# Patient Record
Sex: FEMALE | Race: ASIAN | NOT HISPANIC OR LATINO | URBAN - METROPOLITAN AREA
[De-identification: names, ages, dates, MRNs, and addresses within clinical notes are randomized per-mention and may not be internally consistent; named-entity substitution may affect disease eponyms.]

---

## 2014-10-02 RX ORDER — FUROSEMIDE 40 MG
1 TABLET ORAL
Qty: 0 | Refills: 0 | COMMUNITY
Start: 2014-10-02

## 2016-10-12 RX ORDER — HYDRALAZINE HCL 50 MG
1 TABLET ORAL
Qty: 0 | Refills: 0 | COMMUNITY
Start: 2016-10-12 | End: 2016-11-11

## 2017-01-25 ENCOUNTER — INPATIENT (INPATIENT)
Facility: HOSPITAL | Age: 77
LOS: 3 days | Discharge: HOME | DRG: 292 | End: 2017-01-29
Attending: FAMILY MEDICINE | Admitting: FAMILY MEDICINE
Payer: MEDICAID

## 2017-01-25 VITALS
DIASTOLIC BLOOD PRESSURE: 100 MMHG | SYSTOLIC BLOOD PRESSURE: 196 MMHG | TEMPERATURE: 99 F | HEART RATE: 106 BPM | WEIGHT: 178.57 LBS | RESPIRATION RATE: 22 BRPM | OXYGEN SATURATION: 96 %

## 2017-01-25 DIAGNOSIS — Z95.4 PRESENCE OF OTHER HEART-VALVE REPLACEMENT: Chronic | ICD-10-CM

## 2017-01-25 DIAGNOSIS — I50.9 HEART FAILURE, UNSPECIFIED: ICD-10-CM

## 2017-01-25 LAB
ALBUMIN SERPL ELPH-MCNC: 3.6 G/DL — SIGNIFICANT CHANGE UP (ref 3.3–5)
ALP SERPL-CCNC: 76 U/L — SIGNIFICANT CHANGE UP (ref 30–120)
ALT FLD-CCNC: 22 U/L DA — SIGNIFICANT CHANGE UP (ref 10–60)
ANION GAP SERPL CALC-SCNC: 12 MMOL/L — SIGNIFICANT CHANGE UP (ref 5–17)
APTT BLD: 44.1 SEC — HIGH (ref 27.5–37.4)
AST SERPL-CCNC: 22 U/L — SIGNIFICANT CHANGE UP (ref 10–40)
BASOPHILS # BLD AUTO: 0.1 K/UL — SIGNIFICANT CHANGE UP (ref 0–0.2)
BASOPHILS NFR BLD AUTO: 1 % — SIGNIFICANT CHANGE UP (ref 0–2)
BILIRUB SERPL-MCNC: 0.5 MG/DL — SIGNIFICANT CHANGE UP (ref 0.2–1.2)
BUN SERPL-MCNC: 18 MG/DL — SIGNIFICANT CHANGE UP (ref 7–23)
CALCIUM SERPL-MCNC: 9.1 MG/DL — SIGNIFICANT CHANGE UP (ref 8.4–10.5)
CHLORIDE SERPL-SCNC: 103 MMOL/L — SIGNIFICANT CHANGE UP (ref 96–108)
CK MB CFR SERPL CALC: 2.4 NG/ML — SIGNIFICANT CHANGE UP (ref 0–3.6)
CK SERPL-CCNC: 89 U/L — SIGNIFICANT CHANGE UP (ref 26–192)
CO2 SERPL-SCNC: 25 MMOL/L — SIGNIFICANT CHANGE UP (ref 22–31)
CREAT SERPL-MCNC: 0.96 MG/DL — SIGNIFICANT CHANGE UP (ref 0.5–1.3)
EOSINOPHIL # BLD AUTO: 0.2 K/UL — SIGNIFICANT CHANGE UP (ref 0–0.5)
EOSINOPHIL NFR BLD AUTO: 1.6 % — SIGNIFICANT CHANGE UP (ref 0–6)
GLUCOSE SERPL-MCNC: 157 MG/DL — HIGH (ref 70–99)
HCT VFR BLD CALC: 36.9 % — SIGNIFICANT CHANGE UP (ref 34.5–45)
HGB BLD-MCNC: 11.6 G/DL — SIGNIFICANT CHANGE UP (ref 11.5–15.5)
INR BLD: 3.02 RATIO — HIGH (ref 0.88–1.16)
LACTATE SERPL-SCNC: 1.2 MMOL/L — SIGNIFICANT CHANGE UP (ref 0.7–2)
LYMPHOCYTES # BLD AUTO: 1.5 K/UL — SIGNIFICANT CHANGE UP (ref 1–3.3)
LYMPHOCYTES # BLD AUTO: 10.5 % — LOW (ref 13–44)
MCHC RBC-ENTMCNC: 25.6 PG — LOW (ref 27–34)
MCHC RBC-ENTMCNC: 31.5 GM/DL — LOW (ref 32–36)
MCV RBC AUTO: 81.1 FL — SIGNIFICANT CHANGE UP (ref 80–100)
MONOCYTES # BLD AUTO: 0.9 K/UL — SIGNIFICANT CHANGE UP (ref 0–0.9)
MONOCYTES NFR BLD AUTO: 6.2 % — SIGNIFICANT CHANGE UP (ref 2–14)
NEUTROPHILS # BLD AUTO: 11.7 K/UL — HIGH (ref 1.8–7.4)
NEUTROPHILS NFR BLD AUTO: 80.7 % — HIGH (ref 43–77)
NT-PROBNP SERPL-SCNC: 1424 PG/ML — HIGH (ref 0–450)
PLATELET # BLD AUTO: 277 K/UL — SIGNIFICANT CHANGE UP (ref 150–400)
POTASSIUM SERPL-MCNC: 3.8 MMOL/L — SIGNIFICANT CHANGE UP (ref 3.5–5.3)
POTASSIUM SERPL-SCNC: 3.8 MMOL/L — SIGNIFICANT CHANGE UP (ref 3.5–5.3)
PROT SERPL-MCNC: 7.4 G/DL — SIGNIFICANT CHANGE UP (ref 6–8.3)
PROTHROM AB SERPL-ACNC: 34.2 SEC — HIGH (ref 10–13.1)
RBC # BLD: 4.55 M/UL — SIGNIFICANT CHANGE UP (ref 3.8–5.2)
RBC # FLD: 13.6 % — SIGNIFICANT CHANGE UP (ref 10.3–14.5)
SODIUM SERPL-SCNC: 140 MMOL/L — SIGNIFICANT CHANGE UP (ref 135–145)
TROPONIN I SERPL-MCNC: 0.06 NG/ML — HIGH (ref 0.02–0.06)
WBC # BLD: 14.5 K/UL — HIGH (ref 3.8–10.5)
WBC # FLD AUTO: 14.5 K/UL — HIGH (ref 3.8–10.5)

## 2017-01-25 PROCEDURE — 93010 ELECTROCARDIOGRAM REPORT: CPT

## 2017-01-25 PROCEDURE — 99285 EMERGENCY DEPT VISIT HI MDM: CPT

## 2017-01-25 PROCEDURE — 71020: CPT | Mod: 26

## 2017-01-25 NOTE — H&P ADULT. - PSH
H/O aortic valve replacement  9/22/14  H/O mitral valve replacement  9/22/14  S/P angioplasty with stent  2011  S/P CABG x 1  (2000)

## 2017-01-25 NOTE — H&P ADULT. - FAMILY HISTORY
Mother  Still living? No  Family history of acute myocardial infarction, Age at diagnosis: Age Unknown

## 2017-01-25 NOTE — H&P ADULT. - PMH
Afib  (on Warfarin)  CAD (Coronary Artery Disease)    CHF (congestive heart failure)    CVA (Cerebral Infarction)  2011  Gout    HTN (Hypertension)    Mitral Regurgitation    Upper GI bleed

## 2017-01-25 NOTE — H&P ADULT. - SCARS
3 mini-lap 1 rt & 2 middle infra-thorax upper abdomen. Left lower inner leg like vein strapping for CABG/location

## 2017-01-25 NOTE — H&P ADULT. - HISTORY OF PRESENT ILLNESS
75 yo woman, obese, c/o SOB x 4 hrs, /s C/P, cough, runny nose, fever, N/V/D, no new leg/ calf pain. Feels like what has happened to her in the past, though she is unsure what the reason of such symptoms means. SOB is worst on excerption. PH (+) HTN, CHF, At Fib, CVA, CAD- CABG, multi- valvular HD, GBD

## 2017-01-26 DIAGNOSIS — I25.810 ATHEROSCLEROSIS OF CORONARY ARTERY BYPASS GRAFT(S) WITHOUT ANGINA PECTORIS: ICD-10-CM

## 2017-01-26 DIAGNOSIS — I48.2 CHRONIC ATRIAL FIBRILLATION: ICD-10-CM

## 2017-01-26 DIAGNOSIS — R79.89 OTHER SPECIFIED ABNORMAL FINDINGS OF BLOOD CHEMISTRY: ICD-10-CM

## 2017-01-26 DIAGNOSIS — J18.9 PNEUMONIA, UNSPECIFIED ORGANISM: ICD-10-CM

## 2017-01-26 DIAGNOSIS — I50.9 HEART FAILURE, UNSPECIFIED: ICD-10-CM

## 2017-01-26 DIAGNOSIS — I10 ESSENTIAL (PRIMARY) HYPERTENSION: ICD-10-CM

## 2017-01-26 LAB
ANION GAP SERPL CALC-SCNC: 8 MMOL/L — SIGNIFICANT CHANGE UP (ref 5–17)
BUN SERPL-MCNC: 17 MG/DL — SIGNIFICANT CHANGE UP (ref 7–23)
CALCIUM SERPL-MCNC: 8.8 MG/DL — SIGNIFICANT CHANGE UP (ref 8.4–10.5)
CHLORIDE SERPL-SCNC: 104 MMOL/L — SIGNIFICANT CHANGE UP (ref 96–108)
CHOLEST SERPL-MCNC: 105 MG/DL — SIGNIFICANT CHANGE UP (ref 10–199)
CK MB BLD-MCNC: 2.8 % — SIGNIFICANT CHANGE UP (ref 0–3.5)
CK MB CFR SERPL CALC: 2.1 NG/ML — SIGNIFICANT CHANGE UP (ref 0–3.6)
CK SERPL-CCNC: 74 U/L — SIGNIFICANT CHANGE UP (ref 26–192)
CO2 SERPL-SCNC: 28 MMOL/L — SIGNIFICANT CHANGE UP (ref 22–31)
CREAT SERPL-MCNC: 0.9 MG/DL — SIGNIFICANT CHANGE UP (ref 0.5–1.3)
CRP SERPL-MCNC: 1 MG/DL — HIGH (ref 0–0.4)
GLUCOSE SERPL-MCNC: 112 MG/DL — HIGH (ref 70–99)
HBA1C BLD-MCNC: 6.3 % — HIGH (ref 4–5.6)
HCT VFR BLD CALC: 34.4 % — LOW (ref 34.5–45)
HDLC SERPL-MCNC: 45 MG/DL — SIGNIFICANT CHANGE UP (ref 40–125)
HGB BLD-MCNC: 10.7 G/DL — LOW (ref 11.5–15.5)
INR BLD: 3.11 RATIO — HIGH (ref 0.88–1.16)
LIPID PNL WITH DIRECT LDL SERPL: 49 MG/DL — SIGNIFICANT CHANGE UP
MCHC RBC-ENTMCNC: 25.8 PG — LOW (ref 27–34)
MCHC RBC-ENTMCNC: 31.2 GM/DL — LOW (ref 32–36)
MCV RBC AUTO: 82.7 FL — SIGNIFICANT CHANGE UP (ref 80–100)
PLATELET # BLD AUTO: 236 K/UL — SIGNIFICANT CHANGE UP (ref 150–400)
POTASSIUM SERPL-MCNC: 4.1 MMOL/L — SIGNIFICANT CHANGE UP (ref 3.5–5.3)
POTASSIUM SERPL-SCNC: 4.1 MMOL/L — SIGNIFICANT CHANGE UP (ref 3.5–5.3)
PROTHROM AB SERPL-ACNC: 35.2 SEC — HIGH (ref 10–13.1)
RBC # BLD: 4.16 M/UL — SIGNIFICANT CHANGE UP (ref 3.8–5.2)
RBC # FLD: 13.6 % — SIGNIFICANT CHANGE UP (ref 10.3–14.5)
SODIUM SERPL-SCNC: 140 MMOL/L — SIGNIFICANT CHANGE UP (ref 135–145)
TOTAL CHOLESTEROL/HDL RATIO MEASUREMENT: 2.3 RATIO — LOW (ref 3.3–7.1)
TRIGL SERPL-MCNC: 55 MG/DL — SIGNIFICANT CHANGE UP (ref 10–149)
TROPONIN I SERPL-MCNC: 0.01 NG/ML — LOW (ref 0.02–0.06)
TROPONIN I SERPL-MCNC: 0.04 NG/ML — SIGNIFICANT CHANGE UP (ref 0.02–0.06)
WBC # BLD: 10.9 K/UL — HIGH (ref 3.8–10.5)
WBC # FLD AUTO: 10.9 K/UL — HIGH (ref 3.8–10.5)

## 2017-01-26 PROCEDURE — 93010 ELECTROCARDIOGRAM REPORT: CPT

## 2017-01-26 PROCEDURE — 93306 TTE W/DOPPLER COMPLETE: CPT | Mod: 26

## 2017-01-26 PROCEDURE — 76604 US EXAM CHEST: CPT | Mod: 26

## 2017-01-26 RX ORDER — ALBUTEROL 90 UG/1
2 AEROSOL, METERED ORAL EVERY 6 HOURS
Qty: 0 | Refills: 0 | Status: DISCONTINUED | OUTPATIENT
Start: 2017-01-26 | End: 2017-01-29

## 2017-01-26 RX ORDER — DILTIAZEM HCL 120 MG
180 CAPSULE, EXT RELEASE 24 HR ORAL DAILY
Qty: 0 | Refills: 0 | Status: DISCONTINUED | OUTPATIENT
Start: 2017-01-26 | End: 2017-01-29

## 2017-01-26 RX ORDER — TIOTROPIUM BROMIDE 18 UG/1
1 CAPSULE ORAL; RESPIRATORY (INHALATION) DAILY
Qty: 0 | Refills: 0 | Status: DISCONTINUED | OUTPATIENT
Start: 2017-01-26 | End: 2017-01-29

## 2017-01-26 RX ORDER — FUROSEMIDE 40 MG
40 TABLET ORAL DAILY
Qty: 0 | Refills: 0 | Status: DISCONTINUED | OUTPATIENT
Start: 2017-01-26 | End: 2017-01-29

## 2017-01-26 RX ORDER — PANTOPRAZOLE SODIUM 20 MG/1
40 TABLET, DELAYED RELEASE ORAL
Qty: 0 | Refills: 0 | Status: DISCONTINUED | OUTPATIENT
Start: 2017-01-26 | End: 2017-01-29

## 2017-01-26 RX ORDER — POTASSIUM CHLORIDE 20 MEQ
20 PACKET (EA) ORAL DAILY
Qty: 0 | Refills: 0 | Status: DISCONTINUED | OUTPATIENT
Start: 2017-01-26 | End: 2017-01-29

## 2017-01-26 RX ORDER — METFORMIN HYDROCHLORIDE 850 MG/1
500 TABLET ORAL
Qty: 0 | Refills: 0 | Status: DISCONTINUED | OUTPATIENT
Start: 2017-01-26 | End: 2017-01-29

## 2017-01-26 RX ORDER — ENOXAPARIN SODIUM 100 MG/ML
40 INJECTION SUBCUTANEOUS EVERY 24 HOURS
Qty: 0 | Refills: 0 | Status: DISCONTINUED | OUTPATIENT
Start: 2017-01-26 | End: 2017-01-27

## 2017-01-26 RX ORDER — SIMVASTATIN 20 MG/1
40 TABLET, FILM COATED ORAL AT BEDTIME
Qty: 0 | Refills: 0 | Status: DISCONTINUED | OUTPATIENT
Start: 2017-01-26 | End: 2017-01-29

## 2017-01-26 RX ORDER — HYDRALAZINE HCL 50 MG
50 TABLET ORAL EVERY 8 HOURS
Qty: 0 | Refills: 0 | Status: DISCONTINUED | OUTPATIENT
Start: 2017-01-26 | End: 2017-01-29

## 2017-01-26 RX ADMIN — ALBUTEROL 2 PUFF(S): 90 AEROSOL, METERED ORAL at 01:54

## 2017-01-26 RX ADMIN — ALBUTEROL 2 PUFF(S): 90 AEROSOL, METERED ORAL at 10:13

## 2017-01-26 RX ADMIN — Medication 20 MILLIGRAM(S): at 06:29

## 2017-01-26 RX ADMIN — Medication 180 MILLIGRAM(S): at 06:29

## 2017-01-26 RX ADMIN — Medication 50 MILLIGRAM(S): at 06:29

## 2017-01-26 RX ADMIN — ALBUTEROL 2 PUFF(S): 90 AEROSOL, METERED ORAL at 17:37

## 2017-01-26 RX ADMIN — ENOXAPARIN SODIUM 40 MILLIGRAM(S): 100 INJECTION SUBCUTANEOUS at 17:36

## 2017-01-26 RX ADMIN — TIOTROPIUM BROMIDE 1 CAPSULE(S): 18 CAPSULE ORAL; RESPIRATORY (INHALATION) at 10:13

## 2017-01-26 RX ADMIN — METFORMIN HYDROCHLORIDE 500 MILLIGRAM(S): 850 TABLET ORAL at 17:37

## 2017-01-26 RX ADMIN — Medication 20 MILLIEQUIVALENT(S): at 11:28

## 2017-01-26 RX ADMIN — Medication 50 MILLIGRAM(S): at 13:20

## 2017-01-26 RX ADMIN — Medication 40 MILLIGRAM(S): at 06:29

## 2017-01-26 RX ADMIN — METFORMIN HYDROCHLORIDE 500 MILLIGRAM(S): 850 TABLET ORAL at 10:12

## 2017-01-26 RX ADMIN — PANTOPRAZOLE SODIUM 40 MILLIGRAM(S): 20 TABLET, DELAYED RELEASE ORAL at 10:12

## 2017-01-26 RX ADMIN — Medication 20 MILLIGRAM(S): at 17:36

## 2017-01-26 RX ADMIN — SIMVASTATIN 40 MILLIGRAM(S): 20 TABLET, FILM COATED ORAL at 22:14

## 2017-01-26 RX ADMIN — Medication 50 MILLIGRAM(S): at 22:14

## 2017-01-27 ENCOUNTER — TRANSCRIPTION ENCOUNTER (OUTPATIENT)
Age: 77
End: 2017-01-27

## 2017-01-27 LAB
INR BLD: 2.67 RATIO — HIGH (ref 0.88–1.16)
OB PNL STL: NEGATIVE — SIGNIFICANT CHANGE UP
PROTHROM AB SERPL-ACNC: 30.2 SEC — HIGH (ref 10–13.1)

## 2017-01-27 PROCEDURE — 93010 ELECTROCARDIOGRAM REPORT: CPT

## 2017-01-27 RX ORDER — WARFARIN SODIUM 2.5 MG/1
2 TABLET ORAL ONCE
Qty: 0 | Refills: 0 | Status: COMPLETED | OUTPATIENT
Start: 2017-01-27 | End: 2017-01-27

## 2017-01-27 RX ADMIN — SIMVASTATIN 40 MILLIGRAM(S): 20 TABLET, FILM COATED ORAL at 22:22

## 2017-01-27 RX ADMIN — Medication 20 MILLIGRAM(S): at 06:01

## 2017-01-27 RX ADMIN — Medication 50 MILLIGRAM(S): at 06:01

## 2017-01-27 RX ADMIN — Medication 180 MILLIGRAM(S): at 06:01

## 2017-01-27 RX ADMIN — PANTOPRAZOLE SODIUM 40 MILLIGRAM(S): 20 TABLET, DELAYED RELEASE ORAL at 06:32

## 2017-01-27 RX ADMIN — METFORMIN HYDROCHLORIDE 500 MILLIGRAM(S): 850 TABLET ORAL at 08:00

## 2017-01-27 RX ADMIN — ALBUTEROL 2 PUFF(S): 90 AEROSOL, METERED ORAL at 17:58

## 2017-01-27 RX ADMIN — TIOTROPIUM BROMIDE 1 CAPSULE(S): 18 CAPSULE ORAL; RESPIRATORY (INHALATION) at 06:45

## 2017-01-27 RX ADMIN — Medication 20 MILLIGRAM(S): at 17:58

## 2017-01-27 RX ADMIN — Medication 50 MILLIGRAM(S): at 22:22

## 2017-01-27 RX ADMIN — Medication 40 MILLIGRAM(S): at 06:01

## 2017-01-27 RX ADMIN — METFORMIN HYDROCHLORIDE 500 MILLIGRAM(S): 850 TABLET ORAL at 17:58

## 2017-01-27 RX ADMIN — WARFARIN SODIUM 2 MILLIGRAM(S): 2.5 TABLET ORAL at 22:22

## 2017-01-27 RX ADMIN — ALBUTEROL 2 PUFF(S): 90 AEROSOL, METERED ORAL at 06:45

## 2017-01-27 RX ADMIN — Medication 20 MILLIEQUIVALENT(S): at 12:43

## 2017-01-27 NOTE — DISCHARGE NOTE ADULT - PLAN OF CARE
Diet/ fluid, sodium salt, weight, activity, physician visit compliance. DAILY WEIGHTS EVERY MORNING, REPORT + OR > 2 LBS/ 24- 48HRS WEIGHT GAIN. DASH/ TLC/ ADA

## 2017-01-27 NOTE — DISCHARGE NOTE ADULT - COMMUNITY RESOURCES
Nurse to visit the day after hospital discharge. Please contact the home care agency at the above phone number if you have not heard from them by 12 noon on the day after your hospital discharge.

## 2017-01-27 NOTE — DISCHARGE NOTE ADULT - CARE PROVIDER_API CALL
Primary cardiologist/ Physician,   Phone: (   )    -  Fax: (   )    -    Shiloh Gomez), Cardiovascular Disease; Internal Medicine  [address] [facility]  [place] [address]  Phone: [phone]  Fax: [phone]    Pasha Oliver), Family Practice Medicine  [address] [id number]  Phone: [phone]  Fax: [phone]

## 2017-01-27 NOTE — DISCHARGE NOTE ADULT - CARE PLAN
Principal Discharge DX:	Acute on chronic congestive heart failure, unspecified congestive heart failure type  Goal:	Diet/ fluid, sodium salt, weight, activity, physician visit compliance. DAILY WEIGHTS EVERY MORNING, REPORT + OR > 2 LBS/ 24- 48HRS WEIGHT GAIN.  Instructions for follow-up, activity and diet:	DASH/ TLC/ ADA  Secondary Diagnosis:	Chronic atrial fibrillation  Secondary Diagnosis:	Troponin level elevated  Secondary Diagnosis:	Hypertensive heart disease with congestive heart failure  Secondary Diagnosis:	Pulmonary hypertension  Secondary Diagnosis:	Anticoagulant long-term use  Secondary Diagnosis:	CAD of autologous vein bypass graft without angina

## 2017-01-27 NOTE — DISCHARGE NOTE ADULT - MEDICATION SUMMARY - MEDICATIONS TO TAKE
I will START or STAY ON the medications listed below when I get home from the hospital:    acetaminophen 325 mg oral tablet  -- 2 tab(s) by mouth every 6 hours, As needed, Moderate Pain  -- Indication: For Pain/ fever    enalapril 20 mg oral tablet  -- 1 tab(s) by mouth 2 times a day  -- Indication: For Heart failure/ HTN    Cardizem  mg/24 hours oral capsule, extended release  -- 1 cap(s) by mouth once a day  -- It is very important that you take or use this exactly as directed.  Do not skip doses or discontinue unless directed by your doctor.  Some non-prescription drugs may aggravate your condition.  Read all labels carefully.  If a warning appears, check with your doctor before taking.  Swallow whole.  Do not crush.    -- Indication: For Chronic atrial fibrillation/ HTN    warfarin 4 mg oral tablet  -- 1 tab(s) by mouth once a day. INR goal 2-3. Monitor INR twice weekly with PCP.  -- Do not take this drug if you are pregnant.  It is very important that you take or use this exactly as directed.  Do not skip doses or discontinue unless directed by your doctor.  Obtain medical advice before taking any non-prescription drugs as some may affect the action of this medication.    -- Indication: For Chronic atrial fibrillation    metFORMIN 500 mg oral tablet, extended release  -- 1 tab(s) by mouth 2 times a day for 10 days, then 1 tab daily.  -- Indication: For Impaired fasting glucose/ Diabetes M    simvastatin 40 mg oral tablet  -- 1 tab(s) by mouth once a day (at bedtime)  -- Indication: For Coronary artery disease involving coronary bypass graft of native heart without angina pectoris    DuoNeb 0.5 mg-2.5 mg/3 mL inhalation solution  -- 3 milliliter(s) inhaled every 6 hours  -- For inhalation only.  It is very important that you take or use this exactly as directed.  Do not skip doses or discontinue unless directed by your doctor.  Obtain medical advice before taking any non-prescription drugs as some may affect the action of this medication.    -- Indication: For COPD exacerbation    Atrovent HFA CFC free 17 mcg/inh inhalation aerosol  -- 2 puff(s) inhaled 4 times a day, As Needed -for shortness of breath and/or wheezing  -- For inhalation only.  It is very important that you take or use this exactly as directed.  Do not skip doses or discontinue unless directed by your doctor.    -- Indication: For COPD exacerbation    furosemide 40 mg oral tablet  -- 1 tab(s) by mouth once a day  -- Indication: For Acute on chronic congestive heart failure, unspecified congestive heart failure type    potassium chloride 20 mEq oral tablet, extended release  -- 1 tab(s) by mouth once a day  -- Indication: For Heart failure/ on diuetic    pantoprazole 40 mg oral delayed release tablet  -- 1 tab(s) by mouth once a day (before a meal)  -- Indication: For Anemia, history of GI bleeding    hydrALAZINE 50 mg oral tablet  -- 1 tab(s) by mouth every 8 hours  -- Indication: For Essential hypertension

## 2017-01-27 NOTE — DISCHARGE NOTE ADULT - SECONDARY DIAGNOSIS.
Chronic atrial fibrillation Troponin level elevated Hypertensive heart disease with congestive heart failure Pulmonary hypertension Anticoagulant long-term use CAD of autologous vein bypass graft without angina

## 2017-01-27 NOTE — DISCHARGE NOTE ADULT - PATIENT PORTAL LINK FT
“You can access the FollowHealth Patient Portal, offered by [facility], by registering with the following website: [email or web address]”

## 2017-01-27 NOTE — DISCHARGE NOTE ADULT - HOSPITAL COURSE
77 yo woman, obese, c/o SOB x 4 hrs, /s C/P, cough, runny nose, fever, N/V/D, no new leg/ calf pain. Feels like what has happened to her in the past, though she is unsure what the reason of such symptoms means. SOB is worst on excerption. PH (+) HTN, CHF, At Fib, CVA, CAD- CABG, multi- valvular HD, GBD. Resolved post diuresing, diet. Cardio, pulmonary consult, PT  Subtherapeutic A/C, adjusted. Needs blood tests as outpatient, also monitor anemia, /c PH GI bleeding on PPI..

## 2017-01-28 LAB
HCT VFR BLD CALC: 33.6 % — LOW (ref 34.5–45)
HGB BLD-MCNC: 10.5 G/DL — LOW (ref 11.5–15.5)
INR BLD: 2.06 RATIO — HIGH (ref 0.88–1.16)
PROTHROM AB SERPL-ACNC: 23.2 SEC — HIGH (ref 10–13.1)

## 2017-01-28 RX ORDER — WARFARIN SODIUM 2.5 MG/1
3 TABLET ORAL ONCE
Qty: 0 | Refills: 0 | Status: COMPLETED | OUTPATIENT
Start: 2017-01-28 | End: 2017-01-28

## 2017-01-28 RX ADMIN — PANTOPRAZOLE SODIUM 40 MILLIGRAM(S): 20 TABLET, DELAYED RELEASE ORAL at 05:41

## 2017-01-28 RX ADMIN — Medication 180 MILLIGRAM(S): at 05:41

## 2017-01-28 RX ADMIN — ALBUTEROL 2 PUFF(S): 90 AEROSOL, METERED ORAL at 05:41

## 2017-01-28 RX ADMIN — METFORMIN HYDROCHLORIDE 500 MILLIGRAM(S): 850 TABLET ORAL at 08:01

## 2017-01-28 RX ADMIN — Medication 40 MILLIGRAM(S): at 05:41

## 2017-01-28 RX ADMIN — TIOTROPIUM BROMIDE 1 CAPSULE(S): 18 CAPSULE ORAL; RESPIRATORY (INHALATION) at 05:40

## 2017-01-28 RX ADMIN — Medication 50 MILLIGRAM(S): at 21:48

## 2017-01-28 RX ADMIN — Medication 20 MILLIEQUIVALENT(S): at 12:47

## 2017-01-28 RX ADMIN — ALBUTEROL 2 PUFF(S): 90 AEROSOL, METERED ORAL at 17:57

## 2017-01-28 RX ADMIN — Medication 50 MILLIGRAM(S): at 15:12

## 2017-01-28 RX ADMIN — WARFARIN SODIUM 3 MILLIGRAM(S): 2.5 TABLET ORAL at 21:48

## 2017-01-28 RX ADMIN — ALBUTEROL 2 PUFF(S): 90 AEROSOL, METERED ORAL at 12:47

## 2017-01-28 RX ADMIN — Medication 20 MILLIGRAM(S): at 17:57

## 2017-01-28 RX ADMIN — Medication 50 MILLIGRAM(S): at 05:41

## 2017-01-28 RX ADMIN — Medication 20 MILLIGRAM(S): at 05:41

## 2017-01-28 RX ADMIN — METFORMIN HYDROCHLORIDE 500 MILLIGRAM(S): 850 TABLET ORAL at 17:56

## 2017-01-28 RX ADMIN — SIMVASTATIN 40 MILLIGRAM(S): 20 TABLET, FILM COATED ORAL at 21:48

## 2017-01-28 NOTE — DIETITIAN INITIAL EVALUATION ADULT. - OTHER INFO
Pt admitted with heart failure: Pmhx:Afib, CAD, CVA, HTN, stent, CABG x 1 MVR, AVR, DM? (spoke with RN who stated as per family pt does have diabetes but they requested no accuchecks/insulin coverage in hospital-) Pt is also on metformin No family at bedside Interview conducted using [place]  #[id number]. She denies any trouble eating or changes in wt PTA. She states she follows a low sodium diet and doesn't eat much sugar. Pt denies having DM but does state that sometimes her sugars run high. Pt educated on dietary strategies to help achieve glycemic control. Reviewed foods that can raise BG and stressed portion size and balanced meals.  Vitamin K/coumadin interaction was also reviewed. Current diet rx DASH/TLC: Recommend adding consistent carbohydrate to order.  Noted: Pt reported wt as 153# per chart However admission weight 178# Pt appears closer to 178#

## 2017-01-28 NOTE — PHYSICAL THERAPY INITIAL EVALUATION ADULT - ADDITIONAL COMMENTS
used to translate Pino<->English (name- Arthur, #[id number]). Pt states she lives with her son in a house in [place] but is currently visiting/staying with her daughter on [place] (she'll remain with her daughter until she is well enough to return to living with her son). Her daughter lives in a house with 3-4 steps to enter with a railing and no steps inside. Pt states her daughter is home during the day. Pt was independent with all mobility prior to admission and has no DME. Pt does not use oxygen at home.

## 2017-01-29 VITALS — DIASTOLIC BLOOD PRESSURE: 69 MMHG | TEMPERATURE: 99 F | HEART RATE: 76 BPM | SYSTOLIC BLOOD PRESSURE: 130 MMHG

## 2017-01-29 LAB
INR BLD: 1.53 RATIO — HIGH (ref 0.88–1.16)
PROTHROM AB SERPL-ACNC: 17.2 SEC — HIGH (ref 10–13.1)

## 2017-01-29 PROCEDURE — 94760 N-INVAS EAR/PLS OXIMETRY 1: CPT

## 2017-01-29 PROCEDURE — 80048 BASIC METABOLIC PNL TOTAL CA: CPT

## 2017-01-29 PROCEDURE — 83036 HEMOGLOBIN GLYCOSYLATED A1C: CPT

## 2017-01-29 PROCEDURE — 82272 OCCULT BLD FECES 1-3 TESTS: CPT

## 2017-01-29 PROCEDURE — 96374 THER/PROPH/DIAG INJ IV PUSH: CPT

## 2017-01-29 PROCEDURE — 83605 ASSAY OF LACTIC ACID: CPT

## 2017-01-29 PROCEDURE — 82553 CREATINE MB FRACTION: CPT

## 2017-01-29 PROCEDURE — 99285 EMERGENCY DEPT VISIT HI MDM: CPT | Mod: 25

## 2017-01-29 PROCEDURE — 94640 AIRWAY INHALATION TREATMENT: CPT

## 2017-01-29 PROCEDURE — 87040 BLOOD CULTURE FOR BACTERIA: CPT

## 2017-01-29 PROCEDURE — 80061 LIPID PANEL: CPT

## 2017-01-29 PROCEDURE — 84484 ASSAY OF TROPONIN QUANT: CPT

## 2017-01-29 PROCEDURE — 85027 COMPLETE CBC AUTOMATED: CPT

## 2017-01-29 PROCEDURE — 93306 TTE W/DOPPLER COMPLETE: CPT

## 2017-01-29 PROCEDURE — 97161 PT EVAL LOW COMPLEX 20 MIN: CPT

## 2017-01-29 PROCEDURE — 93005 ELECTROCARDIOGRAM TRACING: CPT

## 2017-01-29 PROCEDURE — 82550 ASSAY OF CK (CPK): CPT

## 2017-01-29 PROCEDURE — 85018 HEMOGLOBIN: CPT

## 2017-01-29 PROCEDURE — 85610 PROTHROMBIN TIME: CPT

## 2017-01-29 PROCEDURE — 76604 US EXAM CHEST: CPT

## 2017-01-29 PROCEDURE — 80053 COMPREHEN METABOLIC PANEL: CPT

## 2017-01-29 PROCEDURE — 85730 THROMBOPLASTIN TIME PARTIAL: CPT

## 2017-01-29 PROCEDURE — 86140 C-REACTIVE PROTEIN: CPT

## 2017-01-29 PROCEDURE — 71046 X-RAY EXAM CHEST 2 VIEWS: CPT

## 2017-01-29 PROCEDURE — 83880 ASSAY OF NATRIURETIC PEPTIDE: CPT

## 2017-01-29 RX ORDER — WARFARIN SODIUM 2.5 MG/1
4 TABLET ORAL ONCE
Qty: 0 | Refills: 0 | Status: COMPLETED | OUTPATIENT
Start: 2017-01-29 | End: 2017-01-29

## 2017-01-29 RX ADMIN — Medication 50 MILLIGRAM(S): at 06:52

## 2017-01-29 RX ADMIN — WARFARIN SODIUM 4 MILLIGRAM(S): 2.5 TABLET ORAL at 13:58

## 2017-01-29 RX ADMIN — ALBUTEROL 2 PUFF(S): 90 AEROSOL, METERED ORAL at 06:53

## 2017-01-29 RX ADMIN — Medication 40 MILLIGRAM(S): at 06:52

## 2017-01-29 RX ADMIN — METFORMIN HYDROCHLORIDE 500 MILLIGRAM(S): 850 TABLET ORAL at 08:06

## 2017-01-29 RX ADMIN — Medication 20 MILLIEQUIVALENT(S): at 11:44

## 2017-01-29 RX ADMIN — Medication 180 MILLIGRAM(S): at 06:52

## 2017-01-29 RX ADMIN — TIOTROPIUM BROMIDE 1 CAPSULE(S): 18 CAPSULE ORAL; RESPIRATORY (INHALATION) at 06:52

## 2017-01-29 RX ADMIN — ALBUTEROL 2 PUFF(S): 90 AEROSOL, METERED ORAL at 13:57

## 2017-01-29 RX ADMIN — Medication 50 MILLIGRAM(S): at 13:57

## 2017-01-29 RX ADMIN — Medication 20 MILLIGRAM(S): at 06:52

## 2017-01-29 RX ADMIN — PANTOPRAZOLE SODIUM 40 MILLIGRAM(S): 20 TABLET, DELAYED RELEASE ORAL at 06:52

## 2017-01-31 LAB
CULTURE RESULTS: SIGNIFICANT CHANGE UP
CULTURE RESULTS: SIGNIFICANT CHANGE UP
SPECIMEN SOURCE: SIGNIFICANT CHANGE UP
SPECIMEN SOURCE: SIGNIFICANT CHANGE UP

## 2017-02-08 ENCOUNTER — INPATIENT (INPATIENT)
Facility: HOSPITAL | Age: 77
LOS: 4 days | Discharge: HOME | End: 2017-02-13
Attending: INTERNAL MEDICINE | Admitting: INTERNAL MEDICINE
Payer: MEDICAID

## 2017-02-08 VITALS
HEART RATE: 88 BPM | TEMPERATURE: 99 F | OXYGEN SATURATION: 96 % | RESPIRATION RATE: 20 BRPM | DIASTOLIC BLOOD PRESSURE: 78 MMHG | SYSTOLIC BLOOD PRESSURE: 197 MMHG

## 2017-02-08 DIAGNOSIS — Z95.4 PRESENCE OF OTHER HEART-VALVE REPLACEMENT: Chronic | ICD-10-CM

## 2017-02-08 DIAGNOSIS — J90 PLEURAL EFFUSION, NOT ELSEWHERE CLASSIFIED: ICD-10-CM

## 2017-02-08 LAB
ALBUMIN SERPL ELPH-MCNC: 4 G/DL — SIGNIFICANT CHANGE UP (ref 3.3–5)
ALP SERPL-CCNC: 60 U/L — SIGNIFICANT CHANGE UP (ref 40–120)
ALT FLD-CCNC: 20 U/L — SIGNIFICANT CHANGE UP (ref 4–33)
AST SERPL-CCNC: 33 U/L — HIGH (ref 4–32)
BASOPHILS # BLD AUTO: 0.07 K/UL — SIGNIFICANT CHANGE UP (ref 0–0.2)
BASOPHILS NFR BLD AUTO: 0.6 % — SIGNIFICANT CHANGE UP (ref 0–2)
BILIRUB SERPL-MCNC: 0.4 MG/DL — SIGNIFICANT CHANGE UP (ref 0.2–1.2)
BUN SERPL-MCNC: 17 MG/DL — SIGNIFICANT CHANGE UP (ref 7–23)
CALCIUM SERPL-MCNC: 9 MG/DL — SIGNIFICANT CHANGE UP (ref 8.4–10.5)
CHLORIDE SERPL-SCNC: 104 MMOL/L — SIGNIFICANT CHANGE UP (ref 98–107)
CK MB BLD-MCNC: 3.07 NG/ML — SIGNIFICANT CHANGE UP (ref 1–4.7)
CK MB BLD-MCNC: SIGNIFICANT CHANGE UP (ref 0–2.5)
CK SERPL-CCNC: 104 U/L — SIGNIFICANT CHANGE UP (ref 25–170)
CO2 SERPL-SCNC: 21 MMOL/L — LOW (ref 22–31)
CREAT SERPL-MCNC: 0.94 MG/DL — SIGNIFICANT CHANGE UP (ref 0.5–1.3)
EOSINOPHIL # BLD AUTO: 0.21 K/UL — SIGNIFICANT CHANGE UP (ref 0–0.5)
EOSINOPHIL NFR BLD AUTO: 1.7 % — SIGNIFICANT CHANGE UP (ref 0–6)
GLUCOSE SERPL-MCNC: 111 MG/DL — HIGH (ref 70–99)
HCT VFR BLD CALC: 37.5 % — SIGNIFICANT CHANGE UP (ref 34.5–45)
HGB BLD-MCNC: 11.6 G/DL — SIGNIFICANT CHANGE UP (ref 11.5–15.5)
IMM GRANULOCYTES NFR BLD AUTO: 0.3 % — SIGNIFICANT CHANGE UP (ref 0–1.5)
LYMPHOCYTES # BLD AUTO: 16.7 % — SIGNIFICANT CHANGE UP (ref 13–44)
LYMPHOCYTES # BLD AUTO: 2.07 K/UL — SIGNIFICANT CHANGE UP (ref 1–3.3)
MAGNESIUM SERPL-MCNC: 1.9 MG/DL — SIGNIFICANT CHANGE UP (ref 1.6–2.6)
MCHC RBC-ENTMCNC: 26 PG — LOW (ref 27–34)
MCHC RBC-ENTMCNC: 30.9 % — LOW (ref 32–36)
MCV RBC AUTO: 83.9 FL — SIGNIFICANT CHANGE UP (ref 80–100)
MONOCYTES # BLD AUTO: 0.99 K/UL — HIGH (ref 0–0.9)
MONOCYTES NFR BLD AUTO: 8 % — SIGNIFICANT CHANGE UP (ref 2–14)
NEUTROPHILS # BLD AUTO: 9 K/UL — HIGH (ref 1.8–7.4)
NEUTROPHILS NFR BLD AUTO: 72.7 % — SIGNIFICANT CHANGE UP (ref 43–77)
NT-PROBNP SERPL-SCNC: 2001 PG/ML — SIGNIFICANT CHANGE UP
PLATELET # BLD AUTO: 283 K/UL — SIGNIFICANT CHANGE UP (ref 150–400)
PMV BLD: 10.3 FL — SIGNIFICANT CHANGE UP (ref 7–13)
POTASSIUM SERPL-MCNC: 4.6 MMOL/L — SIGNIFICANT CHANGE UP (ref 3.5–5.3)
POTASSIUM SERPL-SCNC: 4.6 MMOL/L — SIGNIFICANT CHANGE UP (ref 3.5–5.3)
PROT SERPL-MCNC: 7.2 G/DL — SIGNIFICANT CHANGE UP (ref 6–8.3)
RBC # BLD: 4.47 M/UL — SIGNIFICANT CHANGE UP (ref 3.8–5.2)
RBC # FLD: 14.2 % — SIGNIFICANT CHANGE UP (ref 10.3–14.5)
SODIUM SERPL-SCNC: 141 MMOL/L — SIGNIFICANT CHANGE UP (ref 135–145)
TROPONIN T SERPL-MCNC: < 0.06 NG/ML — SIGNIFICANT CHANGE UP (ref 0–0.06)
WBC # BLD: 12.38 K/UL — HIGH (ref 3.8–10.5)
WBC # FLD AUTO: 12.38 K/UL — HIGH (ref 3.8–10.5)

## 2017-02-08 PROCEDURE — 71020: CPT | Mod: 26

## 2017-02-08 RX ORDER — FUROSEMIDE 40 MG
40 TABLET ORAL ONCE
Qty: 0 | Refills: 0 | Status: COMPLETED | OUTPATIENT
Start: 2017-02-08 | End: 2017-02-09

## 2017-02-08 NOTE — ED PROVIDER NOTE - OBJECTIVE STATEMENT
76F pmh HTN, ?CHF (EF 60% but on lasix), pulmonary htn,  A Fib (on coumadin), CVA, CAD- CABG, multiple valve replacements, COPD (on duonebs) I, [place], speak [place] fluently  76F pmh HTN, ?CHF (EF 60% but on 40 mg lasix), pulmonary htn,  A Fib (on coumadin), CVA, CAD- CABG, multiple valve replacements, ?COPD (on duonebs) p/w sob. pt recently d/c 1 wk ago for chf exacerabtion & pna ~ 1wk ago. since yesterday pt redeveloped sob, silva. denies any orthopnea, cough, rhinorrhea, f/c, cp, n/v/d, abdominal pain, recent travel, hx of dvt, le swelling, recent surgery.

## 2017-02-08 NOTE — ED ADULT TRIAGE NOTE - CHIEF COMPLAINT QUOTE
Patient c/o SOB since yesterday. Unrelieved with nebulizer. Denies chest pain. patient with cardiac history,

## 2017-02-08 NOTE — ED PROVIDER NOTE - MEDICAL DECISION MAKING DETAILS
ddx: pna vs chf exacerbation vs viral uri. given new LBBB from 2014 will need to r/o acs  -ekg -cxr -bnp -labs -consider admission ddx: pna vs chf exacerbation vs viral uri. given new LBBB that doesn't meet rayray holly from 2014 but will need to r/o acs  -ekg -cxr -bnp -labs -consider admission

## 2017-02-08 NOTE — ED ADULT NURSE NOTE - OBJECTIVE STATEMENT
pt brought to ed by daughter, with c/o difficulty breathing since last night, neb treatment was given by daughter, no respiratory distress noted. rectal temp 98.3. pt denies pain. Daughter Humberto Long translated as per pt, pt speaks limited english.

## 2017-02-08 NOTE — ED PROVIDER NOTE - ATTENDING CONTRIBUTION TO CARE
I performed a face to face evaluation of this patient and performed a history and physical examination on the patient.  I agree with the resident's history, physical examination, and plan of the patient. patient with shortness of breath. multiple comorbids. cv: rrr.  cxr with right pleural effusion. concerning for chf exacerbation. ekg changes. will admit.

## 2017-02-09 DIAGNOSIS — Z41.8 ENCOUNTER FOR OTHER PROCEDURES FOR PURPOSES OTHER THAN REMEDYING HEALTH STATE: ICD-10-CM

## 2017-02-09 DIAGNOSIS — I48.91 UNSPECIFIED ATRIAL FIBRILLATION: ICD-10-CM

## 2017-02-09 DIAGNOSIS — E11.9 TYPE 2 DIABETES MELLITUS WITHOUT COMPLICATIONS: ICD-10-CM

## 2017-02-09 DIAGNOSIS — E78.5 HYPERLIPIDEMIA, UNSPECIFIED: ICD-10-CM

## 2017-02-09 DIAGNOSIS — I10 ESSENTIAL (PRIMARY) HYPERTENSION: ICD-10-CM

## 2017-02-09 DIAGNOSIS — I50.9 HEART FAILURE, UNSPECIFIED: ICD-10-CM

## 2017-02-09 DIAGNOSIS — J44.9 CHRONIC OBSTRUCTIVE PULMONARY DISEASE, UNSPECIFIED: ICD-10-CM

## 2017-02-09 LAB
ALBUMIN SERPL ELPH-MCNC: 3.7 G/DL — SIGNIFICANT CHANGE UP (ref 3.3–5)
ALP SERPL-CCNC: 57 U/L — SIGNIFICANT CHANGE UP (ref 40–120)
ALT FLD-CCNC: 20 U/L — SIGNIFICANT CHANGE UP (ref 4–33)
APPEARANCE UR: CLEAR — SIGNIFICANT CHANGE UP
APTT BLD: 40.1 SEC — HIGH (ref 27.5–37.4)
AST SERPL-CCNC: 22 U/L — SIGNIFICANT CHANGE UP (ref 4–32)
BILIRUB SERPL-MCNC: 0.5 MG/DL — SIGNIFICANT CHANGE UP (ref 0.2–1.2)
BILIRUB UR-MCNC: NEGATIVE — SIGNIFICANT CHANGE UP
BLOOD UR QL VISUAL: NEGATIVE — SIGNIFICANT CHANGE UP
BUN SERPL-MCNC: 18 MG/DL — SIGNIFICANT CHANGE UP (ref 7–23)
CALCIUM SERPL-MCNC: 9 MG/DL — SIGNIFICANT CHANGE UP (ref 8.4–10.5)
CHLORIDE SERPL-SCNC: 102 MMOL/L — SIGNIFICANT CHANGE UP (ref 98–107)
CHOLEST SERPL-MCNC: 99 MG/DL — LOW (ref 120–199)
CK MB BLD-MCNC: 3.4 NG/ML — SIGNIFICANT CHANGE UP (ref 1–4.7)
CK MB BLD-MCNC: SIGNIFICANT CHANGE UP (ref 0–2.5)
CK SERPL-CCNC: 102 U/L — SIGNIFICANT CHANGE UP (ref 25–170)
CO2 SERPL-SCNC: 24 MMOL/L — SIGNIFICANT CHANGE UP (ref 22–31)
COLOR SPEC: YELLOW — SIGNIFICANT CHANGE UP
CREAT SERPL-MCNC: 0.82 MG/DL — SIGNIFICANT CHANGE UP (ref 0.5–1.3)
GLUCOSE SERPL-MCNC: 112 MG/DL — HIGH (ref 70–99)
GLUCOSE UR-MCNC: NEGATIVE — SIGNIFICANT CHANGE UP
HCT VFR BLD CALC: 34.9 % — SIGNIFICANT CHANGE UP (ref 34.5–45)
HDLC SERPL-MCNC: 54 MG/DL — SIGNIFICANT CHANGE UP (ref 45–65)
HGB BLD-MCNC: 11.1 G/DL — LOW (ref 11.5–15.5)
HYALINE CASTS # UR AUTO: SIGNIFICANT CHANGE UP (ref 0–?)
INR BLD: 2.16 — HIGH (ref 0.87–1.18)
KETONES UR-MCNC: NEGATIVE — SIGNIFICANT CHANGE UP
LEUKOCYTE ESTERASE UR-ACNC: NEGATIVE — SIGNIFICANT CHANGE UP
LIPID PNL WITH DIRECT LDL SERPL: 40 MG/DL — SIGNIFICANT CHANGE UP
MAGNESIUM SERPL-MCNC: 1.7 MG/DL — SIGNIFICANT CHANGE UP (ref 1.6–2.6)
MCHC RBC-ENTMCNC: 26.6 PG — LOW (ref 27–34)
MCHC RBC-ENTMCNC: 31.8 % — LOW (ref 32–36)
MCV RBC AUTO: 83.7 FL — SIGNIFICANT CHANGE UP (ref 80–100)
MUCOUS THREADS # UR AUTO: SIGNIFICANT CHANGE UP
NITRITE UR-MCNC: NEGATIVE — SIGNIFICANT CHANGE UP
PH UR: 5.5 — SIGNIFICANT CHANGE UP (ref 4.6–8)
PHOSPHATE SERPL-MCNC: 3.8 MG/DL — SIGNIFICANT CHANGE UP (ref 2.5–4.5)
PLATELET # BLD AUTO: 256 K/UL — SIGNIFICANT CHANGE UP (ref 150–400)
PMV BLD: 10.2 FL — SIGNIFICANT CHANGE UP (ref 7–13)
POTASSIUM SERPL-MCNC: 4.3 MMOL/L — SIGNIFICANT CHANGE UP (ref 3.5–5.3)
POTASSIUM SERPL-SCNC: 4.3 MMOL/L — SIGNIFICANT CHANGE UP (ref 3.5–5.3)
PROT SERPL-MCNC: 6.2 G/DL — SIGNIFICANT CHANGE UP (ref 6–8.3)
PROT UR-MCNC: NEGATIVE — SIGNIFICANT CHANGE UP
PROTHROM AB SERPL-ACNC: 24.8 SEC — HIGH (ref 10–13.1)
RBC # BLD: 4.17 M/UL — SIGNIFICANT CHANGE UP (ref 3.8–5.2)
RBC # FLD: 14.3 % — SIGNIFICANT CHANGE UP (ref 10.3–14.5)
RBC CASTS # UR COMP ASSIST: SIGNIFICANT CHANGE UP (ref 0–?)
SODIUM SERPL-SCNC: 142 MMOL/L — SIGNIFICANT CHANGE UP (ref 135–145)
SP GR SPEC: 1.01 — SIGNIFICANT CHANGE UP (ref 1–1.03)
SQUAMOUS # UR AUTO: SIGNIFICANT CHANGE UP
TRIGL SERPL-MCNC: 62 MG/DL — SIGNIFICANT CHANGE UP (ref 10–149)
TROPONIN T SERPL-MCNC: < 0.06 NG/ML — SIGNIFICANT CHANGE UP (ref 0–0.06)
TSH SERPL-MCNC: 0.63 UIU/ML — SIGNIFICANT CHANGE UP (ref 0.27–4.2)
UROBILINOGEN FLD QL: NORMAL E.U. — SIGNIFICANT CHANGE UP (ref 0.1–0.2)
WBC # BLD: 12.03 K/UL — HIGH (ref 3.8–10.5)
WBC # FLD AUTO: 12.03 K/UL — HIGH (ref 3.8–10.5)
WBC UR QL: SIGNIFICANT CHANGE UP (ref 0–?)

## 2017-02-09 RX ORDER — IPRATROPIUM BROMIDE 0.2 MG/ML
2 SOLUTION, NON-ORAL INHALATION EVERY 6 HOURS
Qty: 0 | Refills: 0 | Status: DISCONTINUED | OUTPATIENT
Start: 2017-02-09 | End: 2017-02-13

## 2017-02-09 RX ORDER — GLUCAGON INJECTION, SOLUTION 0.5 MG/.1ML
1 INJECTION, SOLUTION SUBCUTANEOUS ONCE
Qty: 0 | Refills: 0 | Status: DISCONTINUED | OUTPATIENT
Start: 2017-02-09 | End: 2017-02-13

## 2017-02-09 RX ORDER — DEXTROSE 50 % IN WATER 50 %
25 SYRINGE (ML) INTRAVENOUS ONCE
Qty: 0 | Refills: 0 | Status: DISCONTINUED | OUTPATIENT
Start: 2017-02-09 | End: 2017-02-13

## 2017-02-09 RX ORDER — HYDRALAZINE HCL 50 MG
50 TABLET ORAL EVERY 8 HOURS
Qty: 0 | Refills: 0 | Status: DISCONTINUED | OUTPATIENT
Start: 2017-02-09 | End: 2017-02-13

## 2017-02-09 RX ORDER — ACETAMINOPHEN 500 MG
650 TABLET ORAL EVERY 6 HOURS
Qty: 0 | Refills: 0 | Status: DISCONTINUED | OUTPATIENT
Start: 2017-02-09 | End: 2017-02-13

## 2017-02-09 RX ORDER — DEXTROSE 50 % IN WATER 50 %
1 SYRINGE (ML) INTRAVENOUS ONCE
Qty: 0 | Refills: 0 | Status: DISCONTINUED | OUTPATIENT
Start: 2017-02-09 | End: 2017-02-13

## 2017-02-09 RX ORDER — SODIUM CHLORIDE 9 MG/ML
1000 INJECTION, SOLUTION INTRAVENOUS
Qty: 0 | Refills: 0 | Status: DISCONTINUED | OUTPATIENT
Start: 2017-02-09 | End: 2017-02-13

## 2017-02-09 RX ORDER — INSULIN LISPRO 100/ML
VIAL (ML) SUBCUTANEOUS AT BEDTIME
Qty: 0 | Refills: 0 | Status: DISCONTINUED | OUTPATIENT
Start: 2017-02-09 | End: 2017-02-13

## 2017-02-09 RX ORDER — HYDRALAZINE HCL 50 MG
50 TABLET ORAL ONCE
Qty: 0 | Refills: 0 | Status: COMPLETED | OUTPATIENT
Start: 2017-02-09 | End: 2017-02-09

## 2017-02-09 RX ORDER — PANTOPRAZOLE SODIUM 20 MG/1
40 TABLET, DELAYED RELEASE ORAL
Qty: 0 | Refills: 0 | Status: DISCONTINUED | OUTPATIENT
Start: 2017-02-09 | End: 2017-02-13

## 2017-02-09 RX ORDER — INSULIN LISPRO 100/ML
VIAL (ML) SUBCUTANEOUS
Qty: 0 | Refills: 0 | Status: DISCONTINUED | OUTPATIENT
Start: 2017-02-09 | End: 2017-02-13

## 2017-02-09 RX ORDER — SIMVASTATIN 20 MG/1
40 TABLET, FILM COATED ORAL AT BEDTIME
Qty: 0 | Refills: 0 | Status: DISCONTINUED | OUTPATIENT
Start: 2017-02-09 | End: 2017-02-13

## 2017-02-09 RX ORDER — WARFARIN SODIUM 2.5 MG/1
4 TABLET ORAL ONCE
Qty: 0 | Refills: 0 | Status: COMPLETED | OUTPATIENT
Start: 2017-02-09 | End: 2017-02-09

## 2017-02-09 RX ORDER — DEXTROSE 50 % IN WATER 50 %
12.5 SYRINGE (ML) INTRAVENOUS ONCE
Qty: 0 | Refills: 0 | Status: DISCONTINUED | OUTPATIENT
Start: 2017-02-09 | End: 2017-02-13

## 2017-02-09 RX ORDER — DILTIAZEM HCL 120 MG
180 CAPSULE, EXT RELEASE 24 HR ORAL DAILY
Qty: 0 | Refills: 0 | Status: DISCONTINUED | OUTPATIENT
Start: 2017-02-09 | End: 2017-02-13

## 2017-02-09 RX ORDER — FUROSEMIDE 40 MG
40 TABLET ORAL
Qty: 0 | Refills: 0 | Status: DISCONTINUED | OUTPATIENT
Start: 2017-02-09 | End: 2017-02-12

## 2017-02-09 RX ORDER — ALBUTEROL 90 UG/1
2 AEROSOL, METERED ORAL EVERY 6 HOURS
Qty: 0 | Refills: 0 | Status: DISCONTINUED | OUTPATIENT
Start: 2017-02-09 | End: 2017-02-13

## 2017-02-09 RX ADMIN — Medication 50 MILLIGRAM(S): at 05:28

## 2017-02-09 RX ADMIN — Medication 50 MILLIGRAM(S): at 13:57

## 2017-02-09 RX ADMIN — WARFARIN SODIUM 4 MILLIGRAM(S): 2.5 TABLET ORAL at 18:12

## 2017-02-09 RX ADMIN — Medication 50 MILLIGRAM(S): at 01:11

## 2017-02-09 RX ADMIN — Medication: at 18:12

## 2017-02-09 RX ADMIN — Medication 20 MILLIGRAM(S): at 05:27

## 2017-02-09 RX ADMIN — Medication 50 MILLIGRAM(S): at 22:11

## 2017-02-09 RX ADMIN — Medication 180 MILLIGRAM(S): at 05:28

## 2017-02-09 RX ADMIN — SIMVASTATIN 40 MILLIGRAM(S): 20 TABLET, FILM COATED ORAL at 22:11

## 2017-02-09 RX ADMIN — Medication 40 MILLIGRAM(S): at 05:27

## 2017-02-09 RX ADMIN — Medication 40 MILLIGRAM(S): at 18:13

## 2017-02-09 RX ADMIN — PANTOPRAZOLE SODIUM 40 MILLIGRAM(S): 20 TABLET, DELAYED RELEASE ORAL at 05:28

## 2017-02-09 RX ADMIN — Medication 20 MILLIGRAM(S): at 18:12

## 2017-02-09 RX ADMIN — Medication 40 MILLIGRAM(S): at 00:14

## 2017-02-09 NOTE — H&P ADULT. - NEGATIVE OPHTHALMOLOGIC SYMPTOMS
no discharge R/no lacrimation R/no blurred vision R/no blurred vision L/no lacrimation L/no photophobia/no diplopia/no discharge L

## 2017-02-09 NOTE — H&P ADULT. - FAMILY HISTORY
Mother  Still living? Unknown  Family history of acute myocardial infarction, Age at diagnosis: Age Unknown

## 2017-02-09 NOTE — H&P ADULT. - PROBLEM SELECTOR PLAN 1
Admit to telemetry, serial Uday, serial EKGs  f/u MD note   HgbA1C, TSH, lipid profile, CBC, CMP in am   Low sodium diet, daily weights, monitor I's and O's , fluid restrictions 1500cc/day  Check BNP in 48 hours   On Lasix IV 40mg BID    Consider TTE in am to evaluate LVEF   PT consult ordered

## 2017-02-09 NOTE — H&P ADULT. - NEGATIVE NEUROLOGICAL SYMPTOMS
no focal seizures/no syncope/no loss of sensation/no tremors/no transient paralysis/no difficulty walking/no loss of consciousness/no weakness/no headache/no paresthesias/no vertigo/no generalized seizures/no hemiparesis/no confusion

## 2017-02-09 NOTE — H&P ADULT. - NEGATIVE ENMT SYMPTOMS
no nasal discharge/no vertigo/no hearing difficulty/no tinnitus/no ear pain/no nasal congestion/no sinus symptoms

## 2017-02-09 NOTE — H&P ADULT. - ASSESSMENT
77 y/o F with PMH of CAD(s/p CABG and stents), Afib(on Coumadin), CVA, CHF(with EF of 65%), Aortic and Mitral valve replacement(bioprosthetic) presented with the complaint of SOB and HERNANDEZ. R/o CHF exacerbation    +CHF exacerbation-On IV Lasix 40mg BID

## 2017-02-09 NOTE — H&P ADULT. - HISTORY OF PRESENT ILLNESS
*HPI and ROS was obtained from patient's daughter who was at bedside and preferred to translate for her mother*     77 y/o F with PMH of CAD(s/p CABG and stents), Afib(on Coumadin), CVA, CHF(with EF of 65%), Aortic and Mitral valve replacement(bioprosthetic) presented with the complaint of SOB and HERNANDEZ. As per daughter her mother was discharged 1 month ago from [facility] for similar complaints and was diagnosed with heart failure and pneumonia and was diuresed and given antibiotics and sent home. Yesterday evening while she was lying in the bed she developed severe SOB and took her nebulizer which helped mildly. When daughter asked how she was feeling she stated that "a little better". This morning she developed similar SOB for which she took her inhaler and it did not help so she decided to bring her mother to the ER. Patient stated that when she lies down she developed worsening SOB than when she is sitting up. Patient also endorsed intermittent episode of bilateral LE swelling. Patient also endorsed productive cough with yellow sputum production. Patient normally sleeps with 1 pillow at night which is chronic for her. Patient also endorsed orthopnea and PND. Daughter stated that her mother is compliant with her heart medications but does not adhere to salt and water intake. Patient also endorsed vague abdominal pain, without any aggravating or alleviating factors, without any radiation, lasting only 2 minutes in duration, with a severity of 6/10. Patient also endorsed increased urinary frequency about 2-3 times a night. Patient denied any fevers, chills, N/V/D/C, melena, hematochezia, recent travel, sick contact, dysuria, pleuritic or positional chest pain.     On ED admission EKG revealed Atrial fibrillation with LBBB at a rate of 80, CE x 1: Negative, WBC: 12.38, Gluc: 111, BNP: 2001. Prelim CXR: Small right pleural effusion. Patient received 1x dose of Lasix 40mg IVP in the ED. When examined patient is resting in the stretcher and denied any current CP, SOB or abdominal pain.

## 2017-02-09 NOTE — H&P ADULT. - RS GEN PE MLT RESP DETAILS PC
respirations non-labored/no chest wall tenderness/normal/wheezes/no intercostal retractions/airway patent/no rhonchi/rales

## 2017-02-09 NOTE — H&P ADULT. - NEGATIVE GASTROINTESTINAL SYMPTOMS
no nausea/no change in bowel habits/no melena/no diarrhea/no constipation/no hematochezia/no vomiting

## 2017-02-09 NOTE — H&P ADULT. - PROBLEM SELECTOR PLAN 2
Patient on Coumadin for anticoagulation. Will check stat INR and dose coumadin accordingly if needed. If patient is Subtherapeutic as per on call for physician(EB Ortiz can just does Coumadin without any bridging ie with Heparin or Lovenox)   Continue with Cardizem 180mg q24hrs for rate control

## 2017-02-09 NOTE — H&P ADULT. - NEGATIVE MUSCULOSKELETAL SYMPTOMS
no neck pain/no muscle weakness/no myalgia/no stiffness/no arthralgia/no muscle cramps/no arthritis/no joint swelling

## 2017-02-10 LAB
APTT BLD: 33.8 SEC — SIGNIFICANT CHANGE UP (ref 27.5–37.4)
BUN SERPL-MCNC: 28 MG/DL — HIGH (ref 7–23)
CALCIUM SERPL-MCNC: 8.5 MG/DL — SIGNIFICANT CHANGE UP (ref 8.4–10.5)
CHLORIDE SERPL-SCNC: 97 MMOL/L — LOW (ref 98–107)
CO2 SERPL-SCNC: 24 MMOL/L — SIGNIFICANT CHANGE UP (ref 22–31)
CREAT SERPL-MCNC: 1.19 MG/DL — SIGNIFICANT CHANGE UP (ref 0.5–1.3)
GLUCOSE SERPL-MCNC: 113 MG/DL — HIGH (ref 70–99)
HCT VFR BLD CALC: 36.2 % — SIGNIFICANT CHANGE UP (ref 34.5–45)
HGB BLD-MCNC: 11.3 G/DL — LOW (ref 11.5–15.5)
INR BLD: 1.95 — HIGH (ref 0.87–1.18)
MAGNESIUM SERPL-MCNC: 1.8 MG/DL — SIGNIFICANT CHANGE UP (ref 1.6–2.6)
MCHC RBC-ENTMCNC: 26.1 PG — LOW (ref 27–34)
MCHC RBC-ENTMCNC: 31.2 % — LOW (ref 32–36)
MCV RBC AUTO: 83.6 FL — SIGNIFICANT CHANGE UP (ref 80–100)
PLATELET # BLD AUTO: 269 K/UL — SIGNIFICANT CHANGE UP (ref 150–400)
PMV BLD: 10.6 FL — SIGNIFICANT CHANGE UP (ref 7–13)
POTASSIUM SERPL-MCNC: 3.7 MMOL/L — SIGNIFICANT CHANGE UP (ref 3.5–5.3)
POTASSIUM SERPL-SCNC: 3.7 MMOL/L — SIGNIFICANT CHANGE UP (ref 3.5–5.3)
PROTHROM AB SERPL-ACNC: 22.4 SEC — HIGH (ref 10–13.1)
RBC # BLD: 4.33 M/UL — SIGNIFICANT CHANGE UP (ref 3.8–5.2)
RBC # FLD: 14.5 % — SIGNIFICANT CHANGE UP (ref 10.3–14.5)
SODIUM SERPL-SCNC: 139 MMOL/L — SIGNIFICANT CHANGE UP (ref 135–145)
WBC # BLD: 12.15 K/UL — HIGH (ref 3.8–10.5)
WBC # FLD AUTO: 12.15 K/UL — HIGH (ref 3.8–10.5)

## 2017-02-10 PROCEDURE — 93306 TTE W/DOPPLER COMPLETE: CPT | Mod: 26

## 2017-02-10 RX ORDER — WARFARIN SODIUM 2.5 MG/1
5 TABLET ORAL ONCE
Qty: 0 | Refills: 0 | Status: COMPLETED | OUTPATIENT
Start: 2017-02-10 | End: 2017-02-10

## 2017-02-10 RX ADMIN — PANTOPRAZOLE SODIUM 40 MILLIGRAM(S): 20 TABLET, DELAYED RELEASE ORAL at 05:27

## 2017-02-10 RX ADMIN — SIMVASTATIN 40 MILLIGRAM(S): 20 TABLET, FILM COATED ORAL at 22:50

## 2017-02-10 RX ADMIN — Medication: at 18:00

## 2017-02-10 RX ADMIN — Medication 50 MILLIGRAM(S): at 05:27

## 2017-02-10 RX ADMIN — Medication 20 MILLIGRAM(S): at 18:00

## 2017-02-10 RX ADMIN — Medication 40 MILLIGRAM(S): at 18:00

## 2017-02-10 RX ADMIN — Medication 20 MILLIGRAM(S): at 05:27

## 2017-02-10 RX ADMIN — Medication 40 MILLIGRAM(S): at 05:27

## 2017-02-10 RX ADMIN — Medication 180 MILLIGRAM(S): at 05:27

## 2017-02-10 RX ADMIN — WARFARIN SODIUM 5 MILLIGRAM(S): 2.5 TABLET ORAL at 18:00

## 2017-02-10 RX ADMIN — Medication 50 MILLIGRAM(S): at 22:50

## 2017-02-10 RX ADMIN — Medication 50 MILLIGRAM(S): at 15:53

## 2017-02-11 LAB
BUN SERPL-MCNC: 37 MG/DL — HIGH (ref 7–23)
CALCIUM SERPL-MCNC: 8.6 MG/DL — SIGNIFICANT CHANGE UP (ref 8.4–10.5)
CHLORIDE SERPL-SCNC: 97 MMOL/L — LOW (ref 98–107)
CO2 SERPL-SCNC: 18 MMOL/L — LOW (ref 22–31)
CREAT SERPL-MCNC: 1.29 MG/DL — SIGNIFICANT CHANGE UP (ref 0.5–1.3)
GLUCOSE SERPL-MCNC: 126 MG/DL — HIGH (ref 70–99)
HCT VFR BLD CALC: 34.7 % — SIGNIFICANT CHANGE UP (ref 34.5–45)
HGB BLD-MCNC: 10.9 G/DL — LOW (ref 11.5–15.5)
INR BLD: 1.75 — HIGH (ref 0.87–1.18)
MAGNESIUM SERPL-MCNC: 1.9 MG/DL — SIGNIFICANT CHANGE UP (ref 1.6–2.6)
MCHC RBC-ENTMCNC: 26.4 PG — LOW (ref 27–34)
MCHC RBC-ENTMCNC: 31.4 % — LOW (ref 32–36)
MCV RBC AUTO: 84 FL — SIGNIFICANT CHANGE UP (ref 80–100)
NT-PROBNP SERPL-SCNC: 687 PG/ML — SIGNIFICANT CHANGE UP
PLATELET # BLD AUTO: 276 K/UL — SIGNIFICANT CHANGE UP (ref 150–400)
PMV BLD: 10.7 FL — SIGNIFICANT CHANGE UP (ref 7–13)
POTASSIUM SERPL-MCNC: 4.2 MMOL/L — SIGNIFICANT CHANGE UP (ref 3.5–5.3)
POTASSIUM SERPL-SCNC: 4.2 MMOL/L — SIGNIFICANT CHANGE UP (ref 3.5–5.3)
PROTHROM AB SERPL-ACNC: 20.1 SEC — HIGH (ref 10–13.1)
RBC # BLD: 4.13 M/UL — SIGNIFICANT CHANGE UP (ref 3.8–5.2)
RBC # FLD: 14.4 % — SIGNIFICANT CHANGE UP (ref 10.3–14.5)
SODIUM SERPL-SCNC: 135 MMOL/L — SIGNIFICANT CHANGE UP (ref 135–145)
WBC # BLD: 13.3 K/UL — HIGH (ref 3.8–10.5)
WBC # FLD AUTO: 13.3 K/UL — HIGH (ref 3.8–10.5)

## 2017-02-11 RX ORDER — WARFARIN SODIUM 2.5 MG/1
7.5 TABLET ORAL ONCE
Qty: 0 | Refills: 0 | Status: COMPLETED | OUTPATIENT
Start: 2017-02-11 | End: 2017-02-11

## 2017-02-11 RX ORDER — ENOXAPARIN SODIUM 100 MG/ML
80 INJECTION SUBCUTANEOUS
Qty: 0 | Refills: 0 | Status: DISCONTINUED | OUTPATIENT
Start: 2017-02-11 | End: 2017-02-12

## 2017-02-11 RX ADMIN — Medication 50 MILLIGRAM(S): at 22:38

## 2017-02-11 RX ADMIN — Medication 20 MILLIGRAM(S): at 17:40

## 2017-02-11 RX ADMIN — Medication 180 MILLIGRAM(S): at 06:04

## 2017-02-11 RX ADMIN — Medication 40 MILLIGRAM(S): at 06:04

## 2017-02-11 RX ADMIN — SIMVASTATIN 40 MILLIGRAM(S): 20 TABLET, FILM COATED ORAL at 22:38

## 2017-02-11 RX ADMIN — PANTOPRAZOLE SODIUM 40 MILLIGRAM(S): 20 TABLET, DELAYED RELEASE ORAL at 06:04

## 2017-02-11 RX ADMIN — WARFARIN SODIUM 7.5 MILLIGRAM(S): 2.5 TABLET ORAL at 17:40

## 2017-02-11 RX ADMIN — Medication 50 MILLIGRAM(S): at 06:04

## 2017-02-11 RX ADMIN — Medication 1: at 14:23

## 2017-02-11 RX ADMIN — Medication 20 MILLIGRAM(S): at 06:04

## 2017-02-11 RX ADMIN — Medication 50 MILLIGRAM(S): at 14:24

## 2017-02-11 RX ADMIN — ENOXAPARIN SODIUM 80 MILLIGRAM(S): 100 INJECTION SUBCUTANEOUS at 17:40

## 2017-02-11 RX ADMIN — Medication 40 MILLIGRAM(S): at 17:40

## 2017-02-11 NOTE — DIETITIAN INITIAL EVALUATION ADULT. - PROBLEM SELECTOR PLAN 4
Continue with Atrovent and Proventil q6hrs prn for SOB/Wheezing  NC at 2L, continuous pulse oxygen  Consider Pulmonary consult in am if warranted

## 2017-02-11 NOTE — DIETITIAN INITIAL EVALUATION ADULT. - NS AS NUTRI INTERV MEALS SNACK
1. Suggest: PO diet rx: Consistent Carbohydrate (no snacks), DASH/TLC (cholesterol and sodium restricted), Fluid restriction per MD discretion;               2. Monitor PO diet tolerance;           3. Monitor labs, daily weights, hydration status;/Diets modified for specific foods and ingredients/Other (specify)

## 2017-02-11 NOTE — DIETITIAN INITIAL EVALUATION ADULT. - PERTINENT LABORATORY DATA
(2/11) WBC 13.3 H, Hbg 10.9 L, Cl 97 L, BUN 37 H, Glu 126 H;        (2/9) Albumin 3.7, Cholesterol 99 L

## 2017-02-11 NOTE — DIETITIAN INITIAL EVALUATION ADULT. - OTHER INFO
Nutrition Consult X Registered Dietitian. Pt 77 yo Arabic speaking female appears alert, oriented.  Phone service (ID# [phone]) used. Pt's appetite/PO intake usually okay. No chew/swallow problem voiced; no nausea/vomiting/diarrhea @ present. Pt tries to avoid salt; Pt adds sugar to her morning tea reported as well. RDN discussed Heart Healthy diet, Consistent Carbohydrate diet with Pt; written materials on diets also provided. RDN remains available, Pt made aware. RDN attempted to reach Pt’s family via phone ([phone], [phone]), but no one answered. Spoke to nurse.

## 2017-02-11 NOTE — DIETITIAN INITIAL EVALUATION ADULT. - DIET TYPE
No Carbonated Beverages, No Chocolate/1500ml/low sodium/caffeine free/consistent carbohydrate (no snacks)/regular/DASH/TLC (sodium and cholesterol restricted diet)

## 2017-02-11 NOTE — DIETITIAN INITIAL EVALUATION ADULT. - NS AS NUTRI INTERV ED CONTENT
Recommended modifications/Purpose of the nutrition education/Nutrition relationship to health/disease

## 2017-02-12 LAB
BUN SERPL-MCNC: 33 MG/DL — HIGH (ref 7–23)
CALCIUM SERPL-MCNC: 8.4 MG/DL — SIGNIFICANT CHANGE UP (ref 8.4–10.5)
CHLORIDE SERPL-SCNC: 100 MMOL/L — SIGNIFICANT CHANGE UP (ref 98–107)
CO2 SERPL-SCNC: 24 MMOL/L — SIGNIFICANT CHANGE UP (ref 22–31)
CREAT SERPL-MCNC: 1.14 MG/DL — SIGNIFICANT CHANGE UP (ref 0.5–1.3)
GLUCOSE SERPL-MCNC: 123 MG/DL — HIGH (ref 70–99)
HCT VFR BLD CALC: 34.2 % — LOW (ref 34.5–45)
HGB BLD-MCNC: 10.8 G/DL — LOW (ref 11.5–15.5)
INR BLD: 2.8 — HIGH (ref 0.87–1.18)
MAGNESIUM SERPL-MCNC: 1.9 MG/DL — SIGNIFICANT CHANGE UP (ref 1.6–2.6)
MCHC RBC-ENTMCNC: 26.5 PG — LOW (ref 27–34)
MCHC RBC-ENTMCNC: 31.6 % — LOW (ref 32–36)
MCV RBC AUTO: 83.8 FL — SIGNIFICANT CHANGE UP (ref 80–100)
PLATELET # BLD AUTO: 266 K/UL — SIGNIFICANT CHANGE UP (ref 150–400)
PMV BLD: 10.8 FL — SIGNIFICANT CHANGE UP (ref 7–13)
POTASSIUM SERPL-MCNC: 4.1 MMOL/L — SIGNIFICANT CHANGE UP (ref 3.5–5.3)
POTASSIUM SERPL-SCNC: 4.1 MMOL/L — SIGNIFICANT CHANGE UP (ref 3.5–5.3)
PROTHROM AB SERPL-ACNC: 32.3 SEC — HIGH (ref 10–13.1)
RBC # BLD: 4.08 M/UL — SIGNIFICANT CHANGE UP (ref 3.8–5.2)
RBC # FLD: 14.4 % — SIGNIFICANT CHANGE UP (ref 10.3–14.5)
SODIUM SERPL-SCNC: 139 MMOL/L — SIGNIFICANT CHANGE UP (ref 135–145)
WBC # BLD: 10.59 K/UL — HIGH (ref 3.8–10.5)
WBC # FLD AUTO: 10.59 K/UL — HIGH (ref 3.8–10.5)

## 2017-02-12 PROCEDURE — 78582 LUNG VENTILAT&PERFUS IMAGING: CPT | Mod: 26,GC

## 2017-02-12 PROCEDURE — 71010: CPT | Mod: 26

## 2017-02-12 RX ORDER — WARFARIN SODIUM 2.5 MG/1
3 TABLET ORAL ONCE
Qty: 0 | Refills: 0 | Status: COMPLETED | OUTPATIENT
Start: 2017-02-12 | End: 2017-02-12

## 2017-02-12 RX ORDER — FUROSEMIDE 40 MG
40 TABLET ORAL
Qty: 0 | Refills: 0 | Status: DISCONTINUED | OUTPATIENT
Start: 2017-02-12 | End: 2017-02-13

## 2017-02-12 RX ADMIN — PANTOPRAZOLE SODIUM 40 MILLIGRAM(S): 20 TABLET, DELAYED RELEASE ORAL at 06:17

## 2017-02-12 RX ADMIN — SIMVASTATIN 40 MILLIGRAM(S): 20 TABLET, FILM COATED ORAL at 22:20

## 2017-02-12 RX ADMIN — Medication 20 MILLIGRAM(S): at 18:24

## 2017-02-12 RX ADMIN — Medication 50 MILLIGRAM(S): at 06:17

## 2017-02-12 RX ADMIN — Medication: at 13:46

## 2017-02-12 RX ADMIN — Medication 40 MILLIGRAM(S): at 18:34

## 2017-02-12 RX ADMIN — Medication 50 MILLIGRAM(S): at 13:47

## 2017-02-12 RX ADMIN — ENOXAPARIN SODIUM 80 MILLIGRAM(S): 100 INJECTION SUBCUTANEOUS at 18:25

## 2017-02-12 RX ADMIN — ENOXAPARIN SODIUM 80 MILLIGRAM(S): 100 INJECTION SUBCUTANEOUS at 06:17

## 2017-02-12 RX ADMIN — Medication 50 MILLIGRAM(S): at 22:20

## 2017-02-12 RX ADMIN — Medication 180 MILLIGRAM(S): at 06:17

## 2017-02-12 RX ADMIN — Medication 20 MILLIGRAM(S): at 06:17

## 2017-02-12 RX ADMIN — Medication 40 MILLIGRAM(S): at 06:17

## 2017-02-12 RX ADMIN — WARFARIN SODIUM 3 MILLIGRAM(S): 2.5 TABLET ORAL at 18:32

## 2017-02-12 RX ADMIN — Medication 650 MILLIGRAM(S): at 22:05

## 2017-02-13 ENCOUNTER — TRANSCRIPTION ENCOUNTER (OUTPATIENT)
Age: 77
End: 2017-02-13

## 2017-02-13 VITALS
OXYGEN SATURATION: 98 % | SYSTOLIC BLOOD PRESSURE: 160 MMHG | RESPIRATION RATE: 17 BRPM | HEART RATE: 78 BPM | DIASTOLIC BLOOD PRESSURE: 57 MMHG

## 2017-02-13 LAB
BUN SERPL-MCNC: 36 MG/DL — HIGH (ref 7–23)
CALCIUM SERPL-MCNC: 8.7 MG/DL — SIGNIFICANT CHANGE UP (ref 8.4–10.5)
CHLORIDE SERPL-SCNC: 100 MMOL/L — SIGNIFICANT CHANGE UP (ref 98–107)
CO2 SERPL-SCNC: 22 MMOL/L — SIGNIFICANT CHANGE UP (ref 22–31)
CREAT SERPL-MCNC: 1.11 MG/DL — SIGNIFICANT CHANGE UP (ref 0.5–1.3)
GLUCOSE SERPL-MCNC: 112 MG/DL — HIGH (ref 70–99)
HCT VFR BLD CALC: 33.2 % — LOW (ref 34.5–45)
HGB BLD-MCNC: 10.5 G/DL — LOW (ref 11.5–15.5)
INR BLD: 2.81 — HIGH (ref 0.87–1.18)
MCHC RBC-ENTMCNC: 26.4 PG — LOW (ref 27–34)
MCHC RBC-ENTMCNC: 31.6 % — LOW (ref 32–36)
MCV RBC AUTO: 83.4 FL — SIGNIFICANT CHANGE UP (ref 80–100)
PLATELET # BLD AUTO: 238 K/UL — SIGNIFICANT CHANGE UP (ref 150–400)
PMV BLD: 10.8 FL — SIGNIFICANT CHANGE UP (ref 7–13)
POTASSIUM SERPL-MCNC: 3.9 MMOL/L — SIGNIFICANT CHANGE UP (ref 3.5–5.3)
POTASSIUM SERPL-SCNC: 3.9 MMOL/L — SIGNIFICANT CHANGE UP (ref 3.5–5.3)
PROTHROM AB SERPL-ACNC: 32.4 SEC — HIGH (ref 10–13.1)
RBC # BLD: 3.98 M/UL — SIGNIFICANT CHANGE UP (ref 3.8–5.2)
RBC # FLD: 14.2 % — SIGNIFICANT CHANGE UP (ref 10.3–14.5)
SODIUM SERPL-SCNC: 139 MMOL/L — SIGNIFICANT CHANGE UP (ref 135–145)
WBC # BLD: 9.49 K/UL — SIGNIFICANT CHANGE UP (ref 3.8–10.5)
WBC # FLD AUTO: 9.49 K/UL — SIGNIFICANT CHANGE UP (ref 3.8–10.5)

## 2017-02-13 RX ORDER — IPRATROPIUM BROMIDE 0.2 MG/ML
2 SOLUTION, NON-ORAL INHALATION
Qty: 0 | Refills: 0 | COMMUNITY
Start: 2017-02-13

## 2017-02-13 RX ADMIN — Medication 50 MILLIGRAM(S): at 06:33

## 2017-02-13 RX ADMIN — Medication 40 MILLIGRAM(S): at 06:32

## 2017-02-13 RX ADMIN — Medication 20 MILLIGRAM(S): at 06:32

## 2017-02-13 RX ADMIN — Medication 40 MILLIGRAM(S): at 17:18

## 2017-02-13 RX ADMIN — Medication 180 MILLIGRAM(S): at 06:32

## 2017-02-13 RX ADMIN — Medication 20 MILLIGRAM(S): at 17:17

## 2017-02-13 RX ADMIN — PANTOPRAZOLE SODIUM 40 MILLIGRAM(S): 20 TABLET, DELAYED RELEASE ORAL at 06:33

## 2017-02-13 RX ADMIN — Medication 50 MILLIGRAM(S): at 14:15

## 2017-02-13 NOTE — DISCHARGE NOTE ADULT - ADDITIONAL INSTRUCTIONS
follow up with your doctor in 3 days with kristi Michelle [phone]  follow up with your doctor in 2 weeks Dr Short [phone]

## 2017-02-13 NOTE — DISCHARGE NOTE ADULT - CARE PROVIDER_API CALL
Jose A Short), Cardiovascular Disease; Internal Medicine; Interventional Cardiology  [address] [place] [address]  Phone: [phone]  Fax: [phone]    Rick West ([place]), Internal Medicine  [address] [place] [address]  Phone: [phone]  Fax: [phone]

## 2017-02-13 NOTE — DISCHARGE NOTE ADULT - PLAN OF CARE
continue oral diuresis, compliance with medications, follow up with physicians activity- as tolerated and with assistance   low salt &  low cholesterol, no sugar added, fluid restriction 1500cc daily continue with diltiazem, compliance with medications, follow up with physicians activity- as tolerated and with assistance   low salt &  low cholesterol

## 2017-02-13 NOTE — DISCHARGE NOTE ADULT - PATIENT PORTAL LINK FT
“You can access the FollowHealth Patient Portal, offered by [facility], by registering with the following website: [email or web address]”

## 2017-02-13 NOTE — DISCHARGE NOTE ADULT - CARE PLAN
Principal Discharge DX:	CHF exacerbation  Goal:	continue oral diuresis, compliance with medications, follow up with physicians  Instructions for follow-up, activity and diet:	activity- as tolerated and with assistance   low salt &  low cholesterol, no sugar added, fluid restriction 1500cc daily  Secondary Diagnosis:	Atrial fibrillation  Goal:	continue with diltiazem, compliance with medications, follow up with physicians  Instructions for follow-up, activity and diet:	activity- as tolerated and with assistance   low salt &  low cholesterol

## 2017-02-13 NOTE — DISCHARGE NOTE ADULT - MEDICATION SUMMARY - MEDICATIONS TO STOP TAKING
I will STOP taking the medications listed below when I get home from the hospital:    potassium chloride 20 mEq oral tablet, extended release  -- 1 tab(s) by mouth once a day    DuoNeb 0.5 mg-2.5 mg/3 mL inhalation solution  -- 3 milliliter(s) inhaled every 6 hours  -- For inhalation only.  It is very important that you take or use this exactly as directed.  Do not skip doses or discontinue unless directed by your doctor.  Obtain medical advice before taking any non-prescription drugs as some may affect the action of this medication.

## 2017-02-13 NOTE — DISCHARGE NOTE ADULT - MEDICATION SUMMARY - MEDICATIONS TO TAKE
I will START or STAY ON the medications listed below when I get home from the hospital:    enalapril 20 mg oral tablet  -- 1 tab(s) by mouth 2 times a day  -- Indication: For blood pressure, heart remodeling    Cardizem  mg/24 hours oral capsule, extended release  -- 1 cap(s) by mouth once a day  -- It is very important that you take or use this exactly as directed.  Do not skip doses or discontinue unless directed by your doctor.  Some non-prescription drugs may aggravate your condition.  Read all labels carefully.  If a warning appears, check with your doctor before taking.  Swallow whole.  Do not crush.    -- Indication: For blood pressure and heart rate    warfarin 4 mg oral tablet  -- 1 tab(s) by mouth once a day. INR goal 2-3. Monitor INR twice weekly with PCP.  -- Do not take this drug if you are pregnant.  It is very important that you take or use this exactly as directed.  Do not skip doses or discontinue unless directed by your doctor.  Obtain medical advice before taking any non-prescription drugs as some may affect the action of this medication.    -- Indication: For Afib    metFORMIN 500 mg oral tablet, extended release  -- 1 tab(s) by mouth 2 times a day for 10 days, then 1 tab daily.  -- Indication: For Diabetes mellitus type II, controlled    simvastatin 40 mg oral tablet  -- 1 tab(s) by mouth once a day (at bedtime)  -- Indication: For CHolesterol    Atrovent HFA CFC free 17 mcg/inh inhalation aerosol  -- 2 puff(s) inhaled 4 times a day, As Needed -for shortness of breath and/or wheezing  -- For inhalation only.  It is very important that you take or use this exactly as directed.  Do not skip doses or discontinue unless directed by your doctor.    -- Indication: For bronchial spasm    ipratropium CFC free 17 mcg/inh inhalation aerosol  -- 2 puff(s) inhaled every 6 hours, As needed, SOB/Wheezing  -- Indication: For bronchial spasm    furosemide 40 mg oral tablet  -- 1 tab(s) by mouth once a day  -- Indication: For Heart failure    pantoprazole 40 mg oral delayed release tablet  -- 1 tab(s) by mouth once a day (before a meal)  -- Indication: For stomach    hydrALAZINE 50 mg oral tablet  -- 1 tab(s) by mouth every 8 hours  -- Indication: For blood pressure

## 2017-02-13 NOTE — DISCHARGE NOTE ADULT - HOSPITAL COURSE
75 y/o F with PMH of CAD(s/p CABG and stents), Afib(on Coumadin), CVA, CHF(with EF of 65%), Aortic and Mitral valve repacement(bioprosthetic) presented with the complaint of SOB and HERNANDEZ. R/o CHF exacerbation    +CHF exacerbation-On PO Lasix 40mg BID   + Afib on Lovenox BID to Coumadin   + Sev Pulm HTN     2/11- Tele d/c  EKG: Atrial fibrillation with LBBB at a rate of 80  CE x 2: Negative   WBC: 12.38  Gluc: 111  BNP: 2001  Prelim CXR: Small right pleural effusion.   CXR: Small bilateral pleural effusions with adjacent subsegmental atelectasis.  2/9_Cards: Check TTE, afib 70s TSH normal, continue coumadin for CVA prevention, CHF exac continue IV lasix for diuresis, continue cardizem/hydralazine/enalapril/statin. Consider adding BB/ASA second to stents, Check UA/Ucx for infection, monitor WBC   UA: negative.  2/10- Echo- EF 66% - 1. Bioprosthetic mitral valve replacement. Minimal mitral regurgitation. Mean transmitral valve gradient equals 6 mm Hg, which is elevated even in the setting of a prosthetic valve. Bioprosthetic aortic valve replacement. Peak transaortic  valve gradient equals 46 mm Hg, mean transaortic valve gradient equals 21 mm Hg, which is elevated even in the presence of a prosthetic valve. No aortic valve  regurgitation seen. Mildly dilated left atrium.  LA volume index = 41 cc/m2. Normal left ventricular internal dimensions and wall thicknesses. Normal left ventricular systolic function. No segmental wall motion abnormalities.  Paradoxical septal wall motion,  consistent with the postoperative state. Normal right ventricular size and function.  Estimated right ventricular systolic pressure equals 67 mm Hg, assuming right atrial pressure equals 10 mm Hg, consistent with severe pulmonary hypertension  2/10 Cards: cw coumadin, cw cardizem, echo pending, IV lasix.

## 2017-05-13 ENCOUNTER — INPATIENT (INPATIENT)
Facility: HOSPITAL | Age: 77
LOS: 3 days | Discharge: HOME HEALTH | End: 2017-05-17
Attending: INTERNAL MEDICINE | Admitting: INTERNAL MEDICINE
Payer: MEDICAID

## 2017-05-13 VITALS
OXYGEN SATURATION: 100 % | HEART RATE: 75 BPM | DIASTOLIC BLOOD PRESSURE: 61 MMHG | RESPIRATION RATE: 16 BRPM | TEMPERATURE: 98 F | SYSTOLIC BLOOD PRESSURE: 184 MMHG

## 2017-05-13 DIAGNOSIS — Z95.4 PRESENCE OF OTHER HEART-VALVE REPLACEMENT: Chronic | ICD-10-CM

## 2017-05-13 DIAGNOSIS — R06.09 OTHER FORMS OF DYSPNEA: ICD-10-CM

## 2017-05-13 LAB
ALBUMIN SERPL ELPH-MCNC: 4 G/DL — SIGNIFICANT CHANGE UP (ref 3.3–5)
ALP SERPL-CCNC: 89 U/L — SIGNIFICANT CHANGE UP (ref 40–120)
ALT FLD-CCNC: 20 U/L — SIGNIFICANT CHANGE UP (ref 4–33)
APTT BLD: 58.6 SEC — HIGH (ref 27.5–37.4)
AST SERPL-CCNC: 23 U/L — SIGNIFICANT CHANGE UP (ref 4–32)
BASOPHILS # BLD AUTO: 0.06 K/UL — SIGNIFICANT CHANGE UP (ref 0–0.2)
BASOPHILS NFR BLD AUTO: 0.5 % — SIGNIFICANT CHANGE UP (ref 0–2)
BILIRUB SERPL-MCNC: 0.3 MG/DL — SIGNIFICANT CHANGE UP (ref 0.2–1.2)
BUN SERPL-MCNC: 29 MG/DL — HIGH (ref 7–23)
CALCIUM SERPL-MCNC: 9 MG/DL — SIGNIFICANT CHANGE UP (ref 8.4–10.5)
CHLORIDE SERPL-SCNC: 104 MMOL/L — SIGNIFICANT CHANGE UP (ref 98–107)
CK MB BLD-MCNC: 2.93 NG/ML — SIGNIFICANT CHANGE UP (ref 1–4.7)
CK MB BLD-MCNC: 3.02 NG/ML — SIGNIFICANT CHANGE UP (ref 1–4.7)
CK MB BLD-MCNC: SIGNIFICANT CHANGE UP (ref 0–2.5)
CK SERPL-CCNC: 84 U/L — SIGNIFICANT CHANGE UP (ref 25–170)
CK SERPL-CCNC: 86 U/L — SIGNIFICANT CHANGE UP (ref 25–170)
CO2 SERPL-SCNC: 21 MMOL/L — LOW (ref 22–31)
CREAT SERPL-MCNC: 1.05 MG/DL — SIGNIFICANT CHANGE UP (ref 0.5–1.3)
EOSINOPHIL # BLD AUTO: 0.15 K/UL — SIGNIFICANT CHANGE UP (ref 0–0.5)
EOSINOPHIL NFR BLD AUTO: 1.3 % — SIGNIFICANT CHANGE UP (ref 0–6)
GLUCOSE SERPL-MCNC: 114 MG/DL — HIGH (ref 70–99)
HCT VFR BLD CALC: 38.8 % — SIGNIFICANT CHANGE UP (ref 34.5–45)
HGB BLD-MCNC: 12 G/DL — SIGNIFICANT CHANGE UP (ref 11.5–15.5)
IMM GRANULOCYTES NFR BLD AUTO: 0.3 % — SIGNIFICANT CHANGE UP (ref 0–1.5)
INR BLD: 4.23 — HIGH (ref 0.88–1.17)
LYMPHOCYTES # BLD AUTO: 1.6 K/UL — SIGNIFICANT CHANGE UP (ref 1–3.3)
LYMPHOCYTES # BLD AUTO: 13.7 % — SIGNIFICANT CHANGE UP (ref 13–44)
MAGNESIUM SERPL-MCNC: 2 MG/DL — SIGNIFICANT CHANGE UP (ref 1.6–2.6)
MCHC RBC-ENTMCNC: 26 PG — LOW (ref 27–34)
MCHC RBC-ENTMCNC: 30.9 % — LOW (ref 32–36)
MCV RBC AUTO: 84 FL — SIGNIFICANT CHANGE UP (ref 80–100)
MONOCYTES # BLD AUTO: 0.68 K/UL — SIGNIFICANT CHANGE UP (ref 0–0.9)
MONOCYTES NFR BLD AUTO: 5.8 % — SIGNIFICANT CHANGE UP (ref 2–14)
NEUTROPHILS # BLD AUTO: 9.13 K/UL — HIGH (ref 1.8–7.4)
NEUTROPHILS NFR BLD AUTO: 78.4 % — HIGH (ref 43–77)
NT-PROBNP SERPL-SCNC: 1075 PG/ML — SIGNIFICANT CHANGE UP
PHOSPHATE SERPL-MCNC: 3.1 MG/DL — SIGNIFICANT CHANGE UP (ref 2.5–4.5)
PLATELET # BLD AUTO: 277 K/UL — SIGNIFICANT CHANGE UP (ref 150–400)
PMV BLD: 11 FL — SIGNIFICANT CHANGE UP (ref 7–13)
POTASSIUM SERPL-MCNC: 4.6 MMOL/L — SIGNIFICANT CHANGE UP (ref 3.5–5.3)
POTASSIUM SERPL-SCNC: 4.6 MMOL/L — SIGNIFICANT CHANGE UP (ref 3.5–5.3)
PROT SERPL-MCNC: 7.3 G/DL — SIGNIFICANT CHANGE UP (ref 6–8.3)
PROTHROM AB SERPL-ACNC: 48.8 SEC — HIGH (ref 9.8–13.1)
RBC # BLD: 4.62 M/UL — SIGNIFICANT CHANGE UP (ref 3.8–5.2)
RBC # FLD: 15.5 % — HIGH (ref 10.3–14.5)
SODIUM SERPL-SCNC: 141 MMOL/L — SIGNIFICANT CHANGE UP (ref 135–145)
TROPONIN T SERPL-MCNC: < 0.06 NG/ML — SIGNIFICANT CHANGE UP (ref 0–0.06)
TROPONIN T SERPL-MCNC: < 0.06 NG/ML — SIGNIFICANT CHANGE UP (ref 0–0.06)
WBC # BLD: 11.66 K/UL — HIGH (ref 3.8–10.5)
WBC # FLD AUTO: 11.66 K/UL — HIGH (ref 3.8–10.5)

## 2017-05-13 PROCEDURE — 71010: CPT | Mod: 26

## 2017-05-13 RX ORDER — SODIUM CHLORIDE 9 MG/ML
1000 INJECTION, SOLUTION INTRAVENOUS
Qty: 0 | Refills: 0 | Status: DISCONTINUED | OUTPATIENT
Start: 2017-05-13 | End: 2017-05-17

## 2017-05-13 RX ORDER — DEXTROSE 50 % IN WATER 50 %
25 SYRINGE (ML) INTRAVENOUS ONCE
Qty: 0 | Refills: 0 | Status: DISCONTINUED | OUTPATIENT
Start: 2017-05-13 | End: 2017-05-17

## 2017-05-13 RX ORDER — GLUCAGON INJECTION, SOLUTION 0.5 MG/.1ML
1 INJECTION, SOLUTION SUBCUTANEOUS ONCE
Qty: 0 | Refills: 0 | Status: DISCONTINUED | OUTPATIENT
Start: 2017-05-13 | End: 2017-05-17

## 2017-05-13 RX ORDER — DEXTROSE 50 % IN WATER 50 %
1 SYRINGE (ML) INTRAVENOUS ONCE
Qty: 0 | Refills: 0 | Status: DISCONTINUED | OUTPATIENT
Start: 2017-05-13 | End: 2017-05-17

## 2017-05-13 RX ORDER — SIMVASTATIN 20 MG/1
40 TABLET, FILM COATED ORAL AT BEDTIME
Qty: 0 | Refills: 0 | Status: DISCONTINUED | OUTPATIENT
Start: 2017-05-13 | End: 2017-05-17

## 2017-05-13 RX ORDER — IPRATROPIUM BROMIDE 0.2 MG/ML
2 SOLUTION, NON-ORAL INHALATION EVERY 6 HOURS
Qty: 0 | Refills: 0 | Status: DISCONTINUED | OUTPATIENT
Start: 2017-05-13 | End: 2017-05-17

## 2017-05-13 RX ORDER — INSULIN LISPRO 100/ML
VIAL (ML) SUBCUTANEOUS AT BEDTIME
Qty: 0 | Refills: 0 | Status: DISCONTINUED | OUTPATIENT
Start: 2017-05-13 | End: 2017-05-17

## 2017-05-13 RX ORDER — HYDRALAZINE HCL 50 MG
50 TABLET ORAL EVERY 8 HOURS
Qty: 0 | Refills: 0 | Status: DISCONTINUED | OUTPATIENT
Start: 2017-05-13 | End: 2017-05-17

## 2017-05-13 RX ORDER — SODIUM CHLORIDE 9 MG/ML
3 INJECTION INTRAMUSCULAR; INTRAVENOUS; SUBCUTANEOUS EVERY 8 HOURS
Qty: 0 | Refills: 0 | Status: DISCONTINUED | OUTPATIENT
Start: 2017-05-13 | End: 2017-05-17

## 2017-05-13 RX ORDER — PANTOPRAZOLE SODIUM 20 MG/1
40 TABLET, DELAYED RELEASE ORAL
Qty: 0 | Refills: 0 | Status: DISCONTINUED | OUTPATIENT
Start: 2017-05-13 | End: 2017-05-17

## 2017-05-13 RX ORDER — FUROSEMIDE 40 MG
40 TABLET ORAL ONCE
Qty: 0 | Refills: 0 | Status: COMPLETED | OUTPATIENT
Start: 2017-05-13 | End: 2017-05-13

## 2017-05-13 RX ORDER — DEXTROSE 50 % IN WATER 50 %
12.5 SYRINGE (ML) INTRAVENOUS ONCE
Qty: 0 | Refills: 0 | Status: DISCONTINUED | OUTPATIENT
Start: 2017-05-13 | End: 2017-05-17

## 2017-05-13 RX ORDER — ACETAMINOPHEN 500 MG
650 TABLET ORAL EVERY 6 HOURS
Qty: 0 | Refills: 0 | Status: DISCONTINUED | OUTPATIENT
Start: 2017-05-13 | End: 2017-05-17

## 2017-05-13 RX ORDER — INSULIN LISPRO 100/ML
VIAL (ML) SUBCUTANEOUS
Qty: 0 | Refills: 0 | Status: DISCONTINUED | OUTPATIENT
Start: 2017-05-13 | End: 2017-05-17

## 2017-05-13 RX ORDER — FUROSEMIDE 40 MG
40 TABLET ORAL
Qty: 0 | Refills: 0 | Status: DISCONTINUED | OUTPATIENT
Start: 2017-05-13 | End: 2017-05-16

## 2017-05-13 RX ORDER — DILTIAZEM HCL 120 MG
180 CAPSULE, EXT RELEASE 24 HR ORAL DAILY
Qty: 0 | Refills: 0 | Status: DISCONTINUED | OUTPATIENT
Start: 2017-05-13 | End: 2017-05-17

## 2017-05-13 RX ADMIN — SODIUM CHLORIDE 3 MILLILITER(S): 9 INJECTION INTRAMUSCULAR; INTRAVENOUS; SUBCUTANEOUS at 14:29

## 2017-05-13 RX ADMIN — Medication 40 MILLIGRAM(S): at 04:19

## 2017-05-13 RX ADMIN — PANTOPRAZOLE SODIUM 40 MILLIGRAM(S): 20 TABLET, DELAYED RELEASE ORAL at 14:29

## 2017-05-13 RX ADMIN — Medication 50 MILLIGRAM(S): at 21:29

## 2017-05-13 RX ADMIN — Medication 20 MILLIGRAM(S): at 17:42

## 2017-05-13 RX ADMIN — SIMVASTATIN 40 MILLIGRAM(S): 20 TABLET, FILM COATED ORAL at 21:29

## 2017-05-13 RX ADMIN — SODIUM CHLORIDE 3 MILLILITER(S): 9 INJECTION INTRAMUSCULAR; INTRAVENOUS; SUBCUTANEOUS at 21:29

## 2017-05-13 RX ADMIN — Medication 40 MILLIGRAM(S): at 17:42

## 2017-05-13 RX ADMIN — Medication 50 MILLIGRAM(S): at 14:29

## 2017-05-13 RX ADMIN — Medication 180 MILLIGRAM(S): at 14:29

## 2017-05-13 NOTE — H&P ADULT. - NEGATIVE ENMT SYMPTOMS
no vertigo/no hearing difficulty/no nasal congestion/no ear pain/no nasal discharge/no sinus symptoms/no tinnitus

## 2017-05-13 NOTE — ED ADULT NURSE NOTE - OBJECTIVE STATEMENT
Pt Mongolian speaking, received A&Ox3, NAD to ED Tr A with c/o SOB & mild chest discomfort last evening. Also c/o BLE swelling sp 10hr drive to [place] in which pt did not get out of car the entire time. RR equal & unlabored. Labs sent per MD orders. Son at bedside. Will monitor.

## 2017-05-13 NOTE — ED ADULT TRIAGE NOTE - CHIEF COMPLAINT QUOTE
Pt brought to ED by dtr c/o L Chest Pain for 30 minutes, c/o LLExt swelling/pain to foot for several days. Michael lechuga, denies  services requests dtr to translate. Per dtr - traveled to [place] last wk, 10hrs in car and pt would not get out. Pt in Wheelchair for comfort, per dtr walks w/o walker at baseline.  Hx CHF, CAD, stent/cabg, HTN, CVA w/ left residual weakness. EKG obtained in triage.

## 2017-05-13 NOTE — H&P ADULT. - ATTENDING COMMENTS
Pt. seen and examined.  Agree with above.   -Admit to tele  -GIA   -Check TTE  -IV lasix to keep O > I

## 2017-05-13 NOTE — H&P ADULT. - PMH
Afib  (on Warfarin)  CAD (Coronary Artery Disease)    CHF (congestive heart failure)    CVA (Cerebral Infarction)  2011  Diabetes mellitus    Gout    HTN (Hypertension)    Mitral Regurgitation    Upper GI bleed

## 2017-05-13 NOTE — H&P ADULT. - ASSESSMENT
77 y/o Italian speaking Female, Translation done through  phone ID # [id number], with a PmHx of CAD (s/p CABG x 1 and stents), Afib (on Coumadin), CVA, CHF (Diastolic HF with EF of 65%), Aortic and Mitral valve replacement (bioprosthetic), DM, is being admitted to telemetry for r/o acs and for acute on chronic diastolic dysfunction.

## 2017-05-13 NOTE — ED ADULT NURSE REASSESSMENT NOTE - NS ED NURSE REASSESS COMMENT FT1
Pt AAOx3 resting comfortably, pt in NAD, vital signs stable as noted. Son at bedside will continue to monitor. Pt AAOx3 resting comfortably, pt in NAD, legs appear swollen, no pain noted, vital signs stable as noted. Son at bedside will continue to monitor.

## 2017-05-13 NOTE — ED PROVIDER NOTE - MEDICAL DECISION MAKING DETAILS
76yof w/ significant cardiac hx p/w dyspnea and transient chest discomfort, now resolved. 76yof w/ significant cardiac hx p/w dyspnea and transient chest discomfort, now resolved. Concerning for ACS vs CHF exacerbation. IV lasix, enzymes, BNP, CXR. Likely admission

## 2017-05-13 NOTE — ED PROVIDER NOTE - ATTENDING CONTRIBUTION TO CARE
Pt was seen and evaluated by me. Pt states having SOB over the past day with chest heaviness that has since resolved. Family was concerned over recent 10 hour car ride where she did not urinate and are concerned she is fluid overloaded. Pt denies any fever, chills, nausea, vomiting, or abd pain. RRR. Coarse breath sounds b/l. Abd soft, non-tender. b/l LE + distal pulses, no calf tenderness.

## 2017-05-13 NOTE — H&P ADULT. - PROBLEM SELECTOR PLAN 1
ekg/telemetry, f/u ce x 2, mg, recent echo done, probnp, daily wts, fluid restriction, strict I & O, PT c/s, Lasix 40 iv bid

## 2017-05-13 NOTE — H&P ADULT. - NEGATIVE NEUROLOGICAL SYMPTOMS
no paresthesias/no headache/no generalized seizures/no transient paralysis/no focal seizures/no weakness/no hemiparesis/no tremors/no loss of sensation/no difficulty walking/no loss of consciousness/no vertigo/no syncope/no confusion

## 2017-05-13 NOTE — ED PROVIDER NOTE - OBJECTIVE STATEMENT
76yof w/ HTN, CAD, CHF, Afib on coumadin, AVR/MVR p/w dyspnea. Primarily Welsh speaking,  [id number] used. Was at rest at home today and had mild shortness of breath all day, and then this evening pt began to feel increasing shortness of breath and mild heaviness in the chest. Symptoms have since resolved 76yof w/ HTN, CAD, CHF, Afib on coumadin, AVR/MVR p/w dyspnea. Primarily Greek speaking,  [id number] used. Was at rest at home today and had mild shortness of breath all day, and then this evening pt began to feel increasing shortness of breath and mild heaviness in the chest. Symptoms have since resolved. Endorses increasing orthopnea. Had a 10 hour car ride to [place] 76yof w/ HTN, CAD, CHF, Afib on coumadin, AVR/MVR p/w dyspnea. Primarily [place] speaking,  [id number] used. Was at rest at home today and had mild shortness of breath all day, and then this evening pt began to feel increasing shortness of breath and mild heaviness in the chest. Symptoms have since resolved. Endorses increasing orthopnea. Had a 10 hour car ride to [place] 3 days ago and did not get out of the car the whole time. Her son is concerned because she was not urinating during that time and when she does not urinate she gets fluid in the lungs. No fevers, chills, cough. No unilateral leg swelling.

## 2017-05-13 NOTE — H&P ADULT. - HISTORY OF PRESENT ILLNESS
77 y/o Chinese speaking Female, Translation done through  phone ID # [id number], with a PmHx of CAD (s/p CABG x 1 and stents), Afib (on Coumadin), CVA, CHF (Diastolic HF with EF of 65%), Aortic and Mitral valve replacement (bioprosthetic), DM, presented with the complaint of SOB and HERNANDEZ. Pt states she has been having worsening gradually onset of SOB that had gone worse last night so she came to the hospital for an evaluation. Pt states she was at home resting all day and when the night came she began to have SOB with a mild heaviness in her chest. Pt admits to a 10 hour car ride to [place] 3 days ago. Son at bedside (also Chinese speaking - translated by  phone), stated he was concerned because she hasn't been urinating much and last time that happened (back in February when she was admitted to [facility]), she filled up with fluids. She denies any fever, chills, HA, dizziness, blurred vision, n/v. She appears comfortable at this time. She states after she was given the lasix by the ed and was able to urinated she started to feel much better. She is being admitted to telemetry for r/o acs and for acute on chronic diastolic heart failure.

## 2017-05-13 NOTE — H&P ADULT. - NEGATIVE MUSCULOSKELETAL SYMPTOMS
no muscle weakness/no arthralgia/no myalgia/no neck pain/no muscle cramps/no stiffness/no arthritis/no joint swelling

## 2017-05-13 NOTE — H&P ADULT. - RS GEN PE MLT RESP DETAILS PC
good air movement/no chest wall tenderness/airway patent/respirations non-labored/normal/breath sounds equal/no rhonchi/rales

## 2017-05-13 NOTE — H&P ADULT. - GASTROINTESTINAL DETAILS
normal/no distention/soft/no guarding/nontender/no rebound tenderness/no rigidity/bowel sounds normal

## 2017-05-13 NOTE — H&P ADULT. - NEGATIVE OPHTHALMOLOGIC SYMPTOMS
no blurred vision L/no photophobia/no loss of vision L/no blurred vision R/no loss of vision R/no lacrimation L/no diplopia

## 2017-05-13 NOTE — H&P ADULT. - PROBLEM SELECTOR PLAN 2
rate control, currently supratherapeutic on coumadin with an INR of 4.23  Hold coumadin for now, restart when INR between 2-3

## 2017-05-14 LAB
APTT BLD: 44.1 SEC — HIGH (ref 27.5–37.4)
BUN SERPL-MCNC: 28 MG/DL — HIGH (ref 7–23)
CALCIUM SERPL-MCNC: 8.6 MG/DL — SIGNIFICANT CHANGE UP (ref 8.4–10.5)
CHLORIDE SERPL-SCNC: 100 MMOL/L — SIGNIFICANT CHANGE UP (ref 98–107)
CK SERPL-CCNC: 69 U/L — SIGNIFICANT CHANGE UP (ref 25–170)
CO2 SERPL-SCNC: 25 MMOL/L — SIGNIFICANT CHANGE UP (ref 22–31)
CREAT SERPL-MCNC: 1.19 MG/DL — SIGNIFICANT CHANGE UP (ref 0.5–1.3)
GLUCOSE SERPL-MCNC: 106 MG/DL — HIGH (ref 70–99)
HBA1C BLD-MCNC: 6.4 % — HIGH (ref 4–5.6)
HCT VFR BLD CALC: 37.7 % — SIGNIFICANT CHANGE UP (ref 34.5–45)
HGB BLD-MCNC: 11.7 G/DL — SIGNIFICANT CHANGE UP (ref 11.5–15.5)
INR BLD: 3.08 — HIGH (ref 0.88–1.17)
MCHC RBC-ENTMCNC: 25.5 PG — LOW (ref 27–34)
MCHC RBC-ENTMCNC: 31 % — LOW (ref 32–36)
MCV RBC AUTO: 82.1 FL — SIGNIFICANT CHANGE UP (ref 80–100)
PLATELET # BLD AUTO: 272 K/UL — SIGNIFICANT CHANGE UP (ref 150–400)
PMV BLD: 10.9 FL — SIGNIFICANT CHANGE UP (ref 7–13)
POTASSIUM SERPL-MCNC: 3.9 MMOL/L — SIGNIFICANT CHANGE UP (ref 3.5–5.3)
POTASSIUM SERPL-SCNC: 3.9 MMOL/L — SIGNIFICANT CHANGE UP (ref 3.5–5.3)
PROTHROM AB SERPL-ACNC: 35.3 SEC — HIGH (ref 9.8–13.1)
RBC # BLD: 4.59 M/UL — SIGNIFICANT CHANGE UP (ref 3.8–5.2)
RBC # FLD: 15.5 % — HIGH (ref 10.3–14.5)
SODIUM SERPL-SCNC: 140 MMOL/L — SIGNIFICANT CHANGE UP (ref 135–145)
TROPONIN T SERPL-MCNC: < 0.06 NG/ML — SIGNIFICANT CHANGE UP (ref 0–0.06)
WBC # BLD: 8.95 K/UL — SIGNIFICANT CHANGE UP (ref 3.8–10.5)
WBC # FLD AUTO: 8.95 K/UL — SIGNIFICANT CHANGE UP (ref 3.8–10.5)

## 2017-05-14 PROCEDURE — 93010 ELECTROCARDIOGRAM REPORT: CPT

## 2017-05-14 RX ORDER — CELECOXIB 200 MG/1
200 CAPSULE ORAL DAILY
Qty: 0 | Refills: 0 | Status: DISCONTINUED | OUTPATIENT
Start: 2017-05-14 | End: 2017-05-15

## 2017-05-14 RX ADMIN — SODIUM CHLORIDE 3 MILLILITER(S): 9 INJECTION INTRAMUSCULAR; INTRAVENOUS; SUBCUTANEOUS at 21:01

## 2017-05-14 RX ADMIN — Medication 180 MILLIGRAM(S): at 05:30

## 2017-05-14 RX ADMIN — CELECOXIB 200 MILLIGRAM(S): 200 CAPSULE ORAL at 22:00

## 2017-05-14 RX ADMIN — SODIUM CHLORIDE 3 MILLILITER(S): 9 INJECTION INTRAMUSCULAR; INTRAVENOUS; SUBCUTANEOUS at 05:30

## 2017-05-14 RX ADMIN — Medication 50 MILLIGRAM(S): at 21:03

## 2017-05-14 RX ADMIN — Medication 40 MILLIGRAM(S): at 17:17

## 2017-05-14 RX ADMIN — Medication 40 MILLIGRAM(S): at 05:30

## 2017-05-14 RX ADMIN — Medication 650 MILLIGRAM(S): at 22:00

## 2017-05-14 RX ADMIN — Medication 650 MILLIGRAM(S): at 21:02

## 2017-05-14 RX ADMIN — Medication 20 MILLIGRAM(S): at 05:30

## 2017-05-14 RX ADMIN — PANTOPRAZOLE SODIUM 40 MILLIGRAM(S): 20 TABLET, DELAYED RELEASE ORAL at 05:30

## 2017-05-14 RX ADMIN — CELECOXIB 200 MILLIGRAM(S): 200 CAPSULE ORAL at 21:20

## 2017-05-14 RX ADMIN — Medication 50 MILLIGRAM(S): at 13:36

## 2017-05-14 RX ADMIN — Medication 50 MILLIGRAM(S): at 05:30

## 2017-05-14 RX ADMIN — SODIUM CHLORIDE 3 MILLILITER(S): 9 INJECTION INTRAMUSCULAR; INTRAVENOUS; SUBCUTANEOUS at 13:32

## 2017-05-14 RX ADMIN — SIMVASTATIN 40 MILLIGRAM(S): 20 TABLET, FILM COATED ORAL at 21:02

## 2017-05-14 RX ADMIN — Medication: at 17:21

## 2017-05-14 RX ADMIN — Medication 20 MILLIGRAM(S): at 17:18

## 2017-05-14 NOTE — PHYSICAL THERAPY INITIAL EVALUATION ADULT - ADDITIONAL COMMENTS
Pt. left in chair post-PT, as received, in NAD with all lines/tubes intact & call bell within reach.  RN Stella Lutz aware.

## 2017-05-14 NOTE — PHYSICAL THERAPY INITIAL EVALUATION ADULT - CRITERIA FOR SKILLED THERAPEUTIC INTERVENTIONS
therapy frequency/impairments found/anticipated discharge recommendation/anticipated equipment needs at discharge/Home with home PT and a possible straight cane/predicted duration of therapy intervention

## 2017-05-14 NOTE — PHYSICAL THERAPY INITIAL EVALUATION ADULT - PERTINENT HX OF CURRENT PROBLEM, REHAB EVAL
Pt. is a 75 y/o Estonian-speaking female admitted to [facility] on 05/13/17 with a dx of dyspnea/SOB and r/o ACS.  PT consult request secondary to heart failure.  H/O CVA.  (-) CXR.  Cardiac enzymes (-) x 2.

## 2017-05-14 NOTE — PHYSICAL THERAPY INITIAL EVALUATION ADULT - PATIENT PROFILE REVIEW, REHAB EVAL
yes/no formal activity order; consult with jeff Lutz RN --> pt. OK for PT as tolerated no formal activity order; consult with Stella Lutz RN --> pt. OK for PT as tolerated/yes

## 2017-05-14 NOTE — PHYSICAL THERAPY INITIAL EVALUATION ADULT - RANGE OF MOTION EXAMINATION, REHAB EVAL
bilateral lower extremity ROM was WFL (within functional limits)/(not formally assessed)/bilateral upper extremity ROM was WFL (within functional limits)

## 2017-05-15 ENCOUNTER — TRANSCRIPTION ENCOUNTER (OUTPATIENT)
Age: 77
End: 2017-05-15

## 2017-05-15 LAB
APTT BLD: 36.6 SEC — SIGNIFICANT CHANGE UP (ref 27.5–37.4)
BUN SERPL-MCNC: 37 MG/DL — HIGH (ref 7–23)
CALCIUM SERPL-MCNC: 8.9 MG/DL — SIGNIFICANT CHANGE UP (ref 8.4–10.5)
CHLORIDE SERPL-SCNC: 98 MMOL/L — SIGNIFICANT CHANGE UP (ref 98–107)
CO2 SERPL-SCNC: 26 MMOL/L — SIGNIFICANT CHANGE UP (ref 22–31)
CREAT SERPL-MCNC: 1.36 MG/DL — HIGH (ref 0.5–1.3)
GLUCOSE SERPL-MCNC: 105 MG/DL — HIGH (ref 70–99)
HCT VFR BLD CALC: 39.7 % — SIGNIFICANT CHANGE UP (ref 34.5–45)
HGB BLD-MCNC: 12.3 G/DL — SIGNIFICANT CHANGE UP (ref 11.5–15.5)
INR BLD: 2.05 — HIGH (ref 0.88–1.17)
MCHC RBC-ENTMCNC: 25.5 PG — LOW (ref 27–34)
MCHC RBC-ENTMCNC: 31 % — LOW (ref 32–36)
MCV RBC AUTO: 82.4 FL — SIGNIFICANT CHANGE UP (ref 80–100)
PLATELET # BLD AUTO: 308 K/UL — SIGNIFICANT CHANGE UP (ref 150–400)
PMV BLD: 10.7 FL — SIGNIFICANT CHANGE UP (ref 7–13)
POTASSIUM SERPL-MCNC: 3.8 MMOL/L — SIGNIFICANT CHANGE UP (ref 3.5–5.3)
POTASSIUM SERPL-SCNC: 3.8 MMOL/L — SIGNIFICANT CHANGE UP (ref 3.5–5.3)
PROTHROM AB SERPL-ACNC: 23.3 SEC — HIGH (ref 9.8–13.1)
RBC # BLD: 4.82 M/UL — SIGNIFICANT CHANGE UP (ref 3.8–5.2)
RBC # FLD: 15.5 % — HIGH (ref 10.3–14.5)
SODIUM SERPL-SCNC: 139 MMOL/L — SIGNIFICANT CHANGE UP (ref 135–145)
WBC # BLD: 8.91 K/UL — SIGNIFICANT CHANGE UP (ref 3.8–10.5)
WBC # FLD AUTO: 8.91 K/UL — SIGNIFICANT CHANGE UP (ref 3.8–10.5)

## 2017-05-15 PROCEDURE — 93306 TTE W/DOPPLER COMPLETE: CPT | Mod: 26

## 2017-05-15 PROCEDURE — 78452 HT MUSCLE IMAGE SPECT MULT: CPT | Mod: 26

## 2017-05-15 PROCEDURE — 93018 CV STRESS TEST I&R ONLY: CPT | Mod: GC

## 2017-05-15 PROCEDURE — 93016 CV STRESS TEST SUPVJ ONLY: CPT | Mod: GC

## 2017-05-15 RX ORDER — WARFARIN SODIUM 2.5 MG/1
4 TABLET ORAL ONCE
Qty: 0 | Refills: 0 | Status: COMPLETED | OUTPATIENT
Start: 2017-05-15 | End: 2017-05-15

## 2017-05-15 RX ADMIN — Medication 50 MILLIGRAM(S): at 05:28

## 2017-05-15 RX ADMIN — WARFARIN SODIUM 4 MILLIGRAM(S): 2.5 TABLET ORAL at 17:17

## 2017-05-15 RX ADMIN — Medication 20 MILLIGRAM(S): at 17:22

## 2017-05-15 RX ADMIN — Medication 2: at 12:32

## 2017-05-15 RX ADMIN — Medication 40 MILLIGRAM(S): at 05:28

## 2017-05-15 RX ADMIN — CELECOXIB 200 MILLIGRAM(S): 200 CAPSULE ORAL at 12:35

## 2017-05-15 RX ADMIN — PANTOPRAZOLE SODIUM 40 MILLIGRAM(S): 20 TABLET, DELAYED RELEASE ORAL at 09:18

## 2017-05-15 RX ADMIN — Medication 40 MILLIGRAM(S): at 17:17

## 2017-05-15 RX ADMIN — SODIUM CHLORIDE 3 MILLILITER(S): 9 INJECTION INTRAMUSCULAR; INTRAVENOUS; SUBCUTANEOUS at 21:58

## 2017-05-15 RX ADMIN — SODIUM CHLORIDE 3 MILLILITER(S): 9 INJECTION INTRAMUSCULAR; INTRAVENOUS; SUBCUTANEOUS at 13:04

## 2017-05-15 RX ADMIN — CELECOXIB 200 MILLIGRAM(S): 200 CAPSULE ORAL at 13:04

## 2017-05-15 RX ADMIN — SODIUM CHLORIDE 3 MILLILITER(S): 9 INJECTION INTRAMUSCULAR; INTRAVENOUS; SUBCUTANEOUS at 05:24

## 2017-05-15 RX ADMIN — SIMVASTATIN 40 MILLIGRAM(S): 20 TABLET, FILM COATED ORAL at 22:00

## 2017-05-15 RX ADMIN — Medication 20 MILLIGRAM(S): at 05:28

## 2017-05-15 RX ADMIN — Medication 50 MILLIGRAM(S): at 22:00

## 2017-05-15 RX ADMIN — Medication 180 MILLIGRAM(S): at 05:28

## 2017-05-15 RX ADMIN — Medication 50 MILLIGRAM(S): at 14:07

## 2017-05-15 NOTE — DISCHARGE NOTE ADULT - HOSPITAL COURSE
75 y/o Romanian speaking Female, Translation done through  phone ID # [id number], with a PmHx of CAD (s/p CABG x 1 and stents), Afib (on Coumadin), CVA, CHF (Diastolic HF with EF of 65%), Aortic and Mitral valve replacement (bioprosthetic), DM, presented with the complaint of SOB and HERNANDEZ. Pt states she has been having worsening gradually onset of SOB that had gone worse last night so she came to the hospital for an evaluation. Pt states she was at home resting all day and when the night came she began to have SOB with a mild heaviness in her chest. Pt admits to a 10 hour car ride to [place] 3 days ago. Son at bedside (also Romanian speaking - translated by  phone), stated he was concerned because she hasn't been urinating much and last time that happened (back in February when she was admitted to [facility]), she filled up with fluids. She denies any fever, chills, HA, dizziness, blurred vision, n/v. She appears comfortable at this time. She states after she was given the lasix by the ed and was able to urinated she started to feel much better. She is being admitted to telemetry for r/o acs and for acute on chronic diastolic heart failure.  EKG: Afib @ 78, T inv I, AVL, ST dep V5  CE x 2 neg                WBC: 11.66                Glucose: 114                 BNP: 1,030  CXR - clear  5/13 CArdio: -Admit to tele -GIA  -Check TTE -IV lasix to keep O > I.   5/15 Echo: EF 73% 1. Bioprosthetic mitral valve replacement. No mitral valve regurgitation seen. Mean transmitral valve gradient equals  5 mm Hg, which is probably normal in the setting of a prosthetic valve.  2. Bioprosthetic aortic valve replacement. Peak transaortic valve gradient equals 50 mm Hg, mean transaortic valve gradient equals 22 mm Hg, which is elevated even in the presence of a prosthetic valve. No aortic valve regurgitation seen. 3. Moderately dilated left atrium.  LA volume index = 46 cc/m2. 4. Normal left ventricular internal dimensions and wall thicknesses. 5. Normal left ventricular systolic function. No segmental wall motion abnormalities. Paradoxical septal wall motion  consistent with postoperative state. 6. Mild right atrial enlargement.  7. Normal right ventricular size and function. 8. Normal tricuspid valve.  Moderate tricuspid regurgitation. 9. Estimated pulmonary artery systolic pressure equals 56 mm Hg, assuming right atrial pressure equals 10  mm Hg, consistent with moderate pulmonary hypertension.  *** Compared with echocardiogram of 2/10/2017, no significant changes noted.  5/15 NST: Normal Study  * Myocardial Perfusion SPECT results are normal.  * Normal myocardial perfusion scan,with no evidence of  infarction or inducible ischemia.  * Post-stress gated wall motion analysis was performed  (LVEF = 64 %;LVEDV = 82 ml.), revealing normal LV  function. There was paradoxical motion of the septum (post  CABG). RV function appeared normal.  5/15 Renal c/s (Dorsey): MARICHUY likely NSAID induced +/- effects of diuretics, avoid NSAIDs at present, check UA, total protein/cr, urine lytes, no need for renal sono, diuretics as ordered, decrease enalapril to 10mg bid, no further celebrex of orther nsaids, tylenol for pain     Patient stable for d/c home today INR therapeutic, continue cardiac meds as prescribed. Follow up as outpatient with Cardiologist and PCP. 77 y/o Yi speaking Female, Translation done through  phone ID # [id number], with a PmHx of CAD (s/p CABG x 1 and stents), Afib (on Coumadin), CVA, CHF (Diastolic HF with EF of 65%), Aortic and Mitral valve replacement (bioprosthetic), DM, presented with the complaint of SOB and HERNANDEZ. Pt states she has been having worsening gradually onset of SOB that had gone worse last night so she came to the hospital for an evaluation. Pt states she was at home resting all day and when the night came she began to have SOB with a mild heaviness in her chest. Pt admits to a 10 hour car ride to [place] 3 days ago. Son at bedside (also Yi speaking - translated by  phone), stated he was concerned because she hasn't been urinating much and last time that happened (back in February when she was admitted to [facility]), she filled up with fluids. She denies any fever, chills, HA, dizziness, blurred vision, n/v. She appears comfortable at this time. She states after she was given the lasix by the ed and was able to urinated she started to feel much better. She is being admitted to telemetry for r/o acs and for acute on chronic diastolic heart failure.  EKG: Afib @ 78, T inv I, AVL, ST dep V5  CE x 2 neg                WBC: 11.66                Glucose: 114                 BNP: 1,030  CXR - clear  5/13 CArdio: -Admit to tele -GIA  -Check TTE -IV lasix to keep O > I.   5/15 Echo: EF 73% 1. Bioprosthetic mitral valve replacement. No mitral valve regurgitation seen. Mean transmitral valve gradient equals  5 mm Hg, which is probably normal in the setting of a prosthetic valve.  2. Bioprosthetic aortic valve replacement. Peak transaortic valve gradient equals 50 mm Hg, mean transaortic valve gradient equals 22 mm Hg, which is elevated even in the presence of a prosthetic valve. No aortic valve regurgitation seen. 3. Moderately dilated left atrium.  LA volume index = 46 cc/m2. 4. Normal left ventricular internal dimensions and wall thicknesses. 5. Normal left ventricular systolic function. No segmental wall motion abnormalities. Paradoxical septal wall motion  consistent with postoperative state. 6. Mild right atrial enlargement.  7. Normal right ventricular size and function. 8. Normal tricuspid valve.  Moderate tricuspid regurgitation. 9. Estimated pulmonary artery systolic pressure equals 56 mm Hg, assuming right atrial pressure equals 10  mm Hg, consistent with moderate pulmonary hypertension.  *** Compared with echocardiogram of 2/10/2017, no significant changes noted.  5/15 NST: Normal Study  * Myocardial Perfusion SPECT results are normal.  * Normal myocardial perfusion scan,with no evidence of  infarction or inducible ischemia.  * Post-stress gated wall motion analysis was performed  (LVEF = 64 %;LVEDV = 82 ml.), revealing normal LV  function. There was paradoxical motion of the septum (post  CABG). RV function appeared normal.  5/15 Renal c/s (Dorsey): MARICHUY likely NSAID induced +/- effects of diuretics, avoid NSAIDs at present, check UA, total protein/cr, urine lytes, no need for renal sono, diuretics as ordered, decrease enalapril to 10mg bid, no further celebrex of orther nsaids, tylenol for pain     Patient stable for d/c home today INR therapeutic, continue cardiac meds as prescribed. Follow up as outpatient with Cardiologist and PCP.     7/3 Patient with moderate pulmonary hypertension on echocardiogram which is unchanged from previous echocardiogram in 2/10/2017. Therefore, pulmonary hypertension insignificant for this admission, no treatment was obtained for this diagnosis during this hospitalization.

## 2017-05-15 NOTE — DISCHARGE NOTE ADULT - ADDITIONAL INSTRUCTIONS
follow up with your PCP on Friday for INR check goal INR 2-3  follow up with Dr. Gómez Cardiologist office will call you with appointment

## 2017-05-15 NOTE — DISCHARGE NOTE ADULT - PATIENT PORTAL LINK FT
“You can access the Follow[employer] Patient Portal, offered by [facility], by registering with the following website: [email or web address]”

## 2017-05-15 NOTE — DISCHARGE NOTE ADULT - SECONDARY DIAGNOSIS.
Atrial fibrillation, unspecified type CAD (coronary artery disease) CVA (cerebral vascular accident)

## 2017-05-15 NOTE — DISCHARGE NOTE ADULT - PLAN OF CARE
compliance of medications, euvolemic follow up with your Cardiologist Dr. Gómez in 1-2 weeks, low salt diet, 1500ml fluids restriction, continue current cardiac meds goal INR 2-3, your coumadin dose was decreased to 3mg daily, when you came in your INR was supratherapeutic. Please have your INR checked by Friday by your PCP continue cardiac meds as prescribed continue coumadin and zocor

## 2017-05-15 NOTE — DISCHARGE NOTE ADULT - MEDICATION SUMMARY - MEDICATIONS TO CHANGE
I will SWITCH the dose or number of times a day I take the medications listed below when I get home from the hospital:    enalapril 20 mg oral tablet  -- 1 tab(s) by mouth 2 times a day    warfarin 4 mg oral tablet  -- 1 tab(s) by mouth once a day. INR goal 2-3. Monitor INR twice weekly with PCP.  -- Do not take this drug if you are pregnant.  It is very important that you take or use this exactly as directed.  Do not skip doses or discontinue unless directed by your doctor.  Obtain medical advice before taking any non-prescription drugs as some may affect the action of this medication.

## 2017-05-15 NOTE — DIETITIAN INITIAL EVALUATION ADULT. - NS AS NUTRI INTERV ED CONTENT
Priority modifications/Recommended modifications/Nutrition relationship to health/disease/Purpose of the nutrition education/Survival information

## 2017-05-15 NOTE — DISCHARGE NOTE ADULT - CARE PROVIDER_API CALL
Rick West ([place]), Internal Medicine  [address] [place] [address]  Phone: [phone]  Fax: [phone]    Monica Gómez), Cardiovascular Disease; Internal Medicine  [address] [facility] [address] [place] [id number]  Phone: [phone]  Fax: [phone]

## 2017-05-15 NOTE — DIETITIAN INITIAL EVALUATION ADULT. - PT NOT SOURCE
Pt English speaking but able to make her needs known in English, and RD able to speak to pt in preferred language

## 2017-05-15 NOTE — DISCHARGE NOTE ADULT - MEDICATION SUMMARY - MEDICATIONS TO TAKE
I will START or STAY ON the medications listed below when I get home from the hospital:    dilTIAZem 180 mg/24 hours oral capsule, extended release  -- 1 cap(s) by mouth once a day  -- Indication: For afib    Coumadin 3 mg oral tablet  -- 1 tab(s) by mouth once a day  -- Do not take this drug if you are pregnant.  It is very important that you take or use this exactly as directed.  Do not skip doses or discontinue unless directed by your doctor.  Obtain medical advice before taking any non-prescription drugs as some may affect the action of this medication.    -- Indication: For afib     metFORMIN 500 mg oral tablet, extended release  -- 1 tab(s) by mouth once a day  -- Indication: For Diabetes mellitus    simvastatin 40 mg oral tablet  -- 1 tab(s) by mouth once a day (at bedtime)  -- Indication: For hyperlipidemia     ipratropium CFC free 17 mcg/inh inhalation aerosol  -- 2 puff(s) inhaled every 6 hours, As needed, SOB/Wheezing  -- Indication: For shortness of breath     furosemide 40 mg oral tablet  -- 1 tab(s) by mouth once a day  -- Indication: For CHF    pantoprazole 40 mg oral delayed release tablet  -- 1 tab(s) by mouth once a day (before a meal)  -- Indication: For GERD    hydrALAZINE 50 mg oral tablet  -- 1 tab(s) by mouth every 8 hours  -- Indication: For hypertension

## 2017-05-15 NOTE — DIETITIAN INITIAL EVALUATION ADULT. - OTHER INFO
Nutrition consult received for RD. 75y/o F admitted with SOB. Reports good appetite and PO intake %. Prefers cultural food choices vs. hospital foods. Patient denies any nausea/vomiting/diarrhea/constipation or difficulty chewing and swallowing. NKFA. +BM reported. Reviewed diet recommendations with pt. Provided the Pt with coumadin/vitamin k education. Encouraged good PO intake. No questions or concerns voiced at this time.

## 2017-05-16 LAB
APTT BLD: 146.7 SEC — CRITICAL HIGH (ref 27.5–37.4)
BUN SERPL-MCNC: 36 MG/DL — HIGH (ref 7–23)
CALCIUM SERPL-MCNC: 8.9 MG/DL — SIGNIFICANT CHANGE UP (ref 8.4–10.5)
CHLORIDE SERPL-SCNC: 100 MMOL/L — SIGNIFICANT CHANGE UP (ref 98–107)
CO2 SERPL-SCNC: 25 MMOL/L — SIGNIFICANT CHANGE UP (ref 22–31)
CREAT SERPL-MCNC: 1.29 MG/DL — SIGNIFICANT CHANGE UP (ref 0.5–1.3)
GLUCOSE SERPL-MCNC: 121 MG/DL — HIGH (ref 70–99)
HCT VFR BLD CALC: 39.8 % — SIGNIFICANT CHANGE UP (ref 34.5–45)
HCT VFR BLD CALC: 40.2 % — SIGNIFICANT CHANGE UP (ref 34.5–45)
HGB BLD-MCNC: 12.3 G/DL — SIGNIFICANT CHANGE UP (ref 11.5–15.5)
HGB BLD-MCNC: 12.3 G/DL — SIGNIFICANT CHANGE UP (ref 11.5–15.5)
INR BLD: 1.64 — HIGH (ref 0.88–1.17)
MAGNESIUM SERPL-MCNC: 2.1 MG/DL — SIGNIFICANT CHANGE UP (ref 1.6–2.6)
MCHC RBC-ENTMCNC: 25.3 PG — LOW (ref 27–34)
MCHC RBC-ENTMCNC: 25.4 PG — LOW (ref 27–34)
MCHC RBC-ENTMCNC: 30.6 % — LOW (ref 32–36)
MCHC RBC-ENTMCNC: 30.9 % — LOW (ref 32–36)
MCV RBC AUTO: 82.2 FL — SIGNIFICANT CHANGE UP (ref 80–100)
MCV RBC AUTO: 82.5 FL — SIGNIFICANT CHANGE UP (ref 80–100)
PLATELET # BLD AUTO: 292 K/UL — SIGNIFICANT CHANGE UP (ref 150–400)
PLATELET # BLD AUTO: 302 K/UL — SIGNIFICANT CHANGE UP (ref 150–400)
PMV BLD: 10.7 FL — SIGNIFICANT CHANGE UP (ref 7–13)
PMV BLD: 11 FL — SIGNIFICANT CHANGE UP (ref 7–13)
POTASSIUM SERPL-MCNC: 3.8 MMOL/L — SIGNIFICANT CHANGE UP (ref 3.5–5.3)
POTASSIUM SERPL-SCNC: 3.8 MMOL/L — SIGNIFICANT CHANGE UP (ref 3.5–5.3)
PROTHROM AB SERPL-ACNC: 18.6 SEC — HIGH (ref 9.8–13.1)
RBC # BLD: 4.84 M/UL — SIGNIFICANT CHANGE UP (ref 3.8–5.2)
RBC # BLD: 4.87 M/UL — SIGNIFICANT CHANGE UP (ref 3.8–5.2)
RBC # FLD: 15.3 % — HIGH (ref 10.3–14.5)
RBC # FLD: 15.4 % — HIGH (ref 10.3–14.5)
SODIUM SERPL-SCNC: 141 MMOL/L — SIGNIFICANT CHANGE UP (ref 135–145)
WBC # BLD: 10.9 K/UL — HIGH (ref 3.8–10.5)
WBC # BLD: 11.3 K/UL — HIGH (ref 3.8–10.5)
WBC # FLD AUTO: 10.9 K/UL — HIGH (ref 3.8–10.5)
WBC # FLD AUTO: 11.3 K/UL — HIGH (ref 3.8–10.5)

## 2017-05-16 RX ORDER — WARFARIN SODIUM 2.5 MG/1
4 TABLET ORAL ONCE
Qty: 0 | Refills: 0 | Status: COMPLETED | OUTPATIENT
Start: 2017-05-16 | End: 2017-05-16

## 2017-05-16 RX ORDER — HEPARIN SODIUM 5000 [USP'U]/ML
3000 INJECTION INTRAVENOUS; SUBCUTANEOUS EVERY 6 HOURS
Qty: 0 | Refills: 0 | Status: DISCONTINUED | OUTPATIENT
Start: 2017-05-16 | End: 2017-05-17

## 2017-05-16 RX ORDER — HEPARIN SODIUM 5000 [USP'U]/ML
6500 INJECTION INTRAVENOUS; SUBCUTANEOUS EVERY 6 HOURS
Qty: 0 | Refills: 0 | Status: DISCONTINUED | OUTPATIENT
Start: 2017-05-16 | End: 2017-05-17

## 2017-05-16 RX ORDER — FUROSEMIDE 40 MG
40 TABLET ORAL DAILY
Qty: 0 | Refills: 0 | Status: DISCONTINUED | OUTPATIENT
Start: 2017-05-16 | End: 2017-05-17

## 2017-05-16 RX ORDER — HEPARIN SODIUM 5000 [USP'U]/ML
INJECTION INTRAVENOUS; SUBCUTANEOUS
Qty: 25000 | Refills: 0 | Status: DISCONTINUED | OUTPATIENT
Start: 2017-05-16 | End: 2017-05-17

## 2017-05-16 RX ADMIN — Medication 50 MILLIGRAM(S): at 05:46

## 2017-05-16 RX ADMIN — SODIUM CHLORIDE 3 MILLILITER(S): 9 INJECTION INTRAMUSCULAR; INTRAVENOUS; SUBCUTANEOUS at 21:09

## 2017-05-16 RX ADMIN — HEPARIN SODIUM 1200 UNIT(S)/HR: 5000 INJECTION INTRAVENOUS; SUBCUTANEOUS at 21:30

## 2017-05-16 RX ADMIN — Medication 40 MILLIGRAM(S): at 05:46

## 2017-05-16 RX ADMIN — SODIUM CHLORIDE 3 MILLILITER(S): 9 INJECTION INTRAMUSCULAR; INTRAVENOUS; SUBCUTANEOUS at 05:49

## 2017-05-16 RX ADMIN — Medication 50 MILLIGRAM(S): at 21:09

## 2017-05-16 RX ADMIN — HEPARIN SODIUM 0 UNIT(S)/HR: 5000 INJECTION INTRAVENOUS; SUBCUTANEOUS at 20:31

## 2017-05-16 RX ADMIN — Medication 50 MILLIGRAM(S): at 13:01

## 2017-05-16 RX ADMIN — HEPARIN SODIUM 1500 UNIT(S)/HR: 5000 INJECTION INTRAVENOUS; SUBCUTANEOUS at 12:36

## 2017-05-16 RX ADMIN — SIMVASTATIN 40 MILLIGRAM(S): 20 TABLET, FILM COATED ORAL at 21:09

## 2017-05-16 RX ADMIN — Medication 10 MILLIGRAM(S): at 17:28

## 2017-05-16 RX ADMIN — WARFARIN SODIUM 4 MILLIGRAM(S): 2.5 TABLET ORAL at 17:28

## 2017-05-16 RX ADMIN — Medication 10 MILLIGRAM(S): at 05:51

## 2017-05-16 RX ADMIN — Medication: at 09:23

## 2017-05-16 RX ADMIN — Medication 2: at 12:35

## 2017-05-16 RX ADMIN — PANTOPRAZOLE SODIUM 40 MILLIGRAM(S): 20 TABLET, DELAYED RELEASE ORAL at 05:46

## 2017-05-16 RX ADMIN — SODIUM CHLORIDE 3 MILLILITER(S): 9 INJECTION INTRAMUSCULAR; INTRAVENOUS; SUBCUTANEOUS at 13:01

## 2017-05-16 RX ADMIN — Medication 180 MILLIGRAM(S): at 05:46

## 2017-05-17 VITALS — SYSTOLIC BLOOD PRESSURE: 151 MMHG | HEART RATE: 60 BPM | DIASTOLIC BLOOD PRESSURE: 51 MMHG

## 2017-05-17 LAB
APTT BLD: > 200 SEC — CRITICAL HIGH (ref 27.5–37.4)
BUN SERPL-MCNC: 34 MG/DL — HIGH (ref 7–23)
CALCIUM SERPL-MCNC: 8.8 MG/DL — SIGNIFICANT CHANGE UP (ref 8.4–10.5)
CHLORIDE SERPL-SCNC: 100 MMOL/L — SIGNIFICANT CHANGE UP (ref 98–107)
CO2 SERPL-SCNC: 25 MMOL/L — SIGNIFICANT CHANGE UP (ref 22–31)
CREAT SERPL-MCNC: 1.14 MG/DL — SIGNIFICANT CHANGE UP (ref 0.5–1.3)
GLUCOSE SERPL-MCNC: 120 MG/DL — HIGH (ref 70–99)
HCT VFR BLD CALC: 35.9 % — SIGNIFICANT CHANGE UP (ref 34.5–45)
HGB BLD-MCNC: 11.1 G/DL — LOW (ref 11.5–15.5)
INR BLD: 2.02 — HIGH (ref 0.88–1.17)
MAGNESIUM SERPL-MCNC: 2.1 MG/DL — SIGNIFICANT CHANGE UP (ref 1.6–2.6)
MCHC RBC-ENTMCNC: 25.5 PG — LOW (ref 27–34)
MCHC RBC-ENTMCNC: 30.9 % — LOW (ref 32–36)
MCV RBC AUTO: 82.3 FL — SIGNIFICANT CHANGE UP (ref 80–100)
PLATELET # BLD AUTO: 269 K/UL — SIGNIFICANT CHANGE UP (ref 150–400)
PMV BLD: 10.8 FL — SIGNIFICANT CHANGE UP (ref 7–13)
POTASSIUM SERPL-MCNC: 3.9 MMOL/L — SIGNIFICANT CHANGE UP (ref 3.5–5.3)
POTASSIUM SERPL-SCNC: 3.9 MMOL/L — SIGNIFICANT CHANGE UP (ref 3.5–5.3)
PROTHROM AB SERPL-ACNC: 22.9 SEC — HIGH (ref 9.8–13.1)
RBC # BLD: 4.36 M/UL — SIGNIFICANT CHANGE UP (ref 3.8–5.2)
RBC # FLD: 15.3 % — HIGH (ref 10.3–14.5)
SODIUM SERPL-SCNC: 140 MMOL/L — SIGNIFICANT CHANGE UP (ref 135–145)
WBC # BLD: 10.38 K/UL — SIGNIFICANT CHANGE UP (ref 3.8–10.5)
WBC # FLD AUTO: 10.38 K/UL — SIGNIFICANT CHANGE UP (ref 3.8–10.5)

## 2017-05-17 RX ORDER — METFORMIN HYDROCHLORIDE 850 MG/1
1 TABLET ORAL
Qty: 0 | Refills: 0 | COMMUNITY
Start: 2017-05-17

## 2017-05-17 RX ORDER — WARFARIN SODIUM 2.5 MG/1
1 TABLET ORAL
Qty: 30 | Refills: 0 | OUTPATIENT
Start: 2017-05-17 | End: 2017-06-16

## 2017-05-17 RX ORDER — DILTIAZEM HCL 120 MG
1 CAPSULE, EXT RELEASE 24 HR ORAL
Qty: 0 | Refills: 0 | COMMUNITY
Start: 2017-05-17

## 2017-05-17 RX ORDER — WARFARIN SODIUM 2.5 MG/1
3 TABLET ORAL ONCE
Qty: 0 | Refills: 0 | Status: COMPLETED | OUTPATIENT
Start: 2017-05-17 | End: 2017-05-17

## 2017-05-17 RX ADMIN — Medication 10 MILLIGRAM(S): at 18:11

## 2017-05-17 RX ADMIN — Medication: at 18:11

## 2017-05-17 RX ADMIN — SODIUM CHLORIDE 3 MILLILITER(S): 9 INJECTION INTRAMUSCULAR; INTRAVENOUS; SUBCUTANEOUS at 05:19

## 2017-05-17 RX ADMIN — PANTOPRAZOLE SODIUM 40 MILLIGRAM(S): 20 TABLET, DELAYED RELEASE ORAL at 06:09

## 2017-05-17 RX ADMIN — Medication 40 MILLIGRAM(S): at 05:20

## 2017-05-17 RX ADMIN — SODIUM CHLORIDE 3 MILLILITER(S): 9 INJECTION INTRAMUSCULAR; INTRAVENOUS; SUBCUTANEOUS at 13:32

## 2017-05-17 RX ADMIN — Medication: at 13:31

## 2017-05-17 RX ADMIN — Medication 10 MILLIGRAM(S): at 05:20

## 2017-05-17 RX ADMIN — HEPARIN SODIUM 0 UNIT(S)/HR: 5000 INJECTION INTRAVENOUS; SUBCUTANEOUS at 07:20

## 2017-05-17 RX ADMIN — Medication 180 MILLIGRAM(S): at 05:20

## 2017-05-17 RX ADMIN — Medication 50 MILLIGRAM(S): at 05:20

## 2017-05-17 RX ADMIN — WARFARIN SODIUM 3 MILLIGRAM(S): 2.5 TABLET ORAL at 18:11

## 2017-05-17 RX ADMIN — Medication 50 MILLIGRAM(S): at 13:32

## 2017-05-17 NOTE — CONSULT NOTE ADULT - ASSESSMENT
Problem/Plan - 1:  ·  Problem: CHF exacerbation.  Plan: ekg/telemetry, f/u ce x 2, mg, recent echo done, probnp, daily wts, fluid restriction, strict I & O, PT c/s, Lasix 40 iv bid.     Problem/Plan - 2:  ·  Problem: Atrial fibrillation.  Plan: rate control, currently supratherapeutic on coumadin with an INR of 4.23  Hold coumadin for now, restart when INR between 2-3.     Problem/Plan - 3:  ·  Problem: HTN (Hypertension).  Plan: Monitor bp, con't meds, adjust as needed.     Problem/Plan - 4:  ·  Problem: COPD (chronic obstructive pulmonary disease).  Plan: con't nebs.     Problem/Plan - 5:  ·  Problem: Diabetes mellitus type II, controlled.  Plan: monitor fs, hold po meds, insulin ssc, hgba1-c.     Problem/Plan - 6:  Problem: HLD (hyperlipidemia). Plan: fasting lipid profile, con't statin.    Problem/Plan - 7:  ·  Problem: Need for prophylactic measure.  Plan: therapeutic on coumadin. Problem/Plan - 1:  ·  Problem: CHF exacerbation.  Plan: diuretics as per crds  cw current meds     Problem/Plan - 2:  ·  Problem: Atrial fibrillation.  Plan: rate control,   check inr and dose coumadin daily    Problem/Plan - 3:  ·  Problem: HTN (Hypertension).  Plan: Monitor bp, con't meds, adjust as needed.     Problem/Plan - 4:  ·  Problem: COPD (chronic obstructive pulmonary disease).  Plan: con't nebs.     Problem/Plan - 5:  ·  Problem: Diabetes mellitus type II, controlled.  Plan: monitor fs, hold po meds, insulin ssc, hgba1-c.     Problem/Plan - 6:  Problem: HLD (hyperlipidemia). Plan: fasting lipid profile, con't statin.    Problem/Plan - 7:  ·  Problem: Need for prophylactic measure.  Plan: therapeutic on coumadin.

## 2017-05-17 NOTE — PROGRESS NOTE ADULT - SUBJECTIVE AND OBJECTIVE BOX
Subjective: Patient with no anginal chest pain or shortness of breath    	  MEDICATIONS:  MEDICATIONS  (STANDING):  sodium chloride 0.9% lock flush 3milliLiter(s) IV Push every 8 hours  diltiazem   CD 180milliGRAM(s) Oral daily  simvastatin 40milliGRAM(s) Oral at bedtime  pantoprazole    Tablet 40milliGRAM(s) Oral before breakfast  hydrALAZINE 50milliGRAM(s) Oral every 8 hours  insulin lispro (HumaLOG) corrective regimen sliding scale  SubCutaneous three times a day before meals  insulin lispro (HumaLOG) corrective regimen sliding scale  SubCutaneous at bedtime  dextrose 5%. 1000milliLiter(s) IV Continuous <Continuous>  dextrose 50% Injectable 12.5Gram(s) IV Push once  dextrose 50% Injectable 25Gram(s) IV Push once  dextrose 50% Injectable 25Gram(s) IV Push once  enalapril 10milliGRAM(s) Oral two times a day  furosemide    Tablet 40milliGRAM(s) Oral daily  warfarin 3milliGRAM(s) Oral once      LABS:	 	    CARDIAC MARKERS:  CARDIAC MARKERS ( 14 May 2017 21:18 )  x     / < 0.06 ng/mL / 69 u/L / x     / x                                11.1   10.38 )-----------( 269      ( 17 May 2017 06:00 )             35.9     05-17    140  |  100  |  34<H>  ----------------------------<  120<H>  3.9   |  25  |  1.14    Ca    8.8      17 May 2017 06:00  Mg     2.1     05-17      COAGS:   PT/INR - ( 17 May 2017 06:00 )   PT: 22.9 SEC;   INR: 2.02          PTT - ( 17 May 2017 06:00 )  PTT:> 200.0 SEC      PHYSICAL EXAM:  T(C): 37.1, Max: 37.1 (05-17 @ 05:18)  HR: 72 (63 - 72)  BP: 155/75 (138/55 - 155/75)  RR: 18 (18 - 18)  SpO2: 98% (98% - 98%)  Wt(kg): --  I&O's Summary        HEENT:   Normal oral mucosa, PERRL, EOMI	  Lymphatic: No obvious lymphadenopathy , no edema  Cardiovascular: Normal S1 S2, No JVD, 1/6 DAVID murmur, Peripheral pulses palpable 2+ bilaterally  Respiratory: Lungs clear to auscultation, normal effort 	  Gastrointestinal:  Soft, Non-tender, + BS	  Skin: No rashes,  No cyanosis, warm to touch  Musculoskeletal: Normal range of motion, normal strength  Psychiatry:  Appropriate Mood & affect     TELEMETRY: 	  AFIB 70s         DIAGNOSTIC TESTING:    [ ] Echocardiogram: CONCLUSIONS:  1. Bioprosthetic mitral valve replacement. No mitral valve  regurgitation seen. Mean transmitral valve gradient equals  5 mm Hg, which is probably normal inthe setting of a  prosthetic valve.  2. Bioprosthetic aortic valve replacement. Peak transaortic  valve gradient equals 50 mm Hg, mean transaortic valve  gradient equals 22 mm Hg, which is elevated even in the  presence of a prosthetic valve. No aortic valve  regurgitation seen.  3. Moderately dilated left atrium.  LA volume index = 46  cc/m2.  4. Normal left ventricular internal dimensions and wall  thicknesses.  5. Normal left ventricular systolic function. No segmental  wall motion abnormalities. Paradoxical septal wall motion  consistent with postoperative state.  6. Mild right atrial enlargement.  7. Normal right ventricular size and function.  8. Normal tricuspid valve.  Moderate tricuspid  regurgitation.  9. Estimated pulmonary artery systolic pressure equals 56  mm Hg, assuming right atrial pressure equals 10  mm Hg,  consistent with moderate pulmonary hypertension.  *** Compared with echocardiogram of 2/10/2017, no  significant changes noted.    [ ] Stress Test:    IMPRESSIONS:Normal Study  * Myocardial Perfusion SPECT results are normal.  * Normal myocardial perfusion scan,with no evidence of  infarction or inducible ischemia.  * Post-stress gated wall motion analysis was performed  (LVEF = 64 %;LVEDV = 82 ml.), revealing normal LV  function. There was paradoxical motion of the septum (post  CABG). RV function appeared normal.OTHER: 	      ASSESSMENT/PLAN: 	76y Female with HTN, HLD, DM, CAD h/o CABG/AVR/MVR , CVA, Afib admitted with acute on chronic diastolic CHF exacerbation with MARICHUY who ruled out for ACS with normal LV function on TTE with no ischemia on nuclear stress test :    - Patient's inr is therapeutic today. Continue with lifelong AC on Coumadin with CVA prevention. Continue with Cardizem CD for rate control .   - Continue with enalapril and hydralazine for BP control.  - Continue with Lasix 40milliGRAM(s) Oral daily - patient is now euvolemic.   - Statin for h/o CAD.   - DC home today     Yaritza Willis [facility]  O:  [phone]  P: [phone] Subjective: Patient with no anginal chest pain or shortness of breath    	  MEDICATIONS:  MEDICATIONS  (STANDING):  sodium chloride 0.9% lock flush 3milliLiter(s) IV Push every 8 hours  diltiazem   CD 180milliGRAM(s) Oral daily  simvastatin 40milliGRAM(s) Oral at bedtime  pantoprazole    Tablet 40milliGRAM(s) Oral before breakfast  hydrALAZINE 50milliGRAM(s) Oral every 8 hours  insulin lispro (HumaLOG) corrective regimen sliding scale  SubCutaneous three times a day before meals  insulin lispro (HumaLOG) corrective regimen sliding scale  SubCutaneous at bedtime  dextrose 5%. 1000milliLiter(s) IV Continuous <Continuous>  dextrose 50% Injectable 12.5Gram(s) IV Push once  dextrose 50% Injectable 25Gram(s) IV Push once  dextrose 50% Injectable 25Gram(s) IV Push once  enalapril 10milliGRAM(s) Oral two times a day  furosemide    Tablet 40milliGRAM(s) Oral daily  warfarin 3milliGRAM(s) Oral once      LABS:	 	    CARDIAC MARKERS:  CARDIAC MARKERS ( 14 May 2017 21:18 )  x     / < 0.06 ng/mL / 69 u/L / x     / x                                11.1   10.38 )-----------( 269      ( 17 May 2017 06:00 )             35.9     05-17    140  |  100  |  34<H>  ----------------------------<  120<H>  3.9   |  25  |  1.14    Ca    8.8      17 May 2017 06:00  Mg     2.1     05-17      COAGS:   PT/INR - ( 17 May 2017 06:00 )   PT: 22.9 SEC;   INR: 2.02          PTT - ( 17 May 2017 06:00 )  PTT:> 200.0 SEC      PHYSICAL EXAM:  T(C): 37.1, Max: 37.1 (05-17 @ 05:18)  HR: 72 (63 - 72)  BP: 155/75 (138/55 - 155/75)  RR: 18 (18 - 18)  SpO2: 98% (98% - 98%)  Wt(kg): --  I&O's Summary        HEENT:   Normal oral mucosa, PERRL, EOMI	  Lymphatic: No obvious lymphadenopathy , no edema  Cardiovascular: Normal S1 S2, No JVD, 1/6 DAVID murmur, Peripheral pulses palpable 2+ bilaterally  Respiratory: Lungs clear to auscultation, normal effort 	  Gastrointestinal:  Soft, Non-tender, + BS	  Skin: No rashes,  No cyanosis, warm to touch  Musculoskeletal: Normal range of motion, normal strength  Psychiatry:  Appropriate Mood & affect     TELEMETRY: 	  AFIB 70s         DIAGNOSTIC TESTING:    [ ] Echocardiogram: CONCLUSIONS:  1. Bioprosthetic mitral valve replacement. No mitral valve  regurgitation seen. Mean transmitral valve gradient equals  5 mm Hg, which is probably normal inthe setting of a  prosthetic valve.  2. Bioprosthetic aortic valve replacement. Peak transaortic  valve gradient equals 50 mm Hg, mean transaortic valve  gradient equals 22 mm Hg, which is elevated even in the  presence of a prosthetic valve. No aortic valve  regurgitation seen.  3. Moderately dilated left atrium.  LA volume index = 46  cc/m2.  4. Normal left ventricular internal dimensions and wall  thicknesses.  5. Normal left ventricular systolic function. No segmental  wall motion abnormalities. Paradoxical septal wall motion  consistent with postoperative state.  6. Mild right atrial enlargement.  7. Normal right ventricular size and function.  8. Normal tricuspid valve.  Moderate tricuspid  regurgitation.  9. Estimated pulmonary artery systolic pressure equals 56  mm Hg, assuming right atrial pressure equals 10  mm Hg,  consistent with moderate pulmonary hypertension.  *** Compared with echocardiogram of 2/10/2017, no  significant changes noted.    [ ] Stress Test:    IMPRESSIONS:Normal Study  * Myocardial Perfusion SPECT results are normal.  * Normal myocardial perfusion scan,with no evidence of  infarction or inducible ischemia.  * Post-stress gated wall motion analysis was performed  (LVEF = 64 %;LVEDV = 82 ml.), revealing normal LV  function. There was paradoxical motion of the septum (post  CABG). RV function appeared normal.OTHER: 	      ASSESSMENT/PLAN: 	76y Female with HTN, HLD, DM, CAD h/o CABG/AVR/MVR , CVA, Afib admitted with acute on chronic diastolic CHF exacerbation with MARICHUY who ruled out for ACS with normal LV function on TTE with no ischemia on nuclear stress test :    - Patient's inr is therapeutic today. Continue with lifelong AC on Coumadin with CVA prevention. Continue with Cardizem CD for rate control .   - Continue with enalapril and hydralazine for BP control.  - Continue with Lasix 40milliGRAM(s) Oral daily - patient is now euvolemic.   - Statin for h/o CAD.   - DC home today - will arrange outpatient cardio follow up with Dr. Dalia Willis [facility]  O:  [phone]  P: [phone]

## 2017-05-17 NOTE — PROGRESS NOTE ADULT - SUBJECTIVE AND OBJECTIVE BOX
NEPHROLOGY        Patient seen and examined @ beside, no new complaints, feels good    REVIEW OF SYSTEMS:  As per HPI, otherwise 8 full 10 ROS were unremarkable    MEDICATIONS  (STANDING):  sodium chloride 0.9% lock flush 3milliLiter(s) IV Push every 8 hours  diltiazem   CD 180milliGRAM(s) Oral daily  simvastatin 40milliGRAM(s) Oral at bedtime  pantoprazole    Tablet 40milliGRAM(s) Oral before breakfast  hydrALAZINE 50milliGRAM(s) Oral every 8 hours  insulin lispro (HumaLOG) corrective regimen sliding scale  SubCutaneous three times a day before meals  insulin lispro (HumaLOG) corrective regimen sliding scale  SubCutaneous at bedtime  dextrose 5%. 1000milliLiter(s) IV Continuous <Continuous>  dextrose 50% Injectable 12.5Gram(s) IV Push once  dextrose 50% Injectable 25Gram(s) IV Push once  dextrose 50% Injectable 25Gram(s) IV Push once  enalapril 10milliGRAM(s) Oral two times a day  furosemide    Tablet 40milliGRAM(s) Oral daily  warfarin 3milliGRAM(s) Oral once      VITAL:  T(C): , Max: 37.1 (05-17 @ 05:18)  T(F): , Max: 98.8 (05-17 @ 05:18)  HR: 72  BP: 155/75  BP(mean): --  RR: 18  SpO2: 98%  Wt(kg): --    I and O's:        PHYSICAL EXAM:    Constitutional: NAD  HEENT: PERRLA, EOMI,  MMM  Neck: No LAD, No JVD  Respiratory: CTAB  Cardiovascular: S1 and S2  Gastrointestinal: BS+, soft, NT/ND  Extremities: No peripheral edema  Neurological: A/O x 3, no focal deficits  Psychiatric: Normal mood, normal affect  : No Rosa  Skin: No rashes  Access: Not applicable    LABS:                        11.1   10.38 )-----------( 269      ( 17 May 2017 06:00 )             35.9     Na(140)/K(3.9)/Cl(100)/HCO3(25)/BUN(34)/Cr(1.14)/Glu(120)/Ca(8.8)/Mg(2.1)/P04 (--)   05-17 @ 06:00  Na(141)/K(3.8)/Cl(100)/HCO3(25)/BUN(36)/Cr(1.29)/Glu(121)/Ca(8.9)/Mg(2.1)/P04 (--)   05-16 @ 06:48  Na(139)/K(3.8)/Cl(98)/HCO3(26)/BUN(37)/Cr(1.36)/Glu(105)/Ca(8.9)/Mg(--)/P04 (--)   05-15 @ 06:48      INR:2.02    Urine Studies:          RADIOLOGY & ADDITIONAL STUDIES:        ASSESSMENT: 77 y/o female PMH of DM, CAD-CABG/stent, AVR, MVR, HTN, CVA, HFpEF on 5/13 a/w SOB c/b MARICHUY      RECOMMENDATIONS  (1) Renal: MARICUHY likely prerenally mediated in setting of obligatory diuresis.  Resoloved, no renal objection to d/c  (2) CVS: continue PO lasix as ordered  (3) Heme: being bridged to coumading  (4) Dispo: d/c planning once INR therapeutic NEPHROLOGY        Patient seen and examined @ beside, no new complaints, feels good    REVIEW OF SYSTEMS:  As per HPI, otherwise 8 full 10 ROS were unremarkable    MEDICATIONS  (STANDING):  sodium chloride 0.9% lock flush 3milliLiter(s) IV Push every 8 hours  diltiazem   CD 180milliGRAM(s) Oral daily  simvastatin 40milliGRAM(s) Oral at bedtime  pantoprazole    Tablet 40milliGRAM(s) Oral before breakfast  hydrALAZINE 50milliGRAM(s) Oral every 8 hours  insulin lispro (HumaLOG) corrective regimen sliding scale  SubCutaneous three times a day before meals  insulin lispro (HumaLOG) corrective regimen sliding scale  SubCutaneous at bedtime  dextrose 5%. 1000milliLiter(s) IV Continuous <Continuous>  dextrose 50% Injectable 12.5Gram(s) IV Push once  dextrose 50% Injectable 25Gram(s) IV Push once  dextrose 50% Injectable 25Gram(s) IV Push once  enalapril 10milliGRAM(s) Oral two times a day  furosemide    Tablet 40milliGRAM(s) Oral daily  warfarin 3milliGRAM(s) Oral once      VITAL:  T(C): , Max: 37.1 (05-17 @ 05:18)  T(F): , Max: 98.8 (05-17 @ 05:18)  HR: 72  BP: 155/75  BP(mean): --  RR: 18  SpO2: 98%  Wt(kg): --    I and O's:        PHYSICAL EXAM:    Constitutional: NAD  HEENT: PERRLA, EOMI  Neck:  No JVD  Respiratory: CTAB  Cardiovascular: S1 and S2  Gastrointestinal: BS+, soft, NT/ND  Extremities: No peripheral edema  Neurological: A/O x 3, no focal deficits  Psychiatric: Normal mood, normal affect  : No Rosa  Skin: No rashes  Access: Not applicable    LABS:                        11.1   10.38 )-----------( 269      ( 17 May 2017 06:00 )             35.9     Na(140)/K(3.9)/Cl(100)/HCO3(25)/BUN(34)/Cr(1.14)/Glu(120)/Ca(8.8)/Mg(2.1)/P04 (--)   05-17 @ 06:00  Na(141)/K(3.8)/Cl(100)/HCO3(25)/BUN(36)/Cr(1.29)/Glu(121)/Ca(8.9)/Mg(2.1)/P04 (--)   05-16 @ 06:48  Na(139)/K(3.8)/Cl(98)/HCO3(26)/BUN(37)/Cr(1.36)/Glu(105)/Ca(8.9)/Mg(--)/P04 (--)   05-15 @ 06:48      INR:2.02    Urine Studies:          RADIOLOGY & ADDITIONAL STUDIES:        ASSESSMENT: 75 y/o female PMH of DM, CAD-CABG/stent, AVR, MVR, HTN, CVA, HFpEF on 5/13 a/w SOB c/b MARICHUY      RECOMMENDATIONS  (1) Renal: MARICHUY likely prerenally mediated in setting of obligatory diuresis.  Resoloved, no renal objection to d/c  (2) CVS: continue PO lasix as ordered  (3) Heme: being bridged to coumading  (4) Dispo: d/c planning once INR therapeutic

## 2017-05-17 NOTE — CONSULT NOTE ADULT - SUBJECTIVE AND OBJECTIVE BOX
Allergies/Medications:   Allergies:        Allergies:  	No Known Allergies:     Home Medications:   * Patient Currently Takes Medications as of 13-May-2017 10:54 documented in Prescription Writer  · 	ipratropium CFC free 17 mcg/inh inhalation aerosol: Last Dose Taken:  , 2 puff(s) inhaled every 6 hours, As needed, SOB/Wheezing  · 	Cardizem  mg/24 hours oral capsule, extended release: Last Dose Taken:  , 1 cap(s) orally once a day  · 	metFORMIN 500 mg oral tablet, extended release: Last Dose Taken:  , 1 tab(s) orally 2 times a day for 10 days, then 1 tab daily.  · 	warfarin 4 mg oral tablet: 1 tab(s) orally once a day. INR goal 2-3. Monitor INR twice weekly with PCP.  · 	hydrALAZINE 50 mg oral tablet: Last Dose Taken:  , 1 tab(s) orally every 8 hours  · 	pantoprazole 40 mg oral delayed release tablet: Last Dose Taken:  , 1 tab(s) orally once a day (before a meal)  · 	enalapril 20 mg oral tablet: Last Dose Taken:  , 1 tab(s) orally 2 times a day  · 	furosemide 40 mg oral tablet: Last Dose Taken:  , 1 tab(s) orally once a day  PMH/PSH/FH/SH:   Past Medical History:  Afib  (on Warfarin)  CAD (Coronary Artery Disease)    CHF (congestive heart failure)    CVA (Cerebral Infarction)  2011  Diabetes mellitus    Gout    HTN (Hypertension)    Mitral Regurgitation    Upper GI bleed.    Past Surgical History:  H/O aortic valve replacement  9/22/14  H/O mitral valve replacement  9/22/14  S/P angioplasty with stent  2011  S/P CABG x 1  (2000).    Family History:  Mother  Still living? Unknown  Family history of acute myocardial infarction, Age at diagnosis: Age Unknown.    Social History:  · Marital Status		  · Occupation	Housewife	  · Lives With	children; spouse	    Substance Use History:  · Substance Use	never used	    Alcohol Use History:  · Have you ever consumed alcohol	never	    Tobacco Usage:  · Tobacco Usage: Never smoker	    Passive Smoke Exposure:  · Passive Smoke Exposure	No	  Review of Systems:  · Negative General Symptoms	no fever; no chills; no sweating; no anorexia; no weight loss; no weight gain; no malaise; no fatigue	  · Negative Skin Symptoms	no rash; no itching; no dryness	  · Negative Ophthalmologic Symptoms	no diplopia; no photophobia; no lacrimation L; no blurred vision L; no blurred vision R; no loss of vision L; no loss of vision R	  · Negative ENMT Symptoms	no hearing difficulty; no ear pain; no tinnitus; no vertigo; no sinus symptoms; no nasal congestion; no nasal discharge	  · Negative Respiratory and Thorax Symptoms	no wheezing; no cough; no hemoptysis; no pleuritic chest pain	  · Respiratory and Thorax Symptoms	dyspnea	  · Negative Cardiovascular Symptoms	no chest pain; no palpitations	  · Cardiovascular Symptoms	dyspnea on exertion; orthopnea; paroxysmal nocturnal dyspnea; peripheral edema	  · Negative Gastrointestinal Symptoms	no nausea; no vomiting; no change in bowel habits; no abdominal pain	  · Negative General Genitourinary Symptoms	no hematuria; no renal colic; no flank pain L; no flank pain R; no dysuria	  · Negative Musculoskeletal Symptoms	no arthralgia; no arthritis; no joint swelling; no myalgia; no muscle cramps; no muscle weakness; no stiffness; no neck pain	  · Negative Neurological Symptoms	no transient paralysis; no weakness; no paresthesias; no generalized seizures; no focal seizures; no syncope; no tremors; no vertigo; no loss of sensation; no difficulty walking; no headache; no loss of consciousness; no hemiparesis; no confusion	  · Psychiatric	negative	  · Hematology/Lymphatics	negative	  · Endocrine	negative	  · Allergic/Immunologic	negative	  · Constitutional Details	well-developed; well-groomed; well-nourished; no distress; obese	  · Eyes	detailed exam	  · Eyes Details	PERRL; EOMI; conjunctiva clear	  · ENMT	No oral lesions; no gross abnormalities	  · Neck	detailed exam	  · Neck Details	normal; supple; no JVD	  · Breasts	not examined	  · Back	No deformity or limitation of movement	  · Respiratory	detailed exam	  · Respiratory Details	normal; airway patent; breath sounds equal; good air movement; respirations non-labored; no chest wall tenderness; no rhonchi; rales	  · Rales	lower L; lower R	  · Cardiovascular	detailed exam	  · Cardiovascular Details	no rub	  · Cardiovascular Details	irregular rate and rhythm; positive S1; positive S2; murmur	  · Murmur Timing	systolic	  · Character of Systolic Murmur	murmur loudness: II/VI	  · Gastrointestinal	detailed exam	  · GI Normal	normal; soft; nontender; no distention; bowel sounds normal; no rebound tenderness; no guarding; no rigidity	  · Genitourinary	not examined	  · Rectal	not examined	  · Extremities	detailed exam	  · Extremities Details	normal; no clubbing; no cyanosis; pedal edema	  · Pedal Edema Severity	1+	  · Pedal Edema Type	non-pitting	  · Extremities Comments	+1 non-pitting traceable edema noted b/l from ankle to mid tibia	  · Vascular	detailed exam	  · Radial Pulse	right normal; left normal; +2 pulses b/l	  · DP Pulse	+1 pulses b/l	  · PT Pulse	+1 pulses b/l	  · Neurological	detailed exam	  · Neurological Details	alert and oriented x 3; responds to verbal commands; sensation intact; cranial nerves intact	  · Skin	No lesions; no rash	                          11.1   10.38 )-----------( 269      ( 17 May 2017 06:00 )             35.9       05-17    140  |  100  |  34<H>  ----------------------------<  120<H>  3.9   |  25  |  1.14    Ca    8.8      17 May 2017 06:00  Mg     2.1     05-17                    PT/INR - ( 17 May 2017 06:00 )   PT: 22.9 SEC;   INR: 2.02          PTT - ( 17 May 2017 06:00 )  PTT:> 200.0 SEC    Lactate Trend            CAPILLARY BLOOD GLUCOSE  167 (17 May 2017 12:21) cc sob    HPI 77 y/o Bengali speaking Female, Translation done through  phone ID # [id number], with a PmHx of CAD (s/p CABG x 1 and stents), Afib (on Coumadin), CVA, CHF (Diastolic HF with EF of 65%), Aortic and Mitral valve replacement (bioprosthetic), DM, presented with the complaint of SOB and HERNANDEZ. Pt states she has been having worsening gradually onset of SOB that had gone worse last night so she came to the hospital for an evaluation. Pt states she was at home resting all day and when the night came she began to have SOB with a mild heaviness in her chest. Pt admits to a 10 hour car ride to [place] 3 days ago. Son at bedside (also Bengali speaking - translated by  phone), stated he was concerned because she hasn't been urinating much and last time that happened (back in February when she was admitted to [facility]), she filled up with fluids. She denies any fever, chills, HA, dizziness, blurred vision, n/v. She appears comfortable at this time.     Allergies/Medications:   Allergies:        Allergies:  	No Known Allergies:     Home Medications:   * Patient Currently Takes Medications as of 13-May-2017 10:54 documented in Prescription Writer  · 	ipratropium CFC free 17 mcg/inh inhalation aerosol: Last Dose Taken:  , 2 puff(s) inhaled every 6 hours, As needed, SOB/Wheezing  · 	Cardizem  mg/24 hours oral capsule, extended release: Last Dose Taken:  , 1 cap(s) orally once a day  · 	metFORMIN 500 mg oral tablet, extended release: Last Dose Taken:  , 1 tab(s) orally 2 times a day for 10 days, then 1 tab daily.  · 	warfarin 4 mg oral tablet: 1 tab(s) orally once a day. INR goal 2-3. Monitor INR twice weekly with PCP.  · 	hydrALAZINE 50 mg oral tablet: Last Dose Taken:  , 1 tab(s) orally every 8 hours  · 	pantoprazole 40 mg oral delayed release tablet: Last Dose Taken:  , 1 tab(s) orally once a day (before a meal)  · 	enalapril 20 mg oral tablet: Last Dose Taken:  , 1 tab(s) orally 2 times a day  · 	furosemide 40 mg oral tablet: Last Dose Taken:  , 1 tab(s) orally once a day  PMH/PSH/FH/SH:   Past Medical History:  Afib  (on Warfarin)  CAD (Coronary Artery Disease)    CHF (congestive heart failure)    CVA (Cerebral Infarction)  2011  Diabetes mellitus    Gout    HTN (Hypertension)    Mitral Regurgitation    Upper GI bleed.    Past Surgical History:  H/O aortic valve replacement  9/22/14  H/O mitral valve replacement  9/22/14  S/P angioplasty with stent  2011  S/P CABG x 1  (2000).    Family History:  Mother  Still living? Unknown  Family history of acute myocardial infarction, Age at diagnosis: Age Unknown.    Social History:  · Marital Status		  · Occupation	Housewife	  · Lives With	children; spouse	    Substance Use History:  · Substance Use	never used	    Alcohol Use History:  · Have you ever consumed alcohol	never	    Tobacco Usage:  · Tobacco Usage: Never smoker	    Passive Smoke Exposure:  · Passive Smoke Exposure	No	  Review of Systems:  · Negative General Symptoms	no fever; no chills; no sweating; no anorexia; no weight loss; no weight gain; no malaise; no fatigue	  · Negative Skin Symptoms	no rash; no itching; no dryness	  · Negative Ophthalmologic Symptoms	no diplopia; no photophobia; no lacrimation L; no blurred vision L; no blurred vision R; no loss of vision L; no loss of vision R	  · Negative ENMT Symptoms	no hearing difficulty; no ear pain; no tinnitus; no vertigo; no sinus symptoms; no nasal congestion; no nasal discharge	  · Negative Respiratory and Thorax Symptoms	no wheezing; no cough; no hemoptysis; no pleuritic chest pain	  · Respiratory and Thorax Symptoms	dyspnea	  · Negative Cardiovascular Symptoms	no chest pain; no palpitations	  · Cardiovascular Symptoms	dyspnea on exertion; orthopnea; paroxysmal nocturnal dyspnea; peripheral edema	  · Negative Gastrointestinal Symptoms	no nausea; no vomiting; no change in bowel habits; no abdominal pain	  · Negative General Genitourinary Symptoms	no hematuria; no renal colic; no flank pain L; no flank pain R; no dysuria	  · Negative Musculoskeletal Symptoms	no arthralgia; no arthritis; no joint swelling; no myalgia; no muscle cramps; no muscle weakness; no stiffness; no neck pain	  · Negative Neurological Symptoms	no transient paralysis; no weakness; no paresthesias; no generalized seizures; no focal seizures; no syncope; no tremors; no vertigo; no loss of sensation; no difficulty walking; no headache; no loss of consciousness; no hemiparesis; no confusion	  · Psychiatric	negative	  · Hematology/Lymphatics	negative	  · Endocrine	negative	  · Allergic/Immunologic	negative	  · Constitutional Details	well-developed; well-groomed; well-nourished; no distress; obese	  · Eyes	detailed exam	  · Eyes Details	PERRL; EOMI; conjunctiva clear	  · ENMT	No oral lesions; no gross abnormalities	  · Neck	detailed exam	  · Neck Details	normal; supple; no JVD	  · Breasts	not examined	  · Back	No deformity or limitation of movement	  · Respiratory	detailed exam	  · Respiratory Details	normal; airway patent; breath sounds equal; good air movement; respirations non-labored; no chest wall tenderness; no rhonchi; rales	  · Rales	lower L; lower R	  · Cardiovascular	detailed exam	  · Cardiovascular Details	no rub	  · Cardiovascular Details	irregular rate and rhythm; positive S1; positive S2; murmur	  · Murmur Timing	systolic	  · Character of Systolic Murmur	murmur loudness: II/VI	  · Gastrointestinal	detailed exam	  · GI Normal	normal; soft; nontender; no distention; bowel sounds normal; no rebound tenderness; no guarding; no rigidity	  · Genitourinary	not examined	  · Rectal	not examined	  · Extremities	detailed exam	  · Extremities Details	normal; no clubbing; no cyanosis; pedal edema	  · Pedal Edema Severity	1+	  · Pedal Edema Type	non-pitting	  · Extremities Comments	+1 non-pitting traceable edema noted b/l from ankle to mid tibia	  · Vascular	detailed exam	  · Radial Pulse	right normal; left normal; +2 pulses b/l	  · DP Pulse	+1 pulses b/l	  · PT Pulse	+1 pulses b/l	  · Neurological	detailed exam	  · Neurological Details	alert and oriented x 3; responds to verbal commands; sensation intact; cranial nerves intact	  · Skin	No lesions; no rash	                          11.1   10.38 )-----------( 269      ( 17 May 2017 06:00 )             35.9       05-17    140  |  100  |  34<H>  ----------------------------<  120<H>  3.9   |  25  |  1.14    Ca    8.8      17 May 2017 06:00  Mg     2.1     05-17                    PT/INR - ( 17 May 2017 06:00 )   PT: 22.9 SEC;   INR: 2.02          PTT - ( 17 May 2017 06:00 )  PTT:> 200.0 SEC    Lactate Trend            CAPILLARY BLOOD GLUCOSE  167 (17 May 2017 12:21)

## 2017-10-07 ENCOUNTER — EMERGENCY (EMERGENCY)
Facility: HOSPITAL | Age: 77
LOS: 1 days | Discharge: HOME | End: 2017-10-07
Attending: EMERGENCY MEDICINE | Admitting: EMERGENCY MEDICINE
Payer: SELF-PAY

## 2017-10-07 VITALS
WEIGHT: 173.94 LBS | RESPIRATION RATE: 24 BRPM | HEIGHT: 60 IN | TEMPERATURE: 98 F | DIASTOLIC BLOOD PRESSURE: 82 MMHG | OXYGEN SATURATION: 94 % | HEART RATE: 82 BPM | SYSTOLIC BLOOD PRESSURE: 180 MMHG

## 2017-10-07 VITALS
HEART RATE: 65 BPM | SYSTOLIC BLOOD PRESSURE: 160 MMHG | OXYGEN SATURATION: 96 % | DIASTOLIC BLOOD PRESSURE: 50 MMHG | RESPIRATION RATE: 18 BRPM | TEMPERATURE: 98 F

## 2017-10-07 DIAGNOSIS — Z95.4 PRESENCE OF OTHER HEART-VALVE REPLACEMENT: Chronic | ICD-10-CM

## 2017-10-07 LAB
ANION GAP SERPL CALC-SCNC: 9 MMOL/L — SIGNIFICANT CHANGE UP (ref 5–17)
APTT BLD: 50.7 SEC — HIGH (ref 27.5–37.4)
BASOPHILS # BLD AUTO: 0.1 K/UL — SIGNIFICANT CHANGE UP (ref 0–0.2)
BASOPHILS NFR BLD AUTO: 1.1 % — SIGNIFICANT CHANGE UP (ref 0–2)
BUN SERPL-MCNC: 30 MG/DL — HIGH (ref 7–23)
CALCIUM SERPL-MCNC: 9.1 MG/DL — SIGNIFICANT CHANGE UP (ref 8.4–10.5)
CHLORIDE SERPL-SCNC: 103 MMOL/L — SIGNIFICANT CHANGE UP (ref 96–108)
CK MB BLD-MCNC: 1.6 % — SIGNIFICANT CHANGE UP (ref 0–3.5)
CK MB CFR SERPL CALC: 1.4 NG/ML — SIGNIFICANT CHANGE UP (ref 0–3.6)
CK SERPL-CCNC: 88 U/L — SIGNIFICANT CHANGE UP (ref 26–192)
CO2 SERPL-SCNC: 27 MMOL/L — SIGNIFICANT CHANGE UP (ref 22–31)
CREAT SERPL-MCNC: 1.07 MG/DL — SIGNIFICANT CHANGE UP (ref 0.5–1.3)
EOSINOPHIL # BLD AUTO: 0.2 K/UL — SIGNIFICANT CHANGE UP (ref 0–0.5)
EOSINOPHIL NFR BLD AUTO: 2 % — SIGNIFICANT CHANGE UP (ref 0–6)
GLUCOSE SERPL-MCNC: 139 MG/DL — HIGH (ref 70–99)
HCT VFR BLD CALC: 36.6 % — SIGNIFICANT CHANGE UP (ref 34.5–45)
HGB BLD-MCNC: 11.2 G/DL — LOW (ref 11.5–15.5)
INR BLD: 4.15 RATIO — HIGH (ref 0.88–1.16)
LYMPHOCYTES # BLD AUTO: 1.9 K/UL — SIGNIFICANT CHANGE UP (ref 1–3.3)
LYMPHOCYTES # BLD AUTO: 16.6 % — SIGNIFICANT CHANGE UP (ref 13–44)
MCHC RBC-ENTMCNC: 25.7 PG — LOW (ref 27–34)
MCHC RBC-ENTMCNC: 30.8 GM/DL — LOW (ref 32–36)
MCV RBC AUTO: 83.7 FL — SIGNIFICANT CHANGE UP (ref 80–100)
MONOCYTES # BLD AUTO: 0.8 K/UL — SIGNIFICANT CHANGE UP (ref 0–0.9)
MONOCYTES NFR BLD AUTO: 6.7 % — SIGNIFICANT CHANGE UP (ref 2–14)
NEUTROPHILS # BLD AUTO: 8.3 K/UL — HIGH (ref 1.8–7.4)
NEUTROPHILS NFR BLD AUTO: 73.6 % — SIGNIFICANT CHANGE UP (ref 43–77)
NT-PROBNP SERPL-SCNC: 1587 PG/ML — HIGH (ref 0–450)
PLATELET # BLD AUTO: 252 K/UL — SIGNIFICANT CHANGE UP (ref 150–400)
POTASSIUM SERPL-MCNC: 4 MMOL/L — SIGNIFICANT CHANGE UP (ref 3.5–5.3)
POTASSIUM SERPL-SCNC: 4 MMOL/L — SIGNIFICANT CHANGE UP (ref 3.5–5.3)
PROTHROM AB SERPL-ACNC: 46.6 SEC — HIGH (ref 9.8–12.7)
RBC # BLD: 4.37 M/UL — SIGNIFICANT CHANGE UP (ref 3.8–5.2)
RBC # FLD: 14.7 % — HIGH (ref 10.3–14.5)
SODIUM SERPL-SCNC: 139 MMOL/L — SIGNIFICANT CHANGE UP (ref 135–145)
TROPONIN I SERPL-MCNC: 0.01 NG/ML — LOW (ref 0.02–0.06)
TROPONIN I SERPL-MCNC: 0.02 NG/ML — SIGNIFICANT CHANGE UP (ref 0.02–0.06)
WBC # BLD: 11.3 K/UL — HIGH (ref 3.8–10.5)
WBC # FLD AUTO: 11.3 K/UL — HIGH (ref 3.8–10.5)

## 2017-10-07 PROCEDURE — 71010: CPT | Mod: 26

## 2017-10-07 PROCEDURE — 93010 ELECTROCARDIOGRAM REPORT: CPT

## 2017-10-07 PROCEDURE — 85730 THROMBOPLASTIN TIME PARTIAL: CPT

## 2017-10-07 PROCEDURE — 85027 COMPLETE CBC AUTOMATED: CPT

## 2017-10-07 PROCEDURE — 83880 ASSAY OF NATRIURETIC PEPTIDE: CPT

## 2017-10-07 PROCEDURE — 99285 EMERGENCY DEPT VISIT HI MDM: CPT

## 2017-10-07 PROCEDURE — 82553 CREATINE MB FRACTION: CPT

## 2017-10-07 PROCEDURE — 80048 BASIC METABOLIC PNL TOTAL CA: CPT

## 2017-10-07 PROCEDURE — 96374 THER/PROPH/DIAG INJ IV PUSH: CPT

## 2017-10-07 PROCEDURE — 82550 ASSAY OF CK (CPK): CPT

## 2017-10-07 PROCEDURE — 84484 ASSAY OF TROPONIN QUANT: CPT

## 2017-10-07 PROCEDURE — 99284 EMERGENCY DEPT VISIT MOD MDM: CPT | Mod: 25

## 2017-10-07 PROCEDURE — 93005 ELECTROCARDIOGRAM TRACING: CPT

## 2017-10-07 PROCEDURE — 71045 X-RAY EXAM CHEST 1 VIEW: CPT

## 2017-10-07 PROCEDURE — 85610 PROTHROMBIN TIME: CPT

## 2017-10-07 RX ORDER — SODIUM CHLORIDE 9 MG/ML
3 INJECTION INTRAMUSCULAR; INTRAVENOUS; SUBCUTANEOUS ONCE
Qty: 0 | Refills: 0 | Status: COMPLETED | OUTPATIENT
Start: 2017-10-07 | End: 2017-10-07

## 2017-10-07 RX ORDER — FUROSEMIDE 40 MG
20 TABLET ORAL ONCE
Qty: 0 | Refills: 0 | Status: COMPLETED | OUTPATIENT
Start: 2017-10-07 | End: 2017-10-07

## 2017-10-07 RX ADMIN — SODIUM CHLORIDE 3 MILLILITER(S): 9 INJECTION INTRAMUSCULAR; INTRAVENOUS; SUBCUTANEOUS at 15:30

## 2017-10-07 RX ADMIN — Medication 20 MILLIGRAM(S): at 15:59

## 2017-10-07 NOTE — ED PROVIDER NOTE - NS_ ATTENDINGSCRIBEDETAILS _ED_A_ED_FT
Neel Lim MD - The scribe's documentation has been prepared under my direction and personally reviewed by me in its entirety. I confirm that the note above accurately reflects all work, treatment, procedures, and medical decision making performed by me.

## 2017-10-07 NOTE — ED PROVIDER NOTE - PROGRESS NOTE DETAILS
All results were explained to patient and/or family and a copy of all available results given.  pt felt better and resting comfortably

## 2017-10-07 NOTE — ED ADULT NURSE NOTE - CHPI ED SYMPTOMS NEG
no cough/no body aches/no chest pain/no edema/no hemoptysis/no fever/no headache/no diaphoresis/no wheezing/no chills

## 2017-10-07 NOTE — ED ADULT NURSE NOTE - OBJECTIVE STATEMENT
Per daughter, patient has c/o increased shortness of breath since last night. Patient presents moaning but denies pain. Hyperventilating on arrival. Lungs crackles left base. Daughter describes episodes of shortness of breath last night resolved after a few hours and again today. Patient has had admission every few months for CHF.

## 2017-10-07 NOTE — CONSULT NOTE ADULT - SUBJECTIVE AND OBJECTIVE BOX
History of Present Illness: The patient is a 77 year old female with a history of HTN, DM, CAD s/p PCI and CABG, chronic AF, CVA, bioprosthetic AVR, MVR who presents with shortness of breath. There has been progressive dyspnea on exertion, worse over past day or so. She denies chest pain, cough, fevers, dizziness, palpitations. Two weeks ago, she had similar symptoms, her daughter increased her dose of furosemide to bid dosing, which helped.    Past Medical/Surgical History:  HTN, DM, CAD s/p PCI and CABG, chronic AF, CVA, bioprosthetic AVR, MVR    Medications:  Furosemide  Enalapril  Hydralazine  Diltiazem  Warfarin  Simvastatin    Family History: Non-contributory family history of premature cardiovascular atherosclerotic disease    Social History: No tobacco, alcohol or drug use    Review of Systems:  General: No fevers, chills, weight loss or gain  Skin: No rashes, color changes  Cardiovascular: No chest pain, orthopnea  Respiratory: No shortness of breath, cough  Gastrointestinal: No nausea, abdominal pain  Genitourinary: No incontinence, pain with urination  Musculoskeletal: No pain, swelling, decreased range of motion  Neurological: No headache, weakness  Psychiatric: No depression, anxiety  Endocrine: No weight loss or gain, increased thirst  All other systems are comprehensively negative.    Physical Exam:  Vitals:        Vital Signs Last 24 Hrs  T(C): 36.7 (07 Oct 2017 14:56), Max: 36.7 (07 Oct 2017 14:56)  T(F): 98 (07 Oct 2017 14:56), Max: 98 (07 Oct 2017 14:56)  HR: 68 (07 Oct 2017 15:30) (68 - 82)  BP: 138/42 (07 Oct 2017 15:30) (138/42 - 180/82)  BP(mean): --  RR: 22 (07 Oct 2017 15:30) (22 - 24)  SpO2: 94% (07 Oct 2017 15:30) (94% - 94%)  General: NAD  HEENT: MMM  Neck: No JVD, no carotid bruit  Lungs: CTAB  CV: RRR, nl S1/S2, no M/R/G  Abdomen: S/NT/ND, +BS  Extremities: No LE edema, no cyanosis  Neuro: AAOx3, non-focal  Skin: No rash    Labs:                        11.2   11.3  )-----------( 252      ( 07 Oct 2017 15:30 )             36.6     10-07    139  |  103  |  30<H>  ----------------------------<  139<H>  4.0   |  27  |  1.07    Ca    9.1      07 Oct 2017 15:31      CARDIAC MARKERS ( 07 Oct 2017 15:31 )  .008 ng/mL / x     / 88 U/L / x     / 1.4 ng/mL      PT/INR - ( 07 Oct 2017 15:30 )   PT: 46.6 sec;   INR: 4.15 ratio         PTT - ( 07 Oct 2017 15:30 )  PTT:50.7 sec    ECG: AF, LAD, poor R wave progression, TWI I, aVL

## 2017-10-07 NOTE — ED PROVIDER NOTE - OBJECTIVE STATEMENT
78 y/o F presents to the ED for two episodes of SOB today. Pt's daughter states that pt got up to use the restroom at about 0200 and experienced an episode of SOB, lasting about 2-3 hours. She felt better but the SOB returned this morning after she awoke. Daughter states that pt has episodes of SOB about every three months. Daughter states that pt was hospitalized for fluid buildup in lungs at [place] last year. Daughter is a poor historian. PMD: elin wade. PMHx: CAD, CHF

## 2017-10-10 ENCOUNTER — INPATIENT (INPATIENT)
Facility: HOSPITAL | Age: 77
LOS: 2 days | Discharge: HOME HEALTH | End: 2017-10-13
Attending: INTERNAL MEDICINE | Admitting: INTERNAL MEDICINE
Payer: MEDICAID

## 2017-10-10 VITALS
OXYGEN SATURATION: 93 % | DIASTOLIC BLOOD PRESSURE: 69 MMHG | HEART RATE: 78 BPM | SYSTOLIC BLOOD PRESSURE: 199 MMHG | RESPIRATION RATE: 22 BRPM

## 2017-10-10 DIAGNOSIS — I25.10 ATHEROSCLEROTIC HEART DISEASE OF NATIVE CORONARY ARTERY WITHOUT ANGINA PECTORIS: ICD-10-CM

## 2017-10-10 DIAGNOSIS — E11.9 TYPE 2 DIABETES MELLITUS WITHOUT COMPLICATIONS: ICD-10-CM

## 2017-10-10 DIAGNOSIS — Z95.4 PRESENCE OF OTHER HEART-VALVE REPLACEMENT: Chronic | ICD-10-CM

## 2017-10-10 DIAGNOSIS — I10 ESSENTIAL (PRIMARY) HYPERTENSION: ICD-10-CM

## 2017-10-10 DIAGNOSIS — I50.33 ACUTE ON CHRONIC DIASTOLIC (CONGESTIVE) HEART FAILURE: ICD-10-CM

## 2017-10-10 DIAGNOSIS — I48.91 UNSPECIFIED ATRIAL FIBRILLATION: ICD-10-CM

## 2017-10-10 DIAGNOSIS — I50.9 HEART FAILURE, UNSPECIFIED: ICD-10-CM

## 2017-10-10 LAB
ALBUMIN SERPL ELPH-MCNC: 3.8 G/DL — SIGNIFICANT CHANGE UP (ref 3.3–5)
ALP SERPL-CCNC: 67 U/L — SIGNIFICANT CHANGE UP (ref 40–120)
ALT FLD-CCNC: 30 U/L — SIGNIFICANT CHANGE UP (ref 4–33)
APTT BLD: 35.9 SEC — SIGNIFICANT CHANGE UP (ref 27.5–37.4)
AST SERPL-CCNC: 36 U/L — HIGH (ref 4–32)
BASOPHILS # BLD AUTO: 0.08 K/UL — SIGNIFICANT CHANGE UP (ref 0–0.2)
BASOPHILS NFR BLD AUTO: 0.7 % — SIGNIFICANT CHANGE UP (ref 0–2)
BILIRUB SERPL-MCNC: 0.5 MG/DL — SIGNIFICANT CHANGE UP (ref 0.2–1.2)
BUN SERPL-MCNC: 26 MG/DL — HIGH (ref 7–23)
CALCIUM SERPL-MCNC: 9 MG/DL — SIGNIFICANT CHANGE UP (ref 8.4–10.5)
CHLORIDE SERPL-SCNC: 97 MMOL/L — LOW (ref 98–107)
CK MB BLD-MCNC: 2.81 NG/ML — SIGNIFICANT CHANGE UP (ref 1–4.7)
CK SERPL-CCNC: 94 U/L — SIGNIFICANT CHANGE UP (ref 25–170)
CK SERPL-CCNC: 98 U/L — SIGNIFICANT CHANGE UP (ref 25–170)
CO2 SERPL-SCNC: 26 MMOL/L — SIGNIFICANT CHANGE UP (ref 22–31)
CREAT SERPL-MCNC: 1.05 MG/DL — SIGNIFICANT CHANGE UP (ref 0.5–1.3)
EOSINOPHIL # BLD AUTO: 0.16 K/UL — SIGNIFICANT CHANGE UP (ref 0–0.5)
EOSINOPHIL NFR BLD AUTO: 1.4 % — SIGNIFICANT CHANGE UP (ref 0–6)
GLUCOSE BLDC GLUCOMTR-MCNC: 122 MG/DL — HIGH (ref 70–99)
GLUCOSE SERPL-MCNC: 103 MG/DL — HIGH (ref 70–99)
HCT VFR BLD CALC: 37 % — SIGNIFICANT CHANGE UP (ref 34.5–45)
HGB BLD-MCNC: 11.5 G/DL — SIGNIFICANT CHANGE UP (ref 11.5–15.5)
IMM GRANULOCYTES # BLD AUTO: 0.05 # — SIGNIFICANT CHANGE UP
IMM GRANULOCYTES NFR BLD AUTO: 0.4 % — SIGNIFICANT CHANGE UP (ref 0–1.5)
INR BLD: 1.5 — HIGH (ref 0.88–1.17)
LYMPHOCYTES # BLD AUTO: 1.68 K/UL — SIGNIFICANT CHANGE UP (ref 1–3.3)
LYMPHOCYTES # BLD AUTO: 14.8 % — SIGNIFICANT CHANGE UP (ref 13–44)
MCHC RBC-ENTMCNC: 25.9 PG — LOW (ref 27–34)
MCHC RBC-ENTMCNC: 31.1 % — LOW (ref 32–36)
MCV RBC AUTO: 83.3 FL — SIGNIFICANT CHANGE UP (ref 80–100)
MONOCYTES # BLD AUTO: 0.76 K/UL — SIGNIFICANT CHANGE UP (ref 0–0.9)
MONOCYTES NFR BLD AUTO: 6.7 % — SIGNIFICANT CHANGE UP (ref 2–14)
NEUTROPHILS # BLD AUTO: 8.65 K/UL — HIGH (ref 1.8–7.4)
NEUTROPHILS NFR BLD AUTO: 76 % — SIGNIFICANT CHANGE UP (ref 43–77)
NRBC # FLD: 0 — SIGNIFICANT CHANGE UP
NT-PROBNP SERPL-SCNC: 1391 PG/ML — SIGNIFICANT CHANGE UP
PLATELET # BLD AUTO: 272 K/UL — SIGNIFICANT CHANGE UP (ref 150–400)
PMV BLD: 10.5 FL — SIGNIFICANT CHANGE UP (ref 7–13)
POTASSIUM SERPL-MCNC: 4.6 MMOL/L — SIGNIFICANT CHANGE UP (ref 3.5–5.3)
POTASSIUM SERPL-SCNC: 4.6 MMOL/L — SIGNIFICANT CHANGE UP (ref 3.5–5.3)
PROT SERPL-MCNC: 7.3 G/DL — SIGNIFICANT CHANGE UP (ref 6–8.3)
PROTHROM AB SERPL-ACNC: 16.9 SEC — HIGH (ref 9.8–13.1)
RBC # BLD: 4.44 M/UL — SIGNIFICANT CHANGE UP (ref 3.8–5.2)
RBC # FLD: 15.9 % — HIGH (ref 10.3–14.5)
SODIUM SERPL-SCNC: 138 MMOL/L — SIGNIFICANT CHANGE UP (ref 135–145)
TROPONIN T SERPL-MCNC: < 0.06 NG/ML — SIGNIFICANT CHANGE UP (ref 0–0.06)
TROPONIN T SERPL-MCNC: < 0.06 NG/ML — SIGNIFICANT CHANGE UP (ref 0–0.06)
WBC # BLD: 11.38 K/UL — HIGH (ref 3.8–10.5)
WBC # FLD AUTO: 11.38 K/UL — HIGH (ref 3.8–10.5)

## 2017-10-10 PROCEDURE — 71010: CPT | Mod: 26

## 2017-10-10 PROCEDURE — 93971 EXTREMITY STUDY: CPT | Mod: 26,LT

## 2017-10-10 RX ORDER — SODIUM CHLORIDE 9 MG/ML
1000 INJECTION, SOLUTION INTRAVENOUS
Qty: 0 | Refills: 0 | Status: DISCONTINUED | OUTPATIENT
Start: 2017-10-10 | End: 2017-10-13

## 2017-10-10 RX ORDER — GLUCAGON INJECTION, SOLUTION 0.5 MG/.1ML
1 INJECTION, SOLUTION SUBCUTANEOUS ONCE
Qty: 0 | Refills: 0 | Status: DISCONTINUED | OUTPATIENT
Start: 2017-10-10 | End: 2017-10-13

## 2017-10-10 RX ORDER — DEXTROSE 50 % IN WATER 50 %
1 SYRINGE (ML) INTRAVENOUS ONCE
Qty: 0 | Refills: 0 | Status: DISCONTINUED | OUTPATIENT
Start: 2017-10-10 | End: 2017-10-13

## 2017-10-10 RX ORDER — FUROSEMIDE 40 MG
80 TABLET ORAL ONCE
Qty: 0 | Refills: 0 | Status: COMPLETED | OUTPATIENT
Start: 2017-10-10 | End: 2017-10-10

## 2017-10-10 RX ORDER — HYDRALAZINE HCL 50 MG
50 TABLET ORAL EVERY 8 HOURS
Qty: 0 | Refills: 0 | Status: DISCONTINUED | OUTPATIENT
Start: 2017-10-10 | End: 2017-10-13

## 2017-10-10 RX ORDER — DEXTROSE 50 % IN WATER 50 %
25 SYRINGE (ML) INTRAVENOUS ONCE
Qty: 0 | Refills: 0 | Status: DISCONTINUED | OUTPATIENT
Start: 2017-10-10 | End: 2017-10-13

## 2017-10-10 RX ORDER — SIMVASTATIN 20 MG/1
40 TABLET, FILM COATED ORAL AT BEDTIME
Qty: 0 | Refills: 0 | Status: DISCONTINUED | OUTPATIENT
Start: 2017-10-10 | End: 2017-10-13

## 2017-10-10 RX ORDER — PANTOPRAZOLE SODIUM 20 MG/1
40 TABLET, DELAYED RELEASE ORAL
Qty: 0 | Refills: 0 | Status: DISCONTINUED | OUTPATIENT
Start: 2017-10-10 | End: 2017-10-13

## 2017-10-10 RX ORDER — INSULIN LISPRO 100/ML
VIAL (ML) SUBCUTANEOUS
Qty: 0 | Refills: 0 | Status: DISCONTINUED | OUTPATIENT
Start: 2017-10-10 | End: 2017-10-13

## 2017-10-10 RX ORDER — DEXTROSE 50 % IN WATER 50 %
12.5 SYRINGE (ML) INTRAVENOUS ONCE
Qty: 0 | Refills: 0 | Status: DISCONTINUED | OUTPATIENT
Start: 2017-10-10 | End: 2017-10-13

## 2017-10-10 RX ORDER — DILTIAZEM HCL 120 MG
180 CAPSULE, EXT RELEASE 24 HR ORAL DAILY
Qty: 0 | Refills: 0 | Status: DISCONTINUED | OUTPATIENT
Start: 2017-10-11 | End: 2017-10-13

## 2017-10-10 RX ORDER — WARFARIN SODIUM 2.5 MG/1
4 TABLET ORAL ONCE
Qty: 0 | Refills: 0 | Status: COMPLETED | OUTPATIENT
Start: 2017-10-10 | End: 2017-10-10

## 2017-10-10 RX ORDER — FUROSEMIDE 40 MG
80 TABLET ORAL EVERY 12 HOURS
Qty: 0 | Refills: 0 | Status: DISCONTINUED | OUTPATIENT
Start: 2017-10-10 | End: 2017-10-12

## 2017-10-10 RX ORDER — WARFARIN SODIUM 2.5 MG/1
5 TABLET ORAL ONCE
Qty: 0 | Refills: 0 | Status: DISCONTINUED | OUTPATIENT
Start: 2017-10-10 | End: 2017-10-10

## 2017-10-10 RX ADMIN — Medication 50 MILLIGRAM(S): at 21:23

## 2017-10-10 RX ADMIN — Medication 10 MILLIGRAM(S): at 17:37

## 2017-10-10 RX ADMIN — PANTOPRAZOLE SODIUM 40 MILLIGRAM(S): 20 TABLET, DELAYED RELEASE ORAL at 17:37

## 2017-10-10 RX ADMIN — SIMVASTATIN 40 MILLIGRAM(S): 20 TABLET, FILM COATED ORAL at 21:23

## 2017-10-10 RX ADMIN — Medication 80 MILLIGRAM(S): at 14:26

## 2017-10-10 RX ADMIN — WARFARIN SODIUM 4 MILLIGRAM(S): 2.5 TABLET ORAL at 18:48

## 2017-10-10 NOTE — PATIENT PROFILE ADULT. - FALL HARM RISK
weakness, silva/other coagulation(Bleeding disorder R/T clinical cond/anti-coags)/weakness, silva/other

## 2017-10-10 NOTE — ED ADULT NURSE NOTE - OBJECTIVE STATEMENT
(coverage RN) Pt awake, alert, Chinese speaking, needs .  S1,S2+.  A-fib on cm.  VSS.  Breathing mildly labored, 97% on room air.  EB Landers at the bedside.  Placed onto cm. IV accessed.  labs sent.  Daughter at the bedside.  Will continue to monitor.

## 2017-10-10 NOTE — ED PROVIDER NOTE - OBJECTIVE STATEMENT
Rich  #: [id number]. 77 year old female, PMHx of CHF, Afib on coumadin, HTN, HLD, CAD, DM, CVA; presents to the ED for shortness of breath. Patient states she has been having worsening shortness of breath for the last three weeks. She was seen at [facility], told to take her Lasix 40mg two times a day but it is not getting any better. Her symptoms worsen with exertion and while laying flat, improve with rest and sitting up. She sees Dr. Short as an outpatient. She also endorses two days of left lower leg pain, bilateral LE swelling chronic. She denies f/c/n/v/d, chest pain, cough, recent travel or other complaints.

## 2017-10-10 NOTE — ED ADULT TRIAGE NOTE - CHIEF COMPLAINT QUOTE
pt with a c/o severe SOB. pt seen in the ED on 10/07/17 for SOB. pt denies chest pain. pt is very uncomfortable during triage. ekg completed.   pmhx HTN

## 2017-10-10 NOTE — ED PROVIDER NOTE - MEDICAL DECISION MAKING DETAILS
Worsening SOB with LE swelling, appears to be fluid overloaded, took Lasix 40mg this morning. Will give Lasix 80mg IV, labs, CE/pro-bnp. EKG unchanged

## 2017-10-10 NOTE — H&P ADULT - PROBLEM SELECTOR PLAN 1
Admit to Telemetry, serial CE's, EKG's, FLP, continue IV Lasix, monitor daily weights, strict I's & O's, F/U Cardiology note

## 2017-10-10 NOTE — ED PROVIDER NOTE - ATTENDING CONTRIBUTION TO CARE
ED Attending Dr. Barnes: 76 yo female with CHF, afib on coumadin, HTN, HLD, CAD, DM, CVA, recently had Lasix dose increased for CHF, in ED with 3 weeks of worsening SOB.  Symptoms worse with exertion and when laying flat.  Also 2 days of left LE pain but continues to have bilateral LE swelling at baseline.  On exam pt overall well appearing, in NAD, heart irregular, lungs mild bibasilar crackles, abd NTND, extremities without swelling, +left calf TTP, strength 5/5 in all extremities and skin without rash.

## 2017-10-10 NOTE — H&P ADULT - ATTENDING COMMENTS
Pt seen and examined at bedside. Agree with assessment and plan   Admitted with chest pain, typical and atypical features  Also complaining of palpitations   Given history, will plan for coronary angiography   Monitor on Tele Pt seen and examined at bedside. Agree with assessment and plan   Admitted with acute decompensated diastolic heart failure   IV diuresis   Home meds

## 2017-10-10 NOTE — H&P ADULT - HISTORY OF PRESENT ILLNESS
78 yo Nepali speaking F p/w "breathing problem" for the last 2 weeks. Pt admits to feeling HERNANDEZ walking to the bathroom lately, also c/o weakness x 3-4 days. Pt admits to requiring 2-3 pillows to sleep sometimes but usually denies orthopnea. Also c/o nasal congestion anya at night with yellow mucous. No fevers/ chills, cough, recent infections, CP, palp's, Pt also admits to chronic LE edema for 10 years. Pt denies weighing herself, but admits to following a low salt diet. Pt was recently seen in [place] ED on 10/7 for CHF, was recommended to increase Lasix to BID but pt denies doing so.

## 2017-10-11 DIAGNOSIS — I48.91 UNSPECIFIED ATRIAL FIBRILLATION: ICD-10-CM

## 2017-10-11 DIAGNOSIS — E11.9 TYPE 2 DIABETES MELLITUS WITHOUT COMPLICATIONS: ICD-10-CM

## 2017-10-11 DIAGNOSIS — I50.33 ACUTE ON CHRONIC DIASTOLIC (CONGESTIVE) HEART FAILURE: ICD-10-CM

## 2017-10-11 DIAGNOSIS — I25.118 ATHEROSCLEROTIC HEART DISEASE OF NATIVE CORONARY ARTERY WITH OTHER FORMS OF ANGINA PECTORIS: ICD-10-CM

## 2017-10-11 LAB
ALBUMIN SERPL ELPH-MCNC: 3.6 G/DL — SIGNIFICANT CHANGE UP (ref 3.3–5)
ALP SERPL-CCNC: 60 U/L — SIGNIFICANT CHANGE UP (ref 40–120)
ALT FLD-CCNC: 22 U/L — SIGNIFICANT CHANGE UP (ref 4–33)
AST SERPL-CCNC: 21 U/L — SIGNIFICANT CHANGE UP (ref 4–32)
BASOPHILS # BLD AUTO: 0.1 K/UL — SIGNIFICANT CHANGE UP (ref 0–0.2)
BASOPHILS NFR BLD AUTO: 0.9 % — SIGNIFICANT CHANGE UP (ref 0–2)
BILIRUB SERPL-MCNC: 0.5 MG/DL — SIGNIFICANT CHANGE UP (ref 0.2–1.2)
BUN SERPL-MCNC: 30 MG/DL — HIGH (ref 7–23)
CALCIUM SERPL-MCNC: 8.5 MG/DL — SIGNIFICANT CHANGE UP (ref 8.4–10.5)
CHLORIDE SERPL-SCNC: 95 MMOL/L — LOW (ref 98–107)
CHOLEST SERPL-MCNC: 123 MG/DL — SIGNIFICANT CHANGE UP (ref 120–199)
CK SERPL-CCNC: 87 U/L — SIGNIFICANT CHANGE UP (ref 25–170)
CO2 SERPL-SCNC: 24 MMOL/L — SIGNIFICANT CHANGE UP (ref 22–31)
CREAT SERPL-MCNC: 1.16 MG/DL — SIGNIFICANT CHANGE UP (ref 0.5–1.3)
EOSINOPHIL # BLD AUTO: 0.41 K/UL — SIGNIFICANT CHANGE UP (ref 0–0.5)
EOSINOPHIL NFR BLD AUTO: 3.6 % — SIGNIFICANT CHANGE UP (ref 0–6)
GLUCOSE BLDC GLUCOMTR-MCNC: 103 MG/DL — HIGH (ref 70–99)
GLUCOSE BLDC GLUCOMTR-MCNC: 108 MG/DL — HIGH (ref 70–99)
GLUCOSE BLDC GLUCOMTR-MCNC: 202 MG/DL — HIGH (ref 70–99)
GLUCOSE SERPL-MCNC: 97 MG/DL — SIGNIFICANT CHANGE UP (ref 70–99)
HBA1C BLD-MCNC: 6.1 % — HIGH (ref 4–5.6)
HCT VFR BLD CALC: 35.9 % — SIGNIFICANT CHANGE UP (ref 34.5–45)
HDLC SERPL-MCNC: 45 MG/DL — SIGNIFICANT CHANGE UP (ref 45–65)
HGB BLD-MCNC: 11 G/DL — LOW (ref 11.5–15.5)
IMM GRANULOCYTES # BLD AUTO: 0.05 # — SIGNIFICANT CHANGE UP
IMM GRANULOCYTES NFR BLD AUTO: 0.4 % — SIGNIFICANT CHANGE UP (ref 0–1.5)
INR BLD: 1.82 — HIGH (ref 0.88–1.17)
LIPID PNL WITH DIRECT LDL SERPL: 69 MG/DL — SIGNIFICANT CHANGE UP
LYMPHOCYTES # BLD AUTO: 1.63 K/UL — SIGNIFICANT CHANGE UP (ref 1–3.3)
LYMPHOCYTES # BLD AUTO: 14.2 % — SIGNIFICANT CHANGE UP (ref 13–44)
MAGNESIUM SERPL-MCNC: 1.9 MG/DL — SIGNIFICANT CHANGE UP (ref 1.6–2.6)
MCHC RBC-ENTMCNC: 25.6 PG — LOW (ref 27–34)
MCHC RBC-ENTMCNC: 30.6 % — LOW (ref 32–36)
MCV RBC AUTO: 83.5 FL — SIGNIFICANT CHANGE UP (ref 80–100)
MONOCYTES # BLD AUTO: 1.06 K/UL — HIGH (ref 0–0.9)
MONOCYTES NFR BLD AUTO: 9.2 % — SIGNIFICANT CHANGE UP (ref 2–14)
NEUTROPHILS # BLD AUTO: 8.22 K/UL — HIGH (ref 1.8–7.4)
NEUTROPHILS NFR BLD AUTO: 71.7 % — SIGNIFICANT CHANGE UP (ref 43–77)
NRBC # FLD: 0 — SIGNIFICANT CHANGE UP
PHOSPHATE SERPL-MCNC: 4.6 MG/DL — HIGH (ref 2.5–4.5)
PLATELET # BLD AUTO: 251 K/UL — SIGNIFICANT CHANGE UP (ref 150–400)
PMV BLD: 10.3 FL — SIGNIFICANT CHANGE UP (ref 7–13)
POTASSIUM SERPL-MCNC: 3.9 MMOL/L — SIGNIFICANT CHANGE UP (ref 3.5–5.3)
POTASSIUM SERPL-SCNC: 3.9 MMOL/L — SIGNIFICANT CHANGE UP (ref 3.5–5.3)
PROT SERPL-MCNC: 6.7 G/DL — SIGNIFICANT CHANGE UP (ref 6–8.3)
PROTHROM AB SERPL-ACNC: 20.6 SEC — HIGH (ref 9.8–13.1)
RBC # BLD: 4.3 M/UL — SIGNIFICANT CHANGE UP (ref 3.8–5.2)
RBC # FLD: 15.7 % — HIGH (ref 10.3–14.5)
SODIUM SERPL-SCNC: 137 MMOL/L — SIGNIFICANT CHANGE UP (ref 135–145)
TRIGL SERPL-MCNC: 71 MG/DL — SIGNIFICANT CHANGE UP (ref 10–149)
TROPONIN T SERPL-MCNC: < 0.06 NG/ML — SIGNIFICANT CHANGE UP (ref 0–0.06)
WBC # BLD: 11.47 K/UL — HIGH (ref 3.8–10.5)
WBC # FLD AUTO: 11.47 K/UL — HIGH (ref 3.8–10.5)

## 2017-10-11 RX ORDER — WARFARIN SODIUM 2.5 MG/1
4 TABLET ORAL ONCE
Qty: 0 | Refills: 0 | Status: COMPLETED | OUTPATIENT
Start: 2017-10-11 | End: 2017-10-11

## 2017-10-11 RX ADMIN — Medication 50 MILLIGRAM(S): at 14:38

## 2017-10-11 RX ADMIN — Medication 80 MILLIGRAM(S): at 17:15

## 2017-10-11 RX ADMIN — PANTOPRAZOLE SODIUM 40 MILLIGRAM(S): 20 TABLET, DELAYED RELEASE ORAL at 06:43

## 2017-10-11 RX ADMIN — Medication 10 MILLIGRAM(S): at 06:43

## 2017-10-11 RX ADMIN — Medication 10 MILLIGRAM(S): at 17:15

## 2017-10-11 RX ADMIN — SIMVASTATIN 40 MILLIGRAM(S): 20 TABLET, FILM COATED ORAL at 21:48

## 2017-10-11 RX ADMIN — Medication 50 MILLIGRAM(S): at 21:49

## 2017-10-11 RX ADMIN — WARFARIN SODIUM 4 MILLIGRAM(S): 2.5 TABLET ORAL at 18:43

## 2017-10-11 RX ADMIN — Medication 80 MILLIGRAM(S): at 08:54

## 2017-10-11 RX ADMIN — Medication 50 MILLIGRAM(S): at 06:43

## 2017-10-11 NOTE — PROGRESS NOTE ADULT - SUBJECTIVE AND OBJECTIVE BOX
Subjective: Patient seen and examined. No new events except as noted.    at bedside   REVIEW OF SYSTEMS:    CONSTITUTIONAL: No weakness, fevers or chills  EYES/ENT: No visual changes;  No vertigo or throat pain   NECK: No pain or stiffness  RESPIRATORY: No cough, wheezing, hemoptysis; No shortness of breath  CARDIOVASCULAR: No chest pain or palpitations  GASTROINTESTINAL: No abdominal or epigastric pain. No nausea, vomiting, or hematemesis; No diarrhea or constipation. No melena or hematochezia.  GENITOURINARY: No dysuria, frequency or hematuria  NEUROLOGICAL: No numbness or weakness  SKIN: No itching, burning, rashes, or lesions   All other review of systems is negative unless indicated above.    MEDICATIONS:  MEDICATIONS  (STANDING):  dextrose 5%. 1000 milliLiter(s) (50 mL/Hr) IV Continuous <Continuous>  dextrose 50% Injectable 12.5 Gram(s) IV Push once  dextrose 50% Injectable 25 Gram(s) IV Push once  dextrose 50% Injectable 25 Gram(s) IV Push once  diltiazem    milliGRAM(s) Oral daily  enalapril 10 milliGRAM(s) Oral two times a day  furosemide   Injectable 80 milliGRAM(s) IV Push every 12 hours  hydrALAZINE 50 milliGRAM(s) Oral every 8 hours  insulin lispro (HumaLOG) corrective regimen sliding scale   SubCutaneous three times a day before meals  pantoprazole    Tablet 40 milliGRAM(s) Oral before breakfast  simvastatin 40 milliGRAM(s) Oral at bedtime      PHYSICAL EXAM:  T(C): 36.6 (10-11-17 @ 06:22), Max: 36.8 (10-10-17 @ 13:57)  HR: 54 (10-11-17 @ 06:22) (54 - 78)  BP: 123/61 (10-11-17 @ 06:22) (123/61 - 199/69)  RR: 18 (10-11-17 @ 06:22) (16 - 22)  SpO2: 98% (10-11-17 @ 06:22) (93% - 98%)  Wt(kg): --  I&O's Summary    10 Oct 2017 07:01  -  11 Oct 2017 07:00  --------------------------------------------------------  IN: 200 mL / OUT: 60 mL / NET: 140 mL      Height (cm): 152.4 (10-10 @ 21:15)  Weight (kg): 83 (10-10 @ 21:15)  BMI (kg/m2): 35.7 (10-10 @ 21:15)  BSA (m2): 1.8 (10-10 @ 21:15)    Appearance: Normal	  HEENT:   Normal oral mucosa, PERRL, EOMI	  Lymphatic: No lymphadenopathy , no edema  Cardiovascular: Normal S1 S2, No JVD, No murmurs , Peripheral pulses palpable 2+ bilaterally  Respiratory: Lungs clear to auscultation, normal effort 	  Gastrointestinal:  Soft, Non-tender, + BS	  Skin: No rashes, No ecchymoses, No cyanosis, warm to touch  Musculoskeletal: Normal range of motion, normal strength  Psychiatry:  Mood & affect appropriate  Ext: No edema      LABS:    CARDIAC MARKERS:  CARDIAC MARKERS ( 11 Oct 2017 06:00 )  x     / < 0.06 ng/mL / 87 u/L / x     / x      CARDIAC MARKERS ( 10 Oct 2017 19:50 )  x     / < 0.06 ng/mL / 94 u/L / x     / x      CARDIAC MARKERS ( 10 Oct 2017 13:50 )  x     / < 0.06 ng/mL / 98 u/L / 2.81 ng/mL / x                                    11.0   11.47 )-----------( 251      ( 11 Oct 2017 06:00 )             35.9     10-11    137  |  95<L>  |  30<H>  ----------------------------<  97  3.9   |  24  |  1.16    Ca    8.5      11 Oct 2017 06:00  Phos  4.6     10-11  Mg     1.9     10-11    TPro  6.7  /  Alb  3.6  /  TBili  0.5  /  DBili  x   /  AST  21  /  ALT  22  /  AlkPhos  60  10-11    proBNP: Serum Pro-Brain Natriuretic Peptide: 1391 pg/mL (10-10 @ 19:50)    Lipid Profile:   HgA1c: Hemoglobin A1C, Whole Blood: 6.1 % (10-11 @ 06:00)    TSH:             TELEMETRY: SR	    ECG:  	  RADIOLOGY:   DIAGNOSTIC TESTING:  [ ] Echocardiogram:  [ ]  Catheterization:  [ ] Stress Test:    OTHER:

## 2017-10-11 NOTE — CONSULT NOTE ADULT - SUBJECTIVE AND OBJECTIVE BOX
78 yo woman admitted with acute on chronic diastolic CHF 2/2 poor compliance with lasix. HAI 6/1%  PMH/PSH/Meds/Allerigies reviewed in H&P  ROs: 3 pillow orthopnea, no CP/palps/n/v worsening edema in LE    [id number]  HEENT  NECK  Lungs  Card  Abd  Ext  neuro    Labs/meds/consults reviewed    78 yo woman w h/o CAD, AFIB, Diastolic CHF, DMII presents w worsening HF

## 2017-10-11 NOTE — CONSULT NOTE ADULT - PROBLEM SELECTOR PROBLEM 3
Coronary artery disease involving native coronary artery with other forms of angina pectoris, unspecified whether native or transplanted heart

## 2017-10-12 LAB
BUN SERPL-MCNC: 33 MG/DL — HIGH (ref 7–23)
CALCIUM SERPL-MCNC: 8.4 MG/DL — SIGNIFICANT CHANGE UP (ref 8.4–10.5)
CHLORIDE SERPL-SCNC: 97 MMOL/L — LOW (ref 98–107)
CO2 SERPL-SCNC: 23 MMOL/L — SIGNIFICANT CHANGE UP (ref 22–31)
CREAT SERPL-MCNC: 1.17 MG/DL — SIGNIFICANT CHANGE UP (ref 0.5–1.3)
GLUCOSE BLDC GLUCOMTR-MCNC: 122 MG/DL — HIGH (ref 70–99)
GLUCOSE BLDC GLUCOMTR-MCNC: 125 MG/DL — HIGH (ref 70–99)
GLUCOSE BLDC GLUCOMTR-MCNC: 134 MG/DL — HIGH (ref 70–99)
GLUCOSE SERPL-MCNC: 104 MG/DL — HIGH (ref 70–99)
HCT VFR BLD CALC: 36.3 % — SIGNIFICANT CHANGE UP (ref 34.5–45)
HGB BLD-MCNC: 11.2 G/DL — LOW (ref 11.5–15.5)
INR BLD: 2.13 — HIGH (ref 0.88–1.17)
MAGNESIUM SERPL-MCNC: 2.1 MG/DL — SIGNIFICANT CHANGE UP (ref 1.6–2.6)
MCHC RBC-ENTMCNC: 25.3 PG — LOW (ref 27–34)
MCHC RBC-ENTMCNC: 30.9 % — LOW (ref 32–36)
MCV RBC AUTO: 81.9 FL — SIGNIFICANT CHANGE UP (ref 80–100)
NRBC # FLD: 0 — SIGNIFICANT CHANGE UP
PHOSPHATE SERPL-MCNC: 4.1 MG/DL — SIGNIFICANT CHANGE UP (ref 2.5–4.5)
PLATELET # BLD AUTO: 249 K/UL — SIGNIFICANT CHANGE UP (ref 150–400)
PMV BLD: 10.5 FL — SIGNIFICANT CHANGE UP (ref 7–13)
POTASSIUM SERPL-MCNC: 3.9 MMOL/L — SIGNIFICANT CHANGE UP (ref 3.5–5.3)
POTASSIUM SERPL-SCNC: 3.9 MMOL/L — SIGNIFICANT CHANGE UP (ref 3.5–5.3)
PROTHROM AB SERPL-ACNC: 24.2 SEC — HIGH (ref 9.8–13.1)
RBC # BLD: 4.43 M/UL — SIGNIFICANT CHANGE UP (ref 3.8–5.2)
RBC # FLD: 15.6 % — HIGH (ref 10.3–14.5)
SODIUM SERPL-SCNC: 137 MMOL/L — SIGNIFICANT CHANGE UP (ref 135–145)
WBC # BLD: 11.06 K/UL — HIGH (ref 3.8–10.5)
WBC # FLD AUTO: 11.06 K/UL — HIGH (ref 3.8–10.5)

## 2017-10-12 RX ORDER — WARFARIN SODIUM 2.5 MG/1
4 TABLET ORAL ONCE
Qty: 0 | Refills: 0 | Status: COMPLETED | OUTPATIENT
Start: 2017-10-12 | End: 2017-10-12

## 2017-10-12 RX ORDER — FUROSEMIDE 40 MG
40 TABLET ORAL
Qty: 0 | Refills: 0 | Status: DISCONTINUED | OUTPATIENT
Start: 2017-10-12 | End: 2017-10-13

## 2017-10-12 RX ADMIN — Medication 80 MILLIGRAM(S): at 05:52

## 2017-10-12 RX ADMIN — WARFARIN SODIUM 4 MILLIGRAM(S): 2.5 TABLET ORAL at 18:45

## 2017-10-12 RX ADMIN — Medication 50 MILLIGRAM(S): at 22:13

## 2017-10-12 RX ADMIN — Medication 50 MILLIGRAM(S): at 05:52

## 2017-10-12 RX ADMIN — PANTOPRAZOLE SODIUM 40 MILLIGRAM(S): 20 TABLET, DELAYED RELEASE ORAL at 05:52

## 2017-10-12 RX ADMIN — Medication 10 MILLIGRAM(S): at 05:52

## 2017-10-12 RX ADMIN — Medication 40 MILLIGRAM(S): at 18:45

## 2017-10-12 RX ADMIN — Medication 50 MILLIGRAM(S): at 13:39

## 2017-10-12 RX ADMIN — SIMVASTATIN 40 MILLIGRAM(S): 20 TABLET, FILM COATED ORAL at 22:13

## 2017-10-12 RX ADMIN — Medication 180 MILLIGRAM(S): at 05:52

## 2017-10-12 RX ADMIN — Medication 10 MILLIGRAM(S): at 18:45

## 2017-10-12 NOTE — PROGRESS NOTE ADULT - PROBLEM SELECTOR PLAN 1
IV lasix  - diuresed appropriately by exam. I/O not documented.  Monitor urine output  May switch to PO lasix 40mg BID (pt was on 40 QD at home).

## 2017-10-12 NOTE — PROGRESS NOTE ADULT - SUBJECTIVE AND OBJECTIVE BOX
Patient is a 77y old  Female who presents with a chief complaint of     SUBJECTIVE / OVERNIGHT EVENTS:    MEDICATIONS  (STANDING):  dextrose 5%. 1000 milliLiter(s) (50 mL/Hr) IV Continuous <Continuous>  dextrose 50% Injectable 12.5 Gram(s) IV Push once  dextrose 50% Injectable 25 Gram(s) IV Push once  dextrose 50% Injectable 25 Gram(s) IV Push once  diltiazem    milliGRAM(s) Oral daily  enalapril 10 milliGRAM(s) Oral two times a day  furosemide    Tablet 40 milliGRAM(s) Oral two times a day  hydrALAZINE 50 milliGRAM(s) Oral every 8 hours  insulin lispro (HumaLOG) corrective regimen sliding scale   SubCutaneous three times a day before meals  pantoprazole    Tablet 40 milliGRAM(s) Oral before breakfast  simvastatin 40 milliGRAM(s) Oral at bedtime    MEDICATIONS  (PRN):  dextrose Gel 1 Dose(s) Oral once PRN Blood Glucose LESS THAN 70 milliGRAM(s)/deciliter  glucagon  Injectable 1 milliGRAM(s) IntraMuscular once PRN Glucose LESS THAN 70 milligrams/deciliter      Vital Signs Last 24 Hrs  T(F): 98.4 (10-12-17 @ 20:17), Max: 98.5 (10-12-17 @ 12:30)  HR: 57 (10-12-17 @ 20:17) (52 - 67)  BP: 140/51 (10-12-17 @ 20:17) (130/60 - 153/63)  RR: 18 (10-12-17 @ 20:17) (16 - 18)  SpO2: 95% (10-12-17 @ 20:17) (95% - 97%)  Telemetry:   CAPILLARY BLOOD GLUCOSE  130 (12 Oct 2017 09:20)      POCT Blood Glucose.: 134 mg/dL (12 Oct 2017 16:38)  POCT Blood Glucose.: 125 mg/dL (12 Oct 2017 13:20)  POCT Blood Glucose.: 202 mg/dL (11 Oct 2017 23:02)    I&O's Summary    11 Oct 2017 07:01  -  12 Oct 2017 07:00  --------------------------------------------------------  IN: 200 mL / OUT: 0 mL / NET: 200 mL    12 Oct 2017 07:01  -  12 Oct 2017 20:43  --------------------------------------------------------  IN: 220 mL / OUT: 0 mL / NET: 220 mL        PHYSICAL EXAM:  GENERAL: NAD, well-developed  HEAD:  Atraumatic, Normocephalic  EYES: EOMI, PERRLA, conjunctiva and sclera clear  NECK: Supple, No JVD  CHEST/LUNG: Clear to auscultation bilaterally; No wheeze  HEART: Regular rate and rhythm; No murmurs, rubs, or gallops  ABDOMEN: Soft, Nontender, Nondistended; Bowel sounds present  EXTREMITIES:  2+ Peripheral Pulses, No clubbing, cyanosis, or edema  PSYCH: AAOx3  NEUROLOGY: non-focal  SKIN: No rashes or lesions    LABS:                        11.2   11.06 )-----------( 249      ( 12 Oct 2017 05:55 )             36.3     10-12    137  |  97<L>  |  33<H>  ----------------------------<  104<H>  3.9   |  23  |  1.17    Ca    8.4      12 Oct 2017 05:55  Phos  4.1     10-12  Mg     2.1     10-12    TPro  6.7  /  Alb  3.6  /  TBili  0.5  /  DBili  x   /  AST  21  /  ALT  22  /  AlkPhos  60  10-11    PT/INR - ( 12 Oct 2017 05:55 )   PT: 24.2 SEC;   INR: 2.13            CARDIAC MARKERS ( 11 Oct 2017 06:00 )  x     / < 0.06 ng/mL / 87 u/L / x     / x              RADIOLOGY & ADDITIONAL TESTS:    Imaging Personally Reviewed:    Consultant(s) Notes Reviewed:      Care Discussed with Consultants/Other Providers:

## 2017-10-12 NOTE — DIETITIAN INITIAL EVALUATION ADULT. - ENERGY NEEDS
Pt's height: 60"        IBW: 100#+/-10%  Pt's dosing weight (182.9#) not consistent with usual body weight (178# - per daughter). ?? Question about accuracy of weights?? Related to Fluid status?

## 2017-10-12 NOTE — DIETITIAN INITIAL EVALUATION ADULT. - OTHER INFO
Nutrition Consult X Registered Dietitian. Pt 76 yo Frisian speaking female appears alert, oriented. Pt's daughter @ bed side answered questions during interview. Pt's appetite usually good; No chew/swallow problem voiced; no nausea/vomiting/diarrhea reported @ present. Pt's usual body weight: 178# - daughter stated. No weight loss or weight changes voiced. At home Pt usually eats food prepared with minimal amount of salt. Heart Healthy diet discussed with daughter; written materials on diet also provided. RDN remains available, daughter made aware.

## 2017-10-12 NOTE — PROGRESS NOTE ADULT - PROBLEM SELECTOR PLAN 1
IV lasix  - diuresed appropriately by exam. I/O not documented.  Monitor urine output  May switch to PO lasix 40mg BID (pt was on 40 QD at home).  DC planning as per Cardiology

## 2017-10-12 NOTE — PROGRESS NOTE ADULT - SUBJECTIVE AND OBJECTIVE BOX
Subjective: Patient seen and examined. No new events except as noted.     No new reports.  Pt unable to communicate in English.    MEDICATIONS:  MEDICATIONS  (STANDING):  diltiazem    milliGRAM(s) Oral daily  enalapril 10 milliGRAM(s) Oral two times a day  furosemide    Tablet 40 milliGRAM(s) Oral two times a day  hydrALAZINE 50 milliGRAM(s) Oral every 8 hours  insulin lispro (HumaLOG) corrective regimen sliding scale   SubCutaneous three times a day before meals  pantoprazole    Tablet 40 milliGRAM(s) Oral before breakfast  simvastatin 40 milliGRAM(s) Oral at bedtime  warfarin 4 milliGRAM(s) Oral once      PHYSICAL EXAM:  T(F): 97.5 (10-12-17 @ 11:10), Max: 98.4 (10-12-17 @ 05:48)  HR: 52 (10-12-17 @ 11:10) (52 - 67)  BP: 135/57 (10-12-17 @ 11:10) (130/60 - 153/63)  RR: 18 (10-12-17 @ 11:10) (18 - 18)  SpO2: 96% (10-12-17 @ 11:10) (95% - 96%)  I&O's Summary    11 Oct 2017 07:01  -  12 Oct 2017 07:00  --------------------------------------------------------  IN: 200 mL / OUT: 0 mL / NET: 200 mL    12 Oct 2017 07:01  -  12 Oct 2017 11:26  --------------------------------------------------------  IN: 100 mL / OUT: 0 mL / NET: 100 mL      TELEMETRY: A-fib     Appearance: Non-English speaking, NAD	  HEENT:   Normal oral mucosa, PERRL, EOMI	  Lymphatic: No lymphadenopathy , no edema  Cardiovascular: Normal S1 S2, No JVD, No murmurs , Peripheral pulses palpable 2+ bilaterally  Respiratory: Lungs clear to auscultation, normal effort 	  Gastrointestinal:  Soft, Non-tender, + BS	  Skin: No rashes, No ecchymoses, No cyanosis, warm to touch  Musculoskeletal: Normal range of motion, normal strength  Psychiatry:  Mood & affect appropriate  Ext: No edema      LABS:    CARDIAC MARKERS:  CARDIAC MARKERS ( 11 Oct 2017 06:00 )  x     / < 0.06 ng/mL / 87 u/L / x     / x      CARDIAC MARKERS ( 10 Oct 2017 19:50 )  x     / < 0.06 ng/mL / 94 u/L / x     / x      CARDIAC MARKERS ( 10 Oct 2017 13:50 )  x     / < 0.06 ng/mL / 98 u/L / 2.81 ng/mL / x                                    11.2   11.06 )-----------( 249      ( 12 Oct 2017 05:55 )             36.3     10-12    137  |  97<L>  |  33<H>  ----------------------------<  104<H>  3.9   |  23  |  1.17    Ca    8.4      12 Oct 2017 05:55  Phos  4.1     10-12  Mg     2.1     10-12    TPro  6.7  /  Alb  3.6  /  TBili  0.5  /  DBili  x   /  AST  21  /  ALT  22  /  AlkPhos  60  10-11

## 2017-10-13 ENCOUNTER — TRANSCRIPTION ENCOUNTER (OUTPATIENT)
Age: 77
End: 2017-10-13

## 2017-10-13 VITALS
RESPIRATION RATE: 14 BRPM | TEMPERATURE: 98 F | OXYGEN SATURATION: 97 % | DIASTOLIC BLOOD PRESSURE: 54 MMHG | SYSTOLIC BLOOD PRESSURE: 146 MMHG | HEART RATE: 74 BPM

## 2017-10-13 LAB
BUN SERPL-MCNC: 38 MG/DL — HIGH (ref 7–23)
CALCIUM SERPL-MCNC: 8.2 MG/DL — LOW (ref 8.4–10.5)
CHLORIDE SERPL-SCNC: 98 MMOL/L — SIGNIFICANT CHANGE UP (ref 98–107)
CO2 SERPL-SCNC: 26 MMOL/L — SIGNIFICANT CHANGE UP (ref 22–31)
CREAT SERPL-MCNC: 1.24 MG/DL — SIGNIFICANT CHANGE UP (ref 0.5–1.3)
GLUCOSE BLDC GLUCOMTR-MCNC: 132 MG/DL — HIGH (ref 70–99)
GLUCOSE BLDC GLUCOMTR-MCNC: 136 MG/DL — HIGH (ref 70–99)
GLUCOSE BLDC GLUCOMTR-MCNC: 136 MG/DL — HIGH (ref 70–99)
GLUCOSE SERPL-MCNC: 113 MG/DL — HIGH (ref 70–99)
HCT VFR BLD CALC: 35.2 % — SIGNIFICANT CHANGE UP (ref 34.5–45)
HGB BLD-MCNC: 11 G/DL — LOW (ref 11.5–15.5)
INR BLD: 2.27 — HIGH (ref 0.88–1.17)
MAGNESIUM SERPL-MCNC: 2.2 MG/DL — SIGNIFICANT CHANGE UP (ref 1.6–2.6)
MCHC RBC-ENTMCNC: 25.6 PG — LOW (ref 27–34)
MCHC RBC-ENTMCNC: 31.3 % — LOW (ref 32–36)
MCV RBC AUTO: 81.9 FL — SIGNIFICANT CHANGE UP (ref 80–100)
NRBC # FLD: 0 — SIGNIFICANT CHANGE UP
PHOSPHATE SERPL-MCNC: 3.7 MG/DL — SIGNIFICANT CHANGE UP (ref 2.5–4.5)
PLATELET # BLD AUTO: 233 K/UL — SIGNIFICANT CHANGE UP (ref 150–400)
PMV BLD: 10.4 FL — SIGNIFICANT CHANGE UP (ref 7–13)
POTASSIUM SERPL-MCNC: 3.7 MMOL/L — SIGNIFICANT CHANGE UP (ref 3.5–5.3)
POTASSIUM SERPL-SCNC: 3.7 MMOL/L — SIGNIFICANT CHANGE UP (ref 3.5–5.3)
PROTHROM AB SERPL-ACNC: 25.8 SEC — HIGH (ref 9.8–13.1)
RBC # BLD: 4.3 M/UL — SIGNIFICANT CHANGE UP (ref 3.8–5.2)
RBC # FLD: 15.6 % — HIGH (ref 10.3–14.5)
SODIUM SERPL-SCNC: 138 MMOL/L — SIGNIFICANT CHANGE UP (ref 135–145)
WBC # BLD: 10.78 K/UL — HIGH (ref 3.8–10.5)
WBC # FLD AUTO: 10.78 K/UL — HIGH (ref 3.8–10.5)

## 2017-10-13 RX ORDER — FUROSEMIDE 40 MG
1 TABLET ORAL
Qty: 60 | Refills: 0 | OUTPATIENT
Start: 2017-10-13 | End: 2017-11-12

## 2017-10-13 RX ORDER — FUROSEMIDE 40 MG
1 TABLET ORAL
Qty: 0 | Refills: 0 | COMMUNITY

## 2017-10-13 RX ORDER — FUROSEMIDE 40 MG
1 TABLET ORAL
Qty: 0 | Refills: 0 | COMMUNITY
Start: 2017-10-13 | End: 2017-11-12

## 2017-10-13 RX ORDER — WARFARIN SODIUM 2.5 MG/1
4 TABLET ORAL ONCE
Qty: 0 | Refills: 0 | Status: COMPLETED | OUTPATIENT
Start: 2017-10-13 | End: 2017-10-13

## 2017-10-13 RX ADMIN — Medication 180 MILLIGRAM(S): at 05:35

## 2017-10-13 RX ADMIN — PANTOPRAZOLE SODIUM 40 MILLIGRAM(S): 20 TABLET, DELAYED RELEASE ORAL at 09:26

## 2017-10-13 RX ADMIN — Medication 50 MILLIGRAM(S): at 14:47

## 2017-10-13 RX ADMIN — Medication 50 MILLIGRAM(S): at 05:35

## 2017-10-13 RX ADMIN — Medication 40 MILLIGRAM(S): at 05:35

## 2017-10-13 RX ADMIN — Medication 40 MILLIGRAM(S): at 17:19

## 2017-10-13 RX ADMIN — Medication 10 MILLIGRAM(S): at 17:19

## 2017-10-13 RX ADMIN — WARFARIN SODIUM 4 MILLIGRAM(S): 2.5 TABLET ORAL at 17:19

## 2017-10-13 RX ADMIN — Medication 10 MILLIGRAM(S): at 05:35

## 2017-10-13 NOTE — PROGRESS NOTE ADULT - SUBJECTIVE AND OBJECTIVE BOX
Subjective: Patient seen and examined. No new events except as noted.     REVIEW OF SYSTEMS:    CONSTITUTIONAL: No weakness, fevers or chills  EYES/ENT: No visual changes;  No vertigo or throat pain   NECK: No pain or stiffness  RESPIRATORY: No cough, wheezing, hemoptysis; No shortness of breath  CARDIOVASCULAR: No chest pain or palpitations  GASTROINTESTINAL: No abdominal or epigastric pain. No nausea, vomiting, or hematemesis; No diarrhea or constipation. No melena or hematochezia.  GENITOURINARY: No dysuria, frequency or hematuria  NEUROLOGICAL: No numbness or weakness  SKIN: No itching, burning, rashes, or lesions   All other review of systems is negative unless indicated above.    MEDICATIONS:  MEDICATIONS  (STANDING):  dextrose 5%. 1000 milliLiter(s) (50 mL/Hr) IV Continuous <Continuous>  dextrose 50% Injectable 12.5 Gram(s) IV Push once  dextrose 50% Injectable 25 Gram(s) IV Push once  dextrose 50% Injectable 25 Gram(s) IV Push once  diltiazem    milliGRAM(s) Oral daily  enalapril 10 milliGRAM(s) Oral two times a day  furosemide    Tablet 40 milliGRAM(s) Oral two times a day  hydrALAZINE 50 milliGRAM(s) Oral every 8 hours  insulin lispro (HumaLOG) corrective regimen sliding scale   SubCutaneous three times a day before meals  pantoprazole    Tablet 40 milliGRAM(s) Oral before breakfast  simvastatin 40 milliGRAM(s) Oral at bedtime  warfarin 4 milliGRAM(s) Oral once      PHYSICAL EXAM:  T(F): 98.7 (10-13-17 @ 05:34), Max: 98.7 (10-13-17 @ 05:34)  HR: 63 (10-13-17 @ 05:34) (52 - 63)  BP: 130/55 (10-13-17 @ 05:34) (130/55 - 142/56)  RR: 18 (10-13-17 @ 05:34) (16 - 18)  SpO2: 95% (10-13-17 @ 05:34) (95% - 97%)  I&O's Summary    12 Oct 2017 07:01  -  13 Oct 2017 07:00  --------------------------------------------------------  IN: 220 mL / OUT: 0 mL / NET: 220 mL      TELEMETRY: A-fib 50-80, no ectopy    Appearance: Non-English speaking, NAD	  HEENT:   Normal oral mucosa, PERRL, EOMI	  Lymphatic: No lymphadenopathy , no edema  Cardiovascular: Normal S1 S2, No JVD, No murmurs , Peripheral pulses palpable 2+ bilaterally  Respiratory: Lungs clear to auscultation, normal effort 	  Gastrointestinal:  Soft, Non-tender, + BS	  Skin: No rashes, No ecchymoses, No cyanosis, warm to touch  Musculoskeletal: Normal range of motion, normal strength  Psychiatry:  Mood & affect appropriate  Ext: No edema      LABS:    CARDIAC MARKERS:  CARDIAC MARKERS ( 11 Oct 2017 06:00 )  x     / < 0.06 ng/mL / 87 u/L / x     / x      CARDIAC MARKERS ( 10 Oct 2017 19:50 )  x     / < 0.06 ng/mL / 94 u/L / x     / x      CARDIAC MARKERS ( 10 Oct 2017 13:50 )  x     / < 0.06 ng/mL / 98 u/L / 2.81 ng/mL / x                                    11.0   10.78 )-----------( 233      ( 13 Oct 2017 05:12 )             35.2     10-13    138  |  98  |  38<H>  ----------------------------<  113<H>  3.7   |  26  |  1.24    Ca    8.2<L>      13 Oct 2017 05:12  Phos  3.7     10-13  Mg     2.2     10-13 Subjective: Patient seen and examined. No new events except as noted.     No new complaints.  Difficult to communicate but indicates with a smile and a nod that she feels better and wants to go home.    MEDICATIONS:  MEDICATIONS  (STANDING):  diltiazem    milliGRAM(s) Oral daily  enalapril 10 milliGRAM(s) Oral two times a day  furosemide    Tablet 40 milliGRAM(s) Oral two times a day  hydrALAZINE 50 milliGRAM(s) Oral every 8 hours  insulin lispro (HumaLOG) corrective regimen sliding scale   SubCutaneous three times a day before meals  pantoprazole    Tablet 40 milliGRAM(s) Oral before breakfast  simvastatin 40 milliGRAM(s) Oral at bedtime  warfarin 4 milliGRAM(s) Oral once      PHYSICAL EXAM:  T(F): 98.7 (10-13-17 @ 05:34), Max: 98.7 (10-13-17 @ 05:34)  HR: 63 (10-13-17 @ 05:34) (52 - 63)  BP: 130/55 (10-13-17 @ 05:34) (130/55 - 142/56)  RR: 18 (10-13-17 @ 05:34) (16 - 18)  SpO2: 95% (10-13-17 @ 05:34) (95% - 97%)  I&O's Summary    12 Oct 2017 07:01  -  13 Oct 2017 07:00  --------------------------------------------------------  IN: 220 mL / OUT: 0 mL / NET: 220 mL      TELEMETRY: A-fib 50-80, no ectopy    Appearance: Non-English speaking, NAD	  HEENT:   Normal oral mucosa, PERRL, EOMI	  Lymphatic: No lymphadenopathy , no edema  Cardiovascular: Irreg irreg S1 S2, No JVD, No murmurs , Peripheral pulses palpable 2+ bilaterally  Respiratory: Lungs clear to auscultation, normal effort 	  Gastrointestinal:  Soft, Non-tender, + BS	  Skin: No rashes, No ecchymoses, No cyanosis, warm to touch  Musculoskeletal: Normal range of motion, normal strength  Psychiatry:  Mood & affect appropriate  Ext: Minimal edema, non-pitting      LABS:    CARDIAC MARKERS:  CARDIAC MARKERS ( 11 Oct 2017 06:00 )  x     / < 0.06 ng/mL / 87 u/L / x     / x      CARDIAC MARKERS ( 10 Oct 2017 19:50 )  x     / < 0.06 ng/mL / 94 u/L / x     / x      CARDIAC MARKERS ( 10 Oct 2017 13:50 )  x     / < 0.06 ng/mL / 98 u/L / 2.81 ng/mL / x                                    11.0   10.78 )-----------( 233      ( 13 Oct 2017 05:12 )             35.2     10-13    138  |  98  |  38<H>  ----------------------------<  113<H>  3.7   |  26  |  1.24    Ca    8.2<L>      13 Oct 2017 05:12  Phos  3.7     10-13  Mg     2.2     10-13

## 2017-10-13 NOTE — DISCHARGE NOTE ADULT - MEDICATION SUMMARY - MEDICATIONS TO TAKE
I will START or STAY ON the medications listed below when I get home from the hospital:    enalapril 10 mg oral tablet  -- orally 2 times a day  -- Indication: For HTN (Hypertension)    dilTIAZem 180 mg/24 hours oral capsule, extended release  -- 1 cap(s) by mouth once a day  -- Indication: For Afib    Coumadin 4 mg oral tablet  -- 1 tab(s) by mouth once a day (at bedtime)  -- Indication: For Afib    metFORMIN 500 mg oral tablet, extended release  -- 1 tab(s) by mouth once a day  -- Indication: For Dm    simvastatin 40 mg oral tablet  -- 1 tab(s) by mouth once a day (at bedtime)  -- Indication: For Hld    furosemide 40 mg oral tablet  -- 1 tab(s) by mouth 2 times a day  -- Indication: For Chf    pantoprazole 40 mg oral delayed release tablet  -- 1 tab(s) by mouth once a day (before a meal)  -- Indication: For gerd    hydrALAZINE 50 mg oral tablet  -- 1 tab(s) by mouth every 8 hours  -- Indication: For HTN (Hypertension)

## 2017-10-13 NOTE — PROGRESS NOTE ADULT - PROBLEM SELECTOR PLAN 4
continue diltiazem, hydralazine, enalapril, furosemide continue diltiazem, hydralazine, enalapril, furosemide  Furosemide dose is increased compared with prior dose at home.  She may be discharged home with f/u to her PMD and cardiologist.  She should understand the change in her medication regimen, specifically to higher dose of lasix (along with salt/fluid restrictions)

## 2017-10-13 NOTE — DISCHARGE NOTE ADULT - CARE PROVIDER_API CALL
Mikhail Gary (), Cardiology; Internal Medicine  [address] [place] [address]  [address]  [place] [address]  Phone: [phone]  Fax: [phone] Mikhail Gary (DO), Cardiology; Internal Medicine  [address] [place] [address]  [address]  [place] [address]  Phone: [phone]  Fax: [phone]    Dr Francois,   Phone: (   )    -  Fax: (   )    -

## 2017-10-13 NOTE — DISCHARGE NOTE ADULT - CARE PLAN
Principal Discharge DX:	Acute on chronic diastolic CHF (congestive heart failure)  Goal:	improve breath  Instructions for follow-up, activity and diet:	continue medication as directed, your lasix has been increased from daily to twice a day. follow up with your cardiologist in 1 week. low salt diet and daily weight  Secondary Diagnosis:	Afib  Instructions for follow-up, activity and diet:	continue coumadin as directed. follow up with your PCP for INR check in 2-3 days  Secondary Diagnosis:	Diabetes mellitus  Instructions for follow-up, activity and diet:	Continue diet modification. Avoid complex carbohydrates such as bread, pasta, cereal, white rice, white potatoes, etc. Avoid concentrated sugar as found in desserts, candy, soda, juice, etc. Consume a diet based on lean protein (chicken, fish) and vegetables.  Secondary Diagnosis:	HTN (Hypertension)  Instructions for follow-up, activity and diet:	Continue medications as currently prescribed. Follow up with your PMD for further management of disease. Dash diet. Principal Discharge DX:	Acute on chronic diastolic CHF (congestive heart failure)  Goal:	improve breath  Instructions for follow-up, activity and diet:	continue medication as directed, your lasix has been increased from daily to twice a day. follow up with your cardiologist (Dr Francois) in 1 week. low salt diet and daily weight  Secondary Diagnosis:	Afib  Instructions for follow-up, activity and diet:	continue coumadin as directed. follow up with your PCP for INR check in 2-3 days  Secondary Diagnosis:	Diabetes mellitus  Instructions for follow-up, activity and diet:	Continue diet modification. Avoid complex carbohydrates such as bread, pasta, cereal, white rice, white potatoes, etc. Avoid concentrated sugar as found in desserts, candy, soda, juice, etc. Consume a diet based on lean protein (chicken, fish) and vegetables.  Secondary Diagnosis:	HTN (Hypertension)  Instructions for follow-up, activity and diet:	Continue medications as currently prescribed. Follow up with your PMD for further management of disease. Dash diet.

## 2017-10-13 NOTE — DISCHARGE NOTE ADULT - PATIENT PORTAL LINK FT
“You can access the Follow[facility] Patient Portal, offered by [facility], by registering with the following website: [email or web address]”

## 2017-10-13 NOTE — PROGRESS NOTE ADULT - PROBLEM SELECTOR PLAN 1
IV lasix  - diuresed appropriately by exam. I/O not documented.  Monitor urine output  May switch to PO lasix 40mg BID (pt was on 40 QD at home). IV lasix  - diuresed appropriately by exam.   I/O again not documented.  PO lasix 40mg BID (pt was on 40 QD at home).

## 2017-10-13 NOTE — DISCHARGE NOTE ADULT - PLAN OF CARE
improve breath continue medication as directed, your lasix has been increased from daily to twice a day. follow up with your cardiologist in 1 week. low salt diet and daily weight continue coumadin as directed. follow up with your PCP for INR check in 2-3 days Continue diet modification. Avoid complex carbohydrates such as bread, pasta, cereal, white rice, white potatoes, etc. Avoid concentrated sugar as found in desserts, candy, soda, juice, etc. Consume a diet based on lean protein (chicken, fish) and vegetables. Continue medications as currently prescribed. Follow up with your PMD for further management of disease. Dash diet. continue medication as directed, your lasix has been increased from daily to twice a day. follow up with your cardiologist (Dr Francois) in 1 week. low salt diet and daily weight

## 2017-10-13 NOTE — DISCHARGE NOTE ADULT - PROVIDER TOKENS
TOKEN:'[id number]' TOKEN:'[id number]',FREE:[LAST:[Dr Francois],PHONE:[(   )    -],FAX:[(   )    -]]

## 2017-10-13 NOTE — PROGRESS NOTE ADULT - PROBLEM SELECTOR PLAN 2
continue with cardizem, coumadin, monitor INR  Dose 4mg QD continue with cardizem, coumadin,  Dose 4mg QD

## 2017-10-13 NOTE — DISCHARGE NOTE ADULT - HOSPITAL COURSE
76 yo Chinese speaking F p/w "breathing problem" for the last 2 weeks. Pt admits to feeling HERNANDEZ walking to the bathroom lately, also c/o weakness x 3-4 days. Pt admits to requiring 2-3 pillows to sleep sometimes but usually denies orthopnea. Also c/o nasal congestion anya at night with yellow mucous. No fevers/ chills, cough, recent infections, CP, palp's, Pt also admits to chronic LE edema for 10 years. Pt denies weighing herself, but admits to following a low salt diet. Pt was recently seen in [place] ED on 10/7 for CHF, was recommended to increase Lasix to BID but pt denies doing so.     Hospital course:  EKG- Afib @ 70 TWI, PRWP. I, avL  CXR- Mild interstitial edema.  CE neg x2  BNP 1391  INR- 1.5  WBC- 11.7  LLE Doppler- No evidence of left lower extremity deep venous thrombosis.    Pt seen by cardiology, noted with CHF exacerbation. Pt started on IV lasix with transitioned to PO lasix. Pt with improvement of LE edema and breathing and cleared for discharge with lasix BID. PT consulted recommended home with home PT.

## 2017-11-02 NOTE — H&P ADULT - PROBLEM/PLAN-2
Ideally your blood pressure goal should be below 130/80. You can learn more about blood pressure and ways to incorporate a healthy lifestlye to help treat it from the American Heart Association website: www.heart. org under the  Getting Healthy link, and the [address] website: www.nhlbi.nih.gov/hbp    A for Activity  It helps your blood pressure when you exercise regularly. You should try to exercise at least 3 times a week for 20 minutes at a pace that you can comfortably carry on a conversation without being out of breath. B for BMI  The Body Mass Index should be below 30. This is your weight and height measurement. A BMI below 30 is preferred to help lower the risk of Type 2 diabetes, high cholesterol, heart disease, high blood pressure, sleep apnea, gallbladder disease and strokes. C for Control your Salt Intake  Avoid adding salt to your food. Avoid processed food, fast food, and junk food which all has high levels of sodium added. Read labels and get no more than 1500-2300mg of sodium a day. How confident do you feel that you will be able to begin working on your goals before next visit? :     *Some confidence       Please record your blood pressure once every two weeks below:  Date  Blood pressure    You should quit smoking for the benefit of your health and those who love you. Your goal is to quit smoking. The first step is to cut down your cigarettes per week by half. This is your immediate goal.   Cut down your number of cigarettes per week in half before your next visit. Go to  [employer] on the internet for more assistance in your effort to quit smoking. Or call 8-507- [place] Ouch for more assistance. Good luck and keep up the effort! Many patients quit after several times of trying to quit and failing, so do not get discouraged if you don't quit on your first try. DISPLAY PLAN FREE TEXT

## 2017-12-07 ENCOUNTER — EMERGENCY (EMERGENCY)
Facility: HOSPITAL | Age: 77
LOS: 1 days | Discharge: HOME | End: 2017-12-07
Attending: EMERGENCY MEDICINE | Admitting: EMERGENCY MEDICINE
Payer: SELF-PAY

## 2017-12-07 VITALS
HEART RATE: 63 BPM | SYSTOLIC BLOOD PRESSURE: 172 MMHG | RESPIRATION RATE: 17 BRPM | DIASTOLIC BLOOD PRESSURE: 47 MMHG | TEMPERATURE: 98 F | OXYGEN SATURATION: 97 %

## 2017-12-07 DIAGNOSIS — Z95.4 PRESENCE OF OTHER HEART-VALVE REPLACEMENT: Chronic | ICD-10-CM

## 2017-12-07 PROCEDURE — 99284 EMERGENCY DEPT VISIT MOD MDM: CPT

## 2017-12-07 NOTE — ED ADULT TRIAGE NOTE - PRO INTERPRETER NEED 2
Rich no loss of consciousness, no gait abnormality, no headache, no sensory deficits, and no weakness.

## 2017-12-08 ENCOUNTER — EMERGENCY (EMERGENCY)
Facility: HOSPITAL | Age: 77
LOS: 1 days | Discharge: HOME | End: 2017-12-08
Attending: EMERGENCY MEDICINE | Admitting: EMERGENCY MEDICINE
Payer: SELF-PAY

## 2017-12-08 VITALS
OXYGEN SATURATION: 97 % | SYSTOLIC BLOOD PRESSURE: 168 MMHG | DIASTOLIC BLOOD PRESSURE: 60 MMHG | HEART RATE: 58 BPM | RESPIRATION RATE: 18 BRPM

## 2017-12-08 VITALS
OXYGEN SATURATION: 95 % | TEMPERATURE: 98 F | HEIGHT: 60 IN | DIASTOLIC BLOOD PRESSURE: 58 MMHG | RESPIRATION RATE: 16 BRPM | SYSTOLIC BLOOD PRESSURE: 163 MMHG | HEART RATE: 70 BPM | WEIGHT: 184.09 LBS

## 2017-12-08 VITALS — HEIGHT: 61 IN | WEIGHT: 179.9 LBS

## 2017-12-08 DIAGNOSIS — Z95.4 PRESENCE OF OTHER HEART-VALVE REPLACEMENT: Chronic | ICD-10-CM

## 2017-12-08 LAB
ALBUMIN SERPL ELPH-MCNC: 3.8 G/DL — SIGNIFICANT CHANGE UP (ref 3.3–5)
ALP SERPL-CCNC: 56 U/L — SIGNIFICANT CHANGE UP (ref 40–120)
ALT FLD-CCNC: 15 U/L — SIGNIFICANT CHANGE UP (ref 4–33)
APPEARANCE UR: CLEAR — SIGNIFICANT CHANGE UP
APTT BLD: 60.7 SEC — HIGH (ref 27.5–37.4)
AST SERPL-CCNC: 22 U/L — SIGNIFICANT CHANGE UP (ref 4–32)
BACTERIA # UR AUTO: SIGNIFICANT CHANGE UP
BASOPHILS # BLD AUTO: 0.07 K/UL — SIGNIFICANT CHANGE UP (ref 0–0.2)
BASOPHILS NFR BLD AUTO: 0.6 % — SIGNIFICANT CHANGE UP (ref 0–2)
BILIRUB SERPL-MCNC: 0.4 MG/DL — SIGNIFICANT CHANGE UP (ref 0.2–1.2)
BILIRUB UR-MCNC: NEGATIVE — SIGNIFICANT CHANGE UP
BLOOD UR QL VISUAL: HIGH
BUN SERPL-MCNC: 25 MG/DL — HIGH (ref 7–23)
CALCIUM SERPL-MCNC: 8.7 MG/DL — SIGNIFICANT CHANGE UP (ref 8.4–10.5)
CHLORIDE SERPL-SCNC: 100 MMOL/L — SIGNIFICANT CHANGE UP (ref 98–107)
CO2 SERPL-SCNC: 31 MMOL/L — SIGNIFICANT CHANGE UP (ref 22–31)
COLOR SPEC: SIGNIFICANT CHANGE UP
CREAT SERPL-MCNC: 1.05 MG/DL — SIGNIFICANT CHANGE UP (ref 0.5–1.3)
EOSINOPHIL # BLD AUTO: 0.34 K/UL — SIGNIFICANT CHANGE UP (ref 0–0.5)
EOSINOPHIL NFR BLD AUTO: 2.8 % — SIGNIFICANT CHANGE UP (ref 0–6)
GLUCOSE SERPL-MCNC: 110 MG/DL — HIGH (ref 70–99)
GLUCOSE UR-MCNC: NEGATIVE — SIGNIFICANT CHANGE UP
HCT VFR BLD CALC: 37.5 % — SIGNIFICANT CHANGE UP (ref 34.5–45)
HGB BLD-MCNC: 11.9 G/DL — SIGNIFICANT CHANGE UP (ref 11.5–15.5)
IMM GRANULOCYTES # BLD AUTO: 0.04 # — SIGNIFICANT CHANGE UP
IMM GRANULOCYTES NFR BLD AUTO: 0.3 % — SIGNIFICANT CHANGE UP (ref 0–1.5)
INR BLD: 6.13 — CRITICAL HIGH (ref 0.88–1.17)
KETONES UR-MCNC: NEGATIVE — SIGNIFICANT CHANGE UP
LEUKOCYTE ESTERASE UR-ACNC: NEGATIVE — SIGNIFICANT CHANGE UP
LYMPHOCYTES # BLD AUTO: 1.79 K/UL — SIGNIFICANT CHANGE UP (ref 1–3.3)
LYMPHOCYTES # BLD AUTO: 15 % — SIGNIFICANT CHANGE UP (ref 13–44)
MCHC RBC-ENTMCNC: 26 PG — LOW (ref 27–34)
MCHC RBC-ENTMCNC: 31.7 % — LOW (ref 32–36)
MCV RBC AUTO: 82.1 FL — SIGNIFICANT CHANGE UP (ref 80–100)
MONOCYTES # BLD AUTO: 0.96 K/UL — HIGH (ref 0–0.9)
MONOCYTES NFR BLD AUTO: 8 % — SIGNIFICANT CHANGE UP (ref 2–14)
NEUTROPHILS # BLD AUTO: 8.77 K/UL — HIGH (ref 1.8–7.4)
NEUTROPHILS NFR BLD AUTO: 73.3 % — SIGNIFICANT CHANGE UP (ref 43–77)
NITRITE UR-MCNC: NEGATIVE — SIGNIFICANT CHANGE UP
NRBC # FLD: 0 — SIGNIFICANT CHANGE UP
PH UR: 7 — SIGNIFICANT CHANGE UP (ref 4.6–8)
PLATELET # BLD AUTO: 230 K/UL — SIGNIFICANT CHANGE UP (ref 150–400)
PMV BLD: 10.3 FL — SIGNIFICANT CHANGE UP (ref 7–13)
POTASSIUM SERPL-MCNC: 4.2 MMOL/L — SIGNIFICANT CHANGE UP (ref 3.5–5.3)
POTASSIUM SERPL-SCNC: 4.2 MMOL/L — SIGNIFICANT CHANGE UP (ref 3.5–5.3)
PROT SERPL-MCNC: 7.1 G/DL — SIGNIFICANT CHANGE UP (ref 6–8.3)
PROT UR-MCNC: NEGATIVE MG/DL — SIGNIFICANT CHANGE UP
PROTHROM AB SERPL-ACNC: 70.6 SEC — HIGH (ref 9.8–13.1)
RBC # BLD: 4.57 M/UL — SIGNIFICANT CHANGE UP (ref 3.8–5.2)
RBC # FLD: 14.9 % — HIGH (ref 10.3–14.5)
RBC CASTS # UR COMP ASSIST: SIGNIFICANT CHANGE UP (ref 0–?)
SODIUM SERPL-SCNC: 141 MMOL/L — SIGNIFICANT CHANGE UP (ref 135–145)
SP GR SPEC: 1.01 — SIGNIFICANT CHANGE UP (ref 1–1.04)
SQUAMOUS # UR AUTO: SIGNIFICANT CHANGE UP
UROBILINOGEN FLD QL: NORMAL MG/DL — SIGNIFICANT CHANGE UP
WBC # BLD: 11.97 K/UL — HIGH (ref 3.8–10.5)
WBC # FLD AUTO: 11.97 K/UL — HIGH (ref 3.8–10.5)

## 2017-12-08 PROCEDURE — 70450 CT HEAD/BRAIN W/O DYE: CPT | Mod: 26

## 2017-12-08 PROCEDURE — 99284 EMERGENCY DEPT VISIT MOD MDM: CPT

## 2017-12-08 RX ORDER — DIPHENHYDRAMINE HCL 50 MG
25 CAPSULE ORAL ONCE
Qty: 0 | Refills: 0 | Status: COMPLETED | OUTPATIENT
Start: 2017-12-08 | End: 2017-12-08

## 2017-12-08 RX ORDER — METOCLOPRAMIDE HCL 10 MG
10 TABLET ORAL ONCE
Qty: 0 | Refills: 0 | Status: COMPLETED | OUTPATIENT
Start: 2017-12-08 | End: 2017-12-08

## 2017-12-08 RX ADMIN — Medication 10 MILLIGRAM(S): at 02:23

## 2017-12-08 RX ADMIN — Medication 25 MILLIGRAM(S): at 02:23

## 2017-12-08 NOTE — ED PROVIDER NOTE - CHPI ED SYMPTOMS NEG
no nausea/no dizziness/no fever/no numbness/no tingling/no weakness/no decreased eating/drinking/no vomiting/no chills/no pain

## 2017-12-08 NOTE — ED PROVIDER NOTE - PHYSICAL EXAMINATION
Gen: NAD, non-toxic, conversational  Eyes: PERRLA, EOMI   HENT: Normocephalic, atraumatic. External ears normal, no rhinorrhea, moist mucous membranes.   CV: RRR, no M/R/G  Resp: non-labored, speaking without difficulty on room air  Abd: soft, non tender, non rigid, no guarding or rebound tenderness  Skin: dry, wwp   Neuro: AOx3, speech is fluent and appropriate (French), CN 2-12 intact, motor 5/5 & symmetric in BUE/BLE, sensation grossly intact, gait with narrow short stride, FTN nl.   Psych: Mood good, affect euthymic

## 2017-12-08 NOTE — ED PROVIDER NOTE - PHYSICAL EXAMINATION
: 1/2 cm superficial laceration to L buttock. No active bleeding. Cervix visible and no bleeding. No discharge. No rectal bleeding. Chaperone: EB Mccann

## 2017-12-08 NOTE — ED ADULT TRIAGE NOTE - CHIEF COMPLAINT QUOTE
c/o blood noticed on tissue after wiping post urination. Daughter states pt told her she noticed it the 3 times she went to the bathroom while being seen in the ER since last night. Pt denies any pain, denies bleeding now. pt is on Warfarin

## 2017-12-08 NOTE — ED PROVIDER NOTE - CARE PLAN
Principal Discharge DX:	Coumadin toxicity, accidental or unintentional, initial encounter Principal Discharge DX:	Coumadin toxicity, accidental or unintentional, initial encounter  Instructions for follow-up, activity and diet:	DO NOT TAKE YOUR COUMADIN TODAY, RESUME TAKING IT TOMORROW. FOLLOW UP WITH YOUR DOCTOR FOR YOUR INR CHECK. Rest, drink plenty of fluids.  Advance activity as tolerated.  Continue all previously prescribed medications as directed. You can use motrin 600mg every 6-8 hours for pain or fever, and/or Tylenol 650 mg every 4 hours for pain/fever. Follow up with your primary care physician in 48-72 hours- bring copies of your results.  Return to the emergency department for chest pain, shortness of breath, dizziness, or worsening, concerning, or persistent symptoms.

## 2017-12-08 NOTE — ED PROVIDER NOTE - ATTENDING CONTRIBUTION TO CARE
AJM: pt seen with resident and agree with above note. 77 year old female with history of afib on coumadin, CAD, CHF, CVA presenting with headache. Symptoms present for the past 2 days, intermittent. Patient notes headache was not sudden onset and began as mild headache that gradually grew more intense. Denies any focal weakness/numbness. No confusion, changes in vision, neck pain. No family history of SAH or aneurysms. No syncope. Also noted some dizziness earlier tonight which resolved. took advil pta and notes headache is improved though still present. Benign neuro exam, no dysmetria, normal gait. will obtain ct head to r/o ICH given risk factors, reglan, benadryl. dc with pmd follow up if workup neg and symptoms improved.

## 2017-12-08 NOTE — ED PROVIDER NOTE - PROGRESS NOTE DETAILS
JW 77 year old female, PMHx of CHF, Afib on coumadin, HTN, HLD, CAD, DM, CVA; presents to the ED for blood on toilet paper when she wiped this morning. pt was seen over night in the ER for vertigo and nausea and had negative CT, now resolved p/w hematochezia after wiping.  Repeat examination today reveals 1/2 cm superficial laceration to L buttock with no active bleeding.  Given return performing INR check and will refer to PCP for follow up.  Will treat symptomatically and reassess. EB Bro: pt doing well, no further bleeding. INR 6.13-- will have pt hold coumadin today and then restart and follow up with PMD for recheck.

## 2017-12-08 NOTE — ED PROVIDER NOTE - OBJECTIVE STATEMENT
Multiple medical comorbidities presenting with complaint of 2 days of headache with an episode of vertigo tonight at midnight that spontaneously resolved. Pt took two advil prior to coming in to the ED and notes her headache feels like it is improving. She does not recall what she was doing when the headache started. She has no aggravating factors for her headache, stating no photophobia, phonophobia, or worsening with neck movements. She has not noticed any change in her vision, has been able to ambulate as normal, and has not had any numbness, tingling, or weakness. She notes her headache is now improving but still present.

## 2017-12-08 NOTE — ED PROVIDER NOTE - NS ED ROS FT
General: No fevers / chills  HENT: No head trauma, ear pain, runny nose, or sore throat  Eyes: No visual changes  CP: No chest pain, palpitations, or light headedness  Resp: No shortness of breath, no cough  GI: No abdominal pain, diarrhea, constipation, nausea, or vomiting  : No urinary fz, dysuria, or hematuria  Neuro: + HA  Endo: + hx of diabetes  Heme: + coumadin

## 2017-12-08 NOTE — ED PROVIDER NOTE - OBJECTIVE STATEMENT
77 year old female, PMHx of CHF, Afib on coumadin, HTN, HLD, CAD, DM, CVA; presents to the ED for blood on toilet paper when she wiped this morning. pt was seen over night in the ER for vertigo and nausea and had negative CT, now resolved. However returned when she noted blood on toilet paper when wiping when she left. Denies other bleeding or blood loss. Unsure if from vagina/urine/rectal. Denies bleeding in past. On warfarin 4mg daily. Denies fever chills vomiting diarrhea cp sob weakness HA dizziness. Daughter translating at pt request.

## 2017-12-08 NOTE — ED ADULT NURSE NOTE - OBJECTIVE STATEMENT
pt was orginally discharged but than c/o blood noticed on tissue after wiping post urination. Pt denies any pain, denies bleeding now. pt is on Warfarin  poss vaginal bleed. pt denies any chest pain/palpitations. pt breathing unlabored, pt reports a headache and feeling light headed. pt is VSS and is in NAD at this time. pt daughter is at bedside. pt labs/urine sent.

## 2017-12-08 NOTE — ED PROVIDER NOTE - MEDICAL DECISION MAKING DETAILS
77yF multiple medical comorbidities presenting with complaint of HA that is partially resolved upon presentation; no ongoing vertigo, negative neuro exam. Will order reglan benadryl for HA sx, NCHCT to eval for possible bleeding; f/u studies, reassess, dispo.

## 2017-12-08 NOTE — ED PROVIDER NOTE - PROGRESS NOTE DETAILS
AJM: pt feeling improved, again normal exam. ct neg. stable for dc home with pmd follow up. pt and daughter comfortable with plan.

## 2017-12-08 NOTE — ED PROVIDER NOTE - MEDICAL DECISION MAKING DETAILS
78 yo F here for blood on toilet paper this AM. PLAN: labs, INR check. 76 yo F here for blood on toilet paper this AM. Small superficial laceration to buttock on exam, no active bleeding, pt reports shaving in that area yesterday, likely nicked her skin however will obtain basic labs and urine and INR check. PLAN: labs, INR check.

## 2017-12-08 NOTE — ED ADULT NURSE NOTE - OBJECTIVE STATEMENT
rec'd pt aaox3 in room 8, predominantly Lithuanian speaking, daughter at bedside translating.  Pt. presents w/ c/o an intermittent frontal HA w/ radiating pain to the forehead and eyes, denies n/v/d, visual changes, neck stiffness, sensitivity to light, CP/SOB.  Pt. hypertensive, as per daughter pt. did not take her PM dose of hydralazine 50mg.  VS otherwise stable.  Pt. awaiting MD evaluation and dispo.

## 2017-12-08 NOTE — ED PROVIDER NOTE - ATTENDING CONTRIBUTION TO CARE
Danica--Pt with bleeding wjile wiping on toilet.  Pt on coumadin.  reports shaving buttocks area.  requesting female peorvider  per PA note--abrasion n buttocks, not bleeding  plan UA  INR elevated--pt instructed to hold for a day

## 2017-12-08 NOTE — ED PROVIDER NOTE - PLAN OF CARE
DO NOT TAKE YOUR COUMADIN TODAY, RESUME TAKING IT TOMORROW. FOLLOW UP WITH YOUR DOCTOR FOR YOUR INR CHECK. Rest, drink plenty of fluids.  Advance activity as tolerated.  Continue all previously prescribed medications as directed. You can use motrin 600mg every 6-8 hours for pain or fever, and/or Tylenol 650 mg every 4 hours for pain/fever. Follow up with your primary care physician in 48-72 hours- bring copies of your results.  Return to the emergency department for chest pain, shortness of breath, dizziness, or worsening, concerning, or persistent symptoms.

## 2018-01-06 NOTE — ED ADULT TRIAGE NOTE - BP NONINVASIVE DIASTOLIC (MM HG)
[facility]-  Patient called because he had a syncopal episode about an hour ago. Patient stated he was dizzy and sweaty and fell to onto his buttocks. 12-lead showed ST, pacer did kick in at one point when the heart rate was around 200. Patient saw his PMD yesterday and everything was normal, including his BP, today he was hypertensive. . 2 IVs attempted. No pain, and patient is feeling normal. 12-lead send.   Crew stated he lost his wife last month, lives alone, independent.     Patient stated he was walking to the fridge after eating and was putting his food away and knew he felt dizzy and was going to pass out. He feels as though he may have black out for a short time, he doesn't feel as though he hurt himself. He was able to get up off the floor but when he sat down his tailbone hurt.     Patient stated his wife  after aspiration episode, she was at a nursing home for years.  is this week, they were waiting for family to come home. Patient stated that he has support and he is okay living at home still.   
77

## 2018-02-13 NOTE — H&P ADULT. - RS GEN HX ROS MEA POS PC
continue with Hemodialysis on Tues, thurs, Sat watch diet, monitor fluid intake, limit to 1.5 L per day  keep renal diet continue with Hemodialysis on Tues, thurs, Sat  keep renal diet continue home medications continue zoloft  see outpatient psychiatrist within 3-5 days dyspnea/sputum production/cough/wheezing

## 2019-02-01 ENCOUNTER — OUTPATIENT (OUTPATIENT)
Dept: OUTPATIENT SERVICES | Facility: HOSPITAL | Age: 79
LOS: 1 days | End: 2019-02-01
Payer: MEDICARE

## 2019-02-01 DIAGNOSIS — Z95.4 PRESENCE OF OTHER HEART-VALVE REPLACEMENT: Chronic | ICD-10-CM

## 2019-02-01 PROCEDURE — G9001: CPT

## 2019-02-07 ENCOUNTER — INPATIENT (INPATIENT)
Facility: HOSPITAL | Age: 79
LOS: 9 days | Discharge: HOME HEALTH | End: 2019-02-17
Attending: INTERNAL MEDICINE | Admitting: INTERNAL MEDICINE
Payer: MEDICARE

## 2019-02-07 VITALS
DIASTOLIC BLOOD PRESSURE: 69 MMHG | HEART RATE: 130 BPM | OXYGEN SATURATION: 97 % | SYSTOLIC BLOOD PRESSURE: 153 MMHG | RESPIRATION RATE: 24 BRPM | TEMPERATURE: 98 F

## 2019-02-07 DIAGNOSIS — I25.10 ATHEROSCLEROTIC HEART DISEASE OF NATIVE CORONARY ARTERY WITHOUT ANGINA PECTORIS: ICD-10-CM

## 2019-02-07 DIAGNOSIS — E78.5 HYPERLIPIDEMIA, UNSPECIFIED: ICD-10-CM

## 2019-02-07 DIAGNOSIS — Z95.4 PRESENCE OF OTHER HEART-VALVE REPLACEMENT: Chronic | ICD-10-CM

## 2019-02-07 DIAGNOSIS — Z79.899 OTHER LONG TERM (CURRENT) DRUG THERAPY: ICD-10-CM

## 2019-02-07 DIAGNOSIS — I48.91 UNSPECIFIED ATRIAL FIBRILLATION: ICD-10-CM

## 2019-02-07 DIAGNOSIS — E11.9 TYPE 2 DIABETES MELLITUS WITHOUT COMPLICATIONS: ICD-10-CM

## 2019-02-07 DIAGNOSIS — A41.9 SEPSIS, UNSPECIFIED ORGANISM: ICD-10-CM

## 2019-02-07 DIAGNOSIS — I10 ESSENTIAL (PRIMARY) HYPERTENSION: ICD-10-CM

## 2019-02-07 DIAGNOSIS — I50.9 HEART FAILURE, UNSPECIFIED: ICD-10-CM

## 2019-02-07 DIAGNOSIS — Z29.9 ENCOUNTER FOR PROPHYLACTIC MEASURES, UNSPECIFIED: ICD-10-CM

## 2019-02-07 LAB
ALBUMIN SERPL ELPH-MCNC: 4.2 G/DL — SIGNIFICANT CHANGE UP (ref 3.3–5)
ALP SERPL-CCNC: 68 U/L — SIGNIFICANT CHANGE UP (ref 40–120)
ALT FLD-CCNC: 10 U/L — SIGNIFICANT CHANGE UP (ref 4–33)
ANION GAP SERPL CALC-SCNC: 14 MMO/L — SIGNIFICANT CHANGE UP (ref 7–14)
APTT BLD: 37.1 SEC — HIGH (ref 27.5–36.3)
AST SERPL-CCNC: 21 U/L — SIGNIFICANT CHANGE UP (ref 4–32)
B PERT DNA SPEC QL NAA+PROBE: NOT DETECTED — SIGNIFICANT CHANGE UP
BASE EXCESS BLDV CALC-SCNC: 1.5 MMOL/L — SIGNIFICANT CHANGE UP
BASE EXCESS BLDV CALC-SCNC: 2.8 MMOL/L — SIGNIFICANT CHANGE UP
BASOPHILS # BLD AUTO: 0.06 K/UL — SIGNIFICANT CHANGE UP (ref 0–0.2)
BASOPHILS NFR BLD AUTO: 0.4 % — SIGNIFICANT CHANGE UP (ref 0–2)
BILIRUB SERPL-MCNC: 0.5 MG/DL — SIGNIFICANT CHANGE UP (ref 0.2–1.2)
BLOOD GAS VENOUS - CREATININE: 1 MG/DL — SIGNIFICANT CHANGE UP (ref 0.5–1.3)
BLOOD GAS VENOUS - CREATININE: 1.05 MG/DL — SIGNIFICANT CHANGE UP (ref 0.5–1.3)
BUN SERPL-MCNC: 27 MG/DL — HIGH (ref 7–23)
C PNEUM DNA SPEC QL NAA+PROBE: NOT DETECTED — SIGNIFICANT CHANGE UP
CALCIUM SERPL-MCNC: 9.6 MG/DL — SIGNIFICANT CHANGE UP (ref 8.4–10.5)
CHLORIDE BLDV-SCNC: 103 MMOL/L — SIGNIFICANT CHANGE UP (ref 96–108)
CHLORIDE BLDV-SCNC: 107 MMOL/L — SIGNIFICANT CHANGE UP (ref 96–108)
CHLORIDE SERPL-SCNC: 99 MMOL/L — SIGNIFICANT CHANGE UP (ref 98–107)
CO2 SERPL-SCNC: 26 MMOL/L — SIGNIFICANT CHANGE UP (ref 22–31)
CREAT SERPL-MCNC: 1.12 MG/DL — SIGNIFICANT CHANGE UP (ref 0.5–1.3)
EOSINOPHIL # BLD AUTO: 0.04 K/UL — SIGNIFICANT CHANGE UP (ref 0–0.5)
EOSINOPHIL NFR BLD AUTO: 0.3 % — SIGNIFICANT CHANGE UP (ref 0–6)
FLUAV H1 2009 PAND RNA SPEC QL NAA+PROBE: NOT DETECTED — SIGNIFICANT CHANGE UP
FLUAV H1 RNA SPEC QL NAA+PROBE: NOT DETECTED — SIGNIFICANT CHANGE UP
FLUAV H3 RNA SPEC QL NAA+PROBE: NOT DETECTED — SIGNIFICANT CHANGE UP
FLUAV SUBTYP SPEC NAA+PROBE: NOT DETECTED — SIGNIFICANT CHANGE UP
FLUBV RNA SPEC QL NAA+PROBE: NOT DETECTED — SIGNIFICANT CHANGE UP
GAS PNL BLDV: 134 MMOL/L — LOW (ref 136–146)
GAS PNL BLDV: 135 MMOL/L — LOW (ref 136–146)
GLUCOSE BLDV-MCNC: 105 — HIGH (ref 70–99)
GLUCOSE BLDV-MCNC: 117 — HIGH (ref 70–99)
GLUCOSE SERPL-MCNC: 120 MG/DL — HIGH (ref 70–99)
HADV DNA SPEC QL NAA+PROBE: NOT DETECTED — SIGNIFICANT CHANGE UP
HCO3 BLDV-SCNC: 25 MMOL/L — SIGNIFICANT CHANGE UP (ref 20–27)
HCO3 BLDV-SCNC: 26 MMOL/L — SIGNIFICANT CHANGE UP (ref 20–27)
HCOV PNL SPEC NAA+PROBE: SIGNIFICANT CHANGE UP
HCT VFR BLD CALC: 37.6 % — SIGNIFICANT CHANGE UP (ref 34.5–45)
HCT VFR BLDV CALC: 30.6 % — LOW (ref 34.5–45)
HCT VFR BLDV CALC: 34.4 % — LOW (ref 34.5–45)
HGB BLD-MCNC: 11 G/DL — LOW (ref 11.5–15.5)
HGB BLDV-MCNC: 11.1 G/DL — LOW (ref 11.5–15.5)
HGB BLDV-MCNC: 9.9 G/DL — LOW (ref 11.5–15.5)
HMPV RNA SPEC QL NAA+PROBE: NOT DETECTED — SIGNIFICANT CHANGE UP
HPIV1 RNA SPEC QL NAA+PROBE: NOT DETECTED — SIGNIFICANT CHANGE UP
HPIV2 RNA SPEC QL NAA+PROBE: NOT DETECTED — SIGNIFICANT CHANGE UP
HPIV3 RNA SPEC QL NAA+PROBE: NOT DETECTED — SIGNIFICANT CHANGE UP
HPIV4 RNA SPEC QL NAA+PROBE: NOT DETECTED — SIGNIFICANT CHANGE UP
IMM GRANULOCYTES NFR BLD AUTO: 0.4 % — SIGNIFICANT CHANGE UP (ref 0–1.5)
INR BLD: 3.1 — HIGH (ref 0.88–1.17)
LACTATE BLDV-MCNC: 1.5 MMOL/L — SIGNIFICANT CHANGE UP (ref 0.5–2)
LACTATE BLDV-MCNC: 3.1 MMOL/L — HIGH (ref 0.5–2)
LYMPHOCYTES # BLD AUTO: 0.72 K/UL — LOW (ref 1–3.3)
LYMPHOCYTES # BLD AUTO: 5.2 % — LOW (ref 13–44)
MCHC RBC-ENTMCNC: 22.5 PG — LOW (ref 27–34)
MCHC RBC-ENTMCNC: 29.3 % — LOW (ref 32–36)
MCV RBC AUTO: 76.9 FL — LOW (ref 80–100)
MONOCYTES # BLD AUTO: 0.88 K/UL — SIGNIFICANT CHANGE UP (ref 0–0.9)
MONOCYTES NFR BLD AUTO: 6.3 % — SIGNIFICANT CHANGE UP (ref 2–14)
NEUTROPHILS # BLD AUTO: 12.14 K/UL — HIGH (ref 1.8–7.4)
NEUTROPHILS NFR BLD AUTO: 87.4 % — HIGH (ref 43–77)
NRBC # FLD: 0 K/UL — LOW (ref 25–125)
NT-PROBNP SERPL-SCNC: 2306 PG/ML — SIGNIFICANT CHANGE UP
PCO2 BLDV: 43 MMHG — SIGNIFICANT CHANGE UP (ref 41–51)
PCO2 BLDV: 44 MMHG — SIGNIFICANT CHANGE UP (ref 41–51)
PH BLDV: 7.39 PH — SIGNIFICANT CHANGE UP (ref 7.32–7.43)
PH BLDV: 7.41 PH — SIGNIFICANT CHANGE UP (ref 7.32–7.43)
PLATELET # BLD AUTO: 316 K/UL — SIGNIFICANT CHANGE UP (ref 150–400)
PMV BLD: 10.3 FL — SIGNIFICANT CHANGE UP (ref 7–13)
PO2 BLDV: 28 MMHG — LOW (ref 35–40)
PO2 BLDV: 29 MMHG — LOW (ref 35–40)
POTASSIUM BLDV-SCNC: 3.8 MMOL/L — SIGNIFICANT CHANGE UP (ref 3.4–4.5)
POTASSIUM BLDV-SCNC: 4.1 MMOL/L — SIGNIFICANT CHANGE UP (ref 3.4–4.5)
POTASSIUM SERPL-MCNC: 4.1 MMOL/L — SIGNIFICANT CHANGE UP (ref 3.5–5.3)
POTASSIUM SERPL-SCNC: 4.1 MMOL/L — SIGNIFICANT CHANGE UP (ref 3.5–5.3)
PROT SERPL-MCNC: 7.9 G/DL — SIGNIFICANT CHANGE UP (ref 6–8.3)
PROTHROM AB SERPL-ACNC: 35.6 SEC — HIGH (ref 9.8–13.1)
RBC # BLD: 4.89 M/UL — SIGNIFICANT CHANGE UP (ref 3.8–5.2)
RBC # FLD: 18.5 % — HIGH (ref 10.3–14.5)
RSV RNA SPEC QL NAA+PROBE: NOT DETECTED — SIGNIFICANT CHANGE UP
RV+EV RNA SPEC QL NAA+PROBE: NOT DETECTED — SIGNIFICANT CHANGE UP
SAO2 % BLDV: 44.6 % — LOW (ref 60–85)
SAO2 % BLDV: 47.4 % — LOW (ref 60–85)
SODIUM SERPL-SCNC: 139 MMOL/L — SIGNIFICANT CHANGE UP (ref 135–145)
TROPONIN T, HIGH SENSITIVITY: 44 NG/L — SIGNIFICANT CHANGE UP (ref ?–14)
WBC # BLD: 13.9 K/UL — HIGH (ref 3.8–10.5)
WBC # FLD AUTO: 13.9 K/UL — HIGH (ref 3.8–10.5)

## 2019-02-07 PROCEDURE — 71046 X-RAY EXAM CHEST 2 VIEWS: CPT | Mod: 26

## 2019-02-07 PROCEDURE — 99223 1ST HOSP IP/OBS HIGH 75: CPT | Mod: GC

## 2019-02-07 PROCEDURE — 71045 X-RAY EXAM CHEST 1 VIEW: CPT | Mod: 26

## 2019-02-07 RX ORDER — DEXTROSE 50 % IN WATER 50 %
25 SYRINGE (ML) INTRAVENOUS ONCE
Qty: 0 | Refills: 0 | Status: DISCONTINUED | OUTPATIENT
Start: 2019-02-07 | End: 2019-02-17

## 2019-02-07 RX ORDER — DEXTROSE 50 % IN WATER 50 %
15 SYRINGE (ML) INTRAVENOUS ONCE
Qty: 0 | Refills: 0 | Status: DISCONTINUED | OUTPATIENT
Start: 2019-02-07 | End: 2019-02-17

## 2019-02-07 RX ORDER — INFLUENZA VIRUS VACCINE 15; 15; 15; 15 UG/.5ML; UG/.5ML; UG/.5ML; UG/.5ML
0.5 SUSPENSION INTRAMUSCULAR ONCE
Qty: 0 | Refills: 0 | Status: DISCONTINUED | OUTPATIENT
Start: 2019-02-07 | End: 2019-02-17

## 2019-02-07 RX ORDER — ACETAMINOPHEN 500 MG
650 TABLET ORAL ONCE
Qty: 0 | Refills: 0 | Status: COMPLETED | OUTPATIENT
Start: 2019-02-07 | End: 2019-02-07

## 2019-02-07 RX ORDER — FUROSEMIDE 40 MG
20 TABLET ORAL ONCE
Qty: 0 | Refills: 0 | Status: COMPLETED | OUTPATIENT
Start: 2019-02-07 | End: 2019-02-07

## 2019-02-07 RX ORDER — METOPROLOL TARTRATE 50 MG
50 TABLET ORAL EVERY 12 HOURS
Qty: 0 | Refills: 0 | Status: DISCONTINUED | OUTPATIENT
Start: 2019-02-07 | End: 2019-02-09

## 2019-02-07 RX ORDER — ACETAMINOPHEN 500 MG
650 TABLET ORAL EVERY 6 HOURS
Qty: 0 | Refills: 0 | Status: DISCONTINUED | OUTPATIENT
Start: 2019-02-07 | End: 2019-02-17

## 2019-02-07 RX ORDER — SODIUM CHLORIDE 9 MG/ML
1000 INJECTION, SOLUTION INTRAVENOUS
Qty: 0 | Refills: 0 | Status: DISCONTINUED | OUTPATIENT
Start: 2019-02-07 | End: 2019-02-17

## 2019-02-07 RX ORDER — ONDANSETRON 8 MG/1
4 TABLET, FILM COATED ORAL ONCE
Qty: 0 | Refills: 0 | Status: COMPLETED | OUTPATIENT
Start: 2019-02-07 | End: 2019-02-07

## 2019-02-07 RX ORDER — PIPERACILLIN AND TAZOBACTAM 4; .5 G/20ML; G/20ML
3.38 INJECTION, POWDER, LYOPHILIZED, FOR SOLUTION INTRAVENOUS ONCE
Qty: 0 | Refills: 0 | Status: DISCONTINUED | OUTPATIENT
Start: 2019-02-07 | End: 2019-02-07

## 2019-02-07 RX ORDER — ONDANSETRON 8 MG/1
4 TABLET, FILM COATED ORAL ONCE
Qty: 0 | Refills: 0 | Status: COMPLETED | OUTPATIENT
Start: 2019-02-07 | End: 2019-02-08

## 2019-02-07 RX ORDER — VANCOMYCIN HCL 1 G
1000 VIAL (EA) INTRAVENOUS ONCE
Qty: 0 | Refills: 0 | Status: DISCONTINUED | OUTPATIENT
Start: 2019-02-07 | End: 2019-02-07

## 2019-02-07 RX ORDER — SODIUM CHLORIDE 9 MG/ML
1000 INJECTION INTRAMUSCULAR; INTRAVENOUS; SUBCUTANEOUS ONCE
Qty: 0 | Refills: 0 | Status: COMPLETED | OUTPATIENT
Start: 2019-02-07 | End: 2019-02-07

## 2019-02-07 RX ORDER — WARFARIN SODIUM 2.5 MG/1
1 TABLET ORAL
Qty: 0 | Refills: 0 | COMMUNITY

## 2019-02-07 RX ORDER — AZITHROMYCIN 500 MG/1
500 TABLET, FILM COATED ORAL ONCE
Qty: 0 | Refills: 0 | Status: COMPLETED | OUTPATIENT
Start: 2019-02-07 | End: 2019-02-07

## 2019-02-07 RX ORDER — SIMVASTATIN 20 MG/1
40 TABLET, FILM COATED ORAL AT BEDTIME
Qty: 0 | Refills: 0 | Status: DISCONTINUED | OUTPATIENT
Start: 2019-02-07 | End: 2019-02-08

## 2019-02-07 RX ORDER — CEFTRIAXONE 500 MG/1
1 INJECTION, POWDER, FOR SOLUTION INTRAMUSCULAR; INTRAVENOUS ONCE
Qty: 0 | Refills: 0 | Status: COMPLETED | OUTPATIENT
Start: 2019-02-07 | End: 2019-02-07

## 2019-02-07 RX ORDER — INSULIN LISPRO 100/ML
VIAL (ML) SUBCUTANEOUS
Qty: 0 | Refills: 0 | Status: DISCONTINUED | OUTPATIENT
Start: 2019-02-07 | End: 2019-02-17

## 2019-02-07 RX ORDER — GLUCAGON INJECTION, SOLUTION 0.5 MG/.1ML
1 INJECTION, SOLUTION SUBCUTANEOUS ONCE
Qty: 0 | Refills: 0 | Status: DISCONTINUED | OUTPATIENT
Start: 2019-02-07 | End: 2019-02-17

## 2019-02-07 RX ORDER — DEXTROSE 50 % IN WATER 50 %
12.5 SYRINGE (ML) INTRAVENOUS ONCE
Qty: 0 | Refills: 0 | Status: DISCONTINUED | OUTPATIENT
Start: 2019-02-07 | End: 2019-02-17

## 2019-02-07 RX ADMIN — Medication 20 MILLIGRAM(S): at 23:43

## 2019-02-07 RX ADMIN — Medication 650 MILLIGRAM(S): at 23:43

## 2019-02-07 RX ADMIN — Medication 650 MILLIGRAM(S): at 18:42

## 2019-02-07 RX ADMIN — CEFTRIAXONE 100 GRAM(S): 500 INJECTION, POWDER, FOR SOLUTION INTRAMUSCULAR; INTRAVENOUS at 17:20

## 2019-02-07 RX ADMIN — AZITHROMYCIN 250 MILLIGRAM(S): 500 TABLET, FILM COATED ORAL at 18:42

## 2019-02-07 RX ADMIN — Medication 50 MILLIGRAM(S): at 23:41

## 2019-02-07 RX ADMIN — Medication 650 MILLIGRAM(S): at 17:03

## 2019-02-07 RX ADMIN — SIMVASTATIN 40 MILLIGRAM(S): 20 TABLET, FILM COATED ORAL at 23:43

## 2019-02-07 RX ADMIN — SODIUM CHLORIDE 1000 MILLILITER(S): 9 INJECTION INTRAMUSCULAR; INTRAVENOUS; SUBCUTANEOUS at 17:03

## 2019-02-07 RX ADMIN — ONDANSETRON 4 MILLIGRAM(S): 8 TABLET, FILM COATED ORAL at 17:03

## 2019-02-07 NOTE — H&P ADULT - NSHPPHYSICALEXAM_GEN_ALL_CORE
.  VITAL SIGNS:  T(C): 37.5 (02-07-19 @ 21:59), Max: 39.6 (02-07-19 @ 16:47)  T(F): 99.5 (02-07-19 @ 21:59), Max: 103.2 (02-07-19 @ 16:47)  HR: 110 (02-07-19 @ 21:59) (110 - 130)  BP: 145/58 (02-07-19 @ 21:59) (145/58 - 199/41)  BP(mean): --  RR: 18 (02-07-19 @ 21:59) (18 - 24)  SpO2: 98% (02-07-19 @ 21:59) (97% - 98%)  Wt(kg): --    PHYSICAL EXAM:    Constitutional: NAD  Head: NC/AT  Eyes: PERRLA, EOMI, clear conjunctiva  ENT: no nasal discharge; no oropharyngeal erythema or exudates; dry oral mucosa  Neck: supple; no JVD or thyromegaly  Respiratory: diffuse rhonchi in mid and lower lung fields bilaterally  Cardiac: irregularly irregular rhythm, tachycardic, PMI non-displaced  Gastrointestinal: soft, NT/ND; no rebound or guarding; +BS, no hepatosplenomegaly  Extremities: WWP, no clubbing or cyanosis; 2+ nonpitting edema  Vascular: 2+ radial, DP/PT pulses B/L  Lymphatic: no submandibular or cervical LAD  Neurologic: AAOx3; CNII-XII grossly intact

## 2019-02-07 NOTE — H&P ADULT - PROBLEM SELECTOR PLAN 3
Most recent A1C unknown. Pt was previously on metformin, but has not been taking for several months.   - f/u A1C  - FS checks with low dose ISS Pt takes metoprolol XR for rate control and is on coumadin 3mg QD. INR on admission supratherapeutic to 3.10  - will hold tonight's dose of coumadin and recheck INR in AM  - c/w metoprolol 50mg BID in setting of sepsis  - cont to monitor on telemetry Pt has hx of HFpEF, TTE from 5/2017 showed EF73%, normal RVSF and LVSF, moderate TR, and moderate pulm HTN. Prelim read of CXR showing mild pulm edema. pBNP elevated to 2306; Likely multifactorial etiology due to sepsis and Afib with RVR;  - will give one dose of furosemide 20mg IV now and monitor response  - repeat TTE  -daily weigth

## 2019-02-07 NOTE — H&P ADULT - ATTENDING COMMENTS
Pt seen and evaluated and d/w PGY-2 on 2/7; H&P co-signed/updated after MN; Pt seen and evaluated and d/w PGY-2 on 2/7; H&P co-signed/updated after MN;    Addendum, 2/8, 7:30am:  -2nd Trop elevated 44 --> 628, Pt reports no CP or palpitations, daughter translating at bedside;   -Repeat EKG, 2/8, 6:36am unchanged compared to prior from 2/7, no acute ST changes;   -s/p ASA and Brilinta  -Cardiology c/s pending   -c/w telemetry   -trend CE  -TTE  Possible demand ischaemia vs NSTEMI Pt seen and evaluated and d/w PGY-2 on 2/7; H&P co-signed/updated after MN;    Addendum, 2/8, 7:30am:  -2nd Trop elevated 44 --> 628, Pt reports no CP or palpitations, daughter translating at bedside;   -Repeat EKG, 2/8, 6:36am unchanged compared to prior from 2/7, no acute ST changes;   -s/p ASA and Brilinta  -No Heparin gtt ACS necessary at this time --> INR=2.8   -Cardiology c/s pending   -c/w telemetry   -trend CE  -TTE  Possible demand ischaemia vs NSTEMI

## 2019-02-07 NOTE — H&P ADULT - PROBLEM SELECTOR PLAN 8
Diet: DASH/TLC/CC  DVT ppx: lovenox Diet: DASH/TLC/CC  DVT ppx: coumadin Pt and daughter unsure of medications and pharmacy could not be reached, [facility]. Daughter will bring in list of meds with pharmacy information tomorrow AM  - med rec in AM Pt and daughter unsure of medications and pharmacy could not be reached, [facility] in [facility]. Daughter will bring in list of meds with pharmacy information tomorrow AM  - med rec in AM (primary team)

## 2019-02-07 NOTE — ED ADULT NURSE NOTE - NSIMPLEMENTINTERV_GEN_ALL_ED
Implemented All Fall with Harm Risk Interventions:  [place] to call system. Call bell, personal items and telephone within reach. Instruct patient to call for assistance. Room bathroom lighting operational. Non-slip footwear when patient is off stretcher. Physically safe environment: no spills, clutter or unnecessary equipment. Stretcher in lowest position, wheels locked, appropriate side rails in place. Provide visual cue, wrist band, yellow gown, etc. Monitor gait and stability. Monitor for mental status changes and reorient to person, place, and time. Review medications for side effects contributing to fall risk. Reinforce activity limits and safety measures with patient and family. Provide visual clues: red socks.

## 2019-02-07 NOTE — ED PROVIDER NOTE - OBJECTIVE STATEMENT
77yo F PMHx of CHF, Afib on coumadin, HTN, HLD, CAD, DM, CVA pw cough, shortness of breath x2 days. daughter at bedside and patient prefers her to translate. Patient reports she is feeling generalized weakness and shortness of breath worsening since yesterday. shortness of breath occurs even at resting. reports dry cough, denies fevers, chills, headaches, diarrhea, constipation, urinary complaints, rashes, leg swelling, chest pain. Reports body aches. Denies sick contacts.

## 2019-02-07 NOTE — H&P ADULT - ASSESSMENT
78F with PMHx of HFpEF (EF73%), CAD s/p CABG, afib on coumadin, HTN, HLD, DMII who presents with generalized fatigue, malaise, and shortness of breath 2/2 sepsis of known etiology, most likely pneumonia. 78F with PMHx of HFpEF (EF73%), CAD s/p CABG, afib on coumadin, HTN, HLD, DM type 2 who presents with generalized fatigue, malaise, and shortness of breath a/w sepsis due to likely pneumonia in the setting of acute pEF HF; 78F with PMHx of HFpEF (EF73%), CAD s/p CABG, afib on coumadin, HTN, HLD, DM type 2 who presents with generalized fatigue, malaise, and shortness of breath a/w sepsis due to likely pneumonia in the setting of acute pEF HF;  Now complicated by elevated hsTrop due to demand ischemia vs NSTEMI;     Please see attg's addendum below;

## 2019-02-07 NOTE — H&P ADULT - NSHPSOCIALHISTORY_GEN_ALL_CORE
Pt lives in [facility] with son. She denies any hx of alcohol, tobacco, or illicit drug use.

## 2019-02-07 NOTE — H&P ADULT - PROBLEM SELECTOR PLAN 2
Pt has hx of HFpEF, TTE from 5/2017 showed EF73%, normal RVSF and LVSF, moderate TR, and moderate pulm HTN. Prelim read of CXR showing mild pulm edema. pBNP elevated to 2306  - c/w furosemide 40mg QD  - repeat TTE Pt has hx of HFpEF, TTE from 5/2017 showed EF73%, normal RVSF and LVSF, moderate TR, and moderate pulm HTN. Prelim read of CXR showing mild pulm edema. pBNP elevated to 2306  - will give one dose of furosemide 20mg IV now and monitor response  - repeat TTE Pt takes metoprolol XR for rate control and is on coumadin 3mg QD.  INR on admission supratherapeutic to 3.10  - will hold tonight's dose of coumadin and recheck INR in AM  - c/w metoprolol 50mg BID in setting of sepsis  - cont to monitor on telemetry [id number] risk score 7  Pt takes metoprolol XR for rate control and is on coumadin 3mg QD.  INR on admission supratherapeutic to 3.10;  - will hold tonight's dose of coumadin and recheck INR in AM  - c/w metoprolol 50mg BID in setting of sepsis  - cont to monitor on telemetry

## 2019-02-07 NOTE — ED ADULT NURSE REASSESSMENT NOTE - NS ED NURSE REASSESS COMMENT FT1
Pt receiving IV abx, pending RVP results at this time. Pt AxOx3. Pt afib on the monitor with HR in the 110's. Pt O2 saturation 98% on room air. Vital signs as noted. Will continue to monitor.

## 2019-02-07 NOTE — H&P ADULT - PROBLEM SELECTOR PLAN 5
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 Pt not on ASA81  - c/w statin 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 Pt not on ASA81. Troponin 44 on admission.  - c/w statin  - recheck troponin Most recent A1C unknown. Pt was previously on metformin, but has not been taking for several months.   - f/u A1C  - FS checks with low dose ISS

## 2019-02-07 NOTE — ED PROVIDER NOTE - NS ED ROS FT
ROS:  GENERAL: No fever, no chills  EYES: no change in vision  HEENT: no trouble swallowing, no trouble speaking  CARDIAC: no chest pain  PULMONARY: + cough, + shortness of breath  GI: no abdominal pain, no nausea, no vomiting, no diarrhea, no constipation  : No dysuria, no frequency, no change in appearance, or odor of urine  SKIN: no rashes  NEURO: no headache, + weakness  MSK: No joint pain  ~Lew Kiran D.O. -Resident

## 2019-02-07 NOTE — ED ADULT TRIAGE NOTE - CHIEF COMPLAINT QUOTE
Pt. c/o sob, weakness and cough since last night. Denies n/v/d, cp, dizziness or fever. Rapid afib noted on EKG. Pmhx: CAD, Afib, CHF

## 2019-02-07 NOTE — H&P ADULT - PROBLEM SELECTOR PLAN 1
Pt meets severe sepsis criteria with fever to 103.2, tachycardia, tachypnea, WBC count of 13.9, and elevated lactate. Unclear etiology at this time but most likely 2/2 pneumonia as pt has SOB and was coughing at time of evaluation. Received azithromycin and ctx in ED.  - will c/w vanc and zosyn given multiple recent admissions for pneumonia, most recent approx 2 months ago  - f/u UA, urine and blood cultures  - f/u CXR   - tylenol PRN fever Pt meets severe sepsis criteria with fever to 103.2, tachycardia, tachypnea, WBC count of 13.9, and elevated lactate. Unclear etiology at this time but most likely 2/2 pneumonia as pt has SOB and was coughing at time of evaluation. CXR does not show consolidation. Received azithromycin and ctx in ED.  - will c/w levofloxacin renally dosed Q48H  - f/u UA, urine and blood cultures, urine legionella  - f/u CT scan  - tylenol PRN fever  - vital signs Q4H Pt meets sepsis criteria with fever to 103.2, tachycardia, tachypnea, WBC count of 13.9, and elevated lactate. Suspect pneumonia as pt has SOB, coughing and febrile. Cannot r/o PNA masked by pulm edema; Received azithromycin and ctx in ED.  - will c/w levofloxacin renally dosed Q48H  - f/u UA, urine and blood cultures, urine legionella  - f/u CT scan  - tylenol PRN fever  - vital signs Q4H  - RVP negative

## 2019-02-07 NOTE — H&P ADULT - HISTORY OF PRESENT ILLNESS
Pt is a 78F with PMHx of HFpEF (EF73%), CAD s/p CABG, afib on coumadin, HTN, HLD, DMII who presents with generalized fatigue, malaise, and shortness of breath. Pt is Czech speaking, translation and hx provided by daughter at bedside. Pt is poor historian and daughter at bedside is not primary caretaker. Pt has had multiple admissions over past year for "lung infection," most [place]3 [place] where [place] primarily lives with son. Pt was in usual state of health and is in [place] visiting daughter. Pt became acutely lethargic with shortness of breath the day prior to presentation. Shortness of breath is worse when she is walking and ameliorates with rest. Pt also endorses chest congestion, but denies cough. She denies F/C/N/V, HA, chest pain, orthopnea, PND, or worsening LE edema. Her symptoms progressed overnight and the morning of presentation her daughter brought her to the ED for worsening fatigue and shortness of breath. Pt reports compliance with her home medications and denies dietary indiscretions, however she has not seen a primary care doctor for several months. Daughter at bedside states her LE edema is at baseline.     In ED, VS were Tmax 103.2  /69 RR24 SaO2 97% on RA. Labs notable for WBC of 13.9, lactate of 3.1 on VBG. Prelim CXR read showing mild edema. RVP negative. Pt is a 77yo Female, ambulatory with a walker (non-compliant per the daughter) with PMHx of HFpEF (EF73%), CAD s/p CABG, afib on coumadin, HTN, HLD, DM Type 2 who presents with generalized fatigue, malaise, and shortness of breath. Pt is Turkish speaking, translation and hx provided by daughter at bedside. Pt is poor historian and daughter at bedside is not primary caretaker. Pt has had multiple admissions over past year for "lung infection," most recently about 2[place]3 [place] ago [place] where [place] primarily lives with son. Pt was in usual state of health and is in [place] visiting daughter. Pt became acutely lethargic with shortness of breath the day prior to presentation. Shortness of breath is worse when she is walking and ameliorates with rest. Pt also endorses chest congestion, but denies cough (which was observed at bedside). She denies F/C/N/V, HA, chest pain, orthopnea, PND, or worsening LE edema. Her symptoms progressed overnight and the morning of presentation her daughter brought her to the ED for worsening fatigue and shortness of breath. Pt reports compliance with her home medications and denies dietary indiscretions, however she has not seen a primary care doctor for several months. Daughter at bedside states her LE edema is at baseline.     In ED, VS were Tmax 103.2  /69 RR24 SaO2 97% on RA. Labs notable for WBC of 13.9, lactate of 3.1 on VBG. Prelim CXR read showing mild edema. RVP negative. Pt is a 77yo Female, ambulatory with a walker (non-compliant per the daughter) with PMHx of HFpEF (EF73%), CAD s/p CABG, afib on coumadin, s/p Bioprosthetic mitral and aortic valve replacement, moderate pHTN, HTN, HLD, DM Type 2 who presents with generalized fatigue, malaise, and shortness of breath. Pt is Maori speaking, translation and hx provided by daughter at bedside. Pt is poor historian and daughter at bedside is not primary caretaker. Pt has had multiple admissions over past year for "lung infection," most [place] about [place]3 [place] ago [place] where [place] primarily lives with son. Pt was in usual state of health and is in [place] visiting daughter. Pt became acutely lethargic with shortness of breath the day prior to presentation. Shortness of breath is worse when she is walking and ameliorates with rest. Pt also endorses chest congestion, but denies cough (which was observed at bedside). She denies F/C/N/V, HA, chest pain, orthopnea, PND, or worsening LE edema. Her symptoms progressed overnight and the morning of presentation her daughter brought her to the ED for worsening fatigue and shortness of breath. Pt reports compliance with her home medications and denies dietary indiscretions, however she has not seen a primary care doctor for several months. Daughter at bedside states her LE edema is at baseline.     In ED, VS were Tmax 103.2  /69 RR24 SaO2 97% on RA. Labs notable for WBC of 13.9, lactate of 3.1 on VBG. Prelim CXR read showing mild edema. RVP negative.

## 2019-02-07 NOTE — ED PROVIDER NOTE - MEDICAL DECISION MAKING DETAILS
77yo f shortness of breath, fever, body aches, labs, abx, cxr, ekg, fluids, cultures, concern for pna with shortness of breath, crackles on exam, body aches, will get ua and RVP as well r/o other sources of infection. 77yo f shortness of breath, fever, body aches, labs, abx, cxr, ekg, fluids, cultures, concern for pna with shortness of breath, crackles on exam, body aches, will get ua and RVP as well r/o other sources of infection. will not give sepsis fluids due to CHF and signs of fluid overload on exam, concern for further worsening of fluid overload/chf.

## 2019-02-07 NOTE — H&P ADULT - PROBLEM SELECTOR PLAN 6
- c/w enalapril 20mg QD  - holding home hydralazine in setting of active infection, add back as clinically indicated holding home hydralazine and enalapril in setting of active infection, add back as clinically indicated

## 2019-02-07 NOTE — H&P ADULT - NSHPREVIEWOFSYSTEMS_GEN_ALL_CORE
REVIEW OF SYSTEMS:    CONSTITUTIONAL: +weakness, No fevers or chills  EYES/ENT: No visual changes;  No vertigo or throat pain   NECK: No pain or stiffness  RESPIRATORY: +SOB, No cough, wheezing, hemoptysis  CARDIOVASCULAR: No chest pain or palpitations  GASTROINTESTINAL: No abdominal or epigastric pain. No nausea, vomiting, or hematemesis; No diarrhea or constipation. No melena or hematochezia.  GENITOURINARY: No dysuria, frequency or hematuria  NEUROLOGICAL: No numbness or weakness  SKIN: No itching, burning, rashes, or lesions   All other review of systems is negative unless indicated above. REVIEW OF SYSTEMS:    CONSTITUTIONAL: +weakness, No fevers or chills  EYES/ENT: No visual changes;  No vertigo or throat pain   NECK: No pain or stiffness  RESPIRATORY: +SOB, + cough, no wheezing, hemoptysis  CARDIOVASCULAR: No chest pain or palpitations  GASTROINTESTINAL: No abdominal or epigastric pain. No nausea, vomiting, or hematemesis; No diarrhea or constipation. No melena or hematochezia.  GENITOURINARY: No dysuria, frequency or hematuria  NEUROLOGICAL: No numbness or weakness  SKIN: No itching, burning, rashes, or lesions   All other review of systems is negative unless indicated above.

## 2019-02-07 NOTE — ED PROVIDER NOTE - PROGRESS NOTE DETAILS
Storm GARNER-  patient seen and reassessed. called Dr. West, who is not in town and requesting to admit to hospitalist.

## 2019-02-07 NOTE — H&P ADULT - PROBLEM SELECTOR PLAN 4
Pt takes metoprolol XR for rate control and is on coumadin 3mg QD. INR on admission supratherapeutic to 3.10  - will hold tonight's dose of coumadin and recheck INR in AM  - c/w metoprolol 50mg BID in setting of sepsis Pt takes metoprolol XR for rate control and is on coumadin 3mg QD. INR on admission supratherapeutic to 3.10  - will hold tonight's dose of coumadin and recheck INR in AM  - c/w metoprolol 50mg BID in setting of sepsis  - cont to monitor on telemetry Most recent A1C unknown. Pt was previously on metformin, but has not been taking for several months.   - f/u A1C  - FS checks with low dose ISS 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 Pt not on ASA81. Troponin 44 on admission.  - c/w statin  - recheck troponin

## 2019-02-07 NOTE — ED PROVIDER NOTE - PHYSICAL EXAMINATION
Physical Exam:  Gen: NAD, AOx3, lethargic appearing  Head: NCAT  HEENT: EOMI, PEERLA, normal conjunctiva, tongue midline, oral mucosa dry  Lung: tachypneic, coarse breath sounds B/L, no resp distress  CV: tachycardic  Abd: soft, NTND, no guarding, no rigidity, no CVA tenderness   MSK: no visible deformities, ROM normal in UE/LE, no back pain  Neuro: No focal sensory or motor deficits  Skin: Warm, well perfused, no rash  Psych: calm  ~Lew Kiran D.O. -Resident

## 2019-02-07 NOTE — ED PROVIDER NOTE - ATTENDING CONTRIBUTION TO CARE
DR. MARIN, ATTENDING MD-  I performed a face to face bedside interview with patient regarding history of present illness, review of symptoms and past medical history. I completed an independent physical exam.  I have discussed patient's plan of care with the resident.   Documentation as above in the note.    77 y/o female h/o chf, afib, htn, hld, dm, cva, cad, cabg, stent here with sob, cough x2 days.  Denies ha, neck stiffness, cp, abd pain, n/v/d, dysuria, rash.  Febrile, ill appearing, dry mm, diffuse rhonchi, mild tachypnea, speaking in full sentences, s1s2 tachy reg rhythm ejection murmur, abd soft non ttp no r/g, no cva tenderness b/l, no rash.  Concern for viral vs bacterial pna causing sepsis.  Obtain cbc, cmp, blood cx x 2, lactate, rvp, cxr, ua, ucx, blood cx x2, give ivf bolus x1 (cautious admin given h/o chf), cap coverage abx, will need admission for further care and evaluation.

## 2019-02-07 NOTE — ED PROVIDER NOTE - CARE PLAN
Principal Discharge DX:	Cough  Secondary Diagnosis:	SOB (shortness of breath) Principal Discharge DX:	Sepsis  Secondary Diagnosis:	SOB (shortness of breath)  Secondary Diagnosis:	PNA (pneumonia)

## 2019-02-07 NOTE — H&P ADULT - PROBLEM SELECTOR PROBLEM 5
Coronary artery disease Type 2 diabetes mellitus with hyperglycemia, without long-term current use of insulin

## 2019-02-07 NOTE — H&P ADULT - NSHPLABSRESULTS_GEN_ALL_CORE
11.0   13.90 )-----------( 316      ( 07 Feb 2019 16:43 )             37.6       02-07    139  |  99  |  27<H>  ----------------------------<  120<H>  4.1   |  26  |  1.12    Ca    9.6      07 Feb 2019 16:43    TPro  7.9  /  Alb  4.2  /  TBili  0.5  /  DBili  x   /  AST  21  /  ALT  10  /  AlkPhos  68  02-07                  PT/INR - ( 07 Feb 2019 16:43 )   PT: 35.6 SEC;   INR: 3.10          PTT - ( 07 Feb 2019 16:43 )  PTT:37.1 SEC          < from: Xray Chest 1 View AP/PA (02.07.19 @ 17:22) >    ******PRELIMINARY REPORT******    ******PRELIMINARY REPORT******            INTERPRETATION:  mild edema.            ******PRELIMINARY REPORT******    ******PRELIMINARY REPORT******       < end of copied text > EKG, 2/7, Afib with RVR, 127bpm, qtc 467, no acute Tw or ST changes, isolated Tw inv in aVL - my reading     11.0   13.90 )-----------( 316      ( 07 Feb 2019 16:43 )             37.6       02-07    139  |  99  |  27<H>  ----------------------------<  120<H>  4.1   |  26  |  1.12    Ca    9.6      07 Feb 2019 16:43    TPro  7.9  /  Alb  4.2  /  TBili  0.5  /  DBili  x   /  AST  21  /  ALT  10  /  AlkPhos  68  02-07                  PT/INR - ( 07 Feb 2019 16:43 )   PT: 35.6 SEC;   INR: 3.10          PTT - ( 07 Feb 2019 16:43 )  PTT:37.1 SEC      Xray Chest - mild pulm edema, no consolidations or pleural effusions - my reading EKG, 2/7, Afib with RVR, 127bpm, qtc 467, no acute Tw or ST changes, isolated Tw inv in aVL - my reading     11.0   13.90 )-----------( 316      ( 07 Feb 2019 16:43 )             37.6       02-07    139  |  99  |  27<H>  ----------------------------<  120<H>  4.1   |  26  |  1.12    Ca    9.6      07 Feb 2019 16:43    TPro  7.9  /  Alb  4.2  /  TBili  0.5  /  DBili  x   /  AST  21  /  ALT  10  /  AlkPhos  68  02-07          TTE, 5/15/17  Ejection Fraction (Teicholtz): 73 %  ------------------------------------------------------------------------  OBSERVATIONS:  Mitral Valve: Bioprosthetic mitral valve replacement. No  mitral valve regurgitation seen. Mean transmitral valve  gradient equals 5 mm Hg, which is probably normal in the  setting of a prosthetic valve.  Aortic Root: Normal aortic root.  Aortic Valve: Bioprosthetic aortic valve replacement. Peak  transaortic valve gradient equals 50 mm Hg, mean  transaortic valve gradient equals 22 mm Hg, which is  elevated even in the presence of a prosthetic valve. No  aortic valve regurgitation seen.  Left Atrium: Moderately dilated left atrium.  LA volume  index = 46 cc/m2.  Left Ventricle: Normal left ventricular systolic function.  No segmental wall motion abnormalities. Paradoxical septal  wall motion consistent with postoperative state. Normal  left ventricular internal dimensions and wall thicknesses.  Right Heart: Mild right atrial enlargement. Normal right  ventricular size and function. Normal tricuspid valve.  Moderate tricuspid regurgitation. Normal pulmonic valve.  Pericardium/PleuraNormal pericardium with no pericardial  effusion.  Hemodynamic: Estimated right ventricular systolic pressure  equals 56 mm Hg, assuming right atrial pressure equals 10  mm Hg, consistent with moderate pulmonary hypertension.  ------------------------------------------------------------------------  CONCLUSIONS:  1. Bioprosthetic mitral valve replacement. No mitral valve  regurgitation seen. Mean transmitral valve gradient equals  5 mm Hg, which is probably normal in the setting of a  prosthetic valve.  2. Bioprosthetic aortic valve replacement. Peak transaortic  valve gradient equals 50 mm Hg, mean transaortic valve  gradient equals 22 mm Hg, which is elevated even in the  presence of a prosthetic valve. No aortic valve  regurgitation seen.  3. Moderately dilated left atrium.  LA volume index = 46  cc/m2.  4. Normal left ventricular internal dimensions and wall  thicknesses.  5. Normal left ventricular systolic function. No segmental  wall motion abnormalities. Paradoxical septal wall motion  consistent with postoperative state.  6. Mild right atrial enlargement.  7. Normal right ventricular size and function.  8. Normal tricuspid valve.  Moderate tricuspid  regurgitation.  9. Estimated pulmonary artery systolic pressure equals 56  mm Hg, assuming right atrial pressure equals 10  mm Hg,  consistent with moderate pulmonary hypertension.      PT/INR - ( 07 Feb 2019 16:43 )   PT: 35.6 SEC;   INR: 3.10          PTT - ( 07 Feb 2019 16:43 )  PTT:37.1 SEC      Xray Chest - mild pulm edema, no consolidations or pleural effusions - my reading EKG, 2/7, 15:18, Afib with RVR, 127bpm, qtc 467, no acute ST changes, Tw inv in aVL and I - my reading       11.0   13.90 )-----------( 316      ( 07 Feb 2019 16:43 )             37.6       02-07    139  |  99  |  27<H>  ----------------------------<  120<H>  4.1   |  26  |  1.12    Ca    9.6      07 Feb 2019 16:43    TPro  7.9  /  Alb  4.2  /  TBili  0.5  /  DBili  x   /  AST  21  /  ALT  10  /  AlkPhos  68  02-07          TTE, 5/15/17  Ejection Fraction (Teicholtz): 73 %  ------------------------------------------------------------------------  OBSERVATIONS:  Mitral Valve: Bioprosthetic mitral valve replacement. No  mitral valve regurgitation seen. Mean transmitral valve  gradient equals 5 mm Hg, which is probably normal in the  setting of a prosthetic valve.  Aortic Root: Normal aortic root.  Aortic Valve: Bioprosthetic aortic valve replacement. Peak  transaortic valve gradient equals 50 mm Hg, mean  transaortic valve gradient equals 22 mm Hg, which is  elevated even in the presence of a prosthetic valve. No  aortic valve regurgitation seen.  Left Atrium: Moderately dilated left atrium.  LA volume  index = 46 cc/m2.  Left Ventricle: Normal left ventricular systolic function.  No segmental wall motion abnormalities. Paradoxical septal  wall motion consistent with postoperative state. Normal  left ventricular internal dimensions and wall thicknesses.  Right Heart: Mild right atrial enlargement. Normal right  ventricular size and function. Normal tricuspid valve.  Moderate tricuspid regurgitation. Normal pulmonic valve.  Pericardium/PleuraNormal pericardium with no pericardial  effusion.  Hemodynamic: Estimated right ventricular systolic pressure  equals 56 mm Hg, assuming right atrial pressure equals 10  mm Hg, consistent with moderate pulmonary hypertension.  ------------------------------------------------------------------------  CONCLUSIONS:  1. Bioprosthetic mitral valve replacement. No mitral valve  regurgitation seen. Mean transmitral valve gradient equals  5 mm Hg, which is probably normal in the setting of a  prosthetic valve.  2. Bioprosthetic aortic valve replacement. Peak transaortic  valve gradient equals 50 mm Hg, mean transaortic valve  gradient equals 22 mm Hg, which is elevated even in the  presence of a prosthetic valve. No aortic valve  regurgitation seen.  3. Moderately dilated left atrium.  LA volume index = 46  cc/m2.  4. Normal left ventricular internal dimensions and wall  thicknesses.  5. Normal left ventricular systolic function. No segmental  wall motion abnormalities. Paradoxical septal wall motion  consistent with postoperative state.  6. Mild right atrial enlargement.  7. Normal right ventricular size and function.  8. Normal tricuspid valve.  Moderate tricuspid  regurgitation.  9. Estimated pulmonary artery systolic pressure equals 56  mm Hg, assuming right atrial pressure equals 10  mm Hg,  consistent with moderate pulmonary hypertension.      PT/INR - ( 07 Feb 2019 16:43 )   PT: 35.6 SEC;   INR: 3.10          PTT - ( 07 Feb 2019 16:43 )  PTT:37.1 SEC      Xray Chest - mild pulm edema, no consolidations or pleural effusions - my reading

## 2019-02-08 DIAGNOSIS — I21.4 NON-ST ELEVATION (NSTEMI) MYOCARDIAL INFARCTION: ICD-10-CM

## 2019-02-08 DIAGNOSIS — D50.0 IRON DEFICIENCY ANEMIA SECONDARY TO BLOOD LOSS (CHRONIC): ICD-10-CM

## 2019-02-08 DIAGNOSIS — I50.30 UNSPECIFIED DIASTOLIC (CONGESTIVE) HEART FAILURE: ICD-10-CM

## 2019-02-08 DIAGNOSIS — E11.65 TYPE 2 DIABETES MELLITUS WITH HYPERGLYCEMIA: ICD-10-CM

## 2019-02-08 LAB
ALBUMIN SERPL ELPH-MCNC: 3.7 G/DL — SIGNIFICANT CHANGE UP (ref 3.3–5)
ALP SERPL-CCNC: 70 U/L — SIGNIFICANT CHANGE UP (ref 40–120)
ALT FLD-CCNC: 35 U/L — HIGH (ref 4–33)
ANION GAP SERPL CALC-SCNC: 15 MMO/L — HIGH (ref 7–14)
ANION GAP SERPL CALC-SCNC: 16 MMO/L — HIGH (ref 7–14)
APPEARANCE UR: CLEAR — SIGNIFICANT CHANGE UP
AST SERPL-CCNC: 88 U/L — HIGH (ref 4–32)
BACTERIA # UR AUTO: NEGATIVE — SIGNIFICANT CHANGE UP
BASOPHILS # BLD AUTO: 0.04 K/UL — SIGNIFICANT CHANGE UP (ref 0–0.2)
BASOPHILS # BLD AUTO: 0.06 K/UL — SIGNIFICANT CHANGE UP (ref 0–0.2)
BASOPHILS NFR BLD AUTO: 0.5 % — SIGNIFICANT CHANGE UP (ref 0–2)
BASOPHILS NFR BLD AUTO: 0.7 % — SIGNIFICANT CHANGE UP (ref 0–2)
BILIRUB SERPL-MCNC: 0.3 MG/DL — SIGNIFICANT CHANGE UP (ref 0.2–1.2)
BILIRUB UR-MCNC: NEGATIVE — SIGNIFICANT CHANGE UP
BLOOD UR QL VISUAL: NEGATIVE — SIGNIFICANT CHANGE UP
BUN SERPL-MCNC: 26 MG/DL — HIGH (ref 7–23)
BUN SERPL-MCNC: 26 MG/DL — HIGH (ref 7–23)
CALCIUM SERPL-MCNC: 8.6 MG/DL — SIGNIFICANT CHANGE UP (ref 8.4–10.5)
CALCIUM SERPL-MCNC: 8.7 MG/DL — SIGNIFICANT CHANGE UP (ref 8.4–10.5)
CHLORIDE SERPL-SCNC: 102 MMOL/L — SIGNIFICANT CHANGE UP (ref 98–107)
CHLORIDE SERPL-SCNC: 96 MMOL/L — LOW (ref 98–107)
CK MB BLD-MCNC: 17.37 NG/ML — HIGH (ref 1–4.7)
CK MB BLD-MCNC: 2.8 — HIGH (ref 0–2.5)
CK MB BLD-MCNC: 21.45 NG/ML — HIGH (ref 1–4.7)
CK MB BLD-MCNC: 21.97 NG/ML — HIGH (ref 1–4.7)
CK MB BLD-MCNC: 6 — HIGH (ref 0–2.5)
CK SERPL-CCNC: 364 U/L — HIGH (ref 25–170)
CK SERPL-CCNC: 503 U/L — HIGH (ref 25–170)
CK SERPL-CCNC: 618 U/L — HIGH (ref 25–170)
CO2 SERPL-SCNC: 23 MMOL/L — SIGNIFICANT CHANGE UP (ref 22–31)
CO2 SERPL-SCNC: 27 MMOL/L — SIGNIFICANT CHANGE UP (ref 22–31)
COLOR SPEC: YELLOW — SIGNIFICANT CHANGE UP
CREAT SERPL-MCNC: 1.31 MG/DL — HIGH (ref 0.5–1.3)
CREAT SERPL-MCNC: 1.49 MG/DL — HIGH (ref 0.5–1.3)
EOSINOPHIL # BLD AUTO: 0.01 K/UL — SIGNIFICANT CHANGE UP (ref 0–0.5)
EOSINOPHIL # BLD AUTO: 0.02 K/UL — SIGNIFICANT CHANGE UP (ref 0–0.5)
EOSINOPHIL NFR BLD AUTO: 0.1 % — SIGNIFICANT CHANGE UP (ref 0–6)
EOSINOPHIL NFR BLD AUTO: 0.2 % — SIGNIFICANT CHANGE UP (ref 0–6)
GLUCOSE BLDC GLUCOMTR-MCNC: 124 MG/DL — HIGH (ref 70–99)
GLUCOSE BLDC GLUCOMTR-MCNC: 99 MG/DL — SIGNIFICANT CHANGE UP (ref 70–99)
GLUCOSE SERPL-MCNC: 102 MG/DL — HIGH (ref 70–99)
GLUCOSE SERPL-MCNC: 90 MG/DL — SIGNIFICANT CHANGE UP (ref 70–99)
GLUCOSE UR-MCNC: NEGATIVE — SIGNIFICANT CHANGE UP
HBA1C BLD-MCNC: 5.7 % — HIGH (ref 4–5.6)
HCT VFR BLD CALC: 31.8 % — LOW (ref 34.5–45)
HCT VFR BLD CALC: 32.7 % — LOW (ref 34.5–45)
HCT VFR BLD CALC: 33.5 % — LOW (ref 34.5–45)
HGB BLD-MCNC: 9.6 G/DL — LOW (ref 11.5–15.5)
HGB BLD-MCNC: 9.7 G/DL — LOW (ref 11.5–15.5)
HGB BLD-MCNC: 9.7 G/DL — LOW (ref 11.5–15.5)
HYALINE CASTS # UR AUTO: NEGATIVE — SIGNIFICANT CHANGE UP
IMM GRANULOCYTES NFR BLD AUTO: 0.5 % — SIGNIFICANT CHANGE UP (ref 0–1.5)
IMM GRANULOCYTES NFR BLD AUTO: 0.8 % — SIGNIFICANT CHANGE UP (ref 0–1.5)
INR BLD: 2.82 — HIGH (ref 0.88–1.17)
KETONES UR-MCNC: NEGATIVE — SIGNIFICANT CHANGE UP
LEUKOCYTE ESTERASE UR-ACNC: NEGATIVE — SIGNIFICANT CHANGE UP
LYMPHOCYTES # BLD AUTO: 0.69 K/UL — LOW (ref 1–3.3)
LYMPHOCYTES # BLD AUTO: 0.72 K/UL — LOW (ref 1–3.3)
LYMPHOCYTES # BLD AUTO: 8.1 % — LOW (ref 13–44)
LYMPHOCYTES # BLD AUTO: 9.4 % — LOW (ref 13–44)
MAGNESIUM SERPL-MCNC: 1.7 MG/DL — SIGNIFICANT CHANGE UP (ref 1.6–2.6)
MAGNESIUM SERPL-MCNC: 1.8 MG/DL — SIGNIFICANT CHANGE UP (ref 1.6–2.6)
MCHC RBC-ENTMCNC: 22.4 PG — LOW (ref 27–34)
MCHC RBC-ENTMCNC: 22.8 PG — LOW (ref 27–34)
MCHC RBC-ENTMCNC: 23.1 PG — LOW (ref 27–34)
MCHC RBC-ENTMCNC: 29 % — LOW (ref 32–36)
MCHC RBC-ENTMCNC: 29.7 % — LOW (ref 32–36)
MCHC RBC-ENTMCNC: 30.2 % — LOW (ref 32–36)
MCV RBC AUTO: 76.6 FL — LOW (ref 80–100)
MCV RBC AUTO: 76.9 FL — LOW (ref 80–100)
MCV RBC AUTO: 77.4 FL — LOW (ref 80–100)
MONOCYTES # BLD AUTO: 0.89 K/UL — SIGNIFICANT CHANGE UP (ref 0–0.9)
MONOCYTES # BLD AUTO: 1.24 K/UL — HIGH (ref 0–0.9)
MONOCYTES NFR BLD AUTO: 11.6 % — SIGNIFICANT CHANGE UP (ref 2–14)
MONOCYTES NFR BLD AUTO: 14.5 % — HIGH (ref 2–14)
NEUTROPHILS # BLD AUTO: 5.98 K/UL — SIGNIFICANT CHANGE UP (ref 1.8–7.4)
NEUTROPHILS # BLD AUTO: 6.51 K/UL — SIGNIFICANT CHANGE UP (ref 1.8–7.4)
NEUTROPHILS NFR BLD AUTO: 76 % — SIGNIFICANT CHANGE UP (ref 43–77)
NEUTROPHILS NFR BLD AUTO: 77.6 % — HIGH (ref 43–77)
NITRITE UR-MCNC: NEGATIVE — SIGNIFICANT CHANGE UP
NRBC # FLD: 0 K/UL — LOW (ref 25–125)
PH UR: 5.5 — SIGNIFICANT CHANGE UP (ref 5–8)
PHOSPHATE SERPL-MCNC: 3.5 MG/DL — SIGNIFICANT CHANGE UP (ref 2.5–4.5)
PHOSPHATE SERPL-MCNC: 3.8 MG/DL — SIGNIFICANT CHANGE UP (ref 2.5–4.5)
PLATELET # BLD AUTO: 226 K/UL — SIGNIFICANT CHANGE UP (ref 150–400)
PLATELET # BLD AUTO: 227 K/UL — SIGNIFICANT CHANGE UP (ref 150–400)
PLATELET # BLD AUTO: 231 K/UL — SIGNIFICANT CHANGE UP (ref 150–400)
PMV BLD: 10.3 FL — SIGNIFICANT CHANGE UP (ref 7–13)
PMV BLD: 10.3 FL — SIGNIFICANT CHANGE UP (ref 7–13)
PMV BLD: 9.8 FL — SIGNIFICANT CHANGE UP (ref 7–13)
POTASSIUM SERPL-MCNC: 3.7 MMOL/L — SIGNIFICANT CHANGE UP (ref 3.5–5.3)
POTASSIUM SERPL-MCNC: 4.4 MMOL/L — SIGNIFICANT CHANGE UP (ref 3.5–5.3)
POTASSIUM SERPL-SCNC: 3.7 MMOL/L — SIGNIFICANT CHANGE UP (ref 3.5–5.3)
POTASSIUM SERPL-SCNC: 4.4 MMOL/L — SIGNIFICANT CHANGE UP (ref 3.5–5.3)
PROT SERPL-MCNC: 7.2 G/DL — SIGNIFICANT CHANGE UP (ref 6–8.3)
PROT UR-MCNC: 20 — SIGNIFICANT CHANGE UP
PROTHROM AB SERPL-ACNC: 32.3 SEC — HIGH (ref 9.8–13.1)
RBC # BLD: 4.15 M/UL — SIGNIFICANT CHANGE UP (ref 3.8–5.2)
RBC # BLD: 4.25 M/UL — SIGNIFICANT CHANGE UP (ref 3.8–5.2)
RBC # BLD: 4.33 M/UL — SIGNIFICANT CHANGE UP (ref 3.8–5.2)
RBC # FLD: 18.2 % — HIGH (ref 10.3–14.5)
RBC # FLD: 18.4 % — HIGH (ref 10.3–14.5)
RBC # FLD: 18.5 % — HIGH (ref 10.3–14.5)
RBC CASTS # UR COMP ASSIST: SIGNIFICANT CHANGE UP (ref 0–?)
SODIUM SERPL-SCNC: 139 MMOL/L — SIGNIFICANT CHANGE UP (ref 135–145)
SODIUM SERPL-SCNC: 140 MMOL/L — SIGNIFICANT CHANGE UP (ref 135–145)
SP GR SPEC: 1.02 — SIGNIFICANT CHANGE UP (ref 1–1.04)
SPECIMEN SOURCE: SIGNIFICANT CHANGE UP
SPECIMEN SOURCE: SIGNIFICANT CHANGE UP
SQUAMOUS # UR AUTO: SIGNIFICANT CHANGE UP
TROPONIN T, HIGH SENSITIVITY: 1005 NG/L — CRITICAL HIGH (ref ?–14)
TROPONIN T, HIGH SENSITIVITY: 628 NG/L — CRITICAL HIGH (ref ?–14)
TROPONIN T, HIGH SENSITIVITY: 841 NG/L — CRITICAL HIGH (ref ?–14)
TSH SERPL-MCNC: 0.59 UIU/ML — SIGNIFICANT CHANGE UP (ref 0.27–4.2)
UROBILINOGEN FLD QL: NORMAL — SIGNIFICANT CHANGE UP
WBC # BLD: 7.7 K/UL — SIGNIFICANT CHANGE UP (ref 3.8–10.5)
WBC # BLD: 8.56 K/UL — SIGNIFICANT CHANGE UP (ref 3.8–10.5)
WBC # BLD: 8.75 K/UL — SIGNIFICANT CHANGE UP (ref 3.8–10.5)
WBC # FLD AUTO: 7.7 K/UL — SIGNIFICANT CHANGE UP (ref 3.8–10.5)
WBC # FLD AUTO: 8.56 K/UL — SIGNIFICANT CHANGE UP (ref 3.8–10.5)
WBC # FLD AUTO: 8.75 K/UL — SIGNIFICANT CHANGE UP (ref 3.8–10.5)
WBC UR QL: SIGNIFICANT CHANGE UP (ref 0–?)

## 2019-02-08 PROCEDURE — 93010 ELECTROCARDIOGRAM REPORT: CPT

## 2019-02-08 PROCEDURE — 99223 1ST HOSP IP/OBS HIGH 75: CPT

## 2019-02-08 PROCEDURE — 71250 CT THORAX DX C-: CPT | Mod: 26

## 2019-02-08 PROCEDURE — 99233 SBSQ HOSP IP/OBS HIGH 50: CPT | Mod: GC

## 2019-02-08 RX ORDER — ASPIRIN/CALCIUM CARB/MAGNESIUM 324 MG
325 TABLET ORAL ONCE
Qty: 0 | Refills: 0 | Status: COMPLETED | OUTPATIENT
Start: 2019-02-08 | End: 2019-02-08

## 2019-02-08 RX ORDER — HEPARIN SODIUM 5000 [USP'U]/ML
5300 INJECTION INTRAVENOUS; SUBCUTANEOUS EVERY 6 HOURS
Qty: 0 | Refills: 0 | Status: DISCONTINUED | OUTPATIENT
Start: 2019-02-08 | End: 2019-02-09

## 2019-02-08 RX ORDER — ACETAMINOPHEN 500 MG
1000 TABLET ORAL ONCE
Qty: 0 | Refills: 0 | Status: COMPLETED | OUTPATIENT
Start: 2019-02-08 | End: 2019-02-08

## 2019-02-08 RX ORDER — FUROSEMIDE 40 MG
20 TABLET ORAL EVERY 12 HOURS
Qty: 0 | Refills: 0 | Status: DISCONTINUED | OUTPATIENT
Start: 2019-02-08 | End: 2019-02-08

## 2019-02-08 RX ORDER — ASPIRIN/CALCIUM CARB/MAGNESIUM 324 MG
81 TABLET ORAL DAILY
Qty: 0 | Refills: 0 | Status: DISCONTINUED | OUTPATIENT
Start: 2019-02-09 | End: 2019-02-17

## 2019-02-08 RX ORDER — HEPARIN SODIUM 5000 [USP'U]/ML
5300 INJECTION INTRAVENOUS; SUBCUTANEOUS EVERY 6 HOURS
Qty: 0 | Refills: 0 | Status: DISCONTINUED | OUTPATIENT
Start: 2019-02-08 | End: 2019-02-08

## 2019-02-08 RX ORDER — HEPARIN SODIUM 5000 [USP'U]/ML
5000 INJECTION INTRAVENOUS; SUBCUTANEOUS ONCE
Qty: 0 | Refills: 0 | Status: DISCONTINUED | OUTPATIENT
Start: 2019-02-08 | End: 2019-02-08

## 2019-02-08 RX ORDER — FUROSEMIDE 40 MG
40 TABLET ORAL DAILY
Qty: 0 | Refills: 0 | Status: DISCONTINUED | OUTPATIENT
Start: 2019-02-08 | End: 2019-02-09

## 2019-02-08 RX ORDER — FUROSEMIDE 40 MG
20 TABLET ORAL ONCE
Qty: 0 | Refills: 0 | Status: DISCONTINUED | OUTPATIENT
Start: 2019-02-08 | End: 2019-02-08

## 2019-02-08 RX ORDER — FUROSEMIDE 40 MG
40 TABLET ORAL ONCE
Qty: 0 | Refills: 0 | Status: COMPLETED | OUTPATIENT
Start: 2019-02-08 | End: 2019-02-08

## 2019-02-08 RX ORDER — TICAGRELOR 90 MG/1
90 TABLET ORAL EVERY 12 HOURS
Qty: 0 | Refills: 0 | Status: DISCONTINUED | OUTPATIENT
Start: 2019-02-09 | End: 2019-02-11

## 2019-02-08 RX ORDER — TICAGRELOR 90 MG/1
180 TABLET ORAL ONCE
Qty: 0 | Refills: 0 | Status: COMPLETED | OUTPATIENT
Start: 2019-02-08 | End: 2019-02-08

## 2019-02-08 RX ORDER — HEPARIN SODIUM 5000 [USP'U]/ML
INJECTION INTRAVENOUS; SUBCUTANEOUS
Qty: 25000 | Refills: 0 | Status: DISCONTINUED | OUTPATIENT
Start: 2019-02-08 | End: 2019-02-08

## 2019-02-08 RX ORDER — HEPARIN SODIUM 5000 [USP'U]/ML
INJECTION INTRAVENOUS; SUBCUTANEOUS
Qty: 25000 | Refills: 0 | Status: DISCONTINUED | OUTPATIENT
Start: 2019-02-08 | End: 2019-02-09

## 2019-02-08 RX ORDER — WARFARIN SODIUM 2.5 MG/1
2 TABLET ORAL ONCE
Qty: 0 | Refills: 0 | Status: COMPLETED | OUTPATIENT
Start: 2019-02-08 | End: 2019-02-08

## 2019-02-08 RX ORDER — ATORVASTATIN CALCIUM 80 MG/1
40 TABLET, FILM COATED ORAL AT BEDTIME
Qty: 0 | Refills: 0 | Status: DISCONTINUED | OUTPATIENT
Start: 2019-02-08 | End: 2019-02-09

## 2019-02-08 RX ADMIN — Medication 1000 MILLIGRAM(S): at 09:55

## 2019-02-08 RX ADMIN — Medication 650 MILLIGRAM(S): at 06:55

## 2019-02-08 RX ADMIN — ATORVASTATIN CALCIUM 40 MILLIGRAM(S): 80 TABLET, FILM COATED ORAL at 21:30

## 2019-02-08 RX ADMIN — Medication 40 MILLIGRAM(S): at 08:06

## 2019-02-08 RX ADMIN — ONDANSETRON 4 MILLIGRAM(S): 8 TABLET, FILM COATED ORAL at 00:31

## 2019-02-08 RX ADMIN — HEPARIN SODIUM 1000 UNIT(S)/HR: 5000 INJECTION INTRAVENOUS; SUBCUTANEOUS at 23:03

## 2019-02-08 RX ADMIN — Medication 650 MILLIGRAM(S): at 07:30

## 2019-02-08 RX ADMIN — Medication 650 MILLIGRAM(S): at 17:24

## 2019-02-08 RX ADMIN — Medication 40 MILLIGRAM(S): at 21:30

## 2019-02-08 RX ADMIN — Medication 325 MILLIGRAM(S): at 06:55

## 2019-02-08 RX ADMIN — Medication 400 MILLIGRAM(S): at 09:40

## 2019-02-08 RX ADMIN — Medication 40 MILLIGRAM(S): at 18:12

## 2019-02-08 RX ADMIN — TICAGRELOR 180 MILLIGRAM(S): 90 TABLET ORAL at 06:54

## 2019-02-08 RX ADMIN — WARFARIN SODIUM 2 MILLIGRAM(S): 2.5 TABLET ORAL at 17:13

## 2019-02-08 RX ADMIN — Medication 650 MILLIGRAM(S): at 01:15

## 2019-02-08 RX ADMIN — Medication 50 MILLIGRAM(S): at 17:14

## 2019-02-08 NOTE — PROGRESS NOTE ADULT - PROBLEM SELECTOR PROBLEM 5
Type 2 diabetes mellitus with hyperglycemia, without long-term current use of insulin Coronary artery disease Anemia due to blood loss

## 2019-02-08 NOTE — PROGRESS NOTE ADULT - PROBLEM SELECTOR PLAN 5
Most recent A1C unknown. Pt was previously on metformin, but has not been taking for several months.   - f/u A1C  - FS checks with low dose ISS 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 Pt not on ASA81. Troponin 44 on admission.  - c/w statin  - recheck troponin -c/w DAPT  - c/w simvastatin for now, will consider switching to high intensity statin Anemia 2/2 blood loss vs. hemoconcentration as all values are lower. Repeat cbc shows hgb stable at 9.6 making hemoconcentration more likely than GIB.   -FOBT   -Iron studies if persistent

## 2019-02-08 NOTE — PROGRESS NOTE ADULT - PROBLEM SELECTOR PLAN 3
Pt has hx of HFpEF, TTE from 5/2017 showed EF73%, normal RVSF and LVSF, moderate TR, and moderate pulm HTN. Prelim read of CXR showing mild pulm edema. pBNP elevated to 2306; Likely multifactorial etiology due to sepsis and Afib with RVR;  - will give one dose of furosemide 20mg IV now and monitor response  - repeat TTE  -daily weigth Pt has hx of HFpEF, TTE from 5/2017 showed EF73%, normal RVSF and LVSF, moderate TR, and moderate pulm HTN. Prelim read of CXR showing mild pulm edema. pBNP elevated to 2306; Likely multifactorial etiology due to sepsis and Afib with RVR;  - STAT dose 40mg lasix IV   - repeat TTE  -daily weights  -Strict Is/Os Acute on chronic HFpEF exacerbation likely 2/2 Sepsis and afib w/t RVR  TTE from 5/2017 showed EF73%, normal RVSF and LVSF, moderate TR, and moderate pulm HTN. Prelim read of CXR showing mild pulm edema. proBNP elevated to 2306  - STAT dose 40mg lasix IV and then 40mg QD  - repeat TTE  -daily weights  -Strict Is/Os  -will continue metoprolol tart 50mg bid

## 2019-02-08 NOTE — CONSULT NOTE ADULT - SUBJECTIVE AND OBJECTIVE BOX
Date of Admission: 2019    CHIEF COMPLAINT: fatigue, general malaise, and exertional SOB    HISTORY OF PRESENT ILLNESS: 78F with CAD, s/p CABG, s/p cath  with patent LIMA to LAD and SVG to OM, + of RCA, valvular heart disease, s/p bioprosthetic MVR and AVR, afib on coumadin, HTN, HLD, DM, CVA, and multiple "lung infections" per family presents with worsening fatigue, exertional SOB, and general malaise with cough and cold symptoms. Patient denies chest pain, palpitations, N/V, or dizziness. In ER patient was febrile to 103.2, cultures were sent and IV antibiotics were started with concern for sepsis of unknown origin. CXR with mild edema and trace LE edema with diffuse rhonchi and scattered crackles noted on PE, IV lasix was given this am.  hsT and CKMB this am were elevated and cardiology is consulted with concern for NSTEMI.       Allergies    No Known Allergies    Intolerances    	    MEDICATIONS:  furosemide   Injectable 20 milliGRAM(s) IV Push every 12 hours  metoprolol tartrate 50 milliGRAM(s) Oral every 12 hours        acetaminophen   Tablet .. 650 milliGRAM(s) Oral every 6 hours PRN  acetaminophen  IVPB .. 1000 milliGRAM(s) IV Intermittent once      dextrose 40% Gel 15 Gram(s) Oral once PRN  dextrose 50% Injectable 12.5 Gram(s) IV Push once  dextrose 50% Injectable 25 Gram(s) IV Push once  dextrose 50% Injectable 25 Gram(s) IV Push once  glucagon  Injectable 1 milliGRAM(s) IntraMuscular once PRN  insulin lispro (HumaLOG) corrective regimen sliding scale   SubCutaneous three times a day before meals  simvastatin 40 milliGRAM(s) Oral at bedtime    dextrose 5%. 1000 milliLiter(s) IV Continuous <Continuous>  influenza   Vaccine 0.5 milliLiter(s) IntraMuscular once      PAST MEDICAL & SURGICAL HISTORY:  Diabetes mellitus  Gout  Upper GI bleed  CHF (congestive heart failure)  Mitral Regurgitation  CVA (Cerebral Infarction):   CAD (Coronary Artery Disease)  Afib: (on Warfarin)  HTN (Hypertension)  H/O mitral valve replacement: 14  H/O aortic valve replacement: 14  S/P CABG x 1: ()  S/P angioplasty with stent:       FAMILY HISTORY:  Family history of acute myocardial infarction (Mother): mother      SOCIAL HISTORY:    Smoker  unknown  Alcohol unknown  Drugs unknown      REVIEW OF SYSTEMS:  See HPI. Otherwise, 10 point ROS done and otherwise negative.    PHYSICAL EXAM:  T(C): 39.4 (19 @ 07:40), Max: 39.6 (19 @ 16:47)  HR: 86 (19 @ 07:40) (74 - 130)  BP: 138/61 (19 @ 07:40) (111/48 - 199/41)  RR: 17 (19 @ 07:40) (17 - 24)  SpO2: 95% (19 @ 07:40) (94% - 98%)  Wt(kg): --  I&O's Summary    2019 07:01  -  2019 08:48  --------------------------------------------------------  IN: 0 mL / OUT: 400 mL / NET: -400 mL        Appearance: mild distress but skin W & D	  HEENT:   Normal oral mucosa, PERRL, EOMI	  Cardiovascular: S1S2 irreg irreg  Respiratory: diffuse rhonchi and scattered crackles  Psychiatry: awake and responsive  Gastrointestinal:  Soft, Non-tender, + BS	  Skin: No rashes, No ecchymoses, No cyanosis	  Neurologic: Non-focal  Extremities: trace B/L LE edema  Vascular: Peripheral pulses palpable 2+ bilaterally        LABS:	 	                        9.6    8.56  )-----------( 231      ( 2019 04:50 )             31.8           140  |  102  |  26<H>  ----------------------------<  90  4.4   |  23  |  1.31<H>    02    139  |  99  |  27<H>  ----------------------------<  120<H>  4.1   |  26  |  1.12    Ca    8.6      2019 04:50  Ca    9.6      2019 16:43  Phos  3.5       Mg     1.8         TPro  7.9  /  Alb  4.2  /  TBili  0.5  /  DBili  x   /  AST  21  /  ALT  10  /  AlkPhos  68        proBNP: Serum Pro-Brain Natriuretic Peptide: 2306 pg/mL ( @ 16:43)      HgA1c: Hemoglobin A1C, Whole Blood: 5.7 % ( @ 04:50)    TSH: Thyroid Stimulating Hormone, Serum: 0.59 uIU/mL ( @ 04:50)      CARDIAC MARKERS:  Troponin T, High Sensitivity: 628 ng/L (19 @ 04:50)  Troponin T, High Sensitivity: 44 ng/L (19 @ 16:43)    Creatine Kinase, Serum: 364 u/L ( @ 04:50)    CKMB: 21.97 ng/mL ( @ 04:50)    CKMB Relative Index: 6.0 ( @ 04:50)    TELEMETRY: 	afib with VR 90s      ECG:  afib with ? IVCD  	    PREVIOUS DIAGNOSTIC TESTING:    [ ] Echocardiogram:    Patient name: CATRACHITO WOODS  YOB: 1940   Age: 76 (F)   MR#: [id number]  Study Date: 5/15/2017  Location: [place] [address] StressSonographer: Idania Mcneill  Study quality: Technically good  Referring Physician: Tomas Cochran MD  Blood Pressure: 128/50 mmHg  Height: 162 cm  Weight: 80 kg  BSA: 1.9 m2  ------------------------------------------------------------------------  PROCEDURE: Transthoracic echocardiogram with 2-D, M-Mode  and complete spectral and color flow Doppler.  INDICATION: Dyspnea, unspecified (R06.00)  ------------------------------------------------------------------------  DIMENSIONS:  Dimensions:     Normal Values:  LA:     4.0 cm    2.0 - 4.0 cm  Ao:     2.8 cm    2.0 - 3.8 cm  SEPTUM: 0.7 cm    0.6 - 1.2 cm  PWT:    0.8 cm    0.6 - 1.1 cm  LVIDd:  4.5 cm    3.0 - 5.6 cm  LVIDs:  2.6 cm    1.8 - 4.0 cm  Derived Variables:  LVMI: 56 g/m2  RWT: 0.35  Fractional short: 42 %  Ejection Fraction (Teicholtz): 73 %  ------------------------------------------------------------------------  OBSERVATIONS:  Mitral Valve: Bioprosthetic mitral valve replacement. No  mitral valve regurgitation seen. Mean transmitral valve  gradient equals 5 mm Hg, which is probably normal in the  setting of a prosthetic valve.  Aortic Root: Normal aortic root.  Aortic Valve: Bioprosthetic aortic valve replacement. Peak  transaortic valve gradient equals 50 mm Hg, mean  transaortic valve gradient equals 22 mm Hg, which is  elevated even in the presence of a prosthetic valve. No  aortic valve regurgitation seen.  Left Atrium: Moderately dilated left atrium.  LA volume  index = 46 cc/m2.  Left Ventricle: Normal left ventricular systolic function.  No segmental wall motion abnormalities. Paradoxical septal  wall motion consistent with postoperative state. Normal  left ventricular internal dimensions and wall thicknesses.  Right Heart: Mild right atrial enlargement. Normal right  ventricular size and function. Normal tricuspid valve.  Moderate tricuspid regurgitation. Normal pulmonic valve.  Pericardium/PleuraNormal pericardium with no pericardial  effusion.  Hemodynamic: Estimated right ventricular systolic pressure  equals 56 mm Hg, assuming right atrial pressure equals 10  mm Hg, consistent with moderate pulmonary hypertension.  ------------------------------------------------------------------------  CONCLUSIONS:  1. Bioprosthetic mitral valve replacement. No mitral valve  regurgitation seen. Mean transmitral valve gradient equals  5 mm Hg, which is probably normal inthe setting of a  prosthetic valve.  2. Bioprosthetic aortic valve replacement. Peak transaortic  valve gradient equals 50 mm Hg, mean transaortic valve  gradient equals 22 mm Hg, which is elevated even in the  presence of a prosthetic valve. No aortic valve  regurgitation seen.  3. Moderately dilated left atrium.  LA volume index = 46  cc/m2.  4. Normal left ventricular internal dimensions and wall  thicknesses.  5. Normal left ventricular systolic function. No segmental  wall motion abnormalities. Paradoxical septal wall motion  consistent with postoperative state.  6. Mild right atrial enlargement.  7. Normal right ventricular size and function.  8. Normal tricuspid valve.  Moderate tricuspid  regurgitation.  9. Estimated pulmonary artery systolic pressure equals 56  mm Hg, assuming right atrial pressure equals 10  mm Hg,  consistent with moderate pulmonary hypertension.  *** Compared with echocardiogram of 2/10/2017, no  significant changes noted.  ------------------------------------------------------------------------  Confirmed on  5/15/2017 - 11:31:39 by Josh Berry M.D. RPVI  ------------------------------------------------------------------------      [ ] Stress Test:  	    PATIENT: CATRACHITO WOODS  : 1940   AGE: 76 (F)   MR#: [id number]  STUDY DATE: 05/15/2017  LOCATION: [place] [address]  REF. PHYSICIAN(S): Tomas Cochran MD  FELLOW: Henry Schoen. [facility]  ------------------------------------------------------------------------  TYPE OF TEST: Stress Pharmacologic  INDICATION: Dyspnea, unspecified (R06.00)  ------------------------------------------------------------------------  HISTORY:  CARDIAC HISTORY: 76 year old female presents with c/o  dyspnea.  History of CAD, CABG, AFIB, CHF, AVR, MVR, HTN, DM.  RISK FACTORS: Diabetes, Hypertension, Post Menopausal  MEDICATIONS: Celebrex, cardizem, enalapril, lasix IV,  hydralizine, humalog, zocor, atrovent.  ------------------------------------------------------------------------  BASELINE ELECTROCARDIOGRAM:  Rhythm: Atrial Fibrillation - 75 BPM  Conduction Defect: LBBB  ST: Nonspecific ST-T wave abnormality.  ------------------------------------------------------------------------  HEMODYNAMIC PARAMETERS:                              HR      BP  Baseline  Pre-Injection             75  134/56  00:00     Inject Regadenoson        68  00:30     Post Injection            75  01:00     Post Injection            76  02:00     Post Injection            84  166/60  03:00     Post Injection            82  04:00     Post Injection            75  114/82  05:00     Recovery                  82  06:00     Recovery                  78  07:00     Recovery                  85  155/54  08:00     Recovery      75  ------------------------------------------------------------------------  Agent: Regadenoson 0.4 mg/5 ml NS. injected over 10 sec.  Aminophylline: 75 mg  HR: Baseline HR: 75 bpm   Peak HR: 85 bpm (59% of MPHR)  MPHR: 143 bpm   85% of MPHR: 122 bpm  BP: Baseline BP: 134/56 mmHg   Peak BP: 166/60 mmHg   Peak  RPP: [id number] (Rate Pressure Product)  Last Caffeine intake: 12 hrs  Terminated: Completion of protocol  ------------------------------------------------------------------------  SYMPTOMS/FINDINGS:  Symptoms: Nausea  Chest pain: No chest pain with administration of  Regadenoson  ------------------------------------------------------------------------  ECG ABNORMALITIES DURING/AFTER STRESS:   Abnormalities: ECG changes could not be interpreted due  to bundle branch block.  ------------------------------------------------------------------------  NEW ARRHYTHMIAS DEVELOPED DURING/AFTER STRESS:  None  ------------------------------------------------------------------------  STRESS TEST IMPRESSIONS:  Chest Pain: No chest pain with administration of  Regadenoson.  Symptom: Nausea.  HR Response: Appropriate.  BP Response: Appropriate.  Heart Rhythm: Atrial Fibrillation - 75 BPM.  Conduction defects: LBBB.  Baseline ECG: NonspecificST-T wave abnormality.  ECG Abnormalities: ECG changes could not be interpreted  due to bundle branch block.  Arrhythmia: None.  ------------------------------------------------------------------------  PROCEDURE:  7.85 mCi of Tc 99m Tetrofosmin wereinjected during stress  protocol. Approximately 45 minutes later, tomographic  images were obtained in a 180 degree arc from right  anterior oblique to left anterior oblique with 64 stops.  The tomographic slices were reconstructed in 3 orthogonal  planes (short axis, horizontal long axis and vertical long  axis).  Interpretation was performed both by visual and  quantitative analysis.  Stress images were acquired using CZT-based system with  pinhole collimation ([employer], [employer] X),  and reconstructed using MLEM algorithm. Images were  re-acquired with the patient in a prone position.  ------------------------------------------------------------------------  NUCLEAR FINDINGS:  Review of raw data shows: The study is of good technical  quality.  The left ventricle was normal in size. Normal myocardial  perfusion scan,with no evidence of infarction or inducible  ischemia.  ------------------------------------------------------------------------  GATED ANALYSIS:  Post-stress gated wall motion analysis was performed (LVEF  = 64 %;LVEDV = 82 ml.), revealing normal LV function.  There was paradoxical motion of the septum (post CABG). RV  function appeared normal.  ------------------------------------------------------------------------  IMPRESSIONS:Normal Study  * Myocardial Perfusion SPECT results are normal.  * Normal myocardial perfusion scan,with no evidence of  infarction or inducible ischemia.  * Post-stress gated wall motion analysis was performed  (LVEF = 64 %;LVEDV = 82 ml.), revealing normal LV  function. There was paradoxical motion of the septum (post  CABG). RV function appeared normal.  ------------------------------------------------------------------------  Confirmed on  5/15/2017 - 14:02:13 by Ramin Cardenas M.D.  ------------------------------------------------------------------------ Date of Admission: 2019    CHIEF COMPLAINT: fatigue, general malaise, and exertional SOB    HISTORY OF PRESENT ILLNESS: 78F with CAD, s/p CABG, s/p cath  with patent LIMA to LAD and SVG to OM, + of RCA, valvular heart disease, s/p bioprosthetic MVR and AVR, afib on coumadin, HTN, HLD, DM, CVA, and multiple "lung infections" per family presents with worsening fatigue, exertional SOB, and general malaise with cough and cold symptoms. Patient denies chest pain, palpitations, N/V, or dizziness. In ER patient was febrile to 103.2, cultures were sent and IV antibiotics were started with concern for sepsis of unknown origin. Patient continues to be febrile.  CXR with mild edema and trace LE edema with diffuse rhonchi and scattered crackles noted on PE, IV lasix was given this am.  hsT and CKMB this am were elevated and cardiology is consulted with concern for NSTEMI.       Allergies    No Known Allergies    Intolerances    	    MEDICATIONS:  furosemide   Injectable 20 milliGRAM(s) IV Push every 12 hours  metoprolol tartrate 50 milliGRAM(s) Oral every 12 hours        acetaminophen   Tablet .. 650 milliGRAM(s) Oral every 6 hours PRN  acetaminophen  IVPB .. 1000 milliGRAM(s) IV Intermittent once      dextrose 40% Gel 15 Gram(s) Oral once PRN  dextrose 50% Injectable 12.5 Gram(s) IV Push once  dextrose 50% Injectable 25 Gram(s) IV Push once  dextrose 50% Injectable 25 Gram(s) IV Push once  glucagon  Injectable 1 milliGRAM(s) IntraMuscular once PRN  insulin lispro (HumaLOG) corrective regimen sliding scale   SubCutaneous three times a day before meals  simvastatin 40 milliGRAM(s) Oral at bedtime    dextrose 5%. 1000 milliLiter(s) IV Continuous <Continuous>  influenza   Vaccine 0.5 milliLiter(s) IntraMuscular once      PAST MEDICAL & SURGICAL HISTORY:  Diabetes mellitus  Gout  Upper GI bleed  CHF (congestive heart failure)  Mitral Regurgitation  CVA (Cerebral Infarction):   CAD (Coronary Artery Disease)  Afib: (on Warfarin)  HTN (Hypertension)  H/O mitral valve replacement: 14  H/O aortic valve replacement: 14  S/P CABG x 1: ()  S/P angioplasty with stent:       FAMILY HISTORY:  Family history of acute myocardial infarction (Mother): mother      SOCIAL HISTORY:    Smoker  unknown  Alcohol unknown  Drugs unknown      REVIEW OF SYSTEMS:  See HPI. Otherwise, 10 point ROS done and otherwise negative.    PHYSICAL EXAM:  T(C): 39.4 (19 @ 07:40), Max: 39.6 (19 @ 16:47)  HR: 86 (19 @ 07:40) (74 - 130)  BP: 138/61 (19 @ 07:40) (111/48 - 199/41)  RR: 17 (19 @ 07:40) (17 - 24)  SpO2: 95% (19 @ 07:40) (94% - 98%)  Wt(kg): --  I&O's Summary    2019 07:01  -  2019 08:48  --------------------------------------------------------  IN: 0 mL / OUT: 400 mL / NET: -400 mL        Appearance: mild distress but skin W & D	  HEENT:   Normal oral mucosa, PERRL, EOMI	  Cardiovascular: S1S2 irreg irreg  Respiratory: diffuse rhonchi and scattered crackles  Psychiatry: awake and responsive  Gastrointestinal:  Soft, Non-tender, + BS	  Skin: No rashes, No ecchymoses, No cyanosis	  Neurologic: Non-focal  Extremities: trace B/L LE edema  Vascular: Peripheral pulses palpable 2+ bilaterally        LABS:	 	                        9.6    8.56  )-----------( 231      ( 2019 04:50 )             31.8           140  |  102  |  26<H>  ----------------------------<  90  4.4   |  23  |  1.31<H>    02    139  |  99  |  27<H>  ----------------------------<  120<H>  4.1   |  26  |  1.12    Ca    8.6      2019 04:50  Ca    9.6      2019 16:43  Phos  3.5       Mg     1.8         TPro  7.9  /  Alb  4.2  /  TBili  0.5  /  DBili  x   /  AST  21  /  ALT  10  /  AlkPhos  68        proBNP: Serum Pro-Brain Natriuretic Peptide: 2306 pg/mL ( @ 16:43)      HgA1c: Hemoglobin A1C, Whole Blood: 5.7 % ( @ 04:50)    TSH: Thyroid Stimulating Hormone, Serum: 0.59 uIU/mL ( @ 04:50)      CARDIAC MARKERS:  Troponin T, High Sensitivity: 628 ng/L (19 @ 04:50)  Troponin T, High Sensitivity: 44 ng/L (19 @ 16:43)    Creatine Kinase, Serum: 364 u/L ( @ 04:50)    CKMB: 21.97 ng/mL ( @ 04:50)    CKMB Relative Index: 6.0 ( @ 04:50)    TELEMETRY: 	afib with VR 90s      ECG:  afib with ? IVCD  	    PREVIOUS DIAGNOSTIC TESTING:    [ ] Echocardiogram:    Patient name: CATRACHITO WOODS  YOB: 1940   Age: 76 (F)   MR#: [id number]  Study Date: 5/15/2017  Location: [place] [address] StressSonographer: Idania Mcneill  Study quality: Technically good  Referring Physician: Tomas Cochran MD  Blood Pressure: 128/50 mmHg  Height: 162 cm  Weight: 80 kg  BSA: 1.9 m2  ------------------------------------------------------------------------  PROCEDURE: Transthoracic echocardiogram with 2-D, M-Mode  and complete spectral and color flow Doppler.  INDICATION: Dyspnea, unspecified (R06.00)  ------------------------------------------------------------------------  DIMENSIONS:  Dimensions:     Normal Values:  LA:     4.0 cm    2.0 - 4.0 cm  Ao:     2.8 cm    2.0 - 3.8 cm  SEPTUM: 0.7 cm    0.6 - 1.2 cm  PWT:    0.8 cm    0.6 - 1.1 cm  LVIDd:  4.5 cm    3.0 - 5.6 cm  LVIDs:  2.6 cm    1.8 - 4.0 cm  Derived Variables:  LVMI: 56 g/m2  RWT: 0.35  Fractional short: 42 %  Ejection Fraction (Teicholtz): 73 %  ------------------------------------------------------------------------  OBSERVATIONS:  Mitral Valve: Bioprosthetic mitral valve replacement. No  mitral valve regurgitation seen. Mean transmitral valve  gradient equals 5 mm Hg, which is probably normal in the  setting of a prosthetic valve.  Aortic Root: Normal aortic root.  Aortic Valve: Bioprosthetic aortic valve replacement. Peak  transaortic valve gradient equals 50 mm Hg, mean  transaortic valve gradient equals 22 mm Hg, which is  elevated even in the presence of a prosthetic valve. No  aortic valve regurgitation seen.  Left Atrium: Moderately dilated left atrium.  LA volume  index = 46 cc/m2.  Left Ventricle: Normal left ventricular systolic function.  No segmental wall motion abnormalities. Paradoxical septal  wall motion consistent with postoperative state. Normal  left ventricular internal dimensions and wall thicknesses.  Right Heart: Mild right atrial enlargement. Normal right  ventricular size and function. Normal tricuspid valve.  Moderate tricuspid regurgitation. Normal pulmonic valve.  Pericardium/PleuraNormal pericardium with no pericardial  effusion.  Hemodynamic: Estimated right ventricular systolic pressure  equals 56 mm Hg, assuming right atrial pressure equals 10  mm Hg, consistent with moderate pulmonary hypertension.  ------------------------------------------------------------------------  CONCLUSIONS:  1. Bioprosthetic mitral valve replacement. No mitral valve  regurgitation seen. Mean transmitral valve gradient equals  5 mm Hg, which is probably normal inthe setting of a  prosthetic valve.  2. Bioprosthetic aortic valve replacement. Peak transaortic  valve gradient equals 50 mm Hg, mean transaortic valve  gradient equals 22 mm Hg, which is elevated even in the  presence of a prosthetic valve. No aortic valve  regurgitation seen.  3. Moderately dilated left atrium.  LA volume index = 46  cc/m2.  4. Normal left ventricular internal dimensions and wall  thicknesses.  5. Normal left ventricular systolic function. No segmental  wall motion abnormalities. Paradoxical septal wall motion  consistent with postoperative state.  6. Mild right atrial enlargement.  7. Normal right ventricular size and function.  8. Normal tricuspid valve.  Moderate tricuspid  regurgitation.  9. Estimated pulmonary artery systolic pressure equals 56  mm Hg, assuming right atrial pressure equals 10  mm Hg,  consistent with moderate pulmonary hypertension.  *** Compared with echocardiogram of 2/10/2017, no  significant changes noted.  ------------------------------------------------------------------------  Confirmed on  5/15/2017 - 11:31:39 by Josh Berry M.D. RPVI  ------------------------------------------------------------------------      [ ] Stress Test:  	    PATIENT: CATRACHITO WOODS  : 1940   AGE: 76 (F)   MR#: [id number]  STUDY DATE: 05/15/2017  LOCATION: [place] [address]  REF. PHYSICIAN(S): Tomas Cochran MD  FELLOW: Henry Schoen. [facility]  ------------------------------------------------------------------------  TYPE OF TEST: Stress Pharmacologic  INDICATION: Dyspnea, unspecified (R06.00)  ------------------------------------------------------------------------  HISTORY:  CARDIAC HISTORY: 76 year old female presents with c/o  dyspnea.  History of CAD, CABG, AFIB, CHF, AVR, MVR, HTN, DM.  RISK FACTORS: Diabetes, Hypertension, Post Menopausal  MEDICATIONS: Celebrex, cardizem, enalapril, lasix IV,  hydralizine, humalog, zocor, atrovent.  ------------------------------------------------------------------------  BASELINE ELECTROCARDIOGRAM:  Rhythm: Atrial Fibrillation - 75 BPM  Conduction Defect: LBBB  ST: Nonspecific ST-T wave abnormality.  ------------------------------------------------------------------------  HEMODYNAMIC PARAMETERS:                              HR      BP  Baseline  Pre-Injection             75  134/56  00:00     Inject Regadenoson        68  00:30     Post Injection            75  01:00     Post Injection            76  02:00     Post Injection            84  166/60  03:00     Post Injection            82  04:00     Post Injection            75  114/82  05:00     Recovery                  82  06:00     Recovery                  78  07:00     Recovery                  85  155/54  08:00     Recovery      75  ------------------------------------------------------------------------  Agent: Regadenoson 0.4 mg/5 ml NS. injected over 10 sec.  Aminophylline: 75 mg  HR: Baseline HR: 75 bpm   Peak HR: 85 bpm (59% of MPHR)  MPHR: 143 bpm   85% of MPHR: 122 bpm  BP: Baseline BP: 134/56 mmHg   Peak BP: 166/60 mmHg   Peak  RPP: [id number] (Rate Pressure Product)  Last Caffeine intake: 12 hrs  Terminated: Completion of protocol  ------------------------------------------------------------------------  SYMPTOMS/FINDINGS:  Symptoms: Nausea  Chest pain: No chest pain with administration of  Regadenoson  ------------------------------------------------------------------------  ECG ABNORMALITIES DURING/AFTER STRESS:   Abnormalities: ECG changes could not be interpreted due  to bundle branch block.  ------------------------------------------------------------------------  NEW ARRHYTHMIAS DEVELOPED DURING/AFTER STRESS:  None  ------------------------------------------------------------------------  STRESS TEST IMPRESSIONS:  Chest Pain: No chest pain with administration of  Regadenoson.  Symptom: Nausea.  HR Response: Appropriate.  BP Response: Appropriate.  Heart Rhythm: Atrial Fibrillation - 75 BPM.  Conduction defects: LBBB.  Baseline ECG: NonspecificST-T wave abnormality.  ECG Abnormalities: ECG changes could not be interpreted  due to bundle branch block.  Arrhythmia: None.  ------------------------------------------------------------------------  PROCEDURE:  7.85 mCi of Tc 99m Tetrofosmin wereinjected during stress  protocol. Approximately 45 minutes later, tomographic  images were obtained in a 180 degree arc from right  anterior oblique to left anterior oblique with 64 stops.  The tomographic slices were reconstructed in 3 orthogonal  planes (short axis, horizontal long axis and vertical long  axis).  Interpretation was performed both by visual and  quantitative analysis.  Stress images were acquired using CZT-based system with  pinhole collimation ([facility], [employer]),  and reconstructed using MLEM algorithm. Images were  re-acquired with the patient in a prone position.  ------------------------------------------------------------------------  NUCLEAR FINDINGS:  Review of raw data shows: The study is of good technical  quality.  The left ventricle was normal in size. Normal myocardial  perfusion scan,with no evidence of infarction or inducible  ischemia.  ------------------------------------------------------------------------  GATED ANALYSIS:  Post-stress gated wall motion analysis was performed (LVEF  = 64 %;LVEDV = 82 ml.), revealing normal LV function.  There was paradoxical motion of the septum (post CABG). RV  function appeared normal.  ------------------------------------------------------------------------  IMPRESSIONS:Normal Study  * Myocardial Perfusion SPECT results are normal.  * Normal myocardial perfusion scan,with no evidence of  infarction or inducible ischemia.  * Post-stress gated wall motion analysis was performed  (LVEF = 64 %;LVEDV = 82 ml.), revealing normal LV  function. There was paradoxical motion of the septum (post  CABG). RV function appeared normal.  ------------------------------------------------------------------------  Confirmed on  5/15/2017 - 14:02:13 by Ramin Cardenas M.D.  ------------------------------------------------------------------------

## 2019-02-08 NOTE — PROGRESS NOTE ADULT - PROBLEM SELECTOR PLAN 2
[id number] risk score 7  Pt takes metoprolol XR for rate control and is on coumadin 3mg QD.  INR on admission supratherapeutic to 3.10;  - will hold tonight's dose of coumadin and recheck INR in AM  - c/w metoprolol 50mg BID in setting of sepsis  - cont to monitor on telemetry NSTEMI 2/2 type demand ischemia due to sepsis and/or CHF exacerbation  hst Trops 44 -->628  CKMB 21.97    -cards consult  -DAPT load w/t ASA and brillinta  -Holding on AC pending INR and cards recs  -STAT EKG  -Repeat Echo NSTEMI 2/2 type demand ischemia due to sepsis and/or CHF exacerbation  hst Trops 44 -->628  CKMB 21.97    -cards consult  -DAPT load w/t ASA and brillinta  -Holding heparin given INR 2.82  -STAT EKG  -Repeat TTE

## 2019-02-08 NOTE — CONSULT NOTE ADULT - ATTENDING COMMENTS
Patient seen and examined.    Case reviewed with team.  Continue with supportive management for presumed underlying sepsis.  Diurese prn.  At this point, she is off NC O2, and is laying flat in bed.  However, her pulmonary exam suggests volume overload.  Will follow closely with you.

## 2019-02-08 NOTE — PROGRESS NOTE ADULT - PROBLEM SELECTOR PLAN 6
holding home hydralazine and enalapril in setting of active infection, add back as clinically indicated Most recent A1C unknown. Pt was previously on metformin, but has not been taking for several months.   - f/u A1C  - FS checks with low dose ISS Most recent A1C unknown. Pt was previously on metformin, but has not been taking for several months.   - f/u A1C  - FS checks with KRYSTYNA -c/w DAPT  - c/w simvastatin for now, will consider switching to high intensity statin

## 2019-02-08 NOTE — PROVIDER CONTACT NOTE (CRITICAL VALUE NOTIFICATION) - ACTION/TREATMENT ORDERED:
Bedside EKG completed, Stat CBC ordered, Cardiology at bedside to assess patient. Bedside EKG completed, Stat CBC ordered. Brilanta and Aspirin to be administered. Cardiology at bedside to assess patient.

## 2019-02-08 NOTE — PROGRESS NOTE ADULT - PROBLEM SELECTOR PLAN 1
Pt meets sepsis criteria with fever to 103.2, tachycardia, tachypnea, WBC count of 13.9, and elevated lactate. Suspect pneumonia as pt has SOB, coughing and febrile. Cannot r/o PNA masked by pulm edema; Received azithromycin and ctx in ED.  - will c/w levofloxacin renally dosed Q48H  - f/u UA, urine and blood cultures, urine legionella  - f/u CT scan  - tylenol PRN fever  - vital signs Q4H  - RVP negative + severe sepsis criteria: fever to 103.2, tachycardia, tachypnea, WBC count of 13.9, and elevated lactate. CXR significant pulmonary edema, RVP neg. Received azithromycin and ctx in ED.  - now on levofloxacin renally dosed Q48H  - STAT UA,   -f/u blood cultures, urine legionella  - f/u CT scan  - tylenol PRN fever  - vital signs Q4H  -trend fever and wbc + severe sepsis criteria: fever to 103.2, tachycardia, tachypnea, WBC count of 13.9, and elevated lactate. CXR significant pulmonary edema, but no focal consolidation, RVP neg. Received azithromycin and ctx in ED.  - now on levofloxacin renally dosed Q48H  - STAT UA  -f/u blood cultures, urine legionella  - f/u CT scan  - tylenol PRN fever  - vital signs Q4H  -trend fever and wbc qd

## 2019-02-08 NOTE — PROGRESS NOTE ADULT - SUBJECTIVE AND OBJECTIVE BOX
Dr. Larry Pike   Internal Medicine PGY-1  Pager #[phone]    Patient is a 78y old  Female who presents with a chief complaint of Sepsis (07 Feb 2019 22:12)      SUBJECTIVE / OVERNIGHT EVENTS:  Positive hst Troponins    MEDICATIONS  (STANDING):  dextrose 5%. 1000 milliLiter(s) (50 mL/Hr) IV Continuous <Continuous>  dextrose 50% Injectable 12.5 Gram(s) IV Push once  dextrose 50% Injectable 25 Gram(s) IV Push once  dextrose 50% Injectable 25 Gram(s) IV Push once  furosemide   Injectable 20 milliGRAM(s) IV Push every 12 hours  influenza   Vaccine 0.5 milliLiter(s) IntraMuscular once  insulin lispro (HumaLOG) corrective regimen sliding scale   SubCutaneous three times a day before meals  metoprolol tartrate 50 milliGRAM(s) Oral every 12 hours  simvastatin 40 milliGRAM(s) Oral at bedtime    MEDICATIONS  (PRN):  acetaminophen   Tablet .. 650 milliGRAM(s) Oral every 6 hours PRN Temp greater or equal to 38C (100.4F)  dextrose 40% Gel 15 Gram(s) Oral once PRN Blood Glucose LESS THAN 70 milliGRAM(s)/deciliter  glucagon  Injectable 1 milliGRAM(s) IntraMuscular once PRN Glucose LESS THAN 70 milligrams/deciliter      Vital Signs Last 24 Hrs  T(C): 37.9 (08 Feb 2019 03:40), Max: 39.6 (07 Feb 2019 16:47)  T(F): 100.2 (08 Feb 2019 03:40), Max: 103.2 (07 Feb 2019 16:47)  HR: 74 (08 Feb 2019 03:40) (74 - 130)  BP: 111/48 (08 Feb 2019 03:40) (111/48 - 199/41)  BP(mean): --  RR: 18 (08 Feb 2019 03:40) (18 - 24)  SpO2: 95% (08 Feb 2019 03:40) (94% - 98%)  CAPILLARY BLOOD GLUCOSE        I&O's Summary      PHYSICAL EXAM:  GENERAL: NAD, well-developed  HEAD:  Atraumatic, Normocephalic  EYES: EOMI, PERRLA, conjunctiva and sclera clear  NECK: Supple, No JVD  CHEST/LUNG: Clear to auscultation bilaterally; No wheeze  HEART: Regular rate and rhythm; No murmurs, rubs, or gallops  ABDOMEN: Soft, Nontender, Nondistended; Bowel sounds present  EXTREMITIES:  2+ Peripheral Pulses, No clubbing, cyanosis, or edema  PSYCH: AAOx3  NEUROLOGY: non-focal  SKIN: No rashes or lesions    LABS:                        9.6    8.56  )-----------( 231      ( 08 Feb 2019 04:50 )             31.8     02-08    140  |  102  |  26<H>  ----------------------------<  90  4.4   |  23  |  1.31<H>    Ca    8.6      08 Feb 2019 04:50  Phos  3.5     02-08  Mg     1.8     02-08    TPro  7.9  /  Alb  4.2  /  TBili  0.5  /  DBili  x   /  AST  21  /  ALT  10  /  AlkPhos  68  02-07    PT/INR - ( 08 Feb 2019 04:50 )   PT: 32.3 SEC;   INR: 2.82          PTT - ( 07 Feb 2019 16:43 )  PTT:37.1 SEC          RADIOLOGY & ADDITIONAL TESTS:    Imaging Personally Reviewed:    Consultant(s) Notes Reviewed:      Care Discussed with Consultants/Other Providers: Dr. Larry Pike   Internal Medicine PGY-1  Pager #[phone]    Patient is a 78y old  Female who presents with a chief complaint of Sepsis (07 Feb 2019 22:12)      SUBJECTIVE / OVERNIGHT EVENTS:  Positive hst Troponins 44--> 628. EKG unchanged since admission. [place] down could not compare to priors. Per patient using  phone, SOB started 3 days prior and she also had 1 episode of acute N/V and diaphoresis. She denies CP, but also notes that she never had CP with her past MIs. No pain radiating to the neck, arm, or back. Notes that her leg swelling appears the same as usual and they always hurt when pressed on. She notes HERNANDEZ going to the restroom after only a few feet. No LOC or palpitations. Denies subjective fevers, sweats, or chills. No diarrhea.     At baseline, she develops HERNANDEZ going up 1 flight of stairs and has difficulty ambulating 1 block on flat ground. No orthopnea or PND.    Tele: Highest rate of afib on tele went to 130s.     MEDICATIONS  (STANDING):  dextrose 5%. 1000 milliLiter(s) (50 mL/Hr) IV Continuous <Continuous>  dextrose 50% Injectable 12.5 Gram(s) IV Push once  dextrose 50% Injectable 25 Gram(s) IV Push once  dextrose 50% Injectable 25 Gram(s) IV Push once  furosemide   Injectable 20 milliGRAM(s) IV Push every 12 hours  influenza   Vaccine 0.5 milliLiter(s) IntraMuscular once  insulin lispro (HumaLOG) corrective regimen sliding scale   SubCutaneous three times a day before meals  metoprolol tartrate 50 milliGRAM(s) Oral every 12 hours  simvastatin 40 milliGRAM(s) Oral at bedtime    MEDICATIONS  (PRN):  acetaminophen   Tablet .. 650 milliGRAM(s) Oral every 6 hours PRN Temp greater or equal to 38C (100.4F)  dextrose 40% Gel 15 Gram(s) Oral once PRN Blood Glucose LESS THAN 70 milliGRAM(s)/deciliter  glucagon  Injectable 1 milliGRAM(s) IntraMuscular once PRN Glucose LESS THAN 70 milligrams/deciliter      Vital Signs Last 24 Hrs  T(C): 37.9 (08 Feb 2019 03:40), Max: 39.6 (07 Feb 2019 16:47)  T(F): 100.2 (08 Feb 2019 03:40), Max: 103.2 (07 Feb 2019 16:47)  HR: 74 (08 Feb 2019 03:40) (74 - 130)  BP: 111/48 (08 Feb 2019 03:40) (111/48 - 199/41)  BP(mean): --  RR: 18 (08 Feb 2019 03:40) (18 - 24)  SpO2: 95% (08 Feb 2019 03:40) (94% - 98%)  CAPILLARY BLOOD GLUCOSE        I&O's Summary      PHYSICAL EXAM:  GENERAL: In acute distress  HEAD:  Atraumatic, Normocephalic  EYES: EOMI, PERRLA, conjunctiva and sclera clear  NECK: Supple, +JVD  CHEST/LUNG: Diffuse crackles bilaterally.   HEART: Irregularly irrgeular rate and rhythm; No murmurs, rubs, or gallops appreciated, however, course crackles limited exam.   ABDOMEN: Soft, Nontender, Nondistended; Bowel sounds present  EXTREMITIES:  2+ Peripheral Pulses, 1-2+ edema of bl LE with mild tenderness diffusely throughout bilateral calves.   PSYCH: AAOx3  NEUROLOGY: non-focal  SKIN: No rashes or lesions    LABS:                        9.6    8.56  )-----------( 231      ( 08 Feb 2019 04:50 )             31.8     02-08    140  |  102  |  26<H>  ----------------------------<  90  4.4   |  23  |  1.31<H>    Ca    8.6      08 Feb 2019 04:50  Phos  3.5     02-08  Mg     1.8     02-08    TPro  7.9  /  Alb  4.2  /  TBili  0.5  /  DBili  x   /  AST  21  /  ALT  10  /  AlkPhos  68  02-07    PT/INR - ( 08 Feb 2019 04:50 )   PT: 32.3 SEC;   INR: 2.82          PTT - ( 07 Feb 2019 16:43 )  PTT:37.1 SEC    CARDIAC MARKERS ( 08 Feb 2019 04:50 )  x     / x     / 364 u/L / 21.97 ng/mL / x            RADIOLOGY & ADDITIONAL TESTS:  < from: Transthoracic Echocardiogram (05.15.17 @ 07:14) >  Ejection Fraction (Teicholtz): 73 %  ------------------------------------------------------------------------  OBSERVATIONS:  Mitral Valve: Bioprosthetic mitral valve replacement. No  mitral valve regurgitation seen. Mean transmitral valve  gradient equals 5 mm Hg, which is probably normal in the  setting of a prosthetic valve.  Aortic Root: Normal aortic root.  Aortic Valve: Bioprosthetic aortic valve replacement. Peak  transaortic valve gradient equals 50 mm Hg, mean  transaortic valve gradient equals 22 mm Hg, which is  elevated even in the presence of a prosthetic valve. No  aortic valve regurgitation seen.  Left Atrium: Moderately dilated left atrium.  LA volume  index = 46 cc/m2.  Left Ventricle: Normal left ventricular systolic function.  No segmental wall motion abnormalities. Paradoxical septal  wall motion consistent with postoperative state. Normal  left ventricular internal dimensions and wall thicknesses.  Right Heart: Mild right atrial enlargement. Normal right  ventricular size and function. Normal tricuspid valve.  Moderate tricuspid regurgitation. Normal pulmonic valve.  Pericardium/PleuraNormal pericardium with no pericardial  effusion.  Hemodynamic: Estimated right ventricular systolic pressure  equals 56 mm Hg, assuming right atrial pressure equals 10  mm Hg, consistent with moderate pulmonary hypertension.  ------------------------------------------------------------------------  CONCLUSIONS:  1. Bioprosthetic mitral valve replacement. No mitral valve  regurgitation seen. Mean transmitral valve gradient equals  5 mm Hg, which is probably normal inthe setting of a  prosthetic valve.  2. Bioprosthetic aortic valve replacement. Peak transaortic  valve gradient equals 50 mm Hg, mean transaortic valve  gradient equals 22 mm Hg, which is elevated even in the  presence of a prosthetic valve. No aortic valve  regurgitation seen.  3. Moderately dilated left atrium.  LA volume index = 46  cc/m2.  4. Normal left ventricular internal dimensions and wall  thicknesses.  5. Normal left ventricular systolic function. No segmental  wall motion abnormalities. Paradoxical septal wall motion  consistent with postoperative state.  6. Mild right atrial enlargement.  7. Normal right ventricular size and function.  8. Normal tricuspid valve.  Moderate tricuspid  regurgitation.  9. Estimated pulmonary artery systolic pressure equals 56  mm Hg, assuming right atrial pressure equals 10  mm Hg,  consistent with moderate pulmonary hypertension.  *** Compared with echocardiogram of 2/10/2017, no  significant changes noted.    < end of copied text > Dr. Larry Pike   Internal Medicine PGY-1  Pager #[phone]    Patient is a 78y old  Female who presents with a chief complaint of Sepsis (07 Feb 2019 22:12)      SUBJECTIVE / OVERNIGHT EVENTS:  Positive hst Troponins 44--> 628. EKG unchanged since admission. [place] down could not compare to priors. Per patient using  phone, SOB started 3 days prior and she also had 1 episode of acute N/V and diaphoresis. She denies CP, but also notes that she never had CP with her past MIs. No pain radiating to the neck, arm, or back. Notes that her leg swelling appears the same as usual and they always hurt when pressed on. She notes HERNANDEZ going to the restroom after only a few feet. No LOC or palpitations. Denies subjective fevers, sweats, or chills. No diarrhea.     At baseline, she develops HERNANDEZ going up 1 flight of stairs and has difficulty ambulating 1 block on flat ground. No orthopnea or PND.    Tele: Highest rate of afib on tele went to 130s.     MEDICATIONS  (STANDING):  dextrose 5%. 1000 milliLiter(s) (50 mL/Hr) IV Continuous <Continuous>  dextrose 50% Injectable 12.5 Gram(s) IV Push once  dextrose 50% Injectable 25 Gram(s) IV Push once  dextrose 50% Injectable 25 Gram(s) IV Push once  furosemide   Injectable 20 milliGRAM(s) IV Push every 12 hours  influenza   Vaccine 0.5 milliLiter(s) IntraMuscular once  insulin lispro (HumaLOG) corrective regimen sliding scale   SubCutaneous three times a day before meals  metoprolol tartrate 50 milliGRAM(s) Oral every 12 hours  simvastatin 40 milliGRAM(s) Oral at bedtime    MEDICATIONS  (PRN):  acetaminophen   Tablet .. 650 milliGRAM(s) Oral every 6 hours PRN Temp greater or equal to 38C (100.4F)  dextrose 40% Gel 15 Gram(s) Oral once PRN Blood Glucose LESS THAN 70 milliGRAM(s)/deciliter  glucagon  Injectable 1 milliGRAM(s) IntraMuscular once PRN Glucose LESS THAN 70 milligrams/deciliter      Vital Signs Last 24 Hrs  T(C): 37.9 (08 Feb 2019 03:40), Max: 39.6 (07 Feb 2019 16:47)  T(F): 100.2 (08 Feb 2019 03:40), Max: 103.2 (07 Feb 2019 16:47)  HR: 74 (08 Feb 2019 03:40) (74 - 130)  BP: 111/48 (08 Feb 2019 03:40) (111/48 - 199/41)  BP(mean): --  RR: 18 (08 Feb 2019 03:40) (18 - 24)  SpO2: 95% (08 Feb 2019 03:40) (94% - 98%)  CAPILLARY BLOOD GLUCOSE        I&O's Summary      PHYSICAL EXAM:  GENERAL: In acute distress  HEAD:  Atraumatic, Normocephalic  EYES: EOMI, PERRLA, conjunctiva and sclera clear  NECK: Supple, +JVD  CHEST/LUNG: Diffuse crackles bilaterally.   HEART: Irregularly irrgeular rate and rhythm; Holosystolic murmur at RSB.  ABDOMEN: Soft, Nontender, Nondistended; Bowel sounds present  EXTREMITIES:  2+ Peripheral Pulses, 1-2+ edema of bl LE with mild tenderness diffusely throughout bilateral calves.   PSYCH: AAOx3  NEUROLOGY: non-focal  SKIN: No rashes or lesions    LABS:                        9.6    8.56  )-----------( 231      ( 08 Feb 2019 04:50 )             31.8     02-08    140  |  102  |  26<H>  ----------------------------<  90  4.4   |  23  |  1.31<H>    Ca    8.6      08 Feb 2019 04:50  Phos  3.5     02-08  Mg     1.8     02-08    TPro  7.9  /  Alb  4.2  /  TBili  0.5  /  DBili  x   /  AST  21  /  ALT  10  /  AlkPhos  68  02-07    PT/INR - ( 08 Feb 2019 04:50 )   PT: 32.3 SEC;   INR: 2.82          PTT - ( 07 Feb 2019 16:43 )  PTT:37.1 SEC    CARDIAC MARKERS ( 08 Feb 2019 04:50 )  x     / x     / 364 u/L / 21.97 ng/mL / x            RADIOLOGY & ADDITIONAL TESTS:  < from: Transthoracic Echocardiogram (05.15.17 @ 07:14) >  Ejection Fraction (Teicholtz): 73 %  ------------------------------------------------------------------------  OBSERVATIONS:  Mitral Valve: Bioprosthetic mitral valve replacement. No  mitral valve regurgitation seen. Mean transmitral valve  gradient equals 5 mm Hg, which is probably normal in the  setting of a prosthetic valve.  Aortic Root: Normal aortic root.  Aortic Valve: Bioprosthetic aortic valve replacement. Peak  transaortic valve gradient equals 50 mm Hg, mean  transaortic valve gradient equals 22 mm Hg, which is  elevated even in the presence of a prosthetic valve. No  aortic valve regurgitation seen.  Left Atrium: Moderately dilated left atrium.  LA volume  index = 46 cc/m2.  Left Ventricle: Normal left ventricular systolic function.  No segmental wall motion abnormalities. Paradoxical septal  wall motion consistent with postoperative state. Normal  left ventricular internal dimensions and wall thicknesses.  Right Heart: Mild right atrial enlargement. Normal right  ventricular size and function. Normal tricuspid valve.  Moderate tricuspid regurgitation. Normal pulmonic valve.  Pericardium/PleuraNormal pericardium with no pericardial  effusion.  Hemodynamic: Estimated right ventricular systolic pressure  equals 56 mm Hg, assuming right atrial pressure equals 10  mm Hg, consistent with moderate pulmonary hypertension.  ------------------------------------------------------------------------  CONCLUSIONS:  1. Bioprosthetic mitral valve replacement. No mitral valve  regurgitation seen. Mean transmitral valve gradient equals  5 mm Hg, which is probably normal inthe setting of a  prosthetic valve.  2. Bioprosthetic aortic valve replacement. Peak transaortic  valve gradient equals 50 mm Hg, mean transaortic valve  gradient equals 22 mm Hg, which is elevated even in the  presence of a prosthetic valve. No aortic valve  regurgitation seen.  3. Moderately dilated left atrium.  LA volume index = 46  cc/m2.  4. Normal left ventricular internal dimensions and wall  thicknesses.  5. Normal left ventricular systolic function. No segmental  wall motion abnormalities. Paradoxical septal wall motion  consistent with postoperative state.  6. Mild right atrial enlargement.  7. Normal right ventricular size and function.  8. Normal tricuspid valve.  Moderate tricuspid  regurgitation.  9. Estimated pulmonary artery systolic pressure equals 56  mm Hg, assuming right atrial pressure equals 10  mm Hg,  consistent with moderate pulmonary hypertension.  *** Compared with echocardiogram of 2/10/2017, no  significant changes noted.    < end of copied text >

## 2019-02-08 NOTE — PROGRESS NOTE ADULT - ASSESSMENT
78F with PMHx of HFpEF (EF73%), CAD s/p CABG, afib on coumadin, HTN, HLD, DM type 2 who presents with generalized fatigue, malaise, and shortness of breath a/w sepsis due to likely pneumonia in the setting of acute pEF HF; 78F with PMHx of HFpEF (EF73%), CAD s/p CABG (LIMA to LAD & SVG to OM,  RCA), bioprosthetic MVR & AVR, afib on coumadin, HTN, HLD, T2DM who presents with fever, SOB, N/V and diaphoresis 2/2 sepsis due to CHF exacerbation likely due to underlying infection c/b NSTEMI (type II) vs. post MI fever. 78F with PMHx of HFpEF (EF73%), CAD s/p CABG (LIMA to LAD & SVG to OM,  RCA), bioprosthetic MVR & AVR, afib on coumadin, HTN, HLD, T2DM, and recurrent PNA who presents with fever, SOB, N/V and diaphoresis 2/2 sepsis likely 2/2 acute viral URI vs. CAP (less likely given CXR) c/b CHF exacerbation and afib w/t RVR further c/b NSTEMI (likely type II demand ischemia in setting of sepsis and afib w/t RVR).

## 2019-02-08 NOTE — PROGRESS NOTE ADULT - PROBLEM SELECTOR PLAN 8
Pt and daughter unsure of medications and pharmacy could not be reached, uses a [facility] in [facility]. Daughter will bring in list of meds with pharmacy information tomorrow AM  - med rec in AM (primary team) - c/w simvastatin - c/w simvastatin, will touch base with cards about switching to high intensity statin holding home hydralazine and enalapril in setting of active infection, add back as clinically indicated

## 2019-02-08 NOTE — CHART NOTE - NSCHARTNOTEFT_GEN_A_CORE
Pt with cardiac enzymes that continue to rise, high sensitivity trops now 1005. I spoke with Nurse Practitioner Don who advised to start the pt on heparin at this time per ACS protocol. Cardiology will continue to follow, but no cardiac catheterization at this time given that the pt meets criteria for sepsis.    Will continue to monitor closely.    Good Kamara  PGY-1, [phone], [id number] Pt with cardiac enzymes that continue to rise, high sensitivity trops now 1005. I spoke with Nurse Practitioner Don who advised to start the pt on heparin at this time per ACS protocol. Cardiology will continue to follow, but no cardiac catheterization at this time given that the pt meets criteria for sepsis.  Pt with physical exam and imaging evidence of pulmonary edema being actively diuresed with lasix with significant crackles bilat and tachypneic with a resp rate of 27 saturating 97% on 2L NC. Will start the patient on bipap and continue to monitor her electrolytes while she is being diuresed.  Pt with gram positive cocci in clusters growing in the anaerobic blood culture bottle from 2/7. Will continue pt on Levaquin and follow up speciation and sensitivities.     Will continue to monitor closely.    Good Kamara  PGY-1, [phone], [phone] Pt with cardiac enzymes that continue to rise, high sensitivity trops now 1005. I spoke with Nurse Practitioner Don who advised to start the pt on heparin at this time per ACS protocol. Cardiology will continue to follow, but no cardiac catheterization at this time given that the pt meets criteria for sepsis.  Pt with physical exam and imaging evidence of pulmonary edema being actively diuresed with lasix with significant crackles bilat and tachypneic with a resp rate of 27 saturating 97% on 2L NC. Will start the patient on bipap and continue to monitor her electrolytes while she is being diuresed.  Pt with gram positive cocci in clusters growing in the aerobic blood culture bottle from 2/7. Will continue pt on Levaquin and follow up speciation and sensitivities.     Will continue to monitor closely.    Good Kamara  PGY-1, [phone], [id number]

## 2019-02-08 NOTE — CONSULT NOTE ADULT - ASSESSMENT
78F with CAD, s/p CABG, s/p cath 2014 with patent LIMA to LAD and SVG to OM, + of RCA, valvular heart disease, s/p bioprosthetic MVR and AVR, afib on coumadin, HTN, HLD, DM, CVA, and multiple "lung infections" per family presents with worsening fatigue, exertional SOB, and general malaise with cough and cold symptoms. +fever and likely sepsis but also with elevated hsT and CKMB.    D/W Dr. Santiago, cardio attending: this is likely NSTEMI in setting of demand ischemia due to sepsis. Continue DAPT, trend cardiac enzymes, and repeat ECHO. Cardio will follow.

## 2019-02-08 NOTE — CHART NOTE - NSCHARTNOTEFT_GEN_A_CORE
Patient has a private cardiologist. Seen by house cardiology as a limited consult for elevated trop thought to be 2/2 demand ischemia.    Please call pt's cardiologist for full consult.      Eliel Celestin MD  Cardiology Fellow

## 2019-02-08 NOTE — PROGRESS NOTE ADULT - PROBLEM SELECTOR PLAN 7
- c/w simvastatin holding home hydralazine and enalapril in setting of active infection, add back as clinically indicated Most recent A1C unknown. Pt was previously on metformin, but has not been taking for several months.   - f/u A1C  - FS checks with KRYSTYNA

## 2019-02-08 NOTE — PROGRESS NOTE ADULT - PROBLEM SELECTOR PLAN 9
Diet: DASH/TLC/CC  DVT ppx: on full a/c with coumadin Pt and daughter unsure of medications and pharmacy could not be reached, [facility]. Daughter will bring in list of meds with pharmacy information tomorrow AM  - med rec in AM (primary team) Pt and daughter unsure of medications and pharmacy could not be reached, [facility]. Daughter provided numbers for PCP and Cardiologist. Will call and try to formally med rec her.   - med rec in AM (primary team)

## 2019-02-08 NOTE — PROGRESS NOTE ADULT - PROBLEM SELECTOR PROBLEM 7
Hyperlipidemia HTN (Hypertension) Type 2 diabetes mellitus with hyperglycemia, without long-term current use of insulin

## 2019-02-08 NOTE — PROVIDER CONTACT NOTE (CRITICAL VALUE NOTIFICATION) - BACKGROUND
Patient presents for SOB and cough. Admitting diagnosis of Sepsis. RVP negative. Denies CP. Hx of CHf, Afib, CAD, HTN.

## 2019-02-08 NOTE — PROGRESS NOTE ADULT - PROBLEM SELECTOR PROBLEM 6
HTN (Hypertension) Type 2 diabetes mellitus with hyperglycemia, without long-term current use of insulin Coronary artery disease

## 2019-02-08 NOTE — PROGRESS NOTE ADULT - PROBLEM SELECTOR PLAN 4
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 Pt not on ASA81. Troponin 44 on admission.  - c/w statin  - recheck troponin [id number] risk score 7  Pt takes metoprolol XR for rate control and is on coumadin 3mg QD.  INR on admission supratherapeutic to 3.10 now 2.82;  - holding coumadin pending cardio recs for AC (NSTEMI Rx type I vs type II)  - c/w metoprolol 50mg BID in spit of sepsis given RVR  - telemetry Afib w/t RVR likely 2/2 acute sepsis  Telemetry showed rate up to 131  [id number] score 7  -c/w metop tart 50mg bid in spite of sepsis given RVR  INR on admission supratherapeutic to 3.10 now 2.82 will redose 2mg tonight  - telemetry  -Tylenol for fever

## 2019-02-09 LAB
ALBUMIN SERPL ELPH-MCNC: 3.4 G/DL — SIGNIFICANT CHANGE UP (ref 3.3–5)
ALBUMIN SERPL ELPH-MCNC: 3.6 G/DL — SIGNIFICANT CHANGE UP (ref 3.3–5)
ALP SERPL-CCNC: 59 U/L — SIGNIFICANT CHANGE UP (ref 40–120)
ALP SERPL-CCNC: 67 U/L — SIGNIFICANT CHANGE UP (ref 40–120)
ALT FLD-CCNC: 28 U/L — SIGNIFICANT CHANGE UP (ref 4–33)
ALT FLD-CCNC: 38 U/L — HIGH (ref 4–33)
ANION GAP SERPL CALC-SCNC: 15 MMO/L — HIGH (ref 7–14)
ANION GAP SERPL CALC-SCNC: 19 MMO/L — HIGH (ref 7–14)
APTT BLD: 58.6 SEC — HIGH (ref 27.5–36.3)
AST SERPL-CCNC: 77 U/L — HIGH (ref 4–32)
AST SERPL-CCNC: 96 U/L — HIGH (ref 4–32)
BACTERIA UR CULT: SIGNIFICANT CHANGE UP
BILIRUB SERPL-MCNC: 0.4 MG/DL — SIGNIFICANT CHANGE UP (ref 0.2–1.2)
BILIRUB SERPL-MCNC: 0.4 MG/DL — SIGNIFICANT CHANGE UP (ref 0.2–1.2)
BLD GP AB SCN SERPL QL: NEGATIVE — SIGNIFICANT CHANGE UP
BUN SERPL-MCNC: 27 MG/DL — HIGH (ref 7–23)
BUN SERPL-MCNC: 37 MG/DL — HIGH (ref 7–23)
CALCIUM SERPL-MCNC: 8.1 MG/DL — LOW (ref 8.4–10.5)
CALCIUM SERPL-MCNC: 8.6 MG/DL — SIGNIFICANT CHANGE UP (ref 8.4–10.5)
CHLORIDE SERPL-SCNC: 93 MMOL/L — LOW (ref 98–107)
CHLORIDE SERPL-SCNC: 96 MMOL/L — LOW (ref 98–107)
CK MB BLD-MCNC: 13.67 NG/ML — HIGH (ref 1–4.7)
CK MB BLD-MCNC: 2 — SIGNIFICANT CHANGE UP (ref 0–2.5)
CK SERPL-CCNC: 667 U/L — HIGH (ref 25–170)
CO2 SERPL-SCNC: 25 MMOL/L — SIGNIFICANT CHANGE UP (ref 22–31)
CO2 SERPL-SCNC: 26 MMOL/L — SIGNIFICANT CHANGE UP (ref 22–31)
CREAT SERPL-MCNC: 1.36 MG/DL — HIGH (ref 0.5–1.3)
CREAT SERPL-MCNC: 1.67 MG/DL — HIGH (ref 0.5–1.3)
GLUCOSE BLDC GLUCOMTR-MCNC: 101 MG/DL — HIGH (ref 70–99)
GLUCOSE BLDC GLUCOMTR-MCNC: 106 MG/DL — HIGH (ref 70–99)
GLUCOSE BLDC GLUCOMTR-MCNC: 107 MG/DL — HIGH (ref 70–99)
GLUCOSE BLDC GLUCOMTR-MCNC: 111 MG/DL — HIGH (ref 70–99)
GLUCOSE BLDC GLUCOMTR-MCNC: 129 MG/DL — HIGH (ref 70–99)
GLUCOSE SERPL-MCNC: 122 MG/DL — HIGH (ref 70–99)
GLUCOSE SERPL-MCNC: 99 MG/DL — SIGNIFICANT CHANGE UP (ref 70–99)
HCT VFR BLD CALC: 34.2 % — LOW (ref 34.5–45)
HCT VFR BLD CALC: 35.4 % — SIGNIFICANT CHANGE UP (ref 34.5–45)
HGB BLD-MCNC: 10.1 G/DL — LOW (ref 11.5–15.5)
HGB BLD-MCNC: 10.5 G/DL — LOW (ref 11.5–15.5)
INR BLD: 2.43 — HIGH (ref 0.88–1.17)
L PNEUMO AG UR QL: NEGATIVE — SIGNIFICANT CHANGE UP
MAGNESIUM SERPL-MCNC: 2 MG/DL — SIGNIFICANT CHANGE UP (ref 1.6–2.6)
MAGNESIUM SERPL-MCNC: 2.1 MG/DL — SIGNIFICANT CHANGE UP (ref 1.6–2.6)
MCHC RBC-ENTMCNC: 22.4 PG — LOW (ref 27–34)
MCHC RBC-ENTMCNC: 22.5 PG — LOW (ref 27–34)
MCHC RBC-ENTMCNC: 29.5 % — LOW (ref 32–36)
MCHC RBC-ENTMCNC: 29.7 % — LOW (ref 32–36)
MCV RBC AUTO: 75.8 FL — LOW (ref 80–100)
MCV RBC AUTO: 76 FL — LOW (ref 80–100)
METHOD TYPE: SIGNIFICANT CHANGE UP
NRBC # FLD: 0 K/UL — LOW (ref 25–125)
NRBC # FLD: 0 K/UL — LOW (ref 25–125)
ORGANISM # SPEC MICROSCOPIC CNT: SIGNIFICANT CHANGE UP
PHOSPHATE SERPL-MCNC: 3.6 MG/DL — SIGNIFICANT CHANGE UP (ref 2.5–4.5)
PHOSPHATE SERPL-MCNC: 3.8 MG/DL — SIGNIFICANT CHANGE UP (ref 2.5–4.5)
PLATELET # BLD AUTO: 235 K/UL — SIGNIFICANT CHANGE UP (ref 150–400)
PLATELET # BLD AUTO: 242 K/UL — SIGNIFICANT CHANGE UP (ref 150–400)
PMV BLD: 10.1 FL — SIGNIFICANT CHANGE UP (ref 7–13)
PMV BLD: 10.6 FL — SIGNIFICANT CHANGE UP (ref 7–13)
POTASSIUM SERPL-MCNC: 4.2 MMOL/L — SIGNIFICANT CHANGE UP (ref 3.5–5.3)
POTASSIUM SERPL-MCNC: 4.3 MMOL/L — SIGNIFICANT CHANGE UP (ref 3.5–5.3)
POTASSIUM SERPL-SCNC: 4.2 MMOL/L — SIGNIFICANT CHANGE UP (ref 3.5–5.3)
POTASSIUM SERPL-SCNC: 4.3 MMOL/L — SIGNIFICANT CHANGE UP (ref 3.5–5.3)
PROT SERPL-MCNC: 6.7 G/DL — SIGNIFICANT CHANGE UP (ref 6–8.3)
PROT SERPL-MCNC: 6.9 G/DL — SIGNIFICANT CHANGE UP (ref 6–8.3)
PROTHROM AB SERPL-ACNC: 28.5 SEC — HIGH (ref 9.8–13.1)
RBC # BLD: 4.5 M/UL — SIGNIFICANT CHANGE UP (ref 3.8–5.2)
RBC # BLD: 4.67 M/UL — SIGNIFICANT CHANGE UP (ref 3.8–5.2)
RBC # FLD: 18.1 % — HIGH (ref 10.3–14.5)
RBC # FLD: 18.4 % — HIGH (ref 10.3–14.5)
RH IG SCN BLD-IMP: POSITIVE — SIGNIFICANT CHANGE UP
SODIUM SERPL-SCNC: 137 MMOL/L — SIGNIFICANT CHANGE UP (ref 135–145)
SODIUM SERPL-SCNC: 137 MMOL/L — SIGNIFICANT CHANGE UP (ref 135–145)
SPECIMEN SOURCE: SIGNIFICANT CHANGE UP
TROPONIN T, HIGH SENSITIVITY: 1065 NG/L — CRITICAL HIGH (ref ?–14)
TROPONIN T, HIGH SENSITIVITY: 1133 NG/L — CRITICAL HIGH (ref ?–14)
WBC # BLD: 5.4 K/UL — SIGNIFICANT CHANGE UP (ref 3.8–10.5)
WBC # BLD: 8.4 K/UL — SIGNIFICANT CHANGE UP (ref 3.8–10.5)
WBC # FLD AUTO: 5.4 K/UL — SIGNIFICANT CHANGE UP (ref 3.8–10.5)
WBC # FLD AUTO: 8.4 K/UL — SIGNIFICANT CHANGE UP (ref 3.8–10.5)

## 2019-02-09 PROCEDURE — 99233 SBSQ HOSP IP/OBS HIGH 50: CPT | Mod: GC

## 2019-02-09 PROCEDURE — 99233 SBSQ HOSP IP/OBS HIGH 50: CPT

## 2019-02-09 PROCEDURE — 93306 TTE W/DOPPLER COMPLETE: CPT | Mod: 26

## 2019-02-09 RX ORDER — PIPERACILLIN AND TAZOBACTAM 4; .5 G/20ML; G/20ML
3.38 INJECTION, POWDER, LYOPHILIZED, FOR SOLUTION INTRAVENOUS ONCE
Qty: 0 | Refills: 0 | Status: COMPLETED | OUTPATIENT
Start: 2019-02-09 | End: 2019-02-09

## 2019-02-09 RX ORDER — MAGNESIUM SULFATE 500 MG/ML
2 VIAL (ML) INJECTION ONCE
Qty: 0 | Refills: 0 | Status: COMPLETED | OUTPATIENT
Start: 2019-02-09 | End: 2019-02-09

## 2019-02-09 RX ORDER — LEVALBUTEROL 1.25 MG/.5ML
0.63 SOLUTION, CONCENTRATE RESPIRATORY (INHALATION) EVERY 6 HOURS
Qty: 0 | Refills: 0 | Status: DISCONTINUED | OUTPATIENT
Start: 2019-02-09 | End: 2019-02-17

## 2019-02-09 RX ORDER — FUROSEMIDE 40 MG
40 TABLET ORAL EVERY 12 HOURS
Qty: 0 | Refills: 0 | Status: DISCONTINUED | OUTPATIENT
Start: 2019-02-09 | End: 2019-02-13

## 2019-02-09 RX ORDER — ATORVASTATIN CALCIUM 80 MG/1
80 TABLET, FILM COATED ORAL AT BEDTIME
Qty: 0 | Refills: 0 | Status: DISCONTINUED | OUTPATIENT
Start: 2019-02-09 | End: 2019-02-17

## 2019-02-09 RX ORDER — WARFARIN SODIUM 2.5 MG/1
2 TABLET ORAL ONCE
Qty: 0 | Refills: 0 | Status: COMPLETED | OUTPATIENT
Start: 2019-02-09 | End: 2019-02-09

## 2019-02-09 RX ORDER — METOPROLOL TARTRATE 50 MG
75 TABLET ORAL EVERY 12 HOURS
Qty: 0 | Refills: 0 | Status: DISCONTINUED | OUTPATIENT
Start: 2019-02-09 | End: 2019-02-11

## 2019-02-09 RX ORDER — PIPERACILLIN AND TAZOBACTAM 4; .5 G/20ML; G/20ML
3.38 INJECTION, POWDER, LYOPHILIZED, FOR SOLUTION INTRAVENOUS EVERY 8 HOURS
Qty: 0 | Refills: 0 | Status: DISCONTINUED | OUTPATIENT
Start: 2019-02-09 | End: 2019-02-14

## 2019-02-09 RX ORDER — POTASSIUM CHLORIDE 20 MEQ
40 PACKET (EA) ORAL EVERY 4 HOURS
Qty: 0 | Refills: 0 | Status: COMPLETED | OUTPATIENT
Start: 2019-02-09 | End: 2019-02-09

## 2019-02-09 RX ADMIN — TICAGRELOR 90 MILLIGRAM(S): 90 TABLET ORAL at 17:39

## 2019-02-09 RX ADMIN — ATORVASTATIN CALCIUM 80 MILLIGRAM(S): 80 TABLET, FILM COATED ORAL at 21:45

## 2019-02-09 RX ADMIN — Medication 81 MILLIGRAM(S): at 12:13

## 2019-02-09 RX ADMIN — PIPERACILLIN AND TAZOBACTAM 25 GRAM(S): 4; .5 INJECTION, POWDER, LYOPHILIZED, FOR SOLUTION INTRAVENOUS at 21:45

## 2019-02-09 RX ADMIN — Medication 40 MILLIGRAM(S): at 05:00

## 2019-02-09 RX ADMIN — Medication 40 MILLIEQUIVALENT(S): at 05:00

## 2019-02-09 RX ADMIN — PIPERACILLIN AND TAZOBACTAM 200 GRAM(S): 4; .5 INJECTION, POWDER, LYOPHILIZED, FOR SOLUTION INTRAVENOUS at 12:13

## 2019-02-09 RX ADMIN — Medication 40 MILLIGRAM(S): at 17:39

## 2019-02-09 RX ADMIN — WARFARIN SODIUM 2 MILLIGRAM(S): 2.5 TABLET ORAL at 17:39

## 2019-02-09 RX ADMIN — Medication 75 MILLIGRAM(S): at 17:39

## 2019-02-09 RX ADMIN — Medication 50 GRAM(S): at 01:33

## 2019-02-09 RX ADMIN — TICAGRELOR 90 MILLIGRAM(S): 90 TABLET ORAL at 09:09

## 2019-02-09 RX ADMIN — Medication 40 MILLIEQUIVALENT(S): at 01:34

## 2019-02-09 RX ADMIN — Medication 50 MILLIGRAM(S): at 05:00

## 2019-02-09 NOTE — PROGRESS NOTE ADULT - PROBLEM SELECTOR PLAN 8
holding home hydralazine and enalapril in setting of active infection, add back as clinically indicated

## 2019-02-09 NOTE — PROGRESS NOTE ADULT - PROBLEM SELECTOR PLAN 2
NSTEMI 2/2 type demand ischemia due to sepsis and/or CHF exacerbation  hst Trops 44 -->628  CKMB 21.97    -cards consult  -DAPT load w/t ASA and brillinta  -Holding heparin given INR 2.82  -STAT EKG  -Repeat TTE NSTEMI 2/2 type demand ischemia due to sepsis and/or CHF exacerbation  hst Trops 44 -->628 -->1133  CKMB 21.97 --> 13.67    --> 667  -cards recs  -ASA/Brillinta maintenance dose  -Holding heparin given bleed overnight  -EKG QD  -f/u TTE result  - per cards, c/w coumadin, goal INR 2-3  -NSTEMI likely to improve with better rate control will increase metoprol to 75mg bid.

## 2019-02-09 NOTE — PROGRESS NOTE ADULT - PROBLEM SELECTOR PLAN 9
Pt and daughter unsure of medications and pharmacy could not be reached, [facility]. Daughter provided numbers for PCP and Cardiologist. Will call and try to formally med rec her.   - med rec in AM (primary team)

## 2019-02-09 NOTE — PROGRESS NOTE ADULT - PROBLEM SELECTOR PLAN 1
+ severe sepsis criteria: fever to 103.2, tachycardia, tachypnea, WBC count of 13.9, and elevated lactate. CXR significant pulmonary edema, but no focal consolidation, RVP neg. Received azithromycin and ctx in ED.  - now on levofloxacin renally dosed Q48H  - STAT UA  -f/u blood cultures, urine legionella  - f/u CT scan  - tylenol PRN fever  - vital signs Q4H  -trend fever and wbc qd + severe sepsis criteria: fever to 103.2, tachycardia, tachypnea, WBC count of 13.9, and elevated lactate. CXR significant pulmonary edema, but no focal consolidation, RVP neg. Received azithromycin and ctx in ED. Chest CT showing possible multifocal PNA  - now on levofloxacin renally dosed Q48H  -UA negative  -f/u blood cultures w/t coag neg staph- likely contaminant, urine legionella neg  - tylenol PRN fever  - vital signs Q4H  -trend fever and wbc qd  -Breath sounds more rhochorus, will order xopenex neb + severe sepsis criteria: fever to 103.2, tachycardia, tachypnea, WBC count of 13.9, and elevated lactate. CXR significant pulmonary edema, but no focal consolidation, RVP neg. Received azithromycin and ctx in ED. Chest CT showing possible multifocal PNA  - dc levo and start zosyn  -UA negative  -f/u 1 blood culture w/t coag neg staph- likely contaminant, urine legionella neg  - tylenol PRN fever  - vital signs Q4H  -trend fever and wbc qd  -Breath sounds more rhochorus, will order xopenex neb Q6H PRN SOB

## 2019-02-09 NOTE — CHART NOTE - NSCHARTNOTEFT_GEN_A_CORE
Pt noted to having bleeding from the IV site and new ecchymotic area on left forearm. Pt still without any complaints. BP. On exam pt is in mild resp distress, improved since starting on bipap. Lung sounds still with significant crackles on exam bilat. Blood saturated gauze over the area where IV was removed and a new soft ecchymotic area on left arm, but no active bleeding  observed at this time. Pt also with old ecchymotic areas on legs.    The heparin was discontinued and cardiology notified. I ordered a stat cbc and coags and will follow up.    Will continue to monitor closely.    Maximus Marshall, [place]  PGY-1, [phone], [id number] Pt noted to having bleeding from the IV site and new ecchymotic area on left forearm. Pt still without any complaints. /80, HR 88, Resp 18 sat 98% on bipap 30% FiO2. On exam pt is in mild resp distress, improved since starting on bipap. Lung sounds still with significant crackles on exam bilat. Blood saturated gauze over the area where IV was removed and a new soft ecchymotic area on left arm, but no active bleeding  observed at this time. Pt also with old ecchymotic areas on legs.    The heparin was discontinued and cardiology notified. I ordered a stat cbc and coags and will follow up.    Will continue to monitor closely.    Maximus Marshall, Megt  PGY-1, [phone], [id number] Pt noted to having bleeding from the IV site and new ecchymotic area on left forearm. Pt still without any complaints. /80, HR 88, Resp 18 sat 98% on bipap 30% FiO2. On exam pt is in mild resp distress, improved since starting on bipap. Lung sounds still with significant crackles on exam bilat. Blood saturated gauze over the area where IV was removed, a new soft ecchymotic area on left arm,  and minimal dry  blood noted from nose, but no active bleeding  observed at this time. Pt also with old ecchymotic areas on legs.    The heparin was discontinued and cardiology notified. I ordered a stat cbc and coags and will follow up.    Will continue to monitor closely.    Maximus Marshall, Megt  PGY-1, [phone], [id number] Pt noted to having bleeding from the IV site and new ecchymotic area on left forearm. Pt still without any complaints. /80, HR 88, Resp 18 sat 98% on bipap 30% FiO2. On exam pt is in mild resp distress, improved since starting on bipap. Lung sounds still with significant crackles on exam bilat. Blood saturated gauze over the area where IV was removed, a new soft ecchymotic area on left arm,  and minimal dry  blood noted from nose, but no active bleeding  observed at this time. Pt also with old ecchymotic areas on legs.    The heparin was discontinued and cardiology notified. I ordered a stat cbc which showed Hgb stable at 10.5. I also ordered  coags and will follow up.    Will continue to monitor closely.    Maximus Marshall, Megt  PGY-1, [phone], [id number]

## 2019-02-09 NOTE — PROVIDER CONTACT NOTE (OTHER) - ASSESSMENT
Pt is alert and orientedx4. Denies pain at this time. Hematomas seen in left arm and bilateral thighs.

## 2019-02-09 NOTE — DIETITIAN INITIAL EVALUATION ADULT. - PROBLEM SELECTOR PLAN 3
Pt has hx of HFpEF, TTE from 5/2017 showed EF73%, normal RVSF and LVSF, moderate TR, and moderate pulm HTN. Prelim read of CXR showing mild pulm edema. pBNP elevated to 2306; Likely multifactorial etiology due to sepsis and Afib with RVR;  - will give one dose of furosemide 20mg IV now and monitor response  - repeat TTE  -daily weigth

## 2019-02-09 NOTE — PHYSICAL THERAPY INITIAL EVALUATION ADULT - PERTINENT HX OF CURRENT PROBLEM, REHAB EVAL
Pt is a 78 year old female admitted to [facility] secondary to sepsis. PMH: CVA, CAD, HTN, CHF.

## 2019-02-09 NOTE — PROGRESS NOTE ADULT - PROBLEM SELECTOR PLAN 5
Anemia 2/2 blood loss vs. hemoconcentration as all values are lower. Repeat cbc shows hgb stable at 9.6 making hemoconcentration more likely than GIB.   -FOBT   -Iron studies if persistent Heparin gtt last night appears to have caused bleed vs. infiltration. Hgb stable.  -CBC Q12H (minimize blood loss by stick)  -FOBT

## 2019-02-09 NOTE — PROGRESS NOTE ADULT - SUBJECTIVE AND OBJECTIVE BOX
24H hour events: No acute events.    MEDICATIONS:  furosemide   Injectable 40 milliGRAM(s) IV Push daily  metoprolol tartrate 50 milliGRAM(s) Oral every 12 hours  ticagrelor 90 milliGRAM(s) Oral every 12 hours  acetaminophen   Tablet .. 650 milliGRAM(s) Oral every 6 hours PRN  aspirin 81 milliGRAM(s) Oral daily  atorvastatin 40 milliGRAM(s) Oral at bedtime  dextrose 40% Gel 15 Gram(s) Oral once PRN  dextrose 50% Injectable 12.5 Gram(s) IV Push once  dextrose 50% Injectable 25 Gram(s) IV Push once  dextrose 50% Injectable 25 Gram(s) IV Push once  glucagon  Injectable 1 milliGRAM(s) IntraMuscular once PRN  insulin lispro (HumaLOG) corrective regimen sliding scale   SubCutaneous three times a day before meals  dextrose 5%. 1000 milliLiter(s) IV Continuous <Continuous>  influenza   Vaccine 0.5 milliLiter(s) IntraMuscular once    REVIEW OF SYSTEMS:  Complete 10point ROS negative.    PHYSICAL EXAM:  T(C): 37.3 (02-09-19 @ 04:54), Max: 39.3 (02-08-19 @ 17:20)  HR: 84 (02-09-19 @ 07:23) (67 - 130)  BP: 153/80 (02-09-19 @ 04:54) (135/64 - 199/82)  RR: 18 (02-09-19 @ 04:54) (17 - 28)  SpO2: 98% (02-09-19 @ 04:54) (95% - 100%)  Wt(kg): --  I&O's Summary    08 Feb 2019 07:01  -  09 Feb 2019 07:00  --------------------------------------------------------  IN: 0 mL / OUT: 2000 mL / NET: -2000 mL    Appearance: NAd  HEENT:   Normal oral mucosa, PERRL, EOMI	  Cardiovascular: Normal S1 S2, JVD elevated, No murmurs, LE 2+ pitting  Respiratory: ronchi	  Gastrointestinal:  Soft, Non-tender, + BS	  Neurologic: Non-focal  Vascular: Peripheral pulses palpable 2+ bilaterally    LABS:	 	    CBC Full  -  ( 09 Feb 2019 04:32 )  WBC Count : 8.40 K/uL  Hemoglobin : 10.5 g/dL  Hematocrit : 35.4 %  Platelet Count - Automated : 242 K/uL  Mean Cell Volume : 75.8 fL  Mean Cell Hemoglobin : 22.5 pg  Mean Cell Hemoglobin Concentration : 29.7 %    02-09    137  |  93<L>  |  27<H>  ----------------------------<  99  4.3   |  25  |  1.36<H>  02-08    139  |  96<L>  |  26<H>  ----------------------------<  102<H>  3.7   |  27  |  1.49<H>    Ca    8.6      09 Feb 2019 03:05  Ca    8.7      08 Feb 2019 21:30  Phos  3.6     02-09  Phos  3.8     02-08  Mg     2.1     02-09  Mg     1.7     02-08    TPro  6.9  /  Alb  3.6  /  TBili  0.4  /  DBili  x   /  AST  96<H>  /  ALT  38<H>  /  AlkPhos  67  02-09  TPro  7.2  /  Alb  3.7  /  TBili  0.3  /  DBili  x   /  AST  88<H>  /  ALT  35<H>  /  AlkPhos  70  02-08    proBNP: Serum Pro-Brain Natriuretic Peptide: 2306 pg/mL (02-07 @ 16:43)    < from: Transthoracic Echocardiogram (05.15.17 @ 07:14) >  CONCLUSIONS:  1. Bioprosthetic mitral valve replacement. No mitral valve  regurgitation seen. Mean transmitral valve gradient equals  5 mm Hg, which is probably normal inthe setting of a  prosthetic valve.  2. Bioprosthetic aortic valve replacement. Peak transaortic  valve gradient equals 50 mm Hg, mean transaortic valve  gradient equals 22 mm Hg, which is elevated even in the  presence of a prosthetic valve. No aortic valve  regurgitation seen.  3. Moderately dilated left atrium.  LA volume index = 46  cc/m2.  4. Normal left ventricular internal dimensions and wall  thicknesses.  5. Normal left ventricular systolic function. No segmental  wall motion abnormalities. Paradoxical septal wall motion  consistent with postoperative state.  6. Mild right atrial enlargement.  7. Normal right ventricular size and function.  8. Normal tricuspid valve.  Moderate tricuspid  regurgitation.  9. Estimated pulmonary artery systolic pressure equals 56  mm Hg, assuming right atrial pressure equals 10  mm Hg,  consistent with moderate pulmonary hypertension.  *** Compared with echocardiogram of 2/10/2017, no  significant changes noted.    < end of copied text >

## 2019-02-09 NOTE — DIETITIAN INITIAL EVALUATION ADULT. - PROBLEM SELECTOR PLAN 5
Most recent A1C unknown. Pt was previously on metformin, but has not been taking for several months.   - f/u A1C  - FS checks with low dose ISS

## 2019-02-09 NOTE — DIETITIAN INITIAL EVALUATION ADULT. - PROBLEM SELECTOR PLAN 1
Pt meets sepsis criteria with fever to 103.2, tachycardia, tachypnea, WBC count of 13.9, and elevated lactate. Suspect pneumonia as pt has SOB, coughing and febrile. Cannot r/o PNA masked by pulm edema; Received azithromycin and ctx in ED.  - will c/w levofloxacin renally dosed Q48H  - f/u UA, urine and blood cultures, urine legionella  - f/u CT scan  - tylenol PRN fever  - vital signs Q4H  - RVP negative

## 2019-02-09 NOTE — DIETITIAN INITIAL EVALUATION ADULT. - PROBLEM SELECTOR PLAN 4
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 Pt not on ASA81. Troponin 44 on admission.  - c/w statin  - recheck troponin

## 2019-02-09 NOTE — PROGRESS NOTE ADULT - SUBJECTIVE AND OBJECTIVE BOX
Dr. Larry Pike   Internal Medicine PGY-1  Pager #[phone]    Patient is a 78y old  Female who presents with a chief complaint of Sepsis (2019 08:48)      SUBJECTIVE / OVERNIGHT EVENTS:    MEDICATIONS  (STANDING):  aspirin 81 milliGRAM(s) Oral daily  atorvastatin 40 milliGRAM(s) Oral at bedtime  dextrose 5%. 1000 milliLiter(s) (50 mL/Hr) IV Continuous <Continuous>  dextrose 50% Injectable 12.5 Gram(s) IV Push once  dextrose 50% Injectable 25 Gram(s) IV Push once  dextrose 50% Injectable 25 Gram(s) IV Push once  furosemide   Injectable 40 milliGRAM(s) IV Push daily  influenza   Vaccine 0.5 milliLiter(s) IntraMuscular once  insulin lispro (HumaLOG) corrective regimen sliding scale   SubCutaneous three times a day before meals  metoprolol tartrate 50 milliGRAM(s) Oral every 12 hours  ticagrelor 90 milliGRAM(s) Oral every 12 hours    MEDICATIONS  (PRN):  acetaminophen   Tablet .. 650 milliGRAM(s) Oral every 6 hours PRN Temp greater or equal to 38C (100.4F)  dextrose 40% Gel 15 Gram(s) Oral once PRN Blood Glucose LESS THAN 70 milliGRAM(s)/deciliter  glucagon  Injectable 1 milliGRAM(s) IntraMuscular once PRN Glucose LESS THAN 70 milligrams/deciliter      Vital Signs Last 24 Hrs  T(C): 37.3 (2019 04:54), Max: 39.4 (2019 07:40)  T(F): 99.1 (2019 04:54), Max: 102.9 (2019 07:40)  HR: 87 (2019 03:16) (67 - 130)  BP: 153/80 (2019 04:54) (135/64 - 199/82)  BP(mean): --  RR: 18 (2019 04:54) (17 - 28)  SpO2: 98% (2019 04:54) (95% - 100%)  CAPILLARY BLOOD GLUCOSE      POCT Blood Glucose.: 101 mg/dL (2019 06:23)  POCT Blood Glucose.: 106 mg/dL (2019 00:34)  POCT Blood Glucose.: 124 mg/dL (2019 17:36)  POCT Blood Glucose.: 99 mg/dL (2019 12:04)  POCT Blood Glucose.: 141 mg/dL (2019 08:28)    I&O's Summary    2019 07:01  -  2019 07:00  --------------------------------------------------------  IN: 100 mL / OUT: 200 mL / NET: -100 mL    2019 07:01  -  2019 06:43  --------------------------------------------------------  IN: 0 mL / OUT: 2000 mL / NET: -2000 mL        PHYSICAL EXAM:  GENERAL: NAD, well-developed  HEAD:  Atraumatic, Normocephalic  EYES: EOMI, PERRLA, conjunctiva and sclera clear  NECK: Supple, No JVD  CHEST/LUNG: Clear to auscultation bilaterally; No wheeze  HEART: Regular rate and rhythm; No murmurs, rubs, or gallops  ABDOMEN: Soft, Nontender, Nondistended; Bowel sounds present  EXTREMITIES:  2+ Peripheral Pulses, No clubbing, cyanosis, or edema  PSYCH: AAOx3  NEUROLOGY: non-focal  SKIN: No rashes or lesions    LABS:                        10.5   8.40  )-----------( 242      ( 2019 04:32 )             35.4     02-09    137  |  93<L>  |  27<H>  ----------------------------<  99  4.3   |  25  |  1.36<H>    Ca    8.6      2019 03:05  Phos  3.6     02-  Mg     2.1     02-09    TPro  6.9  /  Alb  3.6  /  TBili  0.4  /  DBili  x   /  AST  96<H>  /  ALT  38<H>  /  AlkPhos  67  02-    PT/INR - ( 2019 04:50 )   PT: 32.3 SEC;   INR: 2.82          PTT - ( 2019 04:32 )  PTT:58.6 SEC  CARDIAC MARKERS ( 2019 03:05 )  x     / x     / 667 u/L / 13.67 ng/mL / x      CARDIAC MARKERS ( 2019 19:06 )  x     / x     / 618 u/L / 17.37 ng/mL / x      CARDIAC MARKERS ( 2019 10:40 )  x     / x     / 503 u/L / 21.45 ng/mL / x      CARDIAC MARKERS ( 2019 04:50 )  x     / x     / 364 u/L / 21.97 ng/mL / x          Urinalysis Basic - ( 2019 08:10 )    Color: YELLOW / Appearance: CLEAR / S.018 / pH: 5.5  Gluc: NEGATIVE / Ketone: NEGATIVE  / Bili: NEGATIVE / Urobili: NORMAL   Blood: NEGATIVE / Protein: 20 / Nitrite: NEGATIVE   Leuk Esterase: NEGATIVE / RBC: 0-2 / WBC 0-2   Sq Epi: FEW / Non Sq Epi: x / Bacteria: NEGATIVE        RADIOLOGY & ADDITIONAL TESTS:    Imaging Personally Reviewed:    Consultant(s) Notes Reviewed:      Care Discussed with Consultants/Other Providers: Dr. Larry Pike   Internal Medicine PGY-1  Pager #[phone]    Patient is a 78y old  Female who presents with a chief complaint of Sepsis (2019 08:48)      SUBJECTIVE / OVERNIGHT EVENTS:  Trops continued to rise overnight so NF called cardiology NP, they started heparin gtt, but it had to be stopped 2/2 bleeding from IV sites and hematoma of L forearm where heparin was running. She was also started on biPAP overnight. This morning she had removed the biPAP herself as she does not like the mask and feels like she is breathing better. She notes chills overnight, but no subjective fever. No cough. She denies CP, N/V, and diarrhea. Still feels she is urinating well. No palpitations or racing heart.     MEDICATIONS  (STANDING):  aspirin 81 milliGRAM(s) Oral daily  atorvastatin 40 milliGRAM(s) Oral at bedtime  dextrose 5%. 1000 milliLiter(s) (50 mL/Hr) IV Continuous <Continuous>  dextrose 50% Injectable 12.5 Gram(s) IV Push once  dextrose 50% Injectable 25 Gram(s) IV Push once  dextrose 50% Injectable 25 Gram(s) IV Push once  furosemide   Injectable 40 milliGRAM(s) IV Push daily  influenza   Vaccine 0.5 milliLiter(s) IntraMuscular once  insulin lispro (HumaLOG) corrective regimen sliding scale   SubCutaneous three times a day before meals  metoprolol tartrate 50 milliGRAM(s) Oral every 12 hours  ticagrelor 90 milliGRAM(s) Oral every 12 hours    MEDICATIONS  (PRN):  acetaminophen   Tablet .. 650 milliGRAM(s) Oral every 6 hours PRN Temp greater or equal to 38C (100.4F)  dextrose 40% Gel 15 Gram(s) Oral once PRN Blood Glucose LESS THAN 70 milliGRAM(s)/deciliter  glucagon  Injectable 1 milliGRAM(s) IntraMuscular once PRN Glucose LESS THAN 70 milligrams/deciliter      Vital Signs Last 24 Hrs  T(C): 37.3 (2019 04:54), Max: 39.4 (2019 07:40)  T(F): 99.1 (2019 04:54), Max: 102.9 (2019 07:40)  HR: 87 (2019 03:16) (67 - 130)  BP: 153/80 (2019 04:54) (135/64 - 199/82)  BP(mean): --  RR: 18 (2019 04:54) (17 - 28)  SpO2: 98% (2019 04:54) (95% - 100%)  CAPILLARY BLOOD GLUCOSE      POCT Blood Glucose.: 101 mg/dL (2019 06:23)  POCT Blood Glucose.: 106 mg/dL (2019 00:34)  POCT Blood Glucose.: 124 mg/dL (2019 17:36)  POCT Blood Glucose.: 99 mg/dL (2019 12:04)  POCT Blood Glucose.: 141 mg/dL (2019 08:28)    I&O's Summary    2019 07:01  -  2019 07:00  --------------------------------------------------------  IN: 100 mL / OUT: 200 mL / NET: -100 mL    2019 07:01  -  2019 06:43  --------------------------------------------------------  IN: 0 mL / OUT: 2000 mL / NET: -2000 mL        PHYSICAL EXAM:  GENERAL: NAD, well-developed  HEAD:  Atraumatic, Normocephalic  EYES: EOMI, PERRLA, conjunctiva and sclera clear  NECK: Supple, No JVD  CHEST/LUNG: Clear to auscultation bilaterally; No wheeze  HEART: Regular rate and rhythm; No murmurs, rubs, or gallops  ABDOMEN: Soft, Nontender, Nondistended; Bowel sounds present  EXTREMITIES:  2+ Peripheral Pulses, No clubbing, cyanosis, or edema  PSYCH: AAOx3  NEUROLOGY: non-focal  SKIN: No rashes or lesions    LABS:                        10.5   8.40  )-----------( 242      ( 2019 04:32 )             35.4     02-09    137  |  93<L>  |  27<H>  ----------------------------<  99  4.3   |  25  |  1.36<H>    Ca    8.6      2019 03:05  Phos  3.6       Mg     2.1         TPro  6.9  /  Alb  3.6  /  TBili  0.4  /  DBili  x   /  AST  96<H>  /  ALT  38<H>  /  AlkPhos  67      PT/INR - ( 2019 04:50 )   PT: 32.3 SEC;   INR: 2.82          PTT - ( 2019 04:32 )  PTT:58.6 SEC  CARDIAC MARKERS ( 2019 03:05 )  x     / x     / 667 u/L / 13.67 ng/mL / x      CARDIAC MARKERS ( 2019 19:06 )  x     / x     / 618 u/L / 17.37 ng/mL / x      CARDIAC MARKERS ( 2019 10:40 )  x     / x     / 503 u/L / 21.45 ng/mL / x      CARDIAC MARKERS ( 2019 04:50 )  x     / x     / 364 u/L / 21.97 ng/mL / x          Urinalysis Basic - ( 2019 08:10 )    Color: YELLOW / Appearance: CLEAR / S.018 / pH: 5.5  Gluc: NEGATIVE / Ketone: NEGATIVE  / Bili: NEGATIVE / Urobili: NORMAL   Blood: NEGATIVE / Protein: 20 / Nitrite: NEGATIVE   Leuk Esterase: NEGATIVE / RBC: 0-2 / WBC 0-2   Sq Epi: FEW / Non Sq Epi: x / Bacteria: NEGATIVE        RADIOLOGY & ADDITIONAL TESTS:    Imaging Personally Reviewed:    Consultant(s) Notes Reviewed:      Care Discussed with Consultants/Other Providers: Dr. Larry Pike   Internal Medicine PGY-1  Pager #[phone]    Patient is a 78y old  Female who presents with a chief complaint of Sepsis (2019 08:48)      SUBJECTIVE / OVERNIGHT EVENTS:  Trops continued to rise overnight so NF called cardiology NP, they started heparin gtt, but it had to be stopped 2/2 bleeding from IV sites and hematoma of L forearm where heparin was running. She was also started on biPAP overnight. This morning she had removed the biPAP herself as she does not like the mask and feels like she is breathing better. She notes chills overnight, but no subjective fever. No cough. She denies CP, N/V, and diarrhea. Still feels she is urinating well. No palpitations or racing heart.     MEDICATIONS  (STANDING):  aspirin 81 milliGRAM(s) Oral daily  atorvastatin 40 milliGRAM(s) Oral at bedtime  dextrose 5%. 1000 milliLiter(s) (50 mL/Hr) IV Continuous <Continuous>  dextrose 50% Injectable 12.5 Gram(s) IV Push once  dextrose 50% Injectable 25 Gram(s) IV Push once  dextrose 50% Injectable 25 Gram(s) IV Push once  furosemide   Injectable 40 milliGRAM(s) IV Push daily  influenza   Vaccine 0.5 milliLiter(s) IntraMuscular once  insulin lispro (HumaLOG) corrective regimen sliding scale   SubCutaneous three times a day before meals  metoprolol tartrate 50 milliGRAM(s) Oral every 12 hours  ticagrelor 90 milliGRAM(s) Oral every 12 hours    MEDICATIONS  (PRN):  acetaminophen   Tablet .. 650 milliGRAM(s) Oral every 6 hours PRN Temp greater or equal to 38C (100.4F)  dextrose 40% Gel 15 Gram(s) Oral once PRN Blood Glucose LESS THAN 70 milliGRAM(s)/deciliter  glucagon  Injectable 1 milliGRAM(s) IntraMuscular once PRN Glucose LESS THAN 70 milligrams/deciliter      Vital Signs Last 24 Hrs  T(C): 37.3 (2019 04:54), Max: 39.4 (2019 07:40)  T(F): 99.1 (2019 04:54), Max: 102.9 (2019 07:40)  HR: 87 (2019 03:16) (67 - 130)  BP: 153/80 (2019 04:54) (135/64 - 199/82)  BP(mean): --  RR: 18 (2019 04:54) (17 - 28)  SpO2: 98% (2019 04:54) (95% - 100%)  CAPILLARY BLOOD GLUCOSE      POCT Blood Glucose.: 101 mg/dL (2019 06:23)  POCT Blood Glucose.: 106 mg/dL (2019 00:34)  POCT Blood Glucose.: 124 mg/dL (2019 17:36)  POCT Blood Glucose.: 99 mg/dL (2019 12:04)  POCT Blood Glucose.: 141 mg/dL (2019 08:28)    I&O's Summary    2019 07:01  -  2019 07:00  --------------------------------------------------------  IN: 100 mL / OUT: 200 mL / NET: -100 mL    2019 07:01  -  2019 06:43  --------------------------------------------------------  IN: 0 mL / OUT: 2000 mL / NET: -2000 mL        PHYSICAL EXAM:  GENERAL: Still grunting with each breath, but appears comfortable.   HEAD:  Atraumatic, Normocephalic  EYES: EOMI, PERRLA, conjunctiva and sclera clear  NECK: Supple, No JVD  CHEST/LUNG: Lungs now diffusely with more rhonchi and crackles in the dependent position (slept on her left side).  HEART: Irregularly irregular rate and rhythm; No murmurs, rubs, or gallops  ABDOMEN: Soft, Nontender, Nondistended; Bowel sounds present  EXTREMITIES:  2+ Peripheral Pulses, No clubbing, cyanosis, or edema  PSYCH: AAOx3  NEUROLOGY: non-focal  SKIN: No rashes or lesions    LABS:                        10.5   8.40  )-----------( 242      ( 2019 04:32 )             35.4     02-    137  |  93<L>  |  27<H>  ----------------------------<  99  4.3   |  25  |  1.36<H>    Ca    8.6      2019 03:05  Phos  3.6     02-  Mg     2.1         TPro  6.9  /  Alb  3.6  /  TBili  0.4  /  DBili  x   /  AST  96<H>  /  ALT  38<H>  /  AlkPhos  67  02-09    PT/INR - ( 2019 04:50 )   PT: 32.3 SEC;   INR: 2.82          PTT - ( 2019 04:32 )  PTT:58.6 SEC  CARDIAC MARKERS ( 2019 03:05 )  x     / x     / 667 u/L / 13.67 ng/mL / x      CARDIAC MARKERS ( 2019 19:06 )  x     / x     / 618 u/L / 17.37 ng/mL / x      CARDIAC MARKERS ( 2019 10:40 )  x     / x     / 503 u/L / 21.45 ng/mL / x      CARDIAC MARKERS ( 2019 04:50 )  x     / x     / 364 u/L / 21.97 ng/mL / x          Urinalysis Basic - ( 2019 08:10 )    Color: YELLOW / Appearance: CLEAR / S.018 / pH: 5.5  Gluc: NEGATIVE / Ketone: NEGATIVE  / Bili: NEGATIVE / Urobili: NORMAL   Blood: NEGATIVE / Protein: 20 / Nitrite: NEGATIVE   Leuk Esterase: NEGATIVE / RBC: 0-2 / WBC 0-2   Sq Epi: FEW / Non Sq Epi: x / Bacteria: NEGATIVE        RADIOLOGY & ADDITIONAL TESTS:    Imaging Personally Reviewed:    Consultant(s) Notes Reviewed:      Care Discussed with Consultants/Other Providers: Dr. Larry Pike   Internal Medicine PGY-1  Pager #[phone]    Patient is a 78y old  Female who presents with a chief complaint of Sepsis (2019 08:48)      SUBJECTIVE / OVERNIGHT EVENTS:  Trops continued to rise overnight so NF called cardiology NP, they started heparin gtt, but it had to be stopped 2/2 bleeding from IV sites and hematoma of L forearm where heparin was running. She was also started on biPAP overnight. This morning she had removed the biPAP herself as she does not like the mask and feels like she is breathing better. She notes chills overnight, but no subjective fever. No cough. She denies CP, N/V, and diarrhea. Still feels she is urinating well. No palpitations or racing heart.     MEDICATIONS  (STANDING):  aspirin 81 milliGRAM(s) Oral daily  atorvastatin 40 milliGRAM(s) Oral at bedtime  dextrose 5%. 1000 milliLiter(s) (50 mL/Hr) IV Continuous <Continuous>  dextrose 50% Injectable 12.5 Gram(s) IV Push once  dextrose 50% Injectable 25 Gram(s) IV Push once  dextrose 50% Injectable 25 Gram(s) IV Push once  furosemide   Injectable 40 milliGRAM(s) IV Push daily  influenza   Vaccine 0.5 milliLiter(s) IntraMuscular once  insulin lispro (HumaLOG) corrective regimen sliding scale   SubCutaneous three times a day before meals  metoprolol tartrate 50 milliGRAM(s) Oral every 12 hours  ticagrelor 90 milliGRAM(s) Oral every 12 hours    MEDICATIONS  (PRN):  acetaminophen   Tablet .. 650 milliGRAM(s) Oral every 6 hours PRN Temp greater or equal to 38C (100.4F)  dextrose 40% Gel 15 Gram(s) Oral once PRN Blood Glucose LESS THAN 70 milliGRAM(s)/deciliter  glucagon  Injectable 1 milliGRAM(s) IntraMuscular once PRN Glucose LESS THAN 70 milligrams/deciliter      Vital Signs Last 24 Hrs  T(C): 37.3 (2019 04:54), Max: 39.4 (2019 07:40)  T(F): 99.1 (2019 04:54), Max: 102.9 (2019 07:40)  HR: 87 (2019 03:16) (67 - 130)  BP: 153/80 (2019 04:54) (135/64 - 199/82)  BP(mean): --  RR: 18 (2019 04:54) (17 - 28)  SpO2: 98% (2019 04:54) (95% - 100%)  CAPILLARY BLOOD GLUCOSE      POCT Blood Glucose.: 101 mg/dL (2019 06:23)  POCT Blood Glucose.: 106 mg/dL (2019 00:34)  POCT Blood Glucose.: 124 mg/dL (2019 17:36)  POCT Blood Glucose.: 99 mg/dL (2019 12:04)  POCT Blood Glucose.: 141 mg/dL (2019 08:28)    I&O's Summary    2019 07:01  -  2019 07:00  --------------------------------------------------------  IN: 100 mL / OUT: 200 mL / NET: -100 mL    2019 07:01  -  2019 06:43  --------------------------------------------------------  IN: 0 mL / OUT: 2000 mL / NET: -2000 mL        PHYSICAL EXAM:  GENERAL: Still grunting with each breath, but appears comfortable.   HEAD:  Atraumatic, Normocephalic  EYES: EOMI, PERRLA, conjunctiva and sclera clear  NECK: Supple, No JVD  CHEST/LUNG: Lungs now diffusely with more rhonchi and crackles in the dependent position (slept on her left side).  HEART: Irregularly irregular rate and rhythm; holosystolic murmur heard best between 2nd/3rd IC at midline.   ABDOMEN: Soft, Nontender, Nondistended; Bowel sounds present  EXTREMITIES:  2+ Peripheral Pulses, No clubbing, cyanosis. 1+ LE edema.   PSYCH: AAOx3  NEUROLOGY: non-focal  SKIN: No rashes or lesions    LABS:                        10.5   8.40  )-----------( 242      ( 2019 04:32 )             35.4         137  |  93<L>  |  27<H>  ----------------------------<  99  4.3   |  25  |  1.36<H>    Ca    8.6      2019 03:05  Phos  3.6       Mg     2.1         TPro  6.9  /  Alb  3.6  /  TBili  0.4  /  DBili  x   /  AST  96<H>  /  ALT  38<H>  /  AlkPhos  67      PT/INR - ( 2019 04:50 )   PT: 32.3 SEC;   INR: 2.82          PTT - ( 2019 04:32 )  PTT:58.6 SEC  CARDIAC MARKERS ( 2019 03:05 )  x     / x     / 667 u/L / 13.67 ng/mL / x      CARDIAC MARKERS ( 2019 19:06 )  x     / x     / 618 u/L / 17.37 ng/mL / x      CARDIAC MARKERS ( 2019 10:40 )  x     / x     / 503 u/L / 21.45 ng/mL / x      CARDIAC MARKERS ( 2019 04:50 )  x     / x     / 364 u/L / 21.97 ng/mL / x          Urinalysis Basic - ( 2019 08:10 )    Color: YELLOW / Appearance: CLEAR / S.018 / pH: 5.5  Gluc: NEGATIVE / Ketone: NEGATIVE  / Bili: NEGATIVE / Urobili: NORMAL   Blood: NEGATIVE / Protein: 20 / Nitrite: NEGATIVE   Leuk Esterase: NEGATIVE / RBC: 0-2 / WBC 0-2   Sq Epi: FEW / Non Sq Epi: x / Bacteria: NEGATIVE        RADIOLOGY & ADDITIONAL TESTS:    < from: CT Chest No Cont (19 @ 20:09) >  FINDINGS:    CHEST:     LUNGS AND LARGE AIRWAYS: The airways are patent with debris/fluid within   the left mainstem bronchus. There are right upper lobe and left lower   lobe nodular opacities. There is a patchy left lower lobe opacity.   PLEURA: Small left pleural effusion.  VESSELS: Dense atherosclerosis of the thoracic aorta.  HEART: Cardiomegaly. No pericardial effusion. Coronary artery, aortic   valve, and dense mitralannular calcifications. Status post CABG.  MEDIASTINUM AND MAITE: No lymphadenopathy.  CHEST WALL AND LOWER NECK: Sternotomy.  VISUALIZED UPPER ABDOMEN: Cholelithiasis  BONES: Mild degenerative changes.    IMPRESSION:   Multifocal pneumonia.Small leftpleural effusion.    < end of copied text >

## 2019-02-09 NOTE — PROGRESS NOTE ADULT - PROBLEM SELECTOR PLAN 4
Afib w/t RVR likely 2/2 acute sepsis  Telemetry showed rate up to 131  [id number] score 7  -c/w metop tart 50mg bid in spite of sepsis given RVR  INR on admission supratherapeutic to 3.10 now 2.82 will redose 2mg tonight  - telemetry  -Tylenol for fever Afib w/t RVR likely 2/2 acute sepsis  Telemetry showed rate up to 131  [id number] score 7  -increase metop tart 75mg bid to decrease demand  -coumadin 2mg tonight, goal INR 2-3  - telemetry  -Tylenol for fever

## 2019-02-09 NOTE — DIETITIAN INITIAL EVALUATION ADULT. - PROBLEM SELECTOR PLAN 8
Pt and daughter unsure of medications and pharmacy could not be reached, uses a [facility] in [facility]. Daughter will bring in list of meds with pharmacy information tomorrow AM  - med rec in AM (primary team)

## 2019-02-09 NOTE — DIETITIAN INITIAL EVALUATION ADULT. - PROBLEM SELECTOR PLAN 2
[id number] risk score 7  Pt takes metoprolol XR for rate control and is on coumadin 3mg QD.  INR on admission supratherapeutic to 3.10;  - will hold tonight's dose of coumadin and recheck INR in AM  - c/w metoprolol 50mg BID in setting of sepsis  - cont to monitor on telemetry

## 2019-02-09 NOTE — PHYSICAL THERAPY INITIAL EVALUATION ADULT - ADDITIONAL COMMENTS
as per medical records pt. lives with her son. Pt. ambulates with a rolling walker. Please consult with social work for more accurate history. Pt. left supine in bed with all tubes/lines intact, call bell in reach and in NAD.

## 2019-02-09 NOTE — PHYSICAL THERAPY INITIAL EVALUATION ADULT - LEVEL OF INDEPENDENCE: SUPINE/SIT, REHAB EVAL
Medications:  Continuous Infusions:    Scheduled Meds:   acetaminophen  1,000 mg Oral Q8H    amLODIPine  10 mg Oral Daily    ampicillin-sulbactam 1.5 g in sodium chloride 0.9 % 100 mL IVPB (ready to mix system)  1.5 g Intravenous Q8H    benazepril  20 mg Oral BID    levothyroxine  100 mcg Oral Before breakfast    metoprolol succinate  25 mg Oral Daily    pantoprazole  40 mg Oral Daily    pregabalin  75 mg Oral QHS    rosuvastatin  20 mg Oral QHS    senna-docusate 8.6-50 mg  1 tablet Oral BID    vancomycin (VANCOCIN) IVPB  750 mg Intravenous Q24H     PRN Meds:ondansetron, oxyCODONE, prochlorperazine, ramelteon, sodium chloride 0.9%     Objective:     Vital Signs (Most Recent):  Temp: 96.2 °F (35.7 °C) (06/08/18 0513)  Pulse: 74 (06/08/18 0513)  Resp: 17 (06/08/18 0513)  BP: (!) 149/67 (06/08/18 0513)  SpO2: 95 % (06/08/18 0513) Vital Signs (24h Range):  Temp:  [96.2 °F (35.7 °C)-99.8 °F (37.7 °C)] 96.2 °F (35.7 °C)  Pulse:  [68-91] 74  Resp:  [16-17] 17  SpO2:  [86 %-96 %] 95 %  BP: (120-157)/(56-70) 149/67          Physical Exam   Constitutional: She appears well-developed. No distress.   Cardiovascular: Normal rate.    Pulmonary/Chest: Effort normal. No respiratory distress.   Abdominal: Soft. She exhibits no distension.   Neurological: She is alert.   Psychiatric: She has a normal mood and affect.   Wound vac in place over surgical incision    Significant Labs:  CBC:     Recent Labs  Lab 06/08/18  0408   WBC 21.14*   RBC 3.78*   HGB 11.6*   HCT 36.0*      MCV 95   MCH 30.7   MCHC 32.2     CMP:     Recent Labs  Lab 06/08/18  0408   GLU 64*   CALCIUM 7.8*   ALBUMIN 1.4*   PROT 4.3*   *   K 4.3   CO2 23      BUN 6*   CREATININE 0.5   ALKPHOS 91   ALT <5*   AST 13   BILITOT 0.4       Significant Diagnostics:  I have reviewed all pertinent imaging results/findings within the past 24 hours.   unable to perform

## 2019-02-09 NOTE — PROGRESS NOTE ADULT - PROBLEM SELECTOR PLAN 7
Most recent A1C unknown. Pt was previously on metformin, but has not been taking for several months.   - f/u A1C  - FS checks with KRYSTYNA Hba1c 5.7, now in prediabetes range  - FS checks with KRYSTNYA

## 2019-02-09 NOTE — PROGRESS NOTE ADULT - PROBLEM SELECTOR PLAN 6
-c/w DAPT  - c/w simvastatin for now, will consider switching to high intensity statin -c/w DAPT  -high intensity statin atorvastatin 80mg qhs

## 2019-02-09 NOTE — PHYSICAL THERAPY INITIAL EVALUATION ADULT - PATIENT PROFILE REVIEW, REHAB EVAL
yes/Pt. profile reviewed, consulted with RN Mariza HOLM prior to initial PT evaluation and tx, as per RN, Pt. is OK to participate in skilled therapy session,  as per RN HOLD functional mobility at this time secondary to medical however, Pt. ok for skilled ROM and MMT assessment

## 2019-02-09 NOTE — PROGRESS NOTE ADULT - ASSESSMENT
78F with PMHx of HFpEF (EF73%), CAD s/p CABG (LIMA to LAD & SVG to OM,  RCA), bioprosthetic MVR & AVR, afib on coumadin, HTN, HLD, T2DM, and recurrent PNA who presents with fever, SOB, N/V and diaphoresis 2/2 sepsis likely 2/2 acute viral URI vs. CAP (less likely given CXR) c/b CHF exacerbation and afib w/t RVR further c/b NSTEMI (likely type II demand ischemia in setting of sepsis and afib w/t RVR). 78F with PMHx of HFpEF (EF73%), CAD s/p CABG (LIMA to LAD & SVG to OM,  RCA), bioprosthetic MVR & AVR, afib on coumadin, HTN, HLD, T2DM, and recurrent PNA who presents with fever, SOB, N/V and diaphoresis 2/2 sepsis likely 2/2 acute viral URI vs. CAP (less likely given CXR) c/b CHF exacerbation and afib w/t RVR further c/b NSTEMI (likely type II demand ischemia in setting of sepsis and afib w/t RVR). Continued rise in trops concerning for type I MI will monitor.

## 2019-02-09 NOTE — PROGRESS NOTE ADULT - ASSESSMENT
78 year old woman with CAD, s/p CABG, s/p cath 2014 with patent LIMA to LAD and SVG to OM, + of RCA, valvular heart disease, s/p bioprosthetic MVR and AVR, afib on coumadin, HTN, HLD, DM, CVA, and multiple "lung infections" per family presents with worsening fatigue, exertional SOB, and general malaise with cough and cold symptoms. +fever and likely sepsis but also with elevated hsT and CKMB.    nstemi likely type II  -cont trend CE  -cont dapt  -change atorvastatin to 80mg     AF w RVR  -cont systemic anticoagulation goal INR 2-3  -cont rate control - inc metoprolol for goal HR closer to 60    bio mvr/avr - elevated gradients  -cont lasix  -HR control will improve flow    [id number] 78 year old woman with CAD, s/p CABG, s/p cath 2014 with patent LIMA to LAD and SVG to OM, + of RCA, valvular heart disease, s/p bioprosthetic MVR and AVR, afib on coumadin, HTN, HLD, DM, CVA, and multiple "lung infections" per family presents with worsening fatigue, exertional SOB, and general malaise with cough and cold symptoms. +fever and likely sepsis but also with elevated hsT and CKMB.    nstemi likely type II  - daily EKG  -cont trend CE  -cont dapt  -change atorvastatin to 80mg     AF w RVR  -cont systemic anticoagulation goal INR 2-3  -cont rate control - inc metoprolol for goal HR closer to 60    bio mvr/avr - elevated gradients  -cont lasix  -HR control will improve flow    [id number]

## 2019-02-10 LAB
-  COAGULASE NEGATIVE STAPHYLOCOCCUS: SIGNIFICANT CHANGE UP
ALBUMIN SERPL ELPH-MCNC: 3.2 G/DL — LOW (ref 3.3–5)
ALP SERPL-CCNC: 56 U/L — SIGNIFICANT CHANGE UP (ref 40–120)
ALT FLD-CCNC: 25 U/L — SIGNIFICANT CHANGE UP (ref 4–33)
ANION GAP SERPL CALC-SCNC: 15 MMO/L — HIGH (ref 7–14)
AST SERPL-CCNC: 69 U/L — HIGH (ref 4–32)
BACTERIA BLD CULT: SIGNIFICANT CHANGE UP
BILIRUB SERPL-MCNC: 0.4 MG/DL — SIGNIFICANT CHANGE UP (ref 0.2–1.2)
BUN SERPL-MCNC: 44 MG/DL — HIGH (ref 7–23)
CALCIUM SERPL-MCNC: 8.3 MG/DL — LOW (ref 8.4–10.5)
CHLORIDE SERPL-SCNC: 98 MMOL/L — SIGNIFICANT CHANGE UP (ref 98–107)
CK MB BLD-MCNC: 1.8 — SIGNIFICANT CHANGE UP (ref 0–2.5)
CK MB BLD-MCNC: 7.07 NG/ML — HIGH (ref 1–4.7)
CK SERPL-CCNC: 385 U/L — HIGH (ref 25–170)
CO2 SERPL-SCNC: 25 MMOL/L — SIGNIFICANT CHANGE UP (ref 22–31)
CREAT SERPL-MCNC: 1.79 MG/DL — HIGH (ref 0.5–1.3)
GLUCOSE BLDC GLUCOMTR-MCNC: 107 MG/DL — HIGH (ref 70–99)
GLUCOSE BLDC GLUCOMTR-MCNC: 124 MG/DL — HIGH (ref 70–99)
GLUCOSE BLDC GLUCOMTR-MCNC: 138 MG/DL — HIGH (ref 70–99)
GLUCOSE BLDC GLUCOMTR-MCNC: 143 MG/DL — HIGH (ref 70–99)
GLUCOSE SERPL-MCNC: 105 MG/DL — HIGH (ref 70–99)
HCT VFR BLD CALC: 35.1 % — SIGNIFICANT CHANGE UP (ref 34.5–45)
HGB BLD-MCNC: 10.2 G/DL — LOW (ref 11.5–15.5)
INR BLD: 1.79 — HIGH (ref 0.88–1.17)
MAGNESIUM SERPL-MCNC: 1.9 MG/DL — SIGNIFICANT CHANGE UP (ref 1.6–2.6)
MCHC RBC-ENTMCNC: 22.3 PG — LOW (ref 27–34)
MCHC RBC-ENTMCNC: 29.1 % — LOW (ref 32–36)
MCV RBC AUTO: 76.8 FL — LOW (ref 80–100)
NRBC # FLD: 0 K/UL — LOW (ref 25–125)
ORGANISM # SPEC MICROSCOPIC CNT: SIGNIFICANT CHANGE UP
PHOSPHATE SERPL-MCNC: 4.2 MG/DL — SIGNIFICANT CHANGE UP (ref 2.5–4.5)
PLATELET # BLD AUTO: 245 K/UL — SIGNIFICANT CHANGE UP (ref 150–400)
PMV BLD: 10.4 FL — SIGNIFICANT CHANGE UP (ref 7–13)
POTASSIUM SERPL-MCNC: 4.1 MMOL/L — SIGNIFICANT CHANGE UP (ref 3.5–5.3)
POTASSIUM SERPL-SCNC: 4.1 MMOL/L — SIGNIFICANT CHANGE UP (ref 3.5–5.3)
PROT SERPL-MCNC: 6.3 G/DL — SIGNIFICANT CHANGE UP (ref 6–8.3)
PROTHROM AB SERPL-ACNC: 20.2 SEC — HIGH (ref 9.8–13.1)
RBC # BLD: 4.57 M/UL — SIGNIFICANT CHANGE UP (ref 3.8–5.2)
RBC # FLD: 18.3 % — HIGH (ref 10.3–14.5)
SODIUM SERPL-SCNC: 138 MMOL/L — SIGNIFICANT CHANGE UP (ref 135–145)
TROPONIN T, HIGH SENSITIVITY: 1180 NG/L — CRITICAL HIGH (ref ?–14)
WBC # BLD: 6.22 K/UL — SIGNIFICANT CHANGE UP (ref 3.8–10.5)
WBC # FLD AUTO: 6.22 K/UL — SIGNIFICANT CHANGE UP (ref 3.8–10.5)

## 2019-02-10 PROCEDURE — 99233 SBSQ HOSP IP/OBS HIGH 50: CPT

## 2019-02-10 PROCEDURE — 99233 SBSQ HOSP IP/OBS HIGH 50: CPT | Mod: GC

## 2019-02-10 RX ORDER — WARFARIN SODIUM 2.5 MG/1
2 TABLET ORAL AT BEDTIME
Qty: 0 | Refills: 0 | Status: COMPLETED | OUTPATIENT
Start: 2019-02-10 | End: 2019-02-10

## 2019-02-10 RX ORDER — LACTOBACILLUS ACIDOPHILUS 100MM CELL
1 CAPSULE ORAL DAILY
Qty: 0 | Refills: 0 | Status: DISCONTINUED | OUTPATIENT
Start: 2019-02-10 | End: 2019-02-17

## 2019-02-10 RX ADMIN — Medication 40 MILLIGRAM(S): at 05:46

## 2019-02-10 RX ADMIN — Medication 1 TABLET(S): at 18:51

## 2019-02-10 RX ADMIN — Medication 40 MILLIGRAM(S): at 18:49

## 2019-02-10 RX ADMIN — Medication 81 MILLIGRAM(S): at 12:39

## 2019-02-10 RX ADMIN — TICAGRELOR 90 MILLIGRAM(S): 90 TABLET ORAL at 05:46

## 2019-02-10 RX ADMIN — Medication 75 MILLIGRAM(S): at 18:50

## 2019-02-10 RX ADMIN — TICAGRELOR 90 MILLIGRAM(S): 90 TABLET ORAL at 18:50

## 2019-02-10 RX ADMIN — PIPERACILLIN AND TAZOBACTAM 25 GRAM(S): 4; .5 INJECTION, POWDER, LYOPHILIZED, FOR SOLUTION INTRAVENOUS at 21:13

## 2019-02-10 RX ADMIN — ATORVASTATIN CALCIUM 80 MILLIGRAM(S): 80 TABLET, FILM COATED ORAL at 21:13

## 2019-02-10 RX ADMIN — Medication 75 MILLIGRAM(S): at 05:46

## 2019-02-10 RX ADMIN — PIPERACILLIN AND TAZOBACTAM 25 GRAM(S): 4; .5 INJECTION, POWDER, LYOPHILIZED, FOR SOLUTION INTRAVENOUS at 05:46

## 2019-02-10 RX ADMIN — LEVALBUTEROL 0.63 MILLIGRAM(S): 1.25 SOLUTION, CONCENTRATE RESPIRATORY (INHALATION) at 00:15

## 2019-02-10 RX ADMIN — PIPERACILLIN AND TAZOBACTAM 25 GRAM(S): 4; .5 INJECTION, POWDER, LYOPHILIZED, FOR SOLUTION INTRAVENOUS at 12:39

## 2019-02-10 RX ADMIN — LEVALBUTEROL 0.63 MILLIGRAM(S): 1.25 SOLUTION, CONCENTRATE RESPIRATORY (INHALATION) at 09:53

## 2019-02-10 RX ADMIN — WARFARIN SODIUM 2 MILLIGRAM(S): 2.5 TABLET ORAL at 21:13

## 2019-02-10 NOTE — PROGRESS NOTE ADULT - PROBLEM SELECTOR PLAN 3
- Acute on chronic HFpEF exacerbation likely 2/2 Sepsis and afib w/t RVR  - TTE from 5/2017 showed EF73%, normal RVSF and LVSF, moderate TR, and moderate pulm HTN. Prelim read of CXR showing mild pulm edema. proBNP elevated to 2306  - 40mg lasix IV BID   - f/u TTE  -daily weights  -Strict Is/Os  -increase to metoprolol tart 75mg bid - Acute on chronic HFpEF exacerbation likely 2/2 Sepsis and afib w/t RVR  - TTE from 5/2017 showed EF73%, normal RVSF and LVSF, moderate TR, and moderate pulm HTN. Prelim read of CXR showing mild pulm edema. proBNP elevated to 2306  - 40mg lasix IV BID   - f/u TTE  -daily weights  -Strict Is/Os  -continue metoprolol tart 75mg bid

## 2019-02-10 NOTE — PROGRESS NOTE ADULT - PROBLEM SELECTOR PLAN 9
Pt and daughter unsure of medications and pharmacy could not be reached, [facility]. Daughter provided numbers for PCP and Cardiologist. Will call and try to formally med rec her.   - med rec in AM (primary team)

## 2019-02-10 NOTE — PROGRESS NOTE ADULT - SUBJECTIVE AND OBJECTIVE BOX
24H hour events: No acute events.    MEDICATIONS:  furosemide   Injectable 40 milliGRAM(s) IV Push every 12 hours  metoprolol tartrate 75 milliGRAM(s) Oral every 12 hours  ticagrelor 90 milliGRAM(s) Oral every 12 hours  piperacillin/tazobactam IVPB. 3.375 Gram(s) IV Intermittent every 8 hours  levalbuterol Inhalation 0.63 milliGRAM(s) Inhalation every 6 hours PRN  acetaminophen   Tablet .. 650 milliGRAM(s) Oral every 6 hours PRN  aspirin 81 milliGRAM(s) Oral daily  atorvastatin 80 milliGRAM(s) Oral at bedtime  dextrose 40% Gel 15 Gram(s) Oral once PRN  dextrose 50% Injectable 12.5 Gram(s) IV Push once  dextrose 50% Injectable 25 Gram(s) IV Push once  dextrose 50% Injectable 25 Gram(s) IV Push once  glucagon  Injectable 1 milliGRAM(s) IntraMuscular once PRN  insulin lispro (HumaLOG) corrective regimen sliding scale   SubCutaneous three times a day before meals  dextrose 5%. 1000 milliLiter(s) IV Continuous <Continuous>  influenza   Vaccine 0.5 milliLiter(s) IntraMuscular once    REVIEW OF SYSTEMS:  Complete 10point ROS negative.  # [id number] Romanian. Patient denies SOB, pain, palpitations, lh/dizziness, orthopnea, pnd. Feels well. She is asking when she can go home.    PHYSICAL EXAM:  T(C): 37.1 (02-10-19 @ 08:30), Max: 37.5 (02-09-19 @ 14:40)  HR: 70 (02-10-19 @ 09:53) (62 - 90)  BP: 104/64 (02-10-19 @ 08:30) (104/64 - 125/58)  RR: 18 (02-10-19 @ 08:30) (17 - 18)  SpO2: 98% (02-10-19 @ 09:53) (95% - 100%)  Wt(kg): --  I&O's Summary    09 Feb 2019 07:01  -  10 Feb 2019 07:00  --------------------------------------------------------  IN: 400 mL / OUT: 2050 mL / NET: -1650 mL    Appearance: NAD  HEENT:   Normal oral mucosa, PERRL, EOMI	  Cardiovascular: Normal S1 S2, JVD elevated, No murmurs, LE 2+ pitting  Respiratory: ronchi still present, but improved air movement sounds less turbulent   Gastrointestinal:  Soft, Non-tender, + BS	  Neurologic: Non-focal  Vascular: Peripheral pulses palpable 2+ bilaterally    LABS:	 	    CBC Full  -  ( 10 Feb 2019 06:13 )  WBC Count : 6.22 K/uL  Hemoglobin : 10.2 g/dL  Hematocrit : 35.1 %  Platelet Count - Automated : 245 K/uL  Mean Cell Volume : 76.8 fL  Mean Cell Hemoglobin : 22.3 pg  Mean Cell Hemoglobin Concentration : 29.1 %    02-10    138  |  98  |  44<H>  ----------------------------<  105<H>  4.1   |  25  |  1.79<H>  02-09    137  |  96<L>  |  37<H>  ----------------------------<  122<H>  4.2   |  26  |  1.67<H>    Ca    8.3<L>      10 Feb 2019 06:13  Ca    8.1<L>      09 Feb 2019 18:11  Phos  4.2     02-10  Phos  3.8     02-09  Mg     1.9     02-10  Mg     2.0     02-09    TPro  6.3  /  Alb  3.2<L>  /  TBili  0.4  /  DBili  x   /  AST  69<H>  /  ALT  25  /  AlkPhos  56  02-10  TPro  6.7  /  Alb  3.4  /  TBili  0.4  /  DBili  x   /  AST  77<H>  /  ALT  28  /  AlkPhos  59  02-09    proBNP: Serum Pro-Brain Natriuretic Peptide: 2306 pg/mL (02-07 @ 16:43)    CKMB: 7.07 ng/mL (02-10 @ 06:13)    CKMB Relative Index: 1.8 (02-10 @ 06:13)    TELEMETRY: Af 40-80s    < from: Transthoracic Echocardiogram (02.09.19 @ 07:28) >  ------------------------------------------------------------------------  DIMENSIONS:  Dimensions:     Normal Values:  LA:     4.5 cm    2.0 - 4.0 cm  Ao:     2.9 cm    2.0 - 3.8 cm  SEPTUM: 1.3 cm    0.6 - 1.2 cm  PWT:1.3 cm    0.6 - 1.1 cm  LVIDd:  3.5 cm    3.0 - 5.6 cm  LVIDs:  2.8 cm    1.8 - 4.0 cm  Derived Variables:  LVMI: 83 g/m2  RWT: 0.74  Fractional short: 20 %  Ejection Fraction (Teicholtz): 42 %  ------------------------------------------------------------------------    < end of copied text >  < from: Transthoracic Echocardiogram (02.09.19 @ 07:28) >  OBSERVATIONS:  Mitral Valve: Bioprosthetic mitral valve replacement. Mean  transmitral valve gradient equals 5 mm Hg, which is  probably normal in the setting of a prosthetic valve.  Aortic Root: Normal aortic root.  Aortic Valve: Bioprosthetic aortic valve replacement. Peak  transaortic valve gradient equals 25 mm Hg, mean  transaortic valve gradient equals 11 mm Hg, which is  probably normal in the presence of a prosthetic valve.  Left Atrium: Mild left atrial enlargement.  Left Ventricle: Endocardium not well visualized; moderate  left ventricular dysfunction. Increased relative wall  thickness with normal left ventricular mass index,  consistent with concentric left ventricular remodeling.  Right Heart: Mild right atrialenlargement. Right  ventricular enlargement with normal right ventricular  systolic function. Normal tricuspid valve.  Severe  tricuspid regurgitation. Normal pulmonic valve.  Pericardium/PleuraNormal pericardium with no pericardial  effusion.  Hemodynamic: Estimated right ventricular systolic pressure  equals 40 mm Hg, assuming right atrial pressure equals 10  mm Hg, consistent with mild pulmonary hypertension.  ------------------------------------------------------------------------  CONCLUSIONS:  1. Bioprosthetic mitral valve replacement. Mean transmitral  valve gradient equals 5 mm Hg, which is probably normal in  the setting of a prosthetic valve.  2. Bioprosthetic aortic valve replacement. Peak transaortic  valve gradient equals 25 mm Hg, mean transaortic valve  gradient equals 11 mm Hg, which is probably normal in the  presence of a prosthetic valve.  3. Mild left atrial enlargement.  4. Increased relative wall thickness with normal left  ventricular mass index, consistent with concentric left  ventricular remodeling.  5. Endocardium not well visualized; moderate left  ventricular dysfunction.  6. Mild right atrial enlargement.  7. Right ventricular enlargement with normal right  ventricular systolic function.  8. Normal tricuspid valve.  Severe tricuspid regurgitation.  9. Estimated pulmonary artery systolic pressure equals 40  mm Hg, assuming right atrial pressure equals 10  mm Hg,  consistent with mild pulmonary hypertension.    < end of copied text >

## 2019-02-10 NOTE — PROGRESS NOTE ADULT - ASSESSMENT
78F with PMHx of HFpEF (EF73%), CAD s/p CABG (LIMA to LAD & SVG to OM,  RCA), bioprosthetic MVR & AVR, afib on coumadin, HTN, HLD, T2DM, and recurrent PNA who presents with fever, SOB, N/V and diaphoresis 2/2 sepsis likely 2/2 acute viral URI vs. CAP (less likely given CXR) c/b CHF exacerbation and afib w/t RVR further c/b NSTEMI (likely type II demand ischemia in setting of sepsis and afib w/t RVR). Continued rise in trops

## 2019-02-10 NOTE — PROGRESS NOTE ADULT - PROBLEM SELECTOR PLAN 2
- NSTEMI 2/2 type demand ischemia likely multifactorial due to sepsis and/or CHF exacerbation/ afib w/RVR  - hst Trops 44 -->628 -->1133  - CKMB trending down  -ASA/Brillinta maintenance dose  -Holding heparin given bleed overnight  -EKG QD  -f/u TTE result  - per cards, c/w coumadin, goal INR 2-3  - NSTEMI likely to improve with better rate control will increase metoprol to 75mg bid.   - Continue rate control and AC - NSTEMI 2/2 type demand ischemia likely multifactorial due to sepsis and/or CHF exacerbation/ afib w/RVR  - hst Trops 44 -->628 -->1133  - CKMB trending down  -ASA/Brillinta maintenance dose  -Holding heparin given bleed overnight  -EKG QD  -f/u TTE result  - per cards, c/w coumadin, goal INR 2-3  - NSTEMI likely to improve with better rate control will increase metoprol to 75mg bid.   - Continue rate control and AC  - Possible LHC per cards

## 2019-02-10 NOTE — PROGRESS NOTE ADULT - PROBLEM SELECTOR PLAN 5
Heparin gtt last night appears to have caused bleed vs. infiltration. Hgb stable.  -CBC Q12H (minimize blood loss by stick)  -FOBT

## 2019-02-10 NOTE — PROGRESS NOTE ADULT - PROBLEM SELECTOR PLAN 4
Afib w/t RVR likely 2/2 acute sepsis  Telemetry showed rate up to 131  [id number] score 7  -increase metop tart 75mg bid to decrease demand  -coumadin 2mg tonight, goal INR 2-3  - telemetry  -Tylenol for fever

## 2019-02-10 NOTE — PROGRESS NOTE ADULT - SUBJECTIVE AND OBJECTIVE BOX
For Night coverage 7pm-7am: NS- page [phone] Team1-3, page [phone] Team4 & Care Model  Sat/[place] Cross Coverage 12pm-7pm: NS- page [phone] for Team1-4, MANUEL- pager forwarded to covering Resident    CONTACT INFO:  GAVIOTA Hardy MD  Internal Medicine PGY1  Pager: [phone]/ [phone]    CATRACHITO WOODS  78y  Female    Patient is a 78y old  Female who presents with a chief complaint of Sepsis (09 Feb 2019 09:22)    INTERVAL HPI / OVERNIGHT EVENTS: No acute events overnight. Patient seen and evaluated at bedside. Son at bedside. Breathing well on NC. No fever/chills.  Denies chest pain, palpitations, abdominal pain, nausea/vomiting      OBJECTIVE:  Vitals Signs (24 Hrs):  T(C): 37.1 (02-10-19 @ 08:30), Max: 37.5 (02-09-19 @ 14:40)  HR: 70 (02-10-19 @ 09:53) (62 - 90)  BP: 104/64 (02-10-19 @ 08:30) (104/64 - 125/58)  RR: 18 (02-10-19 @ 08:30) (17 - 18)  SpO2: 98% (02-10-19 @ 09:53) (95% - 100%)    MEDICATIONS:  acetaminophen   Tablet .. 650 milliGRAM(s) Oral every 6 hours PRN Temp greater or equal to 38C (100.4F)  aspirin 81 milliGRAM(s) Oral daily  atorvastatin 80 milliGRAM(s) Oral at bedtime  dextrose 40% Gel 15 Gram(s) Oral once PRN Blood Glucose LESS THAN 70 milliGRAM(s)/deciliter  dextrose 5%. 1000 milliLiter(s) (50 mL/Hr) IV Continuous <Continuous>  dextrose 50% Injectable 12.5 Gram(s) IV Push once  dextrose 50% Injectable 25 Gram(s) IV Push once  dextrose 50% Injectable 25 Gram(s) IV Push once  furosemide   Injectable 40 milliGRAM(s) IV Push every 12 hours  glucagon  Injectable 1 milliGRAM(s) IntraMuscular once PRN Glucose LESS THAN 70 milligrams/deciliter  influenza   Vaccine 0.5 milliLiter(s) IntraMuscular once  insulin lispro (HumaLOG) corrective regimen sliding scale   SubCutaneous three times a day before meals  levalbuterol Inhalation 0.63 milliGRAM(s) Inhalation every 6 hours PRN sob/wheezing  metoprolol tartrate 75 milliGRAM(s) Oral every 12 hours  piperacillin/tazobactam IVPB. 3.375 Gram(s) IV Intermittent every 8 hours  ticagrelor 90 milliGRAM(s) Oral every 12 hours      PHYSICAL EXAM:  General: Comfortable, no apparent distress  HEENT: Atraumatic; EOMI,   Neck: Supple; no JVD;  Chest/Lungs: Coarse breath sound  Heart: Regular rate and rhythm; normal S1/S2; no murmurs, rubs, or gallops  Abdomen: Soft, nontender, nondistended; bowel sounds present  Extremities: PT/DP pulses 2+ B/L; no LE edema  Skin: No rashes or lesions  Neurological: A&O x3    LABS:                        10.2   6.22  )-----------( 245      ( 10 Feb 2019 06:13 )             35.1     02-10    138  |  98  |  44<H>  ----------------------------<  105<H>  4.1   |  25  |  1.79<H>    Ca    8.3<L>      10 Feb 2019 06:13  Phos  4.2     02-10  Mg     1.9     02-10    TPro  6.3  /  Alb  3.2<L>  /  TBili  0.4  /  DBili  x   /  AST  69<H>  /  ALT  25  /  AlkPhos  56  02-10    PT/INR - ( 09 Feb 2019 04:32 )   PT: 28.5 SEC;   INR: 2.43          PTT - ( 09 Feb 2019 04:32 )  PTT:58.6 SEC    CAPILLARY BLOOD GLUCOSE      POCT Blood Glucose.: 107 mg/dL (10 Feb 2019 07:41)  POCT Blood Glucose.: 129 mg/dL (09 Feb 2019 22:05)  POCT Blood Glucose.: 111 mg/dL (09 Feb 2019 17:25)  POCT Blood Glucose.: 107 mg/dL (09 Feb 2019 12:01)        RADIOLOGY & ADDITIONAL TESTS:    ALLERGIES:  No Known Allergies      Labs and imaging personally reviewed and interpreted [  ]  Consult notes reviewed   Case discussed with consultants

## 2019-02-10 NOTE — PROGRESS NOTE ADULT - ASSESSMENT
78 year old woman with CAD, s/p CABG, s/p cath 2014 with patent LIMA to LAD and SVG to OM, + of RCA, valvular heart disease, s/p bioprosthetic MVR and AVR, afib on coumadin, HTN, HLD, DM, CVA, and multiple "lung infections" per family presents with worsening fatigue, exertional SOB, and general malaise with cough and cold symptoms. +fever and likely sepsis but also with elevated hsT and CKMB.    -1.6L balance 24h    nstemi likely demand  -daily EKG  -CE have been downtrending  -cont dapt  -atorvastatin 80mg   -benefit from LHC once optimized from infectious perspective    AF w RVR  -cont systemic anticoagulation goal INR 2-3  -cont current rate control dosing    bio mvr/avr - elevated gradients  -cont lasix  -HR control will improve flow    [id number]

## 2019-02-10 NOTE — PROGRESS NOTE ADULT - PROBLEM SELECTOR PLAN 1
- Met severe sepsis criteria on admission: fever to 103.2, tachycardia, tachypnea, WBC count of 13.9, and elevated lactate. CXR significant pulmonary edema, but no focal consolidation, RVP neg. Received azithromycin and ctx in ED. Chest CT showing possible multifocal PNA  -UA negative  -f/u 1 blood culture w/t coag neg staph- likely contaminant, urine legionella neg  - tylenol PRN fever  - vital signs Q4H  - Continue Zosyn  - trend fever and wbc qd

## 2019-02-11 DIAGNOSIS — Z71.89 OTHER SPECIFIED COUNSELING: ICD-10-CM

## 2019-02-11 DIAGNOSIS — I50.21 ACUTE SYSTOLIC (CONGESTIVE) HEART FAILURE: ICD-10-CM

## 2019-02-11 DIAGNOSIS — N17.9 ACUTE KIDNEY FAILURE, UNSPECIFIED: ICD-10-CM

## 2019-02-11 DIAGNOSIS — Z29.9 ENCOUNTER FOR PROPHYLACTIC MEASURES, UNSPECIFIED: ICD-10-CM

## 2019-02-11 LAB
ALBUMIN SERPL ELPH-MCNC: 3.2 G/DL — LOW (ref 3.3–5)
ALBUMIN SERPL ELPH-MCNC: 3.7 G/DL — SIGNIFICANT CHANGE UP (ref 3.3–5)
ALP SERPL-CCNC: 56 U/L — SIGNIFICANT CHANGE UP (ref 40–120)
ALP SERPL-CCNC: 59 U/L — SIGNIFICANT CHANGE UP (ref 40–120)
ALT FLD-CCNC: 19 U/L — SIGNIFICANT CHANGE UP (ref 4–33)
ALT FLD-CCNC: 22 U/L — SIGNIFICANT CHANGE UP (ref 4–33)
ANION GAP SERPL CALC-SCNC: 13 MMO/L — SIGNIFICANT CHANGE UP (ref 7–14)
ANION GAP SERPL CALC-SCNC: 13 MMO/L — SIGNIFICANT CHANGE UP (ref 7–14)
APPEARANCE UR: CLEAR — SIGNIFICANT CHANGE UP
APTT BLD: 31.9 SEC — SIGNIFICANT CHANGE UP (ref 27.5–36.3)
AST SERPL-CCNC: 46 U/L — HIGH (ref 4–32)
AST SERPL-CCNC: 50 U/L — HIGH (ref 4–32)
BILIRUB SERPL-MCNC: 0.3 MG/DL — SIGNIFICANT CHANGE UP (ref 0.2–1.2)
BILIRUB SERPL-MCNC: 0.4 MG/DL — SIGNIFICANT CHANGE UP (ref 0.2–1.2)
BILIRUB UR-MCNC: NEGATIVE — SIGNIFICANT CHANGE UP
BLOOD UR QL VISUAL: SIGNIFICANT CHANGE UP
BUN SERPL-MCNC: 39 MG/DL — HIGH (ref 7–23)
BUN SERPL-MCNC: 43 MG/DL — HIGH (ref 7–23)
CALCIUM SERPL-MCNC: 8.1 MG/DL — LOW (ref 8.4–10.5)
CALCIUM SERPL-MCNC: 8.6 MG/DL — SIGNIFICANT CHANGE UP (ref 8.4–10.5)
CHLORIDE SERPL-SCNC: 96 MMOL/L — LOW (ref 98–107)
CHLORIDE SERPL-SCNC: 96 MMOL/L — LOW (ref 98–107)
CK MB BLD-MCNC: 2.2 — SIGNIFICANT CHANGE UP (ref 0–2.5)
CK MB BLD-MCNC: 4.81 NG/ML — HIGH (ref 1–4.7)
CK SERPL-CCNC: 221 U/L — HIGH (ref 25–170)
CO2 SERPL-SCNC: 25 MMOL/L — SIGNIFICANT CHANGE UP (ref 22–31)
CO2 SERPL-SCNC: 28 MMOL/L — SIGNIFICANT CHANGE UP (ref 22–31)
COLOR SPEC: YELLOW — SIGNIFICANT CHANGE UP
CREAT SERPL-MCNC: 1.49 MG/DL — HIGH (ref 0.5–1.3)
CREAT SERPL-MCNC: 1.5 MG/DL — HIGH (ref 0.5–1.3)
GLUCOSE BLDC GLUCOMTR-MCNC: 125 MG/DL — HIGH (ref 70–99)
GLUCOSE SERPL-MCNC: 119 MG/DL — HIGH (ref 70–99)
GLUCOSE SERPL-MCNC: 128 MG/DL — HIGH (ref 70–99)
GLUCOSE UR-MCNC: NEGATIVE — SIGNIFICANT CHANGE UP
HCT VFR BLD CALC: 34 % — LOW (ref 34.5–45)
HGB BLD-MCNC: 10.1 G/DL — LOW (ref 11.5–15.5)
INR BLD: 1.68 — HIGH (ref 0.88–1.17)
KETONES UR-MCNC: NEGATIVE — SIGNIFICANT CHANGE UP
LEUKOCYTE ESTERASE UR-ACNC: NEGATIVE — SIGNIFICANT CHANGE UP
MAGNESIUM SERPL-MCNC: 1.9 MG/DL — SIGNIFICANT CHANGE UP (ref 1.6–2.6)
MAGNESIUM SERPL-MCNC: 2.5 MG/DL — SIGNIFICANT CHANGE UP (ref 1.6–2.6)
MCHC RBC-ENTMCNC: 22.6 PG — LOW (ref 27–34)
MCHC RBC-ENTMCNC: 29.7 % — LOW (ref 32–36)
MCV RBC AUTO: 76.1 FL — LOW (ref 80–100)
NITRITE UR-MCNC: NEGATIVE — SIGNIFICANT CHANGE UP
NRBC # FLD: 0 K/UL — LOW (ref 25–125)
PH UR: 6.5 — SIGNIFICANT CHANGE UP (ref 5–8)
PHOSPHATE SERPL-MCNC: 3.1 MG/DL — SIGNIFICANT CHANGE UP (ref 2.5–4.5)
PHOSPHATE SERPL-MCNC: 3.9 MG/DL — SIGNIFICANT CHANGE UP (ref 2.5–4.5)
PLATELET # BLD AUTO: 263 K/UL — SIGNIFICANT CHANGE UP (ref 150–400)
PMV BLD: 10.5 FL — SIGNIFICANT CHANGE UP (ref 7–13)
POTASSIUM SERPL-MCNC: 3.3 MMOL/L — LOW (ref 3.5–5.3)
POTASSIUM SERPL-MCNC: 4.5 MMOL/L — SIGNIFICANT CHANGE UP (ref 3.5–5.3)
POTASSIUM SERPL-SCNC: 3.3 MMOL/L — LOW (ref 3.5–5.3)
POTASSIUM SERPL-SCNC: 4.5 MMOL/L — SIGNIFICANT CHANGE UP (ref 3.5–5.3)
PROT SERPL-MCNC: 6.2 G/DL — SIGNIFICANT CHANGE UP (ref 6–8.3)
PROT SERPL-MCNC: 7 G/DL — SIGNIFICANT CHANGE UP (ref 6–8.3)
PROT UR-MCNC: NEGATIVE — SIGNIFICANT CHANGE UP
PROTHROM AB SERPL-ACNC: 19.5 SEC — HIGH (ref 9.8–13.1)
RBC # BLD: 4.47 M/UL — SIGNIFICANT CHANGE UP (ref 3.8–5.2)
RBC # FLD: 18.1 % — HIGH (ref 10.3–14.5)
SODIUM SERPL-SCNC: 134 MMOL/L — LOW (ref 135–145)
SODIUM SERPL-SCNC: 137 MMOL/L — SIGNIFICANT CHANGE UP (ref 135–145)
SP GR SPEC: 1.01 — SIGNIFICANT CHANGE UP (ref 1–1.04)
TROPONIN T, HIGH SENSITIVITY: 1590 NG/L — CRITICAL HIGH (ref ?–14)
UROBILINOGEN FLD QL: NORMAL — SIGNIFICANT CHANGE UP
UUN UR-MCNC: 512.6 MG/DL — SIGNIFICANT CHANGE UP
WBC # BLD: 6.67 K/UL — SIGNIFICANT CHANGE UP (ref 3.8–10.5)
WBC # FLD AUTO: 6.67 K/UL — SIGNIFICANT CHANGE UP (ref 3.8–10.5)

## 2019-02-11 PROCEDURE — 99233 SBSQ HOSP IP/OBS HIGH 50: CPT | Mod: GC

## 2019-02-11 PROCEDURE — 99232 SBSQ HOSP IP/OBS MODERATE 35: CPT

## 2019-02-11 PROCEDURE — 93010 ELECTROCARDIOGRAM REPORT: CPT

## 2019-02-11 RX ORDER — WARFARIN SODIUM 2.5 MG/1
2 TABLET ORAL ONCE
Qty: 0 | Refills: 0 | Status: COMPLETED | OUTPATIENT
Start: 2019-02-11 | End: 2019-02-11

## 2019-02-11 RX ORDER — TICAGRELOR 90 MG/1
90 TABLET ORAL ONCE
Qty: 0 | Refills: 0 | Status: COMPLETED | OUTPATIENT
Start: 2019-02-11 | End: 2019-02-11

## 2019-02-11 RX ORDER — POTASSIUM CHLORIDE 20 MEQ
40 PACKET (EA) ORAL EVERY 4 HOURS
Qty: 0 | Refills: 0 | Status: COMPLETED | OUTPATIENT
Start: 2019-02-11 | End: 2019-02-11

## 2019-02-11 RX ORDER — MAGNESIUM SULFATE 500 MG/ML
1 VIAL (ML) INJECTION ONCE
Qty: 0 | Refills: 0 | Status: COMPLETED | OUTPATIENT
Start: 2019-02-11 | End: 2019-02-11

## 2019-02-11 RX ORDER — CLOPIDOGREL BISULFATE 75 MG/1
75 TABLET, FILM COATED ORAL DAILY
Qty: 0 | Refills: 0 | Status: DISCONTINUED | OUTPATIENT
Start: 2019-02-12 | End: 2019-02-14

## 2019-02-11 RX ORDER — METOPROLOL TARTRATE 50 MG
50 TABLET ORAL EVERY 12 HOURS
Qty: 0 | Refills: 0 | Status: DISCONTINUED | OUTPATIENT
Start: 2019-02-11 | End: 2019-02-14

## 2019-02-11 RX ORDER — WARFARIN SODIUM 2.5 MG/1
3 TABLET ORAL ONCE
Qty: 0 | Refills: 0 | Status: DISCONTINUED | OUTPATIENT
Start: 2019-02-11 | End: 2019-02-11

## 2019-02-11 RX ADMIN — Medication 40 MILLIEQUIVALENT(S): at 12:55

## 2019-02-11 RX ADMIN — Medication 81 MILLIGRAM(S): at 12:55

## 2019-02-11 RX ADMIN — PIPERACILLIN AND TAZOBACTAM 25 GRAM(S): 4; .5 INJECTION, POWDER, LYOPHILIZED, FOR SOLUTION INTRAVENOUS at 05:30

## 2019-02-11 RX ADMIN — Medication 50 MILLIGRAM(S): at 17:32

## 2019-02-11 RX ADMIN — Medication 40 MILLIGRAM(S): at 05:30

## 2019-02-11 RX ADMIN — TICAGRELOR 90 MILLIGRAM(S): 90 TABLET ORAL at 05:30

## 2019-02-11 RX ADMIN — Medication 1 TABLET(S): at 12:55

## 2019-02-11 RX ADMIN — PIPERACILLIN AND TAZOBACTAM 25 GRAM(S): 4; .5 INJECTION, POWDER, LYOPHILIZED, FOR SOLUTION INTRAVENOUS at 21:43

## 2019-02-11 RX ADMIN — Medication 75 MILLIGRAM(S): at 05:30

## 2019-02-11 RX ADMIN — Medication 100 GRAM(S): at 17:32

## 2019-02-11 RX ADMIN — WARFARIN SODIUM 2 MILLIGRAM(S): 2.5 TABLET ORAL at 17:35

## 2019-02-11 RX ADMIN — TICAGRELOR 90 MILLIGRAM(S): 90 TABLET ORAL at 17:33

## 2019-02-11 RX ADMIN — ATORVASTATIN CALCIUM 80 MILLIGRAM(S): 80 TABLET, FILM COATED ORAL at 21:43

## 2019-02-11 RX ADMIN — PIPERACILLIN AND TAZOBACTAM 25 GRAM(S): 4; .5 INJECTION, POWDER, LYOPHILIZED, FOR SOLUTION INTRAVENOUS at 12:55

## 2019-02-11 RX ADMIN — Medication 40 MILLIEQUIVALENT(S): at 10:42

## 2019-02-11 NOTE — PROGRESS NOTE ADULT - PROBLEM SELECTOR PLAN 9
Pt and daughter unsure of medications and pharmacy could not be reached, [facility] in [facility]. Daughter provided numbers for PCP and Cardiologist. Will call and try to formally med rec her.   - med rec in AM (primary team) holding home hydralazine and enalapril in setting of active infection, add back as clinically indicated

## 2019-02-11 NOTE — PROGRESS NOTE ADULT - SUBJECTIVE AND OBJECTIVE BOX
Dr. Larry Pike   Internal Medicine PGY-1  Pager #[phone]    Patient is a 78y old  Female who presents with a chief complaint of Sepsis (10 Feb 2019 11:11)      SUBJECTIVE / OVERNIGHT EVENTS:  HAYDER ON. Denies CP.     Tele events:     MEDICATIONS  (STANDING):  aspirin 81 milliGRAM(s) Oral daily  atorvastatin 80 milliGRAM(s) Oral at bedtime  dextrose 5%. 1000 milliLiter(s) (50 mL/Hr) IV Continuous <Continuous>  dextrose 50% Injectable 12.5 Gram(s) IV Push once  dextrose 50% Injectable 25 Gram(s) IV Push once  dextrose 50% Injectable 25 Gram(s) IV Push once  furosemide   Injectable 40 milliGRAM(s) IV Push every 12 hours  influenza   Vaccine 0.5 milliLiter(s) IntraMuscular once  insulin lispro (HumaLOG) corrective regimen sliding scale   SubCutaneous three times a day before meals  lactobacillus acidophilus 1 Tablet(s) Oral daily  metoprolol tartrate 75 milliGRAM(s) Oral every 12 hours  piperacillin/tazobactam IVPB. 3.375 Gram(s) IV Intermittent every 8 hours  ticagrelor 90 milliGRAM(s) Oral every 12 hours    MEDICATIONS  (PRN):  acetaminophen   Tablet .. 650 milliGRAM(s) Oral every 6 hours PRN Temp greater or equal to 38C (100.4F)  dextrose 40% Gel 15 Gram(s) Oral once PRN Blood Glucose LESS THAN 70 milliGRAM(s)/deciliter  glucagon  Injectable 1 milliGRAM(s) IntraMuscular once PRN Glucose LESS THAN 70 milligrams/deciliter  levalbuterol Inhalation 0.63 milliGRAM(s) Inhalation every 6 hours PRN sob/wheezing      Vital Signs Last 24 Hrs  T(C): 36.7 (11 Feb 2019 04:30), Max: 37.2 (10 Feb 2019 20:30)  T(F): 98.1 (11 Feb 2019 04:30), Max: 98.9 (10 Feb 2019 20:30)  HR: 70 (11 Feb 2019 04:30) (62 - 82)  BP: 131/60 (11 Feb 2019 04:30) (104/64 - 131/60)  BP(mean): --  RR: 17 (11 Feb 2019 04:30) (17 - 18)  SpO2: 98% (11 Feb 2019 04:30) (96% - 100%)  CAPILLARY BLOOD GLUCOSE      POCT Blood Glucose.: 124 mg/dL (10 Feb 2019 22:04)  POCT Blood Glucose.: 138 mg/dL (10 Feb 2019 17:02)  POCT Blood Glucose.: 143 mg/dL (10 Feb 2019 12:10)  POCT Blood Glucose.: 107 mg/dL (10 Feb 2019 07:41)    I&O's Summary    09 Feb 2019 07:01  -  10 Feb 2019 07:00  --------------------------------------------------------  IN: 400 mL / OUT: 2050 mL / NET: -1650 mL    10 Feb 2019 07:01  -  11 Feb 2019 06:29  --------------------------------------------------------  IN: 1040 mL / OUT: 900 mL / NET: 140 mL        PHYSICAL EXAM:  GENERAL: Still grunting with each breath, but appears comfortable.   HEAD:  Atraumatic, Normocephalic  EYES: EOMI, PERRLA, conjunctiva and sclera clear  NECK: Supple, No JVD  CHEST/LUNG: Lungs now diffusely with more rhonchi and crackles in the dependent position (slept on her left side).  HEART: Irregularly irregular rate and rhythm; holosystolic murmur heard best between 2nd/3rd IC at midline.   ABDOMEN: Soft, Nontender, Nondistended; Bowel sounds present  EXTREMITIES:  2+ Peripheral Pulses, No clubbing, cyanosis. 1+ LE edema.   PSYCH: AAOx3  NEUROLOGY: non-focal    LABS:                        10.2   6.22  )-----------( 245      ( 10 Feb 2019 06:13 )             35.1     02-10    138  |  98  |  44<H>  ----------------------------<  105<H>  4.1   |  25  |  1.79<H>    Ca    8.3<L>      10 Feb 2019 06:13  Phos  4.2     02-10  Mg     1.9     02-10    TPro  6.3  /  Alb  3.2<L>  /  TBili  0.4  /  DBili  x   /  AST  69<H>  /  ALT  25  /  AlkPhos  56  02-10    PT/INR - ( 10 Feb 2019 17:45 )   PT: 20.2 SEC;   INR: 1.79            CARDIAC MARKERS ( 10 Feb 2019 06:13 )  x     / x     / 385 u/L / 7.07 ng/mL / x              RADIOLOGY & ADDITIONAL TESTS:    Imaging Personally Reviewed:    Consultant(s) Notes Reviewed:      Care Discussed with Consultants/Other Providers: Dr. Larry Pike   Internal Medicine PGY-1  Pager #[phone]    Patient is a 78y old  Female who presents with a chief complaint of Sepsis (10 Feb 2019 11:11)      SUBJECTIVE / OVERNIGHT EVENTS:  HAYDER ON. Denies CP. Breathing improved. Had 3 loose stools, however, there was some formed stool within it and greenish color, not pure loose watery diarrhea. No fever sweats or chills. No N/V.    Tele events: 4 episodes of felecia to mid 50s, no pauses > 3s  EKG: New TWI in V1-V6    MEDICATIONS  (STANDING):  aspirin 81 milliGRAM(s) Oral daily  atorvastatin 80 milliGRAM(s) Oral at bedtime  dextrose 5%. 1000 milliLiter(s) (50 mL/Hr) IV Continuous <Continuous>  dextrose 50% Injectable 12.5 Gram(s) IV Push once  dextrose 50% Injectable 25 Gram(s) IV Push once  dextrose 50% Injectable 25 Gram(s) IV Push once  furosemide   Injectable 40 milliGRAM(s) IV Push every 12 hours  influenza   Vaccine 0.5 milliLiter(s) IntraMuscular once  insulin lispro (HumaLOG) corrective regimen sliding scale   SubCutaneous three times a day before meals  lactobacillus acidophilus 1 Tablet(s) Oral daily  metoprolol tartrate 75 milliGRAM(s) Oral every 12 hours  piperacillin/tazobactam IVPB. 3.375 Gram(s) IV Intermittent every 8 hours  ticagrelor 90 milliGRAM(s) Oral every 12 hours    MEDICATIONS  (PRN):  acetaminophen   Tablet .. 650 milliGRAM(s) Oral every 6 hours PRN Temp greater or equal to 38C (100.4F)  dextrose 40% Gel 15 Gram(s) Oral once PRN Blood Glucose LESS THAN 70 milliGRAM(s)/deciliter  glucagon  Injectable 1 milliGRAM(s) IntraMuscular once PRN Glucose LESS THAN 70 milligrams/deciliter  levalbuterol Inhalation 0.63 milliGRAM(s) Inhalation every 6 hours PRN sob/wheezing      Vital Signs Last 24 Hrs  T(C): 36.7 (11 Feb 2019 04:30), Max: 37.2 (10 Feb 2019 20:30)  T(F): 98.1 (11 Feb 2019 04:30), Max: 98.9 (10 Feb 2019 20:30)  HR: 70 (11 Feb 2019 04:30) (62 - 82)  BP: 131/60 (11 Feb 2019 04:30) (104/64 - 131/60)  BP(mean): --  RR: 17 (11 Feb 2019 04:30) (17 - 18)  SpO2: 98% (11 Feb 2019 04:30) (96% - 100%)  CAPILLARY BLOOD GLUCOSE      POCT Blood Glucose.: 124 mg/dL (10 Feb 2019 22:04)  POCT Blood Glucose.: 138 mg/dL (10 Feb 2019 17:02)  POCT Blood Glucose.: 143 mg/dL (10 Feb 2019 12:10)  POCT Blood Glucose.: 107 mg/dL (10 Feb 2019 07:41)    I&O's Summary    09 Feb 2019 07:01  -  10 Feb 2019 07:00  --------------------------------------------------------  IN: 400 mL / OUT: 2050 mL / NET: -1650 mL    10 Feb 2019 07:01  -  11 Feb 2019 06:29  --------------------------------------------------------  IN: 1040 mL / OUT: 900 mL / NET: 140 mL        PHYSICAL EXAM:  GENERAL: Still grunting with each breath, but appears comfortable.   HEAD:  Atraumatic, Normocephalic  EYES: EOMI, PERRLA, conjunctiva and sclera clear  NECK: Supple, No JVD  CHEST/LUNG: Lungs now diffusely with more rhonchi and crackles in the dependent position (slept on her left side).  HEART: Irregularly irregular rate and rhythm; holosystolic murmur heard best between 2nd/3rd IC at midline.   ABDOMEN: Soft, Nontender, Nondistended; Bowel sounds present  EXTREMITIES:  2+ Peripheral Pulses, No clubbing, cyanosis. 1+ LE edema.   PSYCH: AAOx3  NEUROLOGY: non-focal    LABS:                        10.2   6.22  )-----------( 245      ( 10 Feb 2019 06:13 )             35.1     02-10    138  |  98  |  44<H>  ----------------------------<  105<H>  4.1   |  25  |  1.79<H>    Ca    8.3<L>      10 Feb 2019 06:13  Phos  4.2     02-10  Mg     1.9     02-10    TPro  6.3  /  Alb  3.2<L>  /  TBili  0.4  /  DBili  x   /  AST  69<H>  /  ALT  25  /  AlkPhos  56  02-10    PT/INR - ( 10 Feb 2019 17:45 )   PT: 20.2 SEC;   INR: 1.79            CARDIAC MARKERS ( 10 Feb 2019 06:13 )  x     / x     / 385 u/L / 7.07 ng/mL / x              RADIOLOGY & ADDITIONAL TESTS:    Imaging Personally Reviewed:    Consultant(s) Notes Reviewed:      Care Discussed with Consultants/Other Providers: Dr. Larry Pike   Internal Medicine PGY-1  Pager #[phone]    Patient is a 78y old  Female who presents with a chief complaint of Sepsis (10 Feb 2019 11:11)      SUBJECTIVE / OVERNIGHT EVENTS:  HAYDER ON. Denies CP. Breathing improved. Had 3 loose stools, however, there was some formed stool within it and greenish color, not pure loose watery diarrhea. No fever sweats or chills. No N/V.    Tele events: 4 episodes of felecia to mid 50s, no pauses > 3s  EKG: New TWI in V1-V6    MEDICATIONS  (STANDING):  aspirin 81 milliGRAM(s) Oral daily  atorvastatin 80 milliGRAM(s) Oral at bedtime  dextrose 5%. 1000 milliLiter(s) (50 mL/Hr) IV Continuous <Continuous>  dextrose 50% Injectable 12.5 Gram(s) IV Push once  dextrose 50% Injectable 25 Gram(s) IV Push once  dextrose 50% Injectable 25 Gram(s) IV Push once  furosemide   Injectable 40 milliGRAM(s) IV Push every 12 hours  influenza   Vaccine 0.5 milliLiter(s) IntraMuscular once  insulin lispro (HumaLOG) corrective regimen sliding scale   SubCutaneous three times a day before meals  lactobacillus acidophilus 1 Tablet(s) Oral daily  metoprolol tartrate 75 milliGRAM(s) Oral every 12 hours  piperacillin/tazobactam IVPB. 3.375 Gram(s) IV Intermittent every 8 hours  ticagrelor 90 milliGRAM(s) Oral every 12 hours    MEDICATIONS  (PRN):  acetaminophen   Tablet .. 650 milliGRAM(s) Oral every 6 hours PRN Temp greater or equal to 38C (100.4F)  dextrose 40% Gel 15 Gram(s) Oral once PRN Blood Glucose LESS THAN 70 milliGRAM(s)/deciliter  glucagon  Injectable 1 milliGRAM(s) IntraMuscular once PRN Glucose LESS THAN 70 milligrams/deciliter  levalbuterol Inhalation 0.63 milliGRAM(s) Inhalation every 6 hours PRN sob/wheezing      Vital Signs Last 24 Hrs  T(C): 36.7 (2019 04:30), Max: 37.2 (10 Feb 2019 20:30)  T(F): 98.1 (2019 04:30), Max: 98.9 (10 Feb 2019 20:30)  HR: 70 (2019 04:30) (62 - 82)  BP: 131/60 (2019 04:30) (104/64 - 131/60)  BP(mean): --  RR: 17 (2019 04:30) (17 - 18)  SpO2: 98% (2019 04:30) (96% - 100%)  CAPILLARY BLOOD GLUCOSE      POCT Blood Glucose.: 124 mg/dL (10 Feb 2019 22:04)  POCT Blood Glucose.: 138 mg/dL (10 Feb 2019 17:02)  POCT Blood Glucose.: 143 mg/dL (10 Feb 2019 12:10)  POCT Blood Glucose.: 107 mg/dL (10 Feb 2019 07:41)    I&O's Summary    2019 07:01  -  10 Feb 2019 07:00  --------------------------------------------------------  IN: 400 mL / OUT: 2050 mL / NET: -1650 mL    10 Feb 2019 07:01  -  2019 06:29  --------------------------------------------------------  IN: 1040 mL / OUT: 900 mL / NET: 140 mL        PHYSICAL EXAM:  GENERAL: Still grunting with each breath, but appears comfortable.   HEAD:  Atraumatic, Normocephalic  EYES: EOMI, PERRLA, conjunctiva and sclera clear  NECK: Supple, No JVD  CHEST/LUNG: Lungs now diffusely with more rhonchi and crackles in the dependent position (slept on her left side).  HEART: Irregularly irregular rate and rhythm; holosystolic murmur heard best between 2nd/3rd IC at midline.   ABDOMEN: Soft, Nontender, Nondistended; Bowel sounds present  EXTREMITIES:  2+ Peripheral Pulses, No clubbing, cyanosis. 1+ LE edema.   PSYCH: AAOx3  NEUROLOGY: non-focal    LABS:  CAPILLARY BLOOD GLUCOSE      POCT Blood Glucose.: 125 mg/dL (2019 11:52)  POCT Blood Glucose.: 125 mg/dL (2019 07:48)  POCT Blood Glucose.: 124 mg/dL (10 Feb 2019 22:04)  POCT Blood Glucose.: 138 mg/dL (10 Feb 2019 17:02)                            10.1   6.67  )-----------( 263      ( 2019 06:25 )             34.0     02-11    137  |  96<L>  |  43<H>  ----------------------------<  119<H>  3.3<L>   |  28  |  1.50<H>    Ca    8.1<L>      2019 06:28  Phos  3.9       Mg     1.9         TPro  6.2  /  Alb  3.2<L>  /  TBili  0.3  /  DBili  x   /  AST  50<H>  /  ALT  19  /  AlkPhos  56  0211    PT/INR - ( 2019 06:25 )   PT: 19.5 SEC;   INR: 1.68          PTT - ( 2019 06:25 )  PTT:31.9 SEC  CARDIAC MARKERS ( 2019 06:28 )  x     / x     / 221 u/L / 4.81 ng/mL / x      CARDIAC MARKERS ( 10 Feb 2019 06:13 )  x     / x     / 385 u/L / 7.07 ng/mL / x        Troponin T, High Sensitivity (19 @ 06:28)    Troponin T, High Sensitivity: 1590: Delta: 1180 on 02/10/  Delta: 1180 on 02/10/    Urinalysis Basic - ( 2019 10:43 )    Color: YELLOW / Appearance: CLEAR / S.015 / pH: 6.5  Gluc: NEGATIVE / Ketone: NEGATIVE  / Bili: NEGATIVE / Urobili: NORMAL   Blood: TRACE / Protein: NEGATIVE / Nitrite: NEGATIVE   Leuk Esterase: NEGATIVE / RBC: x / WBC x   Sq Epi: x / Non Sq Epi: x / Bacteria: x          RADIOLOGY & ADDITIONAL TESTS:    Telemetry Personally Reviewed:    ECG Personally Reviewed:    Imaging Personally Reviewed:    Imaging Reviewed:     Consultant(s) Notes Reviewed:      Care Discussed with Consultants/Other Providers:

## 2019-02-11 NOTE — PROGRESS NOTE ADULT - PROBLEM SELECTOR PLAN 8
holding home hydralazine and enalapril in setting of active infection, add back as clinically indicated Hba1c 5.7, now in prediabetes range  - FS checks with KRYSTYNA

## 2019-02-11 NOTE — PROGRESS NOTE ADULT - PROBLEM SELECTOR PLAN 7
Hba1c 5.7, now in prediabetes range  - FS checks with KRYSTYNA -c/w DAPT  -high intensity statin atorvastatin 80mg qhs

## 2019-02-11 NOTE — PROGRESS NOTE ADULT - ASSESSMENT
78 year old woman with CAD, s/p CABG, s/p cath 2014 with patent LIMA to LAD and SVG to OM, + of RCA, valvular heart disease, s/p bioprosthetic MVR and AVR, afib on coumadin, HTN, HLD, DM, CVA, and multiple "lung infections" per family presents with worsening fatigue, exertional SOB, and general malaise with cough and cold symptoms. +fever and likely sepsis but also with elevated hsT and CKMB.    #NSTEMI w/ hx of CAD  - Pt with still rising cardiac biomarkers  - Mild / moderate LVDF on Echo  - Will need in patient ischemic eval  - Will plan for cardiac cath tomorrow pending continued clinical improvement.  - c/w ASA and atorvastatin  - Change Brilinta to Plavix, given triple therapy      #A-fib  - Bradycardic on tele   - change metoprolol to 50 q 12  - c/w coumadin     #Reduced EF  - Plan for ischemic eval  - c/w metoprolol   - c/w IV lasix 40 BID    Damien Francois MD  Cardiology Fellow - PGY-4  LICRISTOPHER: [id number]  NS: [phone]  [id number]

## 2019-02-11 NOTE — PROGRESS NOTE ADULT - PROBLEM SELECTOR PROBLEM 8
HTN (Hypertension) Type 2 diabetes mellitus with hyperglycemia, without long-term current use of insulin

## 2019-02-11 NOTE — PROGRESS NOTE ADULT - PROBLEM SELECTOR PROBLEM 7
Type 2 diabetes mellitus with hyperglycemia, without long-term current use of insulin Coronary artery disease

## 2019-02-11 NOTE — PROGRESS NOTE ADULT - SUBJECTIVE AND OBJECTIVE BOX
Patient seen and examined at bedside.    Overnight Events: Pt states she feels much improved. No chest pain. Still has cough.       REVIEW OF SYSTEMS:  Constitutional:     [ ] negative [ ] fevers [ ] chills [ ] weight loss [ ] weight gain  HEENT:                  [ ] negative [ ] dry eyes [ ] eye irritation [ ] postnasal drip [ ] nasal congestion  CV:                         [ ] negative  [ ] chest pain [ ] orthopnea [ ] palpitations [ ] murmur  Resp:                     [ ] negative [ x] cough [ ] shortness of breath [ ] dyspnea [ ] wheezing [ ] sputum [ ]hemoptysis  GI:                          [ ] negative [ ] nausea [ ] vomiting [ ] diarrhea [ ] constipation [ ] abd pain [ ] dysphagia   :                        [ ] negative [ ] dysuria [ ] nocturia [ ] hematuria [ ] increased urinary frequency  Musculoskeletal: [ ] negative [ ] back pain [ ] myalgias [ ] arthralgias [ ] fracture  Skin:                       [ ] negative [ ] rash [ ] itch  Neurological:        [ ] negative [ ] headache [ ] dizziness [ ] syncope [ ] weakness [ ] numbness  Psychiatric:           [ ] negative [ ] anxiety [ ] depression  Endocrine:            [ ] negative [ ] diabetes [ ] thyroid problem  Heme/Lymph:      [ ] negative [ ] anemia [ ] bleeding problem  Allergic/Immune: [ ] negative [ ] itchy eyes [ ] nasal discharge [ ] hives [ ] angioedema    [ x] All other systems negative  [ ] Unable to assess ROS because sedated with anoxic brain injury.    Current Meds:  acetaminophen   Tablet .. 650 milliGRAM(s) Oral every 6 hours PRN  aspirin 81 milliGRAM(s) Oral daily  atorvastatin 80 milliGRAM(s) Oral at bedtime  dextrose 40% Gel 15 Gram(s) Oral once PRN  dextrose 5%. 1000 milliLiter(s) IV Continuous <Continuous>  dextrose 50% Injectable 12.5 Gram(s) IV Push once  dextrose 50% Injectable 25 Gram(s) IV Push once  dextrose 50% Injectable 25 Gram(s) IV Push once  furosemide   Injectable 40 milliGRAM(s) IV Push every 12 hours  glucagon  Injectable 1 milliGRAM(s) IntraMuscular once PRN  influenza   Vaccine 0.5 milliLiter(s) IntraMuscular once  insulin lispro (HumaLOG) corrective regimen sliding scale   SubCutaneous three times a day before meals  lactobacillus acidophilus 1 Tablet(s) Oral daily  levalbuterol Inhalation 0.63 milliGRAM(s) Inhalation every 6 hours PRN  metoprolol tartrate 75 milliGRAM(s) Oral every 12 hours  piperacillin/tazobactam IVPB. 3.375 Gram(s) IV Intermittent every 8 hours  potassium chloride    Tablet ER 40 milliEquivalent(s) Oral every 4 hours  ticagrelor 90 milliGRAM(s) Oral every 12 hours  warfarin 3 milliGRAM(s) Oral once      PAST MEDICAL & SURGICAL HISTORY:  Diabetes mellitus  Gout  Upper GI bleed  CHF (congestive heart failure)  Mitral Regurgitation  CVA (Cerebral Infarction): 2011  CAD (Coronary Artery Disease)  Afib: (on Warfarin)  HTN (Hypertension)  H/O mitral valve replacement: 9/22/14  H/O aortic valve replacement: 9/22/14  S/P CABG x 1: (2000)  S/P angioplasty with stent: 2011      Vitals:  T(F): 98.3 (02-11), Max: 98.9 (02-10)  HR: 72 (02-11) (56 - 84)  BP: 139/71 (02-11) (117/52 - 139/71)  RR: 18 (02-11)  SpO2: 95% (02-11)  I&O's Summary    10 Feb 2019 07:01  -  11 Feb 2019 07:00  --------------------------------------------------------  IN: 1040 mL / OUT: 900 mL / NET: 140 mL        Physical Exam:  Appearance: No acute distress; well appearing  Eyes: PERRL, EOMI, pink conjunctiva  HENT: Normal oral mucosa  Cardiovascular: RRR, S1, S2, no murmurs, rubs, or gallops; no edema; no JVD  Respiratory: diffuse rhonchi   Gastrointestinal: soft, non-tender, non-distended with normal bowel sounds  Musculoskeletal: No clubbing; no joint deformity   Neurologic: Non-focal  Lymphatic: No lymphadenopathy  Psychiatry: AAOx3, mood & affect appropriate  Skin: No rashes, ecchymoses, or cyanosis                          10.1   6.67  )-----------( 263      ( 11 Feb 2019 06:25 )             34.0     02-11    137  |  96<L>  |  43<H>  ----------------------------<  119<H>  3.3<L>   |  28  |  1.50<H>    Ca    8.1<L>      11 Feb 2019 06:28  Phos  3.9     02-11  Mg     1.9     02-11    TPro  6.2  /  Alb  3.2<L>  /  TBili  0.3  /  DBili  x   /  AST  50<H>  /  ALT  19  /  AlkPhos  56  02-11    PT/INR - ( 11 Feb 2019 06:25 )   PT: 19.5 SEC;   INR: 1.68          PTT - ( 11 Feb 2019 06:25 )  PTT:31.9 SEC  CARDIAC MARKERS ( 11 Feb 2019 06:28 )  x     / x     / 221 u/L / 4.81 ng/mL / x      CARDIAC MARKERS ( 10 Feb 2019 06:13 )  x     / x     / 385 u/L / 7.07 ng/mL / x          Serum Pro-Brain Natriuretic Peptide: 2306 pg/mL (02-07 @ 16:43)          New ECG(s): Personally reviewed    Echo:  < from: Transthoracic Echocardiogram (02.09.19 @ 07:28) >  ------------------------------------------------------------------------  CONCLUSIONS:  1. Bioprosthetic mitral valve replacement. Mean transmitral  valve gradient equals 5 mm Hg, which is probably normal in  the setting of a prosthetic valve.  2. Bioprosthetic aortic valve replacement. Peak transaortic  valve gradient equals 25 mm Hg, mean transaortic valve  gradient equals 11 mm Hg, which is probably normal in the  presence of a prosthetic valve.  3. Mild left atrial enlargement.  4. Increased relative wall thickness with normal left  ventricular mass index, consistent with concentric left  ventricular remodeling.  5. Endocardium not well visualized; moderate left  ventricular dysfunction.  6. Mild right atrial enlargement.  7. Right ventricular enlargement with normal right  ventricular systolic function.  8. Normal tricuspid valve.  Severe tricuspid regurgitation.  9. Estimated pulmonary artery systolic pressure equals 40  mm Hg, assuming right atrial pressure equals 10  mm Hg,  consistent with mild pulmonary hypertension.  ------------------------------------------------------------------------  Confirmed on  2/9/2019 - 15:42:56 by Sheila Alva M.D.  ------------------------------------------------------------------------    < end of copied text >    Stress Testing:     Cath:  < from: Cardiac Cath Lab - Adult (09.19.14 @ 15:43) >  VENTRICLES: EF estimated was 50 %.  CORONARY VESSELS: The coronary circulation is right dominant.  LM:   --  LM: Normal.  LAD:   --  Proximal LAD: There was a tubular 100 % stenosis. Mid to Distal  LAD fills via widely patent LIMA graft.  CX:   --  Proximal circumflex: Normal. There was no significant restenosis.  --  Mid circumflex: Normal. There was no significant restenosis.  --  Distal circumflex: Normal.  --  OM1: In a second lesion, there was a 100 % stenosis. Om1 fills via  widely patent SVG to OM.  RCA:   -- Proximal RCA: There was a tubular 100 % stenosis. Mid to Distal  RCA fills via left to right collaterals.  GRAFTS:   --  Graft to the LAD: The graft was a medium sized LIMA. Graft  angiography showed no evidence of disease.  --  Graft to the 1st obtuse marginal: The graft was a medium to large sized  saphenous vein graft from the aorta. Graft angiography showed no evidence  of disease.  COMPLICATIONS: There were no complications.  DIAGNOSTIC IMPRESSIONS: Severe Native CAD as described. Normal LVSystolic  Function. Patent LIMA to the LAD and SVG to OM grafts. Severe Mitral and  Aortic Regurgitation on GIACOMO-Echocardiogram.  DIAGNOSTIC RECOMMENDATIONS: CT surgical evaluation for MVR and AVR. Staged  PCI of RCA- after she recovers from surgery.  INTERVENTIONAL IMPRESSIONS: Severe Native CAD as described. Normal LV  Systolic Function. Patent LIMA to the LAD and SVG to OM grafts. Severe  Mitral and Aortic Regurgitation on GIACOMO-Echocardiogram.  INTERVENTIONAL RECOMMENDATIONS: CT surgical evaluation for MVR and AVR.  Staged PCI of RCA- after she recovers from surgery. Patient management  should include aggressive medical therapy, close monitoring of BUN and  creatinine, and weight reduction.  Prepared and signed by  Jose A Short M.D.    < end of copied text >    Imaging:    Interpretation of Telemetry: sheila [email or web address]

## 2019-02-11 NOTE — PROGRESS NOTE ADULT - PROBLEM SELECTOR PLAN 2
- NSTEMI 2/2 type demand ischemia likely multifactorial due to sepsis and/or CHF exacerbation/ afib w/RVR  - hst Trops 44 -->628 -->1133  - CKMB trending down  -ASA/Brillinta maintenance dose  -Holding heparin given bleed overnight  -EKG QD  -f/u TTE result  - per cards, c/w coumadin, goal INR 2-3  - NSTEMI likely to improve with better rate control will increase metoprol to 75mg bid.   - Continue rate control and AC  - Possible LHC per cards - NSTEMI 2/2 type demand ischemia likely multifactorial due to sepsis and/or CHF exacerbation/ afib w/RVR  - hst Trops 44 -->628 -->1133  - CKMB trending down  -ASA/Brillinta maintenance dose  -Holding heparin given bleed overnight  -EKG QD  -f/u TTE result  - per cards, c/w coumadin, goal INR 2-3  - NSTEMI likely to improve with better rate control will increase metoprol to 75mg bid.   - Continue rate control and AC  - Possible LHC per cards once infection controlled - NSTEMI 2/2 type demand ischemia likely multifactorial due to sepsis and/or CHF exacerbation/ afib w/RVR  - hst Trops 44 -->628 -->1133 --> 1590  - CKMB trending down  -ASA/Brillinta maintenance dose  -Holding heparin given bleed overnight, now outside 48hrs  -EKG QD  -TTE w/t new drop in EF concerning for possible combination of type I and II MI in setting of new TWIs.  - per cards, c/w coumadin, goal INR 2-3  - NSTEMI likely to improve with better rate control will continue metoprol to 75mg bid.   - Continue rate control and AC  - Possible LHC per cards once infection controlled - NSTEMI 2/2 type demand ischemia likely multifactorial due to sepsis and/or CHF exacerbation/ afib w/RVR  - hst Trops 44 -->628 -->1133 --> 1590  - CKMB trending down  -ASA/Brillinta maintenance dose  -Holding heparin given bleed overnight, now outside 48hrs  -EKG QD  -TTE w/t new drop in EF concerning for possible combination of type I and II MI in setting of new TWIs.  - per cards, c/w coumadin, goal INR 2-3  - NSTEMI likely to improve with better rate control will continue metoprol to 75mg bid.   - Continue rate control and AC  - Possible [facility] tomorrow per cards - NSTEMI 2/2 type demand ischemia likely multifactorial due to sepsis and/or CHF exacerbation/ afib w/RVR  - hst Trops 44 -->628 -->1133 --> 1590  - CKMB trending down  -ASA/Brillinta maintenance dose  -Holding heparin given bleed overnight, now outside 48hrs  -EKG QD  -TTE w/t new drop in EF concerning for possible combination of type I and II MI in setting of new TWIs.  - per cards, c/w coumadin, goal INR 2-3  - NSTEMI likely to improve with better rate control will decrease metoprolol dose per cardiology recs given bradycardia  - Continue rate control and AC  - LHC tomorrow as part of ischemic evaluation

## 2019-02-11 NOTE — PROGRESS NOTE ADULT - PROBLEM SELECTOR PLAN 3
- Acute on chronic HFpEF exacerbation likely 2/2 Sepsis and afib w/t RVR  - TTE from 5/2017 showed EF73%, however, now with new EF 42% and mod LV dysfunction. Severe TR, and moderate pulm HTN. Prelim read of CXR showing mild pulm edema. proBNP elevated to 2306  - 40mg lasix IV BID   -daily weights  -Strict Is/Os  -continue metoprolol tart 75mg bid - Acute on chronic HFpEF exacerbation likely 2/2 Sepsis and afib w/t RVR  - TTE from 5/2017 showed EF73%, however, now with new EF 42% and mod LV dysfunction. Severe TR, and moderate pulm HTN. Prelim read of CXR showing mild pulm edema. proBNP elevated to 2306  - 40mg lasix IV BID   -daily weights  -Strict Is/Os  -continue metoprolol tart 50mg bid

## 2019-02-11 NOTE — PROGRESS NOTE ADULT - PROBLEM SELECTOR PLAN 6
-c/w DAPT  -high intensity statin atorvastatin 80mg qhs Heparin gtt last night appears to have caused bleed vs. infiltration. Hgb stable.  -CBC Q12H (minimize blood loss by stick)  -FOBT Heparin gtt last night appears to have caused bleed vs. infiltration. Hgb stable.  -CBC QD as hgb stable  -FOBT

## 2019-02-11 NOTE — PROGRESS NOTE ADULT - PROBLEM SELECTOR PLAN 4
Afib w/t RVR likely 2/2 acute sepsis  Telemetry showed rate up to 131  [id number] score 7  -increase metop tart 75mg bid to decrease demand  -coumadin 2mg tonight, goal INR 2-3  - telemetry  -Tylenol for fever Afib w/t RVR likely 2/2 acute sepsis  Telemetry showed rate up to 131  [id number] score 7  -c/w metop tart 75mg bid to decrease demand, intermittent episode of HR in 50s, acceptable, no pauses > 3s  -coumadin 3mg tonight, goal INR 2-3  - telemetry Afib w/t RVR likely 2/2 acute sepsis  Telemetry showed rate up to 131  [id number] score 7  -c/w metop tart 50mg bid to decrease demand  -coumadin 2mg tonight, goal INR 2-3  - telemetry

## 2019-02-11 NOTE — PROGRESS NOTE ADULT - PROBLEM SELECTOR PLAN 5
Heparin gtt last night appears to have caused bleed vs. infiltration. Hgb stable.  -CBC Q12H (minimize blood loss by stick)  -FOBT Acute rise in sCr now improving.   -c/w diuresis  -no IVF given decompensated HFrEF  -UA, U lytes Acute rise in sCr now improving. Likely cardiorenal syndrome.  -c/w diuresis  -UA, U lytes

## 2019-02-12 LAB
ALBUMIN SERPL ELPH-MCNC: 3.2 G/DL — LOW (ref 3.3–5)
ALBUMIN SERPL ELPH-MCNC: 3.6 G/DL — SIGNIFICANT CHANGE UP (ref 3.3–5)
ALP SERPL-CCNC: 51 U/L — SIGNIFICANT CHANGE UP (ref 40–120)
ALP SERPL-CCNC: 54 U/L — SIGNIFICANT CHANGE UP (ref 40–120)
ALT FLD-CCNC: 19 U/L — SIGNIFICANT CHANGE UP (ref 4–33)
ALT FLD-CCNC: 19 U/L — SIGNIFICANT CHANGE UP (ref 4–33)
ANION GAP SERPL CALC-SCNC: 14 MMO/L — SIGNIFICANT CHANGE UP (ref 7–14)
ANION GAP SERPL CALC-SCNC: 15 MMO/L — HIGH (ref 7–14)
APTT BLD: 32 SEC — SIGNIFICANT CHANGE UP (ref 27.5–36.3)
AST SERPL-CCNC: 39 U/L — HIGH (ref 4–32)
AST SERPL-CCNC: 52 U/L — HIGH (ref 4–32)
BACTERIA BLD CULT: SIGNIFICANT CHANGE UP
BILIRUB SERPL-MCNC: 0.4 MG/DL — SIGNIFICANT CHANGE UP (ref 0.2–1.2)
BILIRUB SERPL-MCNC: 0.6 MG/DL — SIGNIFICANT CHANGE UP (ref 0.2–1.2)
BUN SERPL-MCNC: 30 MG/DL — HIGH (ref 7–23)
BUN SERPL-MCNC: 32 MG/DL — HIGH (ref 7–23)
CALCIUM SERPL-MCNC: 8.6 MG/DL — SIGNIFICANT CHANGE UP (ref 8.4–10.5)
CALCIUM SERPL-MCNC: 8.7 MG/DL — SIGNIFICANT CHANGE UP (ref 8.4–10.5)
CHLORIDE SERPL-SCNC: 93 MMOL/L — LOW (ref 98–107)
CHLORIDE SERPL-SCNC: 97 MMOL/L — LOW (ref 98–107)
CK MB BLD-MCNC: 3.8 NG/ML — SIGNIFICANT CHANGE UP (ref 1–4.7)
CK SERPL-CCNC: 158 U/L — SIGNIFICANT CHANGE UP (ref 25–170)
CO2 SERPL-SCNC: 22 MMOL/L — SIGNIFICANT CHANGE UP (ref 22–31)
CO2 SERPL-SCNC: 26 MMOL/L — SIGNIFICANT CHANGE UP (ref 22–31)
CREAT SERPL-MCNC: 1.29 MG/DL — SIGNIFICANT CHANGE UP (ref 0.5–1.3)
CREAT SERPL-MCNC: 1.38 MG/DL — HIGH (ref 0.5–1.3)
GLUCOSE SERPL-MCNC: 112 MG/DL — HIGH (ref 70–99)
GLUCOSE SERPL-MCNC: 121 MG/DL — HIGH (ref 70–99)
HCT VFR BLD CALC: 34.8 % — SIGNIFICANT CHANGE UP (ref 34.5–45)
HGB BLD-MCNC: 10.1 G/DL — LOW (ref 11.5–15.5)
INR BLD: 1.63 — HIGH (ref 0.88–1.17)
MAGNESIUM SERPL-MCNC: 2.1 MG/DL — SIGNIFICANT CHANGE UP (ref 1.6–2.6)
MAGNESIUM SERPL-MCNC: 2.2 MG/DL — SIGNIFICANT CHANGE UP (ref 1.6–2.6)
MCHC RBC-ENTMCNC: 22.1 PG — LOW (ref 27–34)
MCHC RBC-ENTMCNC: 29 % — LOW (ref 32–36)
MCV RBC AUTO: 76.3 FL — LOW (ref 80–100)
NRBC # FLD: 0 K/UL — LOW (ref 25–125)
PHOSPHATE SERPL-MCNC: 3.3 MG/DL — SIGNIFICANT CHANGE UP (ref 2.5–4.5)
PHOSPHATE SERPL-MCNC: 3.8 MG/DL — SIGNIFICANT CHANGE UP (ref 2.5–4.5)
PLATELET # BLD AUTO: 288 K/UL — SIGNIFICANT CHANGE UP (ref 150–400)
PMV BLD: 10.7 FL — SIGNIFICANT CHANGE UP (ref 7–13)
POTASSIUM SERPL-MCNC: 3.8 MMOL/L — SIGNIFICANT CHANGE UP (ref 3.5–5.3)
POTASSIUM SERPL-MCNC: 4.4 MMOL/L — SIGNIFICANT CHANGE UP (ref 3.5–5.3)
POTASSIUM SERPL-SCNC: 3.8 MMOL/L — SIGNIFICANT CHANGE UP (ref 3.5–5.3)
POTASSIUM SERPL-SCNC: 4.4 MMOL/L — SIGNIFICANT CHANGE UP (ref 3.5–5.3)
PROT SERPL-MCNC: 6.8 G/DL — SIGNIFICANT CHANGE UP (ref 6–8.3)
PROT SERPL-MCNC: 7 G/DL — SIGNIFICANT CHANGE UP (ref 6–8.3)
PROTHROM AB SERPL-ACNC: 18.4 SEC — HIGH (ref 9.8–13.1)
RBC # BLD: 4.56 M/UL — SIGNIFICANT CHANGE UP (ref 3.8–5.2)
RBC # FLD: 18 % — HIGH (ref 10.3–14.5)
SODIUM SERPL-SCNC: 130 MMOL/L — LOW (ref 135–145)
SODIUM SERPL-SCNC: 137 MMOL/L — SIGNIFICANT CHANGE UP (ref 135–145)
TROPONIN T, HIGH SENSITIVITY: 1763 NG/L — CRITICAL HIGH (ref ?–14)
WBC # BLD: 6.98 K/UL — SIGNIFICANT CHANGE UP (ref 3.8–10.5)
WBC # FLD AUTO: 6.98 K/UL — SIGNIFICANT CHANGE UP (ref 3.8–10.5)

## 2019-02-12 PROCEDURE — 93455 CORONARY ART/GRFT ANGIO S&I: CPT | Mod: 26

## 2019-02-12 PROCEDURE — 99232 SBSQ HOSP IP/OBS MODERATE 35: CPT

## 2019-02-12 PROCEDURE — 76937 US GUIDE VASCULAR ACCESS: CPT | Mod: 26

## 2019-02-12 PROCEDURE — 93010 ELECTROCARDIOGRAM REPORT: CPT

## 2019-02-12 PROCEDURE — 99233 SBSQ HOSP IP/OBS HIGH 50: CPT | Mod: GC

## 2019-02-12 RX ORDER — SODIUM CHLORIDE 9 MG/ML
500 INJECTION INTRAMUSCULAR; INTRAVENOUS; SUBCUTANEOUS
Qty: 0 | Refills: 0 | Status: COMPLETED | OUTPATIENT
Start: 2019-02-12 | End: 2019-02-12

## 2019-02-12 RX ORDER — WARFARIN SODIUM 2.5 MG/1
3 TABLET ORAL ONCE
Qty: 0 | Refills: 0 | Status: COMPLETED | OUTPATIENT
Start: 2019-02-12 | End: 2019-02-12

## 2019-02-12 RX ADMIN — Medication 40 MILLIGRAM(S): at 05:15

## 2019-02-12 RX ADMIN — SODIUM CHLORIDE 75 MILLILITER(S): 9 INJECTION INTRAMUSCULAR; INTRAVENOUS; SUBCUTANEOUS at 20:09

## 2019-02-12 RX ADMIN — WARFARIN SODIUM 3 MILLIGRAM(S): 2.5 TABLET ORAL at 21:18

## 2019-02-12 RX ADMIN — Medication 50 MILLIGRAM(S): at 05:15

## 2019-02-12 RX ADMIN — PIPERACILLIN AND TAZOBACTAM 25 GRAM(S): 4; .5 INJECTION, POWDER, LYOPHILIZED, FOR SOLUTION INTRAVENOUS at 20:09

## 2019-02-12 RX ADMIN — Medication 1: at 12:28

## 2019-02-12 RX ADMIN — ATORVASTATIN CALCIUM 80 MILLIGRAM(S): 80 TABLET, FILM COATED ORAL at 21:18

## 2019-02-12 RX ADMIN — Medication 1 TABLET(S): at 13:07

## 2019-02-12 RX ADMIN — Medication 40 MILLIGRAM(S): at 20:08

## 2019-02-12 RX ADMIN — PIPERACILLIN AND TAZOBACTAM 25 GRAM(S): 4; .5 INJECTION, POWDER, LYOPHILIZED, FOR SOLUTION INTRAVENOUS at 05:14

## 2019-02-12 RX ADMIN — CLOPIDOGREL BISULFATE 75 MILLIGRAM(S): 75 TABLET, FILM COATED ORAL at 13:07

## 2019-02-12 RX ADMIN — Medication 50 MILLIGRAM(S): at 20:08

## 2019-02-12 RX ADMIN — PIPERACILLIN AND TAZOBACTAM 25 GRAM(S): 4; .5 INJECTION, POWDER, LYOPHILIZED, FOR SOLUTION INTRAVENOUS at 13:07

## 2019-02-12 RX ADMIN — LEVALBUTEROL 0.63 MILLIGRAM(S): 1.25 SOLUTION, CONCENTRATE RESPIRATORY (INHALATION) at 15:00

## 2019-02-12 RX ADMIN — Medication 81 MILLIGRAM(S): at 13:07

## 2019-02-12 NOTE — PROGRESS NOTE ADULT - PROBLEM SELECTOR PLAN 1
- Met severe sepsis criteria on admission: fever to 103.2, tachycardia, tachypnea, WBC count of 13.9, and elevated lactate. CXR significant pulmonary edema, but no focal consolidation, RVP neg. Received azithromycin and ctx in ED. Chest CT showing possible multifocal PNA  -UA negative  -f/u 1 blood culture w/t coag neg staph- likely contaminant, urine legionella neg  - tylenol PRN fever  - vital signs Q4H  - Continue Zosyn renally dosed (2/9- )  - trend fever and wbc qd

## 2019-02-12 NOTE — PROGRESS NOTE ADULT - PROBLEM SELECTOR PLAN 2
- NSTEMI 2/2 type demand ischemia likely multifactorial due to sepsis and/or CHF exacerbation/ afib w/RVR  - hst Trops 44 -->628 -->1133 --> 1590  - CKMB trending down  -ASA and plavix due to triple therapy based on woest trial  -Holding heparin given bleed overnight, now outside 48hrs  -EKG QD  -TTE w/t new drop in EF concerning for possible combination of type I and II MI in setting of new TWIs.  - per cards, c/w coumadin, goal INR 2-3  - NSTEMI likely to improve with better rate control, c/w metoprolol 50mg bid  - Continue rate control and AC  - LHC today - NSTEMI 2/2 type demand ischemia likely multifactorial due to sepsis and/or CHF exacerbation/ afib w/RVR  - hst Trops 44 -->628 -->1133 --> 1590 --> 1700  - CKMB trending down  -ASA and plavix due to triple therapy based on woest trial  -LHC today  -EKG QD  -TTE w/t new drop in EF concerning for possible combination of type I and II MI in setting of new TWIs.  - per cards, c/w coumadin, goal INR 2-3  - NSTEMI likely to improve with better rate control, c/w metoprolol 50mg bid  - Continue rate control and AC  - LHC today

## 2019-02-12 NOTE — PROGRESS NOTE ADULT - SUBJECTIVE AND OBJECTIVE BOX
Dr. Larry Pike   Internal Medicine PGY-1  Pager #[phone]    Patient is a 78y old  Female who presents with a chief complaint of Sepsis (2019 12:46)      SUBJECTIVE / OVERNIGHT EVENTS:  HAYDER ON. Pending [facility] today.     MEDICATIONS  (STANDING):  aspirin 81 milliGRAM(s) Oral daily  atorvastatin 80 milliGRAM(s) Oral at bedtime  clopidogrel Tablet 75 milliGRAM(s) Oral daily  dextrose 5%. 1000 milliLiter(s) (50 mL/Hr) IV Continuous <Continuous>  dextrose 50% Injectable 12.5 Gram(s) IV Push once  dextrose 50% Injectable 25 Gram(s) IV Push once  dextrose 50% Injectable 25 Gram(s) IV Push once  furosemide   Injectable 40 milliGRAM(s) IV Push every 12 hours  influenza   Vaccine 0.5 milliLiter(s) IntraMuscular once  insulin lispro (HumaLOG) corrective regimen sliding scale   SubCutaneous three times a day before meals  lactobacillus acidophilus 1 Tablet(s) Oral daily  metoprolol tartrate 50 milliGRAM(s) Oral every 12 hours  piperacillin/tazobactam IVPB. 3.375 Gram(s) IV Intermittent every 8 hours    MEDICATIONS  (PRN):  acetaminophen   Tablet .. 650 milliGRAM(s) Oral every 6 hours PRN Temp greater or equal to 38C (100.4F)  dextrose 40% Gel 15 Gram(s) Oral once PRN Blood Glucose LESS THAN 70 milliGRAM(s)/deciliter  glucagon  Injectable 1 milliGRAM(s) IntraMuscular once PRN Glucose LESS THAN 70 milligrams/deciliter  levalbuterol Inhalation 0.63 milliGRAM(s) Inhalation every 6 hours PRN sob/wheezing      Vital Signs Last 24 Hrs  T(C): 37.2 (2019 04:00), Max: 37.3 (2019 00:00)  T(F): 98.9 (2019 04:00), Max: 99.1 (2019 00:00)  HR: 61 (2019 05:13) (56 - 84)  BP: 136/74 (2019 05:13) (128/72 - 149/90)  BP(mean): --  RR: 17 (2019 04:00) (17 - 18)  SpO2: 100% (2019 04:00) (95% - 100%)  CAPILLARY BLOOD GLUCOSE      POCT Blood Glucose.: 130 mg/dL (2019 21:55)  POCT Blood Glucose.: 129 mg/dL (2019 17:10)  POCT Blood Glucose.: 125 mg/dL (2019 11:52)  POCT Blood Glucose.: 125 mg/dL (2019 07:48)    I&O's Summary    10 Feb 2019 07:01  -  2019 07:00  --------------------------------------------------------  IN: 1040 mL / OUT: 900 mL / NET: 140 mL    2019 07:01  -  2019 06:47  --------------------------------------------------------  IN: 976 mL / OUT: 900 mL / NET: 76 mL        PHYSICAL EXAM:  GENERAL: Still grunting with each breath, but appears comfortable.   HEAD:  Atraumatic, Normocephalic  EYES: EOMI, PERRLA, conjunctiva and sclera clear  NECK: Supple, No JVD  CHEST/LUNG: Lungs now diffusely with more rhonchi and crackles in the dependent position (slept on her left side).  HEART: Irregularly irregular rate and rhythm; holosystolic murmur heard best between 2nd/3rd IC at midline.   ABDOMEN: Soft, Nontender, Nondistended; Bowel sounds present  EXTREMITIES:  2+ Peripheral Pulses, No clubbing, cyanosis. 1+ LE edema.       LABS:                        10.1   6.67  )-----------( 263      ( 2019 06:25 )             34.0     02-11    134<L>  |  96<L>  |  39<H>  ----------------------------<  128<H>  4.5   |  25  |  1.49<H>    Ca    8.6      2019 20:30  Phos  3.1     -  Mg     2.5     -    TPro  7.0  /  Alb  3.7  /  TBili  0.4  /  DBili  x   /  AST  46<H>  /  ALT  22  /  AlkPhos  59  02-11    PT/INR - ( 2019 06:25 )   PT: 19.5 SEC;   INR: 1.68          PTT - ( 2019 06:25 )  PTT:31.9 SEC  CARDIAC MARKERS ( 2019 06:28 )  x     / x     / 221 u/L / 4.81 ng/mL / x          Urinalysis Basic - ( 2019 10:43 )    Color: YELLOW / Appearance: CLEAR / S.015 / pH: 6.5  Gluc: NEGATIVE / Ketone: NEGATIVE  / Bili: NEGATIVE / Urobili: NORMAL   Blood: TRACE / Protein: NEGATIVE / Nitrite: NEGATIVE   Leuk Esterase: NEGATIVE / RBC: x / WBC x   Sq Epi: x / Non Sq Epi: x / Bacteria: x        RADIOLOGY & ADDITIONAL TESTS:    Imaging Personally Reviewed:    Consultant(s) Notes Reviewed:      Care Discussed with Consultants/Other Providers: Dr. Larry Pike   Internal Medicine PGY-1  Pager #[phone]    Patient is a 78y old  Female who presents with a chief complaint of Sepsis (2019 12:46)      SUBJECTIVE / OVERNIGHT EVENTS:  HAYDER ON. Pending [facility] today. Denies SOB and HERNANDEZ, however, crackles could be heard without a stethoscope as she was laying flat on her back. No CP. No recurrence of diarrhea. Believes she is still urinating well.     MEDICATIONS  (STANDING):  aspirin 81 milliGRAM(s) Oral daily  atorvastatin 80 milliGRAM(s) Oral at bedtime  clopidogrel Tablet 75 milliGRAM(s) Oral daily  dextrose 5%. 1000 milliLiter(s) (50 mL/Hr) IV Continuous <Continuous>  dextrose 50% Injectable 12.5 Gram(s) IV Push once  dextrose 50% Injectable 25 Gram(s) IV Push once  dextrose 50% Injectable 25 Gram(s) IV Push once  furosemide   Injectable 40 milliGRAM(s) IV Push every 12 hours  influenza   Vaccine 0.5 milliLiter(s) IntraMuscular once  insulin lispro (HumaLOG) corrective regimen sliding scale   SubCutaneous three times a day before meals  lactobacillus acidophilus 1 Tablet(s) Oral daily  metoprolol tartrate 50 milliGRAM(s) Oral every 12 hours  piperacillin/tazobactam IVPB. 3.375 Gram(s) IV Intermittent every 8 hours    MEDICATIONS  (PRN):  acetaminophen   Tablet .. 650 milliGRAM(s) Oral every 6 hours PRN Temp greater or equal to 38C (100.4F)  dextrose 40% Gel 15 Gram(s) Oral once PRN Blood Glucose LESS THAN 70 milliGRAM(s)/deciliter  glucagon  Injectable 1 milliGRAM(s) IntraMuscular once PRN Glucose LESS THAN 70 milligrams/deciliter  levalbuterol Inhalation 0.63 milliGRAM(s) Inhalation every 6 hours PRN sob/wheezing      Vital Signs Last 24 Hrs  T(C): 37.2 (2019 04:00), Max: 37.3 (2019 00:00)  T(F): 98.9 (2019 04:00), Max: 99.1 (2019 00:00)  HR: 61 (2019 05:13) (56 - 84)  BP: 136/74 (2019 05:13) (128/72 - 149/90)  BP(mean): --  RR: 17 (2019 04:00) (17 - 18)  SpO2: 100% (2019 04:00) (95% - 100%)  CAPILLARY BLOOD GLUCOSE      POCT Blood Glucose.: 130 mg/dL (2019 21:55)  POCT Blood Glucose.: 129 mg/dL (2019 17:10)  POCT Blood Glucose.: 125 mg/dL (2019 11:52)  POCT Blood Glucose.: 125 mg/dL (2019 07:48)    I&O's Summary    10 Feb 2019 07:01  -  2019 07:00  --------------------------------------------------------  IN: 1040 mL / OUT: 900 mL / NET: 140 mL    2019 07:01  -  2019 06:47  --------------------------------------------------------  IN: 976 mL / OUT: 900 mL / NET: 76 mL        PHYSICAL EXAM:  GENERAL: Still grunting with each breath, but appears comfortable.   HEAD:  Atraumatic, Normocephalic  EYES: EOMI, PERRLA, conjunctiva and sclera clear  NECK: Supple, No JVD  CHEST/LUNG: Crackles heard without a stethoscope. Diffuse crackles and rhonchi throughout all lung fields, patient was laying flat on her back.   HEART: Irregularly irregular rate and rhythm; holosystolic murmur heard best between 2nd/3rd IC at midline.   ABDOMEN: Soft, Nontender, Nondistended; Bowel sounds present  EXTREMITIES:  2+ Peripheral Pulses, No clubbing, cyanosis. 1+ LE edema.       LABS:                                     10.1   6.98  )-----------( 288      ( 2019 06:57 )             34.8       02-12    137  |  97<L>  |  32<H>  ----------------------------<  112<H>  3.8   |  26  |  1.38<H>    Ca    8.7      2019 06:57  Phos  3.3     02-12  Mg     2.2         TPro  7.0  /  Alb  3.6  /  TBili  0.6  /  DBili  x   /  AST  39<H>  /  ALT  19  /  AlkPhos  54                Urinalysis Basic - ( 2019 10:43 )    Color: YELLOW / Appearance: CLEAR / S.015 / pH: 6.5  Gluc: NEGATIVE / Ketone: NEGATIVE  / Bili: NEGATIVE / Urobili: NORMAL   Blood: TRACE / Protein: NEGATIVE / Nitrite: NEGATIVE   Leuk Esterase: NEGATIVE / RBC: x / WBC x   Sq Epi: x / Non Sq Epi: x / Bacteria: x        PT/INR - ( 2019 06:57 )   PT: 18.4 SEC;   INR: 1.63          PTT - ( 2019 06:57 )  PTT:32.0 SEC    Lactate Trend      CARDIAC MARKERS ( 2019 06:57 )  x     / x     / 158 u/L / 3.80 ng/mL / x      CARDIAC MARKERS ( 2019 06:28 )  x     / x     / 221 u/L / 4.81 ng/mL / x            CAPILLARY BLOOD GLUCOSE      POCT Blood Glucose.: 166 mg/dL (2019 11:42)        RADIOLOGY & ADDITIONAL TESTS:    Imaging Personally Reviewed:    Consultant(s) Notes Reviewed:      Care Discussed with Consultants/Other Providers: Dr. Larry Pike   Internal Medicine PGY-1  Pager #[phone]    Patient is a 78y old  Female who presents with a chief complaint of Sepsis (2019 12:46)      SUBJECTIVE / OVERNIGHT EVENTS:  HAYDER ON. Pending [facility] today. Denies SOB and HERNANDEZ, however, crackles could be heard without a stethoscope as she was laying flat on her back. No CP. No recurrence of diarrhea. Believes she is still urinating well.     MEDICATIONS  (STANDING):  aspirin 81 milliGRAM(s) Oral daily  atorvastatin 80 milliGRAM(s) Oral at bedtime  clopidogrel Tablet 75 milliGRAM(s) Oral daily  dextrose 5%. 1000 milliLiter(s) (50 mL/Hr) IV Continuous <Continuous>  dextrose 50% Injectable 12.5 Gram(s) IV Push once  dextrose 50% Injectable 25 Gram(s) IV Push once  dextrose 50% Injectable 25 Gram(s) IV Push once  furosemide   Injectable 40 milliGRAM(s) IV Push every 12 hours  influenza   Vaccine 0.5 milliLiter(s) IntraMuscular once  insulin lispro (HumaLOG) corrective regimen sliding scale   SubCutaneous three times a day before meals  lactobacillus acidophilus 1 Tablet(s) Oral daily  metoprolol tartrate 50 milliGRAM(s) Oral every 12 hours  piperacillin/tazobactam IVPB. 3.375 Gram(s) IV Intermittent every 8 hours    MEDICATIONS  (PRN):  acetaminophen   Tablet .. 650 milliGRAM(s) Oral every 6 hours PRN Temp greater or equal to 38C (100.4F)  dextrose 40% Gel 15 Gram(s) Oral once PRN Blood Glucose LESS THAN 70 milliGRAM(s)/deciliter  glucagon  Injectable 1 milliGRAM(s) IntraMuscular once PRN Glucose LESS THAN 70 milligrams/deciliter  levalbuterol Inhalation 0.63 milliGRAM(s) Inhalation every 6 hours PRN sob/wheezing      Vital Signs Last 24 Hrs  T(C): 37.2 (2019 04:00), Max: 37.3 (2019 00:00)  T(F): 98.9 (2019 04:00), Max: 99.1 (2019 00:00)  HR: 61 (2019 05:13) (56 - 84)  BP: 136/74 (2019 05:13) (128/72 - 149/90)  BP(mean): --  RR: 17 (2019 04:00) (17 - 18)  SpO2: 100% (2019 04:00) (95% - 100%)  CAPILLARY BLOOD GLUCOSE      POCT Blood Glucose.: 130 mg/dL (2019 21:55)  POCT Blood Glucose.: 129 mg/dL (2019 17:10)  POCT Blood Glucose.: 125 mg/dL (2019 11:52)  POCT Blood Glucose.: 125 mg/dL (2019 07:48)    I&O's Summary    10 Feb 2019 07:01  -  2019 07:00  --------------------------------------------------------  IN: 1040 mL / OUT: 900 mL / NET: 140 mL    2019 07:01  -  2019 06:47  --------------------------------------------------------  IN: 976 mL / OUT: 900 mL / NET: 76 mL        PHYSICAL EXAM:  GENERAL: Still grunting with each breath, but appears comfortable.   HEAD:  Atraumatic, Normocephalic  EYES: EOMI, PERRLA, conjunctiva and sclera clear  NECK: Supple, No JVD  CHEST/LUNG: Diffuse crackles and rhonchi throughout all lung fields, patient was laying flat on her back.   HEART: Irregularly irregular rate and rhythm; holosystolic murmur heard best between 2nd/3rd IC at midline.   ABDOMEN: Soft, Nontender, Nondistended; Bowel sounds present  EXTREMITIES:  2+ Peripheral Pulses, No clubbing, cyanosis. 1+ LE edema.       LABS:                                     10.1   6.98  )-----------( 288      ( 2019 06:57 )             34.8       02-12    137  |  97<L>  |  32<H>  ----------------------------<  112<H>  3.8   |  26  |  1.38<H>    Ca    8.7      2019 06:57  Phos  3.3     02-  Mg     2.2         TPro  7.0  /  Alb  3.6  /  TBili  0.6  /  DBili  x   /  AST  39<H>  /  ALT  19  /  AlkPhos  54  02-12              Urinalysis Basic - ( 2019 10:43 )    Color: YELLOW / Appearance: CLEAR / S.015 / pH: 6.5  Gluc: NEGATIVE / Ketone: NEGATIVE  / Bili: NEGATIVE / Urobili: NORMAL   Blood: TRACE / Protein: NEGATIVE / Nitrite: NEGATIVE   Leuk Esterase: NEGATIVE / RBC: x / WBC x   Sq Epi: x / Non Sq Epi: x / Bacteria: x        PT/INR - ( 2019 06:57 )   PT: 18.4 SEC;   INR: 1.63          PTT - ( 2019 06:57 )  PTT:32.0 SEC    Lactate Trend      CARDIAC MARKERS ( 2019 06:57 )  x     / x     / 158 u/L / 3.80 ng/mL / x      CARDIAC MARKERS ( 2019 06:28 )  x     / x     / 221 u/L / 4.81 ng/mL / x            CAPILLARY BLOOD GLUCOSE      POCT Blood Glucose.: 166 mg/dL (2019 11:42)        RADIOLOGY & ADDITIONAL TESTS:    Imaging Personally Reviewed:    Consultant(s) Notes Reviewed:      Care Discussed with Consultants/Other Providers:

## 2019-02-12 NOTE — PROGRESS NOTE ADULT - ASSESSMENT
78 year old woman with CAD, s/p CABG, s/p cath 2014 with patent LIMA to LAD and SVG to OM, + of RCA, valvular heart disease, s/p bioprosthetic MVR and AVR, afib on coumadin, HTN, HLD, DM, CVA, and multiple "lung infections" per family presents with worsening fatigue, exertional SOB, and general malaise with cough and cold symptoms. +fever and likely sepsis but also with elevated hsT and CKMB.    #NSTEMI w/ hx of CAD (Hx of CABG)  - Pt with still rising cardiac biomarkers  - Mild / moderate LVDF on Echo.   - Will need in patient ischemic eval  - Will plan for cardiac cath tomorrow pending continued clinical improvement.  - c/w ASA and atorvastatin  - Change Brilinta to Plavix, given triple therapy      #A-fib  - Bradycardic on tele   - change metoprolol to 50 q 12  - c/w coumadin     #Reduced EF  - Plan for ischemic eval  - c/w metoprolol   - c/w IV lasix 40 BID    Damien Francois MD  Cardiology Fellow - PGY-4  LICRISTOPHER: [id number]  NS: [phone]  [id number] 78 year old woman with CAD, s/p CABG, s/p cath 2014 with patent LIMA to LAD and SVG to OM, + of RCA, valvular heart disease, s/p bioprosthetic MVR and AVR, afib on coumadin, HTN, HLD, DM, CVA, and multiple "lung infections" per family presents with worsening fatigue, exertional SOB, and general malaise with cough and cold symptoms. +fever and likely sepsis but also with elevated hsT and CKMB.    #NSTEMI w/ hx of CAD (Hx of CABG)  - Pt with still rising cardiac biomarkers  - Mild / moderate LVDF on Echo.   - Will need in patient ischemic eval. Will plan for cardiac cath today.   - c/w ASA, Plavix and atorvastatin     #A-fib  - c/w metoprolol 50 BID  - c/w coumadin     #Reduced EF  - Plan for ischemic eval  - c/w metoprolol   - change lasix to 40 PO BID (pt on 40 daily at home)    Damien Francois MD  Cardiology Fellow - PGY-4  MANUEL: [id number]  NS: [phone]  [id number]

## 2019-02-12 NOTE — PROGRESS NOTE ADULT - PROBLEM SELECTOR PLAN 4
Afib w/t RVR likely 2/2 acute sepsis  Telemetry showed rate up to 131  [id number] score 7  -c/w metop tart 50mg bid to decrease demand  -coumadin 2mg tonight, goal INR 2-3  - telemetry

## 2019-02-12 NOTE — PROGRESS NOTE ADULT - SUBJECTIVE AND OBJECTIVE BOX
Patient seen and examined at bedside.    Overnight Events: No acute events overnight.       REVIEW OF SYSTEMS:  Constitutional:     [ ] negative [ ] fevers [ ] chills [ ] weight loss [ ] weight gain  HEENT:                  [ ] negative [ ] dry eyes [ ] eye irritation [ ] postnasal drip [ ] nasal congestion  CV:                         [ ] negative  [ ] chest pain [ ] orthopnea [ ] palpitations [ ] murmur  Resp:                     [ ] negative [ ] cough [ ] shortness of breath [ ] dyspnea [ ] wheezing [ ] sputum [ ]hemoptysis  GI:                          [ ] negative [ ] nausea [ ] vomiting [ ] diarrhea [ ] constipation [ ] abd pain [ ] dysphagia   :                        [ ] negative [ ] dysuria [ ] nocturia [ ] hematuria [ ] increased urinary frequency  Musculoskeletal: [ ] negative [ ] back pain [ ] myalgias [ ] arthralgias [ ] fracture  Skin:                       [ ] negative [ ] rash [ ] itch  Neurological:        [ ] negative [ ] headache [ ] dizziness [ ] syncope [ ] weakness [ ] numbness  Psychiatric:           [ ] negative [ ] anxiety [ ] depression  Endocrine:            [ ] negative [ ] diabetes [ ] thyroid problem  Heme/Lymph:      [ ] negative [ ] anemia [ ] bleeding problem  Allergic/Immune: [ ] negative [ ] itchy eyes [ ] nasal discharge [ ] hives [ ] angioedema    [ ] All other systems negative  [ ] Unable to assess ROS because sedated with anoxic brain injury.    Current Meds:  acetaminophen   Tablet .. 650 milliGRAM(s) Oral every 6 hours PRN  aspirin 81 milliGRAM(s) Oral daily  atorvastatin 80 milliGRAM(s) Oral at bedtime  clopidogrel Tablet 75 milliGRAM(s) Oral daily  dextrose 40% Gel 15 Gram(s) Oral once PRN  dextrose 5%. 1000 milliLiter(s) IV Continuous <Continuous>  dextrose 50% Injectable 12.5 Gram(s) IV Push once  dextrose 50% Injectable 25 Gram(s) IV Push once  dextrose 50% Injectable 25 Gram(s) IV Push once  furosemide   Injectable 40 milliGRAM(s) IV Push every 12 hours  glucagon  Injectable 1 milliGRAM(s) IntraMuscular once PRN  influenza   Vaccine 0.5 milliLiter(s) IntraMuscular once  insulin lispro (HumaLOG) corrective regimen sliding scale   SubCutaneous three times a day before meals  lactobacillus acidophilus 1 Tablet(s) Oral daily  levalbuterol Inhalation 0.63 milliGRAM(s) Inhalation every 6 hours PRN  metoprolol tartrate 50 milliGRAM(s) Oral every 12 hours  piperacillin/tazobactam IVPB. 3.375 Gram(s) IV Intermittent every 8 hours      PAST MEDICAL & SURGICAL HISTORY:  Diabetes mellitus  Gout  Upper GI bleed  CHF (congestive heart failure)  Mitral Regurgitation  CVA (Cerebral Infarction): 2011  CAD (Coronary Artery Disease)  Afib: (on Warfarin)  HTN (Hypertension)  H/O mitral valve replacement: 9/22/14  H/O aortic valve replacement: 9/22/14  S/P CABG x 1: (2000)  S/P angioplasty with stent: 2011      Vitals:  T(F): 98.9 (02-12), Max: 99.1 (02-12)  HR: 65 (02-12) (61 - 81)  BP: 136/74 (02-12) (128/72 - 149/90)  RR: 17 (02-12)  SpO2: 95% (02-12)  I&O's Summary    11 Feb 2019 07:01  -  12 Feb 2019 07:00  --------------------------------------------------------  IN: 1196 mL / OUT: 1400 mL / NET: -204 mL        Physical Exam:  Appearance: No acute distress; well appearing  Eyes: PERRL, EOMI, pink conjunctiva  HENT: Normal oral mucosa  Cardiovascular: RRR, S1, S2, no murmurs, rubs, or gallops; no edema; no JVD  Respiratory: Clear to auscultation bilaterally  Gastrointestinal: soft, non-tender, non-distended with normal bowel sounds  Musculoskeletal: No clubbing; no joint deformity   Neurologic: Non-focal  Lymphatic: No lymphadenopathy  Psychiatry: AAOx3, mood & affect appropriate  Skin: No rashes, ecchymoses, or cyanosis                          10.1   6.98  )-----------( 288      ( 12 Feb 2019 06:57 )             34.8     02-12    137  |  97<L>  |  32<H>  ----------------------------<  112<H>  3.8   |  26  |  1.38<H>    Ca    8.7      12 Feb 2019 06:57  Phos  3.3     02-12  Mg     2.2     02-12    TPro  7.0  /  Alb  3.6  /  TBili  0.6  /  DBili  x   /  AST  39<H>  /  ALT  19  /  AlkPhos  54  02-12    PT/INR - ( 12 Feb 2019 06:57 )   PT: 18.4 SEC;   INR: 1.63          PTT - ( 12 Feb 2019 06:57 )  PTT:32.0 SEC  CARDIAC MARKERS ( 12 Feb 2019 06:57 )  x     / x     / 158 u/L / 3.80 ng/mL / x      CARDIAC MARKERS ( 11 Feb 2019 06:28 )  x     / x     / 221 u/L / 4.81 ng/mL / x          Serum Pro-Brain Natriuretic Peptide: 2306 pg/mL (02-07 @ 16:43)      New ECG(s): Personally reviewed    Echo:  < from: Transthoracic Echocardiogram (02.09.19 @ 07:28) >  ------------------------------------------------------------------------  CONCLUSIONS:  1. Bioprosthetic mitral valve replacement. Mean transmitral  valve gradient equals 5 mm Hg, which is probably normal in  the setting of a prosthetic valve.  2. Bioprosthetic aortic valve replacement. Peak transaortic  valve gradient equals 25 mm Hg, mean transaortic valve  gradient equals 11 mm Hg, which is probably normal in the  presence of a prosthetic valve.  3. Mild left atrial enlargement.  4. Increased relative wall thickness with normal left  ventricular mass index, consistent with concentric left  ventricular remodeling.  5. Endocardium not well visualized; moderate left  ventricular dysfunction.  6. Mild right atrial enlargement.  7. Right ventricular enlargement with normal right  ventricular systolic function.  8. Normal tricuspid valve.  Severe tricuspid regurgitation.  9. Estimated pulmonary artery systolic pressure equals 40  mm Hg, assuming right atrial pressure equals 10  mm Hg,  consistent with mild pulmonary hypertension.  ------------------------------------------------------------------------  Confirmed on  2/9/2019 - 15:42:56 by Sheila Alva M.D.  ------------------------------------------------------------------------    < end of copied text >    Stress Testing:   < from: Nuclear Stress Test-Pharmacologic (05.15.17 @ 09:14) >  ------------------------------------------------------------------------  IMPRESSIONS:Normal Study  * Myocardial Perfusion SPECT results are normal.  * Normal myocardial perfusion scan,with no evidence of  infarction or inducible ischemia.  * Post-stress gated wall motion analysis was performed  (LVEF = 64 %;LVEDV = 82 ml.), revealing normal LV  function. There was paradoxical motion of the septum (post  CABG). RV function appeared normal.  ------------------------------------------------------------------------  Confirmed on  5/15/2017 - 14:02:13 by Ramin Cardenas M.D.  ------------------------------------------------------------------------    < end of copied text >    Cath:  < from: Cardiac Cath Lab - Adult (09.19.14 @ 15:43) >  VENTRICLES: EF estimated was 50 %.  CORONARY VESSELS: The coronary circulation is right dominant.  LM:   --  LM: Normal.  LAD:   --  Proximal LAD: There was a tubular 100 % stenosis. Mid to Distal  LAD fills via widely patent LIMA graft.  CX:   --  Proximal circumflex: Normal. There was no significant restenosis.  --  Mid circumflex: Normal. There was no significant restenosis.  --  Distal circumflex: Normal.  --  OM1: In a second lesion, there was a 100 % stenosis. Om1 fills via  widely patent SVG to OM.  RCA:   -- Proximal RCA: There was a tubular 100 % stenosis. Mid to Distal  RCA fills via left to right collaterals.  GRAFTS:   --  Graft to the LAD: The graft was a medium sized LIMA. Graft  angiography showed no evidence of disease.  --  Graft to the 1st obtuse marginal: The graft was a medium to large sized  saphenous vein graft from the aorta. Graft angiography showed no evidence  of disease.  COMPLICATIONS: There were no complications.  DIAGNOSTIC IMPRESSIONS: Severe Native CAD as described. Normal LVSystolic  Function. Patent LIMA to the LAD and SVG to OM grafts. Severe Mitral and  Aortic Regurgitation on GIACOMO-Echocardiogram.  DIAGNOSTIC RECOMMENDATIONS: CT surgical evaluation for MVR and AVR. Staged  PCI of RCA- after she recovers from surgery.  INTERVENTIONAL IMPRESSIONS: Severe Native CAD as described. Normal LV  Systolic Function. Patent LIMA to the LAD and SVG to OM grafts. Severe  Mitral and Aortic Regurgitation on GIACOMO-Echocardiogram.  INTERVENTIONAL RECOMMENDATIONS: CT surgical evaluation for MVR and AVR.  Staged PCI of RCA- after she recovers from surgery. Patient management  should include aggressive medical therapy, close monitoring of BUN and  creatinine, and weight reduction.    < end of copied text >    Imaging:    Interpretation of Telemetry: Patient seen and examined at bedside.    Overnight Events: No acute events overnight. Pt states that SOB has resolved. Not having chest pain. Pt did not have chest pain on arrival.       REVIEW OF SYSTEMS:  Constitutional:     [ ] negative [ ] fevers [ ] chills [ ] weight loss [ ] weight gain  HEENT:                  [ ] negative [ ] dry eyes [ ] eye irritation [ ] postnasal drip [ ] nasal congestion  CV:                         [ ] negative  [ ] chest pain [ ] orthopnea [ ] palpitations [ ] murmur  Resp:                     [ ] negative [ ] cough [ ] shortness of breath [ ] dyspnea [ ] wheezing [ ] sputum [ ]hemoptysis  GI:                          [ ] negative [ ] nausea [ ] vomiting [ ] diarrhea [ ] constipation [ ] abd pain [ ] dysphagia   :                        [ ] negative [ ] dysuria [ ] nocturia [ ] hematuria [ ] increased urinary frequency  Musculoskeletal: [ ] negative [ ] back pain [ ] myalgias [ ] arthralgias [ ] fracture  Skin:                       [ ] negative [ ] rash [ ] itch  Neurological:        [ ] negative [ ] headache [ ] dizziness [ ] syncope [ ] weakness [ ] numbness  Psychiatric:           [ ] negative [ ] anxiety [ ] depression  Endocrine:            [ ] negative [ ] diabetes [ ] thyroid problem  Heme/Lymph:      [ ] negative [ ] anemia [ ] bleeding problem  Allergic/Immune: [ ] negative [ ] itchy eyes [ ] nasal discharge [ ] hives [ ] angioedema    [x ] All other systems negative  [ ] Unable to assess ROS because sedated with anoxic brain injury.    Current Meds:  acetaminophen   Tablet .. 650 milliGRAM(s) Oral every 6 hours PRN  aspirin 81 milliGRAM(s) Oral daily  atorvastatin 80 milliGRAM(s) Oral at bedtime  clopidogrel Tablet 75 milliGRAM(s) Oral daily  dextrose 40% Gel 15 Gram(s) Oral once PRN  dextrose 5%. 1000 milliLiter(s) IV Continuous <Continuous>  dextrose 50% Injectable 12.5 Gram(s) IV Push once  dextrose 50% Injectable 25 Gram(s) IV Push once  dextrose 50% Injectable 25 Gram(s) IV Push once  furosemide   Injectable 40 milliGRAM(s) IV Push every 12 hours  glucagon  Injectable 1 milliGRAM(s) IntraMuscular once PRN  influenza   Vaccine 0.5 milliLiter(s) IntraMuscular once  insulin lispro (HumaLOG) corrective regimen sliding scale   SubCutaneous three times a day before meals  lactobacillus acidophilus 1 Tablet(s) Oral daily  levalbuterol Inhalation 0.63 milliGRAM(s) Inhalation every 6 hours PRN  metoprolol tartrate 50 milliGRAM(s) Oral every 12 hours  piperacillin/tazobactam IVPB. 3.375 Gram(s) IV Intermittent every 8 hours      PAST MEDICAL & SURGICAL HISTORY:  Diabetes mellitus  Gout  Upper GI bleed  CHF (congestive heart failure)  Mitral Regurgitation  CVA (Cerebral Infarction): 2011  CAD (Coronary Artery Disease)  Afib: (on Warfarin)  HTN (Hypertension)  H/O mitral valve replacement: 9/22/14  H/O aortic valve replacement: 9/22/14  S/P CABG x 1: (2000)  S/P angioplasty with stent: 2011      Vitals:  T(F): 98.9 (02-12), Max: 99.1 (02-12)  HR: 65 (02-12) (61 - 81)  BP: 136/74 (02-12) (128/72 - 149/90)  RR: 17 (02-12)  SpO2: 95% (02-12)  I&O's Summary    11 Feb 2019 07:01  -  12 Feb 2019 07:00  --------------------------------------------------------  IN: 1196 mL / OUT: 1400 mL / NET: -204 mL        Physical Exam:  Appearance: No acute distress; well appearing  Eyes: PERRL, EOMI, pink conjunctiva  HENT: Normal oral mucosa  Cardiovascular: RRR, S1, S2, no murmurs, rubs, or gallops; trace edema; no JVD  Respiratory: b/l rhonchi   Gastrointestinal: soft, non-tender, non-distended with normal bowel sounds  Musculoskeletal: No clubbing; no joint deformity   Neurologic: Non-focal  Lymphatic: No lymphadenopathy  Psychiatry: AAOx3, mood & affect appropriate  Skin: No rashes, ecchymoses, or cyanosis                          10.1   6.98  )-----------( 288      ( 12 Feb 2019 06:57 )             34.8     02-12    137  |  97<L>  |  32<H>  ----------------------------<  112<H>  3.8   |  26  |  1.38<H>    Ca    8.7      12 Feb 2019 06:57  Phos  3.3     02-12  Mg     2.2     02-12    TPro  7.0  /  Alb  3.6  /  TBili  0.6  /  DBili  x   /  AST  39<H>  /  ALT  19  /  AlkPhos  54  02-12    PT/INR - ( 12 Feb 2019 06:57 )   PT: 18.4 SEC;   INR: 1.63          PTT - ( 12 Feb 2019 06:57 )  PTT:32.0 SEC  CARDIAC MARKERS ( 12 Feb 2019 06:57 )  x     / x     / 158 u/L / 3.80 ng/mL / x      CARDIAC MARKERS ( 11 Feb 2019 06:28 )  x     / x     / 221 u/L / 4.81 ng/mL / x          Serum Pro-Brain Natriuretic Peptide: 2306 pg/mL (02-07 @ 16:43)      New ECG(s): Personally reviewed    Echo:  < from: Transthoracic Echocardiogram (02.09.19 @ 07:28) >  ------------------------------------------------------------------------  CONCLUSIONS:  1. Bioprosthetic mitral valve replacement. Mean transmitral  valve gradient equals 5 mm Hg, which is probably normal in  the setting of a prosthetic valve.  2. Bioprosthetic aortic valve replacement. Peak transaortic  valve gradient equals 25 mm Hg, mean transaortic valve  gradient equals 11 mm Hg, which is probably normal in the  presence of a prosthetic valve.  3. Mild left atrial enlargement.  4. Increased relative wall thickness with normal left  ventricular mass index, consistent with concentric left  ventricular remodeling.  5. Endocardium not well visualized; moderate left  ventricular dysfunction.  6. Mild right atrial enlargement.  7. Right ventricular enlargement with normal right  ventricular systolic function.  8. Normal tricuspid valve.  Severe tricuspid regurgitation.  9. Estimated pulmonary artery systolic pressure equals 40  mm Hg, assuming right atrial pressure equals 10  mm Hg,  consistent with mild pulmonary hypertension.  ------------------------------------------------------------------------  Confirmed on  2/9/2019 - 15:42:56 by Sheila Alva M.D.  ------------------------------------------------------------------------    < end of copied text >    Stress Testing:   < from: Nuclear Stress Test-Pharmacologic (05.15.17 @ 09:14) >  ------------------------------------------------------------------------  IMPRESSIONS:Normal Study  * Myocardial Perfusion SPECT results are normal.  * Normal myocardial perfusion scan,with no evidence of  infarction or inducible ischemia.  * Post-stress gated wall motion analysis was performed  (LVEF = 64 %;LVEDV = 82 ml.), revealing normal LV  function. There was paradoxical motion of the septum (post  CABG). RV function appeared normal.  ------------------------------------------------------------------------  Confirmed on  5/15/2017 - 14:02:13 by Ramin Cardenas M.D.  ------------------------------------------------------------------------    < end of copied text >    Cath:  < from: Cardiac Cath Lab - Adult (09.19.14 @ 15:43) >  VENTRICLES: EF estimated was 50 %.  CORONARY VESSELS: The coronary circulation is right dominant.  LM:   --  LM: Normal.  LAD:   --  Proximal LAD: There was a tubular 100 % stenosis. Mid to Distal  LAD fills via widely patent LIMA graft.  CX:   --  Proximal circumflex: Normal. There was no significant restenosis.  --  Mid circumflex: Normal. There was no significant restenosis.  --  Distal circumflex: Normal.  --  OM1: In a second lesion, there was a 100 % stenosis. Om1 fills via  widely patent SVG to OM.  RCA:   -- Proximal RCA: There was a tubular 100 % stenosis. Mid to Distal  RCA fills via left to right collaterals.  GRAFTS:   --  Graft to the LAD: The graft was a medium sized LIMA. Graft  angiography showed no evidence of disease.  --  Graft to the 1st obtuse marginal: The graft was a medium to large sized  saphenous vein graft from the aorta. Graft angiography showed no evidence  of disease.  COMPLICATIONS: There were no complications.  DIAGNOSTIC IMPRESSIONS: Severe Native CAD as described. Normal LVSystolic  Function. Patent LIMA to the LAD and SVG to OM grafts. Severe Mitral and  Aortic Regurgitation on GIACOMO-Echocardiogram.  DIAGNOSTIC RECOMMENDATIONS: CT surgical evaluation for MVR and AVR. Staged  PCI of RCA- after she recovers from surgery.  INTERVENTIONAL IMPRESSIONS: Severe Native CAD as described. Normal LV  Systolic Function. Patent LIMA to the LAD and SVG to OM grafts. Severe  Mitral and Aortic Regurgitation on GIACOMO-Echocardiogram.  INTERVENTIONAL RECOMMENDATIONS: CT surgical evaluation for MVR and AVR.  Staged PCI of RCA- after she recovers from surgery. Patient management  should include aggressive medical therapy, close monitoring of BUN and  creatinine, and weight reduction.    < end of copied text >    Imaging:    Interpretation of Telemetry:

## 2019-02-12 NOTE — PROGRESS NOTE ADULT - PROBLEM SELECTOR PLAN 3
- Acute on chronic HFpEF exacerbation likely 2/2 Sepsis and afib w/t RVR now c/b new systolic failure  - TTE from 5/2017 showed EF73%, however, now with new EF 42% and mod LV dysfunction. Severe TR, and moderate pulm HTN. Prelim read of CXR showing mild pulm edema. proBNP elevated to 2306  - 40mg lasix IV BID   -daily weights  -Strict Is/Os  -continue metoprolol tart 50mg bid - Acute on chronic HFpEF exacerbation likely 2/2 Sepsis and afib w/t RVR now c/b new systolic failure  - TTE from 5/2017 showed EF73%, however, now with new EF 42% and mod LV dysfunction. Severe TR, and moderate pulm HTN. Prelim read of CXR showing mild pulm edema. proBNP elevated to 2306  - 40mg lasix IV BID   -daily weights  -Strict Is/Os  -continue metoprolol tart 50mg bid  -Head of bed 45degrees

## 2019-02-12 NOTE — PROGRESS NOTE ADULT - PROBLEM SELECTOR PLAN 6
Heparin gtt last night appears to have caused bleed vs. infiltration. Hgb stable.  -CBC QD as hgb stable Heparin gtt previously caused bleed vs. infiltration. Hgb stable.  -CBC QD as hgb stable

## 2019-02-12 NOTE — PROGRESS NOTE ADULT - PROBLEM SELECTOR PLAN 5
Acute rise in sCr now improving. Likely cardiorenal syndrome.  -c/w diuresis  -UA, U lytes Acute rise in sCr now improving. Likely cardiorenal syndrome.  -c/w diuresis

## 2019-02-13 ENCOUNTER — TRANSCRIPTION ENCOUNTER (OUTPATIENT)
Age: 79
End: 2019-02-13

## 2019-02-13 DIAGNOSIS — E87.1 HYPO-OSMOLALITY AND HYPONATREMIA: ICD-10-CM

## 2019-02-13 LAB
ANION GAP SERPL CALC-SCNC: 13 MMO/L — SIGNIFICANT CHANGE UP (ref 7–14)
ANION GAP SERPL CALC-SCNC: 13 MMO/L — SIGNIFICANT CHANGE UP (ref 7–14)
BUN SERPL-MCNC: 29 MG/DL — HIGH (ref 7–23)
BUN SERPL-MCNC: 30 MG/DL — HIGH (ref 7–23)
CALCIUM SERPL-MCNC: 8.4 MG/DL — SIGNIFICANT CHANGE UP (ref 8.4–10.5)
CALCIUM SERPL-MCNC: 8.5 MG/DL — SIGNIFICANT CHANGE UP (ref 8.4–10.5)
CHLORIDE SERPL-SCNC: 92 MMOL/L — LOW (ref 98–107)
CHLORIDE SERPL-SCNC: 94 MMOL/L — LOW (ref 98–107)
CK MB BLD-MCNC: 2.82 NG/ML — SIGNIFICANT CHANGE UP (ref 1–4.7)
CK MB BLD-MCNC: SIGNIFICANT CHANGE UP (ref 0–2.5)
CK SERPL-CCNC: 141 U/L — SIGNIFICANT CHANGE UP (ref 25–170)
CO2 SERPL-SCNC: 24 MMOL/L — SIGNIFICANT CHANGE UP (ref 22–31)
CO2 SERPL-SCNC: 26 MMOL/L — SIGNIFICANT CHANGE UP (ref 22–31)
CREAT ?TM UR-MCNC: 65 MG/DL — SIGNIFICANT CHANGE UP
CREAT SERPL-MCNC: 1.42 MG/DL — HIGH (ref 0.5–1.3)
CREAT SERPL-MCNC: 1.42 MG/DL — HIGH (ref 0.5–1.3)
GLUCOSE SERPL-MCNC: 131 MG/DL — HIGH (ref 70–99)
GLUCOSE SERPL-MCNC: 186 MG/DL — HIGH (ref 70–99)
HCT VFR BLD CALC: 34.3 % — LOW (ref 34.5–45)
HGB BLD-MCNC: 9.9 G/DL — LOW (ref 11.5–15.5)
INR BLD: 1.74 — HIGH (ref 0.88–1.17)
MAGNESIUM SERPL-MCNC: 2.1 MG/DL — SIGNIFICANT CHANGE UP (ref 1.6–2.6)
MCHC RBC-ENTMCNC: 22 PG — LOW (ref 27–34)
MCHC RBC-ENTMCNC: 28.9 % — LOW (ref 32–36)
MCV RBC AUTO: 76.1 FL — LOW (ref 80–100)
NRBC # FLD: 0 K/UL — LOW (ref 25–125)
OSMOLALITY SERPL: 288 MOSMO/KG — SIGNIFICANT CHANGE UP (ref 275–295)
OSMOLALITY UR: 359 MOSMO/KG — SIGNIFICANT CHANGE UP (ref 50–1200)
PLATELET # BLD AUTO: 285 K/UL — SIGNIFICANT CHANGE UP (ref 150–400)
PMV BLD: 10.9 FL — SIGNIFICANT CHANGE UP (ref 7–13)
POTASSIUM SERPL-MCNC: 3.8 MMOL/L — SIGNIFICANT CHANGE UP (ref 3.5–5.3)
POTASSIUM SERPL-MCNC: 3.8 MMOL/L — SIGNIFICANT CHANGE UP (ref 3.5–5.3)
POTASSIUM SERPL-SCNC: 3.8 MMOL/L — SIGNIFICANT CHANGE UP (ref 3.5–5.3)
POTASSIUM SERPL-SCNC: 3.8 MMOL/L — SIGNIFICANT CHANGE UP (ref 3.5–5.3)
PROTHROM AB SERPL-ACNC: 19.7 SEC — HIGH (ref 9.8–13.1)
RBC # BLD: 4.51 M/UL — SIGNIFICANT CHANGE UP (ref 3.8–5.2)
RBC # FLD: 18.4 % — HIGH (ref 10.3–14.5)
SODIUM SERPL-SCNC: 131 MMOL/L — LOW (ref 135–145)
SODIUM SERPL-SCNC: 131 MMOL/L — LOW (ref 135–145)
SODIUM UR-SCNC: 48 MMOL/L — SIGNIFICANT CHANGE UP
TROPONIN T, HIGH SENSITIVITY: 1568 NG/L — CRITICAL HIGH (ref ?–14)
UUN UR-MCNC: 473.8 MG/DL — SIGNIFICANT CHANGE UP
WBC # BLD: 8.92 K/UL — SIGNIFICANT CHANGE UP (ref 3.8–10.5)
WBC # FLD AUTO: 8.92 K/UL — SIGNIFICANT CHANGE UP (ref 3.8–10.5)

## 2019-02-13 PROCEDURE — 99232 SBSQ HOSP IP/OBS MODERATE 35: CPT

## 2019-02-13 PROCEDURE — 99233 SBSQ HOSP IP/OBS HIGH 50: CPT | Mod: GC

## 2019-02-13 PROCEDURE — 93010 ELECTROCARDIOGRAM REPORT: CPT

## 2019-02-13 RX ORDER — WARFARIN SODIUM 2.5 MG/1
3 TABLET ORAL ONCE
Qty: 0 | Refills: 0 | Status: COMPLETED | OUTPATIENT
Start: 2019-02-13 | End: 2019-02-13

## 2019-02-13 RX ORDER — FUROSEMIDE 40 MG
40 TABLET ORAL EVERY 12 HOURS
Qty: 0 | Refills: 0 | Status: DISCONTINUED | OUTPATIENT
Start: 2019-02-13 | End: 2019-02-15

## 2019-02-13 RX ORDER — POTASSIUM CHLORIDE 20 MEQ
40 PACKET (EA) ORAL ONCE
Qty: 0 | Refills: 0 | Status: COMPLETED | OUTPATIENT
Start: 2019-02-13 | End: 2019-02-13

## 2019-02-13 RX ADMIN — CLOPIDOGREL BISULFATE 75 MILLIGRAM(S): 75 TABLET, FILM COATED ORAL at 11:53

## 2019-02-13 RX ADMIN — Medication 50 MILLIGRAM(S): at 05:17

## 2019-02-13 RX ADMIN — ATORVASTATIN CALCIUM 80 MILLIGRAM(S): 80 TABLET, FILM COATED ORAL at 21:08

## 2019-02-13 RX ADMIN — Medication 81 MILLIGRAM(S): at 11:53

## 2019-02-13 RX ADMIN — PIPERACILLIN AND TAZOBACTAM 25 GRAM(S): 4; .5 INJECTION, POWDER, LYOPHILIZED, FOR SOLUTION INTRAVENOUS at 05:16

## 2019-02-13 RX ADMIN — WARFARIN SODIUM 3 MILLIGRAM(S): 2.5 TABLET ORAL at 17:45

## 2019-02-13 RX ADMIN — Medication 40 MILLIGRAM(S): at 05:17

## 2019-02-13 RX ADMIN — Medication 40 MILLIEQUIVALENT(S): at 11:53

## 2019-02-13 RX ADMIN — Medication 1 TABLET(S): at 11:52

## 2019-02-13 RX ADMIN — LEVALBUTEROL 0.63 MILLIGRAM(S): 1.25 SOLUTION, CONCENTRATE RESPIRATORY (INHALATION) at 17:30

## 2019-02-13 RX ADMIN — PIPERACILLIN AND TAZOBACTAM 25 GRAM(S): 4; .5 INJECTION, POWDER, LYOPHILIZED, FOR SOLUTION INTRAVENOUS at 11:53

## 2019-02-13 RX ADMIN — Medication 2: at 17:44

## 2019-02-13 RX ADMIN — Medication 50 MILLIGRAM(S): at 17:45

## 2019-02-13 RX ADMIN — Medication 40 MILLIGRAM(S): at 17:45

## 2019-02-13 RX ADMIN — PIPERACILLIN AND TAZOBACTAM 25 GRAM(S): 4; .5 INJECTION, POWDER, LYOPHILIZED, FOR SOLUTION INTRAVENOUS at 21:08

## 2019-02-13 RX ADMIN — Medication 1: at 12:00

## 2019-02-13 NOTE — PROGRESS NOTE ADULT - PROBLEM SELECTOR PLAN 2
- NSTEMI 2/2 type demand ischemia likely multifactorial due to sepsis and/or CHF exacerbation/ afib w/RVR s/p LHC without intervention, LIMA to LAD and SVG to OM1 with mild disease, but good flow.  - hst Trops 44 -->628 -->1133 --> 1590 --> 1700  - CKMB trending down  -ASA and plavix due to triple therapy based on woest trial  -EKG QD  -TTE w/t new drop in EF concerning for possible combination of type I and II MI in setting of new TWIs.  - per cards, c/w coumadin, goal INR 2-3  - NSTEMI likely to improve with better rate control, c/w metoprolol 50mg bid  - Continue rate control and AC - NSTEMI 2/2 type demand ischemia likely multifactorial due to sepsis and/or CHF exacerbation/ afib w/RVR s/p LHC without intervention, LIMA to LAD and SVG to OM1 with mild disease, but good flow.  - hst Trops decreased today  - CKMB trending down  -ASA and plavix due to triple therapy based on woest trial  - per cards, c/w coumadin, goal INR 2-3  - NSTEMI likely to improve with better rate control, c/w metoprolol 50mg bid - NSTEMI 2/2 type demand ischemia likely multifactorial due to sepsis and/or CHF exacerbation/ afib w/RVR s/p LHC without intervention, LIMA to LAD and SVG to OM1 with mild disease, but good flow.  - hst Trops decreased today  - CKMB trending down  -ASA and plavix due to triple therapy based on woest trial  - per cards, c/w coumadin, goal INR 2-3  - c/w metoprolol 50mg bid

## 2019-02-13 NOTE — PROGRESS NOTE ADULT - PROBLEM SELECTOR PLAN 10
Diet: DASH/TLC/CC  DVT ppx: on full a/c with coumadin holding home hydralazine and enalapril in setting of active infection, add back as clinically indicated BP at goal off of hydralazine and enalapril, add back as clinically indicated

## 2019-02-13 NOTE — PROVIDER CONTACT NOTE (MEDICATION) - ASSESSMENT
patient with IVP Lasix and PO Metoprolol q12h, last dose given late at 2000 rather than 1800 due to pt being in cardiac cath. Next doses scheduled to be given at 0600, however medications ordered q12h should be given at 0800.

## 2019-02-13 NOTE — DISCHARGE NOTE ADULT - MEDICATION SUMMARY - MEDICATIONS TO CHANGE
I will SWITCH the dose or number of times a day I take the medications listed below when I get home from the hospital:    Coumadin 3 mg oral tablet  -- 1 tab(s) by mouth once a day    furosemide 40 mg oral tablet  -- 1 tab(s) by mouth once a day

## 2019-02-13 NOTE — PROGRESS NOTE ADULT - PROBLEM SELECTOR PLAN 3
- Acute on chronic HFpEF exacerbation likely 2/2 Sepsis and afib w/t RVR now c/b new systolic failure  - TTE from 5/2017 showed EF73%, however, now with new EF 42% and mod LV dysfunction. Severe TR, and moderate pulm HTN. Prelim read of CXR showing mild pulm edema. proBNP elevated to 2306  - 40mg lasix IV BID   -daily weights  -Strict Is/Os  -continue metoprolol tart 50mg bid  -Head of bed 45degrees - Acute on chronic HFpEF exacerbation likely 2/2 Sepsis and afib w/t RVR now c/b new systolic failure  - TTE from 5/2017 showed EF73%, however, now with new EF 42% and mod LV dysfunction. Severe TR, and moderate pulm HTN. Prelim read of CXR showing mild pulm edema. proBNP elevated to 2306  - 40mg lasix IV BID, crackles not improving  -daily weights  -Strict Is/Os  -continue metoprolol tart 50mg bid  -Head of bed 45degrees

## 2019-02-13 NOTE — PROGRESS NOTE ADULT - PROBLEM SELECTOR PLAN 5
Acute rise in sCr now improving. Likely cardiorenal syndrome.  -c/w diuresis Acute drop in serum sodium  -Serum osm 288, Uosm > 600, suggestive of SIADH. Lasix should dilute the urine  -Fluid restriction < 1L  -Give both doses of IV lasix today, re-evaluate tomorrow Acute drop in serum sodium  -Serum osm 288, Uosm > 600, suggestive of SIADH. Lasix should dilute the urine  -Fluid restriction < 1L  -Give both doses of IV lasix today given continued crackles on exam, re-evaluate tomorrow; if no improvement, will obtain renal consultation

## 2019-02-13 NOTE — DISCHARGE NOTE ADULT - SECONDARY DIAGNOSIS.
Atrial fibrillation with RVR NSTEMI (non-ST elevated myocardial infarction) Acute systolic heart failure CAD (coronary artery disease) Diabetes mellitus

## 2019-02-13 NOTE — PROGRESS NOTE ADULT - PROBLEM SELECTOR PLAN 6
Heparin gtt previously caused bleed vs. infiltration. Hgb stable.  -CBC QD as hgb stable Acute rise in sCr now improving, barely above baseline. Likely cardiorenal syndrome.  -c/w diuresis

## 2019-02-13 NOTE — PROGRESS NOTE ADULT - SUBJECTIVE AND OBJECTIVE BOX
Patient seen and examined at bedside.    Overnight Events:       REVIEW OF SYSTEMS:  Constitutional:     [ ] negative [ ] fevers [ ] chills [ ] weight loss [ ] weight gain  HEENT:                  [ ] negative [ ] dry eyes [ ] eye irritation [ ] postnasal drip [ ] nasal congestion  CV:                         [ ] negative  [ ] chest pain [ ] orthopnea [ ] palpitations [ ] murmur  Resp:                     [ ] negative [ ] cough [ ] shortness of breath [ ] dyspnea [ ] wheezing [ ] sputum [ ]hemoptysis  GI:                          [ ] negative [ ] nausea [ ] vomiting [ ] diarrhea [ ] constipation [ ] abd pain [ ] dysphagia   :                        [ ] negative [ ] dysuria [ ] nocturia [ ] hematuria [ ] increased urinary frequency  Musculoskeletal: [ ] negative [ ] back pain [ ] myalgias [ ] arthralgias [ ] fracture  Skin:                       [ ] negative [ ] rash [ ] itch  Neurological:        [ ] negative [ ] headache [ ] dizziness [ ] syncope [ ] weakness [ ] numbness  Psychiatric:           [ ] negative [ ] anxiety [ ] depression  Endocrine:            [ ] negative [ ] diabetes [ ] thyroid problem  Heme/Lymph:      [ ] negative [ ] anemia [ ] bleeding problem  Allergic/Immune: [ ] negative [ ] itchy eyes [ ] nasal discharge [ ] hives [ ] angioedema    [ ] All other systems negative  [ ] Unable to assess ROS because sedated with anoxic brain injury.    Current Meds:  acetaminophen   Tablet .. 650 milliGRAM(s) Oral every 6 hours PRN  aspirin 81 milliGRAM(s) Oral daily  atorvastatin 80 milliGRAM(s) Oral at bedtime  clopidogrel Tablet 75 milliGRAM(s) Oral daily  dextrose 40% Gel 15 Gram(s) Oral once PRN  dextrose 5%. 1000 milliLiter(s) IV Continuous <Continuous>  dextrose 50% Injectable 12.5 Gram(s) IV Push once  dextrose 50% Injectable 25 Gram(s) IV Push once  dextrose 50% Injectable 25 Gram(s) IV Push once  furosemide   Injectable 40 milliGRAM(s) IV Push every 12 hours  glucagon  Injectable 1 milliGRAM(s) IntraMuscular once PRN  influenza   Vaccine 0.5 milliLiter(s) IntraMuscular once  insulin lispro (HumaLOG) corrective regimen sliding scale   SubCutaneous three times a day before meals  lactobacillus acidophilus 1 Tablet(s) Oral daily  levalbuterol Inhalation 0.63 milliGRAM(s) Inhalation every 6 hours PRN  metoprolol tartrate 50 milliGRAM(s) Oral every 12 hours  piperacillin/tazobactam IVPB. 3.375 Gram(s) IV Intermittent every 8 hours  potassium chloride    Tablet ER 40 milliEquivalent(s) Oral once  warfarin 3 milliGRAM(s) Oral once      PAST MEDICAL & SURGICAL HISTORY:  Diabetes mellitus  Gout  Upper GI bleed  CHF (congestive heart failure)  Mitral Regurgitation  CVA (Cerebral Infarction): 2011  CAD (Coronary Artery Disease)  Afib: (on Warfarin)  HTN (Hypertension)  H/O mitral valve replacement: 9/22/14  H/O aortic valve replacement: 9/22/14  S/P CABG x 1: (2000)  S/P angioplasty with stent: 2011      Vitals:  T(F): 98 (02-13), Max: 98.5 (02-13)  HR: 66 (02-13) (61 - 76)  BP: 130/54 (02-13) (128/51 - 158/74)  RR: 17 (02-13)  SpO2: 96% (02-13)  I&O's Summary    12 Feb 2019 07:01  -  13 Feb 2019 07:00  --------------------------------------------------------  IN: 1235 mL / OUT: 2205 mL / NET: -970 mL        Physical Exam:  Appearance: No acute distress; well appearing  Eyes: PERRL, EOMI, pink conjunctiva  HENT: Normal oral mucosa  Cardiovascular: RRR, S1, S2, no murmurs, rubs, or gallops; no edema; no JVD  Respiratory: Clear to auscultation bilaterally  Gastrointestinal: soft, non-tender, non-distended with normal bowel sounds  Musculoskeletal: No clubbing; no joint deformity   Neurologic: Non-focal  Lymphatic: No lymphadenopathy  Psychiatry: AAOx3, mood & affect appropriate  Skin: No rashes, ecchymoses, or cyanosis                          9.9    8.92  )-----------( 285      ( 13 Feb 2019 04:44 )             34.3     02-13    131<L>  |  94<L>  |  30<H>  ----------------------------<  131<H>  3.8   |  24  |  1.42<H>    Ca    8.5      13 Feb 2019 04:44  Phos  3.8     02-12  Mg     2.1     02-13    TPro  6.8  /  Alb  3.2<L>  /  TBili  0.4  /  DBili  x   /  AST  52<H>  /  ALT  19  /  AlkPhos  51  02-12    PT/INR - ( 13 Feb 2019 04:44 )   PT: 19.7 SEC;   INR: 1.74          PTT - ( 12 Feb 2019 06:57 )  PTT:32.0 SEC  CARDIAC MARKERS ( 13 Feb 2019 04:44 )  x     / x     / 141 u/L / 2.82 ng/mL / x      CARDIAC MARKERS ( 12 Feb 2019 06:57 )  x     / x     / 158 u/L / 3.80 ng/mL / x          Serum Pro-Brain Natriuretic Peptide: 2306 pg/mL (02-07 @ 16:43)          New ECG(s): Personally reviewed    Echo:  < from: Transthoracic Echocardiogram (02.09.19 @ 07:28) >  ------------------------------------------------------------------------  CONCLUSIONS:  1. Bioprosthetic mitral valve replacement. Mean transmitral  valve gradient equals 5 mm Hg, which is probably normal in  the setting of a prosthetic valve.  2. Bioprosthetic aortic valve replacement. Peak transaortic  valve gradient equals 25 mm Hg, mean transaortic valve  gradient equals 11 mm Hg, which is probably normal in the  presence of a prosthetic valve.  3. Mild left atrial enlargement.  4. Increased relative wall thickness with normal left  ventricular mass index, consistent with concentric left  ventricular remodeling.  5. Endocardium not well visualized; moderate left  ventricular dysfunction.  6. Mild right atrial enlargement.  7. Right ventricular enlargement with normal right  ventricular systolic function.  8. Normal tricuspid valve.  Severe tricuspid regurgitation.  9. Estimated pulmonary artery systolic pressure equals 40  mm Hg, assuming right atrial pressure equals 10  mm Hg,  consistent with mild pulmonary hypertension.  ------------------------------------------------------------------------  Confirmed on  2/9/2019 - 15:42:56 by Sheila Alva M.D.  ------------------------------------------------------------------------    < end of copied text >    Stress Testing:   < from: Nuclear Stress Test-Pharmacologic (05.15.17 @ 09:14) >  ------------------------------------------------------------------------  IMPRESSIONS:Normal Study  * Myocardial Perfusion SPECT results are normal.  * Normal myocardial perfusion scan,with no evidence of  infarction or inducible ischemia.  * Post-stress gated wall motion analysis was performed  (LVEF = 64 %;LVEDV = 82 ml.), revealing normal LV  function. There was paradoxical motion of the septum (post  CABG). RV function appeared normal.  ------------------------------------------------------------------------  Confirmed on  5/15/2017 - 14:02:13 by Ramin Cardenas M.D.  ------------------------------------------------------------------------    < end of copied text >    Cath:  < from: Cardiac Cath Lab - Adult (02.12.19 @ 17:20) >  VENTRICLES: No left ventriculogram was performed.  CORONARY VESSELS: The coronary circulation is right dominant.  LM:   --  LM: Angiography showed mild atherosclerosis with no flow limiting  lesions.  LAD:   --  LAD: The distal vessel was supplied by extensive retrograde flow  from the graft(s) to the mid LAD.  --  Proximal LAD: There was a diffuse 99 % stenosis at a site with no prior  intervention. The lesion was eccentric.  CX:   --  Mid circumflex: There was a diffuse 10 % stenosis at the site of  a prior stent. The lesion was eccentric. There was YELENA grade 3 flow  through the vessel (brisk flow).  --  OM1: There was a 100 % stenosis. This lesion is a chronic total  occlusion.  RCA:   --  Proximal RCA: This vessel was not injected. Known to be .  GRAFTS:   --  Graft to the LAD: The graft was a medium sized LIMA. Distal  vessel angiography showed mild diffuse disease.  --  Graft to the 1st obtuse marginal: Thegraft was a saphenous vein graft  from the ascending aorta. Graft angiography showed mild degeneration.  Distal vessel angiography showed mild diffuse disease.  COMPLICATIONS: There were no complications.  DIAGNOSTIC IMPRESSIONS: Patent LIMA to LAD, SVG to OM-1  Patent stents LCx  RCA is a well collateralized , known from prior cath  DIAGNOSTIC RECOMMENDATIONS: Medical management  INTERVENTIONAL IMPRESSIONS: Patent LIMA to LAD, SVG to OM-1  Patent stents LCx  RCA is a well collateralized , known from prior cath  INTERVENTIONAL RECOMMENDATIONS: Medical management    < end of copied text >    Imaging:  < from: Xray Chest 1 View AP/PA (02.07.19 @ 17:22) >  IMPRESSION: Mild pulmonary edema.    < end of copied text > Patient seen and examined at bedside.    Overnight Events: Pt states that she feels a little SOB today.       REVIEW OF SYSTEMS:  Constitutional:     [ ] negative [ ] fevers [ ] chills [ ] weight loss [ ] weight gain  HEENT:                  [ ] negative [ ] dry eyes [ ] eye irritation [ ] postnasal drip [ ] nasal congestion  CV:                         [ ] negative  [ ] chest pain [ ] orthopnea [ ] palpitations [ ] murmur  Resp:                     [ ] negative [ ] cough [ ] shortness of breath [x ] dyspnea [ ] wheezing [ ] sputum [ ]hemoptysis  GI:                          [ ] negative [ ] nausea [ ] vomiting [ ] diarrhea [ ] constipation [ ] abd pain [ ] dysphagia   :                        [ ] negative [ ] dysuria [ ] nocturia [ ] hematuria [ ] increased urinary frequency  Musculoskeletal: [ ] negative [ ] back pain [ ] myalgias [ ] arthralgias [ ] fracture  Skin:                       [ ] negative [ ] rash [ ] itch  Neurological:        [ ] negative [ ] headache [ ] dizziness [ ] syncope [ ] weakness [ ] numbness  Psychiatric:           [ ] negative [ ] anxiety [ ] depression  Endocrine:            [ ] negative [ ] diabetes [ ] thyroid problem  Heme/Lymph:      [ ] negative [ ] anemia [ ] bleeding problem  Allergic/Immune: [ ] negative [ ] itchy eyes [ ] nasal discharge [ ] hives [ ] angioedema    [ x] All other systems negative  [ ] Unable to assess ROS because sedated with anoxic brain injury.    Current Meds:  acetaminophen   Tablet .. 650 milliGRAM(s) Oral every 6 hours PRN  aspirin 81 milliGRAM(s) Oral daily  atorvastatin 80 milliGRAM(s) Oral at bedtime  clopidogrel Tablet 75 milliGRAM(s) Oral daily  dextrose 40% Gel 15 Gram(s) Oral once PRN  dextrose 5%. 1000 milliLiter(s) IV Continuous <Continuous>  dextrose 50% Injectable 12.5 Gram(s) IV Push once  dextrose 50% Injectable 25 Gram(s) IV Push once  dextrose 50% Injectable 25 Gram(s) IV Push once  furosemide   Injectable 40 milliGRAM(s) IV Push every 12 hours  glucagon  Injectable 1 milliGRAM(s) IntraMuscular once PRN  influenza   Vaccine 0.5 milliLiter(s) IntraMuscular once  insulin lispro (HumaLOG) corrective regimen sliding scale   SubCutaneous three times a day before meals  lactobacillus acidophilus 1 Tablet(s) Oral daily  levalbuterol Inhalation 0.63 milliGRAM(s) Inhalation every 6 hours PRN  metoprolol tartrate 50 milliGRAM(s) Oral every 12 hours  piperacillin/tazobactam IVPB. 3.375 Gram(s) IV Intermittent every 8 hours  potassium chloride    Tablet ER 40 milliEquivalent(s) Oral once  warfarin 3 milliGRAM(s) Oral once      PAST MEDICAL & SURGICAL HISTORY:  Diabetes mellitus  Gout  Upper GI bleed  CHF (congestive heart failure)  Mitral Regurgitation  CVA (Cerebral Infarction): 2011  CAD (Coronary Artery Disease)  Afib: (on Warfarin)  HTN (Hypertension)  H/O mitral valve replacement: 9/22/14  H/O aortic valve replacement: 9/22/14  S/P CABG x 1: (2000)  S/P angioplasty with stent: 2011      Vitals:  T(F): 98 (02-13), Max: 98.5 (02-13)  HR: 66 (02-13) (61 - 76)  BP: 130/54 (02-13) (128/51 - 158/74)  RR: 17 (02-13)  SpO2: 96% (02-13)  I&O's Summary    12 Feb 2019 07:01  -  13 Feb 2019 07:00  --------------------------------------------------------  IN: 1235 mL / OUT: 2205 mL / NET: -970 mL        Physical Exam:  Appearance: No acute distress; well appearing  Eyes: PERRL, EOMI, pink conjunctiva  HENT: Normal oral mucosa  Cardiovascular: RRR, S1, S2, no murmurs, rubs, or gallops; tace edema; no JVD  Respiratory: b/l rales and rhonchi   Gastrointestinal: soft, non-tender, non-distended with normal bowel sounds  Musculoskeletal: No clubbing; no joint deformity   Neurologic: Non-focal  Lymphatic: No lymphadenopathy  Psychiatry: AAOx3, mood & affect appropriate  Skin: No rashes, ecchymoses, or cyanosis  Right femoral site: Clean dry no hematoma                         9.9    8.92  )-----------( 285      ( 13 Feb 2019 04:44 )             34.3     02-13    131<L>  |  94<L>  |  30<H>  ----------------------------<  131<H>  3.8   |  24  |  1.42<H>    Ca    8.5      13 Feb 2019 04:44  Phos  3.8     02-12  Mg     2.1     02-13    TPro  6.8  /  Alb  3.2<L>  /  TBili  0.4  /  DBili  x   /  AST  52<H>  /  ALT  19  /  AlkPhos  51  02-12    PT/INR - ( 13 Feb 2019 04:44 )   PT: 19.7 SEC;   INR: 1.74          PTT - ( 12 Feb 2019 06:57 )  PTT:32.0 SEC  CARDIAC MARKERS ( 13 Feb 2019 04:44 )  x     / x     / 141 u/L / 2.82 ng/mL / x      CARDIAC MARKERS ( 12 Feb 2019 06:57 )  x     / x     / 158 u/L / 3.80 ng/mL / x          Serum Pro-Brain Natriuretic Peptide: 2306 pg/mL (02-07 @ 16:43)          New ECG(s): Personally reviewed    Echo:  < from: Transthoracic Echocardiogram (02.09.19 @ 07:28) >  ------------------------------------------------------------------------  CONCLUSIONS:  1. Bioprosthetic mitral valve replacement. Mean transmitral  valve gradient equals 5 mm Hg, which is probably normal in  the setting of a prosthetic valve.  2. Bioprosthetic aortic valve replacement. Peak transaortic  valve gradient equals 25 mm Hg, mean transaortic valve  gradient equals 11 mm Hg, which is probably normal in the  presence of a prosthetic valve.  3. Mild left atrial enlargement.  4. Increased relative wall thickness with normal left  ventricular mass index, consistent with concentric left  ventricular remodeling.  5. Endocardium not well visualized; moderate left  ventricular dysfunction.  6. Mild right atrial enlargement.  7. Right ventricular enlargement with normal right  ventricular systolic function.  8. Normal tricuspid valve.  Severe tricuspid regurgitation.  9. Estimated pulmonary artery systolic pressure equals 40  mm Hg, assuming right atrial pressure equals 10  mm Hg,  consistent with mild pulmonary hypertension.  ------------------------------------------------------------------------  Confirmed on  2/9/2019 - 15:42:56 by Sheila Alva M.D.  ------------------------------------------------------------------------    < end of copied text >    Stress Testing:   < from: Nuclear Stress Test-Pharmacologic (05.15.17 @ 09:14) >  ------------------------------------------------------------------------  IMPRESSIONS:Normal Study  * Myocardial Perfusion SPECT results are normal.  * Normal myocardial perfusion scan,with no evidence of  infarction or inducible ischemia.  * Post-stress gated wall motion analysis was performed  (LVEF = 64 %;LVEDV = 82 ml.), revealing normal LV  function. There was paradoxical motion of the septum (post  CABG). RV function appeared normal.  ------------------------------------------------------------------------  Confirmed on  5/15/2017 - 14:02:13 by Ramin Cardenas M.D.  ------------------------------------------------------------------------    < end of copied text >    Cath:  < from: Cardiac Cath Lab - Adult (02.12.19 @ 17:20) >  VENTRICLES: No left ventriculogram was performed.  CORONARY VESSELS: The coronary circulation is right dominant.  LM:   --  LM: Angiography showed mild atherosclerosis with no flow limiting  lesions.  LAD:   --  LAD: The distal vessel was supplied by extensive retrograde flow  from the graft(s) to the mid LAD.  --  Proximal LAD: There was a diffuse 99 % stenosis at a site with no prior  intervention. The lesion was eccentric.  CX:   --  Mid circumflex: There was a diffuse 10 % stenosis at the site of  a prior stent. The lesion was eccentric. There was YELENA grade 3 flow  through the vessel (brisk flow).  --  OM1: There was a 100 % stenosis. This lesion is a chronic total  occlusion.  RCA:   --  Proximal RCA: This vessel was not injected. Known to be .  GRAFTS:   --  Graft to the LAD: The graft was a medium sized LIMA. Distal  vessel angiography showed mild diffuse disease.  --  Graft to the 1st obtuse marginal: Thegraft was a saphenous vein graft  from the ascending aorta. Graft angiography showed mild degeneration.  Distal vessel angiography showed mild diffuse disease.  COMPLICATIONS: There were no complications.  DIAGNOSTIC IMPRESSIONS: Patent LIMA to LAD, SVG to OM-1  Patent stents LCx  RCA is a well collateralized , known from prior cath  DIAGNOSTIC RECOMMENDATIONS: Medical management  INTERVENTIONAL IMPRESSIONS: Patent LIMA to LAD, SVG to OM-1  Patent stents LCx  RCA is a well collateralized , known from prior cath  INTERVENTIONAL RECOMMENDATIONS: Medical management    < end of copied text >    Imaging:  < from: Xray Chest 1 View AP/PA (02.07.19 @ 17:22) >  IMPRESSION: Mild pulmonary edema.    < end of copied text > Patient seen and examined at bedside.    Overnight Events: Pt states that she feels a little SOB today.       REVIEW OF SYSTEMS:  Constitutional:     [ ] negative [ ] fevers [ ] chills [ ] weight loss [ ] weight gain  HEENT:                  [ ] negative [ ] dry eyes [ ] eye irritation [ ] postnasal drip [ ] nasal congestion  CV:                         [ ] negative  [ ] chest pain [ ] orthopnea [ ] palpitations [ ] murmur  Resp:                     [ ] negative [ ] cough [ ] shortness of breath [x ] dyspnea [ ] wheezing [ ] sputum [ ]hemoptysis  GI:                          [ ] negative [ ] nausea [ ] vomiting [ ] diarrhea [ ] constipation [ ] abd pain [ ] dysphagia   :                        [ ] negative [ ] dysuria [ ] nocturia [ ] hematuria [ ] increased urinary frequency  Musculoskeletal: [ ] negative [ ] back pain [ ] myalgias [ ] arthralgias [ ] fracture  Skin:                       [ ] negative [ ] rash [ ] itch  Neurological:        [ ] negative [ ] headache [ ] dizziness [ ] syncope [ ] weakness [ ] numbness  Psychiatric:           [ ] negative [ ] anxiety [ ] depression  Endocrine:            [ ] negative [ ] diabetes [ ] thyroid problem  Heme/Lymph:      [ ] negative [ ] anemia [ ] bleeding problem  Allergic/Immune: [ ] negative [ ] itchy eyes [ ] nasal discharge [ ] hives [ ] angioedema    [ x] All other systems negative  [ ] Unable to assess ROS because sedated with anoxic brain injury.    Current Meds:  acetaminophen   Tablet .. 650 milliGRAM(s) Oral every 6 hours PRN  aspirin 81 milliGRAM(s) Oral daily  atorvastatin 80 milliGRAM(s) Oral at bedtime  clopidogrel Tablet 75 milliGRAM(s) Oral daily  dextrose 40% Gel 15 Gram(s) Oral once PRN  dextrose 5%. 1000 milliLiter(s) IV Continuous <Continuous>  dextrose 50% Injectable 12.5 Gram(s) IV Push once  dextrose 50% Injectable 25 Gram(s) IV Push once  dextrose 50% Injectable 25 Gram(s) IV Push once  furosemide   Injectable 40 milliGRAM(s) IV Push every 12 hours  glucagon  Injectable 1 milliGRAM(s) IntraMuscular once PRN  influenza   Vaccine 0.5 milliLiter(s) IntraMuscular once  insulin lispro (HumaLOG) corrective regimen sliding scale   SubCutaneous three times a day before meals  lactobacillus acidophilus 1 Tablet(s) Oral daily  levalbuterol Inhalation 0.63 milliGRAM(s) Inhalation every 6 hours PRN  metoprolol tartrate 50 milliGRAM(s) Oral every 12 hours  piperacillin/tazobactam IVPB. 3.375 Gram(s) IV Intermittent every 8 hours  potassium chloride    Tablet ER 40 milliEquivalent(s) Oral once  warfarin 3 milliGRAM(s) Oral once      PAST MEDICAL & SURGICAL HISTORY:  Diabetes mellitus  Gout  Upper GI bleed  CHF (congestive heart failure)  Mitral Regurgitation  CVA (Cerebral Infarction): 2011  CAD (Coronary Artery Disease)  Afib: (on Warfarin)  HTN (Hypertension)  H/O mitral valve replacement: 9/22/14  H/O aortic valve replacement: 9/22/14  S/P CABG x 1: (2000)  S/P angioplasty with stent: 2011      Vitals:  T(F): 98 (02-13), Max: 98.5 (02-13)  HR: 66 (02-13) (61 - 76)  BP: 130/54 (02-13) (128/51 - 158/74)  RR: 17 (02-13)  SpO2: 96% (02-13)  I&O's Summary    12 Feb 2019 07:01  -  13 Feb 2019 07:00  --------------------------------------------------------  IN: 1235 mL / OUT: 2205 mL / NET: -970 mL        Physical Exam:  Appearance: No acute distress; well appearing  Eyes: PERRL, EOMI, pink conjunctiva  HENT: Normal oral mucosa  Cardiovascular: RRR, S1, S2, no murmurs, rubs, or gallops; tace edema; no JVD  Respiratory: b/l rales and rhonchi   Gastrointestinal: soft, non-tender, non-distended with normal bowel sounds  Musculoskeletal: No clubbing; no joint deformity   Neurologic: Non-focal  Lymphatic: No lymphadenopathy  Psychiatry: AAOx3, mood & affect appropriate  Skin: No rashes, ecchymoses, or cyanosis  Right femoral site: Clean dry no hematoma. 2+ DP and PD pulses on the right                         9.9    8.92  )-----------( 285      ( 13 Feb 2019 04:44 )             34.3     02-13    131<L>  |  94<L>  |  30<H>  ----------------------------<  131<H>  3.8   |  24  |  1.42<H>    Ca    8.5      13 Feb 2019 04:44  Phos  3.8     02-12  Mg     2.1     02-13    TPro  6.8  /  Alb  3.2<L>  /  TBili  0.4  /  DBili  x   /  AST  52<H>  /  ALT  19  /  AlkPhos  51  02-12    PT/INR - ( 13 Feb 2019 04:44 )   PT: 19.7 SEC;   INR: 1.74          PTT - ( 12 Feb 2019 06:57 )  PTT:32.0 SEC  CARDIAC MARKERS ( 13 Feb 2019 04:44 )  x     / x     / 141 u/L / 2.82 ng/mL / x      CARDIAC MARKERS ( 12 Feb 2019 06:57 )  x     / x     / 158 u/L / 3.80 ng/mL / x          Serum Pro-Brain Natriuretic Peptide: 2306 pg/mL (02-07 @ 16:43)          New ECG(s): Personally reviewed    Echo:  < from: Transthoracic Echocardiogram (02.09.19 @ 07:28) >  ------------------------------------------------------------------------  CONCLUSIONS:  1. Bioprosthetic mitral valve replacement. Mean transmitral  valve gradient equals 5 mm Hg, which is probably normal in  the setting of a prosthetic valve.  2. Bioprosthetic aortic valve replacement. Peak transaortic  valve gradient equals 25 mm Hg, mean transaortic valve  gradient equals 11 mm Hg, which is probably normal in the  presence of a prosthetic valve.  3. Mild left atrial enlargement.  4. Increased relative wall thickness with normal left  ventricular mass index, consistent with concentric left  ventricular remodeling.  5. Endocardium not well visualized; moderate left  ventricular dysfunction.  6. Mild right atrial enlargement.  7. Right ventricular enlargement with normal right  ventricular systolic function.  8. Normal tricuspid valve.  Severe tricuspid regurgitation.  9. Estimated pulmonary artery systolic pressure equals 40  mm Hg, assuming right atrial pressure equals 10  mm Hg,  consistent with mild pulmonary hypertension.  ------------------------------------------------------------------------  Confirmed on  2/9/2019 - 15:42:56 by Sheila Alva M.D.  ------------------------------------------------------------------------    < end of copied text >    Stress Testing:   < from: Nuclear Stress Test-Pharmacologic (05.15.17 @ 09:14) >  ------------------------------------------------------------------------  IMPRESSIONS:Normal Study  * Myocardial Perfusion SPECT results are normal.  * Normal myocardial perfusion scan,with no evidence of  infarction or inducible ischemia.  * Post-stress gated wall motion analysis was performed  (LVEF = 64 %;LVEDV = 82 ml.), revealing normal LV  function. There was paradoxical motion of the septum (post  CABG). RV function appeared normal.  ------------------------------------------------------------------------  Confirmed on  5/15/2017 - 14:02:13 by Ramin Cardenas M.D.  ------------------------------------------------------------------------    < end of copied text >    Cath:  < from: Cardiac Cath Lab - Adult (02.12.19 @ 17:20) >  VENTRICLES: No left ventriculogram was performed.  CORONARY VESSELS: The coronary circulation is right dominant.  LM:   --  LM: Angiography showed mild atherosclerosis with no flow limiting  lesions.  LAD:   --  LAD: The distal vessel was supplied by extensive retrograde flow  from the graft(s) to the mid LAD.  --  Proximal LAD: There was a diffuse 99 % stenosis at a site with no prior  intervention. The lesion was eccentric.  CX:   --  Mid circumflex: There was a diffuse 10 % stenosis at the site of  a prior stent. The lesion was eccentric. There was YELENA grade 3 flow  through the vessel (brisk flow).  --  OM1: There was a 100 % stenosis. This lesion is a chronic total  occlusion.  RCA:   --  Proximal RCA: This vessel was not injected. Known to be .  GRAFTS:   --  Graft to the LAD: The graft was a medium sized LIMA. Distal  vessel angiography showed mild diffuse disease.  --  Graft to the 1st obtuse marginal: Thegraft was a saphenous vein graft  from the ascending aorta. Graft angiography showed mild degeneration.  Distal vessel angiography showed mild diffuse disease.  COMPLICATIONS: There were no complications.  DIAGNOSTIC IMPRESSIONS: Patent LIMA to LAD, SVG to OM-1  Patent stents LCx  RCA is a well collateralized , known from prior cath  DIAGNOSTIC RECOMMENDATIONS: Medical management  INTERVENTIONAL IMPRESSIONS: Patent LIMA to LAD, SVG to OM-1  Patent stents LCx  RCA is a well collateralized , known from prior cath  INTERVENTIONAL RECOMMENDATIONS: Medical management    < end of copied text >    Imaging:  < from: Xray Chest 1 View AP/PA (02.07.19 @ 17:22) >  IMPRESSION: Mild pulmonary edema.    < end of copied text >

## 2019-02-13 NOTE — DISCHARGE NOTE ADULT - HOME CARE AGENCY
[facility]  [phone] .   Nurse to call and visit day after discharge 2/18/2019

## 2019-02-13 NOTE — DISCHARGE NOTE ADULT - HOSPITAL COURSE
Pt is a 77yo Female, ambulatory with a walker (non-compliant per the daughter) with PMHx of HFpEF (EF73%), CAD s/p CABG, afib on coumadin, s/p Bioprosthetic mitral and aortic valve replacement, moderate pHTN, HTN, HLD, DM Type 2 who presents with generalized fatigue, malaise, and shortness of breath. Pt is Polish speaking, translation and hx provided by daughter at bedside. Pt is poor historian and daughter at bedside is not primary caretaker. Pt has had multiple admissions over past year for "lung infection," most recently [place] 2[place]3 [place] primarily lives with son. Pt was in usual state of health and is in [place] visiting daughter. Pt became acutely lethargic with shortness of breath the day prior to presentation. Shortness of breath is worse when she is walking and ameliorates with rest. Pt also endorses chest congestion, but denies cough (which was observed at bedside). She denies F/C/N/V, HA, chest pain, orthopnea, PND, or worsening LE edema. Her symptoms progressed overnight and the morning of presentation her daughter brought her to the ED for worsening fatigue and shortness of breath. Pt reports compliance with her home medications and denies dietary indiscretions, however she has not seen a primary care doctor for several months. Daughter at bedside states her LE edema is at baseline.     In ED, VS were Tmax 103.2  /69 RR24 SaO2 97% on RA. Labs notable for WBC of 13.9, lactate of 3.1 on VBG. Prelim CXR read showing mild edema. RVP negative. The patient was then admitted to medicine for sepsis c/b afib w/t rvr.     Hospital Course:  On the medicine floors she was treated with IV antibiotics. Her fevers were treated with tylenol. Her metoprolol was titrated up to control her heart rate. She then developed acute decompensated HFpEF and aggressive diuresis was started. Her course was further complicated by positive troponins, suggesting a type II NSTEMI. Cardiology was consulted and aided in medical optimization. She was maintained on coumadin as it was a type II MI, however, her troponins continued to rise above 1000 which was concerning for new occlusive disease. Her TTE revealed a new drop in EF to 42% so she was taken for a C. The LHC revealed patent LIMA graft to the LAD and SVG to OM1. There was no other flow limiting disease so no intervention was performed. She was then transitioned to PO antibiotics and optimized by medical therapy for discharge. Pt is a 79yo Female, ambulatory with a walker (non-compliant per the daughter) with PMHx of HFpEF (EF73%), CAD s/p CABG, afib on coumadin, s/p Bioprosthetic mitral and aortic valve replacement, moderate pHTN, HTN, HLD, DM Type 2 who presents with generalized fatigue, malaise, and shortness of breath. Pt is Icelandic speaking, translation and hx provided by daughter at bedside. Pt is poor historian and daughter at bedside is not primary caretaker. Pt has had multiple admissions over past year for "lung infection," most recently about 2[place]3 [place] ago [place] where [place] primarily lives with son. Pt was in usual state of health and is in [place] visiting daughter. Pt became acutely lethargic with shortness of breath the day prior to presentation. Shortness of breath is worse when she is walking and ameliorates with rest. Pt also endorses chest congestion, but denies cough (which was observed at bedside). She denies F/C/N/V, HA, chest pain, orthopnea, PND, or worsening LE edema. Her symptoms progressed overnight and the morning of presentation her daughter brought her to the ED for worsening fatigue and shortness of breath. Pt reports compliance with her home medications and denies dietary indiscretions, however she has not seen a primary care doctor for several months. Daughter at bedside states her LE edema is at baseline.     In ED, VS were Tmax 103.2  /69 RR24 SaO2 97% on RA. Labs notable for WBC of 13.9, lactate of 3.1 on VBG. Prelim CXR read showing mild edema. RVP negative. The patient was then admitted to medicine for sepsis c/b afib w/t rvr.     Hospital Course:  On the medicine floors she was treated with IV antibiotics. Her fevers were treated with tylenol. Her metoprolol was titrated up to control her heart rate. She then developed acute decompensated HFpEF and aggressive diuresis was started. Her course was further complicated by positive troponins, suggesting a type II NSTEMI. Cardiology was consulted and aided in medical optimization. She was maintained on coumadin as it was a type II MI, however, her troponins continued to rise above 1000 which was concerning for new occlusive disease. Her TTE revealed a new drop in EF to 42% so she was taken for a C. The LHC revealed patent LIMA graft to the LAD and SVG to OM1. There was no other flow limiting disease so no intervention was performed and she was continued on medical management. She was then transitioned to PO antibiotics and continued on IV diuretics. As she recovered from her pneumonia her heart rate started to improve and metoprolol was titrated down. She finished her course of antibiotics. She was transitioned to PO lasix. She is now medically optimized for discharge to home with PT and close followup.

## 2019-02-13 NOTE — PROGRESS NOTE ADULT - ASSESSMENT
78 year old woman with CAD, s/p CABG, s/p cath 2014 with patent LIMA to LAD and SVG to OM, + of RCA, valvular heart disease, s/p bioprosthetic MVR and AVR, afib on coumadin, HTN, HLD, DM, CVA, and multiple "lung infections" per family presents with worsening fatigue, exertional SOB, and general malaise with cough and cold symptoms. +fever and likely sepsis but also with elevated hsT and CKMB.    #Reduced EF  - Pt with some rales, and SOB on exam. Still has trace edema in LE.   - Plan for ischemic eval  - c/w metoprolol   - Agree with IV lasix 40 IV BID for now.   - Home dose if 40 PO daily.     #NSTEMI w/ hx of CAD (Hx of CABG)  - Mild / moderate LVDF on Echo.   - s/p cardiac cath with patent grafts, no intervention was done.   - Maybe related to myositis, vs myocardial injury in the setting of sepsis.   - c/w ASA, Plavix and atorvastatin     #A-fib  - c/w metoprolol 50 BID  - c/w coumadin     Damien Francois MD  Cardiology Fellow - PGY-4  LICRISTOPHER: [id number]  NS: [phone]  [id number]

## 2019-02-13 NOTE — CHART NOTE - NSCHARTNOTEFT_GEN_A_CORE
Upon follow up of repeat BMP to asses k+ (as patient is being diuresed w/ lasix), found that k+ is WNL at 4.4 but Na+ 130. Na+ dropped from 137 at 7 AM. Patient was seen at bedside and denied any SOb. Was satting 98% on RA and mentating normally. On exam, diffuse crackles b/l but consistent w/ prior exam, LE w/ 1+ pitting edema b/l. Patient did receive NS 50 cc/hr at the time of getting BMP. Repeated BMP and Na+ found to be 131.     Hyponatremia likely in setting of aggressive diuresis vs. fluid overload from systolic CHF. Patient currently not symptomatic. Will recommend fluid restriction.     Erica Al  PGY-1

## 2019-02-13 NOTE — DISCHARGE NOTE ADULT - PATIENT PORTAL LINK FT
You can access the [facility] Patient Portal, offered by [facility], by registering with the following website: [email or web address]

## 2019-02-13 NOTE — DISCHARGE NOTE ADULT - MEDICATION SUMMARY - MEDICATIONS TO STOP TAKING
I will STOP taking the medications listed below when I get home from the hospital:    simvastatin 40 mg oral tablet  -- 1 tab(s) by mouth once a day (at bedtime)    hydrALAZINE 50 mg oral tablet  -- 1 tab(s) by mouth 2 times a day    metoprolol succinate 100 mg oral tablet, extended release  -- 1 tab(s) by mouth once a day

## 2019-02-13 NOTE — PROVIDER CONTACT NOTE (MEDICATION) - ACTION/TREATMENT ORDERED:
As per HS4 okay to give next doses as scheduled at 0600 (10 hours rather than 12 hours) then to resume q12h schedule. Continuing to monitor.

## 2019-02-13 NOTE — PROGRESS NOTE ADULT - SUBJECTIVE AND OBJECTIVE BOX
Dr. Larry Pike   Internal Medicine PGY-1  Pager #[phone]    Patient is a 78y old  Female who presents with a chief complaint of Sepsis (2019 08:44)      SUBJECTIVE / OVERNIGHT EVENTS:    MEDICATIONS  (STANDING):  aspirin 81 milliGRAM(s) Oral daily  atorvastatin 80 milliGRAM(s) Oral at bedtime  clopidogrel Tablet 75 milliGRAM(s) Oral daily  dextrose 5%. 1000 milliLiter(s) (50 mL/Hr) IV Continuous <Continuous>  dextrose 50% Injectable 12.5 Gram(s) IV Push once  dextrose 50% Injectable 25 Gram(s) IV Push once  dextrose 50% Injectable 25 Gram(s) IV Push once  furosemide   Injectable 40 milliGRAM(s) IV Push every 12 hours  influenza   Vaccine 0.5 milliLiter(s) IntraMuscular once  insulin lispro (HumaLOG) corrective regimen sliding scale   SubCutaneous three times a day before meals  lactobacillus acidophilus 1 Tablet(s) Oral daily  metoprolol tartrate 50 milliGRAM(s) Oral every 12 hours  piperacillin/tazobactam IVPB. 3.375 Gram(s) IV Intermittent every 8 hours  warfarin 3 milliGRAM(s) Oral once    MEDICATIONS  (PRN):  acetaminophen   Tablet .. 650 milliGRAM(s) Oral every 6 hours PRN Temp greater or equal to 38C (100.4F)  dextrose 40% Gel 15 Gram(s) Oral once PRN Blood Glucose LESS THAN 70 milliGRAM(s)/deciliter  glucagon  Injectable 1 milliGRAM(s) IntraMuscular once PRN Glucose LESS THAN 70 milligrams/deciliter  levalbuterol Inhalation 0.63 milliGRAM(s) Inhalation every 6 hours PRN sob/wheezing      Vital Signs Last 24 Hrs  T(C): 36.7 (2019 05:10), Max: 36.9 (2019 01:30)  T(F): 98 (2019 05:10), Max: 98.5 (2019 01:30)  HR: 66 (2019 07:03) (61 - 76)  BP: 130/54 (2019 05:25) (128/51 - 158/74)  BP(mean): --  RR: 17 (2019 05:25) (17 - 19)  SpO2: 96% (2019 05:25) (94% - 100%)  CAPILLARY BLOOD GLUCOSE      POCT Blood Glucose.: 137 mg/dL (2019 07:42)  POCT Blood Glucose.: 127 mg/dL (2019 21:01)  POCT Blood Glucose.: 123 mg/dL (2019 18:56)  POCT Blood Glucose.: 166 mg/dL (2019 11:42)    I&O's Summary    2019 07:01  -  2019 07:00  --------------------------------------------------------  IN: 1235 mL / OUT: 2205 mL / NET: -970 mL        PHYSICAL EXAM:  GENERAL: NAD, well-developed  HEAD:  Atraumatic, Normocephalic  EYES: EOMI, PERRLA, conjunctiva and sclera clear  NECK: Supple, No JVD  CHEST/LUNG: Clear to auscultation bilaterally; No wheeze  HEART: Regular rate and rhythm; No murmurs, rubs, or gallops  ABDOMEN: Soft, Nontender, Nondistended; Bowel sounds present  EXTREMITIES:  2+ Peripheral Pulses, No clubbing, cyanosis, or edema  PSYCH: AAOx3  NEUROLOGY: non-focal  SKIN: No rashes or lesions    LABS:                        9.9    8.92  )-----------( 285      ( 2019 04:44 )             34.3     02-13    131<L>  |  94<L>  |  30<H>  ----------------------------<  131<H>  3.8   |  24  |  1.42<H>    Ca    8.5      2019 04:44  Phos  3.8     02-12  Mg     2.1     02-13    TPro  6.8  /  Alb  3.2<L>  /  TBili  0.4  /  DBili  x   /  AST  52<H>  /  ALT  19  /  AlkPhos  51  02-12    PT/INR - ( 2019 04:44 )   PT: 19.7 SEC;   INR: 1.74          PTT - ( 2019 06:57 )  PTT:32.0 SEC  CARDIAC MARKERS ( 2019 06:57 )  x     / x     / 158 u/L / 3.80 ng/mL / x          Urinalysis Basic - ( 2019 10:43 )    Color: YELLOW / Appearance: CLEAR / S.015 / pH: 6.5  Gluc: NEGATIVE / Ketone: NEGATIVE  / Bili: NEGATIVE / Urobili: NORMAL   Blood: TRACE / Protein: NEGATIVE / Nitrite: NEGATIVE   Leuk Esterase: NEGATIVE / RBC: x / WBC x   Sq Epi: x / Non Sq Epi: x / Bacteria: x        RADIOLOGY & ADDITIONAL TESTS:    Imaging Personally Reviewed:    Consultant(s) Notes Reviewed:      Care Discussed with Consultants/Other Providers: Dr. Larry Pike   Internal Medicine PGY-1  Pager #[phone]    Patient is a 78y old  Female who presents with a chief complaint of Sepsis (2019 08:44)      SUBJECTIVE / OVERNIGHT EVENTS:  NF called for hyponatremia. Mental status stable. Still with crackles. UOP was good over 24hrs. Patient feels breathing has improved. No CP. No abdominal pain.     MEDICATIONS  (STANDING):  aspirin 81 milliGRAM(s) Oral daily  atorvastatin 80 milliGRAM(s) Oral at bedtime  clopidogrel Tablet 75 milliGRAM(s) Oral daily  dextrose 5%. 1000 milliLiter(s) (50 mL/Hr) IV Continuous <Continuous>  dextrose 50% Injectable 12.5 Gram(s) IV Push once  dextrose 50% Injectable 25 Gram(s) IV Push once  dextrose 50% Injectable 25 Gram(s) IV Push once  furosemide   Injectable 40 milliGRAM(s) IV Push every 12 hours  influenza   Vaccine 0.5 milliLiter(s) IntraMuscular once  insulin lispro (HumaLOG) corrective regimen sliding scale   SubCutaneous three times a day before meals  lactobacillus acidophilus 1 Tablet(s) Oral daily  metoprolol tartrate 50 milliGRAM(s) Oral every 12 hours  piperacillin/tazobactam IVPB. 3.375 Gram(s) IV Intermittent every 8 hours  warfarin 3 milliGRAM(s) Oral once    MEDICATIONS  (PRN):  acetaminophen   Tablet .. 650 milliGRAM(s) Oral every 6 hours PRN Temp greater or equal to 38C (100.4F)  dextrose 40% Gel 15 Gram(s) Oral once PRN Blood Glucose LESS THAN 70 milliGRAM(s)/deciliter  glucagon  Injectable 1 milliGRAM(s) IntraMuscular once PRN Glucose LESS THAN 70 milligrams/deciliter  levalbuterol Inhalation 0.63 milliGRAM(s) Inhalation every 6 hours PRN sob/wheezing      Vital Signs Last 24 Hrs  T(C): 36.7 (2019 05:10), Max: 36.9 (2019 01:30)  T(F): 98 (2019 05:10), Max: 98.5 (2019 01:30)  HR: 66 (2019 07:03) (61 - 76)  BP: 130/54 (2019 05:25) (128/51 - 158/74)  BP(mean): --  RR: 17 (2019 05:25) (17 - 19)  SpO2: 96% (2019 05:25) (94% - 100%)  CAPILLARY BLOOD GLUCOSE      POCT Blood Glucose.: 137 mg/dL (2019 07:42)  POCT Blood Glucose.: 127 mg/dL (2019 21:01)  POCT Blood Glucose.: 123 mg/dL (2019 18:56)  POCT Blood Glucose.: 166 mg/dL (2019 11:42)    I&O's Summary    2019 07:01  -  2019 07:00  --------------------------------------------------------  IN: 1235 mL / OUT: 2205 mL / NET: -970 mL        PHYSICAL EXAM:  GENERAL: Still grunting with each breath, but appears comfortable.   HEAD:  Atraumatic, Normocephalic  EYES: EOMI, PERRLA, conjunctiva and sclera clear  NECK: Supple, No JVD  CHEST/LUNG: Diffuse crackles and rhonchi throughout all lung fields, patient was laying flat on her back.   HEART: Irregularly irregular rate and rhythm; holosystolic murmur heard best between 2nd/3rd IC at midline.   ABDOMEN: Soft, Nontender, Nondistended; Bowel sounds present  EXTREMITIES:  2+ Peripheral Pulses, No clubbing, cyanosis. 1+ LE edema.     LABS:                        9.9    8.92  )-----------( 285      ( 2019 04:44 )             34.3     02-13    131<L>  |  94<L>  |  30<H>  ----------------------------<  131<H>  3.8   |  24  |  1.42<H>    Ca    8.5      2019 04:44  Phos  3.8     02-12  Mg     2.1     02-13    TPro  6.8  /  Alb  3.2<L>  /  TBili  0.4  /  DBili  x   /  AST  52<H>  /  ALT  19  /  AlkPhos  51  02-12    PT/INR - ( 2019 04:44 )   PT: 19.7 SEC;   INR: 1.74          PTT - ( 2019 06:57 )  PTT:32.0 SEC  CARDIAC MARKERS ( 2019 06:57 )  x     / x     / 158 u/L / 3.80 ng/mL / x          Urinalysis Basic - ( 2019 10:43 )    Color: YELLOW / Appearance: CLEAR / S.015 / pH: 6.5  Gluc: NEGATIVE / Ketone: NEGATIVE  / Bili: NEGATIVE / Urobili: NORMAL   Blood: TRACE / Protein: NEGATIVE / Nitrite: NEGATIVE   Leuk Esterase: NEGATIVE / RBC: x / WBC x   Sq Epi: x / Non Sq Epi: x / Bacteria: x        RADIOLOGY & ADDITIONAL TESTS:    < from: Cardiac Cath Lab - Adult (19 @ 17:20) >  VENTRICLES: No left ventriculogram was performed.  CORONARY VESSELS: The coronary circulation is right dominant.  LM:   --  LM: Angiography showed mild atherosclerosis with no flow limiting  lesions.  LAD:   --  LAD: The distal vessel was supplied by extensive retrograde flow  from the graft(s) to the mid LAD.  --  Proximal LAD: There was a diffuse 99 % stenosis at a site with no prior  intervention. The lesion was eccentric.  CX:   --  Mid circumflex: There was a diffuse 10 % stenosis at the site of  a prior stent. The lesion was eccentric. There was YELENA grade 3 flow  through the vessel (brisk flow).  --  OM1: There was a 100 % stenosis. This lesion is a chronic total  occlusion.  RCA:   --  Proximal RCA: This vessel was not injected. Known to be .  GRAFTS:   --  Graft to the LAD: The graft was a medium sized LIMA. Distal  vessel angiography showed mild diffuse disease.  --  Graft to the 1st obtuse marginal: Thegraft was a saphenous vein graft  from the ascending aorta. Graft angiography showed mild degeneration.  Distal vessel angiography showed mild diffuse disease.  COMPLICATIONS: There were no complications.  DIAGNOSTIC IMPRESSIONS: Patent LIMA to LAD, SVG to OM-1  Patent stents LCx  RCA is a well collateralized , known from prior cath  DIAGNOSTIC RECOMMENDATIONS: Medical management  INTERVENTIONAL IMPRESSIONS: Patent LIMA to LAD, SVG to OM-1  Patent stents LCx  RCA is a well collateralized , known from prior cath  INTERVENTIONAL RECOMMENDATIONS: Medical management    < end of copied text > Dr. Larry Pike   Internal Medicine PGY-1  Pager #[phone]    Patient is a 78y old  Female who presents with a chief complaint of Sepsis (2019 08:44)      SUBJECTIVE / OVERNIGHT EVENTS:  NF called for hyponatremia. Mental status stable. Still with crackles. UOP was good over 24hrs. Patient feels breathing has improved. No CP. No abdominal pain.     MEDICATIONS  (STANDING):  aspirin 81 milliGRAM(s) Oral daily  atorvastatin 80 milliGRAM(s) Oral at bedtime  clopidogrel Tablet 75 milliGRAM(s) Oral daily  dextrose 5%. 1000 milliLiter(s) (50 mL/Hr) IV Continuous <Continuous>  dextrose 50% Injectable 12.5 Gram(s) IV Push once  dextrose 50% Injectable 25 Gram(s) IV Push once  dextrose 50% Injectable 25 Gram(s) IV Push once  furosemide   Injectable 40 milliGRAM(s) IV Push every 12 hours  influenza   Vaccine 0.5 milliLiter(s) IntraMuscular once  insulin lispro (HumaLOG) corrective regimen sliding scale   SubCutaneous three times a day before meals  lactobacillus acidophilus 1 Tablet(s) Oral daily  metoprolol tartrate 50 milliGRAM(s) Oral every 12 hours  piperacillin/tazobactam IVPB. 3.375 Gram(s) IV Intermittent every 8 hours  warfarin 3 milliGRAM(s) Oral once    MEDICATIONS  (PRN):  acetaminophen   Tablet .. 650 milliGRAM(s) Oral every 6 hours PRN Temp greater or equal to 38C (100.4F)  dextrose 40% Gel 15 Gram(s) Oral once PRN Blood Glucose LESS THAN 70 milliGRAM(s)/deciliter  glucagon  Injectable 1 milliGRAM(s) IntraMuscular once PRN Glucose LESS THAN 70 milligrams/deciliter  levalbuterol Inhalation 0.63 milliGRAM(s) Inhalation every 6 hours PRN sob/wheezing      Vital Signs Last 24 Hrs  T(C): 36.7 (2019 05:10), Max: 36.9 (2019 01:30)  T(F): 98 (2019 05:10), Max: 98.5 (2019 01:30)  HR: 66 (2019 07:03) (61 - 76)  BP: 130/54 (2019 05:25) (128/51 - 158/74)  BP(mean): --  RR: 17 (2019 05:25) (17 - 19)  SpO2: 96% (2019 05:25) (94% - 100%)  CAPILLARY BLOOD GLUCOSE      POCT Blood Glucose.: 137 mg/dL (2019 07:42)  POCT Blood Glucose.: 127 mg/dL (2019 21:01)  POCT Blood Glucose.: 123 mg/dL (2019 18:56)  POCT Blood Glucose.: 166 mg/dL (2019 11:42)    I&O's Summary    2019 07:01  -  2019 07:00  --------------------------------------------------------  IN: 1235 mL / OUT: 2205 mL / NET: -970 mL        PHYSICAL EXAM:  GENERAL: Still grunting with each breath, but appears comfortable.   HEAD:  Atraumatic, Normocephalic  EYES: EOMI, PERRLA, conjunctiva and sclera clear  NECK: Supple, No JVD  CHEST/LUNG: Diffuse inspiratory crackles and rhonchi throughout all lung fields, no use of accessory muscles  HEART: Irregularly irregular rate and rhythm; holosystolic murmur heard best between 2nd/3rd IC at midline.   ABDOMEN: Soft, Nontender, Nondistended; Bowel sounds present  EXTREMITIES:  2+ Peripheral Pulses, No clubbing, cyanosis. 1+ LE edema.     LABS:                        9.9    8.92  )-----------( 285      ( 2019 04:44 )             34.3     02-13    131<L>  |  94<L>  |  30<H>  ----------------------------<  131<H>  3.8   |  24  |  1.42<H>    Ca    8.5      2019 04:44  Phos  3.8     02-12  Mg     2.1     02-13    TPro  6.8  /  Alb  3.2<L>  /  TBili  0.4  /  DBili  x   /  AST  52<H>  /  ALT  19  /  AlkPhos  51  02    PT/INR - ( 2019 04:44 )   PT: 19.7 SEC;   INR: 1.74          PTT - ( 2019 06:57 )  PTT:32.0 SEC  CARDIAC MARKERS ( 2019 06:57 )  x     / x     / 158 u/L / 3.80 ng/mL / x          Urinalysis Basic - ( 2019 10:43 )    Color: YELLOW / Appearance: CLEAR / S.015 / pH: 6.5  Gluc: NEGATIVE / Ketone: NEGATIVE  / Bili: NEGATIVE / Urobili: NORMAL   Blood: TRACE / Protein: NEGATIVE / Nitrite: NEGATIVE   Leuk Esterase: NEGATIVE / RBC: x / WBC x   Sq Epi: x / Non Sq Epi: x / Bacteria: x        RADIOLOGY & ADDITIONAL TESTS:    < from: Cardiac Cath Lab - Adult (19 @ 17:20) >  VENTRICLES: No left ventriculogram was performed.  CORONARY VESSELS: The coronary circulation is right dominant.  LM:   --  LM: Angiography showed mild atherosclerosis with no flow limiting  lesions.  LAD:   --  LAD: The distal vessel was supplied by extensive retrograde flow  from the graft(s) to the mid LAD.  --  Proximal LAD: There was a diffuse 99 % stenosis at a site with no prior  intervention. The lesion was eccentric.  CX:   --  Mid circumflex: There was a diffuse 10 % stenosis at the site of  a prior stent. The lesion was eccentric. There was YELENA grade 3 flow  through the vessel (brisk flow).  --  OM1: There was a 100 % stenosis. This lesion is a chronic total  occlusion.  RCA:   --  Proximal RCA: This vessel was not injected. Known to be .  GRAFTS:   --  Graft to the LAD: The graft was a medium sized LIMA. Distal  vessel angiography showed mild diffuse disease.  --  Graft to the 1st obtuse marginal: Thegraft was a saphenous vein graft  from the ascending aorta. Graft angiography showed mild degeneration.  Distal vessel angiography showed mild diffuse disease.  COMPLICATIONS: There were no complications.  DIAGNOSTIC IMPRESSIONS: Patent LIMA to LAD, SVG to OM-1  Patent stents LCx  RCA is a well collateralized , known from prior cath  DIAGNOSTIC RECOMMENDATIONS: Medical management  INTERVENTIONAL IMPRESSIONS: Patent LIMA to LAD, SVG to OM-1  Patent stents LCx  RCA is a well collateralized , known from prior cath  INTERVENTIONAL RECOMMENDATIONS: Medical management    < end of copied text >

## 2019-02-13 NOTE — PROVIDER CONTACT NOTE (OTHER) - ASSESSMENT
Pt A&Ox4 laying comfortably in bed refusing Bipap mask tonight despite education from respitory therapist and primary RN. Patient saturating 98% on RA, no SOB or HERNANDEZ noted. HS4 PJ Al aware.

## 2019-02-13 NOTE — DISCHARGE NOTE ADULT - ADDITIONAL INSTRUCTIONS
Please followup with your cardiologist, Dr. Cristobal Zapien, within 1 week to manage your heart disease and blood pressure medications.   1 [place], [place]  [phone]  Address: [address] [place] [address], [place] [address]  Phone: [phone]  Please followup with your primary care physician, Dr. Laura Aguilar, within 1 week to get you a referral for a pulmonologist and to manage your diabetes.

## 2019-02-13 NOTE — PROGRESS NOTE ADULT - PROBLEM SELECTOR PLAN 7
-c/w DAPT  -high intensity statin atorvastatin 80mg qhs -On triple therapy w/t coumadin, asa, plavix   -Disease stable w/o new stenting, dc ASA and c/w plavix and coumadin  -high intensity statin atorvastatin 80mg qhs Heparin gtt previously caused bleed vs. infiltration. Hgb stable.  -CBC QD as hgb stable

## 2019-02-13 NOTE — PROGRESS NOTE ADULT - ASSESSMENT
78F with PMHx of HFpEF (EF73%), CAD s/p CABG (LIMA to LAD & SVG to OM,  RCA), bioprosthetic MVR & AVR, afib on coumadin, HTN, HLD, T2DM, and recurrent PNA who presents with fever, SOB, N/V and diaphoresis 2/2 sepsis likely 2/2 acute viral URI vs. CAP (less likely given CXR) c/b CHF exacerbation and afib w/t RVR further c/b NSTEMI (likely type II demand ischemia in setting of sepsis and afib w/t RVR). Continued rise in trops 78F with PMHx of HFpEF (EF73%), CAD s/p CABG (LIMA to LAD & SVG to OM,  RCA), bioprosthetic MVR & AVR, afib on coumadin, HTN, HLD, T2DM, and recurrent PNA who presents with fever, SOB, N/V and diaphoresis 2/2 sepsis likely 2/2 acute viral URI vs. CAP (less likely given CXR) c/b CHF exacerbation and afib w/t RVR further c/b NSTEMI (likely type II demand ischemia in setting of sepsis and afib w/t RVR). Trops falling.

## 2019-02-13 NOTE — DISCHARGE NOTE ADULT - CARE PLAN
Principal Discharge DX:	Sepsis  Goal:	Treat Infection  Assessment and plan of treatment:	You came into the hospital with acute shortness of breath and fever due to a multifocal pneumonia. You were treated for this problem using antibiotics, tylenol, xopenex nebulizer, and diuretics as the infection led to fluid build up in your lungs. You are finished with your antibiotics, however, your lungs continue to sound course at baseline. It is very important that you see your primary care physician and get a referral to a pulmonologist within 1 week to treat your underlying lung disease and prevent future infections from occurring.  Secondary Diagnosis:	Atrial fibrillation with RVR  Goal:	Maintain a heart rate less than 120  Assessment and plan of treatment:	Due to your infection and fever, your heart rate became elevated. It was controlled with metoprolol and treatment of your infection as above. Now that your infection is treated you are being maintained on 25mg of metoprolol twice daily. Please continue to take 2.5mg of coumadin each evening and 25mg of metoprolol twice per day and followup with your cardiologist within 1 week.  Secondary Diagnosis:	NSTEMI (non-ST elevated myocardial infarction)  Assessment and plan of treatment:	Due to your infection causing a rapid heart rate, you developed a heart attack from the increasing demand on your heart to supply your body with blood. For this problem you were treated with aspirin, plavix, and coumadin. You were then taken to the cath lab, but there was no discreet vessel that could be intervened on so you were continued on medical therapy. For your heart attack, please continue to take 2.5mg of coumadin each night and 81mg of aspirin each day. Please followup with your cardiologist within 1 week.  Secondary Diagnosis:	Acute systolic heart failure  Assessment and plan of treatment:	Due to your heart attack, you developed new systolic heart failure with a reduced ejection fraction of 42%. For this problem you were aggressively diuresed with IV lasix. You were then transitioned to 40mg of lasix twice daily. Please continue to take 40mg of lasix twice per day. Please also continue taking 25mg of metoprolol twice per day, and you may resume your 20mg of enalapril daily. Please followup with your cardiologist within 1 week.  Secondary Diagnosis:	CAD (coronary artery disease)  Assessment and plan of treatment:	You have severe coronary artery disease. Please continue taking 81mg of aspirin daily and 2.5mg of coumadin each night. Please also take 80mg of atorvastatin every evening.  Secondary Diagnosis:	Diabetes mellitus  Assessment and plan of treatment:	You have a history of diabetes. Please continue to take your home medications as prescribed and followup with your primary care within 1 week.

## 2019-02-13 NOTE — DISCHARGE NOTE ADULT - OTHER SIGNIFICANT FINDINGS
< from: Transthoracic Echocardiogram (02.09.19 @ 07:28) >  Ejection Fraction (Teicholtz): 42 %  ------------------------------------------------------------------------  CONCLUSIONS:  1. Bioprosthetic mitral valve replacement. Mean transmitral  valve gradient equals 5 mm Hg, which is probably normal in  the setting of a prosthetic valve.  2. Bioprosthetic aortic valve replacement. Peak transaortic  valve gradient equals 25 mm Hg, mean transaortic valve  gradient equals 11 mm Hg, which is probably normal in the  presence of a prosthetic valve.  3. Mild left atrial enlargement.  4. Increased relative wall thickness with normal left  ventricular mass index, consistent with concentric left  ventricular remodeling.  5. Endocardium not well visualized; moderate left  ventricular dysfunction.  6. Mild right atrial enlargement.  7. Right ventricular enlargement with normal right  ventricular systolic function.  8. Normal tricuspid valve.  Severe tricuspid regurgitation.  9. Estimated pulmonary artery systolic pressure equals 40  mm Hg, assuming right atrial pressure equals 10  mm Hg,  consistent with mild pulmonary hypertension.    < end of copied text >  < from: Cardiac Cath Lab - Adult (02.12.19 @ 17:20) >  VENTRICLES: No left ventriculogram was performed.  CORONARY VESSELS: The coronary circulation is right dominant.  LM:   --  LM: Angiography showed mild atherosclerosis with no flow limiting  lesions.  LAD:   --  LAD: The distal vessel was supplied by extensive retrograde flow  from the graft(s) to the mid LAD.  --  Proximal LAD: There was a diffuse 99 % stenosis at a site with no prior  intervention. The lesion was eccentric.  CX:   --  Mid circumflex: There was a diffuse 10 % stenosis at the site of  a prior stent. The lesion was eccentric. There was YELENA grade 3 flow  through the vessel (brisk flow).  --  OM1: There was a 100 % stenosis. This lesion is a chronic total  occlusion.  RCA:   --  Proximal RCA: This vessel was not injected. Known to be .  GRAFTS:   --  Graft to the LAD: The graft was a medium sized LIMA. Distal  vessel angiography showed mild diffuse disease.  --  Graft to the 1st obtuse marginal: Thegraft was a saphenous vein graft  from the ascending aorta. Graft angiography showed mild degeneration.  Distal vessel angiography showed mild diffuse disease.    < end of copied text >

## 2019-02-13 NOTE — DISCHARGE NOTE ADULT - PLAN OF CARE
Treat Infection You came into the hospital with acute shortness of breath and fever due to a multifocal pneumonia. You were treated for this problem using antibiotics, tylenol, xopenex nebulizer, and diuretics as the infection led to fluid build up in your lungs. You are finished with your antibiotics, however, your lungs continue to sound course at baseline. It is very important that you see your primary care physician and get a referral to a pulmonologist within 1 week to treat your underlying lung disease and prevent future infections from occurring. Maintain a heart rate less than 120 Due to your infection and fever, your heart rate became elevated. It was controlled with metoprolol and treatment of your infection as above. Now that your infection is treated you are being maintained on 25mg of metoprolol twice daily. Please continue to take 2.5mg of coumadin each evening and 25mg of metoprolol twice per day and followup with your cardiologist within 1 week. Due to your infection causing a rapid heart rate, you developed a heart attack from the increasing demand on your heart to supply your body with blood. For this problem you were treated with aspirin, plavix, and coumadin. You were then taken to the cath lab, but there was no discreet vessel that could be intervened on so you were continued on medical therapy. For your heart attack, please continue to take 2.5mg of coumadin each night and 81mg of aspirin each day. Please followup with your cardiologist within 1 week. Due to your heart attack, you developed new systolic heart failure with a reduced ejection fraction of 42%. For this problem you were aggressively diuresed with IV lasix. You were then transitioned to 40mg of lasix twice daily. Please continue to take 40mg of lasix twice per day. Please also continue taking 25mg of metoprolol twice per day, and you may resume your 20mg of enalapril daily. Please followup with your cardiologist within 1 week. You have severe coronary artery disease. Please continue taking 81mg of aspirin daily and 2.5mg of coumadin each night. Please also take 80mg of atorvastatin every evening. You have a history of diabetes. Please continue to take your home medications as prescribed and followup with your primary care within 1 week.

## 2019-02-13 NOTE — PROGRESS NOTE ADULT - PROBLEM SELECTOR PLAN 8
Hba1c 5.7, now in prediabetes range  - FS checks with KRYSTYNA -On triple therapy w/t coumadin, asa, plavix   -Disease stable w/o new stenting, dc ASA and c/w plavix and coumadin  -high intensity statin atorvastatin 80mg qhs

## 2019-02-13 NOTE — DISCHARGE NOTE ADULT - NS AS ACTIVITY OBS
Showering allowed/No Heavy lifting/straining/Do not drive or operate machinery/Bathing allowed/Walking-Outdoors allowed/Walking-Indoors allowed/Do not make important decisions

## 2019-02-13 NOTE — DISCHARGE NOTE ADULT - MEDICATION SUMMARY - MEDICATIONS TO TAKE
I will START or STAY ON the medications listed below when I get home from the hospital:    Aspirin Enteric Coated 81 mg oral delayed release tablet  -- 1 tab(s) by mouth once a day   -- Swallow whole.  Do not crush.  Take with food or milk.    -- Indication: For CAD (coronary artery disease)    traMADol 50 mg oral tablet  -- 1 tab(s) by mouth every 4 hours, As Needed  -- Indication: For Pain    enalapril 20 mg oral tablet  -- 1 tab(s) by mouth once a day  -- Indication: For Hypertension    Coumadin 2.5 mg oral tablet  -- 1 tab(s) by mouth once a day (at bedtime)   -- Do not take this drug if you are pregnant.  It is very important that you take or use this exactly as directed.  Do not skip doses or discontinue unless directed by your doctor.  Obtain medical advice before taking any non-prescription drugs as some may affect the action of this medication.    -- Indication: For Afib    atorvastatin 80 mg oral tablet  -- 1 tab(s) by mouth once a day (at bedtime)  -- Indication: For CAD (coronary artery disease)    metoprolol tartrate 25 mg oral tablet  -- 1 tab(s) by mouth 2 times a day  -- Indication: For Afib    furosemide 40 mg oral tablet  -- 1 tab(s) by mouth 2 times a day   -- Indication: For Heart failure with preserved ejection fraction I will START or STAY ON the medications listed below when I get home from the hospital:    traMADol 50 mg oral tablet  -- 1 tab(s) by mouth every 4 hours, As Needed  -- Indication: For Pain    Aspirin Enteric Coated 81 mg oral delayed release tablet  -- 1 tab(s) by mouth once a day   -- Swallow whole.  Do not crush.  Take with food or milk.    -- Indication: For Need for prophylactic measure    enalapril 20 mg oral tablet  -- 1 tab(s) by mouth once a day  -- Indication: For Hypertension    Coumadin 2.5 mg oral tablet  -- 1 tab(s) by mouth once a day (at bedtime)   -- Do not take this drug if you are pregnant.  It is very important that you take or use this exactly as directed.  Do not skip doses or discontinue unless directed by your doctor.  Obtain medical advice before taking any non-prescription drugs as some may affect the action of this medication.    -- Indication: For Afib    atorvastatin 80 mg oral tablet  -- 1 tab(s) by mouth once a day (at bedtime)  -- Indication: For Hyperlipidemia    metoprolol tartrate 25 mg oral tablet  -- 1 tab(s) by mouth 2 times a day  -- Indication: For Afib    furosemide 40 mg oral tablet  -- 1 tab(s) by mouth 2 times a day   -- Indication: For Acute on chronic congestive heart failure

## 2019-02-13 NOTE — DISCHARGE NOTE ADULT - CARE PROVIDER_API CALL
Cristobal Zapien  Cardiologist  1 [place], [place]  [phone]  Phone: [phone]  Fax: (   )    -  Follow Up Time: 1 week    Laura Aguilar  PCP  Address: [address] [place] [address], [place] [address]  Phone: [phone]  Phone: [phone]  Fax: (   )    -  Follow Up Time: 1 week

## 2019-02-13 NOTE — DISCHARGE NOTE ADULT - PROVIDER TOKENS
FREE:[LAST:[Haleem],FIRST:[Cristobal],PHONE:[phone]],FAX:[(   )    -],ADDRESS:[Cardiologist  [address] [place] [address]  [phone]],FOLLOWUP:[1 week]],FREE:[LAST:[Ogleh],FIRST:[Laura],PHONE:[phone]],FAX:[(   )    -],ADDRESS:[PCP  Address: [address] [facility], [place] [address]  Phone: [phone]],FOLLOWUP:[1 week]]

## 2019-02-13 NOTE — PROGRESS NOTE ADULT - PROBLEM SELECTOR PLAN 1
- Met severe sepsis criteria on admission: fever to 103.2, tachycardia, tachypnea, WBC count of 13.9, and elevated lactate. CXR significant pulmonary edema, but no focal consolidation, RVP neg. Received azithromycin and ctx in ED. Chest CT showing possible multifocal PNA  -UA negative  -f/u 1 blood culture w/t coag neg staph- likely contaminant, urine legionella neg  - tylenol PRN fever  - vital signs Q4H  - Continue Zosyn renally dosed (2/9- )  - trend fever and wbc qd - Met severe sepsis criteria on admission: fever to 103.2, tachycardia, tachypnea, WBC count of 13.9, and elevated lactate. CXR significant pulmonary edema, but no focal consolidation, RVP neg. Received azithromycin and ctx in ED. Chest CT showing possible multifocal PNA  -UA negative  -f/u 1 blood culture w/t coag neg staph- likely contaminant, urine legionella neg  - tylenol PRN fever  - vital signs Q4H  - Consider switching to PO antibiotics to finish 7 day course: Amoxycillin-clavulanate 875/125mg  12-hourly x2 days   -Zosyn renally dosed (2/9-2/13)  - trend fever and wbc qd - Met severe sepsis criteria on admission: fever to 103.2, tachycardia, tachypnea, WBC count of 13.9, and elevated lactate. CXR significant pulmonary edema, but no focal consolidation, RVP neg. Received azithromycin and ctx in ED. Chest CT showing possible multifocal PNA  -UA negative  -f/u 1 blood culture w/t coag neg staph- likely contaminant, urine legionella neg  - tylenol PRN fever  - vital signs Q4H  -Zosyn renally dosed (2/9-2/13); likely transition to oral antibiotics tomorrow given clinical improvement  - trend fever and wbc qd

## 2019-02-14 LAB
ANION GAP SERPL CALC-SCNC: 13 MMO/L — SIGNIFICANT CHANGE UP (ref 7–14)
BUN SERPL-MCNC: 29 MG/DL — HIGH (ref 7–23)
CALCIUM SERPL-MCNC: 8.9 MG/DL — SIGNIFICANT CHANGE UP (ref 8.4–10.5)
CHLORIDE SERPL-SCNC: 100 MMOL/L — SIGNIFICANT CHANGE UP (ref 98–107)
CO2 SERPL-SCNC: 22 MMOL/L — SIGNIFICANT CHANGE UP (ref 22–31)
CREAT SERPL-MCNC: 1.33 MG/DL — HIGH (ref 0.5–1.3)
GLUCOSE SERPL-MCNC: 126 MG/DL — HIGH (ref 70–99)
HCT VFR BLD CALC: 36.2 % — SIGNIFICANT CHANGE UP (ref 34.5–45)
HGB BLD-MCNC: 10.5 G/DL — LOW (ref 11.5–15.5)
INR BLD: 2.29 — HIGH (ref 0.88–1.17)
MAGNESIUM SERPL-MCNC: 2.1 MG/DL — SIGNIFICANT CHANGE UP (ref 1.6–2.6)
MCHC RBC-ENTMCNC: 22.1 PG — LOW (ref 27–34)
MCHC RBC-ENTMCNC: 29 % — LOW (ref 32–36)
MCV RBC AUTO: 76.2 FL — LOW (ref 80–100)
NRBC # FLD: 0 K/UL — LOW (ref 25–125)
PHOSPHATE SERPL-MCNC: 3.5 MG/DL — SIGNIFICANT CHANGE UP (ref 2.5–4.5)
PLATELET # BLD AUTO: 318 K/UL — SIGNIFICANT CHANGE UP (ref 150–400)
PMV BLD: 10.8 FL — SIGNIFICANT CHANGE UP (ref 7–13)
POTASSIUM SERPL-MCNC: 4.3 MMOL/L — SIGNIFICANT CHANGE UP (ref 3.5–5.3)
POTASSIUM SERPL-SCNC: 4.3 MMOL/L — SIGNIFICANT CHANGE UP (ref 3.5–5.3)
PROTHROM AB SERPL-ACNC: 26.8 SEC — HIGH (ref 9.8–13.1)
RBC # BLD: 4.75 M/UL — SIGNIFICANT CHANGE UP (ref 3.8–5.2)
RBC # FLD: 18.3 % — HIGH (ref 10.3–14.5)
SODIUM SERPL-SCNC: 135 MMOL/L — SIGNIFICANT CHANGE UP (ref 135–145)
WBC # BLD: 11.49 K/UL — HIGH (ref 3.8–10.5)
WBC # FLD AUTO: 11.49 K/UL — HIGH (ref 3.8–10.5)

## 2019-02-14 PROCEDURE — 99233 SBSQ HOSP IP/OBS HIGH 50: CPT | Mod: GC

## 2019-02-14 PROCEDURE — 93010 ELECTROCARDIOGRAM REPORT: CPT

## 2019-02-14 RX ORDER — METOPROLOL TARTRATE 50 MG
25 TABLET ORAL
Qty: 0 | Refills: 0 | Status: DISCONTINUED | OUTPATIENT
Start: 2019-02-14 | End: 2019-02-17

## 2019-02-14 RX ORDER — WARFARIN SODIUM 2.5 MG/1
2 TABLET ORAL ONCE
Qty: 0 | Refills: 0 | Status: COMPLETED | OUTPATIENT
Start: 2019-02-14 | End: 2019-02-14

## 2019-02-14 RX ORDER — WARFARIN SODIUM 2.5 MG/1
3 TABLET ORAL ONCE
Qty: 0 | Refills: 0 | Status: DISCONTINUED | OUTPATIENT
Start: 2019-02-14 | End: 2019-02-14

## 2019-02-14 RX ORDER — CLOPIDOGREL BISULFATE 75 MG/1
75 TABLET, FILM COATED ORAL DAILY
Qty: 0 | Refills: 0 | Status: DISCONTINUED | OUTPATIENT
Start: 2019-02-15 | End: 2019-02-14

## 2019-02-14 RX ADMIN — Medication 50 MILLIGRAM(S): at 05:14

## 2019-02-14 RX ADMIN — Medication 1 TABLET(S): at 12:38

## 2019-02-14 RX ADMIN — Medication 40 MILLIGRAM(S): at 17:44

## 2019-02-14 RX ADMIN — ATORVASTATIN CALCIUM 80 MILLIGRAM(S): 80 TABLET, FILM COATED ORAL at 23:17

## 2019-02-14 RX ADMIN — CLOPIDOGREL BISULFATE 75 MILLIGRAM(S): 75 TABLET, FILM COATED ORAL at 12:38

## 2019-02-14 RX ADMIN — Medication 25 MILLIGRAM(S): at 17:44

## 2019-02-14 RX ADMIN — Medication 1: at 17:43

## 2019-02-14 RX ADMIN — Medication 40 MILLIGRAM(S): at 05:13

## 2019-02-14 RX ADMIN — Medication 81 MILLIGRAM(S): at 12:38

## 2019-02-14 RX ADMIN — WARFARIN SODIUM 2 MILLIGRAM(S): 2.5 TABLET ORAL at 18:14

## 2019-02-14 RX ADMIN — PIPERACILLIN AND TAZOBACTAM 25 GRAM(S): 4; .5 INJECTION, POWDER, LYOPHILIZED, FOR SOLUTION INTRAVENOUS at 05:14

## 2019-02-14 NOTE — PROGRESS NOTE ADULT - SUBJECTIVE AND OBJECTIVE BOX
Tony Gu  PGY2 Internal Medicine  Pager [phone] or [phone]    Patient is a 78y old  Female who presents with a chief complaint of Sepsis (14 Feb 2019 12:06)    SUBJECTIVE / OVERNIGHT EVENTS: No ON events, no CP,SOB, feels better and stable LE edema    MEDICATIONS  (STANDING):  aspirin 81 milliGRAM(s) Oral daily  atorvastatin 80 milliGRAM(s) Oral at bedtime  dextrose 5%. 1000 milliLiter(s) (50 mL/Hr) IV Continuous <Continuous>  dextrose 50% Injectable 12.5 Gram(s) IV Push once  dextrose 50% Injectable 25 Gram(s) IV Push once  dextrose 50% Injectable 25 Gram(s) IV Push once  furosemide   Injectable 40 milliGRAM(s) IV Push every 12 hours  influenza   Vaccine 0.5 milliLiter(s) IntraMuscular once  insulin lispro (HumaLOG) corrective regimen sliding scale   SubCutaneous three times a day before meals  lactobacillus acidophilus 1 Tablet(s) Oral daily  levoFLOXacin  Tablet 750 milliGRAM(s) Oral every 48 hours  metoprolol tartrate 25 milliGRAM(s) Oral two times a day  warfarin 2 milliGRAM(s) Oral once    MEDICATIONS  (PRN):  acetaminophen   Tablet .. 650 milliGRAM(s) Oral every 6 hours PRN Temp greater or equal to 38C (100.4F)  dextrose 40% Gel 15 Gram(s) Oral once PRN Blood Glucose LESS THAN 70 milliGRAM(s)/deciliter  glucagon  Injectable 1 milliGRAM(s) IntraMuscular once PRN Glucose LESS THAN 70 milligrams/deciliter  levalbuterol Inhalation 0.63 milliGRAM(s) Inhalation every 6 hours PRN sob/wheezing      T(C): 36.7 (02-14-19 @ 12:34), Max: 37 (02-13-19 @ 20:57)  HR: 64 (02-14-19 @ 12:34) (52 - 78)  BP: 139/65 (02-14-19 @ 12:34) (128/62 - 154/69)  RR: 17 (02-14-19 @ 12:34) (16 - 19)  SpO2: 99% (02-14-19 @ 12:34) (96% - 100%)  CAPILLARY BLOOD GLUCOSE      POCT Blood Glucose.: 128 mg/dL (14 Feb 2019 11:55)  POCT Blood Glucose.: 131 mg/dL (14 Feb 2019 07:34)  POCT Blood Glucose.: 137 mg/dL (13 Feb 2019 20:37)  POCT Blood Glucose.: 227 mg/dL (13 Feb 2019 17:19)    I&O's Summary    13 Feb 2019 07:01  -  14 Feb 2019 07:00  --------------------------------------------------------  IN: 950 mL / OUT: 880 mL / NET: 70 mL    PHYSICAL EXAM:  GENERAL: Still grunting with each breath, but appears comfortable.   HEAD:  Atraumatic, Normocephalic  EYES: EOMI, PERRLA, conjunctiva and sclera clear  NECK: Supple, No JVD  CHEST/LUNG: Diffuse inspiratory crackles and rhonchi throughout all lung fields, no use of accessory muscles  HEART: Irregularly irregular rate and rhythm; holosystolic murmur heard best between 2nd/3rd IC at midline.   ABDOMEN: Soft, Nontender, Nondistended; Bowel sounds present  EXTREMITIES:  2+ Peripheral Pulses, No clubbing, cyanosis. 1+ LE edema.     LABS:                        10.5   11.49 )-----------( 318      ( 14 Feb 2019 05:30 )             36.2     02-14    135  |  100  |  29<H>  ----------------------------<  126<H>  4.3   |  22  |  1.33<H>    Ca    8.9      14 Feb 2019 05:30  Phos  3.5     02-14  Mg     2.1     02-14    TPro  6.8  /  Alb  3.2<L>  /  TBili  0.4  /  DBili  x   /  AST  52<H>  /  ALT  19  /  AlkPhos  51  02-12    PT/INR - ( 14 Feb 2019 05:30 )   PT: 26.8 SEC;   INR: 2.29            CARDIAC MARKERS ( 13 Feb 2019 04:44 )  x     / x     / 141 u/L / 2.82 ng/mL / x              Micro:      RADIOLOGY & ADDITIONAL TESTS:    Imaging Personally Reviewed:    Consultant(s) Notes Reviewed:      Care Discussed with Consultants/Other Providers:

## 2019-02-14 NOTE — PROGRESS NOTE ADULT - PROBLEM SELECTOR PLAN 5
Afib w/t RVR likely 2/2 acute sepsis  Telemetry showed rate up to 131  [id number] score 7  -c/w metop tart 50mg bid to decrease demand  -coumadin 3mg tonight, goal INR 2-3  - telemetry Afib w/t RVR likely 2/2 acute sepsis  Telemetry showed rate up to 131  [id number] score 7  -decreased metoprolol tart 50mg bid to 25mg BID  -coumadin 3mg tonight, goal INR 2-3  - telemetry Afib w/t RVR likely 2/2 acute sepsis  Telemetry showed rate up to 131  [id number] score 7  -decreased metoprolol tart 50mg bid to 25mg BID  -coumadin 2mg tonight, goal INR 2-3  - telemetry

## 2019-02-14 NOTE — PROGRESS NOTE ADULT - PROBLEM SELECTOR PLAN 2
-Resolved, had met severe sepsis criteria on admission: fever to 103.2, tachycardia, tachypnea, WBC count of 13.9, and elevated lactate. CXR significant pulmonary edema, but no focal consolidation, RVP neg. Received azithromycin and ctx in ED. Chest CT showing possible multifocal PNA  -UA negative  -f/u 1 blood culture w/t coag neg staph- likely contaminant, urine legionella neg  - tylenol PRN fever  - vital signs Q4H  -Zosyn renally dosed (2/9-2/13); likely transition to oral antibiotics tomorrow given clinical improvement  - trend fever and wbc qd -Resolved and afebrile had 11.49 WBC, had met severe sepsis criteria on admission: fever to 103.2, tachycardia, tachypnea. CXR significant pulmonary edema, but no focal consolidation, RVP neg. Received azithromycin and ctx in ED. Chest CT showing possible multifocal PNA  -UA negative  -f/u 1 blood culture w/t coag neg staph- likely contaminant, urine legionella neg  - tylenol PRN fever  - vital signs Q4H  -Zosyn renally dosed (2/9-2/13); likely transition to oral antibiotics tomorrow given clinical improvement  - trend fever and wbc qd -Resolved and afebrile had 11.49 WBC, had met severe sepsis criteria on admission: fever to 103.2, tachycardia, tachypnea. CXR significant pulmonary edema, but no focal consolidation, RVP neg. Received azithromycin and ctx in ED. Chest CT showing possible multifocal PNA  -UA negative  -f/u 1 blood culture w/t coag neg staph- likely contaminant, urine legionella neg  - tylenol PRN fever  - vital signs Q4H  -transitioned from Zosyn renally dosed (2/9-2/13) to levaquin 750 q48h today  - trend fever and wbc qd

## 2019-02-14 NOTE — PROGRESS NOTE ADULT - PROBLEM SELECTOR PLAN 3
- NSTEMI 2/2 type demand ischemia likely multifactorial due to sepsis and/or CHF exacerbation/ afib w/RVR s/p LHC without intervention, LIMA to LAD and SVG to OM1 with mild disease, but good flow.  - hst Trops decreased today  - CKMB trending down  -ASA and plavix due to triple therapy based on woest trial  - per cards, c/w coumadin, goal INR 2-3  - c/w metoprolol 50mg bid - NSTEMI 2/2 type demand ischemia likely multifactorial due to sepsis and/or CHF exacerbation/ afib w/RVR s/p LHC without intervention, LIMA to LAD and SVG to OM1 with mild disease, but good flow.  - hst Trops decreased today  - CKMB trending down  -ASA and plavix due to triple therapy based on woest trial but hold off plavix for now and just ASA and coumadin as per cards, INR 2-3  - c/w metoprolol 50mg bid - NSTEMI 2/2 type demand ischemia likely multifactorial due to sepsis and/or CHF exacerbation/ afib w/RVR s/p LHC without intervention, LIMA to LAD and SVG to OM1 with mild disease, but good flow.  - hst Trops decreased today  - CKMB trending down  -ASA and plavix due to triple therapy based on woest trial but hold off plavix for now and just ASA and coumadin as per cards, INR 2-3  -decreased metoprolol tart 50mg bid to 25mg BID

## 2019-02-14 NOTE — PROGRESS NOTE ADULT - SUBJECTIVE AND OBJECTIVE BOX
Patient seen and examined at bedside.    Overnight Events: Pt still with SOB, however, states she feels better.       REVIEW OF SYSTEMS:  Constitutional:     [ ] negative [ ] fevers [ ] chills [ ] weight loss [ ] weight gain  HEENT:                  [ ] negative [ ] dry eyes [ ] eye irritation [ ] postnasal drip [ ] nasal congestion  CV:                         [ ] negative  [ ] chest pain [ ] orthopnea [ ] palpitations [ ] murmur  Resp:                     [ ] negative [ ] cough [ ] shortness of breath [ x] dyspnea [ ] wheezing [ ] sputum [ ]hemoptysis  GI:                          [ ] negative [ ] nausea [ ] vomiting [ ] diarrhea [ ] constipation [ ] abd pain [ ] dysphagia   :                        [ ] negative [ ] dysuria [ ] nocturia [ ] hematuria [ ] increased urinary frequency  Musculoskeletal: [ ] negative [ ] back pain [ ] myalgias [ ] arthralgias [ ] fracture  Skin:                       [ ] negative [ ] rash [ ] itch  Neurological:        [ ] negative [ ] headache [ ] dizziness [ ] syncope [ ] weakness [ ] numbness  Psychiatric:           [ ] negative [ ] anxiety [ ] depression  Endocrine:            [ ] negative [ ] diabetes [ ] thyroid problem  Heme/Lymph:      [ ] negative [ ] anemia [ ] bleeding problem  Allergic/Immune: [ ] negative [ ] itchy eyes [ ] nasal discharge [ ] hives [ ] angioedema    [x ] All other systems negative  [ ] Unable to assess ROS because sedated with anoxic brain injury.    Current Meds:  acetaminophen   Tablet .. 650 milliGRAM(s) Oral every 6 hours PRN  aspirin 81 milliGRAM(s) Oral daily  atorvastatin 80 milliGRAM(s) Oral at bedtime  clopidogrel Tablet 75 milliGRAM(s) Oral daily  dextrose 40% Gel 15 Gram(s) Oral once PRN  dextrose 5%. 1000 milliLiter(s) IV Continuous <Continuous>  dextrose 50% Injectable 12.5 Gram(s) IV Push once  dextrose 50% Injectable 25 Gram(s) IV Push once  dextrose 50% Injectable 25 Gram(s) IV Push once  furosemide   Injectable 40 milliGRAM(s) IV Push every 12 hours  glucagon  Injectable 1 milliGRAM(s) IntraMuscular once PRN  influenza   Vaccine 0.5 milliLiter(s) IntraMuscular once  insulin lispro (HumaLOG) corrective regimen sliding scale   SubCutaneous three times a day before meals  lactobacillus acidophilus 1 Tablet(s) Oral daily  levalbuterol Inhalation 0.63 milliGRAM(s) Inhalation every 6 hours PRN  levoFLOXacin  Tablet 750 milliGRAM(s) Oral every 24 hours  metoprolol tartrate 50 milliGRAM(s) Oral every 12 hours  warfarin 2 milliGRAM(s) Oral once      PAST MEDICAL & SURGICAL HISTORY:  Diabetes mellitus  Gout  Upper GI bleed  CHF (congestive heart failure)  Mitral Regurgitation  CVA (Cerebral Infarction): 2011  CAD (Coronary Artery Disease)  Afib: (on Warfarin)  HTN (Hypertension)  H/O mitral valve replacement: 9/22/14  H/O aortic valve replacement: 9/22/14  S/P CABG x 1: (2000)  S/P angioplasty with stent: 2011      Vitals:  T(F): 98 (02-14), Max: 98.6 (02-13)  HR: 56 (02-14) (52 - 78)  BP: 128/62 (02-14) (128/62 - 154/69)  RR: 17 (02-14)  SpO2: 96% (02-14)  I&O's Summary    13 Feb 2019 07:01  -  14 Feb 2019 07:00  --------------------------------------------------------  IN: 950 mL / OUT: 880 mL / NET: 70 mL        Physical Exam:  Appearance: No acute distress; well appearing  Eyes: PERRL, EOMI, pink conjunctiva  HENT: Normal oral mucosa  Cardiovascular: RRR, S1, S2, no murmurs, rubs, or gallops; no edema; no JVD  Respiratory: b/l exp rhonchi   Gastrointestinal: soft, non-tender, non-distended with normal bowel sounds  Musculoskeletal: No clubbing; no joint deformity   Neurologic: Non-focal  Lymphatic: No lymphadenopathy  Psychiatry: AAOx3, mood & affect appropriate  Skin: No rashes, ecchymoses, or cyanosis                          10.5   11.49 )-----------( 318      ( 14 Feb 2019 05:30 )             36.2     02-14    135  |  100  |  29<H>  ----------------------------<  126<H>  4.3   |  22  |  1.33<H>    Ca    8.9      14 Feb 2019 05:30  Phos  3.5     02-14  Mg     2.1     02-14    TPro  6.8  /  Alb  3.2<L>  /  TBili  0.4  /  DBili  x   /  AST  52<H>  /  ALT  19  /  AlkPhos  51  02-12    PT/INR - ( 14 Feb 2019 05:30 )   PT: 26.8 SEC;   INR: 2.29            CARDIAC MARKERS ( 13 Feb 2019 04:44 )  x     / x     / 141 u/L / 2.82 ng/mL / x          Serum Pro-Brain Natriuretic Peptide: 2306 pg/mL (02-07 @ 16:43)          New ECG(s): Personally reviewed    Echo:  < from: Transthoracic Echocardiogram (02.09.19 @ 07:28) >  CONCLUSIONS:  1. Bioprosthetic mitral valve replacement. Mean transmitral  valve gradient equals 5 mm Hg, which is probably normal in  the setting of a prosthetic valve.  2. Bioprosthetic aortic valve replacement. Peak transaortic  valve gradient equals 25 mm Hg, mean transaortic valve  gradient equals 11 mm Hg, which is probably normal in the  presence of a prosthetic valve.  3. Mild left atrial enlargement.  4. Increased relative wall thickness with normal left  ventricular mass index, consistent with concentric left  ventricular remodeling.  5. Endocardium not well visualized; moderate left  ventricular dysfunction.  6. Mild right atrial enlargement.  7. Right ventricular enlargement with normal right  ventricular systolic function.  8. Normal tricuspid valve.  Severe tricuspid regurgitation.  9. Estimated pulmonary artery systolic pressure equals 40  mm Hg, assuming right atrial pressure equals 10  mm Hg,  consistent with mild pulmonary hypertension.  ------------------------------------------------------------------------  Confirmed on  2/9/2019 - 15:42:56 by Sheila Alva M.D.  ------------------------------------------------------------------------    < end of copied text >    Cath:  < from: Cardiac Cath Lab - Adult (02.12.19 @ 17:20) >  LM:   --  LM: Angiography showed mild atherosclerosis with no flow limiting  lesions.  LAD:   --  LAD: The distal vessel was supplied by extensive retrograde flow  from the graft(s) to the mid LAD.  --  Proximal LAD: There was a diffuse 99 % stenosis at a site with no prior  intervention. The lesion was eccentric.  CX:   --  Mid circumflex: There was a diffuse 10 % stenosis at the site of  a prior stent. The lesion was eccentric. There was YELENA grade 3 flow  through the vessel (brisk flow).  --  OM1: There was a 100 % stenosis. This lesion is a chronic total  occlusion.  RCA:   --  Proximal RCA: This vessel was not injected. Known to be .  GRAFTS:   --  Graft to the LAD: The graft was a medium sized LIMA. Distal  vessel angiography showed mild diffuse disease.  --  Graft to the 1st obtuse marginal: Thegraft was a saphenous vein graft  from the ascending aorta. Graft angiography showed mild degeneration.  Distal vessel angiography showed mild diffuse disease.  COMPLICATIONS: There were no complications.  DIAGNOSTIC IMPRESSIONS: Patent LIMA to LAD, SVG to OM-1  Patent stents LCx  RCA is a well collateralized , known from prior cath  DIAGNOSTIC RECOMMENDATIONS: Medical management  INTERVENTIONAL IMPRESSIONS: Patent LIMA to LAD, SVG to OM-1  Patent stents LCx  RCA is a well collateralized , known from prior cath  INTERVENTIONAL RECOMMENDATIONS: Medical management  Prepared and signed by  Andreia Parker M.D.    < end of copied text >

## 2019-02-14 NOTE — PROGRESS NOTE ADULT - PROBLEM SELECTOR PLAN 8
-On triple therapy w/t coumadin, asa, plavix   -Disease stable w/o new stenting, dc ASA and c/w plavix and coumadin  -high intensity statin atorvastatin 80mg qhs

## 2019-02-14 NOTE — PROGRESS NOTE ADULT - PROBLEM SELECTOR PLAN 4
- Acute on chronic HFpEF exacerbation likely 2/2 Sepsis and afib w/t RVR now c/b new systolic failure  - TTE from 5/2017 showed EF73%, however, now with new EF 42% and mod LV dysfunction. Severe TR, and moderate pulm HTN. Prelim read of CXR showing mild pulm edema. proBNP elevated to 2306  -c/w 40mg lasix IV BID, crackles not improving  -daily weights  -Strict Is/Os  -continue metoprolol tart 50mg bid  -Head of bed 45degrees - Acute on chronic HFpEF exacerbation likely 2/2 Sepsis and afib w/t RVR now c/b new systolic failure  - TTE from 5/2017 showed EF73%, however, now with new EF 42% and mod LV dysfunction. Severe TR, and moderate pulm HTN. Prelim read of CXR showing mild pulm edema. proBNP elevated to 2306  -c/w 40mg lasix IV BID, crackles not improving  -daily weights  -Strict Is/Os  -decreased metoprolol tart 50mg bid to 25mg BID  -Head of bed 45degrees

## 2019-02-14 NOTE — PROGRESS NOTE ADULT - PROBLEM SELECTOR PLAN 1
Acute drop in serum sodium  -Serum osm 288, Uosm > 600, suggestive of SIADH. Lasix should dilute the urine  -Fluid restriction < 1L  -Give both doses of IV lasix today given continued crackles on exam, re-evaluate tomorrow; if no improvement, will obtain renal consultation Resolving, 135, had an acute drop in serum sodium  -Serum osm 288, Uosm > 600, suggestive of SIADH. Lasix should dilute the urine  -c/w Fluid restriction < 1L  -c/w IV lasix 40 BID

## 2019-02-14 NOTE — PROGRESS NOTE ADULT - ASSESSMENT
78 year old woman with CAD, s/p CABG, s/p cath 2014 with patent LIMA to LAD and SVG to OM, + of RCA, valvular heart disease, s/p bioprosthetic MVR and AVR, afib on coumadin, HTN, HLD, DM, CVA, and multiple "lung infections" per family presents with worsening fatigue, exertional SOB, and general malaise with cough and cold symptoms. +fever and likely sepsis but also with elevated hsT and CKMB.    #Reduced EF  - Mostly exp Rhonchi on exam today.   - c/w metoprolol   - Agree with IV lasix 40 IV BID for now, can likely switch to PO 40 BID tomorrow.   - Home dose if 40 PO daily.     #NSTEMI w/ hx of CAD (Hx of CABG) likely 2/2 myocarditis   - Mild / moderate LVDF on Echo.   - s/p cardiac cath with patent grafts, no intervention was done.   - Maybe related to myositis, vs myocardial injury in the setting of sepsis.   - c/w ASA, Plavix and atorvastatin     #A-fib  - c/w metoprolol 50 BID  - c/w coumadin     Damien Francois MD  Cardiology Fellow - PGY-4  [facility]: [id number]  [facility]: [phone]  [id number]

## 2019-02-14 NOTE — PROGRESS NOTE ADULT - ASSESSMENT
78F with PMHx of HFpEF (EF73%), CAD s/p CABG (LIMA to LAD & SVG to OM,  RCA), bioprosthetic MVR & AVR, afib on coumadin, HTN, HLD, T2DM, and recurrent PNA who presents with fever, SOB, N/V and diaphoresis 2/2 sepsis likely 2/2 acute viral URI vs. CAP (less likely given CXR) c/b CHF exacerbation and afib w/t RVR further c/b NSTEMI (likely type II demand ischemia in setting of sepsis and afib w/t RVR). Trops falling.

## 2019-02-15 LAB
ALBUMIN SERPL ELPH-MCNC: 3.4 G/DL — SIGNIFICANT CHANGE UP (ref 3.3–5)
ALP SERPL-CCNC: 51 U/L — SIGNIFICANT CHANGE UP (ref 40–120)
ALT FLD-CCNC: 13 U/L — SIGNIFICANT CHANGE UP (ref 4–33)
ANION GAP SERPL CALC-SCNC: 15 MMO/L — HIGH (ref 7–14)
APTT BLD: 37.6 SEC — HIGH (ref 27.5–36.3)
AST SERPL-CCNC: 22 U/L — SIGNIFICANT CHANGE UP (ref 4–32)
BASOPHILS # BLD AUTO: 0.05 K/UL — SIGNIFICANT CHANGE UP (ref 0–0.2)
BASOPHILS NFR BLD AUTO: 0.4 % — SIGNIFICANT CHANGE UP (ref 0–2)
BILIRUB SERPL-MCNC: 0.5 MG/DL — SIGNIFICANT CHANGE UP (ref 0.2–1.2)
BUN SERPL-MCNC: 20 MG/DL — SIGNIFICANT CHANGE UP (ref 7–23)
CALCIUM SERPL-MCNC: 8.8 MG/DL — SIGNIFICANT CHANGE UP (ref 8.4–10.5)
CHLORIDE SERPL-SCNC: 99 MMOL/L — SIGNIFICANT CHANGE UP (ref 98–107)
CO2 SERPL-SCNC: 23 MMOL/L — SIGNIFICANT CHANGE UP (ref 22–31)
CREAT SERPL-MCNC: 1 MG/DL — SIGNIFICANT CHANGE UP (ref 0.5–1.3)
EOSINOPHIL # BLD AUTO: 0.6 K/UL — HIGH (ref 0–0.5)
EOSINOPHIL NFR BLD AUTO: 5.2 % — SIGNIFICANT CHANGE UP (ref 0–6)
GLUCOSE SERPL-MCNC: 99 MG/DL — SIGNIFICANT CHANGE UP (ref 70–99)
HCT VFR BLD CALC: 34.7 % — SIGNIFICANT CHANGE UP (ref 34.5–45)
HGB BLD-MCNC: 10.2 G/DL — LOW (ref 11.5–15.5)
IMM GRANULOCYTES NFR BLD AUTO: 0.4 % — SIGNIFICANT CHANGE UP (ref 0–1.5)
INR BLD: 3 — HIGH (ref 0.88–1.17)
LYMPHOCYTES # BLD AUTO: 19.9 % — SIGNIFICANT CHANGE UP (ref 13–44)
LYMPHOCYTES # BLD AUTO: 2.31 K/UL — SIGNIFICANT CHANGE UP (ref 1–3.3)
MAGNESIUM SERPL-MCNC: 1.7 MG/DL — SIGNIFICANT CHANGE UP (ref 1.6–2.6)
MCHC RBC-ENTMCNC: 22.3 PG — LOW (ref 27–34)
MCHC RBC-ENTMCNC: 29.4 % — LOW (ref 32–36)
MCV RBC AUTO: 75.8 FL — LOW (ref 80–100)
MONOCYTES # BLD AUTO: 1.05 K/UL — HIGH (ref 0–0.9)
MONOCYTES NFR BLD AUTO: 9.1 % — SIGNIFICANT CHANGE UP (ref 2–14)
NEUTROPHILS # BLD AUTO: 7.54 K/UL — HIGH (ref 1.8–7.4)
NEUTROPHILS NFR BLD AUTO: 65 % — SIGNIFICANT CHANGE UP (ref 43–77)
NRBC # FLD: 0 K/UL — LOW (ref 25–125)
PHOSPHATE SERPL-MCNC: 2.8 MG/DL — SIGNIFICANT CHANGE UP (ref 2.5–4.5)
PLATELET # BLD AUTO: 304 K/UL — SIGNIFICANT CHANGE UP (ref 150–400)
PMV BLD: 10.3 FL — SIGNIFICANT CHANGE UP (ref 7–13)
POTASSIUM SERPL-MCNC: 3.6 MMOL/L — SIGNIFICANT CHANGE UP (ref 3.5–5.3)
POTASSIUM SERPL-SCNC: 3.6 MMOL/L — SIGNIFICANT CHANGE UP (ref 3.5–5.3)
PROT SERPL-MCNC: 6.8 G/DL — SIGNIFICANT CHANGE UP (ref 6–8.3)
PROTHROM AB SERPL-ACNC: 34.4 SEC — HIGH (ref 9.8–13.1)
RBC # BLD: 4.58 M/UL — SIGNIFICANT CHANGE UP (ref 3.8–5.2)
RBC # FLD: 17.9 % — HIGH (ref 10.3–14.5)
SODIUM SERPL-SCNC: 137 MMOL/L — SIGNIFICANT CHANGE UP (ref 135–145)
WBC # BLD: 11.6 K/UL — HIGH (ref 3.8–10.5)
WBC # FLD AUTO: 11.6 K/UL — HIGH (ref 3.8–10.5)

## 2019-02-15 PROCEDURE — 99233 SBSQ HOSP IP/OBS HIGH 50: CPT | Mod: GC

## 2019-02-15 RX ORDER — POTASSIUM CHLORIDE 20 MEQ
40 PACKET (EA) ORAL ONCE
Qty: 0 | Refills: 0 | Status: COMPLETED | OUTPATIENT
Start: 2019-02-15 | End: 2019-02-15

## 2019-02-15 RX ORDER — WARFARIN SODIUM 2.5 MG/1
2.5 TABLET ORAL ONCE
Qty: 0 | Refills: 0 | Status: COMPLETED | OUTPATIENT
Start: 2019-02-15 | End: 2019-02-15

## 2019-02-15 RX ORDER — FUROSEMIDE 40 MG
40 TABLET ORAL EVERY 12 HOURS
Qty: 0 | Refills: 0 | Status: DISCONTINUED | OUTPATIENT
Start: 2019-02-15 | End: 2019-02-17

## 2019-02-15 RX ORDER — MAGNESIUM SULFATE 500 MG/ML
1 VIAL (ML) INJECTION ONCE
Qty: 0 | Refills: 0 | Status: COMPLETED | OUTPATIENT
Start: 2019-02-15 | End: 2019-02-15

## 2019-02-15 RX ADMIN — Medication 1 TABLET(S): at 12:43

## 2019-02-15 RX ADMIN — Medication 100 GRAM(S): at 12:43

## 2019-02-15 RX ADMIN — Medication 40 MILLIEQUIVALENT(S): at 12:43

## 2019-02-15 RX ADMIN — WARFARIN SODIUM 2.5 MILLIGRAM(S): 2.5 TABLET ORAL at 17:40

## 2019-02-15 RX ADMIN — Medication 81 MILLIGRAM(S): at 12:43

## 2019-02-15 RX ADMIN — Medication 40 MILLIGRAM(S): at 05:23

## 2019-02-15 RX ADMIN — Medication 25 MILLIGRAM(S): at 17:40

## 2019-02-15 RX ADMIN — Medication 25 MILLIGRAM(S): at 05:23

## 2019-02-15 RX ADMIN — Medication 40 MILLIGRAM(S): at 17:40

## 2019-02-15 RX ADMIN — Medication 1: at 17:40

## 2019-02-15 RX ADMIN — ATORVASTATIN CALCIUM 80 MILLIGRAM(S): 80 TABLET, FILM COATED ORAL at 21:52

## 2019-02-15 NOTE — PROGRESS NOTE ADULT - PROBLEM SELECTOR PLAN 6
Acute rise in sCr now improving, barely above baseline. Likely cardiorenal syndrome.  -c/w diuresis RESOLVED  Acute rise in sCr now improving, barely above baseline. Likely cardiorenal syndrome.  -c/w diuresis RESOLVED  Likely cardiorenal syndrome.  -c/w diuresis

## 2019-02-15 NOTE — PROGRESS NOTE ADULT - PROBLEM SELECTOR PLAN 10
BP at goal off of hydralazine and enalapril, add back as clinically indicated BP at goal off of hydralazine and enalapril, will consider adding back ACEI tomorrow if BP can tolerate

## 2019-02-15 NOTE — PROGRESS NOTE ADULT - SUBJECTIVE AND OBJECTIVE BOX
Patient seen and examined at bedside.    Overnight Events: Pt states her SOB is better today.       REVIEW OF SYSTEMS:  Constitutional:     [ ] negative [ ] fevers [ ] chills [ ] weight loss [ ] weight gain  HEENT:                  [ ] negative [ ] dry eyes [ ] eye irritation [ ] postnasal drip [ ] nasal congestion  CV:                         [ ] negative  [ ] chest pain [ ] orthopnea [ ] palpitations [ ] murmur  Resp:                     [ ] negative [ ] cough [ x] shortness of breath [ ] dyspnea [ ] wheezing [ ] sputum [ ]hemoptysis  GI:                          [ ] negative [ ] nausea [ ] vomiting [ ] diarrhea [ ] constipation [ ] abd pain [ ] dysphagia   :                        [ ] negative [ ] dysuria [ ] nocturia [ ] hematuria [ ] increased urinary frequency  Musculoskeletal: [ ] negative [ ] back pain [ ] myalgias [ ] arthralgias [ ] fracture  Skin:                       [ ] negative [ ] rash [ ] itch  Neurological:        [ ] negative [ ] headache [ ] dizziness [ ] syncope [ ] weakness [ ] numbness  Psychiatric:           [ ] negative [ ] anxiety [ ] depression  Endocrine:            [ ] negative [ ] diabetes [ ] thyroid problem  Heme/Lymph:      [ ] negative [ ] anemia [ ] bleeding problem  Allergic/Immune: [ ] negative [ ] itchy eyes [ ] nasal discharge [ ] hives [ ] angioedema    [x ] All other systems negative  [ ] Unable to assess ROS because sedated with anoxic brain injury.    Current Meds:  acetaminophen   Tablet .. 650 milliGRAM(s) Oral every 6 hours PRN  aspirin 81 milliGRAM(s) Oral daily  atorvastatin 80 milliGRAM(s) Oral at bedtime  dextrose 40% Gel 15 Gram(s) Oral once PRN  dextrose 5%. 1000 milliLiter(s) IV Continuous <Continuous>  dextrose 50% Injectable 12.5 Gram(s) IV Push once  dextrose 50% Injectable 25 Gram(s) IV Push once  dextrose 50% Injectable 25 Gram(s) IV Push once  furosemide   Injectable 40 milliGRAM(s) IV Push every 12 hours  glucagon  Injectable 1 milliGRAM(s) IntraMuscular once PRN  influenza   Vaccine 0.5 milliLiter(s) IntraMuscular once  insulin lispro (HumaLOG) corrective regimen sliding scale   SubCutaneous three times a day before meals  lactobacillus acidophilus 1 Tablet(s) Oral daily  levalbuterol Inhalation 0.63 milliGRAM(s) Inhalation every 6 hours PRN  levoFLOXacin  Tablet 750 milliGRAM(s) Oral every 48 hours  magnesium sulfate  IVPB 1 Gram(s) IV Intermittent once  metoprolol tartrate 25 milliGRAM(s) Oral two times a day  potassium chloride    Tablet ER 40 milliEquivalent(s) Oral once      PAST MEDICAL & SURGICAL HISTORY:  Diabetes mellitus  Gout  Upper GI bleed  CHF (congestive heart failure)  Mitral Regurgitation  CVA (Cerebral Infarction): 2011  CAD (Coronary Artery Disease)  Afib: (on Warfarin)  HTN (Hypertension)  H/O mitral valve replacement: 9/22/14  H/O aortic valve replacement: 9/22/14  S/P CABG x 1: (2000)  S/P angioplasty with stent: 2011      Vitals:  T(F): 97.4 (02-15), Max: 98.8 (02-15)  HR: 75 (02-15) (64 - 79)  BP: 143/73 (02-15) (120/50 - 174/60)  RR: 16 (02-15)  SpO2: 98% (02-15)  I&O's Summary    14 Feb 2019 07:01  -  15 Feb 2019 07:00  --------------------------------------------------------  IN: 500 mL / OUT: 1200 mL / NET: -700 mL    15 Feb 2019 07:01  -  15 Feb 2019 10:48  --------------------------------------------------------  IN: 0 mL / OUT: 200 mL / NET: -200 mL        Physical Exam:  Appearance: No acute distress; well appearing  Eyes: PERRL, EOMI, pink conjunctiva  HENT: Normal oral mucosa  Cardiovascular: RRR, S1, S2, no murmurs, rubs, or gallops; no edema; no JVD  Respiratory: b/l rhonchi   Gastrointestinal: soft, non-tender, non-distended with normal bowel sounds  Musculoskeletal: No clubbing; no joint deformity   Neurologic: Non-focal  Lymphatic: No lymphadenopathy  Psychiatry: AAOx3, mood & affect appropriate  Skin: No rashes, ecchymoses, or cyanosis                          10.2   11.60 )-----------( 304      ( 15 Feb 2019 05:37 )             34.7     02-15    137  |  99  |  20  ----------------------------<  99  3.6   |  23  |  1.00    Ca    8.8      15 Feb 2019 05:37  Phos  2.8     02-15  Mg     1.7     02-15    TPro  6.8  /  Alb  3.4  /  TBili  0.5  /  DBili  x   /  AST  22  /  ALT  13  /  AlkPhos  51  02-15    PT/INR - ( 15 Feb 2019 05:30 )   PT: 34.4 SEC;   INR: 3.00          PTT - ( 15 Feb 2019 05:30 )  PTT:37.6 SEC    New ECG(s): Personally reviewed    Echo:  < from: Transthoracic Echocardiogram (02.09.19 @ 07:28) >  CONCLUSIONS:  1. Bioprosthetic mitral valve replacement. Mean transmitral  valve gradient equals 5 mm Hg, which is probably normal in  the setting of a prosthetic valve.  2. Bioprosthetic aortic valve replacement. Peak transaortic  valve gradient equals 25 mm Hg, mean transaortic valve  gradient equals 11 mm Hg, which is probably normal in the  presence of a prosthetic valve.  3. Mild left atrial enlargement.  4. Increased relative wall thickness with normal left  ventricular mass index, consistent with concentric left  ventricular remodeling.  5. Endocardium not well visualized; moderate left  ventricular dysfunction.  6. Mild right atrial enlargement.  7. Right ventricular enlargement with normal right  ventricular systolic function.  8. Normal tricuspid valve.  Severe tricuspid regurgitation.  9. Estimated pulmonary artery systolic pressure equals 40  mm Hg, assuming right atrial pressure equals 10  mm Hg,  consistent with mild pulmonary hypertension.  ------------------------------------------------------------------------  Confirmed on  2/9/2019 - 15:42:56 by Sheila Alva, M.D.  ------------------------------------------------------------------------    < end of copied text >    Cath:  < from: Cardiac Cath Lab - Adult (02.12.19 @ 17:20) >  LM:   --  LM: Angiography showed mild atherosclerosis with no flow limiting  lesions.  LAD:   --  LAD: The distal vessel was supplied by extensive retrograde flow  from the graft(s) to the mid LAD.  --  Proximal LAD: There was a diffuse 99 % stenosis at a site with no prior  intervention. The lesion was eccentric.  CX:   --  Mid circumflex: There was a diffuse 10 % stenosis at the site of  a prior stent. The lesion was eccentric. There was YELENA grade 3 flow  through the vessel (brisk flow).  --  OM1: There was a 100 % stenosis. This lesion is a chronic total  occlusion.  RCA:   --  Proximal RCA: This vessel was not injected. Known to be .  GRAFTS:   --  Graft to the LAD: The graft was a medium sized LIMA. Distal  vessel angiography showed mild diffuse disease.  --  Graft to the 1st obtuse marginal: Thegraft was a saphenous vein graft  from the ascending aorta. Graft angiography showed mild degeneration.  Distal vessel angiography showed mild diffuse disease.  COMPLICATIONS: There were no complications.  DIAGNOSTIC IMPRESSIONS: Patent LIMA to LAD, SVG to OM-1  Patent stents LCx  RCA is a well collateralized , known from prior cath  DIAGNOSTIC RECOMMENDATIONS: Medical management  INTERVENTIONAL IMPRESSIONS: Patent LIMA to LAD, SVG to OM-1  Patent stents LCx  RCA is a well collateralized , known from prior cath  INTERVENTIONAL RECOMMENDATIONS: Medical management  Prepared and signed by  Andreia Parker M.D.    < end of copied text >  Interpretation of Telemetry:

## 2019-02-15 NOTE — PROGRESS NOTE ADULT - PROBLEM SELECTOR PLAN 5
Afib w/t RVR likely 2/2 acute sepsis  Telemetry showed rate up to 131  [id number] score 7  -decreased metoprolol tart 50mg bid to 25mg BID  -coumadin 2mg tonight, goal INR 2-3  - telemetry Afib w/t RVR likely 2/2 acute sepsis  Telemetry showed rate up to 131  [id number] score 7  -decreased metoprolol tart 50mg bid to 25mg BID  -coumadin 2.5mg tonight, goal INR 2-3  - telemetry Afib w/t RVR likely 2/2 acute sepsis  Telemetry showed rate up to 131  [id number] score 7  - 25mg metoprolol BID (HR 50s-60s)  -coumadin 2.5mg tonight, goal INR 2-3  - telemetry

## 2019-02-15 NOTE — PROGRESS NOTE ADULT - PROBLEM SELECTOR PLAN 1
Resolving, 135, had an acute drop in serum sodium  -Serum osm 288, Uosm > 600, suggestive of SIADH. Lasix should dilute the urine  -c/w Fluid restriction < 1L  -c/w IV lasix 40 BID Resolved 137, had an acute drop in serum sodium  -Serum osm 288, Uosm > 600, suggestive of SIADH. Lasix should dilute the urine  -c/w Fluid restriction < 1L  -transition to PO lasix 40 BID Resolved 137,  -Serum osm 288, Uosm > 600, suggestive of SIADH. Lasix should dilute the urine  -c/w Fluid restriction < 1L  -transition to PO lasix 40 BID given improvement in sCr, Na, and volume status Resolved 137,  -Serum osm 288, Uosm > 600, suggestive of SIADH. Lasix should dilute the urine  -c/w Fluid restriction < 1L  -PO lasix 40 BID given improvement in sCr, Na, and volume status

## 2019-02-15 NOTE — PROGRESS NOTE ADULT - SUBJECTIVE AND OBJECTIVE BOX
Dr. Larry Pike   Internal Medicine PGY-1  Pager #[phone]    Patient is a 78y old  Female who presents with a chief complaint of Sepsis (14 Feb 2019 12:06)      SUBJECTIVE / OVERNIGHT EVENTS:  HAYDER ON.     MEDICATIONS  (STANDING):  aspirin 81 milliGRAM(s) Oral daily  atorvastatin 80 milliGRAM(s) Oral at bedtime  dextrose 5%. 1000 milliLiter(s) (50 mL/Hr) IV Continuous <Continuous>  dextrose 50% Injectable 12.5 Gram(s) IV Push once  dextrose 50% Injectable 25 Gram(s) IV Push once  dextrose 50% Injectable 25 Gram(s) IV Push once  furosemide   Injectable 40 milliGRAM(s) IV Push every 12 hours  influenza   Vaccine 0.5 milliLiter(s) IntraMuscular once  insulin lispro (HumaLOG) corrective regimen sliding scale   SubCutaneous three times a day before meals  lactobacillus acidophilus 1 Tablet(s) Oral daily  levoFLOXacin  Tablet 750 milliGRAM(s) Oral every 48 hours  metoprolol tartrate 25 milliGRAM(s) Oral two times a day    MEDICATIONS  (PRN):  acetaminophen   Tablet .. 650 milliGRAM(s) Oral every 6 hours PRN Temp greater or equal to 38C (100.4F)  dextrose 40% Gel 15 Gram(s) Oral once PRN Blood Glucose LESS THAN 70 milliGRAM(s)/deciliter  glucagon  Injectable 1 milliGRAM(s) IntraMuscular once PRN Glucose LESS THAN 70 milligrams/deciliter  levalbuterol Inhalation 0.63 milliGRAM(s) Inhalation every 6 hours PRN sob/wheezing      Vital Signs Last 24 Hrs  T(C): 36.8 (15 Feb 2019 05:16), Max: 37.1 (15 Feb 2019 01:33)  T(F): 98.3 (15 Feb 2019 05:16), Max: 98.8 (15 Feb 2019 01:33)  HR: 66 (15 Feb 2019 05:16) (52 - 79)  BP: 120/50 (15 Feb 2019 05:16) (120/50 - 174/60)  BP(mean): --  RR: 18 (15 Feb 2019 05:16) (16 - 18)  SpO2: 98% (15 Feb 2019 05:16) (96% - 100%)  CAPILLARY BLOOD GLUCOSE      POCT Blood Glucose.: 165 mg/dL (14 Feb 2019 21:09)  POCT Blood Glucose.: 193 mg/dL (14 Feb 2019 17:32)  POCT Blood Glucose.: 128 mg/dL (14 Feb 2019 11:55)  POCT Blood Glucose.: 131 mg/dL (14 Feb 2019 07:34)    I&O's Summary    13 Feb 2019 07:01  -  14 Feb 2019 07:00  --------------------------------------------------------  IN: 950 mL / OUT: 880 mL / NET: 70 mL    14 Feb 2019 07:01  -  15 Feb 2019 06:40  --------------------------------------------------------  IN: 500 mL / OUT: 1200 mL / NET: -700 mL        PHYSICAL EXAM:  GENERAL: Still grunting with each breath, but appears comfortable.   HEAD:  Atraumatic, Normocephalic  EYES: EOMI, PERRLA, conjunctiva and sclera clear  NECK: Supple, No JVD  CHEST/LUNG: Diffuse inspiratory crackles and rhonchi throughout all lung fields, no use of accessory muscles  HEART: Irregularly irregular rate and rhythm; holosystolic murmur heard best between 2nd/3rd IC at midline.   ABDOMEN: Soft, Nontender, Nondistended; Bowel sounds present  EXTREMITIES:  2+ Peripheral Pulses, No clubbing, cyanosis. 1+ LE edema.    LABS:                        10.5   11.49 )-----------( 318      ( 14 Feb 2019 05:30 )             36.2     02-14    135  |  100  |  29<H>  ----------------------------<  126<H>  4.3   |  22  |  1.33<H>    Ca    8.9      14 Feb 2019 05:30  Phos  3.5     02-14  Mg     2.1     02-14      PT/INR - ( 14 Feb 2019 05:30 )   PT: 26.8 SEC;   INR: 2.29                    RADIOLOGY & ADDITIONAL TESTS: Dr. Larry Pike   Internal Medicine PGY-1  Pager #[phone]    Patient is a 78y old  Female who presents with a chief complaint of Sepsis (14 Feb 2019 12:06)      SUBJECTIVE / OVERNIGHT EVENTS:  HAYDER ON. Feels like her breathing is at baseline. No CP. No fevers, sweats, or chills. No abdominal pain. Switched to PO levaquin yesterday.     MEDICATIONS  (STANDING):  aspirin 81 milliGRAM(s) Oral daily  atorvastatin 80 milliGRAM(s) Oral at bedtime  dextrose 5%. 1000 milliLiter(s) (50 mL/Hr) IV Continuous <Continuous>  dextrose 50% Injectable 12.5 Gram(s) IV Push once  dextrose 50% Injectable 25 Gram(s) IV Push once  dextrose 50% Injectable 25 Gram(s) IV Push once  furosemide   Injectable 40 milliGRAM(s) IV Push every 12 hours  influenza   Vaccine 0.5 milliLiter(s) IntraMuscular once  insulin lispro (HumaLOG) corrective regimen sliding scale   SubCutaneous three times a day before meals  lactobacillus acidophilus 1 Tablet(s) Oral daily  levoFLOXacin  Tablet 750 milliGRAM(s) Oral every 48 hours  metoprolol tartrate 25 milliGRAM(s) Oral two times a day    MEDICATIONS  (PRN):  acetaminophen   Tablet .. 650 milliGRAM(s) Oral every 6 hours PRN Temp greater or equal to 38C (100.4F)  dextrose 40% Gel 15 Gram(s) Oral once PRN Blood Glucose LESS THAN 70 milliGRAM(s)/deciliter  glucagon  Injectable 1 milliGRAM(s) IntraMuscular once PRN Glucose LESS THAN 70 milligrams/deciliter  levalbuterol Inhalation 0.63 milliGRAM(s) Inhalation every 6 hours PRN sob/wheezing      Vital Signs Last 24 Hrs  T(C): 36.8 (15 Feb 2019 05:16), Max: 37.1 (15 Feb 2019 01:33)  T(F): 98.3 (15 Feb 2019 05:16), Max: 98.8 (15 Feb 2019 01:33)  HR: 66 (15 Feb 2019 05:16) (52 - 79)  BP: 120/50 (15 Feb 2019 05:16) (120/50 - 174/60)  BP(mean): --  RR: 18 (15 Feb 2019 05:16) (16 - 18)  SpO2: 98% (15 Feb 2019 05:16) (96% - 100%)  CAPILLARY BLOOD GLUCOSE      POCT Blood Glucose.: 165 mg/dL (14 Feb 2019 21:09)  POCT Blood Glucose.: 193 mg/dL (14 Feb 2019 17:32)  POCT Blood Glucose.: 128 mg/dL (14 Feb 2019 11:55)  POCT Blood Glucose.: 131 mg/dL (14 Feb 2019 07:34)    I&O's Summary    13 Feb 2019 07:01  -  14 Feb 2019 07:00  --------------------------------------------------------  IN: 950 mL / OUT: 880 mL / NET: 70 mL    14 Feb 2019 07:01  -  15 Feb 2019 06:40  --------------------------------------------------------  IN: 500 mL / OUT: 1200 mL / NET: -700 mL        PHYSICAL EXAM:  GENERAL: Still grunting with each breath, but appears comfortable.   HEAD:  Atraumatic, Normocephalic  EYES: EOMI, PERRLA, conjunctiva and sclera clear  NECK: Supple, No JVD  CHEST/LUNG: Diffuse inspiratory rhonchi throughout all lung fields. Crackles loudest on her right side, which was the dependent position.   HEART: Irregularly irregular rate and rhythm; holosystolic murmur heard best between 2nd/3rd IC at midline.   ABDOMEN: Soft, Nontender, Nondistended; Bowel sounds present  EXTREMITIES:  2+ Peripheral Pulses, No clubbing, cyanosis. 1+ LE edema.    LABS:                            10.2   11.60 )-----------( 304      ( 15 Feb 2019 05:37 )             34.7       02-15    137  |  99  |  20  ----------------------------<  99  3.6   |  23  |  1.00    Ca    8.8      15 Feb 2019 05:37  Phos  2.8     02-15  Mg     1.7     02-15    TPro  6.8  /  Alb  3.4  /  TBili  0.5  /  DBili  x   /  AST  22  /  ALT  13  /  AlkPhos  51  02-15                  PT/INR - ( 15 Feb 2019 05:30 )   PT: 34.4 SEC;   INR: 3.00          PTT - ( 15 Feb 2019 05:30 )  PTT:37.6 SEC    Lactate Trend            CAPILLARY BLOOD GLUCOSE      POCT Blood Glucose.: 118 mg/dL (15 Feb 2019 07:48)                                10.5   11.49 )-----------( 318      ( 14 Feb 2019 05:30 )             36.2     02-14    135  |  100  |  29<H>  ----------------------------<  126<H>  4.3   |  22  |  1.33<H>    Ca    8.9      14 Feb 2019 05:30  Phos  3.5     02-14  Mg     2.1     02-14      PT/INR - ( 14 Feb 2019 05:30 )   PT: 26.8 SEC;   INR: 2.29                    RADIOLOGY & ADDITIONAL TESTS:

## 2019-02-15 NOTE — PROGRESS NOTE ADULT - PROBLEM SELECTOR PLAN 4
- Acute on chronic HFpEF exacerbation likely 2/2 Sepsis and afib w/t RVR now c/b new systolic failure  - TTE from 5/2017 showed EF73%, however, now with new EF 42% and mod LV dysfunction. Severe TR, and moderate pulm HTN. Prelim read of CXR showing mild pulm edema. proBNP elevated to 2306  -c/w 40mg lasix IV BID, crackles not improving  -daily weights  -Strict Is/Os  -metoprolol tart 25mg BID  -Head of bed 45degrees - Acute on chronic HFpEF exacerbation likely 2/2 Sepsis and afib w/t RVR now c/b new systolic failure  - TTE from 5/2017 showed EF73%, however, now with new EF 42% and mod LV dysfunction. Severe TR, and moderate pulm HTN. Prelim read of CXR showing mild pulm edema. proBNP elevated to 2306  -transition to PO 40mg lasix BID  -daily weights  -Strict Is/Os  -metoprolol tart 25mg BID  -Head of bed 45degrees - Acute on chronic HFpEF exacerbation likely 2/2 Sepsis and afib w/t RVR now c/b new systolic failure  - TTE from 5/2017 showed EF73%, however, now with new EF 42% and mod LV dysfunction. Severe TR, and moderate pulm HTN. Prelim read of CXR showing mild pulm edema. proBNP elevated to 2306  -transition to PO 40mg lasix BID  -daily weights  -Strict Is/Os  -metoprolol tart 25mg BID  -Head of bed 45degrees  -Will need ACEI for HF, consider starting tomorrow now that kidney fxn improved.

## 2019-02-15 NOTE — PROGRESS NOTE ADULT - ASSESSMENT
78 year old woman with CAD, s/p CABG, s/p cath 2014 with patent LIMA to LAD and SVG to OM, + of RCA, valvular heart disease, s/p bioprosthetic MVR and AVR, afib on coumadin, HTN, HLD, DM, CVA, and multiple "lung infections" per family presents with worsening fatigue, exertional SOB, and general malaise with cough and cold symptoms. +fever and likely sepsis but also with elevated hsT and CKMB.    #Reduced EF  - Mostly exp Rhonchi on exam today.   - c/w metoprolol  - Agree with lasix switch to PO 40 BID.   - Home dose if 40 PO daily. If pt is to go home she should go home on BID lasix.     #NSTEMI w/ hx of CAD (Hx of CABG) likely 2/2 myocarditis   - Mild / moderate LVDF on Echo.   - s/p cardiac cath with patent grafts, no intervention was done.   - Maybe related to myositis, vs myocardial injury in the setting of sepsis.   - c/w ASA and atorvastatin  - d/c Plavix      #A-fib  - c/w metoprolol 50 BID  - c/w coumadin     Damien Francois MD  Cardiology Fellow - PGY-4  [facility]: [id number]  [facility]: [phone]  [id number]

## 2019-02-15 NOTE — PROGRESS NOTE ADULT - PROBLEM SELECTOR PLAN 3
- NSTEMI 2/2 type demand ischemia likely multifactorial due to sepsis and/or CHF exacerbation/ afib w/RVR s/p LHC without intervention, LIMA to LAD and SVG to OM1 with mild disease, but good flow.  - hst Trops trended down  - CKMB trending down  -ASA and plavix due to triple therapy based on woest trial but hold off plavix for now and just ASA and coumadin as per cards, INR 2-3  -metoprolol tart 25mg BID - NSTEMI 2/2 type demand ischemia likely multifactorial due to sepsis and/or CHF exacerbation/ afib w/RVR s/p LHC without intervention, LIMA to LAD and SVG to OM1 with mild disease, but good flow.  - hst Trops trended down  - CKMB trending down  -ASA and coumadin, goal INR 2-3  -metoprolol tart 25mg BID

## 2019-02-15 NOTE — PROGRESS NOTE ADULT - PROBLEM SELECTOR PLAN 2
RESOLVED  had 11.49 WBC, had met severe sepsis criteria on admission: fever to 103.2, tachycardia, tachypnea. CXR significant pulmonary edema, but no focal consolidation, RVP neg. Received azithromycin and ctx in ED. Chest CT showing possible multifocal PNA  -UA negative  -f/u 1 blood culture w/t coag neg staph- likely contaminant, urine legionella neg  - tylenol PRN fever  - vital signs Q4H  -transitioned from Zosyn renally dosed (2/9-2/13) to levaquin 750 q48h  - trend fever and wbc qd RESOLVED  had 11.49 WBC, had met severe sepsis criteria on admission: fever to 103.2, tachycardia, tachypnea. CXR significant pulmonary edema, but no focal consolidation, RVP neg. Received azithromycin and ctx in ED. Chest CT showing possible multifocal PNA  -UA negative  -f/u 1 blood culture w/t coag neg staph- likely contaminant, urine legionella neg  - tylenol PRN fever  - vital signs Q4H  -transitioned from Zosyn renally dosed (2/9-2/13) to levaquin 750 q48h to cover 2 more days (discontinue tomorrow)  - trend fever and wbc qd

## 2019-02-15 NOTE — PROGRESS NOTE ADULT - PROBLEM SELECTOR PLAN 8
-On triple therapy w/t coumadin, asa, plavix   -Disease stable w/o new stenting, dc ASA and c/w plavix and coumadin  -high intensity statin atorvastatin 80mg qhs -Coumadin and ASA  -high intensity statin atorvastatin 80mg qhs

## 2019-02-16 LAB
ANION GAP SERPL CALC-SCNC: 16 MMO/L — HIGH (ref 7–14)
APTT BLD: 34.8 SEC — SIGNIFICANT CHANGE UP (ref 27.5–36.3)
BUN SERPL-MCNC: 23 MG/DL — SIGNIFICANT CHANGE UP (ref 7–23)
CALCIUM SERPL-MCNC: 8.9 MG/DL — SIGNIFICANT CHANGE UP (ref 8.4–10.5)
CHLORIDE SERPL-SCNC: 100 MMOL/L — SIGNIFICANT CHANGE UP (ref 98–107)
CO2 SERPL-SCNC: 21 MMOL/L — LOW (ref 22–31)
CREAT SERPL-MCNC: 1.06 MG/DL — SIGNIFICANT CHANGE UP (ref 0.5–1.3)
GLUCOSE SERPL-MCNC: 124 MG/DL — HIGH (ref 70–99)
HCT VFR BLD CALC: 35.3 % — SIGNIFICANT CHANGE UP (ref 34.5–45)
HGB BLD-MCNC: 10.2 G/DL — LOW (ref 11.5–15.5)
INR BLD: 2.58 — HIGH (ref 0.88–1.17)
MAGNESIUM SERPL-MCNC: 2 MG/DL — SIGNIFICANT CHANGE UP (ref 1.6–2.6)
MCHC RBC-ENTMCNC: 22.2 PG — LOW (ref 27–34)
MCHC RBC-ENTMCNC: 28.9 % — LOW (ref 32–36)
MCV RBC AUTO: 76.7 FL — LOW (ref 80–100)
NRBC # FLD: 0 K/UL — LOW (ref 25–125)
PHOSPHATE SERPL-MCNC: 3.6 MG/DL — SIGNIFICANT CHANGE UP (ref 2.5–4.5)
PLATELET # BLD AUTO: 351 K/UL — SIGNIFICANT CHANGE UP (ref 150–400)
PMV BLD: 10.5 FL — SIGNIFICANT CHANGE UP (ref 7–13)
POTASSIUM SERPL-MCNC: 4 MMOL/L — SIGNIFICANT CHANGE UP (ref 3.5–5.3)
POTASSIUM SERPL-SCNC: 4 MMOL/L — SIGNIFICANT CHANGE UP (ref 3.5–5.3)
PROTHROM AB SERPL-ACNC: 30.3 SEC — HIGH (ref 9.8–13.1)
RBC # BLD: 4.6 M/UL — SIGNIFICANT CHANGE UP (ref 3.8–5.2)
RBC # FLD: 18.1 % — HIGH (ref 10.3–14.5)
SODIUM SERPL-SCNC: 137 MMOL/L — SIGNIFICANT CHANGE UP (ref 135–145)
WBC # BLD: 11.8 K/UL — HIGH (ref 3.8–10.5)
WBC # FLD AUTO: 11.8 K/UL — HIGH (ref 3.8–10.5)

## 2019-02-16 PROCEDURE — 99232 SBSQ HOSP IP/OBS MODERATE 35: CPT | Mod: GC

## 2019-02-16 RX ORDER — WARFARIN SODIUM 2.5 MG/1
2.5 TABLET ORAL ONCE
Qty: 0 | Refills: 0 | Status: COMPLETED | OUTPATIENT
Start: 2019-02-16 | End: 2019-02-16

## 2019-02-16 RX ADMIN — Medication 40 MILLIGRAM(S): at 17:28

## 2019-02-16 RX ADMIN — Medication 25 MILLIGRAM(S): at 05:21

## 2019-02-16 RX ADMIN — Medication 40 MILLIGRAM(S): at 05:21

## 2019-02-16 RX ADMIN — Medication 25 MILLIGRAM(S): at 17:28

## 2019-02-16 RX ADMIN — LEVALBUTEROL 0.63 MILLIGRAM(S): 1.25 SOLUTION, CONCENTRATE RESPIRATORY (INHALATION) at 19:00

## 2019-02-16 RX ADMIN — ATORVASTATIN CALCIUM 80 MILLIGRAM(S): 80 TABLET, FILM COATED ORAL at 21:21

## 2019-02-16 RX ADMIN — Medication 1: at 17:29

## 2019-02-16 RX ADMIN — WARFARIN SODIUM 2.5 MILLIGRAM(S): 2.5 TABLET ORAL at 17:28

## 2019-02-16 RX ADMIN — Medication 1 TABLET(S): at 12:40

## 2019-02-16 RX ADMIN — Medication 81 MILLIGRAM(S): at 12:40

## 2019-02-16 NOTE — PROGRESS NOTE ADULT - PROBLEM SELECTOR PLAN 2
RESOLVED  had 11.49 WBC, had met severe sepsis criteria on admission: fever to 103.2, tachycardia, tachypnea. CXR significant pulmonary edema, but no focal consolidation, RVP neg. Received azithromycin and ctx in ED. Chest CT showing possible multifocal PNA  -UA negative  -f/u 1 blood culture w/t coag neg staph- likely contaminant, urine legionella neg  - tylenol PRN fever  - vital signs Q4H  -transitioned from Zosyn renally dosed (2/9-2/13) to levaquin 750 q48h to cover 2 more days (discontinue tomorrow)  - trend fever and wbc qd

## 2019-02-16 NOTE — PROGRESS NOTE ADULT - SUBJECTIVE AND OBJECTIVE BOX
Dr. Larry Pike   Internal Medicine PGY-1  Pager #[phone]    Patient is a 78y old  Female who presents with a chief complaint of Sepsis (15 Feb 2019 10:48)      SUBJECTIVE / OVERNIGHT EVENTS:  Feeling well this morning. Breathing well controlled. Tolerating PO lasix. Ceferino to 30s x2 ON.    MEDICATIONS  (STANDING):  aspirin 81 milliGRAM(s) Oral daily  atorvastatin 80 milliGRAM(s) Oral at bedtime  dextrose 5%. 1000 milliLiter(s) (50 mL/Hr) IV Continuous <Continuous>  dextrose 50% Injectable 12.5 Gram(s) IV Push once  dextrose 50% Injectable 25 Gram(s) IV Push once  dextrose 50% Injectable 25 Gram(s) IV Push once  furosemide    Tablet 40 milliGRAM(s) Oral every 12 hours  influenza   Vaccine 0.5 milliLiter(s) IntraMuscular once  insulin lispro (HumaLOG) corrective regimen sliding scale   SubCutaneous three times a day before meals  lactobacillus acidophilus 1 Tablet(s) Oral daily  levoFLOXacin  Tablet 750 milliGRAM(s) Oral every 48 hours  metoprolol tartrate 25 milliGRAM(s) Oral two times a day    MEDICATIONS  (PRN):  acetaminophen   Tablet .. 650 milliGRAM(s) Oral every 6 hours PRN Temp greater or equal to 38C (100.4F)  dextrose 40% Gel 15 Gram(s) Oral once PRN Blood Glucose LESS THAN 70 milliGRAM(s)/deciliter  glucagon  Injectable 1 milliGRAM(s) IntraMuscular once PRN Glucose LESS THAN 70 milligrams/deciliter  levalbuterol Inhalation 0.63 milliGRAM(s) Inhalation every 6 hours PRN sob/wheezing      Vital Signs Last 24 Hrs  T(C): 36.9 (16 Feb 2019 05:15), Max: 37.1 (16 Feb 2019 01:15)  T(F): 98.4 (16 Feb 2019 05:15), Max: 98.7 (16 Feb 2019 01:15)  HR: 62 (16 Feb 2019 08:06) (62 - 85)  BP: 148/68 (16 Feb 2019 05:15) (138/73 - 154/78)  BP(mean): --  RR: 17 (16 Feb 2019 05:15) (16 - 18)  SpO2: 98% (16 Feb 2019 08:06) (96% - 100%)  CAPILLARY BLOOD GLUCOSE      POCT Blood Glucose.: 135 mg/dL (16 Feb 2019 07:39)  POCT Blood Glucose.: 122 mg/dL (15 Feb 2019 22:06)  POCT Blood Glucose.: 163 mg/dL (15 Feb 2019 17:18)  POCT Blood Glucose.: 144 mg/dL (15 Feb 2019 11:51)    I&O's Summary    15 Feb 2019 07:01  -  16 Feb 2019 07:00  --------------------------------------------------------  IN: 960 mL / OUT: 1350 mL / NET: -390 mL        PHYSICAL EXAM:  GENERAL: Still grunting with each breath, but appears comfortable.   HEAD:  Atraumatic, Normocephalic  EYES: EOMI, PERRLA, conjunctiva and sclera clear  NECK: Supple, No JVD  CHEST/LUNG: Diffuse inspiratory crackles and rhonchi throughout all lung fields, no use of accessory muscles  HEART: Irregularly irregular rate and rhythm; holosystolic murmur heard best between 2nd/3rd IC at midline.   ABDOMEN: Soft, Nontender, Nondistended; Bowel sounds present  EXTREMITIES:  2+ Peripheral Pulses, No clubbing, cyanosis. 1+ LE edema.     LABS:                        10.2   11.80 )-----------( 351      ( 16 Feb 2019 06:05 )             35.3     02-16    137  |  100  |  23  ----------------------------<  124<H>  4.0   |  21<L>  |  1.06    Ca    8.9      16 Feb 2019 06:05  Phos  3.6     02-16  Mg     2.0     02-16    TPro  6.8  /  Alb  3.4  /  TBili  0.5  /  DBili  x   /  AST  22  /  ALT  13  /  AlkPhos  51  02-15    PT/INR - ( 16 Feb 2019 06:05 )   PT: 30.3 SEC;   INR: 2.58          PTT - ( 16 Feb 2019 06:05 )  PTT:34.8 SEC          RADIOLOGY & ADDITIONAL TESTS:    Imaging Personally Reviewed:    Consultant(s) Notes Reviewed:      Care Discussed with Consultants/Other Providers:

## 2019-02-16 NOTE — PROGRESS NOTE ADULT - PROBLEM SELECTOR PLAN 3
- NSTEMI 2/2 type demand ischemia likely multifactorial due to sepsis and/or CHF exacerbation/ afib w/RVR s/p LHC without intervention, LIMA to LAD and SVG to OM1 with mild disease, but good flow.  - hst Trops trended down  - CKMB trending down  -ASA and coumadin, goal INR 2-3  -metoprolol tart 25mg BID

## 2019-02-16 NOTE — PROGRESS NOTE ADULT - PROBLEM SELECTOR PLAN 10
BP at goal off of hydralazine and enalapril, will consider adding back ACEI tomorrow if BP can tolerate

## 2019-02-16 NOTE — PROGRESS NOTE ADULT - PROBLEM SELECTOR PLAN 4
- Acute on chronic HFpEF exacerbation likely 2/2 Sepsis and afib w/t RVR now c/b new systolic failure  - TTE from 5/2017 showed EF73%, however, now with new EF 42% and mod LV dysfunction. Severe TR, and moderate pulm HTN. Prelim read of CXR showing mild pulm edema. proBNP elevated to 2306  -transition to PO 40mg lasix BID  -daily weights  -Strict Is/Os  -metoprolol tart 25mg BID  -Head of bed 45degrees  -Will need ACEI for HF, consider starting tomorrow now that kidney fxn improved.

## 2019-02-16 NOTE — PROGRESS NOTE ADULT - PROBLEM SELECTOR PLAN 1
Resolved 137,  -Serum osm 288, Uosm > 600, suggestive of SIADH. Lasix should dilute the urine  -c/w Fluid restriction < 1L  -PO lasix 40 BID given improvement in sCr, Na, and volume status

## 2019-02-16 NOTE — PROGRESS NOTE ADULT - PROBLEM SELECTOR PLAN 5
Afib w/t RVR likely 2/2 acute sepsis  Telemetry showed rate up to 131  [id number] score 7  - decrease to 12.5 mg metoprolol BID (HR 50s-60s)  -coumadin 2.5mg tonight, goal INR 2-3  - telemetry

## 2019-02-17 VITALS
HEART RATE: 72 BPM | RESPIRATION RATE: 18 BRPM | TEMPERATURE: 98 F | SYSTOLIC BLOOD PRESSURE: 140 MMHG | OXYGEN SATURATION: 98 % | DIASTOLIC BLOOD PRESSURE: 60 MMHG

## 2019-02-17 LAB
ANION GAP SERPL CALC-SCNC: 14 MMO/L — SIGNIFICANT CHANGE UP (ref 7–14)
APTT BLD: 37 SEC — HIGH (ref 27.5–36.3)
BUN SERPL-MCNC: 29 MG/DL — HIGH (ref 7–23)
CALCIUM SERPL-MCNC: 8.6 MG/DL — SIGNIFICANT CHANGE UP (ref 8.4–10.5)
CHLORIDE SERPL-SCNC: 103 MMOL/L — SIGNIFICANT CHANGE UP (ref 98–107)
CO2 SERPL-SCNC: 21 MMOL/L — LOW (ref 22–31)
CREAT SERPL-MCNC: 1.17 MG/DL — SIGNIFICANT CHANGE UP (ref 0.5–1.3)
GLUCOSE SERPL-MCNC: 118 MG/DL — HIGH (ref 70–99)
HCT VFR BLD CALC: 33.1 % — LOW (ref 34.5–45)
HGB BLD-MCNC: 9.7 G/DL — LOW (ref 11.5–15.5)
INR BLD: 2.78 — HIGH (ref 0.88–1.17)
MAGNESIUM SERPL-MCNC: 1.8 MG/DL — SIGNIFICANT CHANGE UP (ref 1.6–2.6)
MCHC RBC-ENTMCNC: 22.2 PG — LOW (ref 27–34)
MCHC RBC-ENTMCNC: 29.3 % — LOW (ref 32–36)
MCV RBC AUTO: 75.9 FL — LOW (ref 80–100)
NRBC # FLD: 0 K/UL — LOW (ref 25–125)
PHOSPHATE SERPL-MCNC: 3.6 MG/DL — SIGNIFICANT CHANGE UP (ref 2.5–4.5)
PLATELET # BLD AUTO: 375 K/UL — SIGNIFICANT CHANGE UP (ref 150–400)
PMV BLD: 10.5 FL — SIGNIFICANT CHANGE UP (ref 7–13)
POTASSIUM SERPL-MCNC: 4.3 MMOL/L — SIGNIFICANT CHANGE UP (ref 3.5–5.3)
POTASSIUM SERPL-SCNC: 4.3 MMOL/L — SIGNIFICANT CHANGE UP (ref 3.5–5.3)
PROTHROM AB SERPL-ACNC: 31.8 SEC — HIGH (ref 9.8–13.1)
RBC # BLD: 4.36 M/UL — SIGNIFICANT CHANGE UP (ref 3.8–5.2)
RBC # FLD: 17.7 % — HIGH (ref 10.3–14.5)
SODIUM SERPL-SCNC: 138 MMOL/L — SIGNIFICANT CHANGE UP (ref 135–145)
WBC # BLD: 12.13 K/UL — HIGH (ref 3.8–10.5)
WBC # FLD AUTO: 12.13 K/UL — HIGH (ref 3.8–10.5)

## 2019-02-17 PROCEDURE — 99239 HOSP IP/OBS DSCHRG MGMT >30: CPT

## 2019-02-17 RX ORDER — ASPIRIN/CALCIUM CARB/MAGNESIUM 324 MG
1 TABLET ORAL
Qty: 30 | Refills: 0
Start: 2019-02-17 | End: 2019-03-18

## 2019-02-17 RX ORDER — METOPROLOL TARTRATE 50 MG
1 TABLET ORAL
Qty: 0 | Refills: 0 | COMMUNITY

## 2019-02-17 RX ORDER — METOPROLOL TARTRATE 50 MG
1 TABLET ORAL
Qty: 60 | Refills: 0 | OUTPATIENT
Start: 2019-02-17 | End: 2019-03-18

## 2019-02-17 RX ORDER — FUROSEMIDE 40 MG
1 TABLET ORAL
Qty: 60 | Refills: 0 | OUTPATIENT
Start: 2019-02-17 | End: 2019-03-18

## 2019-02-17 RX ORDER — ATORVASTATIN CALCIUM 80 MG/1
1 TABLET, FILM COATED ORAL
Qty: 30 | Refills: 0 | OUTPATIENT
Start: 2019-02-17 | End: 2019-03-18

## 2019-02-17 RX ORDER — WARFARIN SODIUM 2.5 MG/1
1 TABLET ORAL
Qty: 30 | Refills: 0 | OUTPATIENT
Start: 2019-02-17 | End: 2019-03-18

## 2019-02-17 RX ORDER — WARFARIN SODIUM 2.5 MG/1
1 TABLET ORAL
Qty: 30 | Refills: 0
Start: 2019-02-17 | End: 2019-03-18

## 2019-02-17 RX ORDER — ASPIRIN/CALCIUM CARB/MAGNESIUM 324 MG
1 TABLET ORAL
Qty: 30 | Refills: 0 | OUTPATIENT
Start: 2019-02-17 | End: 2019-03-18

## 2019-02-17 RX ORDER — ATORVASTATIN CALCIUM 80 MG/1
1 TABLET, FILM COATED ORAL
Qty: 30 | Refills: 0
Start: 2019-02-17 | End: 2019-03-18

## 2019-02-17 RX ORDER — FUROSEMIDE 40 MG
1 TABLET ORAL
Qty: 60 | Refills: 0
Start: 2019-02-17 | End: 2019-03-18

## 2019-02-17 RX ORDER — WARFARIN SODIUM 2.5 MG/1
1 TABLET ORAL
Qty: 0 | Refills: 0 | COMMUNITY

## 2019-02-17 RX ORDER — METOPROLOL TARTRATE 50 MG
1 TABLET ORAL
Qty: 60 | Refills: 0
Start: 2019-02-17 | End: 2019-03-18

## 2019-02-17 RX ADMIN — LEVALBUTEROL 0.63 MILLIGRAM(S): 1.25 SOLUTION, CONCENTRATE RESPIRATORY (INHALATION) at 03:37

## 2019-02-17 RX ADMIN — Medication 81 MILLIGRAM(S): at 13:30

## 2019-02-17 RX ADMIN — Medication 1 TABLET(S): at 13:30

## 2019-02-17 RX ADMIN — Medication 40 MILLIGRAM(S): at 06:19

## 2019-02-17 RX ADMIN — Medication 25 MILLIGRAM(S): at 06:20

## 2019-02-17 NOTE — PROGRESS NOTE ADULT - REASON FOR ADMISSION
Sepsis

## 2019-02-17 NOTE — PROGRESS NOTE ADULT - SUBJECTIVE AND OBJECTIVE BOX
Dr. Larry Pike   Internal Medicine PGY-1  Pager #[phone]    Patient is a 78y old  Female who presents with a chief complaint of Sepsis (16 Feb 2019 09:30)      SUBJECTIVE / OVERNIGHT EVENTS:  HAYDER ON.     MEDICATIONS  (STANDING):  aspirin 81 milliGRAM(s) Oral daily  atorvastatin 80 milliGRAM(s) Oral at bedtime  dextrose 5%. 1000 milliLiter(s) (50 mL/Hr) IV Continuous <Continuous>  dextrose 50% Injectable 12.5 Gram(s) IV Push once  dextrose 50% Injectable 25 Gram(s) IV Push once  dextrose 50% Injectable 25 Gram(s) IV Push once  furosemide    Tablet 40 milliGRAM(s) Oral every 12 hours  influenza   Vaccine 0.5 milliLiter(s) IntraMuscular once  insulin lispro (HumaLOG) corrective regimen sliding scale   SubCutaneous three times a day before meals  lactobacillus acidophilus 1 Tablet(s) Oral daily  levoFLOXacin  Tablet 750 milliGRAM(s) Oral every 48 hours  metoprolol tartrate 25 milliGRAM(s) Oral two times a day    MEDICATIONS  (PRN):  acetaminophen   Tablet .. 650 milliGRAM(s) Oral every 6 hours PRN Temp greater or equal to 38C (100.4F)  dextrose 40% Gel 15 Gram(s) Oral once PRN Blood Glucose LESS THAN 70 milliGRAM(s)/deciliter  glucagon  Injectable 1 milliGRAM(s) IntraMuscular once PRN Glucose LESS THAN 70 milligrams/deciliter  levalbuterol Inhalation 0.63 milliGRAM(s) Inhalation every 6 hours PRN sob/wheezing      Vital Signs Last 24 Hrs  T(C): 36.7 (17 Feb 2019 06:17), Max: 36.8 (16 Feb 2019 09:15)  T(F): 98.1 (17 Feb 2019 06:17), Max: 98.2 (16 Feb 2019 09:15)  HR: 75 (17 Feb 2019 06:17) (61 - 88)  BP: 127/55 (17 Feb 2019 06:17) (127/55 - 185/69)  BP(mean): --  RR: 17 (17 Feb 2019 06:17) (17 - 18)  SpO2: 98% (17 Feb 2019 06:17) (97% - 100%)  CAPILLARY BLOOD GLUCOSE      POCT Blood Glucose.: 157 mg/dL (16 Feb 2019 20:55)  POCT Blood Glucose.: 177 mg/dL (16 Feb 2019 17:10)  POCT Blood Glucose.: 117 mg/dL (16 Feb 2019 12:40)  POCT Blood Glucose.: 161 mg/dL (16 Feb 2019 11:34)  POCT Blood Glucose.: 135 mg/dL (16 Feb 2019 07:39)    I&O's Summary    15 Feb 2019 07:01  -  16 Feb 2019 07:00  --------------------------------------------------------  IN: 960 mL / OUT: 1350 mL / NET: -390 mL    16 Feb 2019 07:01  -  17 Feb 2019 06:54  --------------------------------------------------------  IN: 990 mL / OUT: 800 mL / NET: 190 mL        PHYSICAL EXAM:  GENERAL: Still grunting with each breath, but appears comfortable.   HEAD:  Atraumatic, Normocephalic  EYES: EOMI, PERRLA, conjunctiva and sclera clear  NECK: Supple, No JVD  CHEST/LUNG: Diffuse inspiratory crackles and rhonchi throughout all lung fields, no use of accessory muscles  HEART: Irregularly irregular rate and rhythm; holosystolic murmur heard best between 2nd/3rd IC at midline.   ABDOMEN: Soft, Nontender, Nondistended; Bowel sounds present  EXTREMITIES:  2+ Peripheral Pulses, No clubbing, cyanosis. 1+ LE edema.    LABS:                        10.2   11.80 )-----------( 351      ( 16 Feb 2019 06:05 )             35.3     02-16    137  |  100  |  23  ----------------------------<  124<H>  4.0   |  21<L>  |  1.06    Ca    8.9      16 Feb 2019 06:05  Phos  3.6     02-16  Mg     2.0     02-16      PT/INR - ( 16 Feb 2019 06:05 )   PT: 30.3 SEC;   INR: 2.58          PTT - ( 16 Feb 2019 06:05 )  PTT:34.8 SEC          RADIOLOGY & ADDITIONAL TESTS:    Imaging Personally Reviewed:    Consultant(s) Notes Reviewed:      Care Discussed with Consultants/Other Providers: Dr. Larry Pike   Internal Medicine PGY-1  Pager #[phone]    Patient is a 78y old  Female who presents with a chief complaint of Sepsis (16 Feb 2019 09:30)      SUBJECTIVE / OVERNIGHT EVENTS:  HAYDER ON. 1 episode of asymptomatic felecia. Feels like breathing is normal. No CP or abdominal pain.     MEDICATIONS  (STANDING):  aspirin 81 milliGRAM(s) Oral daily  atorvastatin 80 milliGRAM(s) Oral at bedtime  dextrose 5%. 1000 milliLiter(s) (50 mL/Hr) IV Continuous <Continuous>  dextrose 50% Injectable 12.5 Gram(s) IV Push once  dextrose 50% Injectable 25 Gram(s) IV Push once  dextrose 50% Injectable 25 Gram(s) IV Push once  furosemide    Tablet 40 milliGRAM(s) Oral every 12 hours  influenza   Vaccine 0.5 milliLiter(s) IntraMuscular once  insulin lispro (HumaLOG) corrective regimen sliding scale   SubCutaneous three times a day before meals  lactobacillus acidophilus 1 Tablet(s) Oral daily  levoFLOXacin  Tablet 750 milliGRAM(s) Oral every 48 hours  metoprolol tartrate 25 milliGRAM(s) Oral two times a day    MEDICATIONS  (PRN):  acetaminophen   Tablet .. 650 milliGRAM(s) Oral every 6 hours PRN Temp greater or equal to 38C (100.4F)  dextrose 40% Gel 15 Gram(s) Oral once PRN Blood Glucose LESS THAN 70 milliGRAM(s)/deciliter  glucagon  Injectable 1 milliGRAM(s) IntraMuscular once PRN Glucose LESS THAN 70 milligrams/deciliter  levalbuterol Inhalation 0.63 milliGRAM(s) Inhalation every 6 hours PRN sob/wheezing      Vital Signs Last 24 Hrs  T(C): 36.7 (17 Feb 2019 06:17), Max: 36.8 (16 Feb 2019 09:15)  T(F): 98.1 (17 Feb 2019 06:17), Max: 98.2 (16 Feb 2019 09:15)  HR: 75 (17 Feb 2019 06:17) (61 - 88)  BP: 127/55 (17 Feb 2019 06:17) (127/55 - 185/69)  BP(mean): --  RR: 17 (17 Feb 2019 06:17) (17 - 18)  SpO2: 98% (17 Feb 2019 06:17) (97% - 100%)  CAPILLARY BLOOD GLUCOSE      POCT Blood Glucose.: 157 mg/dL (16 Feb 2019 20:55)  POCT Blood Glucose.: 177 mg/dL (16 Feb 2019 17:10)  POCT Blood Glucose.: 117 mg/dL (16 Feb 2019 12:40)  POCT Blood Glucose.: 161 mg/dL (16 Feb 2019 11:34)  POCT Blood Glucose.: 135 mg/dL (16 Feb 2019 07:39)    I&O's Summary    15 Feb 2019 07:01  -  16 Feb 2019 07:00  --------------------------------------------------------  IN: 960 mL / OUT: 1350 mL / NET: -390 mL    16 Feb 2019 07:01  -  17 Feb 2019 06:54  --------------------------------------------------------  IN: 990 mL / OUT: 800 mL / NET: 190 mL        PHYSICAL EXAM:  GENERAL: Still grunting with each breath, but appears comfortable.   HEAD:  Atraumatic, Normocephalic  EYES: EOMI, PERRLA, conjunctiva and sclera clear  NECK: Supple, No JVD  CHEST/LUNG: Diffuse inspiratory crackles and rhonchi throughout all lung fields, no use of accessory muscles  HEART: Irregularly irregular rate and rhythm; holosystolic murmur heard best between 2nd/3rd IC at midline.   ABDOMEN: Soft, Nontender, Nondistended; Bowel sounds present  EXTREMITIES:  2+ Peripheral Pulses, No clubbing, cyanosis. 1+ LE edema.    LABS:                        10.2   11.80 )-----------( 351      ( 16 Feb 2019 06:05 )             35.3     02-16    137  |  100  |  23  ----------------------------<  124<H>  4.0   |  21<L>  |  1.06    Ca    8.9      16 Feb 2019 06:05  Phos  3.6     02-16  Mg     2.0     02-16      PT/INR - ( 16 Feb 2019 06:05 )   PT: 30.3 SEC;   INR: 2.58          PTT - ( 16 Feb 2019 06:05 )  PTT:34.8 SEC          RADIOLOGY & ADDITIONAL TESTS:    Imaging Personally Reviewed:    Consultant(s) Notes Reviewed:      Care Discussed with Consultants/Other Providers:

## 2019-02-17 NOTE — PROGRESS NOTE ADULT - PROVIDER SPECIALTY LIST ADULT
Cardiology
Internal Medicine
Cardiology
Cardiology
Internal Medicine

## 2019-02-17 NOTE — PROGRESS NOTE ADULT - PROBLEM SELECTOR PROBLEM 1
Hyponatremia
Sepsis due to undetermined organism
Hyponatremia

## 2019-02-17 NOTE — PROGRESS NOTE ADULT - PROBLEM SELECTOR PROBLEM 3
NSTEMI (non-ST elevated myocardial infarction)
NSTEMI (non-ST elevated myocardial infarction)
Acute systolic heart failure
Heart failure with preserved ejection fraction
NSTEMI (non-ST elevated myocardial infarction)
NSTEMI (non-ST elevated myocardial infarction)

## 2019-02-17 NOTE — PROGRESS NOTE ADULT - ATTENDING COMMENTS
Non obstructive CAD. Medical optimization.
Pt was seen and examined. Pt reports feeling well, no sob/cp. NAD.   TTE with moderate LVDF. Continuing oral diuresis. Completing course of antibiotics for pneumonia.   s/p LHC on 2/13 with chronic obstructive CAD. Cardiology recommends continued medical management.  dose coumadin based on INR
Pt was seen and examined. Pt was interviewed via [employer]  Job # [id number]. Pt reports feeling well, denied cp/dizziness/palpitation. Tele reviewed.    TTE with moderate LVDF. Continuing oral diuresis. Completed course of antibiotics for pneumonia.   s/p [facility] on 2/13 with chronic obstructive CAD. Cardiology recommends continued medical management.  dose coumadin based on INR  Pt is medically stable for discharge, needs close follow up with cardiologist and pulmonologist as well   Time 50 min
78 y.o. Female w/ hx HTN, DM2. high chol, HFpEF (EF73%), CAD s/p CABG (LIMA to LAD & SVG to OM,  RCA), bioprosthetic MVR & AVR, afib on coumadin, and recurrent PNA p/w fever, SOB, N/V and diaphoresis 2/2 sepsis likely 2/2 acute multifocal PNA and CHF exacerbation and afib w/t RVR further c/b NSTEMI (likely type II demand ischemia in setting of sepsis and afib w/t RVR). Continued rise in trops concerning for type I MI will monitor.  Cardiology following and assessing a type 2 demand ischemia. decrease Lasix from 40mg bid to qdaily since CHF improved and creatitine increased. TTE showed EF 42%. changed Levaquin to Zosyn for Sepsis due to multifocal PNA.
78 W h/o CAD s/p CABG, chronic HF with preserved EF, atrial fibrillation on warfarin, a/w sepsis thought to be 2/2 URI vs multifocal pneumonia. Course has been complicated by acute on chronic decompensation of HF, atrial fibrillation with RVR, and type II NSTEMI in context of demand ischemia.    TTE with moderate LVDF and hsTPN continuing to rise. Patient without chest pain or dyspnea. Continuing diuresis, rate control, and antibiotics for pneumonia.     s/p C today with chronic obstructive CAD. Cardiology recommends continued medical management.
78 W h/o CAD s/p CABG, chronic HF with preserved EF, atrial fibrillation on warfarin, a/w sepsis thought to be 2/2 URI vs multifocal pneumonia. Course has been complicated by acute on chronic decompensation of HF, atrial fibrillation with RVR, and type II NSTEMI in context of demand ischemia.    TTE with moderate LVDF. Continuing diuresis, rate control, and antibiotics for pneumonia.     s/p C on 2/13 with chronic obstructive CAD. Cardiology recommends continued medical management.
78 y.o. Female w/ hx HTN, DM2. high chol, HFpEF (EF73%), CAD s/p CABG (LIMA to LAD & SVG to OM,  RCA), bioprosthetic MVR & AVR, afib on coumadin, and recurrent PNA p/w fever, SOB, N/V and diaphoresis 2/2 sepsis likely 2/2 acute multifocal PNA and CHF exacerbation and afib w/t RVR further c/b NSTEMI (likely type II demand ischemia in setting of sepsis and afib w/t RVR). Continued rise in trops concerning for type I MI will monitor.  Cardiology following and assessing a type 2 demand ischemia. continue to diurese with Lasix 40mg bid fof CHF. change Levaquin to Zosyn for Sepsis due to multifocal PNA.
78 W h/o CAD s/p CABG, chronic HF with preserved EF, atrial fibrillation on warfarin, a/w sepsis thought to be 2/2 URI vs multifocal pneumonia. Course has been complicated by acute on chronic decompensation of HF, atrial fibrillation with RVR, and type II NSTEMI in context of demand ischemia.    TTE with moderate LVDF. Continuing diuresis, rate control. Completing course of antibiotics for pneumonia.     s/p [facility] on 2/13 with chronic obstructive CAD. Cardiology recommends continued medical management.    Approaching euvolemia; labs improving. Will switch IV furosemide to PO and monitor volume status.    INR today is supratherapeutic (no e/o bleeding), will decrease warfarin dose.
78 W h/o CAD s/p CABG, chronic HF with preserved EF, atrial fibrillation on warfarin, a/w sepsis thought to be 2/2 URI vs multifocal pneumonia. Course has been complicated by acute on chronic decompensation of HF, atrial fibrillation with RVR, and type II NSTEMI in context of demand ischemia.    TTE with moderate LVDF and hsTPN continuing to rise. Patient without chest pain or dyspnea. Continuing diuresis, rate control, and antibiotics. Cardiology to perform LHC tomorrow as part of ischemia evaluation.
78 W h/o CAD s/p CABG, chronic HF with preserved EF, atrial fibrillation on warfarin, a/w sepsis thought to be 2/2 URI vs multifocal pneumonia. Course has been complicated by acute on chronic decompensation of HF, atrial fibrillation with RVR, and type II NSTEMI in context of demand ischemia.    TTE with moderate LVDF and hsTPN now declining. Patient without chest pain or dyspnea. Continuing diuresis, rate control, and antibiotics for pneumonia.     s/p C on 2/13 with chronic obstructive CAD. Cardiology recommends continued medical management.    Hyponatremia persists in patient that is hypervolemic. sCr is elevated but patient with unclear baseline and has been in this range before. She continues to exhibit pulmonary crackles. Will continue with diuresis today and reassess chemistry and volume status tomorrow morning.
Patient seen and examined.  Case discussed with house staff.  Agree with above as edited.   Patient is a 78yoF with a h/o CAD s/p CABG with bioprosth AVR/MVR, HFpEF, Afib on coumadin, HTN, HLD, DM type II admitted with sepsis, likely secondary to viral URI vs Community acquired PNA. Course c/b acute on chronic CHFpEF, Afib with RVR and type II NSTEMI 2/2 demand ischemia 2/2 sepsis.  Sepsis - BCx pending. check CT chest - per patient/family - has h/o recurrent PNA. Empiric abx pending Ct.  NSTEMI - d/w cards - will f/u formal eval. check TTE. serial enzymes  Acute on chronic CHFpEF - Strick I/Os, weights. Check TTE. Diurese with lasix  Afib - c/w rate control with metoprolol and AC with coumadin  Coagulopathy - Coumadin held yesterday. Now therapeutic. Dose 2mg today and monitor INR

## 2019-02-17 NOTE — PROGRESS NOTE ADULT - PROBLEM SELECTOR PLAN 2
RESOLVED  had 11.49 WBC, had met severe sepsis criteria on admission: fever to 103.2, tachycardia, tachypnea. CXR significant pulmonary edema, but no focal consolidation, RVP neg. Received azithromycin and ctx in ED. Chest CT showing possible multifocal PNA  -UA negative  -f/u 1 blood culture w/t coag neg staph- likely contaminant, urine legionella neg  - tylenol PRN fever  - vital signs Q4H  -transitioned from Zosyn renally dosed (2/9-2/13) to levaquin 750 q48h to cover 2 more days (discontinue abx)  - trend fever and wbc qd

## 2019-02-17 NOTE — PROGRESS NOTE ADULT - PROBLEM SELECTOR PLAN 5
Afib w/t RVR likely 2/2 acute sepsis  Telemetry showed rate up to 131  [id number] score 7  - c/w 25 mg metoprolol BID (HR 50s-60s)  -coumadin 2.5mg tonight, goal INR 2-3  - telemetry

## 2019-10-01 ENCOUNTER — OUTPATIENT (OUTPATIENT)
Dept: OUTPATIENT SERVICES | Facility: HOSPITAL | Age: 79
LOS: 1 days | End: 2019-10-01

## 2019-10-01 DIAGNOSIS — Z95.4 PRESENCE OF OTHER HEART-VALVE REPLACEMENT: Chronic | ICD-10-CM

## 2019-10-08 ENCOUNTER — INPATIENT (INPATIENT)
Facility: HOSPITAL | Age: 79
LOS: 8 days | Discharge: HOME HEALTH | End: 2019-10-17
Attending: INTERNAL MEDICINE | Admitting: INTERNAL MEDICINE
Payer: MEDICARE

## 2019-10-08 VITALS
DIASTOLIC BLOOD PRESSURE: 79 MMHG | TEMPERATURE: 98 F | RESPIRATION RATE: 16 BRPM | OXYGEN SATURATION: 97 % | SYSTOLIC BLOOD PRESSURE: 206 MMHG | HEART RATE: 91 BPM

## 2019-10-08 DIAGNOSIS — Z95.4 PRESENCE OF OTHER HEART-VALVE REPLACEMENT: Chronic | ICD-10-CM

## 2019-10-08 LAB
ALBUMIN SERPL ELPH-MCNC: 3.3 G/DL — SIGNIFICANT CHANGE UP (ref 3.3–5)
ALP SERPL-CCNC: 90 U/L — SIGNIFICANT CHANGE UP (ref 40–120)
ALT FLD-CCNC: 24 U/L — SIGNIFICANT CHANGE UP (ref 4–33)
ANION GAP SERPL CALC-SCNC: 14 MMO/L — SIGNIFICANT CHANGE UP (ref 7–14)
APTT BLD: 32.9 SEC — SIGNIFICANT CHANGE UP (ref 27.5–36.3)
AST SERPL-CCNC: 27 U/L — SIGNIFICANT CHANGE UP (ref 4–32)
BASE EXCESS BLDV CALC-SCNC: 5.3 MMOL/L — SIGNIFICANT CHANGE UP
BASOPHILS # BLD AUTO: 0.08 K/UL — SIGNIFICANT CHANGE UP (ref 0–0.2)
BASOPHILS NFR BLD AUTO: 0.7 % — SIGNIFICANT CHANGE UP (ref 0–2)
BILIRUB SERPL-MCNC: 0.4 MG/DL — SIGNIFICANT CHANGE UP (ref 0.2–1.2)
BLOOD GAS VENOUS - CREATININE: 0.79 MG/DL — SIGNIFICANT CHANGE UP (ref 0.5–1.3)
BLOOD GAS VENOUS - FIO2: 21 — SIGNIFICANT CHANGE UP
BUN SERPL-MCNC: 26 MG/DL — HIGH (ref 7–23)
CALCIUM SERPL-MCNC: 8.9 MG/DL — SIGNIFICANT CHANGE UP (ref 8.4–10.5)
CHLORIDE BLDV-SCNC: 102 MMOL/L — SIGNIFICANT CHANGE UP (ref 96–108)
CHLORIDE SERPL-SCNC: 100 MMOL/L — SIGNIFICANT CHANGE UP (ref 98–107)
CO2 SERPL-SCNC: 25 MMOL/L — SIGNIFICANT CHANGE UP (ref 22–31)
CREAT SERPL-MCNC: 0.88 MG/DL — SIGNIFICANT CHANGE UP (ref 0.5–1.3)
EOSINOPHIL # BLD AUTO: 0.2 K/UL — SIGNIFICANT CHANGE UP (ref 0–0.5)
EOSINOPHIL NFR BLD AUTO: 1.7 % — SIGNIFICANT CHANGE UP (ref 0–6)
GAS PNL BLDV: 138 MMOL/L — SIGNIFICANT CHANGE UP (ref 136–146)
GLUCOSE BLDV-MCNC: 200 MG/DL — HIGH (ref 70–99)
GLUCOSE SERPL-MCNC: 200 MG/DL — HIGH (ref 70–99)
HCO3 BLDV-SCNC: 28 MMOL/L — HIGH (ref 20–27)
HCT VFR BLD CALC: 36 % — SIGNIFICANT CHANGE UP (ref 34.5–45)
HCT VFR BLDV CALC: 35.8 % — SIGNIFICANT CHANGE UP (ref 34.5–45)
HGB BLD-MCNC: 10.9 G/DL — LOW (ref 11.5–15.5)
HGB BLDV-MCNC: 11.6 G/DL — SIGNIFICANT CHANGE UP (ref 11.5–15.5)
IMM GRANULOCYTES NFR BLD AUTO: 0.4 % — SIGNIFICANT CHANGE UP (ref 0–1.5)
INR BLD: 1.65 — HIGH (ref 0.88–1.17)
LACTATE BLDV-MCNC: 1.8 MMOL/L — SIGNIFICANT CHANGE UP (ref 0.5–2)
LYMPHOCYTES # BLD AUTO: 1.23 K/UL — SIGNIFICANT CHANGE UP (ref 1–3.3)
LYMPHOCYTES # BLD AUTO: 10.5 % — LOW (ref 13–44)
MCHC RBC-ENTMCNC: 25.3 PG — LOW (ref 27–34)
MCHC RBC-ENTMCNC: 30.3 % — LOW (ref 32–36)
MCV RBC AUTO: 83.7 FL — SIGNIFICANT CHANGE UP (ref 80–100)
MONOCYTES # BLD AUTO: 0.95 K/UL — HIGH (ref 0–0.9)
MONOCYTES NFR BLD AUTO: 8.1 % — SIGNIFICANT CHANGE UP (ref 2–14)
NEUTROPHILS # BLD AUTO: 9.26 K/UL — HIGH (ref 1.8–7.4)
NEUTROPHILS NFR BLD AUTO: 78.6 % — HIGH (ref 43–77)
NRBC # FLD: 0 K/UL — SIGNIFICANT CHANGE UP (ref 0–0)
NT-PROBNP SERPL-SCNC: 2460 PG/ML — SIGNIFICANT CHANGE UP
PCO2 BLDV: 51 MMHG — SIGNIFICANT CHANGE UP (ref 41–51)
PH BLDV: 7.39 PH — SIGNIFICANT CHANGE UP (ref 7.32–7.43)
PLATELET # BLD AUTO: 262 K/UL — SIGNIFICANT CHANGE UP (ref 150–400)
PMV BLD: 11 FL — SIGNIFICANT CHANGE UP (ref 7–13)
PO2 BLDV: 35 MMHG — SIGNIFICANT CHANGE UP (ref 35–40)
POTASSIUM BLDV-SCNC: 3.4 MMOL/L — SIGNIFICANT CHANGE UP (ref 3.4–4.5)
POTASSIUM SERPL-MCNC: 3.7 MMOL/L — SIGNIFICANT CHANGE UP (ref 3.5–5.3)
POTASSIUM SERPL-SCNC: 3.7 MMOL/L — SIGNIFICANT CHANGE UP (ref 3.5–5.3)
PROT SERPL-MCNC: 7 G/DL — SIGNIFICANT CHANGE UP (ref 6–8.3)
PROTHROM AB SERPL-ACNC: 19.1 SEC — HIGH (ref 9.8–13.1)
RBC # BLD: 4.3 M/UL — SIGNIFICANT CHANGE UP (ref 3.8–5.2)
RBC # FLD: 15.6 % — HIGH (ref 10.3–14.5)
SAO2 % BLDV: 56.5 % — LOW (ref 60–85)
SODIUM SERPL-SCNC: 139 MMOL/L — SIGNIFICANT CHANGE UP (ref 135–145)
TROPONIN T, HIGH SENSITIVITY: 35 NG/L — SIGNIFICANT CHANGE UP (ref ?–14)
TROPONIN T, HIGH SENSITIVITY: 40 NG/L — SIGNIFICANT CHANGE UP (ref ?–14)
WBC # BLD: 11.77 K/UL — HIGH (ref 3.8–10.5)
WBC # FLD AUTO: 11.77 K/UL — HIGH (ref 3.8–10.5)

## 2019-10-08 PROCEDURE — 71046 X-RAY EXAM CHEST 2 VIEWS: CPT | Mod: 26

## 2019-10-08 RX ORDER — FUROSEMIDE 40 MG
40 TABLET ORAL ONCE
Refills: 0 | Status: COMPLETED | OUTPATIENT
Start: 2019-10-08 | End: 2019-10-08

## 2019-10-08 RX ADMIN — Medication 40 MILLIGRAM(S): at 21:23

## 2019-10-08 NOTE — ED ADULT NURSE NOTE - OBJECTIVE STATEMENT
Receive pt in spot 19 alert and oriented x 3 , family at bedside, c/o sob . Denies chest pain, dizziness, cough.  Resp unlabored.  Labs sent. Skin intact.  IV placed. Labs sent.  Pt NSR on cardiac monitoring

## 2019-10-08 NOTE — ED PROVIDER NOTE - CLINICAL SUMMARY MEDICAL DECISION MAKING FREE TEXT BOX
79yF w/pmhx HTN, HLD, CABG x2 p/w episode of shortness of breath that lasted 10 minutes today. Concern for CHF exacerbation vs ACS. Will check cbc/cmp/trop/bnp, CXR. Will give lasix and reassess.

## 2019-10-08 NOTE — ED PROVIDER NOTE - PROGRESS NOTE DETAILS
EB Corbin: Pt reports shortness of breath with ambulating to bathroom, will admit for CHF exacerbation. pt visiting from [facility], reports salty diet the past few days

## 2019-10-08 NOTE — ED PROVIDER NOTE - ATTENDING CONTRIBUTION TO CARE
I, Jennifer Cabot, MD, have performed a history and physical exam of the patient and discussed their management with the ACP.  I reviewed the PA's note and agree with the documented findings and plan of care. My medical decision making and observations are found above.    Cabot: 79F with PMH of HTN, HLD, CABG x2, here with an episode of SOB while walking back from the bathroom 2h prior to presentation.  Now in her USOH.  Denies F/C/cough, N/V/diaph, PE risk factors.  On exam, HDS, NAD, MMM, eyes clear, lungs with decreased BS at bases, heart sounds normal, abd soft, NT, ND, no CVAT, LEs without edema, wwp, skin normal temperature and color, neuro: alert and oriented, no focal deficits.  Likely CHF exacerbation.  Will check basic labs, trop, BNP, CXR, likely diurese, ?admit.

## 2019-10-08 NOTE — ED PROVIDER NOTE - OBJECTIVE STATEMENT
79yF w/pmhx HTN, HLD, CABG x2 p/w episode of shortness of breath that lasted 10 minutes today. 79yF w/pmhx HTN, HLD, CABG x2 p/w episode of shortness of breath that lasted 10 minutes today. Pt states when walking to the bathroom she felt short of breath but that has since resolved, reports being at her baseline breathing. Two days ago pt developed b/l LE swelling. Pt denies chest pain, cough, palpitations, near syncope, fever/chills, weakness, numbness/tingling or any other concerns.

## 2019-10-09 DIAGNOSIS — I48.20 CHRONIC ATRIAL FIBRILLATION, UNSPECIFIED: ICD-10-CM

## 2019-10-09 DIAGNOSIS — I25.10 ATHEROSCLEROTIC HEART DISEASE OF NATIVE CORONARY ARTERY WITHOUT ANGINA PECTORIS: ICD-10-CM

## 2019-10-09 DIAGNOSIS — Z79.899 OTHER LONG TERM (CURRENT) DRUG THERAPY: ICD-10-CM

## 2019-10-09 DIAGNOSIS — Z29.9 ENCOUNTER FOR PROPHYLACTIC MEASURES, UNSPECIFIED: ICD-10-CM

## 2019-10-09 DIAGNOSIS — I50.9 HEART FAILURE, UNSPECIFIED: ICD-10-CM

## 2019-10-09 DIAGNOSIS — D72.829 ELEVATED WHITE BLOOD CELL COUNT, UNSPECIFIED: ICD-10-CM

## 2019-10-09 DIAGNOSIS — I50.23 ACUTE ON CHRONIC SYSTOLIC (CONGESTIVE) HEART FAILURE: ICD-10-CM

## 2019-10-09 DIAGNOSIS — R06.02 SHORTNESS OF BREATH: ICD-10-CM

## 2019-10-09 DIAGNOSIS — I10 ESSENTIAL (PRIMARY) HYPERTENSION: ICD-10-CM

## 2019-10-09 LAB
ALBUMIN SERPL ELPH-MCNC: 3.2 G/DL — LOW (ref 3.3–5)
ALP SERPL-CCNC: 81 U/L — SIGNIFICANT CHANGE UP (ref 40–120)
ALT FLD-CCNC: 21 U/L — SIGNIFICANT CHANGE UP (ref 4–33)
ANION GAP SERPL CALC-SCNC: 15 MMO/L — HIGH (ref 7–14)
APTT BLD: 30.5 SEC — SIGNIFICANT CHANGE UP (ref 27.5–36.3)
AST SERPL-CCNC: 24 U/L — SIGNIFICANT CHANGE UP (ref 4–32)
BASOPHILS # BLD AUTO: 0.08 K/UL — SIGNIFICANT CHANGE UP (ref 0–0.2)
BASOPHILS NFR BLD AUTO: 0.7 % — SIGNIFICANT CHANGE UP (ref 0–2)
BILIRUB SERPL-MCNC: 0.5 MG/DL — SIGNIFICANT CHANGE UP (ref 0.2–1.2)
BUN SERPL-MCNC: 21 MG/DL — SIGNIFICANT CHANGE UP (ref 7–23)
CALCIUM SERPL-MCNC: 8.8 MG/DL — SIGNIFICANT CHANGE UP (ref 8.4–10.5)
CHLORIDE SERPL-SCNC: 101 MMOL/L — SIGNIFICANT CHANGE UP (ref 98–107)
CK MB BLD-MCNC: 3.35 NG/ML — SIGNIFICANT CHANGE UP (ref 1–4.7)
CK MB BLD-MCNC: SIGNIFICANT CHANGE UP (ref 0–2.5)
CK SERPL-CCNC: 111 U/L — SIGNIFICANT CHANGE UP (ref 25–170)
CO2 SERPL-SCNC: 26 MMOL/L — SIGNIFICANT CHANGE UP (ref 22–31)
CREAT SERPL-MCNC: 0.85 MG/DL — SIGNIFICANT CHANGE UP (ref 0.5–1.3)
D DIMER BLD IA.RAPID-MCNC: 301 NG/ML — SIGNIFICANT CHANGE UP
EOSINOPHIL # BLD AUTO: 0.49 K/UL — SIGNIFICANT CHANGE UP (ref 0–0.5)
EOSINOPHIL NFR BLD AUTO: 4.2 % — SIGNIFICANT CHANGE UP (ref 0–6)
GLUCOSE SERPL-MCNC: 148 MG/DL — HIGH (ref 70–99)
HCT VFR BLD CALC: 35.5 % — SIGNIFICANT CHANGE UP (ref 34.5–45)
HGB BLD-MCNC: 10.7 G/DL — LOW (ref 11.5–15.5)
IMM GRANULOCYTES NFR BLD AUTO: 0.3 % — SIGNIFICANT CHANGE UP (ref 0–1.5)
INR BLD: 1.62 — HIGH (ref 0.88–1.17)
LYMPHOCYTES # BLD AUTO: 1.7 K/UL — SIGNIFICANT CHANGE UP (ref 1–3.3)
LYMPHOCYTES # BLD AUTO: 14.4 % — SIGNIFICANT CHANGE UP (ref 13–44)
MAGNESIUM SERPL-MCNC: 1.7 MG/DL — SIGNIFICANT CHANGE UP (ref 1.6–2.6)
MCHC RBC-ENTMCNC: 25.1 PG — LOW (ref 27–34)
MCHC RBC-ENTMCNC: 30.1 % — LOW (ref 32–36)
MCV RBC AUTO: 83.1 FL — SIGNIFICANT CHANGE UP (ref 80–100)
MONOCYTES # BLD AUTO: 0.81 K/UL — SIGNIFICANT CHANGE UP (ref 0–0.9)
MONOCYTES NFR BLD AUTO: 6.9 % — SIGNIFICANT CHANGE UP (ref 2–14)
NEUTROPHILS # BLD AUTO: 8.68 K/UL — HIGH (ref 1.8–7.4)
NEUTROPHILS NFR BLD AUTO: 73.5 % — SIGNIFICANT CHANGE UP (ref 43–77)
NRBC # FLD: 0 K/UL — SIGNIFICANT CHANGE UP (ref 0–0)
PHOSPHATE SERPL-MCNC: 3.6 MG/DL — SIGNIFICANT CHANGE UP (ref 2.5–4.5)
PLATELET # BLD AUTO: 269 K/UL — SIGNIFICANT CHANGE UP (ref 150–400)
PMV BLD: 10.9 FL — SIGNIFICANT CHANGE UP (ref 7–13)
POTASSIUM SERPL-MCNC: 3.3 MMOL/L — LOW (ref 3.5–5.3)
POTASSIUM SERPL-SCNC: 3.3 MMOL/L — LOW (ref 3.5–5.3)
PROT SERPL-MCNC: 6.8 G/DL — SIGNIFICANT CHANGE UP (ref 6–8.3)
PROTHROM AB SERPL-ACNC: 18.2 SEC — HIGH (ref 9.8–13.1)
RBC # BLD: 4.27 M/UL — SIGNIFICANT CHANGE UP (ref 3.8–5.2)
RBC # FLD: 15.6 % — HIGH (ref 10.3–14.5)
SODIUM SERPL-SCNC: 142 MMOL/L — SIGNIFICANT CHANGE UP (ref 135–145)
TROPONIN T, HIGH SENSITIVITY: 35 NG/L — SIGNIFICANT CHANGE UP (ref ?–14)
WBC # BLD: 11.8 K/UL — HIGH (ref 3.8–10.5)
WBC # FLD AUTO: 11.8 K/UL — HIGH (ref 3.8–10.5)

## 2019-10-09 PROCEDURE — 99223 1ST HOSP IP/OBS HIGH 75: CPT

## 2019-10-09 RX ORDER — DEXTROSE 50 % IN WATER 50 %
15 SYRINGE (ML) INTRAVENOUS ONCE
Refills: 0 | Status: DISCONTINUED | OUTPATIENT
Start: 2019-10-09 | End: 2019-10-17

## 2019-10-09 RX ORDER — ATORVASTATIN CALCIUM 80 MG/1
80 TABLET, FILM COATED ORAL AT BEDTIME
Refills: 0 | Status: DISCONTINUED | OUTPATIENT
Start: 2019-10-09 | End: 2019-10-17

## 2019-10-09 RX ORDER — WARFARIN SODIUM 2.5 MG/1
3 TABLET ORAL ONCE
Refills: 0 | Status: COMPLETED | OUTPATIENT
Start: 2019-10-09 | End: 2019-10-09

## 2019-10-09 RX ORDER — GLUCAGON INJECTION, SOLUTION 0.5 MG/.1ML
1 INJECTION, SOLUTION SUBCUTANEOUS ONCE
Refills: 0 | Status: DISCONTINUED | OUTPATIENT
Start: 2019-10-09 | End: 2019-10-17

## 2019-10-09 RX ORDER — SODIUM CHLORIDE 9 MG/ML
1000 INJECTION, SOLUTION INTRAVENOUS
Refills: 0 | Status: DISCONTINUED | OUTPATIENT
Start: 2019-10-09 | End: 2019-10-17

## 2019-10-09 RX ORDER — ASPIRIN/CALCIUM CARB/MAGNESIUM 324 MG
81 TABLET ORAL DAILY
Refills: 0 | Status: DISCONTINUED | OUTPATIENT
Start: 2019-10-09 | End: 2019-10-17

## 2019-10-09 RX ORDER — METOPROLOL TARTRATE 50 MG
12.5 TABLET ORAL
Refills: 0 | Status: DISCONTINUED | OUTPATIENT
Start: 2019-10-09 | End: 2019-10-17

## 2019-10-09 RX ORDER — TRAMADOL HYDROCHLORIDE 50 MG/1
1 TABLET ORAL
Qty: 0 | Refills: 0 | DISCHARGE

## 2019-10-09 RX ORDER — POTASSIUM CHLORIDE 20 MEQ
40 PACKET (EA) ORAL ONCE
Refills: 0 | Status: COMPLETED | OUTPATIENT
Start: 2019-10-09 | End: 2019-10-09

## 2019-10-09 RX ORDER — DEXTROSE 50 % IN WATER 50 %
25 SYRINGE (ML) INTRAVENOUS ONCE
Refills: 0 | Status: DISCONTINUED | OUTPATIENT
Start: 2019-10-09 | End: 2019-10-17

## 2019-10-09 RX ORDER — INFLUENZA VIRUS VACCINE 15; 15; 15; 15 UG/.5ML; UG/.5ML; UG/.5ML; UG/.5ML
0.5 SUSPENSION INTRAMUSCULAR ONCE
Refills: 0 | Status: COMPLETED | OUTPATIENT
Start: 2019-10-09 | End: 2019-10-17

## 2019-10-09 RX ORDER — DEXTROSE 50 % IN WATER 50 %
12.5 SYRINGE (ML) INTRAVENOUS ONCE
Refills: 0 | Status: DISCONTINUED | OUTPATIENT
Start: 2019-10-09 | End: 2019-10-17

## 2019-10-09 RX ORDER — FUROSEMIDE 40 MG
40 TABLET ORAL
Refills: 0 | Status: DISCONTINUED | OUTPATIENT
Start: 2019-10-09 | End: 2019-10-14

## 2019-10-09 RX ORDER — METOPROLOL TARTRATE 50 MG
25 TABLET ORAL
Refills: 0 | Status: DISCONTINUED | OUTPATIENT
Start: 2019-10-09 | End: 2019-10-09

## 2019-10-09 RX ORDER — INSULIN LISPRO 100/ML
VIAL (ML) SUBCUTANEOUS AT BEDTIME
Refills: 0 | Status: DISCONTINUED | OUTPATIENT
Start: 2019-10-09 | End: 2019-10-17

## 2019-10-09 RX ORDER — INSULIN LISPRO 100/ML
VIAL (ML) SUBCUTANEOUS
Refills: 0 | Status: DISCONTINUED | OUTPATIENT
Start: 2019-10-09 | End: 2019-10-17

## 2019-10-09 RX ADMIN — Medication 12.5 MILLIGRAM(S): at 17:18

## 2019-10-09 RX ADMIN — WARFARIN SODIUM 3 MILLIGRAM(S): 2.5 TABLET ORAL at 18:32

## 2019-10-09 RX ADMIN — Medication 1: at 17:39

## 2019-10-09 RX ADMIN — Medication 40 MILLIGRAM(S): at 05:44

## 2019-10-09 RX ADMIN — Medication 1: at 13:13

## 2019-10-09 RX ADMIN — Medication 81 MILLIGRAM(S): at 11:33

## 2019-10-09 RX ADMIN — ATORVASTATIN CALCIUM 80 MILLIGRAM(S): 80 TABLET, FILM COATED ORAL at 21:11

## 2019-10-09 RX ADMIN — Medication 20 MILLIGRAM(S): at 02:33

## 2019-10-09 RX ADMIN — Medication 40 MILLIGRAM(S): at 17:18

## 2019-10-09 RX ADMIN — Medication 20 MILLIGRAM(S): at 05:44

## 2019-10-09 RX ADMIN — Medication 25 MILLIGRAM(S): at 05:44

## 2019-10-09 RX ADMIN — Medication 40 MILLIEQUIVALENT(S): at 09:18

## 2019-10-09 NOTE — H&P ADULT - PROBLEM SELECTOR PLAN 4
- Patient unsure of her medications, unable to reach patient's daughter at number listed, called patient's son and left  for call back  - OMR reviewed, will c/w meds as per patient's OMR for now, hospital pharmacist emailed also to help aide in patient's medication reconciliation

## 2019-10-09 NOTE — H&P ADULT - HISTORY OF PRESENT ILLNESS
This is an 79F with history of HFrEF (last EF 42%), CAD s/p stents and CABG, s/p bioprosthetic AVR/MVR, HTN, HLD, AFib on Coumadin, and DM2 (not on meds as per patient) who presents to the hospital with complaints of worsening SOB over the past few days. Patient said that she usually lives [facility] near [place] and is visiting her daughter currently. Said that over the past few days she started to notice some SOB associated with worsening LE edema b/l. Said that today her SOB seemed worse than usual and therefore decided to come to the hospital for evaluation. Denied having any associated coughing, chest pain, pleuritic chest pain, or orthopnea (though states that she chronically has to sleep with the head of her bed elevated). Said that her LE edema is improved currently. Said that she has been compliant with her diet and her medications. Denies any other complaints.     On arrival to the ED, her vitals were T 98, P 91, /79, RR 16, O2 sat 97% RA. Her lab work showed mild leukocytosis subtherapeutic INR and an elevated pro-BNP (around the same level as prior when patient was admitted for sepsis c/b NSTEMI and ADHF). She had a CXR that showed a new small left pleural effusion. She was given lasix 40mg IVP x1 and was admitted to telemetry under Dr. Trejo.

## 2019-10-09 NOTE — H&P ADULT - NSICDXPASTSURGICALHX_GEN_ALL_CORE_FT
PAST SURGICAL HISTORY:  H/O aortic valve replacement 9/22/14    H/O mitral valve replacement 9/22/14    S/P angioplasty with stent 2011    S/P CABG x 1 (2000)

## 2019-10-09 NOTE — H&P ADULT - PROBLEM SELECTOR PLAN 1
- Concern for acute on chronic CHF given her elevated pro-BNP, new L pleural effusion, c/o SOB at home and worsening LE edema (though none currently) and elevated JVD on examination  - She received 40mg IVP of lasix in ED -> will c/w lasix 40mg IVP BID for now, will monitor daily weights, will monitor I/O per routine  - Ordered for repeat TTE to assess for patient's LV function  - f/u cardiology for further recommendations - Concern for acute on chronic CHF given her elevated pro-BNP, new L pleural effusion, c/o SOB at home and worsening LE edema (though none currently) and elevated JVD on examination  - She received 40mg IVP of lasix in ED -> will c/w lasix 40mg IVP BID for now, will monitor daily weights, will monitor I/O per routine  - Ordered for repeat TTE to assess for patient's LV function  - f/u cardiology for further recommendations  - Will trend patient's cardiac enzymes in AM

## 2019-10-09 NOTE — H&P ADULT - NSHPPHYSICALEXAM_GEN_ALL_CORE
Vital Signs Last 24 Hrs  T(C): 36.6 (09 Oct 2019 02:00), Max: 36.9 (08 Oct 2019 21:43)  T(F): 97.8 (09 Oct 2019 02:00), Max: 98.4 (08 Oct 2019 21:43)  HR: 82 (09 Oct 2019 02:00) (69 - 91)  BP: 200/85 (09 Oct 2019 02:00) (177/88 - 219/78)  BP(mean): --  RR: 17 (09 Oct 2019 02:00) (16 - 24)  SpO2: 97% (09 Oct 2019 02:00) (95% - 97%)    GENERAL: No acute distress, well-developed  ENT: EOMI, PERRL, conjunctiva and sclera clear, Neck supple, +mild JVD, moist mucosa  CHEST/LUNG: Decreased breath sounds on LLL otherwise clear to auscultation  BACK: No spinal tenderness  HEART: Regular rate and rhythm; No murmurs, rubs, or gallops  ABDOMEN: Soft, Nontender, Nondistended; Bowel sounds present  EXTREMITIES: No clubbing, cyanosis, or LE edema  PSYCH: Nl behavior, nl affect  NEUROLOGY: AAOx3, non-focal  SKIN: Normal color, No rashes or lesions

## 2019-10-09 NOTE — CONSULT NOTE ADULT - SUBJECTIVE AND OBJECTIVE BOX
Collin Trejo MD  Interventional Cardiology / Advance Heart Failure and Cardiac Transplant Specialist  [place] Office : [phone] [place] [address] [place] [address]  Tel:   [place] Office : [phone] [facility] [place] [address]  Tel: [phone]  Cell : [phone]      HISTORY OF PRESENTING ILLNESS:  HPI:  This is an 79F with history of HFrEF (last EF 42%), CAD s/p stents and CABG, s/p bioprosthetic AVR/MVR, HTN, HLD, AFib on Coumadin, and DM2 (not on meds as per patient) who presents to the hospital with complaints of worsening SOB over the past few days. Patient said that [place] near [place] and is visiting her daughter currently. Said that over the past few days she started to notice some SOB associated with worsening LE edema b/l. Said that today her SOB seemed worse than usual and therefore decided to come to the hospital for evaluation. Denied having any associated coughing, chest pain, pleuritic chest pain, or orthopnea (though states that she chronically has to sleep with the head of her bed elevated). Said that her LE edema is improved currently. Said that she has been compliant with her diet and her medications. Denies any other complaints.       PAST MEDICAL & SURGICAL HISTORY:  Diabetes mellitus: not on meds  Gout  Upper GI bleed  CHF (congestive heart failure)  Mitral Regurgitation  CVA (Cerebral Infarction): 2011  CAD (Coronary Artery Disease): s/p stents and CABG  Afib: (on Warfarin)  HTN (Hypertension)  H/O mitral valve replacement: 9/22/14  H/O aortic valve replacement: 9/22/14  S/P CABG x 1: (2000)  S/P angioplasty with stent: 2011      SOCIAL HISTORY: Substance Use (street drugs): ( x ) never used  (  ) other:    FAMILY HISTORY:  Family history of acute myocardial infarction: mother      REVIEW OF SYSTEMS:  CONSTITUTIONAL: No fever, weight loss, or fatigue  EYES: No eye pain, visual disturbances, or discharge  ENMT:  No difficulty hearing, tinnitus, vertigo; No sinus or throat pain  BREASTS: No pain, masses, or nipple discharge  GASTROINTESTINAL: No abdominal or epigastric pain. No nausea, vomiting, or hematemesis; No diarrhea or constipation. No melena or hematochezia.  GENITOURINARY: No dysuria, frequency, hematuria, or incontinence  NEUROLOGICAL: No headaches, memory loss, loss of strength, numbness, or tremors  ENDOCRINE: No heat or cold intolerance; No hair loss  MUSCULOSKELETAL: No joint pain or swelling; No muscle, back, or extremity pain  PSYCHIATRIC: No depression, anxiety, mood swings, or difficulty sleeping  HEME/LYMPH: No easy bruising, or bleeding gums  All others negative    MEDICATIONS:  aspirin enteric coated 81 milliGRAM(s) Oral daily  enalapril 20 milliGRAM(s) Oral daily  furosemide   Injectable 40 milliGRAM(s) IV Push two times a day  metoprolol tartrate 12.5 milliGRAM(s) Oral two times a day  warfarin 3 milliGRAM(s) Oral once            atorvastatin 80 milliGRAM(s) Oral at bedtime  dextrose 40% Gel 15 Gram(s) Oral once PRN  dextrose 50% Injectable 12.5 Gram(s) IV Push once  dextrose 50% Injectable 25 Gram(s) IV Push once  dextrose 50% Injectable 25 Gram(s) IV Push once  glucagon  Injectable 1 milliGRAM(s) IntraMuscular once PRN  insulin lispro (HumaLOG) corrective regimen sliding scale   SubCutaneous three times a day before meals  insulin lispro (HumaLOG) corrective regimen sliding scale   SubCutaneous at bedtime    dextrose 5%. 1000 milliLiter(s) IV Continuous <Continuous>  influenza   Vaccine 0.5 milliLiter(s) IntraMuscular once      FAMILY HISTORY:  Family history of acute myocardial infarction: mother        Allergies    No Known Allergies    Intolerances    	      PHYSICAL EXAM:  T(C): 36.9 (10-09-19 @ 13:33), Max: 36.9 (10-08-19 @ 21:43)  HR: 64 (10-09-19 @ 13:33) (41 - 91)  BP: 160/67 (10-09-19 @ 13:33) (153/55 - 219/78)  RR: 18 (10-09-19 @ 13:33) (16 - 24)  SpO2: 98% (10-09-19 @ 13:33) (95% - 98%)  Wt(kg): --  I&O's Summary    08 Oct 2019 07:01  -  09 Oct 2019 07:00  --------------------------------------------------------  IN: 175 mL / OUT: 0 mL / NET: 175 mL    09 Oct 2019 07:01  -  09 Oct 2019 17:09  --------------------------------------------------------  IN: 240 mL / OUT: 400 mL / NET: -160 mL        GENERAL: NAD, well-groomed, well-developed  EYES: EOMI, PERRLA, conjunctiva and sclera clear  ENMT: No tonsillar erythema, exudates, or enlargement; Moist mucous membranes, Good dentition, No lesions  Cardiovascular: Normal S1 S2, No JVD, No murmurs, No edema  Respiratory: Lungs clear to auscultation	  Gastrointestinal:  Soft, Non-tender, + BS	  Extremities: 2+ EDEMA  LYMPH: No lymphadenopathy noted  NERVOUS SYSTEM:  Alert & Oriented X3, Good concentration; Motor Strength 5/5 B/L upper and lower extremities; DTRs 2+ intact and symmetric      LABS:	 	    CARDIAC MARKERS:      CKMB: 3.35 ng/mL (10-09 @ 06:00)    CKMB Relative Index: Test not performed (10-09 @ 06:00)                            10.7   11.80 )-----------( 269      ( 09 Oct 2019 06:00 )             35.5     10-09    142  |  101  |  21  ----------------------------<  148<H>  3.3<L>   |  26  |  0.85    Ca    8.8      09 Oct 2019 06:00  Phos  3.6     10-09  Mg     1.7     10-09    TPro  6.8  /  Alb  3.2<L>  /  TBili  0.5  /  DBili  x   /  AST  24  /  ALT  21  /  AlkPhos  81  10-09    proBNP: Serum Pro-Brain Natriuretic Peptide: 2460 pg/mL (10-08 @ 20:14)    Lipid Profile:   HgA1c:   TSH:     Consultant(s) Notes Reviewed:  [x ] YES  [ ] NO    Care Discussed with Consultants/Other Providers [ x] YES  [ ] NO    Imaging Personally Reviewed independently:  [x] YES  [ ] NO    All labs, radiologic studies, vitals, orders and medications list reviewed. Patient is seen and examined at bedside. Case discussed with medical team.    ASSESSMENT/PLAN:

## 2019-10-09 NOTE — H&P ADULT - ASSESSMENT
This is an 79F with history as above who presents to the hospital with complaints of worsening SOB found to have a new L pleural effusion likely 2/2 ADHF.

## 2019-10-09 NOTE — CHART NOTE - NSCHARTNOTEFT_GEN_A_CORE
Patient noted to have bradycardia to low 40s this AM. Patient seen and examined at bedside, denied having any complaints, denied any c/o chest pain, SOB, palpitations, or dizziness. Denied ever noticing any episodes of low heart rates in the past. Spoke with patient's RN, said that the patient had received a dose of metoprolol 25mg ~20-30 minutes prior to the bradycardia episode. Currently on Tele patient's HR is high 50s.  - Will decrease her metoprolol dose to 12.5mg BID and monitor her HR.  - c/w other plan as specified in the H&P.

## 2019-10-09 NOTE — H&P ADULT - NSHPLABSRESULTS_GEN_ALL_CORE
LABS and ADDITIONAL STUDIES:                        10.9   11.77 )-----------( 262      ( 08 Oct 2019 20:14 )             36.0     10-08    139  |  100  |  26<H>  ----------------------------<  200<H>  3.7   |  25  |  0.88    Ca    8.9      08 Oct 2019 20:14    TPro  7.0  /  Alb  3.3  /  TBili  0.4  /  DBili  x   /  AST  27  /  ALT  24  /  AlkPhos  90  10-08    LIVER FUNCTIONS - ( 08 Oct 2019 20:14 )  Alb: 3.3 g/dL / Pro: 7.0 g/dL / ALK PHOS: 90 u/L / ALT: 24 u/L / AST: 27 u/L / GGT: x           PT/INR - ( 08 Oct 2019 20:14 )   PT: 19.1 SEC;   INR: 1.65     PTT - ( 08 Oct 2019 20:14 )  PTT:32.9 SEC    Troponin T, High Sensitivity: 35 (10.08.19 @ 20:14)  Troponin T, High Sensitivity: 40 (10.08.19 @ 23:03)  Serum Pro-Brain Natriuretic Peptide: 2460 (10.08.19 @ 20:14)    Blood Gas Venous - Lactate: 1.8 (10.08.19 @ 20:14)    < from: Xray Chest 2 Views PA/Lat (10.08.19 @ 20:42) >    ******PRELIMINARY REPORT******            INTERPRETATION:  Small left pleural effusion and trace right pleural   effusion.    < end of copied text >    EKG - AFib at 87, no significant ST-T wave changes, LBBB (chronic)

## 2019-10-09 NOTE — H&P ADULT - PROBLEM SELECTOR PLAN 2
- Likely 2/2 ADHF/pleural effusion as above though given her recent trip from [facility] there is a consideration for possible PE (especially given her subtherapeutic INR)  - Currently not tachycardic, satting well on RA, no significant EKG changes, no LE edema, her current Well's Score is 0 placing her at a low risk for PE -> will check a d-dimer and base further work up on the results of the d-dimer  - Given patient with new pleural effusion consider pulm eval in AM to see if she needs any further evaluation of the effusion

## 2019-10-09 NOTE — CONSULT NOTE ADULT - ASSESSMENT
ekg - afib @ LBBB  Echo - pending     a/p     1) Acute on chronic systolic CHF - cont lasix 40 mg q12, SOB improving will get 2d echo cont lopressor and enalapril     2) HTN - cont lopressor and enalapril    3) Afib - cont coumadin and lopressor

## 2019-10-09 NOTE — PHYSICAL THERAPY INITIAL EVALUATION ADULT - PERTINENT HX OF CURRENT PROBLEM, REHAB EVAL
patient presents with acute on chronic systolic congestive heart failure. PMH includes HFrEF (last EF 42%), CAD s/p stents and CABG, s/p bioprosthetic AVR/MVR, HTN, HLD, AFib on Coumadin, and DM2 (not on meds as per patient)

## 2019-10-09 NOTE — H&P ADULT - PROBLEM SELECTOR PLAN 8
- SCDs for IMPROVE score of 1  - Currently with subtherapeutic INR, will recheck level in AM and dose coumadin accordingly

## 2019-10-09 NOTE — H&P ADULT - NSHPREVIEWOFSYSTEMS_GEN_ALL_CORE
REVIEW OF SYSTEMS:    CONSTITUTIONAL: No weakness, fevers or chills  EYES: No visual changes or eye discharge  ENT: No rhinorrhea or sore throat  NECK: No pain or stiffness  RESPIRATORY: No cough, wheezing, hemoptysis; +SOB  CARDIOVASCULAR: No chest pain or palpitations; +LE edema (now resolved)  GASTROINTESTINAL: No abdominal or epigastric pain. No nausea, vomiting, or hematemesis; No diarrhea or constipation. No melena or hematochezia.  BACK: No back pain, no flank pain  GENITOURINARY: No dysuria, frequency or hematuria  NEUROLOGICAL: No numbness or weakness  SKIN: No itching, burning, rashes, or lesions

## 2019-10-09 NOTE — H&P ADULT - NSICDXPASTMEDICALHX_GEN_ALL_CORE_FT
PAST MEDICAL HISTORY:  Afib (on Warfarin)    CAD (Coronary Artery Disease) s/p stents and CABG    CHF (congestive heart failure)     CVA (Cerebral Infarction) 2011    Diabetes mellitus not on meds    Gout     HTN (Hypertension)     Mitral Regurgitation     Upper GI bleed

## 2019-10-10 LAB
ANION GAP SERPL CALC-SCNC: 11 MMO/L — SIGNIFICANT CHANGE UP (ref 7–14)
BASOPHILS # BLD AUTO: 0.1 K/UL — SIGNIFICANT CHANGE UP (ref 0–0.2)
BASOPHILS NFR BLD AUTO: 0.9 % — SIGNIFICANT CHANGE UP (ref 0–2)
BUN SERPL-MCNC: 21 MG/DL — SIGNIFICANT CHANGE UP (ref 7–23)
CALCIUM SERPL-MCNC: 8.6 MG/DL — SIGNIFICANT CHANGE UP (ref 8.4–10.5)
CHLORIDE SERPL-SCNC: 101 MMOL/L — SIGNIFICANT CHANGE UP (ref 98–107)
CO2 SERPL-SCNC: 24 MMOL/L — SIGNIFICANT CHANGE UP (ref 22–31)
CREAT SERPL-MCNC: 0.8 MG/DL — SIGNIFICANT CHANGE UP (ref 0.5–1.3)
EOSINOPHIL # BLD AUTO: 0.7 K/UL — HIGH (ref 0–0.5)
EOSINOPHIL NFR BLD AUTO: 6 % — SIGNIFICANT CHANGE UP (ref 0–6)
GLUCOSE SERPL-MCNC: 143 MG/DL — HIGH (ref 70–99)
HBA1C BLD-MCNC: 6.9 % — HIGH (ref 4–5.6)
HCT VFR BLD CALC: 36.3 % — SIGNIFICANT CHANGE UP (ref 34.5–45)
HGB BLD-MCNC: 10.8 G/DL — LOW (ref 11.5–15.5)
IMM GRANULOCYTES NFR BLD AUTO: 0.3 % — SIGNIFICANT CHANGE UP (ref 0–1.5)
INR BLD: 1.41 — HIGH (ref 0.88–1.17)
LYMPHOCYTES # BLD AUTO: 17.4 % — SIGNIFICANT CHANGE UP (ref 13–44)
LYMPHOCYTES # BLD AUTO: 2.03 K/UL — SIGNIFICANT CHANGE UP (ref 1–3.3)
MAGNESIUM SERPL-MCNC: 1.9 MG/DL — SIGNIFICANT CHANGE UP (ref 1.6–2.6)
MCHC RBC-ENTMCNC: 25.2 PG — LOW (ref 27–34)
MCHC RBC-ENTMCNC: 29.8 % — LOW (ref 32–36)
MCV RBC AUTO: 84.8 FL — SIGNIFICANT CHANGE UP (ref 80–100)
MONOCYTES # BLD AUTO: 1.06 K/UL — HIGH (ref 0–0.9)
MONOCYTES NFR BLD AUTO: 9.1 % — SIGNIFICANT CHANGE UP (ref 2–14)
NEUTROPHILS # BLD AUTO: 7.77 K/UL — HIGH (ref 1.8–7.4)
NEUTROPHILS NFR BLD AUTO: 66.3 % — SIGNIFICANT CHANGE UP (ref 43–77)
NRBC # FLD: 0 K/UL — SIGNIFICANT CHANGE UP (ref 0–0)
PLATELET # BLD AUTO: 270 K/UL — SIGNIFICANT CHANGE UP (ref 150–400)
PMV BLD: 10.9 FL — SIGNIFICANT CHANGE UP (ref 7–13)
POTASSIUM SERPL-MCNC: 3.7 MMOL/L — SIGNIFICANT CHANGE UP (ref 3.5–5.3)
POTASSIUM SERPL-SCNC: 3.7 MMOL/L — SIGNIFICANT CHANGE UP (ref 3.5–5.3)
PROTHROM AB SERPL-ACNC: 16.3 SEC — HIGH (ref 9.8–13.1)
RBC # BLD: 4.28 M/UL — SIGNIFICANT CHANGE UP (ref 3.8–5.2)
RBC # FLD: 15.6 % — HIGH (ref 10.3–14.5)
SODIUM SERPL-SCNC: 136 MMOL/L — SIGNIFICANT CHANGE UP (ref 135–145)
WBC # BLD: 11.7 K/UL — HIGH (ref 3.8–10.5)
WBC # FLD AUTO: 11.7 K/UL — HIGH (ref 3.8–10.5)

## 2019-10-10 PROCEDURE — 93306 TTE W/DOPPLER COMPLETE: CPT | Mod: 26

## 2019-10-10 RX ORDER — POTASSIUM CHLORIDE 20 MEQ
40 PACKET (EA) ORAL ONCE
Refills: 0 | Status: COMPLETED | OUTPATIENT
Start: 2019-10-10 | End: 2019-10-10

## 2019-10-10 RX ORDER — WARFARIN SODIUM 2.5 MG/1
5 TABLET ORAL ONCE
Refills: 0 | Status: COMPLETED | OUTPATIENT
Start: 2019-10-10 | End: 2019-10-10

## 2019-10-10 RX ADMIN — Medication 12.5 MILLIGRAM(S): at 05:27

## 2019-10-10 RX ADMIN — Medication 81 MILLIGRAM(S): at 11:17

## 2019-10-10 RX ADMIN — Medication 1: at 17:31

## 2019-10-10 RX ADMIN — Medication 1: at 12:43

## 2019-10-10 RX ADMIN — Medication 40 MILLIEQUIVALENT(S): at 11:17

## 2019-10-10 RX ADMIN — Medication 12.5 MILLIGRAM(S): at 17:31

## 2019-10-10 RX ADMIN — ATORVASTATIN CALCIUM 80 MILLIGRAM(S): 80 TABLET, FILM COATED ORAL at 21:27

## 2019-10-10 RX ADMIN — Medication 20 MILLIGRAM(S): at 05:27

## 2019-10-10 RX ADMIN — Medication 40 MILLIGRAM(S): at 05:27

## 2019-10-10 RX ADMIN — WARFARIN SODIUM 5 MILLIGRAM(S): 2.5 TABLET ORAL at 17:31

## 2019-10-10 RX ADMIN — Medication 40 MILLIGRAM(S): at 17:30

## 2019-10-10 NOTE — CONSULT NOTE ADULT - ASSESSMENT
79F with history of HFrEF (last EF 42%), CAD s/p stents and CABG, s/p bioprosthetic AVR/MVR, HTN, HLD, AFib on Coumadin, and DM2 (not on meds as per patient) who presents to the hospital with complaints of worsening SOB over the past few days. Patient said that she usually lives [place]state near [place] and is visiting her daughter currently. Said that over the past few days she started to notice some SOB associated with worsening LE edema b/l. Said that today her SOB seemed worse than usual and therefore decided to come to the hospital for evaluation. Denied having any associated coughing, chest pain, pleuritic chest pain, or orthopnea (though states that she chronically has to sleep with the head of her bed elevated). Said that her LE edema is improved currently. Said that she has been compliant with her diet and her medications. Denies any other complaints.     Problem/Plan - 1:  ·  Problem: Acute on chronic systolic congestive heart failure.  Plan: - Improving.   - She received 40mg IVP of lasix in ED -> will c/w lasix 40mg IVP BID for now, will monitor daily weights, will monitor I/O per routine  -TTE pending      Problem/Plan - 2:  ·  Problem: Pleural effusion .  Plan: - Likely 2/2 AHF.  -IV Lasix.     Problem/Plan - 3:  ·  Problem: Leukocytosis, unspecified type.  Plan: - No fever etc .  Likely reactionary 2/2 SOB, no significant infectious complaints, will monitor off abx for now.      Problem/Plan - 4:  ·  Problem: DM .  Plan: - SSI for now . Sugars in acceptable range.      Problem/Plan - 5:  ·  Problem: Coronary artery disease involving native coronary artery of native heart without angina pectoris.  Plan: - No CP.  -Will c/w home ASA  - TTE pending.      Problem/Plan - 6:  Problem: Chronic atrial fibrillation. Plan: - on coumadin, subtherapeutic currently, will re-check INR in AM and dose coumadin as per INR.     Problem/Plan - 7:  ·  Problem: Essential hypertension.  Plan: - on enalapril/metoprolol as per her OMR, will c/w for now.      Problem/Plan - 8:  ·  Problem: Need for prophylactic measure.  Plan: - SCDs for IMPROVE score of 1  - Currently with subtherapeutic INR, will recheck level in AM and dose coumadin accordingly.

## 2019-10-10 NOTE — CONSULT NOTE ADULT - SUBJECTIVE AND OBJECTIVE BOX
Patient is a 79y old  Female who presents with a chief complaint of SOB       HPI:  This is an 79F with history of HFrEF (last EF 42%), CAD s/p stents and CABG, s/p bioprosthetic AVR/MVR, HTN, HLD, AFib on Coumadin, and DM2 (not on meds as per patient) who presents to the hospital with complaints of worsening SOB over the past few days. Patient said that she usually lives [place]state near [place] and is visiting her daughter currently. Said that over the past few days she started to notice some SOB associated with worsening LE edema b/l. Said that today her SOB seemed worse than usual and therefore decided to come to the hospital for evaluation. Denied having any associated coughing, chest pain, pleuritic chest pain, or orthopnea (though states that she chronically has to sleep with the head of her bed elevated). Said that her LE edema is improved currently. Said that she has been compliant with her diet and her medications. Denies any other complaints.      PAST MEDICAL & SURGICAL HISTORY:  Diabetes mellitus: not on meds  Gout  Upper GI bleed  CHF (congestive heart failure)  Mitral Regurgitation  CVA (Cerebral Infarction): 2011  CAD (Coronary Artery Disease): s/p stents and CABG  Afib: (on Warfarin)  HTN (Hypertension)  H/O mitral valve replacement: 9/22/14  H/O aortic valve replacement: 9/22/14  S/P CABG x 1: (2000)  S/P angioplasty with stent: 2011      Social History: no smoking ,alcohol or drugs     FAMILY HISTORY:  Family history of acute myocardial infarction: mother      Allergies    No Known Allergies    Intolerances        REVIEW OF SYSTEMS:    CONSTITUTIONAL: No fever, weight loss, or fatigue  EYES: No eye pain, visual disturbances, or discharge  RESPIRATORY: No cough, wheezing, chills or hemoptysis; No shortness of breath  CARDIOVASCULAR: No chest pain, palpitations, dizziness, or leg swelling  GASTROINTESTINAL: No abdominal or epigastric pain. No nausea, vomiting, or hematemesis; No diarrhea or constipation. No melena or hematochezia.  GENITOURINARY: No dysuria, frequency, hematuria, or incontinence  NEUROLOGICAL: No headaches, memory loss, loss of strength, numbness, or tremors  SKIN: No itching, burning, rashes, or lesions   ENDOCRINE: No heat or cold intolerance; No hair loss  MUSCULOSKELETAL: No joint pain or swelling; No muscle, back, or extremity pain  PSYCHIATRIC: No depression, anxiety, mood swings, or difficulty sleeping      MEDICATIONS  (STANDING):  aspirin enteric coated 81 milliGRAM(s) Oral daily  atorvastatin 80 milliGRAM(s) Oral at bedtime  dextrose 5%. 1000 milliLiter(s) (50 mL/Hr) IV Continuous <Continuous>  dextrose 50% Injectable 12.5 Gram(s) IV Push once  dextrose 50% Injectable 25 Gram(s) IV Push once  dextrose 50% Injectable 25 Gram(s) IV Push once  enalapril 20 milliGRAM(s) Oral daily  furosemide   Injectable 40 milliGRAM(s) IV Push two times a day  influenza   Vaccine 0.5 milliLiter(s) IntraMuscular once  insulin lispro (HumaLOG) corrective regimen sliding scale   SubCutaneous three times a day before meals  insulin lispro (HumaLOG) corrective regimen sliding scale   SubCutaneous at bedtime  metoprolol tartrate 12.5 milliGRAM(s) Oral two times a day  potassium chloride   Powder 40 milliEquivalent(s) Oral once    MEDICATIONS  (PRN):  dextrose 40% Gel 15 Gram(s) Oral once PRN Blood Glucose LESS THAN 70 milliGRAM(s)/deciliter  glucagon  Injectable 1 milliGRAM(s) IntraMuscular once PRN Glucose LESS THAN 70 milligrams/deciliter      Vital Signs Last 24 Hrs  T(C): 37.2 (10 Oct 2019 05:24), Max: 37.2 (09 Oct 2019 17:17)  T(F): 99 (10 Oct 2019 05:24), Max: 99 (09 Oct 2019 17:17)  HR: 66 (10 Oct 2019 05:24) (64 - 76)  BP: 151/66 (10 Oct 2019 05:24) (151/66 - 166/65)  BP(mean): --  RR: 16 (10 Oct 2019 05:24) (16 - 18)  SpO2: 95% (10 Oct 2019 05:24) (95% - 98%)    PHYSICAL EXAM:    GENERAL: NAD, well-groomed, well-developed, NC Oxygen   HEAD:  Atraumatic, Normocephalic  EYES: EOMI, PERRLA, conjunctiva and sclera clear  ENMT: No tonsillar erythema, exudates, or enlargement; Moist mucous membranes, Good dentition, No lesions  NECK: Supple, No JVD, Normal thyroid  NERVOUS SYSTEM:  Alert & Oriented X3, No focal sign   CHEST/LUNG: Clear to percussion bilaterally; No rales, rhonchi, wheezing, or rubs  HEART: Regular rate and rhythm; No murmurs, rubs, or gallops  ABDOMEN: Soft, Nontender, Nondistended; Bowel sounds present  EXTREMITIES:  2+ Peripheral Pulses, No clubbing, cyanosis, or edema    LABS:                        10.8   11.70 )-----------( 270      ( 10 Oct 2019 05:45 )             36.3     10-10    136  |  101  |  21  ----------------------------<  143<H>  3.7   |  24  |  0.80    Ca    8.6      10 Oct 2019 05:45  Phos  3.6     10-09  Mg     1.9     10-10    TPro  6.8  /  Alb  3.2<L>  /  TBili  0.5  /  DBili  x   /  AST  24  /  ALT  21  /  AlkPhos  81  10-09    PT/INR - ( 10 Oct 2019 05:45 )   PT: 16.3 SEC;   INR: 1.41          PTT - ( 09 Oct 2019 06:00 )  PTT:30.5 SEC        RADIOLOGY & ADDITIONAL STUDIES:

## 2019-10-11 DIAGNOSIS — Z71.89 OTHER SPECIFIED COUNSELING: ICD-10-CM

## 2019-10-11 LAB
ANION GAP SERPL CALC-SCNC: 11 MMO/L — SIGNIFICANT CHANGE UP (ref 7–14)
APPEARANCE UR: CLEAR — SIGNIFICANT CHANGE UP
BACTERIA # UR AUTO: NEGATIVE — SIGNIFICANT CHANGE UP
BASOPHILS # BLD AUTO: 0.06 K/UL — SIGNIFICANT CHANGE UP (ref 0–0.2)
BASOPHILS NFR BLD AUTO: 0.5 % — SIGNIFICANT CHANGE UP (ref 0–2)
BILIRUB UR-MCNC: NEGATIVE — SIGNIFICANT CHANGE UP
BLOOD UR QL VISUAL: NEGATIVE — SIGNIFICANT CHANGE UP
BUN SERPL-MCNC: 21 MG/DL — SIGNIFICANT CHANGE UP (ref 7–23)
CALCIUM SERPL-MCNC: 8.9 MG/DL — SIGNIFICANT CHANGE UP (ref 8.4–10.5)
CHLORIDE SERPL-SCNC: 102 MMOL/L — SIGNIFICANT CHANGE UP (ref 98–107)
CO2 SERPL-SCNC: 25 MMOL/L — SIGNIFICANT CHANGE UP (ref 22–31)
COLOR SPEC: SIGNIFICANT CHANGE UP
CREAT SERPL-MCNC: 0.82 MG/DL — SIGNIFICANT CHANGE UP (ref 0.5–1.3)
EOSINOPHIL # BLD AUTO: 0.62 K/UL — HIGH (ref 0–0.5)
EOSINOPHIL NFR BLD AUTO: 4.7 % — SIGNIFICANT CHANGE UP (ref 0–6)
GLUCOSE SERPL-MCNC: 160 MG/DL — HIGH (ref 70–99)
GLUCOSE UR-MCNC: NEGATIVE — SIGNIFICANT CHANGE UP
HCT VFR BLD CALC: 39.8 % — SIGNIFICANT CHANGE UP (ref 34.5–45)
HGB BLD-MCNC: 11.5 G/DL — SIGNIFICANT CHANGE UP (ref 11.5–15.5)
HYALINE CASTS # UR AUTO: NEGATIVE — SIGNIFICANT CHANGE UP
IMM GRANULOCYTES NFR BLD AUTO: 0.4 % — SIGNIFICANT CHANGE UP (ref 0–1.5)
INR BLD: 1.45 — HIGH (ref 0.88–1.17)
KETONES UR-MCNC: NEGATIVE — SIGNIFICANT CHANGE UP
LEUKOCYTE ESTERASE UR-ACNC: NEGATIVE — SIGNIFICANT CHANGE UP
LYMPHOCYTES # BLD AUTO: 16.1 % — SIGNIFICANT CHANGE UP (ref 13–44)
LYMPHOCYTES # BLD AUTO: 2.14 K/UL — SIGNIFICANT CHANGE UP (ref 1–3.3)
MAGNESIUM SERPL-MCNC: 1.9 MG/DL — SIGNIFICANT CHANGE UP (ref 1.6–2.6)
MCHC RBC-ENTMCNC: 24.9 PG — LOW (ref 27–34)
MCHC RBC-ENTMCNC: 28.9 % — LOW (ref 32–36)
MCV RBC AUTO: 86.3 FL — SIGNIFICANT CHANGE UP (ref 80–100)
MONOCYTES # BLD AUTO: 1.09 K/UL — HIGH (ref 0–0.9)
MONOCYTES NFR BLD AUTO: 8.2 % — SIGNIFICANT CHANGE UP (ref 2–14)
NEUTROPHILS # BLD AUTO: 9.36 K/UL — HIGH (ref 1.8–7.4)
NEUTROPHILS NFR BLD AUTO: 70.1 % — SIGNIFICANT CHANGE UP (ref 43–77)
NITRITE UR-MCNC: NEGATIVE — SIGNIFICANT CHANGE UP
NRBC # FLD: 0 K/UL — SIGNIFICANT CHANGE UP (ref 0–0)
PH UR: 6 — SIGNIFICANT CHANGE UP (ref 5–8)
PLATELET # BLD AUTO: 284 K/UL — SIGNIFICANT CHANGE UP (ref 150–400)
PMV BLD: 11 FL — SIGNIFICANT CHANGE UP (ref 7–13)
POTASSIUM SERPL-MCNC: 4.1 MMOL/L — SIGNIFICANT CHANGE UP (ref 3.5–5.3)
POTASSIUM SERPL-SCNC: 4.1 MMOL/L — SIGNIFICANT CHANGE UP (ref 3.5–5.3)
PROT UR-MCNC: 20 — SIGNIFICANT CHANGE UP
PROTHROM AB SERPL-ACNC: 16.3 SEC — HIGH (ref 9.8–13.1)
RBC # BLD: 4.61 M/UL — SIGNIFICANT CHANGE UP (ref 3.8–5.2)
RBC # FLD: 15.4 % — HIGH (ref 10.3–14.5)
RBC CASTS # UR COMP ASSIST: SIGNIFICANT CHANGE UP (ref 0–?)
SODIUM SERPL-SCNC: 138 MMOL/L — SIGNIFICANT CHANGE UP (ref 135–145)
SP GR SPEC: 1.02 — SIGNIFICANT CHANGE UP (ref 1–1.04)
SQUAMOUS # UR AUTO: SIGNIFICANT CHANGE UP
UROBILINOGEN FLD QL: NORMAL — SIGNIFICANT CHANGE UP
WBC # BLD: 13.32 K/UL — HIGH (ref 3.8–10.5)
WBC # FLD AUTO: 13.32 K/UL — HIGH (ref 3.8–10.5)
WBC UR QL: SIGNIFICANT CHANGE UP (ref 0–?)

## 2019-10-11 RX ORDER — WARFARIN SODIUM 2.5 MG/1
5 TABLET ORAL ONCE
Refills: 0 | Status: COMPLETED | OUTPATIENT
Start: 2019-10-11 | End: 2019-10-11

## 2019-10-11 RX ORDER — AMLODIPINE BESYLATE 2.5 MG/1
5 TABLET ORAL ONCE
Refills: 0 | Status: COMPLETED | OUTPATIENT
Start: 2019-10-11 | End: 2019-10-11

## 2019-10-11 RX ADMIN — Medication 12.5 MILLIGRAM(S): at 17:34

## 2019-10-11 RX ADMIN — Medication 40 MILLIGRAM(S): at 05:07

## 2019-10-11 RX ADMIN — Medication 20 MILLIGRAM(S): at 05:06

## 2019-10-11 RX ADMIN — WARFARIN SODIUM 5 MILLIGRAM(S): 2.5 TABLET ORAL at 17:34

## 2019-10-11 RX ADMIN — Medication 12.5 MILLIGRAM(S): at 05:06

## 2019-10-11 RX ADMIN — Medication 1: at 17:35

## 2019-10-11 RX ADMIN — AMLODIPINE BESYLATE 5 MILLIGRAM(S): 2.5 TABLET ORAL at 22:41

## 2019-10-11 RX ADMIN — Medication 81 MILLIGRAM(S): at 17:34

## 2019-10-11 RX ADMIN — ATORVASTATIN CALCIUM 80 MILLIGRAM(S): 80 TABLET, FILM COATED ORAL at 21:54

## 2019-10-11 RX ADMIN — Medication 40 MILLIGRAM(S): at 17:35

## 2019-10-11 RX ADMIN — Medication 1: at 12:37

## 2019-10-12 LAB
ANION GAP SERPL CALC-SCNC: 11 MMO/L — SIGNIFICANT CHANGE UP (ref 7–14)
ANION GAP SERPL CALC-SCNC: 11 MMO/L — SIGNIFICANT CHANGE UP (ref 7–14)
BASOPHILS # BLD AUTO: 0.1 K/UL — SIGNIFICANT CHANGE UP (ref 0–0.2)
BASOPHILS NFR BLD AUTO: 0.8 % — SIGNIFICANT CHANGE UP (ref 0–2)
BUN SERPL-MCNC: 17 MG/DL — SIGNIFICANT CHANGE UP (ref 7–23)
BUN SERPL-MCNC: 17 MG/DL — SIGNIFICANT CHANGE UP (ref 7–23)
CALCIUM SERPL-MCNC: 8.6 MG/DL — SIGNIFICANT CHANGE UP (ref 8.4–10.5)
CALCIUM SERPL-MCNC: 8.6 MG/DL — SIGNIFICANT CHANGE UP (ref 8.4–10.5)
CHLORIDE SERPL-SCNC: 99 MMOL/L — SIGNIFICANT CHANGE UP (ref 98–107)
CHLORIDE SERPL-SCNC: 99 MMOL/L — SIGNIFICANT CHANGE UP (ref 98–107)
CO2 SERPL-SCNC: 24 MMOL/L — SIGNIFICANT CHANGE UP (ref 22–31)
CO2 SERPL-SCNC: 24 MMOL/L — SIGNIFICANT CHANGE UP (ref 22–31)
CREAT SERPL-MCNC: 0.8 MG/DL — SIGNIFICANT CHANGE UP (ref 0.5–1.3)
CREAT SERPL-MCNC: 0.8 MG/DL — SIGNIFICANT CHANGE UP (ref 0.5–1.3)
EOSINOPHIL # BLD AUTO: 0.68 K/UL — HIGH (ref 0–0.5)
EOSINOPHIL NFR BLD AUTO: 5.1 % — SIGNIFICANT CHANGE UP (ref 0–6)
GLUCOSE SERPL-MCNC: 135 MG/DL — HIGH (ref 70–99)
GLUCOSE SERPL-MCNC: 135 MG/DL — HIGH (ref 70–99)
HCT VFR BLD CALC: 37.9 % — SIGNIFICANT CHANGE UP (ref 34.5–45)
HCT VFR BLD CALC: 37.9 % — SIGNIFICANT CHANGE UP (ref 34.5–45)
HGB BLD-MCNC: 11.2 G/DL — LOW (ref 11.5–15.5)
HGB BLD-MCNC: 11.2 G/DL — LOW (ref 11.5–15.5)
IMM GRANULOCYTES NFR BLD AUTO: 0.5 % — SIGNIFICANT CHANGE UP (ref 0–1.5)
INR BLD: 1.8 — HIGH (ref 0.88–1.17)
LYMPHOCYTES # BLD AUTO: 1.95 K/UL — SIGNIFICANT CHANGE UP (ref 1–3.3)
LYMPHOCYTES # BLD AUTO: 14.7 % — SIGNIFICANT CHANGE UP (ref 13–44)
MAGNESIUM SERPL-MCNC: 1.8 MG/DL — SIGNIFICANT CHANGE UP (ref 1.6–2.6)
MAGNESIUM SERPL-MCNC: 1.8 MG/DL — SIGNIFICANT CHANGE UP (ref 1.6–2.6)
MCHC RBC-ENTMCNC: 25.5 PG — LOW (ref 27–34)
MCHC RBC-ENTMCNC: 25.5 PG — LOW (ref 27–34)
MCHC RBC-ENTMCNC: 29.6 % — LOW (ref 32–36)
MCHC RBC-ENTMCNC: 29.6 % — LOW (ref 32–36)
MCV RBC AUTO: 86.3 FL — SIGNIFICANT CHANGE UP (ref 80–100)
MCV RBC AUTO: 86.3 FL — SIGNIFICANT CHANGE UP (ref 80–100)
MONOCYTES # BLD AUTO: 1.03 K/UL — HIGH (ref 0–0.9)
MONOCYTES NFR BLD AUTO: 7.8 % — SIGNIFICANT CHANGE UP (ref 2–14)
NEUTROPHILS # BLD AUTO: 9.45 K/UL — HIGH (ref 1.8–7.4)
NEUTROPHILS NFR BLD AUTO: 71.1 % — SIGNIFICANT CHANGE UP (ref 43–77)
NRBC # FLD: 0 K/UL — SIGNIFICANT CHANGE UP (ref 0–0)
NRBC # FLD: 0 K/UL — SIGNIFICANT CHANGE UP (ref 0–0)
PHOSPHATE SERPL-MCNC: 3.5 MG/DL — SIGNIFICANT CHANGE UP (ref 2.5–4.5)
PLATELET # BLD AUTO: 260 K/UL — SIGNIFICANT CHANGE UP (ref 150–400)
PLATELET # BLD AUTO: 260 K/UL — SIGNIFICANT CHANGE UP (ref 150–400)
PMV BLD: 10.8 FL — SIGNIFICANT CHANGE UP (ref 7–13)
PMV BLD: 10.8 FL — SIGNIFICANT CHANGE UP (ref 7–13)
POTASSIUM SERPL-MCNC: 3.6 MMOL/L — SIGNIFICANT CHANGE UP (ref 3.5–5.3)
POTASSIUM SERPL-MCNC: 3.6 MMOL/L — SIGNIFICANT CHANGE UP (ref 3.5–5.3)
POTASSIUM SERPL-SCNC: 3.6 MMOL/L — SIGNIFICANT CHANGE UP (ref 3.5–5.3)
POTASSIUM SERPL-SCNC: 3.6 MMOL/L — SIGNIFICANT CHANGE UP (ref 3.5–5.3)
PROTHROM AB SERPL-ACNC: 20.9 SEC — HIGH (ref 9.8–13.1)
RBC # BLD: 4.39 M/UL — SIGNIFICANT CHANGE UP (ref 3.8–5.2)
RBC # BLD: 4.39 M/UL — SIGNIFICANT CHANGE UP (ref 3.8–5.2)
RBC # FLD: 15.4 % — HIGH (ref 10.3–14.5)
RBC # FLD: 15.4 % — HIGH (ref 10.3–14.5)
SODIUM SERPL-SCNC: 134 MMOL/L — LOW (ref 135–145)
SODIUM SERPL-SCNC: 134 MMOL/L — LOW (ref 135–145)
WBC # BLD: 13.28 K/UL — HIGH (ref 3.8–10.5)
WBC # BLD: 13.28 K/UL — HIGH (ref 3.8–10.5)
WBC # FLD AUTO: 13.28 K/UL — HIGH (ref 3.8–10.5)
WBC # FLD AUTO: 13.28 K/UL — HIGH (ref 3.8–10.5)

## 2019-10-12 RX ORDER — WARFARIN SODIUM 2.5 MG/1
4 TABLET ORAL ONCE
Refills: 0 | Status: COMPLETED | OUTPATIENT
Start: 2019-10-12 | End: 2019-10-12

## 2019-10-12 RX ADMIN — Medication 12.5 MILLIGRAM(S): at 04:53

## 2019-10-12 RX ADMIN — Medication 1: at 17:16

## 2019-10-12 RX ADMIN — Medication 40 MILLIGRAM(S): at 17:16

## 2019-10-12 RX ADMIN — WARFARIN SODIUM 4 MILLIGRAM(S): 2.5 TABLET ORAL at 17:16

## 2019-10-12 RX ADMIN — Medication 81 MILLIGRAM(S): at 08:49

## 2019-10-12 RX ADMIN — ATORVASTATIN CALCIUM 80 MILLIGRAM(S): 80 TABLET, FILM COATED ORAL at 22:29

## 2019-10-12 RX ADMIN — Medication 1: at 08:49

## 2019-10-12 RX ADMIN — Medication 40 MILLIGRAM(S): at 04:54

## 2019-10-12 RX ADMIN — Medication 12.5 MILLIGRAM(S): at 17:16

## 2019-10-12 RX ADMIN — Medication 20 MILLIGRAM(S): at 04:53

## 2019-10-12 NOTE — CHART NOTE - NSCHARTNOTEFT_GEN_A_CORE
Notified by Hillary PIÑA pt's BP was uncontrolled 190/78 mmHg - repeat BP was elevated, pt was asymptomatic.   Patient on Enalapril, Lasix & Metoprolol for BP control, metoprolol was recently reduced for bradycardia.   Uncontrolled HTN - Norvasc 5 mg PO x 1 dose given for BP control   Repeat BP was 147 / 69 mmHg pt hemodynamically stable will continue to monitor       Vital Signs Last 24 Hrs  T(C): 36.6 (11 Oct 2019 22:12), Max: 36.8 (11 Oct 2019 05:04)  T(F): 97.9 (11 Oct 2019 22:12), Max: 98.2 (11 Oct 2019 05:04)  HR: 74 (12 Oct 2019 00:06) (66 - 82)  BP: 147/69 (12 Oct 2019 00:06) (147/69 - 199/78)  BP(mean): --  RR: 18 (11 Oct 2019 22:12) (17 - 18)  SpO2: 98% (11 Oct 2019 22:12) (95% - 98%)

## 2019-10-13 LAB
ANION GAP SERPL CALC-SCNC: 14 MMO/L — SIGNIFICANT CHANGE UP (ref 7–14)
ANION GAP SERPL CALC-SCNC: 14 MMO/L — SIGNIFICANT CHANGE UP (ref 7–14)
BASOPHILS # BLD AUTO: 0.1 K/UL — SIGNIFICANT CHANGE UP (ref 0–0.2)
BASOPHILS NFR BLD AUTO: 0.8 % — SIGNIFICANT CHANGE UP (ref 0–2)
BUN SERPL-MCNC: 20 MG/DL — SIGNIFICANT CHANGE UP (ref 7–23)
BUN SERPL-MCNC: 20 MG/DL — SIGNIFICANT CHANGE UP (ref 7–23)
CALCIUM SERPL-MCNC: 8.9 MG/DL — SIGNIFICANT CHANGE UP (ref 8.4–10.5)
CALCIUM SERPL-MCNC: 8.9 MG/DL — SIGNIFICANT CHANGE UP (ref 8.4–10.5)
CHLORIDE SERPL-SCNC: 99 MMOL/L — SIGNIFICANT CHANGE UP (ref 98–107)
CHLORIDE SERPL-SCNC: 99 MMOL/L — SIGNIFICANT CHANGE UP (ref 98–107)
CO2 SERPL-SCNC: 25 MMOL/L — SIGNIFICANT CHANGE UP (ref 22–31)
CO2 SERPL-SCNC: 25 MMOL/L — SIGNIFICANT CHANGE UP (ref 22–31)
CREAT SERPL-MCNC: 0.75 MG/DL — SIGNIFICANT CHANGE UP (ref 0.5–1.3)
CREAT SERPL-MCNC: 0.75 MG/DL — SIGNIFICANT CHANGE UP (ref 0.5–1.3)
EOSINOPHIL # BLD AUTO: 0.57 K/UL — HIGH (ref 0–0.5)
EOSINOPHIL NFR BLD AUTO: 4.6 % — SIGNIFICANT CHANGE UP (ref 0–6)
GLUCOSE SERPL-MCNC: 136 MG/DL — HIGH (ref 70–99)
GLUCOSE SERPL-MCNC: 136 MG/DL — HIGH (ref 70–99)
HCT VFR BLD CALC: 35.9 % — SIGNIFICANT CHANGE UP (ref 34.5–45)
HCT VFR BLD CALC: 35.9 % — SIGNIFICANT CHANGE UP (ref 34.5–45)
HGB BLD-MCNC: 10.9 G/DL — LOW (ref 11.5–15.5)
HGB BLD-MCNC: 10.9 G/DL — LOW (ref 11.5–15.5)
IMM GRANULOCYTES NFR BLD AUTO: 0.4 % — SIGNIFICANT CHANGE UP (ref 0–1.5)
INR BLD: 2.2 — HIGH (ref 0.88–1.17)
LYMPHOCYTES # BLD AUTO: 1.6 K/UL — SIGNIFICANT CHANGE UP (ref 1–3.3)
LYMPHOCYTES # BLD AUTO: 12.8 % — LOW (ref 13–44)
MAGNESIUM SERPL-MCNC: 1.7 MG/DL — SIGNIFICANT CHANGE UP (ref 1.6–2.6)
MAGNESIUM SERPL-MCNC: 1.7 MG/DL — SIGNIFICANT CHANGE UP (ref 1.6–2.6)
MCHC RBC-ENTMCNC: 25.5 PG — LOW (ref 27–34)
MCHC RBC-ENTMCNC: 25.5 PG — LOW (ref 27–34)
MCHC RBC-ENTMCNC: 30.4 % — LOW (ref 32–36)
MCHC RBC-ENTMCNC: 30.4 % — LOW (ref 32–36)
MCV RBC AUTO: 83.9 FL — SIGNIFICANT CHANGE UP (ref 80–100)
MCV RBC AUTO: 83.9 FL — SIGNIFICANT CHANGE UP (ref 80–100)
MONOCYTES # BLD AUTO: 0.92 K/UL — HIGH (ref 0–0.9)
MONOCYTES NFR BLD AUTO: 7.3 % — SIGNIFICANT CHANGE UP (ref 2–14)
NEUTROPHILS # BLD AUTO: 9.28 K/UL — HIGH (ref 1.8–7.4)
NEUTROPHILS NFR BLD AUTO: 74.1 % — SIGNIFICANT CHANGE UP (ref 43–77)
NRBC # FLD: 0 K/UL — SIGNIFICANT CHANGE UP (ref 0–0)
NRBC # FLD: 0 K/UL — SIGNIFICANT CHANGE UP (ref 0–0)
PHOSPHATE SERPL-MCNC: 3.9 MG/DL — SIGNIFICANT CHANGE UP (ref 2.5–4.5)
PLATELET # BLD AUTO: 250 K/UL — SIGNIFICANT CHANGE UP (ref 150–400)
PLATELET # BLD AUTO: 250 K/UL — SIGNIFICANT CHANGE UP (ref 150–400)
PMV BLD: 10.4 FL — SIGNIFICANT CHANGE UP (ref 7–13)
PMV BLD: 10.4 FL — SIGNIFICANT CHANGE UP (ref 7–13)
POTASSIUM SERPL-MCNC: 3.6 MMOL/L — SIGNIFICANT CHANGE UP (ref 3.5–5.3)
POTASSIUM SERPL-MCNC: 3.6 MMOL/L — SIGNIFICANT CHANGE UP (ref 3.5–5.3)
POTASSIUM SERPL-SCNC: 3.6 MMOL/L — SIGNIFICANT CHANGE UP (ref 3.5–5.3)
POTASSIUM SERPL-SCNC: 3.6 MMOL/L — SIGNIFICANT CHANGE UP (ref 3.5–5.3)
PROTHROM AB SERPL-ACNC: 25 SEC — HIGH (ref 9.8–13.1)
RBC # BLD: 4.28 M/UL — SIGNIFICANT CHANGE UP (ref 3.8–5.2)
RBC # BLD: 4.28 M/UL — SIGNIFICANT CHANGE UP (ref 3.8–5.2)
RBC # FLD: 15.2 % — HIGH (ref 10.3–14.5)
RBC # FLD: 15.2 % — HIGH (ref 10.3–14.5)
SODIUM SERPL-SCNC: 138 MMOL/L — SIGNIFICANT CHANGE UP (ref 135–145)
SODIUM SERPL-SCNC: 138 MMOL/L — SIGNIFICANT CHANGE UP (ref 135–145)
WBC # BLD: 12.52 K/UL — HIGH (ref 3.8–10.5)
WBC # BLD: 12.52 K/UL — HIGH (ref 3.8–10.5)
WBC # FLD AUTO: 12.52 K/UL — HIGH (ref 3.8–10.5)
WBC # FLD AUTO: 12.52 K/UL — HIGH (ref 3.8–10.5)

## 2019-10-13 PROCEDURE — 71275 CT ANGIOGRAPHY CHEST: CPT | Mod: 26

## 2019-10-13 RX ORDER — WARFARIN SODIUM 2.5 MG/1
4 TABLET ORAL ONCE
Refills: 0 | Status: COMPLETED | OUTPATIENT
Start: 2019-10-13 | End: 2019-10-13

## 2019-10-13 RX ADMIN — Medication 2: at 17:34

## 2019-10-13 RX ADMIN — Medication 12.5 MILLIGRAM(S): at 17:35

## 2019-10-13 RX ADMIN — WARFARIN SODIUM 4 MILLIGRAM(S): 2.5 TABLET ORAL at 17:34

## 2019-10-13 RX ADMIN — Medication 20 MILLIGRAM(S): at 05:59

## 2019-10-13 RX ADMIN — Medication 1: at 09:22

## 2019-10-13 RX ADMIN — Medication 81 MILLIGRAM(S): at 08:42

## 2019-10-13 RX ADMIN — Medication 40 MILLIGRAM(S): at 05:59

## 2019-10-13 RX ADMIN — Medication 40 MILLIGRAM(S): at 17:35

## 2019-10-13 RX ADMIN — ATORVASTATIN CALCIUM 80 MILLIGRAM(S): 80 TABLET, FILM COATED ORAL at 23:34

## 2019-10-13 RX ADMIN — Medication 12.5 MILLIGRAM(S): at 05:59

## 2019-10-13 RX ADMIN — Medication 1: at 13:26

## 2019-10-14 LAB
ANION GAP SERPL CALC-SCNC: 12 MMO/L — SIGNIFICANT CHANGE UP (ref 7–14)
ANION GAP SERPL CALC-SCNC: 12 MMO/L — SIGNIFICANT CHANGE UP (ref 7–14)
BASOPHILS # BLD AUTO: 0.08 K/UL — SIGNIFICANT CHANGE UP (ref 0–0.2)
BASOPHILS NFR BLD AUTO: 0.7 % — SIGNIFICANT CHANGE UP (ref 0–2)
BUN SERPL-MCNC: 22 MG/DL — SIGNIFICANT CHANGE UP (ref 7–23)
BUN SERPL-MCNC: 22 MG/DL — SIGNIFICANT CHANGE UP (ref 7–23)
CALCIUM SERPL-MCNC: 8.9 MG/DL — SIGNIFICANT CHANGE UP (ref 8.4–10.5)
CALCIUM SERPL-MCNC: 8.9 MG/DL — SIGNIFICANT CHANGE UP (ref 8.4–10.5)
CHLORIDE SERPL-SCNC: 99 MMOL/L — SIGNIFICANT CHANGE UP (ref 98–107)
CHLORIDE SERPL-SCNC: 99 MMOL/L — SIGNIFICANT CHANGE UP (ref 98–107)
CO2 SERPL-SCNC: 25 MMOL/L — SIGNIFICANT CHANGE UP (ref 22–31)
CO2 SERPL-SCNC: 25 MMOL/L — SIGNIFICANT CHANGE UP (ref 22–31)
CREAT SERPL-MCNC: 0.78 MG/DL — SIGNIFICANT CHANGE UP (ref 0.5–1.3)
CREAT SERPL-MCNC: 0.78 MG/DL — SIGNIFICANT CHANGE UP (ref 0.5–1.3)
EOSINOPHIL # BLD AUTO: 0.65 K/UL — HIGH (ref 0–0.5)
EOSINOPHIL NFR BLD AUTO: 5.8 % — SIGNIFICANT CHANGE UP (ref 0–6)
GLUCOSE SERPL-MCNC: 144 MG/DL — HIGH (ref 70–99)
GLUCOSE SERPL-MCNC: 144 MG/DL — HIGH (ref 70–99)
HCT VFR BLD CALC: 38.1 % — SIGNIFICANT CHANGE UP (ref 34.5–45)
HCT VFR BLD CALC: 38.1 % — SIGNIFICANT CHANGE UP (ref 34.5–45)
HGB BLD-MCNC: 11 G/DL — LOW (ref 11.5–15.5)
HGB BLD-MCNC: 11 G/DL — LOW (ref 11.5–15.5)
IMM GRANULOCYTES NFR BLD AUTO: 0.4 % — SIGNIFICANT CHANGE UP (ref 0–1.5)
INR BLD: 2.58 — HIGH (ref 0.88–1.17)
LYMPHOCYTES # BLD AUTO: 1.55 K/UL — SIGNIFICANT CHANGE UP (ref 1–3.3)
LYMPHOCYTES # BLD AUTO: 13.8 % — SIGNIFICANT CHANGE UP (ref 13–44)
MAGNESIUM SERPL-MCNC: 1.8 MG/DL — SIGNIFICANT CHANGE UP (ref 1.6–2.6)
MAGNESIUM SERPL-MCNC: 1.8 MG/DL — SIGNIFICANT CHANGE UP (ref 1.6–2.6)
MCHC RBC-ENTMCNC: 24.7 PG — LOW (ref 27–34)
MCHC RBC-ENTMCNC: 24.7 PG — LOW (ref 27–34)
MCHC RBC-ENTMCNC: 28.9 % — LOW (ref 32–36)
MCHC RBC-ENTMCNC: 28.9 % — LOW (ref 32–36)
MCV RBC AUTO: 85.4 FL — SIGNIFICANT CHANGE UP (ref 80–100)
MCV RBC AUTO: 85.4 FL — SIGNIFICANT CHANGE UP (ref 80–100)
MONOCYTES # BLD AUTO: 0.83 K/UL — SIGNIFICANT CHANGE UP (ref 0–0.9)
MONOCYTES NFR BLD AUTO: 7.4 % — SIGNIFICANT CHANGE UP (ref 2–14)
NEUTROPHILS # BLD AUTO: 8.06 K/UL — HIGH (ref 1.8–7.4)
NEUTROPHILS NFR BLD AUTO: 71.9 % — SIGNIFICANT CHANGE UP (ref 43–77)
NRBC # FLD: 0 K/UL — SIGNIFICANT CHANGE UP (ref 0–0)
NRBC # FLD: 0 K/UL — SIGNIFICANT CHANGE UP (ref 0–0)
PHOSPHATE SERPL-MCNC: 3.8 MG/DL — SIGNIFICANT CHANGE UP (ref 2.5–4.5)
PLATELET # BLD AUTO: 265 K/UL — SIGNIFICANT CHANGE UP (ref 150–400)
PLATELET # BLD AUTO: 265 K/UL — SIGNIFICANT CHANGE UP (ref 150–400)
PMV BLD: 11.1 FL — SIGNIFICANT CHANGE UP (ref 7–13)
PMV BLD: 11.1 FL — SIGNIFICANT CHANGE UP (ref 7–13)
POTASSIUM SERPL-MCNC: 3.7 MMOL/L — SIGNIFICANT CHANGE UP (ref 3.5–5.3)
POTASSIUM SERPL-MCNC: 3.7 MMOL/L — SIGNIFICANT CHANGE UP (ref 3.5–5.3)
POTASSIUM SERPL-SCNC: 3.7 MMOL/L — SIGNIFICANT CHANGE UP (ref 3.5–5.3)
POTASSIUM SERPL-SCNC: 3.7 MMOL/L — SIGNIFICANT CHANGE UP (ref 3.5–5.3)
PROTHROM AB SERPL-ACNC: 30.3 SEC — HIGH (ref 9.8–13.1)
RBC # BLD: 4.46 M/UL — SIGNIFICANT CHANGE UP (ref 3.8–5.2)
RBC # BLD: 4.46 M/UL — SIGNIFICANT CHANGE UP (ref 3.8–5.2)
RBC # FLD: 15.3 % — HIGH (ref 10.3–14.5)
RBC # FLD: 15.3 % — HIGH (ref 10.3–14.5)
SODIUM SERPL-SCNC: 136 MMOL/L — SIGNIFICANT CHANGE UP (ref 135–145)
SODIUM SERPL-SCNC: 136 MMOL/L — SIGNIFICANT CHANGE UP (ref 135–145)
WBC # BLD: 11.21 K/UL — HIGH (ref 3.8–10.5)
WBC # BLD: 11.21 K/UL — HIGH (ref 3.8–10.5)
WBC # FLD AUTO: 11.21 K/UL — HIGH (ref 3.8–10.5)
WBC # FLD AUTO: 11.21 K/UL — HIGH (ref 3.8–10.5)

## 2019-10-14 RX ORDER — WARFARIN SODIUM 2.5 MG/1
3 TABLET ORAL ONCE
Refills: 0 | Status: COMPLETED | OUTPATIENT
Start: 2019-10-14 | End: 2019-10-14

## 2019-10-14 RX ORDER — FUROSEMIDE 40 MG
60 TABLET ORAL
Refills: 0 | Status: DISCONTINUED | OUTPATIENT
Start: 2019-10-14 | End: 2019-10-17

## 2019-10-14 RX ORDER — FUROSEMIDE 40 MG
20 TABLET ORAL ONCE
Refills: 0 | Status: COMPLETED | OUTPATIENT
Start: 2019-10-14 | End: 2019-10-14

## 2019-10-14 RX ORDER — AMLODIPINE BESYLATE 2.5 MG/1
5 TABLET ORAL DAILY
Refills: 0 | Status: DISCONTINUED | OUTPATIENT
Start: 2019-10-14 | End: 2019-10-17

## 2019-10-14 RX ADMIN — Medication 40 MILLIGRAM(S): at 04:45

## 2019-10-14 RX ADMIN — WARFARIN SODIUM 3 MILLIGRAM(S): 2.5 TABLET ORAL at 18:22

## 2019-10-14 RX ADMIN — AMLODIPINE BESYLATE 5 MILLIGRAM(S): 2.5 TABLET ORAL at 16:51

## 2019-10-14 RX ADMIN — Medication 60 MILLIGRAM(S): at 18:22

## 2019-10-14 RX ADMIN — Medication 20 MILLIGRAM(S): at 14:46

## 2019-10-14 RX ADMIN — Medication 81 MILLIGRAM(S): at 18:22

## 2019-10-14 RX ADMIN — Medication 20 MILLIGRAM(S): at 04:44

## 2019-10-14 RX ADMIN — ATORVASTATIN CALCIUM 80 MILLIGRAM(S): 80 TABLET, FILM COATED ORAL at 21:51

## 2019-10-14 RX ADMIN — Medication 1: at 16:50

## 2019-10-14 RX ADMIN — Medication 12.5 MILLIGRAM(S): at 04:45

## 2019-10-14 RX ADMIN — Medication 12.5 MILLIGRAM(S): at 18:22

## 2019-10-15 LAB
ANION GAP SERPL CALC-SCNC: 14 MMO/L — SIGNIFICANT CHANGE UP (ref 7–14)
BUN SERPL-MCNC: 26 MG/DL — HIGH (ref 7–23)
CALCIUM SERPL-MCNC: 8.6 MG/DL — SIGNIFICANT CHANGE UP (ref 8.4–10.5)
CHLORIDE SERPL-SCNC: 99 MMOL/L — SIGNIFICANT CHANGE UP (ref 98–107)
CO2 SERPL-SCNC: 26 MMOL/L — SIGNIFICANT CHANGE UP (ref 22–31)
CREAT SERPL-MCNC: 0.94 MG/DL — SIGNIFICANT CHANGE UP (ref 0.5–1.3)
GLUCOSE SERPL-MCNC: 129 MG/DL — HIGH (ref 70–99)
HCT VFR BLD CALC: 35.9 % — SIGNIFICANT CHANGE UP (ref 34.5–45)
HGB BLD-MCNC: 10.8 G/DL — LOW (ref 11.5–15.5)
INR BLD: 2.88 — HIGH (ref 0.88–1.17)
MAGNESIUM SERPL-MCNC: 1.8 MG/DL — SIGNIFICANT CHANGE UP (ref 1.6–2.6)
MCHC RBC-ENTMCNC: 25 PG — LOW (ref 27–34)
MCHC RBC-ENTMCNC: 30.1 % — LOW (ref 32–36)
MCV RBC AUTO: 83.1 FL — SIGNIFICANT CHANGE UP (ref 80–100)
NRBC # FLD: 0 K/UL — SIGNIFICANT CHANGE UP (ref 0–0)
PLATELET # BLD AUTO: 259 K/UL — SIGNIFICANT CHANGE UP (ref 150–400)
PMV BLD: 10.8 FL — SIGNIFICANT CHANGE UP (ref 7–13)
POTASSIUM SERPL-MCNC: 3.7 MMOL/L — SIGNIFICANT CHANGE UP (ref 3.5–5.3)
POTASSIUM SERPL-SCNC: 3.7 MMOL/L — SIGNIFICANT CHANGE UP (ref 3.5–5.3)
PROTHROM AB SERPL-ACNC: 33 SEC — HIGH (ref 9.8–13.1)
RBC # BLD: 4.32 M/UL — SIGNIFICANT CHANGE UP (ref 3.8–5.2)
RBC # FLD: 15.4 % — HIGH (ref 10.3–14.5)
SODIUM SERPL-SCNC: 139 MMOL/L — SIGNIFICANT CHANGE UP (ref 135–145)
WBC # BLD: 11.21 K/UL — HIGH (ref 3.8–10.5)
WBC # FLD AUTO: 11.21 K/UL — HIGH (ref 3.8–10.5)

## 2019-10-15 RX ORDER — WARFARIN SODIUM 2.5 MG/1
3 TABLET ORAL ONCE
Refills: 0 | Status: COMPLETED | OUTPATIENT
Start: 2019-10-15 | End: 2019-10-15

## 2019-10-15 RX ADMIN — Medication 81 MILLIGRAM(S): at 13:07

## 2019-10-15 RX ADMIN — Medication 20 MILLIGRAM(S): at 04:54

## 2019-10-15 RX ADMIN — Medication 60 MILLIGRAM(S): at 18:28

## 2019-10-15 RX ADMIN — WARFARIN SODIUM 3 MILLIGRAM(S): 2.5 TABLET ORAL at 18:28

## 2019-10-15 RX ADMIN — ATORVASTATIN CALCIUM 80 MILLIGRAM(S): 80 TABLET, FILM COATED ORAL at 21:16

## 2019-10-15 RX ADMIN — Medication 12.5 MILLIGRAM(S): at 18:28

## 2019-10-15 RX ADMIN — Medication 60 MILLIGRAM(S): at 04:55

## 2019-10-15 RX ADMIN — AMLODIPINE BESYLATE 5 MILLIGRAM(S): 2.5 TABLET ORAL at 04:54

## 2019-10-15 RX ADMIN — Medication 12.5 MILLIGRAM(S): at 04:55

## 2019-10-15 RX ADMIN — Medication 5: at 18:27

## 2019-10-15 RX ADMIN — Medication 2: at 13:07

## 2019-10-16 LAB
ANION GAP SERPL CALC-SCNC: 14 MMO/L — SIGNIFICANT CHANGE UP (ref 7–14)
BUN SERPL-MCNC: 29 MG/DL — HIGH (ref 7–23)
CALCIUM SERPL-MCNC: 8.5 MG/DL — SIGNIFICANT CHANGE UP (ref 8.4–10.5)
CHLORIDE SERPL-SCNC: 100 MMOL/L — SIGNIFICANT CHANGE UP (ref 98–107)
CO2 SERPL-SCNC: 24 MMOL/L — SIGNIFICANT CHANGE UP (ref 22–31)
CREAT SERPL-MCNC: 0.95 MG/DL — SIGNIFICANT CHANGE UP (ref 0.5–1.3)
GLUCOSE SERPL-MCNC: 134 MG/DL — HIGH (ref 70–99)
HCT VFR BLD CALC: 35.7 % — SIGNIFICANT CHANGE UP (ref 34.5–45)
HGB BLD-MCNC: 10.9 G/DL — LOW (ref 11.5–15.5)
INR BLD: 2.91 — HIGH (ref 0.88–1.17)
MAGNESIUM SERPL-MCNC: 1.9 MG/DL — SIGNIFICANT CHANGE UP (ref 1.6–2.6)
MCHC RBC-ENTMCNC: 25.4 PG — LOW (ref 27–34)
MCHC RBC-ENTMCNC: 30.5 % — LOW (ref 32–36)
MCV RBC AUTO: 83.2 FL — SIGNIFICANT CHANGE UP (ref 80–100)
NRBC # FLD: 0 K/UL — SIGNIFICANT CHANGE UP (ref 0–0)
PLATELET # BLD AUTO: 284 K/UL — SIGNIFICANT CHANGE UP (ref 150–400)
PMV BLD: 10.9 FL — SIGNIFICANT CHANGE UP (ref 7–13)
POTASSIUM SERPL-MCNC: 3.6 MMOL/L — SIGNIFICANT CHANGE UP (ref 3.5–5.3)
POTASSIUM SERPL-SCNC: 3.6 MMOL/L — SIGNIFICANT CHANGE UP (ref 3.5–5.3)
PROTHROM AB SERPL-ACNC: 34.3 SEC — HIGH (ref 9.8–13.1)
RBC # BLD: 4.29 M/UL — SIGNIFICANT CHANGE UP (ref 3.8–5.2)
RBC # FLD: 15.5 % — HIGH (ref 10.3–14.5)
SODIUM SERPL-SCNC: 138 MMOL/L — SIGNIFICANT CHANGE UP (ref 135–145)
WBC # BLD: 13.75 K/UL — HIGH (ref 3.8–10.5)
WBC # FLD AUTO: 13.75 K/UL — HIGH (ref 3.8–10.5)

## 2019-10-16 PROCEDURE — 93320 DOPPLER ECHO COMPLETE: CPT | Mod: 26,GC

## 2019-10-16 PROCEDURE — 93325 DOPPLER ECHO COLOR FLOW MAPG: CPT | Mod: 26,GC

## 2019-10-16 PROCEDURE — 93312 ECHO TRANSESOPHAGEAL: CPT | Mod: 26

## 2019-10-16 RX ORDER — WARFARIN SODIUM 2.5 MG/1
1 TABLET ORAL ONCE
Refills: 0 | Status: COMPLETED | OUTPATIENT
Start: 2019-10-16 | End: 2019-10-16

## 2019-10-16 RX ADMIN — Medication 60 MILLIGRAM(S): at 17:59

## 2019-10-16 RX ADMIN — ATORVASTATIN CALCIUM 80 MILLIGRAM(S): 80 TABLET, FILM COATED ORAL at 21:49

## 2019-10-16 RX ADMIN — Medication 1: at 21:49

## 2019-10-16 RX ADMIN — Medication 12.5 MILLIGRAM(S): at 18:00

## 2019-10-16 RX ADMIN — Medication 81 MILLIGRAM(S): at 18:00

## 2019-10-16 RX ADMIN — WARFARIN SODIUM 1 MILLIGRAM(S): 2.5 TABLET ORAL at 18:00

## 2019-10-16 NOTE — CHART NOTE - NSCHARTNOTEFT_GEN_A_CORE
Type of Procedure: GIACOMO (Transesophageal echocardiogram)  Licensed independent practitioner Kristi  Assistant: Sergio  Estimated blood loss: 0  Specimen removed: n/a  Preoperative Dx: MVR/ AVR  Postoperative Dx: LAYLA thrombus, normal functioning AVR, MVR  Complications: none  Anesthesia type: conscious    Findings: as above                Full report to follow    Josh Berry MD  [facility]

## 2019-10-16 NOTE — PROVIDER CONTACT NOTE (OTHER) - ASSESSMENT
Pt vitals as noted. PT asymptomatic resting in bed. Pt received their BP medication @ 18:00 as per there BP was  184/73.

## 2019-10-17 ENCOUNTER — TRANSCRIPTION ENCOUNTER (OUTPATIENT)
Age: 79
End: 2019-10-17

## 2019-10-17 VITALS
RESPIRATION RATE: 14 BRPM | OXYGEN SATURATION: 97 % | SYSTOLIC BLOOD PRESSURE: 145 MMHG | HEART RATE: 82 BPM | TEMPERATURE: 99 F | DIASTOLIC BLOOD PRESSURE: 50 MMHG

## 2019-10-17 LAB
ANION GAP SERPL CALC-SCNC: 16 MMO/L — HIGH (ref 7–14)
BASOPHILS # BLD AUTO: 0.11 K/UL — SIGNIFICANT CHANGE UP (ref 0–0.2)
BASOPHILS NFR BLD AUTO: 1 % — SIGNIFICANT CHANGE UP (ref 0–2)
BUN SERPL-MCNC: 25 MG/DL — HIGH (ref 7–23)
CALCIUM SERPL-MCNC: 8.7 MG/DL — SIGNIFICANT CHANGE UP (ref 8.4–10.5)
CHLORIDE SERPL-SCNC: 101 MMOL/L — SIGNIFICANT CHANGE UP (ref 98–107)
CO2 SERPL-SCNC: 20 MMOL/L — LOW (ref 22–31)
CREAT SERPL-MCNC: 0.85 MG/DL — SIGNIFICANT CHANGE UP (ref 0.5–1.3)
EOSINOPHIL # BLD AUTO: 0.42 K/UL — SIGNIFICANT CHANGE UP (ref 0–0.5)
EOSINOPHIL NFR BLD AUTO: 3.8 % — SIGNIFICANT CHANGE UP (ref 0–6)
GLUCOSE SERPL-MCNC: 121 MG/DL — HIGH (ref 70–99)
HCT VFR BLD CALC: 37 % — SIGNIFICANT CHANGE UP (ref 34.5–45)
HGB BLD-MCNC: 11.3 G/DL — LOW (ref 11.5–15.5)
IMM GRANULOCYTES NFR BLD AUTO: 0.5 % — SIGNIFICANT CHANGE UP (ref 0–1.5)
INR BLD: 3.26 — HIGH (ref 0.88–1.17)
LYMPHOCYTES # BLD AUTO: 1.22 K/UL — SIGNIFICANT CHANGE UP (ref 1–3.3)
LYMPHOCYTES # BLD AUTO: 10.9 % — LOW (ref 13–44)
MAGNESIUM SERPL-MCNC: 1.9 MG/DL — SIGNIFICANT CHANGE UP (ref 1.6–2.6)
MCHC RBC-ENTMCNC: 25.3 PG — LOW (ref 27–34)
MCHC RBC-ENTMCNC: 30.5 % — LOW (ref 32–36)
MCV RBC AUTO: 83 FL — SIGNIFICANT CHANGE UP (ref 80–100)
MONOCYTES # BLD AUTO: 0.73 K/UL — SIGNIFICANT CHANGE UP (ref 0–0.9)
MONOCYTES NFR BLD AUTO: 6.5 % — SIGNIFICANT CHANGE UP (ref 2–14)
NEUTROPHILS # BLD AUTO: 8.66 K/UL — HIGH (ref 1.8–7.4)
NEUTROPHILS NFR BLD AUTO: 77.3 % — HIGH (ref 43–77)
NRBC # FLD: 0 K/UL — SIGNIFICANT CHANGE UP (ref 0–0)
PLATELET # BLD AUTO: 279 K/UL — SIGNIFICANT CHANGE UP (ref 150–400)
PMV BLD: 10.5 FL — SIGNIFICANT CHANGE UP (ref 7–13)
POTASSIUM SERPL-MCNC: 4.3 MMOL/L — SIGNIFICANT CHANGE UP (ref 3.5–5.3)
POTASSIUM SERPL-SCNC: 4.3 MMOL/L — SIGNIFICANT CHANGE UP (ref 3.5–5.3)
PROTHROM AB SERPL-ACNC: 37.5 SEC — HIGH (ref 9.8–13.1)
RBC # BLD: 4.46 M/UL — SIGNIFICANT CHANGE UP (ref 3.8–5.2)
RBC # FLD: 15.5 % — HIGH (ref 10.3–14.5)
SODIUM SERPL-SCNC: 137 MMOL/L — SIGNIFICANT CHANGE UP (ref 135–145)
WBC # BLD: 11.2 K/UL — HIGH (ref 3.8–10.5)
WBC # FLD AUTO: 11.2 K/UL — HIGH (ref 3.8–10.5)

## 2019-10-17 RX ORDER — FUROSEMIDE 40 MG
40 TABLET ORAL EVERY 12 HOURS
Refills: 0 | Status: DISCONTINUED | OUTPATIENT
Start: 2019-10-17 | End: 2019-10-17

## 2019-10-17 RX ORDER — FOLIC ACID 0.8 MG
1 TABLET ORAL
Qty: 0 | Refills: 0 | DISCHARGE

## 2019-10-17 RX ORDER — ATORVASTATIN CALCIUM 80 MG/1
1 TABLET, FILM COATED ORAL
Qty: 30 | Refills: 0
Start: 2019-10-17 | End: 2019-11-15

## 2019-10-17 RX ORDER — FUROSEMIDE 40 MG
1 TABLET ORAL
Qty: 60 | Refills: 0
Start: 2019-10-17 | End: 2019-11-15

## 2019-10-17 RX ORDER — ASPIRIN/CALCIUM CARB/MAGNESIUM 324 MG
1 TABLET ORAL
Qty: 30 | Refills: 0
Start: 2019-10-17 | End: 2019-11-15

## 2019-10-17 RX ORDER — METOPROLOL TARTRATE 50 MG
0.5 TABLET ORAL
Qty: 30 | Refills: 0
Start: 2019-10-17 | End: 2019-11-15

## 2019-10-17 RX ORDER — AMLODIPINE BESYLATE 2.5 MG/1
1 TABLET ORAL
Qty: 30 | Refills: 0
Start: 2019-10-17 | End: 2019-11-15

## 2019-10-17 RX ORDER — FOLIC ACID 0.8 MG
1 TABLET ORAL
Qty: 30 | Refills: 0
Start: 2019-10-17 | End: 2019-11-15

## 2019-10-17 RX ORDER — POTASSIUM CHLORIDE 20 MEQ
1 PACKET (EA) ORAL
Qty: 0 | Refills: 0 | DISCHARGE

## 2019-10-17 RX ORDER — WARFARIN SODIUM 2.5 MG/1
1 TABLET ORAL
Qty: 0 | Refills: 0 | DISCHARGE

## 2019-10-17 RX ADMIN — AMLODIPINE BESYLATE 5 MILLIGRAM(S): 2.5 TABLET ORAL at 05:43

## 2019-10-17 RX ADMIN — Medication 81 MILLIGRAM(S): at 11:47

## 2019-10-17 RX ADMIN — INFLUENZA VIRUS VACCINE 0.5 MILLILITER(S): 15; 15; 15; 15 SUSPENSION INTRAMUSCULAR at 12:39

## 2019-10-17 RX ADMIN — Medication 60 MILLIGRAM(S): at 05:43

## 2019-10-17 RX ADMIN — Medication 12.5 MILLIGRAM(S): at 05:43

## 2019-10-17 RX ADMIN — Medication 20 MILLIGRAM(S): at 05:43

## 2019-10-17 RX ADMIN — Medication 1: at 12:36

## 2019-10-17 NOTE — DISCHARGE NOTE PROVIDER - NSDCCPCAREPLAN_GEN_ALL_CORE_FT
PRINCIPAL DISCHARGE DIAGNOSIS  Diagnosis: Acute on chronic systolic congestive heart failure  Assessment and Plan of Treatment: Low salt diet, fluid restriction to 1500 ml daily, monitor your fluid intake and weight daily, exercise as tolerated 30 minutes daily, and follow up with cardiology within 1-2 weeks; call for appointment.  Continue lasix 40mg by mouth every 12 hours as directed.      SECONDARY DISCHARGE DIAGNOSES  Diagnosis: Coronary artery disease involving native coronary artery of native heart without angina pectoris  Assessment and Plan of Treatment: Continue aspirin and do not stop unless instructed by your physician.  Continue low salt, fat, cholesterol and carbohydrate diet. Follow up with cardiologist and primary care physician's routine appointment.    Diagnosis: Chronic atrial fibrillation  Assessment and Plan of Treatment: Continue medications as prescribed.  Don't take your coumadin today 10/17/19 as your INR level is high today.  Resume taking your coumadin 3mg by mouth daily on 10/18/19.    Follow up with your PCP within 2-3 days for a check of your INR level; call for appointment.  Your INR goal is between 2-3.  Your PCP will guide you on your coumadin dosing based on your INR level.    Diagnosis: Essential hypertension  Assessment and Plan of Treatment: Low sodium and fat diet, continue anti-hypertensive medications, and follow up with primary care physician. PRINCIPAL DISCHARGE DIAGNOSIS  Diagnosis: Acute on chronic systolic congestive heart failure  Assessment and Plan of Treatment: Low salt diet, fluid restriction to 1500 ml daily, monitor your fluid intake and weight daily, exercise as tolerated 30 minutes daily, and follow up with cardiology within 1-2 weeks; call for appointment.  Continue lasix 40mg by mouth every 12 hours as directed.      SECONDARY DISCHARGE DIAGNOSES  Diagnosis: Diabetes mellitus  Assessment and Plan of Treatment: Monitor finger sticks pre-meal and bedtime, low salt, fat and carbohydrate diet, minimize glucose intake.  Exercise daily for at least 30 minutes and weight loss.  Follow up with primary care physician and endocrinologist for routine Hemoglobin A1C checks and management.  Follow up with your ophthalmologist for routine yearly vision exams.    Diagnosis: Thrombus of left atrial appendage  Assessment and Plan of Treatment: Your echocardiogram revealed thrombus (clot) of left atrial appendage of your heart.     Don't take your coumadin today 10/17/19 as your INR level is high today.  Resume taking your coumadin 3mg by mouth daily on 10/18/19.    Follow up with your PCP within 2-3 days for a check of your INR level; call for appointment.  Your INR goal is between 2-3.  Your PCP will guide you on your coumadin dosing based on your INR level.    Diagnosis: Leukocytosis  Assessment and Plan of Treatment: Your white blood cell count is now improving.  Follow up with your PCP within 1 week for repeat blood work to check your WBC count (complete blood count); call for appointment.    Diagnosis: Coronary artery disease involving native coronary artery of native heart without angina pectoris  Assessment and Plan of Treatment: Continue aspirin and do not stop unless instructed by your physician.  Continue low salt, fat, cholesterol and carbohydrate diet. Follow up with cardiologist and primary care physician's routine appointment.    Diagnosis: Chronic atrial fibrillation  Assessment and Plan of Treatment: Continue medications as prescribed.  Don't take your coumadin today 10/17/19 as your INR level is high today.  Resume taking your coumadin 3mg by mouth daily on 10/18/19.    Follow up with your PCP within 2-3 days for a check of your INR level; call for appointment.  Your INR goal is between 2-3.  Your PCP will guide you on your coumadin dosing based on your INR level.    Diagnosis: Essential hypertension  Assessment and Plan of Treatment: Low sodium and fat diet, continue anti-hypertensive medications, and follow up with primary care physician. PRINCIPAL DISCHARGE DIAGNOSIS  Diagnosis: Acute on chronic systolic congestive heart failure  Assessment and Plan of Treatment: Low salt diet, fluid restriction to 1500 ml daily, monitor your fluid intake and weight daily, exercise as tolerated 30 minutes daily, and follow up with cardiology within 1-2 weeks; call for appointment.  Continue lasix 40mg by mouth every 12 hours as directed.      SECONDARY DISCHARGE DIAGNOSES  Diagnosis: Diabetes mellitus  Assessment and Plan of Treatment: Continue diet modification. Avoid complex carbohydrates such as bread, pasta, cereal, white rice, white potatoes, etc. Avoid concentrated sugar as found in desserts, candy, soda, juice, etc. Consume a diet based on lean protein (chicken, fish) and vegetables.   You should follow up with your PCP within 1 week for ongoing management of you diabetes; call for appointment.  You should have your HgA1C blood test checked every 3 months.  You should have yearly routine eye & foot examinations.    Diagnosis: Thrombus of left atrial appendage  Assessment and Plan of Treatment: Your echocardiogram revealed thrombus (clot) of left atrial appendage of your heart.     Don't take your coumadin today 10/17/19 as your INR level is high today.  Resume taking your coumadin 3mg by mouth daily on 10/18/19.    Follow up with your PCP within 2-3 days for a check of your INR level; call for appointment.  Your INR goal is between 2-3.  Your PCP will guide you on your coumadin dosing based on your INR level.    Diagnosis: Leukocytosis  Assessment and Plan of Treatment: Your white blood cell count is now improving.  Follow up with your PCP within 1 week for repeat blood work to check your WBC count (complete blood count); call for appointment.    Diagnosis: Coronary artery disease involving native coronary artery of native heart without angina pectoris  Assessment and Plan of Treatment: Continue aspirin and do not stop unless instructed by your physician.  Continue low salt, fat, cholesterol and carbohydrate diet. Follow up with cardiologist and primary care physician's routine appointment.    Diagnosis: Chronic atrial fibrillation  Assessment and Plan of Treatment: Continue medications as prescribed.  Don't take your coumadin today 10/17/19 as your INR level is high today.  Resume taking your coumadin 3mg by mouth daily on 10/18/19.    Follow up with your PCP within 2-3 days for a check of your INR level; call for appointment.  Your INR goal is between 2-3.  Your PCP will guide you on your coumadin dosing based on your INR level.    Diagnosis: Essential hypertension  Assessment and Plan of Treatment: Low sodium and fat diet, continue anti-hypertensive medications, and follow up with primary care physician. PRINCIPAL DISCHARGE DIAGNOSIS  Diagnosis: Acute on chronic systolic congestive heart failure  Assessment and Plan of Treatment: Low salt diet, fluid restriction to 1500 ml daily, monitor your fluid intake and weight daily, exercise as tolerated 30 minutes daily, and follow up with cardiology within 1-2 weeks; call for appointment.  Continue lasix 40mg by mouth every 12 hours as directed.      SECONDARY DISCHARGE DIAGNOSES  Diagnosis: Pulmonary HTN  Assessment and Plan of Treatment: Continue medications as prescribed.  Follow up with your PCP & cardiology.    Diagnosis: Pulmonary nodule, right  Assessment and Plan of Treatment: Your imaging revealed a right apical nodule.  Follow up with your PCP within 1 week; call for appointment.  You will need routine chest imaging to monitor this nodule as outpatient.  This imaging can be arranged by your PCP.    Diagnosis: Diabetes mellitus  Assessment and Plan of Treatment: Continue diet modification. Avoid complex carbohydrates such as bread, pasta, cereal, white rice, white potatoes, etc. Avoid concentrated sugar as found in desserts, candy, soda, juice, etc. Consume a diet based on lean protein (chicken, fish) and vegetables.   You should follow up with your PCP within 1 week for ongoing management of you diabetes; call for appointment.  You should have your HgA1C blood test checked every 3 months.  You should have yearly routine eye & foot examinations.    Diagnosis: Thrombus of left atrial appendage  Assessment and Plan of Treatment: Your echocardiogram revealed thrombus (clot) of left atrial appendage of your heart.     Don't take your coumadin today 10/17/19 as your INR level is high today.  Resume taking your coumadin 3mg by mouth daily on 10/18/19.    Follow up with your PCP within 2-3 days for a check of your INR level; call for appointment.  Your INR goal is between 2-3.  Your PCP will guide you on your coumadin dosing based on your INR level.    Diagnosis: Leukocytosis  Assessment and Plan of Treatment: Your white blood cell count is now improving.  Follow up with your PCP within 1 week for repeat blood work to check your WBC count (complete blood count); call for appointment.    Diagnosis: Coronary artery disease involving native coronary artery of native heart without angina pectoris  Assessment and Plan of Treatment: Continue aspirin and do not stop unless instructed by your physician.  Continue low salt, fat, cholesterol and carbohydrate diet. Follow up with cardiologist and primary care physician's routine appointment.    Diagnosis: Chronic atrial fibrillation  Assessment and Plan of Treatment: Continue medications as prescribed.  Don't take your coumadin today 10/17/19 as your INR level is high today.  Resume taking your coumadin 3mg by mouth daily on 10/18/19.    Follow up with your PCP within 2-3 days for a check of your INR level; call for appointment.  Your INR goal is between 2-3.  Your PCP will guide you on your coumadin dosing based on your INR level.    Diagnosis: Essential hypertension  Assessment and Plan of Treatment: Low sodium and fat diet, continue anti-hypertensive medications, and follow up with primary care physician. PRINCIPAL DISCHARGE DIAGNOSIS  Diagnosis: Acute on chronic systolic congestive heart failure  Assessment and Plan of Treatment: Low salt diet, fluid restriction to 1500 ml daily, monitor your fluid intake and weight daily, exercise as tolerated 30 minutes daily.  Continue lasix 40mg by mouth every 12 hours as directed.  Follow up with cardiologist Dr. ROSANGELA Dugan on 10/25/19 at 4:30pm (an appointment has been made for you).      SECONDARY DISCHARGE DIAGNOSES  Diagnosis: Pulmonary HTN  Assessment and Plan of Treatment: Continue medications as prescribed.  Follow up with your PCP & cardiology.    Diagnosis: Pulmonary nodule, right  Assessment and Plan of Treatment: Your imaging revealed a right apical nodule.  Follow up with your PCP within 1 week; call for appointment.  You will need routine chest imaging to monitor this nodule as outpatient.  This imaging can be arranged by your PCP.    Diagnosis: Diabetes mellitus  Assessment and Plan of Treatment: Continue diet modification. Avoid complex carbohydrates such as bread, pasta, cereal, white rice, white potatoes, etc. Avoid concentrated sugar as found in desserts, candy, soda, juice, etc. Consume a diet based on lean protein (chicken, fish) and vegetables.   You should follow up with your PCP within 1 week for ongoing management of you diabetes; call for appointment.  You should have your HgA1C blood test checked every 3 months.  You should have yearly routine eye & foot examinations.    Diagnosis: Thrombus of left atrial appendage  Assessment and Plan of Treatment: Your echocardiogram revealed thrombus (clot) of left atrial appendage of your heart.     Don't take your coumadin today 10/17/19 as your INR level is high today.  Resume taking your coumadin 3mg by mouth daily on 10/18/19.    Follow up with your PCP within 2-3 days for a check of your INR level; call for appointment.  Your INR goal is between 2-3.  Your PCP will guide you on your coumadin dosing based on your INR level.    Diagnosis: Leukocytosis  Assessment and Plan of Treatment: Your white blood cell count is now improving.  Follow up with your PCP within 1 week for repeat blood work to check your WBC count (complete blood count); call for appointment.    Diagnosis: Coronary artery disease involving native coronary artery of native heart without angina pectoris  Assessment and Plan of Treatment: Continue aspirin and do not stop unless instructed by your physician.  Continue low salt, fat, cholesterol and carbohydrate diet. Follow up with cardiologist and primary care physician's routine appointment.    Diagnosis: Chronic atrial fibrillation  Assessment and Plan of Treatment: Continue medications as prescribed.  Don't take your coumadin today 10/17/19 as your INR level is high today.  Resume taking your coumadin 3mg by mouth daily on 10/18/19.    Follow up with your PCP within 2-3 days for a check of your INR level; call for appointment.  Your INR goal is between 2-3.  Your PCP will guide you on your coumadin dosing based on your INR level.    Diagnosis: Essential hypertension  Assessment and Plan of Treatment: Low sodium and fat diet, continue anti-hypertensive medications, and follow up with primary care physician.

## 2019-10-17 NOTE — DISCHARGE NOTE PROVIDER - CARE PROVIDER_API CALL
Collin Trejo (MD)  Cardiovascular Disease; Nuclear Cardiology  [address] [place] [address]  Phone: [phone]  Fax: [phone]  Follow Up Time:     Rick West)  Internal Medicine  [address] [place] [address]  Phone: [phone]  Fax: [phone]  Follow Up Time: Collin Trejo)  Cardiovascular Disease; Nuclear Cardiology  [address] [place] [address]  Phone: [phone]  Fax: [phone]  Follow Up Time:     Rick West)  Internal Medicine  [address] [place] [address]  Phone: [phone]  Fax: [phone]  Follow Up Time:     Cristobal Zapien  Cardiologist  Phone: (   )    -  Fax: (   )    -  Follow Up Time: Collin Trejo)  Cardiovascular Disease; Nuclear Cardiology  [address] [place] [address]  Phone: [phone]  Fax: [phone]  Follow Up Time:     Rick West)  Internal Medicine  [address] [place] [address]  Phone: [phone]  Fax: [phone]  Follow Up Time:     ROSANGELA Dugan  [address] [place] [address]  Phone: (   )    -  Fax: (   )    -  Follow Up Time:

## 2019-10-17 NOTE — DISCHARGE NOTE PROVIDER - NSDCFUADDINST_GEN_ALL_CORE_FT
Follow up with cardiology within 1-2 weeks; call for appointment.    Follow up with your PCP for a check of your INR within 2-3 days; call for appointment. Follow up with cardiology within 1-2 weeks; call for appointment.    Follow up with your PCP for a check of your INR within 2-3 days; call for appointment.  You will also need a repeat CBC (complete blood count) blood test within 1 week to monitor your white blood cell count. Follow up with cardiology Dr. ROSANGELA Dugan on 10/25/19 at 4:30pm (an appointment has been made for you).     Follow up with your PCP for a check of your INR within 2-3 days; call for appointment.  You will also need a repeat CBC (complete blood count) blood test within 1 week to monitor your white blood cell count.

## 2019-10-17 NOTE — PROGRESS NOTE ADULT - SUBJECTIVE AND OBJECTIVE BOX
Collin Trejo MD  Interventional Cardiology / Advance Heart Failure and Cardiac Transplant Specialist  [place] Office : [address] [place] [address] [place] [address]  Tel:   [place] Office : [address] [facility] [place] [address]  Tel: [phone]  Cell : [phone]    Pt is lying in bed comfortable not in distress, no chest pains no SOB no palpitations  	  MEDICATIONS:  amLODIPine   Tablet 5 milliGRAM(s) Oral daily  aspirin enteric coated 81 milliGRAM(s) Oral daily  enalapril 20 milliGRAM(s) Oral daily  furosemide   Injectable 60 milliGRAM(s) IV Push two times a day  metoprolol tartrate 12.5 milliGRAM(s) Oral two times a day  warfarin 1 milliGRAM(s) Oral once            atorvastatin 80 milliGRAM(s) Oral at bedtime  dextrose 40% Gel 15 Gram(s) Oral once PRN  dextrose 50% Injectable 12.5 Gram(s) IV Push once  dextrose 50% Injectable 25 Gram(s) IV Push once  dextrose 50% Injectable 25 Gram(s) IV Push once  glucagon  Injectable 1 milliGRAM(s) IntraMuscular once PRN  insulin lispro (HumaLOG) corrective regimen sliding scale   SubCutaneous three times a day before meals  insulin lispro (HumaLOG) corrective regimen sliding scale   SubCutaneous at bedtime    dextrose 5%. 1000 milliLiter(s) IV Continuous <Continuous>  influenza   Vaccine 0.5 milliLiter(s) IntraMuscular once      PAST MEDICAL/SURGICAL HISTORY  PAST MEDICAL & SURGICAL HISTORY:  Diabetes mellitus: not on meds  Gout  Upper GI bleed  CHF (congestive heart failure)  Mitral Regurgitation  CVA (Cerebral Infarction): 2011  CAD (Coronary Artery Disease): s/p stents and CABG  Afib: (on Warfarin)  HTN (Hypertension)  H/O mitral valve replacement: 9/22/14  H/O aortic valve replacement: 9/22/14  S/P CABG x 1: (2000)  S/P angioplasty with stent: 2011      SOCIAL HISTORY: Substance Use (street drugs): ( x ) never used  (  ) other:    FAMILY HISTORY:  Family history of acute myocardial infarction: mother      REVIEW OF SYSTEMS:  CONSTITUTIONAL: No fever, weight loss, or fatigue  EYES: No eye pain, visual disturbances, or discharge  ENMT:  No difficulty hearing, tinnitus, vertigo; No sinus or throat pain  BREASTS: No pain, masses, or nipple discharge  GASTROINTESTINAL: No abdominal or epigastric pain. No nausea, vomiting, or hematemesis; No diarrhea or constipation. No melena or hematochezia.  GENITOURINARY: No dysuria, frequency, hematuria, or incontinence  NEUROLOGICAL: No headaches, memory loss, loss of strength, numbness, or tremors  ENDOCRINE: No heat or cold intolerance; No hair loss  MUSCULOSKELETAL: No joint pain or swelling; No muscle, back, or extremity pain  PSYCHIATRIC: No depression, anxiety, mood swings, or difficulty sleeping  HEME/LYMPH: No easy bruising, or bleeding gums  All others negative    PHYSICAL EXAM:  T(C): 37.1 (10-16-19 @ 15:15), Max: 37.1 (10-16-19 @ 15:15)  HR: 88 (10-16-19 @ 15:15) (78 - 88)  BP: 158/69 (10-16-19 @ 15:15) (140/69 - 189/74)  RR: 17 (10-16-19 @ 15:15) (17 - 18)  SpO2: 96% (10-16-19 @ 15:15) (95% - 98%)  Wt(kg): --  I&O's Summary    15 Oct 2019 07:01  -  16 Oct 2019 07:00  --------------------------------------------------------  IN: 120 mL / OUT: 0 mL / NET: 120 mL          GENERAL: NAD, well-groomed, well-developed  EYES: EOMI, PERRLA, conjunctiva and sclera clear  ENMT: No tonsillar erythema, exudates, or enlargement; Moist mucous membranes, Good dentition, No lesions  Cardiovascular: Normal S1 S2, No JVD, No murmurs, No edema  Respiratory: Lungs clear to auscultation	  Gastrointestinal:  Soft, Non-tender, + BS	  Extremities: Normal range of motion, No clubbing, cyanosis or edema  LYMPH: No lymphadenopathy noted  NERVOUS SYSTEM:  Alert & Oriented X3, Good concentration; Motor Strength 5/5 B/L upper and lower extremities; DTRs 2+ intact and symmetric                                    10.9   13.75 )-----------( 284      ( 16 Oct 2019 07:00 )             35.7     10-16    138  |  100  |  29<H>  ----------------------------<  134<H>  3.6   |  24  |  0.95    Ca    8.5      16 Oct 2019 07:00  Mg     1.9     10-16      proBNP:   Lipid Profile:   HgA1c:   TSH:     Consultant(s) Notes Reviewed:  [x ] YES  [ ] NO    Care Discussed with Consultants/Other Providers [ x] YES  [ ] NO    Imaging Personally Reviewed independently:  [x] YES  [ ] NO    All labs, radiologic studies, vitals, orders and medications list reviewed. Patient is seen and examined at bedside. Case discussed with medical team.
Collin Trejo MD  Interventional Cardiology / Advance Heart Failure and Cardiac Transplant Specialist  [place] Office : [address] [place] [address] [place] [address]  Tel:   [place] Office : [address] [facility] [place] [address]  Tel: [phone]  Cell : [phone]    Pt is lying in bed comfortable not in distress, no chest pains no SOB no palpitations  	  MEDICATIONS:  amLODIPine   Tablet 5 milliGRAM(s) Oral daily  aspirin enteric coated 81 milliGRAM(s) Oral daily  enalapril 20 milliGRAM(s) Oral daily  furosemide   Injectable 60 milliGRAM(s) IV Push two times a day  metoprolol tartrate 12.5 milliGRAM(s) Oral two times a day  warfarin 3 milliGRAM(s) Oral once            atorvastatin 80 milliGRAM(s) Oral at bedtime  dextrose 40% Gel 15 Gram(s) Oral once PRN  dextrose 50% Injectable 12.5 Gram(s) IV Push once  dextrose 50% Injectable 25 Gram(s) IV Push once  dextrose 50% Injectable 25 Gram(s) IV Push once  glucagon  Injectable 1 milliGRAM(s) IntraMuscular once PRN  insulin lispro (HumaLOG) corrective regimen sliding scale   SubCutaneous three times a day before meals  insulin lispro (HumaLOG) corrective regimen sliding scale   SubCutaneous at bedtime    dextrose 5%. 1000 milliLiter(s) IV Continuous <Continuous>  influenza   Vaccine 0.5 milliLiter(s) IntraMuscular once      PAST MEDICAL/SURGICAL HISTORY  PAST MEDICAL & SURGICAL HISTORY:  Diabetes mellitus: not on meds  Gout  Upper GI bleed  CHF (congestive heart failure)  Mitral Regurgitation  CVA (Cerebral Infarction): 2011  CAD (Coronary Artery Disease): s/p stents and CABG  Afib: (on Warfarin)  HTN (Hypertension)  H/O mitral valve replacement: 9/22/14  H/O aortic valve replacement: 9/22/14  S/P CABG x 1: (2000)  S/P angioplasty with stent: 2011      SOCIAL HISTORY: Substance Use (street drugs): ( x ) never used  (  ) other:    FAMILY HISTORY:  Family history of acute myocardial infarction: mother      REVIEW OF SYSTEMS:  CONSTITUTIONAL: No fever, weight loss, or fatigue  EYES: No eye pain, visual disturbances, or discharge  ENMT:  No difficulty hearing, tinnitus, vertigo; No sinus or throat pain  BREASTS: No pain, masses, or nipple discharge  GASTROINTESTINAL: No abdominal or epigastric pain. No nausea, vomiting, or hematemesis; No diarrhea or constipation. No melena or hematochezia.  GENITOURINARY: No dysuria, frequency, hematuria, or incontinence  NEUROLOGICAL: No headaches, memory loss, loss of strength, numbness, or tremors  ENDOCRINE: No heat or cold intolerance; No hair loss  MUSCULOSKELETAL: No joint pain or swelling; No muscle, back, or extremity pain  PSYCHIATRIC: No depression, anxiety, mood swings, or difficulty sleeping  HEME/LYMPH: No easy bruising, or bleeding gums  All others negative    PHYSICAL EXAM:  T(C): 36.9 (10-15-19 @ 13:35), Max: 36.9 (10-15-19 @ 13:35)  HR: 78 (10-15-19 @ 13:35) (78 - 91)  BP: 134/62 (10-15-19 @ 13:35) (134/62 - 196/79)  RR: 18 (10-15-19 @ 13:35) (17 - 18)  SpO2: 91% (10-15-19 @ 13:35) (91% - 97%)  Wt(kg): --  I&O's Summary    14 Oct 2019 07:01  -  15 Oct 2019 07:00  --------------------------------------------------------  IN: 220 mL / OUT: 0 mL / NET: 220 mL          GENERAL: NAD, well-groomed, well-developed  EYES: EOMI, PERRLA, conjunctiva and sclera clear  ENMT: No tonsillar erythema, exudates, or enlargement; Moist mucous membranes, Good dentition, No lesions  Cardiovascular: Normal S1 S2, No JVD, No murmurs, No edema  Respiratory: Lungs clear to auscultation	  Gastrointestinal:  Soft, Non-tender, + BS	  Extremities: Normal range of motion, No clubbing, cyanosis or edema  LYMPH: No lymphadenopathy noted  NERVOUS SYSTEM:  Alert & Oriented X3, Good concentration; Motor Strength 5/5 B/L upper and lower extremities; DTRs 2+ intact and symmetric                                    10.8   11.21 )-----------( 259      ( 15 Oct 2019 06:10 )             35.9     10-15    139  |  99  |  26<H>  ----------------------------<  129<H>  3.7   |  26  |  0.94    Ca    8.6      15 Oct 2019 06:10  Phos  3.8     10-14  Mg     1.8     10-15      proBNP:   Lipid Profile:   HgA1c:   TSH:     Consultant(s) Notes Reviewed:  [x ] YES  [ ] NO    Care Discussed with Consultants/Other Providers [ x] YES  [ ] NO    Imaging Personally Reviewed independently:  [x] YES  [ ] NO    All labs, radiologic studies, vitals, orders and medications list reviewed. Patient is seen and examined at bedside. Case discussed with medical team.
Collin Trejo MD  Interventional Cardiology / Advance Heart Failure and Cardiac Transplant Specialist  [place] Office : [address] [place] [address] [place] [address]  Tel:   [place] Office : [address] [facility] [place] [address]  Tel: [phone]  Cell : [phone]    pt is lying in bed comfortable, not in distress, no cp SOB improving    	  MEDICATIONS:  aspirin enteric coated 81 milliGRAM(s) Oral daily  enalapril 20 milliGRAM(s) Oral daily  furosemide   Injectable 40 milliGRAM(s) IV Push two times a day  metoprolol tartrate 12.5 milliGRAM(s) Oral two times a day  warfarin 4 milliGRAM(s) Oral once            atorvastatin 80 milliGRAM(s) Oral at bedtime  dextrose 40% Gel 15 Gram(s) Oral once PRN  dextrose 50% Injectable 12.5 Gram(s) IV Push once  dextrose 50% Injectable 25 Gram(s) IV Push once  dextrose 50% Injectable 25 Gram(s) IV Push once  glucagon  Injectable 1 milliGRAM(s) IntraMuscular once PRN  insulin lispro (HumaLOG) corrective regimen sliding scale   SubCutaneous three times a day before meals  insulin lispro (HumaLOG) corrective regimen sliding scale   SubCutaneous at bedtime    dextrose 5%. 1000 milliLiter(s) IV Continuous <Continuous>  influenza   Vaccine 0.5 milliLiter(s) IntraMuscular once      PAST MEDICAL/SURGICAL HISTORY  PAST MEDICAL & SURGICAL HISTORY:  Diabetes mellitus: not on meds  Gout  Upper GI bleed  CHF (congestive heart failure)  Mitral Regurgitation  CVA (Cerebral Infarction): 2011  CAD (Coronary Artery Disease): s/p stents and CABG  Afib: (on Warfarin)  HTN (Hypertension)  H/O mitral valve replacement: 9/22/14  H/O aortic valve replacement: 9/22/14  S/P CABG x 1: (2000)  S/P angioplasty with stent: 2011      SOCIAL HISTORY: Substance Use (street drugs): ( x ) never used  (  ) other:    FAMILY HISTORY:  Family history of acute myocardial infarction: mother      REVIEW OF SYSTEMS:  CONSTITUTIONAL: No fever, weight loss, or fatigue  EYES: No eye pain, visual disturbances, or discharge  ENMT:  No difficulty hearing, tinnitus, vertigo; No sinus or throat pain  BREASTS: No pain, masses, or nipple discharge  GASTROINTESTINAL: No abdominal or epigastric pain. No nausea, vomiting, or hematemesis; No diarrhea or constipation. No melena or hematochezia.  GENITOURINARY: No dysuria, frequency, hematuria, or incontinence  NEUROLOGICAL: No headaches, memory loss, loss of strength, numbness, or tremors  ENDOCRINE: No heat or cold intolerance; No hair loss  MUSCULOSKELETAL: No joint pain or swelling; No muscle, back, or extremity pain  PSYCHIATRIC: No depression, anxiety, mood swings, or difficulty sleeping  HEME/LYMPH: No easy bruising, or bleeding gums  All others negative    PHYSICAL EXAM:  T(C): 36.7 (10-12-19 @ 11:58), Max: 36.7 (10-12-19 @ 11:58)  HR: 73 (10-12-19 @ 11:58) (65 - 82)  BP: 176/64 (10-12-19 @ 11:58) (140/66 - 199/78)  RR: 17 (10-12-19 @ 11:58) (17 - 18)  SpO2: 97% (10-12-19 @ 11:58) (97% - 98%)  Wt(kg): --  I&O's Summary    11 Oct 2019 07:01  -  12 Oct 2019 07:00  --------------------------------------------------------  IN: 990 mL / OUT: 400 mL / NET: 590 mL    12 Oct 2019 07:01  -  12 Oct 2019 12:32  --------------------------------------------------------  IN: 240 mL / OUT: 0 mL / NET: 240 mL          GENERAL: NAD, well-groomed, well-developed  EYES: EOMI, PERRLA, conjunctiva and sclera clear  ENMT: No tonsillar erythema, exudates, or enlargement; Moist mucous membranes, Good dentition, No lesions  Cardiovascular: Normal S1 S2, No JVD, No murmurs, No edema  Respiratory: Lungs clear to auscultation	  Gastrointestinal:  Soft, Non-tender, + BS	  Extremities: Normal range of motion, No clubbing, cyanosis or edema  LYMPH: No lymphadenopathy noted  NERVOUS SYSTEM:  Alert & Oriented X3, Good concentration; Motor Strength 5/5 B/L upper and lower extremities; DTRs 2+ intact and symmetric                                    11.2   13.28 )-----------( 260      ( 12 Oct 2019 06:00 )             37.9     10-12    134<L>  |  99  |  17  ----------------------------<  135<H>  3.6   |  24  |  0.80    Ca    8.6      12 Oct 2019 07:00  Phos  3.5     10-12  Mg     1.8     10-12      proBNP:   Lipid Profile:   HgA1c:   TSH:     Consultant(s) Notes Reviewed:  [x ] YES  [ ] NO    Care Discussed with Consultants/Other Providers [ x] YES  [ ] NO    Imaging Personally Reviewed independently:  [x] YES  [ ] NO    All labs, radiologic studies, vitals, orders and medications list reviewed. Patient is seen and examined at bedside. Case discussed with medical team.
Collin Trejo MD  Interventional Cardiology / Advance Heart Failure and Cardiac Transplant Specialist  [place] Office : [address] [place] [address] [place] [address]  Tel:   [place] Office : [address] [facility] [place] [address]  Tel: [phone]  Cell : [phone]    HISTORY OF PRESENTING ILLNESS:  HPI:  This is a 79y Female h/o CHF, HTN HLD who presents with sob NOW IMPROVING  	  MEDICATIONS:  aspirin enteric coated 81 milliGRAM(s) Oral daily  enalapril 20 milliGRAM(s) Oral daily  furosemide   Injectable 60 milliGRAM(s) IV Push two times a day  metoprolol tartrate 12.5 milliGRAM(s) Oral two times a day  warfarin 3 milliGRAM(s) Oral once            atorvastatin 80 milliGRAM(s) Oral at bedtime  dextrose 40% Gel 15 Gram(s) Oral once PRN  dextrose 50% Injectable 12.5 Gram(s) IV Push once  dextrose 50% Injectable 25 Gram(s) IV Push once  dextrose 50% Injectable 25 Gram(s) IV Push once  glucagon  Injectable 1 milliGRAM(s) IntraMuscular once PRN  insulin lispro (HumaLOG) corrective regimen sliding scale   SubCutaneous three times a day before meals  insulin lispro (HumaLOG) corrective regimen sliding scale   SubCutaneous at bedtime    dextrose 5%. 1000 milliLiter(s) IV Continuous <Continuous>  influenza   Vaccine 0.5 milliLiter(s) IntraMuscular once      PAST MEDICAL/SURGICAL HISTORY  PAST MEDICAL & SURGICAL HISTORY:  Diabetes mellitus: not on meds  Gout  Upper GI bleed  CHF (congestive heart failure)  Mitral Regurgitation  CVA (Cerebral Infarction): 2011  CAD (Coronary Artery Disease): s/p stents and CABG  Afib: (on Warfarin)  HTN (Hypertension)  H/O mitral valve replacement: 9/22/14  H/O aortic valve replacement: 9/22/14  S/P CABG x 1: (2000)  S/P angioplasty with stent: 2011      SOCIAL HISTORY: Substance Use (street drugs): ( x ) never used  (  ) other:    FAMILY HISTORY:  Family history of acute myocardial infarction: mother      REVIEW OF SYSTEMS:  CONSTITUTIONAL: No fever, weight loss, or fatigue  EYES: No eye pain, visual disturbances, or discharge  ENMT:  No difficulty hearing, tinnitus, vertigo; No sinus or throat pain  BREASTS: No pain, masses, or nipple discharge  GASTROINTESTINAL: No abdominal or epigastric pain. No nausea, vomiting, or hematemesis; No diarrhea or constipation. No melena or hematochezia.  GENITOURINARY: No dysuria, frequency, hematuria, or incontinence  NEUROLOGICAL: No headaches, memory loss, loss of strength, numbness, or tremors  ENDOCRINE: No heat or cold intolerance; No hair loss  MUSCULOSKELETAL: No joint pain or swelling; No muscle, back, or extremity pain  PSYCHIATRIC: No depression, anxiety, mood swings, or difficulty sleeping  HEME/LYMPH: No easy bruising, or bleeding gums  All others negative    PHYSICAL EXAM:  T(C): 36.7 (10-14-19 @ 04:43), Max: 36.7 (10-13-19 @ 23:21)  HR: 86 (10-14-19 @ 04:43) (66 - 86)  BP: 151/82 (10-14-19 @ 04:43) (151/82 - 191/65)  RR: 18 (10-14-19 @ 04:43) (18 - 18)  SpO2: 99% (10-14-19 @ 04:43) (99% - 100%)  Wt(kg): --  I&O's Summary    13 Oct 2019 07:01  -  14 Oct 2019 07:00  --------------------------------------------------------  IN: 860 mL / OUT: 600 mL / NET: 260 mL          GENERAL: NAD, well-groomed, well-developed  EYES: EOMI, PERRLA, conjunctiva and sclera clear  ENMT: No tonsillar erythema, exudates, or enlargement; Moist mucous membranes, Good dentition, No lesions  Cardiovascular: Normal S1 S2, No JVD, No murmurs, No edema  Respiratory: Lungs clear to auscultation	  Gastrointestinal:  Soft, Non-tender, + BS	  Extremities: Normal range of motion, No clubbing, cyanosis or edema  LYMPH: No lymphadenopathy noted  NERVOUS SYSTEM:  Alert & Oriented X3, Good concentration; Motor Strength 5/5 B/L upper and lower extremities; DTRs 2+ intact and symmetric                                    11.0   11.21 )-----------( 265      ( 14 Oct 2019 06:00 )             38.1     10-14    136  |  99  |  22  ----------------------------<  144<H>  3.7   |  25  |  0.78    Ca    8.9      14 Oct 2019 06:00  Phos  3.8     10-14  Mg     1.8     10-14      proBNP:   Lipid Profile:   HgA1c:   TSH:     Consultant(s) Notes Reviewed:  [x ] YES  [ ] NO    Care Discussed with Consultants/Other Providers [ x] YES  [ ] NO    Imaging Personally Reviewed independently:  [x] YES  [ ] NO    All labs, radiologic studies, vitals, orders and medications list reviewed. Patient is seen and examined at bedside. Case discussed with medical team.
Collin Trejo MD  Interventional Cardiology / Advance Heart Failure and Cardiac Transplant Specialist  [place] Office : [address] [place] [address] [place]. [address]  Tel:   [place] Office : [address] [facility] [place] [address]  Tel: [phone]  Cell : [phone]    HISTORY OF PRESENTING ILLNESS:  HPI:  This is a 79y Female h/o chf  who presents with  CHF , echo shows MVR gradient high for GIACOMO monday  	  MEDICATIONS:  aspirin enteric coated 81 milliGRAM(s) Oral daily  enalapril 20 milliGRAM(s) Oral daily  furosemide   Injectable 40 milliGRAM(s) IV Push two times a day  metoprolol tartrate 12.5 milliGRAM(s) Oral two times a day  warfarin 5 milliGRAM(s) Oral once            atorvastatin 80 milliGRAM(s) Oral at bedtime  dextrose 40% Gel 15 Gram(s) Oral once PRN  dextrose 50% Injectable 12.5 Gram(s) IV Push once  dextrose 50% Injectable 25 Gram(s) IV Push once  dextrose 50% Injectable 25 Gram(s) IV Push once  glucagon  Injectable 1 milliGRAM(s) IntraMuscular once PRN  insulin lispro (HumaLOG) corrective regimen sliding scale   SubCutaneous three times a day before meals  insulin lispro (HumaLOG) corrective regimen sliding scale   SubCutaneous at bedtime    dextrose 5%. 1000 milliLiter(s) IV Continuous <Continuous>  influenza   Vaccine 0.5 milliLiter(s) IntraMuscular once      PAST MEDICAL/SURGICAL HISTORY  PAST MEDICAL & SURGICAL HISTORY:  Diabetes mellitus: not on meds  Gout  Upper GI bleed  CHF (congestive heart failure)  Mitral Regurgitation  CVA (Cerebral Infarction): 2011  CAD (Coronary Artery Disease): s/p stents and CABG  Afib: (on Warfarin)  HTN (Hypertension)  H/O mitral valve replacement: 9/22/14  H/O aortic valve replacement: 9/22/14  S/P CABG x 1: (2000)  S/P angioplasty with stent: 2011      SOCIAL HISTORY: Substance Use (street drugs): ( x ) never used  (  ) other:    FAMILY HISTORY:  Family history of acute myocardial infarction: mother      REVIEW OF SYSTEMS:  CONSTITUTIONAL: No fever, weight loss, or fatigue  EYES: No eye pain, visual disturbances, or discharge  ENMT:  No difficulty hearing, tinnitus, vertigo; No sinus or throat pain  BREASTS: No pain, masses, or nipple discharge  GASTROINTESTINAL: No abdominal or epigastric pain. No nausea, vomiting, or hematemesis; No diarrhea or constipation. No melena or hematochezia.  GENITOURINARY: No dysuria, frequency, hematuria, or incontinence  NEUROLOGICAL: No headaches, memory loss, loss of strength, numbness, or tremors  ENDOCRINE: No heat or cold intolerance; No hair loss  MUSCULOSKELETAL: No joint pain or swelling; No muscle, back, or extremity pain  PSYCHIATRIC: No depression, anxiety, mood swings, or difficulty sleeping  HEME/LYMPH: No easy bruising, or bleeding gums  All others negative    PHYSICAL EXAM:  T(C): 36.8 (10-11-19 @ 11:37), Max: 36.8 (10-11-19 @ 05:04)  HR: 66 (10-11-19 @ 11:37) (66 - 80)  BP: 156/70 (10-11-19 @ 11:37) (146/78 - 158/69)  RR: 17 (10-11-19 @ 11:37) (17 - 20)  SpO2: 97% (10-11-19 @ 11:37) (95% - 97%)  Wt(kg): --  I&O's Summary    10 Oct 2019 07:01  -  11 Oct 2019 07:00  --------------------------------------------------------  IN: 658 mL / OUT: 400 mL / NET: 258 mL    11 Oct 2019 07:01  -  11 Oct 2019 16:26  --------------------------------------------------------  IN: 240 mL / OUT: 400 mL / NET: -160 mL        Weight (kg): 89.8 (10-11 @ 04:39)    GENERAL: NAD, well-groomed, well-developed  EYES: EOMI, PERRLA, conjunctiva and sclera clear  ENMT: No tonsillar erythema, exudates, or enlargement; Moist mucous membranes, Good dentition, No lesions  Cardiovascular: Normal S1 S2, No JVD, No murmurs, No edema  Respiratory: Lungs clear to auscultation	  Gastrointestinal:  Soft, Non-tender, + BS	  Extremities: Normal range of motion, No clubbing, cyanosis or edema  LYMPH: No lymphadenopathy noted  NERVOUS SYSTEM:  Alert & Oriented X3, Good concentration; Motor Strength 5/5 B/L upper and lower extremities; DTRs 2+ intact and symmetric                                    11.5   13.32 )-----------( 284      ( 11 Oct 2019 02:50 )             39.8     10-11    138  |  102  |  21  ----------------------------<  160<H>  4.1   |  25  |  0.82    Ca    8.9      11 Oct 2019 02:50  Mg     1.9     10-11      proBNP:   Lipid Profile:   HgA1c:   TSH:     Consultant(s) Notes Reviewed:  [x ] YES  [ ] NO    Care Discussed with Consultants/Other Providers [ x] YES  [ ] NO    Imaging Personally Reviewed independently:  [x] YES  [ ] NO    All labs, radiologic studies, vitals, orders and medications list reviewed. Patient is seen and examined at bedside. Case discussed with medical team.
Collin Trejo MD  Interventional Cardiology / Advance Heart Failure and Cardiac Transplant Specialist  [place] Office : [address] [place] [address] [place]. [address]  Tel:   [place] Office : [address] [facility] [place] [address]  Tel: [phone]  Cell : [phone]    HISTORY OF PRESENTING ILLNESS:  HPI:  This is a 79y Female h/o CHF, HTN HLD  who presents with CHF exacerbation  	  MEDICATIONS:  aspirin enteric coated 81 milliGRAM(s) Oral daily  enalapril 20 milliGRAM(s) Oral daily  furosemide   Injectable 40 milliGRAM(s) IV Push two times a day  metoprolol tartrate 12.5 milliGRAM(s) Oral two times a day            atorvastatin 80 milliGRAM(s) Oral at bedtime  dextrose 40% Gel 15 Gram(s) Oral once PRN  dextrose 50% Injectable 12.5 Gram(s) IV Push once  dextrose 50% Injectable 25 Gram(s) IV Push once  dextrose 50% Injectable 25 Gram(s) IV Push once  glucagon  Injectable 1 milliGRAM(s) IntraMuscular once PRN  insulin lispro (HumaLOG) corrective regimen sliding scale   SubCutaneous three times a day before meals  insulin lispro (HumaLOG) corrective regimen sliding scale   SubCutaneous at bedtime    dextrose 5%. 1000 milliLiter(s) IV Continuous <Continuous>  influenza   Vaccine 0.5 milliLiter(s) IntraMuscular once      PAST MEDICAL/SURGICAL HISTORY  PAST MEDICAL & SURGICAL HISTORY:  Diabetes mellitus: not on meds  Gout  Upper GI bleed  CHF (congestive heart failure)  Mitral Regurgitation  CVA (Cerebral Infarction): 2011  CAD (Coronary Artery Disease): s/p stents and CABG  Afib: (on Warfarin)  HTN (Hypertension)  H/O mitral valve replacement: 9/22/14  H/O aortic valve replacement: 9/22/14  S/P CABG x 1: (2000)  S/P angioplasty with stent: 2011      SOCIAL HISTORY: Substance Use (street drugs): ( x ) never used  (  ) other:    FAMILY HISTORY:  Family history of acute myocardial infarction: mother      REVIEW OF SYSTEMS:  CONSTITUTIONAL: No fever, weight loss, or fatigue  EYES: No eye pain, visual disturbances, or discharge  ENMT:  No difficulty hearing, tinnitus, vertigo; No sinus or throat pain  BREASTS: No pain, masses, or nipple discharge  GASTROINTESTINAL: No abdominal or epigastric pain. No nausea, vomiting, or hematemesis; No diarrhea or constipation. No melena or hematochezia.  GENITOURINARY: No dysuria, frequency, hematuria, or incontinence  NEUROLOGICAL: No headaches, memory loss, loss of strength, numbness, or tremors  ENDOCRINE: No heat or cold intolerance; No hair loss  MUSCULOSKELETAL: No joint pain or swelling; No muscle, back, or extremity pain  PSYCHIATRIC: No depression, anxiety, mood swings, or difficulty sleeping  HEME/LYMPH: No easy bruising, or bleeding gums  All others negative    PHYSICAL EXAM:  T(C): 36.7 (10-10-19 @ 11:50), Max: 37.2 (10-10-19 @ 05:24)  HR: 79 (10-10-19 @ 17:27) (66 - 88)  BP: 158/69 (10-10-19 @ 17:27) (150/60 - 166/65)  RR: 18 (10-10-19 @ 15:39) (16 - 20)  SpO2: 99% (10-10-19 @ 15:39) (89% - 100%)  Wt(kg): --  I&O's Summary    09 Oct 2019 07:01  -  10 Oct 2019 07:00  --------------------------------------------------------  IN: 476 mL / OUT: 400 mL / NET: 76 mL    10 Oct 2019 07:01  -  10 Oct 2019 18:45  --------------------------------------------------------  IN: 240 mL / OUT: 0 mL / NET: 240 mL        Weight (kg): 90 (10-10 @ 02:53)    GENERAL: NAD, well-groomed, well-developed  EYES: EOMI, PERRLA, conjunctiva and sclera clear  ENMT: No tonsillar erythema, exudates, or enlargement; Moist mucous membranes, Good dentition, No lesions  Cardiovascular: Normal S1 S2, No JVD, No murmurs, No edema  Respiratory: Lungs clear to auscultation	  Gastrointestinal:  Soft, Non-tender, + BS	  Extremities: 2+ edema  LYMPH: No lymphadenopathy noted  NERVOUS SYSTEM:  Alert & Oriented X3, Good concentration; Motor Strength 5/5 B/L upper and lower extremities; DTRs 2+ intact and symmetric                                    10.8   11.70 )-----------( 270      ( 10 Oct 2019 05:45 )             36.3     10-10    136  |  101  |  21  ----------------------------<  143<H>  3.7   |  24  |  0.80    Ca    8.6      10 Oct 2019 05:45  Phos  3.6     10-09  Mg     1.9     10-10    TPro  6.8  /  Alb  3.2<L>  /  TBili  0.5  /  DBili  x   /  AST  24  /  ALT  21  /  AlkPhos  81  10-09    proBNP:   Lipid Profile:   HgA1c: Hemoglobin A1C, Whole Blood: 6.9 % (10-10 @ 05:45)    TSH:     Consultant(s) Notes Reviewed:  [x ] YES  [ ] NO    Care Discussed with Consultants/Other Providers [ x] YES  [ ] NO    Imaging Personally Reviewed independently:  [x] YES  [ ] NO    All labs, radiologic studies, vitals, orders and medications list reviewed. Patient is seen and examined at bedside. Case discussed with medical team.
INTERVAL HPI/OVERNIGHT EVENTS: I feel better. SOB better. Seen earlier in CT waiting area.   Vital Signs Last 24 Hrs  T(C): 36.6 (13 Oct 2019 14:53), Max: 36.8 (13 Oct 2019 05:56)  T(F): 97.8 (13 Oct 2019 14:53), Max: 98.3 (13 Oct 2019 05:56)  HR: 83 (13 Oct 2019 17:38) (66 - 89)  BP: 191/65 (13 Oct 2019 17:38) (154/45 - 191/65)  BP(mean): --  RR: 18 (13 Oct 2019 14:53) (18 - 18)  SpO2: 100% (13 Oct 2019 14:53) (98% - 100%)  I&O's Summary    12 Oct 2019 07:01  -  13 Oct 2019 07:00  --------------------------------------------------------  IN: 1100 mL / OUT: 500 mL / NET: 600 mL    13 Oct 2019 07:01  -  13 Oct 2019 19:20  --------------------------------------------------------  IN: 360 mL / OUT: 600 mL / NET: -240 mL      MEDICATIONS  (STANDING):  aspirin enteric coated 81 milliGRAM(s) Oral daily  atorvastatin 80 milliGRAM(s) Oral at bedtime  dextrose 5%. 1000 milliLiter(s) (50 mL/Hr) IV Continuous <Continuous>  dextrose 50% Injectable 12.5 Gram(s) IV Push once  dextrose 50% Injectable 25 Gram(s) IV Push once  dextrose 50% Injectable 25 Gram(s) IV Push once  enalapril 20 milliGRAM(s) Oral daily  furosemide   Injectable 40 milliGRAM(s) IV Push two times a day  influenza   Vaccine 0.5 milliLiter(s) IntraMuscular once  insulin lispro (HumaLOG) corrective regimen sliding scale   SubCutaneous three times a day before meals  insulin lispro (HumaLOG) corrective regimen sliding scale   SubCutaneous at bedtime  metoprolol tartrate 12.5 milliGRAM(s) Oral two times a day    MEDICATIONS  (PRN):  dextrose 40% Gel 15 Gram(s) Oral once PRN Blood Glucose LESS THAN 70 milliGRAM(s)/deciliter  glucagon  Injectable 1 milliGRAM(s) IntraMuscular once PRN Glucose LESS THAN 70 milligrams/deciliter    LABS:                        10.9   12.52 )-----------( 250      ( 13 Oct 2019 07:00 )             35.9     10-13    138  |  99  |  20  ----------------------------<  136<H>  3.6   |  25  |  0.75    Ca    8.9      13 Oct 2019 07:00  Phos  3.9     10-13  Mg     1.7     10-13      PT/INR - ( 13 Oct 2019 07:00 )   PT: 25.0 SEC;   INR: 2.20              CAPILLARY BLOOD GLUCOSE      POCT Blood Glucose.: 226 mg/dL (13 Oct 2019 17:03)  POCT Blood Glucose.: 163 mg/dL (13 Oct 2019 13:19)  POCT Blood Glucose.: 157 mg/dL (13 Oct 2019 08:40)  POCT Blood Glucose.: 184 mg/dL (12 Oct 2019 22:19)          REVIEW OF SYSTEMS:  CONSTITUTIONAL: No fever, weight loss, or fatigue  EYES: No eye pain, visual disturbances, or discharge  ENMT:  No difficulty hearing, tinnitus, vertigo; No sinus or throat pain  NECK: No pain or stiffness  RESPIRATORY: No cough, wheezing, chills or hemoptysis; No shortness of breath  CARDIOVASCULAR: No chest pain, palpitations, dizziness, or leg swelling  GASTROINTESTINAL: No abdominal or epigastric pain. No nausea, vomiting, or hematemesis; No diarrhea or constipation. No melena or hematochezia.  GENITOURINARY: No dysuria, frequency, hematuria, or incontinence  NEUROLOGICAL: No headaches, memory loss, loss of strength, numbness, or tremors    Consultant(s) Notes Reviewed:  [x ] YES  [ ] NO    PHYSICAL EXAM:  GENERAL: NAD, well-groomed, well-developed,not in any distress ,  HEAD:  Atraumatic, Normocephalic  EYES: EOMI, PERRLA, conjunctiva and sclera clear  ENMT: No tonsillar erythema, exudates, or enlargement; Moist mucous membranes, Good dentition, No lesions  NECK: Supple, No JVD, Normal thyroid  NERVOUS SYSTEM:  Alert & Oriented X3, No focal deficit   CHEST/LUNG: Good air entry bilateral with no  rales, rhonchi, wheezing, or rubs  HEART: Regular rate and rhythm; No murmurs, rubs, or gallops  ABDOMEN: Soft, Nontender, Nondistended; Bowel sounds present  EXTREMITIES:  2+ Peripheral Pulses, No clubbing, cyanosis, or edema    Care Discussed with Consultants/Other Providers [ x] YES  [ ] NO
INTERVAL HPI/OVERNIGHT EVENTS: I feel fine.   Vital Signs Last 24 Hrs  T(C): 36.7 (12 Oct 2019 11:58), Max: 36.7 (12 Oct 2019 11:58)  T(F): 98 (12 Oct 2019 11:58), Max: 98 (12 Oct 2019 11:58)  HR: 73 (12 Oct 2019 11:58) (65 - 82)  BP: 176/64 (12 Oct 2019 11:58) (140/66 - 199/78)  BP(mean): --  RR: 17 (12 Oct 2019 11:58) (17 - 18)  SpO2: 97% (12 Oct 2019 11:58) (97% - 98%)  I&O's Summary    11 Oct 2019 07:01  -  12 Oct 2019 07:00  --------------------------------------------------------  IN: 990 mL / OUT: 400 mL / NET: 590 mL    12 Oct 2019 07:01  -  12 Oct 2019 14:30  --------------------------------------------------------  IN: 480 mL / OUT: 0 mL / NET: 480 mL      MEDICATIONS  (STANDING):  aspirin enteric coated 81 milliGRAM(s) Oral daily  atorvastatin 80 milliGRAM(s) Oral at bedtime  dextrose 5%. 1000 milliLiter(s) (50 mL/Hr) IV Continuous <Continuous>  dextrose 50% Injectable 12.5 Gram(s) IV Push once  dextrose 50% Injectable 25 Gram(s) IV Push once  dextrose 50% Injectable 25 Gram(s) IV Push once  enalapril 20 milliGRAM(s) Oral daily  furosemide   Injectable 40 milliGRAM(s) IV Push two times a day  influenza   Vaccine 0.5 milliLiter(s) IntraMuscular once  insulin lispro (HumaLOG) corrective regimen sliding scale   SubCutaneous three times a day before meals  insulin lispro (HumaLOG) corrective regimen sliding scale   SubCutaneous at bedtime  metoprolol tartrate 12.5 milliGRAM(s) Oral two times a day  warfarin 4 milliGRAM(s) Oral once    MEDICATIONS  (PRN):  dextrose 40% Gel 15 Gram(s) Oral once PRN Blood Glucose LESS THAN 70 milliGRAM(s)/deciliter  glucagon  Injectable 1 milliGRAM(s) IntraMuscular once PRN Glucose LESS THAN 70 milligrams/deciliter    LABS:                        11.2   13.28 )-----------( 260      ( 12 Oct 2019 06:00 )             37.9     10-12    134<L>  |  99  |  17  ----------------------------<  135<H>  3.6   |  24  |  0.80    Ca    8.6      12 Oct 2019 07:00  Phos  3.5     10-12  Mg     1.8     10-12      PT/INR - ( 12 Oct 2019 05:00 )   PT: 20.9 SEC;   INR: 1.80            Urinalysis Basic - ( 11 Oct 2019 02:40 )    Color: LIGHT YELLOW / Appearance: CLEAR / S.016 / pH: 6.0  Gluc: NEGATIVE / Ketone: NEGATIVE  / Bili: NEGATIVE / Urobili: NORMAL   Blood: NEGATIVE / Protein: 20 / Nitrite: NEGATIVE   Leuk Esterase: NEGATIVE / RBC: 0-2 / WBC 3-5   Sq Epi: OCC / Non Sq Epi: x / Bacteria: NEGATIVE      CAPILLARY BLOOD GLUCOSE      POCT Blood Glucose.: 123 mg/dL (12 Oct 2019 12:28)  POCT Blood Glucose.: 188 mg/dL (12 Oct 2019 08:41)  POCT Blood Glucose.: 211 mg/dL (11 Oct 2019 21:31)  POCT Blood Glucose.: 151 mg/dL (11 Oct 2019 17:18)        Urinalysis Basic - ( 11 Oct 2019 02:40 )    Color: LIGHT YELLOW / Appearance: CLEAR / S.016 / pH: 6.0  Gluc: NEGATIVE / Ketone: NEGATIVE  / Bili: NEGATIVE / Urobili: NORMAL   Blood: NEGATIVE / Protein: 20 / Nitrite: NEGATIVE   Leuk Esterase: NEGATIVE / RBC: 0-2 / WBC 3-5   Sq Epi: OCC / Non Sq Epi: x / Bacteria: NEGATIVE      REVIEW OF SYSTEMS:  CONSTITUTIONAL: No fever, weight loss, or fatigue  EYES: No eye pain, visual disturbances, or discharge  ENMT:  No difficulty hearing, tinnitus, vertigo; No sinus or throat pain  NECK: No pain or stiffness  RESPIRATORY: No cough, wheezing, chills or hemoptysis; No shortness of breath  CARDIOVASCULAR: No chest pain, palpitations, dizziness, or leg swelling  GASTROINTESTINAL: No abdominal or epigastric pain. No nausea, vomiting, or hematemesis; No diarrhea or constipation. No melena or hematochezia.  GENITOURINARY: No dysuria, frequency, hematuria, or incontinence  NEUROLOGICAL: No headaches, memory loss, loss of strength, numbness, or tremors    RADIOLOGY & ADDITIONAL TESTS:    Consultant(s) Notes Reviewed:  [x ] YES  [ ] NO    PHYSICAL EXAM:  GENERAL: NAD, well-groomed, well-developed,not in any distress ,  HEAD:  Atraumatic, Normocephalic  EYES: EOMI, PERRLA, conjunctiva and sclera clear  ENMT: No tonsillar erythema, exudates, or enlargement; Moist mucous membranes, Good dentition, No lesions  NECK: Supple, No JVD, Normal thyroid  NERVOUS SYSTEM:  Alert & Oriented X3, No focal deficit   CHEST/LUNG: Good air entry bilateral with no  rales, rhonchi, wheezing, or rubs  HEART: Regular rate and rhythm; No murmurs, rubs, or gallops  ABDOMEN: Soft, Nontender, Nondistended; Bowel sounds present  EXTREMITIES:  2+ Peripheral Pulses, No clubbing, cyanosis, or edema  Care Discussed with Consultants/Other Providers [ x] YES  [ ] NO
INTERVAL HPI/OVERNIGHT EVENTS: I feel fine.   Vital Signs Last 24 Hrs  T(C): 36.7 (14 Oct 2019 04:43), Max: 36.7 (13 Oct 2019 23:21)  T(F): 98 (14 Oct 2019 04:43), Max: 98 (13 Oct 2019 23:21)  HR: 86 (14 Oct 2019 04:43) (66 - 86)  BP: 151/82 (14 Oct 2019 04:43) (151/82 - 191/65)  BP(mean): --  RR: 18 (14 Oct 2019 04:43) (18 - 18)  SpO2: 99% (14 Oct 2019 04:43) (99% - 100%)  I&O's Summary    13 Oct 2019 07:01  -  14 Oct 2019 07:00  --------------------------------------------------------  IN: 860 mL / OUT: 600 mL / NET: 260 mL      MEDICATIONS  (STANDING):  aspirin enteric coated 81 milliGRAM(s) Oral daily  atorvastatin 80 milliGRAM(s) Oral at bedtime  dextrose 5%. 1000 milliLiter(s) (50 mL/Hr) IV Continuous <Continuous>  dextrose 50% Injectable 12.5 Gram(s) IV Push once  dextrose 50% Injectable 25 Gram(s) IV Push once  dextrose 50% Injectable 25 Gram(s) IV Push once  enalapril 20 milliGRAM(s) Oral daily  furosemide   Injectable 40 milliGRAM(s) IV Push two times a day  influenza   Vaccine 0.5 milliLiter(s) IntraMuscular once  insulin lispro (HumaLOG) corrective regimen sliding scale   SubCutaneous three times a day before meals  insulin lispro (HumaLOG) corrective regimen sliding scale   SubCutaneous at bedtime  metoprolol tartrate 12.5 milliGRAM(s) Oral two times a day    MEDICATIONS  (PRN):  dextrose 40% Gel 15 Gram(s) Oral once PRN Blood Glucose LESS THAN 70 milliGRAM(s)/deciliter  glucagon  Injectable 1 milliGRAM(s) IntraMuscular once PRN Glucose LESS THAN 70 milligrams/deciliter    LABS:                        11.0   11.21 )-----------( 265      ( 14 Oct 2019 06:00 )             38.1     10-14    136  |  99  |  22  ----------------------------<  144<H>  3.7   |  25  |  0.78    Ca    8.9      14 Oct 2019 06:00  Phos  3.8     10-14  Mg     1.8     10-14      PT/INR - ( 14 Oct 2019 06:00 )   PT: 30.3 SEC;   INR: 2.58              CAPILLARY BLOOD GLUCOSE      POCT Blood Glucose.: 141 mg/dL (14 Oct 2019 09:54)  POCT Blood Glucose.: 151 mg/dL (14 Oct 2019 08:38)  POCT Blood Glucose.: 119 mg/dL (13 Oct 2019 21:58)  POCT Blood Glucose.: 226 mg/dL (13 Oct 2019 17:03)  POCT Blood Glucose.: 163 mg/dL (13 Oct 2019 13:19)          REVIEW OF SYSTEMS:  CONSTITUTIONAL: No fever, weight loss, or fatigue  EYES: No eye pain, visual disturbances, or discharge  ENMT:  No difficulty hearing, tinnitus, vertigo; No sinus or throat pain  NECK: No pain or stiffness  RESPIRATORY: No cough, wheezing, chills or hemoptysis; No shortness of breath  CARDIOVASCULAR: No chest pain, palpitations, dizziness, or leg swelling  GASTROINTESTINAL: No abdominal or epigastric pain. No nausea, vomiting, or hematemesis; No diarrhea or constipation. No melena or hematochezia.  GENITOURINARY: No dysuria, frequency, hematuria, or incontinence  NEUROLOGICAL: No headaches, memory loss, loss of strength, numbness, or tremors    Consultant(s) Notes Reviewed:  [x ] YES  [ ] NO    PHYSICAL EXAM:  GENERAL: NAD, well-groomed, well-developed ,not in any distress ,  HEAD:  Atraumatic, Normocephalic  EYES: EOMI, PERRLA, conjunctiva and sclera clear  ENMT: No tonsillar erythema, exudates, or enlargement; Moist mucous membranes, Good dentition, No lesions  NECK: Supple, No JVD, Normal thyroid  NERVOUS SYSTEM:  Alert & Oriented X3, No focal deficit   CHEST/LUNG: Good air entry bilateral with no  rales, rhonchi, wheezing, or rubs  HEART: Regular rate and rhythm; No murmurs, rubs, or gallops  ABDOMEN: Soft, Nontender, Nondistended; Bowel sounds present  EXTREMITIES:  2+ Peripheral Pulses, No clubbing, cyanosis, or edema    Care Discussed with Consultants/Other Providers [ x] YES  [ ] NO
INTERVAL HPI/OVERNIGHT EVENTS: I feel fine. S/P GIACOMO .   Vital Signs Last 24 Hrs  T(C): 36.7 (16 Oct 2019 21:38), Max: 37.1 (16 Oct 2019 15:15)  T(F): 98 (16 Oct 2019 21:38), Max: 98.7 (16 Oct 2019 15:15)  HR: 66 (16 Oct 2019 21:38) (66 - 88)  BP: 172/66 (16 Oct 2019 21:38) (140/69 - 184/73)  BP(mean): --  RR: 18 (16 Oct 2019 21:38) (17 - 18)  SpO2: 96% (16 Oct 2019 21:38) (95% - 97%)  I&O's Summary    15 Oct 2019 07:01  -  16 Oct 2019 07:00  --------------------------------------------------------  IN: 120 mL / OUT: 0 mL / NET: 120 mL      MEDICATIONS  (STANDING):  amLODIPine   Tablet 5 milliGRAM(s) Oral daily  aspirin enteric coated 81 milliGRAM(s) Oral daily  atorvastatin 80 milliGRAM(s) Oral at bedtime  dextrose 5%. 1000 milliLiter(s) (50 mL/Hr) IV Continuous <Continuous>  dextrose 50% Injectable 12.5 Gram(s) IV Push once  dextrose 50% Injectable 25 Gram(s) IV Push once  dextrose 50% Injectable 25 Gram(s) IV Push once  enalapril 20 milliGRAM(s) Oral daily  furosemide   Injectable 60 milliGRAM(s) IV Push two times a day  influenza   Vaccine 0.5 milliLiter(s) IntraMuscular once  insulin lispro (HumaLOG) corrective regimen sliding scale   SubCutaneous three times a day before meals  insulin lispro (HumaLOG) corrective regimen sliding scale   SubCutaneous at bedtime  metoprolol tartrate 12.5 milliGRAM(s) Oral two times a day    MEDICATIONS  (PRN):  dextrose 40% Gel 15 Gram(s) Oral once PRN Blood Glucose LESS THAN 70 milliGRAM(s)/deciliter  glucagon  Injectable 1 milliGRAM(s) IntraMuscular once PRN Glucose LESS THAN 70 milligrams/deciliter    LABS:                        10.9   13.75 )-----------( 284      ( 16 Oct 2019 07:00 )             35.7     10-16    138  |  100  |  29<H>  ----------------------------<  134<H>  3.6   |  24  |  0.95    Ca    8.5      16 Oct 2019 07:00  Mg     1.9     10-16      PT/INR - ( 16 Oct 2019 07:00 )   PT: 34.3 SEC;   INR: 2.91              CAPILLARY BLOOD GLUCOSE      POCT Blood Glucose.: 283 mg/dL (16 Oct 2019 21:12)  POCT Blood Glucose.: 126 mg/dL (16 Oct 2019 17:02)  POCT Blood Glucose.: 123 mg/dL (16 Oct 2019 12:15)  POCT Blood Glucose.: 133 mg/dL (16 Oct 2019 07:19)          REVIEW OF SYSTEMS:  CONSTITUTIONAL: No fever, weight loss, or fatigue  EYES: No eye pain, visual disturbances, or discharge  ENMT:  No difficulty hearing, tinnitus, vertigo; No sinus or throat pain  NECK: No pain or stiffness  RESPIRATORY: No cough, wheezing, chills or hemoptysis; No shortness of breath  CARDIOVASCULAR: No chest pain, palpitations, dizziness, or leg swelling  GASTROINTESTINAL: No abdominal or epigastric pain. No nausea, vomiting, or hematemesis; No diarrhea or constipation. No melena or hematochezia.  GENITOURINARY: No dysuria, frequency, hematuria, or incontinence  NEUROLOGICAL: No headaches, memory loss, loss of strength, numbness, or tremors    Consultant(s) Notes Reviewed:  [x ] YES  [ ] NO    PHYSICAL EXAM:  GENERAL: NAD, well-groomed, well-developed, not in any distress ,  HEAD:  Atraumatic, Normocephalic  NECK: Supple, No JVD, Normal thyroid  NERVOUS SYSTEM:  Alert & Oriented X3, No focal deficit   CHEST/LUNG: Good air entry bilateral with no  rales, rhonchi, wheezing, or rubs  HEART: Regular rate and rhythm; No murmurs, rubs, or gallops  ABDOMEN: Soft, Nontender, Nondistended; Bowel sounds present  EXTREMITIES:  2+ Peripheral Pulses, No clubbing, cyanosis, or edema    Care Discussed with Consultants/Other Providers [ x] YES  [ ] NO
INTERVAL HPI/OVERNIGHT EVENTS: No new concerns.   Vital Signs Last 24 Hrs  T(C): 36.8 (11 Oct 2019 11:37), Max: 36.8 (11 Oct 2019 05:04)  T(F): 98.2 (11 Oct 2019 11:37), Max: 98.2 (11 Oct 2019 05:04)  HR: 66 (11 Oct 2019 11:37) (66 - 80)  BP: 156/70 (11 Oct 2019 11:37) (146/78 - 158/69)  BP(mean): --  RR: 17 (11 Oct 2019 11:37) (17 - 20)  SpO2: 97% (11 Oct 2019 11:37) (89% - 99%)  I&O's Summary    10 Oct 2019 07:01  -  11 Oct 2019 07:00  --------------------------------------------------------  IN: 658 mL / OUT: 400 mL / NET: 258 mL      MEDICATIONS  (STANDING):  aspirin enteric coated 81 milliGRAM(s) Oral daily  atorvastatin 80 milliGRAM(s) Oral at bedtime  dextrose 5%. 1000 milliLiter(s) (50 mL/Hr) IV Continuous <Continuous>  dextrose 50% Injectable 12.5 Gram(s) IV Push once  dextrose 50% Injectable 25 Gram(s) IV Push once  dextrose 50% Injectable 25 Gram(s) IV Push once  enalapril 20 milliGRAM(s) Oral daily  furosemide   Injectable 40 milliGRAM(s) IV Push two times a day  influenza   Vaccine 0.5 milliLiter(s) IntraMuscular once  insulin lispro (HumaLOG) corrective regimen sliding scale   SubCutaneous three times a day before meals  insulin lispro (HumaLOG) corrective regimen sliding scale   SubCutaneous at bedtime  metoprolol tartrate 12.5 milliGRAM(s) Oral two times a day  warfarin 5 milliGRAM(s) Oral once    MEDICATIONS  (PRN):  dextrose 40% Gel 15 Gram(s) Oral once PRN Blood Glucose LESS THAN 70 milliGRAM(s)/deciliter  glucagon  Injectable 1 milliGRAM(s) IntraMuscular once PRN Glucose LESS THAN 70 milligrams/deciliter    LABS:                        11.5   13.32 )-----------( 284      ( 11 Oct 2019 02:50 )             39.8     10-11    138  |  102  |  21  ----------------------------<  160<H>  4.1   |  25  |  0.82    Ca    8.9      11 Oct 2019 02:50  Mg     1.9     10-11      PT/INR - ( 11 Oct 2019 02:50 )   PT: 16.3 SEC;   INR: 1.45            Urinalysis Basic - ( 11 Oct 2019 02:40 )    Color: LIGHT YELLOW / Appearance: CLEAR / S.016 / pH: 6.0  Gluc: NEGATIVE / Ketone: NEGATIVE  / Bili: NEGATIVE / Urobili: NORMAL   Blood: NEGATIVE / Protein: 20 / Nitrite: NEGATIVE   Leuk Esterase: NEGATIVE / RBC: 0-2 / WBC 3-5   Sq Epi: OCC / Non Sq Epi: x / Bacteria: NEGATIVE      CAPILLARY BLOOD GLUCOSE      POCT Blood Glucose.: 151 mg/dL (11 Oct 2019 12:22)  POCT Blood Glucose.: 136 mg/dL (11 Oct 2019 08:22)  POCT Blood Glucose.: 212 mg/dL (10 Oct 2019 22:01)  POCT Blood Glucose.: 189 mg/dL (10 Oct 2019 17:29)        Urinalysis Basic - ( 11 Oct 2019 02:40 )    Color: LIGHT YELLOW / Appearance: CLEAR / S.016 / pH: 6.0  Gluc: NEGATIVE / Ketone: NEGATIVE  / Bili: NEGATIVE / Urobili: NORMAL   Blood: NEGATIVE / Protein: 20 / Nitrite: NEGATIVE   Leuk Esterase: NEGATIVE / RBC: 0-2 / WBC 3-5   Sq Epi: OCC / Non Sq Epi: x / Bacteria: NEGATIVE      REVIEW OF SYSTEMS:  CONSTITUTIONAL: No fever, weight loss, or fatigue  EYES: No eye pain, visual disturbances, or discharge  ENMT:  No difficulty hearing, tinnitus, vertigo; No sinus or throat pain  NECK: No pain or stiffness  RESPIRATORY: No cough, wheezing, chills or hemoptysis; No shortness of breath  CARDIOVASCULAR: No chest pain, palpitations, dizziness, or leg swelling  GASTROINTESTINAL: No abdominal or epigastric pain. No nausea, vomiting, or hematemesis; No diarrhea or constipation. No melena or hematochezia.  GENITOURINARY: No dysuria, frequency, hematuria, or incontinence  NEUROLOGICAL: No headaches, memory loss, loss of strength, numbness, or tremors    Consultant(s) Notes Reviewed:  [x ] YES  [ ] NO    PHYSICAL EXAM:  GENERAL: NAD, well-groomed, well-developed,not in any distress ,  HEAD:  Atraumatic, Normocephalic  EYES: EOMI, PERRLA, conjunctiva and sclera clear  ENMT: No tonsillar erythema, exudates, or enlargement; Moist mucous membranes, Good dentition, No lesions  NECK: Supple, No JVD, Normal thyroid  NERVOUS SYSTEM:  Alert & Oriented X3, No focal deficit   CHEST/LUNG: Good air entry bilateral with no  rales, rhonchi, wheezing, or rubs  HEART: Regular rate and rhythm; No murmurs, rubs, or gallops  ABDOMEN: Soft, Nontender, Nondistended; Bowel sounds present  EXTREMITIES:  2+ Peripheral Pulses, No clubbing, cyanosis, or edema    Care Discussed with Consultants/Other Providers [ x] YES  [ ] NO
INTERVAL HPI/OVERNIGHT EVENTS: Seen and examined with family in room.   Vital Signs Last 24 Hrs  T(C): 36.9 (15 Oct 2019 13:35), Max: 36.9 (15 Oct 2019 13:35)  T(F): 98.5 (15 Oct 2019 13:35), Max: 98.5 (15 Oct 2019 13:35)  HR: 78 (15 Oct 2019 13:35) (78 - 91)  BP: 134/62 (15 Oct 2019 13:35) (134/62 - 159/43)  BP(mean): --  RR: 18 (15 Oct 2019 13:35) (17 - 18)  SpO2: 91% (15 Oct 2019 13:35) (91% - 97%)  I&O's Summary    14 Oct 2019 07:01  -  15 Oct 2019 07:00  --------------------------------------------------------  IN: 220 mL / OUT: 0 mL / NET: 220 mL      MEDICATIONS  (STANDING):  amLODIPine   Tablet 5 milliGRAM(s) Oral daily  aspirin enteric coated 81 milliGRAM(s) Oral daily  atorvastatin 80 milliGRAM(s) Oral at bedtime  dextrose 5%. 1000 milliLiter(s) (50 mL/Hr) IV Continuous <Continuous>  dextrose 50% Injectable 12.5 Gram(s) IV Push once  dextrose 50% Injectable 25 Gram(s) IV Push once  dextrose 50% Injectable 25 Gram(s) IV Push once  enalapril 20 milliGRAM(s) Oral daily  furosemide   Injectable 60 milliGRAM(s) IV Push two times a day  influenza   Vaccine 0.5 milliLiter(s) IntraMuscular once  insulin lispro (HumaLOG) corrective regimen sliding scale   SubCutaneous three times a day before meals  insulin lispro (HumaLOG) corrective regimen sliding scale   SubCutaneous at bedtime  metoprolol tartrate 12.5 milliGRAM(s) Oral two times a day  warfarin 3 milliGRAM(s) Oral once    MEDICATIONS  (PRN):  dextrose 40% Gel 15 Gram(s) Oral once PRN Blood Glucose LESS THAN 70 milliGRAM(s)/deciliter  glucagon  Injectable 1 milliGRAM(s) IntraMuscular once PRN Glucose LESS THAN 70 milligrams/deciliter    LABS:                        10.8   11.21 )-----------( 259      ( 15 Oct 2019 06:10 )             35.9     10-15    139  |  99  |  26<H>  ----------------------------<  129<H>  3.7   |  26  |  0.94    Ca    8.6      15 Oct 2019 06:10  Phos  3.8     10-14  Mg     1.8     10-15      PT/INR - ( 15 Oct 2019 06:10 )   PT: 33.0 SEC;   INR: 2.88              CAPILLARY BLOOD GLUCOSE      POCT Blood Glucose.: 202 mg/dL (15 Oct 2019 12:20)  POCT Blood Glucose.: 148 mg/dL (15 Oct 2019 08:39)  POCT Blood Glucose.: 223 mg/dL (14 Oct 2019 21:26)  POCT Blood Glucose.: 164 mg/dL (14 Oct 2019 16:50)          REVIEW OF SYSTEMS:  CONSTITUTIONAL: No fever, weight loss, or fatigue  EYES: No eye pain, visual disturbances, or discharge  ENMT:  No difficulty hearing, tinnitus, vertigo; No sinus or throat pain  NECK: No pain or stiffness  RESPIRATORY: No cough, wheezing, chills or hemoptysis; No shortness of breath  CARDIOVASCULAR: No chest pain, palpitations, dizziness, or leg swelling  GASTROINTESTINAL: No abdominal or epigastric pain. No nausea, vomiting, or hematemesis; No diarrhea or constipation. No melena or hematochezia.  GENITOURINARY: No dysuria, frequency, hematuria, or incontinence  NEUROLOGICAL: No headaches, memory loss, loss of strength, numbness, or tremors    Consultant(s) Notes Reviewed:  [x ] YES  [ ] NO    PHYSICAL EXAM:  GENERAL: NAD, well-groomed, well-developed, not in any distress ,  HEAD:  Atraumatic, Normocephalic  EYES: EOMI, PERRLA, conjunctiva and sclera clear  ENMT: No tonsillar erythema, exudates, or enlargement; Moist mucous membranes, Good dentition, No lesions  NECK: Supple, No JVD, Normal thyroid  NERVOUS SYSTEM:  Alert & Oriented X3, No focal deficit   CHEST/LUNG: Good air entry bilateral with no  rales, rhonchi, wheezing, or rubs  HEART: Regular rate and rhythm; No murmurs, rubs, or gallops  ABDOMEN: Soft, Nontender, Nondistended; Bowel sounds present  EXTREMITIES:  2+ Peripheral Pulses, No clubbing, cyanosis, or edema    Care Discussed with Consultants/Other Providers [ x] YES  [ ] NO
INTERVAL HPI/OVERNIGHT EVENTS: i feel fine. Family in room.   Vital Signs Last 24 Hrs  T(C): 36.9 (17 Oct 2019 11:50), Max: 37.1 (16 Oct 2019 15:15)  T(F): 98.5 (17 Oct 2019 11:50), Max: 98.7 (16 Oct 2019 15:15)  HR: 70 (17 Oct 2019 11:50) (66 - 88)  BP: 146/46 (17 Oct 2019 11:50) (146/46 - 184/73)  BP(mean): --  RR: 16 (17 Oct 2019 11:50) (16 - 18)  SpO2: 96% (17 Oct 2019 11:50) (96% - 100%)  I&O's Summary    16 Oct 2019 07:01  -  17 Oct 2019 07:00  --------------------------------------------------------  IN: 200 mL / OUT: 0 mL / NET: 200 mL      MEDICATIONS  (STANDING):  amLODIPine   Tablet 5 milliGRAM(s) Oral daily  aspirin enteric coated 81 milliGRAM(s) Oral daily  atorvastatin 80 milliGRAM(s) Oral at bedtime  dextrose 5%. 1000 milliLiter(s) (50 mL/Hr) IV Continuous <Continuous>  dextrose 50% Injectable 12.5 Gram(s) IV Push once  dextrose 50% Injectable 25 Gram(s) IV Push once  dextrose 50% Injectable 25 Gram(s) IV Push once  enalapril 20 milliGRAM(s) Oral daily  furosemide    Tablet 40 milliGRAM(s) Oral every 12 hours  insulin lispro (HumaLOG) corrective regimen sliding scale   SubCutaneous three times a day before meals  insulin lispro (HumaLOG) corrective regimen sliding scale   SubCutaneous at bedtime  metoprolol tartrate 12.5 milliGRAM(s) Oral two times a day    MEDICATIONS  (PRN):  dextrose 40% Gel 15 Gram(s) Oral once PRN Blood Glucose LESS THAN 70 milliGRAM(s)/deciliter  glucagon  Injectable 1 milliGRAM(s) IntraMuscular once PRN Glucose LESS THAN 70 milligrams/deciliter    LABS:                        11.3   11.20 )-----------( 279      ( 17 Oct 2019 11:43 )             37.0     10-17    137  |  101  |  25<H>  ----------------------------<  121<H>  4.3   |  20<L>  |  0.85    Ca    8.7      17 Oct 2019 06:50  Mg     1.9     10-17      PT/INR - ( 17 Oct 2019 06:00 )   PT: 37.5 SEC;   INR: 3.26              CAPILLARY BLOOD GLUCOSE      POCT Blood Glucose.: 200 mg/dL (17 Oct 2019 12:30)  POCT Blood Glucose.: 149 mg/dL (17 Oct 2019 08:35)  POCT Blood Glucose.: 283 mg/dL (16 Oct 2019 21:12)  POCT Blood Glucose.: 126 mg/dL (16 Oct 2019 17:02)          REVIEW OF SYSTEMS:  CONSTITUTIONAL: No fever, weight loss, or fatigue  EYES: No eye pain, visual disturbances, or discharge  ENMT:  No difficulty hearing, tinnitus, vertigo; No sinus or throat pain  NECK: No pain or stiffness  RESPIRATORY: No cough, wheezing, chills or hemoptysis; No shortness of breath  CARDIOVASCULAR: No chest pain, palpitations, dizziness, or leg swelling  GASTROINTESTINAL: No abdominal or epigastric pain. No nausea, vomiting, or hematemesis; No diarrhea or constipation. No melena or hematochezia.  GENITOURINARY: No dysuria, frequency, hematuria, or incontinence  NEUROLOGICAL: No headaches, memory loss, loss of strength, numbness, or tremors      Consultant(s) Notes Reviewed:  [x ] YES  [ ] NO    PHYSICAL EXAM:  GENERAL: NAD, well-groomed, well-developed,not in any distress ,  HEAD:  Atraumatic, Normocephalic  EYES: EOMI, PERRLA, conjunctiva and sclera clear  NECK: Supple, No JVD, Normal thyroid  NERVOUS SYSTEM:  Alert & Oriented X3, No focal deficit   CHEST/LUNG: Good air entry bilateral with no  rales, rhonchi, wheezing, or rubs  HEART: Regular rate and rhythm; No murmurs, rubs, or gallops  ABDOMEN: Soft, Nontender, Nondistended; Bowel sounds present  EXTREMITIES:  2+ Peripheral Pulses, No clubbing, cyanosis, or edema    Care Discussed with Consultants/Other Providers [ x] YES  [ ] NO
Note to Provider from Anesthesiology Attending     I have evaluated and examined this patient today in the IR holding area.  Her systolic blood pressures were 225, 196, and 210.  Her O2 saturations were   88 to 90 on room air.  Please optimized this patient prior to GIACOMO as this procedure usually requires some amount of sedation for patient tolerance.    I have discussed this with another attending anesthesiologist and the [facility]iology [facility] who all agree that further optimization would improve this patient's ability to tolerate GIACOMO.      Thank you,     James Draper M.D.  Attending Anesthesiologist.
Collin Trejo MD  Interventional Cardiology / Advance Heart Failure and Cardiac Transplant Specialist  [place] Office : [phone] [place] [address] [place] [address]  Tel:   [place] Office : [phone] [facility] [place] [address]  Tel: [phone]  Cell : [phone]    Pt is lying in bed comfortable not in distress, no chest pains no SOB no palpitations  	  MEDICATIONS:  aspirin enteric coated 81 milliGRAM(s) Oral daily  enalapril 20 milliGRAM(s) Oral daily  furosemide   Injectable 40 milliGRAM(s) IV Push two times a day  metoprolol tartrate 12.5 milliGRAM(s) Oral two times a day  warfarin 4 milliGRAM(s) Oral once            atorvastatin 80 milliGRAM(s) Oral at bedtime  dextrose 40% Gel 15 Gram(s) Oral once PRN  dextrose 50% Injectable 12.5 Gram(s) IV Push once  dextrose 50% Injectable 25 Gram(s) IV Push once  dextrose 50% Injectable 25 Gram(s) IV Push once  glucagon  Injectable 1 milliGRAM(s) IntraMuscular once PRN  insulin lispro (HumaLOG) corrective regimen sliding scale   SubCutaneous three times a day before meals  insulin lispro (HumaLOG) corrective regimen sliding scale   SubCutaneous at bedtime    dextrose 5%. 1000 milliLiter(s) IV Continuous <Continuous>  influenza   Vaccine 0.5 milliLiter(s) IntraMuscular once      PAST MEDICAL/SURGICAL HISTORY  PAST MEDICAL & SURGICAL HISTORY:  Diabetes mellitus: not on meds  Gout  Upper GI bleed  CHF (congestive heart failure)  Mitral Regurgitation  CVA (Cerebral Infarction): 2011  CAD (Coronary Artery Disease): s/p stents and CABG  Afib: (on Warfarin)  HTN (Hypertension)  H/O mitral valve replacement: 9/22/14  H/O aortic valve replacement: 9/22/14  S/P CABG x 1: (2000)  S/P angioplasty with stent: 2011      SOCIAL HISTORY: Substance Use (street drugs): ( x ) never used  (  ) other:    FAMILY HISTORY:  Family history of acute myocardial infarction: mother      REVIEW OF SYSTEMS:  CONSTITUTIONAL: No fever, weight loss, or fatigue  EYES: No eye pain, visual disturbances, or discharge  ENMT:  No difficulty hearing, tinnitus, vertigo; No sinus or throat pain  BREASTS: No pain, masses, or nipple discharge  GASTROINTESTINAL: No abdominal or epigastric pain. No nausea, vomiting, or hematemesis; No diarrhea or constipation. No melena or hematochezia.  GENITOURINARY: No dysuria, frequency, hematuria, or incontinence  NEUROLOGICAL: No headaches, memory loss, loss of strength, numbness, or tremors  ENDOCRINE: No heat or cold intolerance; No hair loss  MUSCULOSKELETAL: No joint pain or swelling; No muscle, back, or extremity pain  PSYCHIATRIC: No depression, anxiety, mood swings, or difficulty sleeping  HEME/LYMPH: No easy bruising, or bleeding gums  All others negative    PHYSICAL EXAM:  T(C): 36.8 (10-13-19 @ 05:56), Max: 36.8 (10-13-19 @ 05:56)  HR: 82 (10-13-19 @ 05:56) (82 - 89)  BP: 156/66 (10-13-19 @ 05:56) (156/66 - 186/65)  RR: 18 (10-13-19 @ 05:56) (18 - 18)  SpO2: 99% (10-13-19 @ 05:56) (98% - 99%)  Wt(kg): --  I&O's Summary    12 Oct 2019 07:01  -  13 Oct 2019 07:00  --------------------------------------------------------  IN: 1100 mL / OUT: 500 mL / NET: 600 mL          GENERAL: NAD, well-groomed, well-developed  EYES: EOMI, PERRLA, conjunctiva and sclera clear  ENMT: No tonsillar erythema, exudates, or enlargement; Moist mucous membranes, Good dentition, No lesions  Cardiovascular: Normal S1 S2, No JVD, No murmurs, No edema  Respiratory: Lungs clear to auscultation	  Gastrointestinal:  Soft, Non-tender, + BS	  Extremities: Normal range of motion, No clubbing, cyanosis or edema  LYMPH: No lymphadenopathy noted  NERVOUS SYSTEM:  Alert & Oriented X3, Good concentration; Motor Strength 5/5 B/L upper and lower extremities; DTRs 2+ intact and symmetric                                    10.9   12.52 )-----------( 250      ( 13 Oct 2019 07:00 )             35.9     10-13    138  |  99  |  20  ----------------------------<  136<H>  3.6   |  25  |  0.75    Ca    8.9      13 Oct 2019 07:00  Phos  3.9     10-13  Mg     1.7     10-13      proBNP:   Lipid Profile:   HgA1c:   TSH:     Consultant(s) Notes Reviewed:  [x ] YES  [ ] NO    Care Discussed with Consultants/Other Providers [ x] YES  [ ] NO    Imaging Personally Reviewed independently:  [x] YES  [ ] NO    All labs, radiologic studies, vitals, orders and medications list reviewed. Patient is seen and examined at bedside. Case discussed with medical team.

## 2019-10-17 NOTE — PROGRESS NOTE ADULT - ASSESSMENT
ekg - afib @ [facility]  Echo - normal LV function    a/p     1) Acute on chronic systolic CHF - cont lasix 40 mg q12, SOB improving  2d echo shows normal LV function normal AVR, elevated gradient of MVR, severe pulm HTN for GIACOMO monday cont lopressor and enalapril     2) HTN - cont lopressor and enalapril    3) Afib - cont coumadin and lopressor    4) Severe pulm HTN - will get CT r/o PE, GIACOMO monday
79F with history of HFrEF (last EF 42%), CAD s/p stents and CABG, s/p bioprosthetic AVR/MVR, HTN, HLD, AFib on Coumadin, and DM2 (not on meds as per patient) who presents to the hospital with complaints of worsening SOB over the past few days. Patient said that she usually lives [place]state near [place] and is visiting her daughter currently. Said that over the past few days she started to notice some SOB associated with worsening LE edema b/l. Said that today her SOB seemed worse than usual and therefore decided to come to the hospital for evaluation. Denied having any associated coughing, chest pain, pleuritic chest pain, or orthopnea (though states that she chronically has to sleep with the head of her bed elevated). Said that her LE edema is improved currently. Said that she has been compliant with her diet and her medications. Denies any other complaints.     Problem/Plan - 1:  ·  Problem: Acute on chronic systolic congestive heart failure.  Plan: - Improving.   - She received 40mg IVP of lasix in ED -> will c/w lasix 40mg IVP BID for now, will monitor daily weights, will monitor I/O per routine  -TTE noted and awaiting GIACOMO.      Problem/Plan - 2:  ·  Problem: Pleural effusion .  Plan: - Likely 2/2 AHF.  -IV Lasix.     Problem/Plan - 3:  ·  Problem: Leukocytosis, unspecified type.  Plan: - No fever etc .  Likely reactionary 2/2 SOB, no significant infectious complaints, will monitor off abx for now.      Problem/Plan - 4:  ·  Problem: DM .  Plan: - SSI for now . Sugars in acceptable range.      Problem/Plan - 5:  ·  Problem: Coronary artery disease involving native coronary artery of native heart without angina pectoris.  Plan: - No CP.  -Will c/w home ASA  - TTE pending.      Problem/Plan - 6:  Problem: Chronic atrial fibrillation. Plan: - on coumadin, subtherapeutic currently, will re-check INR in AM and dose coumadin as per INR.     Problem/Plan - 7:  ·  Problem: Essential hypertension.  Plan: - on enalapril/metoprolol as per her OMR, will c/w for now.      Problem/Plan - 8:  ·  Problem: Need for prophylactic measure.  Plan: - SCDs for IMPROVE score of 1  - Currently with subtherapeutic INR, will recheck level in AM and dose coumadin accordingly.
79F with history of HFrEF (last EF 42%), CAD s/p stents and CABG, s/p bioprosthetic AVR/MVR, HTN, HLD, AFib on Coumadin, and DM2 (not on meds as per patient) who presents to the hospital with complaints of worsening SOB over the past few days. Patient said that she usually lives [place]state near [place] and is visiting her daughter currently. Said that over the past few days she started to notice some SOB associated with worsening LE edema b/l. Said that today her SOB seemed worse than usual and therefore decided to come to the hospital for evaluation. Denied having any associated coughing, chest pain, pleuritic chest pain, or orthopnea (though states that she chronically has to sleep with the head of her bed elevated). Said that her LE edema is improved currently. Said that she has been compliant with her diet and her medications. Denies any other complaints.     Problem/Plan - 1:  ·  Problem: Acute on chronic systolic congestive heart failure.  Plan: - Improving.   - She received 40mg IVP of lasix in ED -> will c/w lasix 40mg IVP BID for now, will monitor daily weights, will monitor I/O per routine  -TTE noted . Awaiting GIACOMO .CTA negative for PE  .     Problem/Plan - 2:  ·  Problem: Pleural effusion .  Plan: - Likely 2/2 AHF.  -IV Lasix.     Problem/Plan - 3:  ·  Problem: Leukocytosis, unspecified type.  Plan: - No fever etc .  Likely reactionary 2/2 SOB, no significant infectious complaints, will monitor off abx for now.      Problem/Plan - 4:  ·  Problem: DM .  Plan: - SSI for now . Sugars in acceptable range.      Problem/Plan - 5:  ·  Problem: Coronary artery disease involving native coronary artery of native heart without angina pectoris.  Plan: - No CP.  -Will c/w home ASA  - GIACOMO pending.      Problem/Plan - 6:  Problem: Chronic atrial fibrillation. Plan: - on coumadin, subtherapeutic currently, will re-check INR in AM and dose coumadin as per INR.     Problem/Plan - 7:  ·  Problem: Essential hypertension.  Plan: - on enalapril/metoprolol as per her OMR, will c/w for now.      Problem/Plan - 8:  ·  Problem: Need for prophylactic measure.  Plan: - SCDs for IMPROVE score of 1  - Currently with subtherapeutic INR, will recheck level in AM and dose coumadin accordingly.
79F with history of HFrEF (last EF 42%), CAD s/p stents and CABG, s/p bioprosthetic AVR/MVR, HTN, HLD, AFib on Coumadin, and DM2 (not on meds as per patient) who presents to the hospital with complaints of worsening SOB over the past few days. Patient said that she usually lives [place]state near [place] and is visiting her daughter currently. Said that over the past few days she started to notice some SOB associated with worsening LE edema b/l. Said that today her SOB seemed worse than usual and therefore decided to come to the hospital for evaluation. Denied having any associated coughing, chest pain, pleuritic chest pain, or orthopnea (though states that she chronically has to sleep with the head of her bed elevated). Said that her LE edema is improved currently. Said that she has been compliant with her diet and her medications. Denies any other complaints.     Problem/Plan - 1:  ·  Problem: Acute on chronic systolic congestive heart failure.  Plan: - Improving.   - She received 40mg IVP of lasix in ED -> will c/w lasix 40mg IVP BID for now, will monitor daily weights, will monitor I/O per routine  -TTE noted . Awaiting GIACOMO and CTA negative for PE  .     Problem/Plan - 2:  ·  Problem: Pleural effusion .  Plan: - Likely 2/2 AHF.  -IV Lasix.     Problem/Plan - 3:  ·  Problem: Leukocytosis, unspecified type.  Plan: - No fever etc .  Likely reactionary 2/2 SOB, no significant infectious complaints, will monitor off abx for now.      Problem/Plan - 4:  ·  Problem: DM .  Plan: - SSI for now . Sugars in acceptable range.      Problem/Plan - 5:  ·  Problem: Coronary artery disease involving native coronary artery of native heart without angina pectoris.  Plan: - No CP.  -Will c/w home ASA  - TTE pending.      Problem/Plan - 6:  Problem: Chronic atrial fibrillation. Plan: - on coumadin, subtherapeutic currently, will re-check INR in AM and dose coumadin as per INR.     Problem/Plan - 7:  ·  Problem: Essential hypertension.  Plan: - on enalapril/metoprolol as per her OMR, will c/w for now.      Problem/Plan - 8:  ·  Problem: Need for prophylactic measure.  Plan: - SCDs for IMPROVE score of 1  - Currently with subtherapeutic INR, will recheck level in AM and dose coumadin accordingly.
79F with history of HFrEF (last EF 42%), CAD s/p stents and CABG, s/p bioprosthetic AVR/MVR, HTN, HLD, AFib on Coumadin, and DM2 (not on meds as per patient) who presents to the hospital with complaints of worsening SOB over the past few days. Patient said that she usually lives upstate near [place] and is visiting her daughter currently. Said that over the past few days she started to notice some SOB associated with worsening LE edema b/l. Said that today her SOB seemed worse than usual and therefore decided to come to the hospital for evaluation. Denied having any associated coughing, chest pain, pleuritic chest pain, or orthopnea (though states that she chronically has to sleep with the head of her bed elevated). Said that her LE edema is improved currently. Said that she has been compliant with her diet and her medications. Denies any other complaints.     Problem/Plan - 1:  ·  Problem: Acute on chronic systolic congestive heart failure.  Plan: - Improving.   - She received 40mg IVP of lasix in ED -> will c/w lasix 40mg IVP BID for now, will monitor daily weights, will monitor I/O per routine  -TTE noted . Awaiting GIACOMO and CTA as PAH with MR .      Problem/Plan - 2:  ·  Problem: Pleural effusion .  Plan: - Likely 2/2 AHF.  -IV Lasix.     Problem/Plan - 3:  ·  Problem: Leukocytosis, unspecified type.  Plan: - No fever etc .  Likely reactionary 2/2 SOB, no significant infectious complaints, will monitor off abx for now.      Problem/Plan - 4:  ·  Problem: DM .  Plan: - SSI for now . Sugars in acceptable range.      Problem/Plan - 5:  ·  Problem: Coronary artery disease involving native coronary artery of native heart without angina pectoris.  Plan: - No CP.  -Will c/w home ASA  - TTE pending.      Problem/Plan - 6:  Problem: Chronic atrial fibrillation. Plan: - on coumadin, subtherapeutic currently, will re-check INR in AM and dose coumadin as per INR.     Problem/Plan - 7:  ·  Problem: Essential hypertension.  Plan: - on enalapril/metoprolol as per her OMR, will c/w for now.      Problem/Plan - 8:  ·  Problem: Need for prophylactic measure.  Plan: - SCDs for IMPROVE score of 1  - Currently with subtherapeutic INR, will recheck level in AM and dose coumadin accordingly.
79F with history of HFrEF (last EF 42%), CAD s/p stents and CABG, s/p bioprosthetic AVR/MVR, HTN, HLD, AFib on Coumadin, and DM2 (not on meds as per patient) who presents to the hospital with complaints of worsening SOB over the past few days. Patient said that she usually lives upstate near [place] and is visiting her daughter currently. Said that over the past few days she started to notice some SOB associated with worsening LE edema b/l. Said that today her SOB seemed worse than usual and therefore decided to come to the hospital for evaluation. Denied having any associated coughing, chest pain, pleuritic chest pain, or orthopnea (though states that she chronically has to sleep with the head of her bed elevated). Said that her LE edema is improved currently. Said that she has been compliant with her diet and her medications. Denies any other complaints.     Problem/Plan - 1:  ·  Problem: Acute on chronic systolic congestive heart failure.  Plan: - Improving.   - She received 40mg IVP of lasix in ED -> will c/w lasix 40mg IVP BID for now, . Now PO Lasix.   -TTE noted . S/P GIACOMO .CTA negative for PE  .     Problem/Plan - 2:  ·  Problem: Pleural effusion .  Plan: - Likely 2/2 AHF.  -IV Lasix.     Problem/Plan - 3:  ·  Problem: Leukocytosis, unspecified type.  Plan: - No fever etc .  Likely reactionary 2/2 SOB, no significant infectious complaints, will monitor off abx for now.      Problem/Plan - 4:  ·  Problem: DM .  Plan: - SSI for now . Sugars in acceptable range.      Problem/Plan - 5:  ·  Problem: Coronary artery disease involving native coronary artery of native heart without angina pectoris.  Plan: - No CP.  -Will c/w home ASA  - GIACOMO .< from: Transesophageal Echocardiogram w/o TTE (10.16.19 @ 14:50) >  CONCLUSIONS:  Limited focus study due to patient's clinical status  1. Bioprosthetic mitral valve replacement, with normal  leaflet mobility. Minimal mitral regurgitation. Mean  transmitral valve gradient equals 7 mm Hg (HR 89)  2. Bioprosthetic aortic valve replacement, with normal  leaflet mobility.  3. Th distal third of the left atrial appendage thrombus is  entriely opacified from thrombus. The remaining appendage  contains spontaneous echocontrast 'smoke'  4. Agitated saline injection demonstrates no evidence of a  patent foramen ovale.    < end of copied text >       Problem/Plan - 6:  Problem: Chronic atrial fibrillation. Plan: - Dosing coumadin per INR.      Problem/Plan - 7:  ·  Problem: Essential hypertension.  Plan: - on enalapril/metoprolol as per her OMR, will c/w for now.      Problem/Plan - 8:  ·  Problem: Need for prophylactic measure.  Plan: - SCDs for IMPROVE score of 1  - Currently with subtherapeutic INR, will recheck level in AM and dose coumadin accordingly.
79F with history of HFrEF (last EF 42%), CAD s/p stents and CABG, s/p bioprosthetic AVR/MVR, HTN, HLD, AFib on Coumadin, and DM2 (not on meds as per patient) who presents to the hospital with complaints of worsening SOB over the past few days. Patient said that she usually lives upstate near [place] and is visiting her daughter currently. Said that over the past few days she started to notice some SOB associated with worsening LE edema b/l. Said that today her SOB seemed worse than usual and therefore decided to come to the hospital for evaluation. Denied having any associated coughing, chest pain, pleuritic chest pain, or orthopnea (though states that she chronically has to sleep with the head of her bed elevated). Said that her LE edema is improved currently. Said that she has been compliant with her diet and her medications. Denies any other complaints.     Problem/Plan - 1:  ·  Problem: Acute on chronic systolic congestive heart failure.  Plan: - Improving.   - She received 40mg IVP of lasix in ED -> will c/w lasix 40mg IVP BID for now, will monitor daily weights, will monitor I/O per routine  -TTE noted . Awaiting GIACOMO and CTA negative for PE  .     Problem/Plan - 2:  ·  Problem: Pleural effusion .  Plan: - Likely 2/2 AHF.  -IV Lasix.     Problem/Plan - 3:  ·  Problem: Leukocytosis, unspecified type.  Plan: - No fever etc .  Likely reactionary 2/2 SOB, no significant infectious complaints, will monitor off abx for now.      Problem/Plan - 4:  ·  Problem: DM .  Plan: - SSI for now . Sugars in acceptable range.      Problem/Plan - 5:  ·  Problem: Coronary artery disease involving native coronary artery of native heart without angina pectoris.  Plan: - No CP.  -Will c/w home ASA  - GIACOMO pending.      Problem/Plan - 6:  Problem: Chronic atrial fibrillation. Plan: - on coumadin, subtherapeutic currently, will re-check INR in AM and dose coumadin as per INR.     Problem/Plan - 7:  ·  Problem: Essential hypertension.  Plan: - on enalapril/metoprolol as per her OMR, will c/w for now.      Problem/Plan - 8:  ·  Problem: Need for prophylactic measure.  Plan: - SCDs for IMPROVE score of 1  - Currently with subtherapeutic INR, will recheck level in AM and dose coumadin accordingly.
79F with history of HFrEF (last EF 42%), CAD s/p stents and CABG, s/p bioprosthetic AVR/MVR, HTN, HLD, AFib on Coumadin, and DM2 (not on meds as per patient) who presents to the hospital with complaints of worsening SOB over the past few days. Patient said that she usually lives upstate near [place] and is visiting her daughter currently. Said that over the past few days she started to notice some SOB associated with worsening LE edema b/l. Said that today her SOB seemed worse than usual and therefore decided to come to the hospital for evaluation. Denied having any associated coughing, chest pain, pleuritic chest pain, or orthopnea (though states that she chronically has to sleep with the head of her bed elevated). Said that her LE edema is improved currently. Said that she has been compliant with her diet and her medications. Denies any other complaints.     Problem/Plan - 1:  ·  Problem: Acute on chronic systolic congestive heart failure.  Plan: - Improving.   - She received 40mg IVP of lasix in ED -> will c/w lasix 40mg IVP BID for now, will monitor daily weights, will monitor I/O per routine  -TTE noted . S/P GIACOMO .CTA negative for PE  .     Problem/Plan - 2:  ·  Problem: Pleural effusion .  Plan: - Likely 2/2 AHF.  -IV Lasix.     Problem/Plan - 3:  ·  Problem: Leukocytosis, unspecified type.  Plan: - No fever etc .  Likely reactionary 2/2 SOB, no significant infectious complaints, will monitor off abx for now.      Problem/Plan - 4:  ·  Problem: DM .  Plan: - SSI for now . Sugars in acceptable range.      Problem/Plan - 5:  ·  Problem: Coronary artery disease involving native coronary artery of native heart without angina pectoris.  Plan: - No CP.  -Will c/w home ASA  - GIACOMO .< from: Transesophageal Echocardiogram w/o TTE (10.16.19 @ 14:50) >  CONCLUSIONS:  Limited focus study due to patient's clinical status  1. Bioprosthetic mitral valve replacement, with normal  leaflet mobility. Minimal mitral regurgitation. Mean  transmitral valve gradient equals 7 mm Hg (HR 89)  2. Bioprosthetic aortic valve replacement, with normal  leaflet mobility.  3. Th distal third of the left atrial appendage thrombus is  entriely opacified from thrombus. The remaining appendage  contains spontaneous echocontrast 'smoke'  4. Agitated saline injection demonstrates no evidence of a  patent foramen ovale.    < end of copied text >       Problem/Plan - 6:  Problem: Chronic atrial fibrillation. Plan: - Dosing coumadin per INR.      Problem/Plan - 7:  ·  Problem: Essential hypertension.  Plan: - on enalapril/metoprolol as per her OMR, will c/w for now.      Problem/Plan - 8:  ·  Problem: Need for prophylactic measure.  Plan: - SCDs for IMPROVE score of 1  - Currently with subtherapeutic INR, will recheck level in AM and dose coumadin accordingly.
ekg - afib @ [facility]  Echo - normal LV function    a/p     1) Acute on chronic systolic CHF - cont lasix 40 mg q12, SOB improving  2d echo shows normal LV function normal AVR, elevated gradient of MVR, severe pulm HTN for GIACOMO monday cont lopressor and enalapril     2) HTN - cont lopressor and enalapril    3) Afib - cont coumadin and lopressor    4) Severe pulm HTN - will get CT r/o PE, GIACOMO monday
ekg - afib @ [facility]  Echo - normal LV function    a/p     1) Acute on chronic systolic CHF - cont lasix 40 mg q12, SOB improving  2d echo shows normal LV function normal AVR, elevated gradient of MVR, severe pulm HTN for GIACOMO monday cont lopressor and enalapril     2) HTN - cont lopressor and enalapril    3) Afib - cont coumadin and lopressor    4) Severe pulm HTN - will get CT r/o PE, GIACOMO monday
ekg - afib @ [facility]  Echo - normal LV function    a/p     1) Acute on chronic systolic CHF - increase lasix to 60 mg q12, SOB improving  2d echo shows normal LV function normal AVR, elevated gradient of MVR, severe pulm HTN for GIACOMO today cont lopressor and enalapril     2) HTN - cont lopressor and enalapril    3) Afib - cont coumadin and lopressor    4) Severe pulm HTN - GIACOMO today CT chest negative for PE, shows pulm edema
ekg - afib @ [facility]  Echo - normal LV function    a/p     1) Acute on chronic systolic CHF - increase lasix to 60 mg q12, SOB improving  2d echo shows normal LV function normal AVR, elevated gradient of MVR, severe pulm HTN for GIACOMO today cont lopressor and enalapril     2) HTN - cont lopressor and enalapril and norvasc    3) Afib - cont coumadin and lopressor    4) Severe pulm HTN - GIACOMO tomorrow CT chest negative for PE, shows pulm edema
ekg - afib @ [facility]  Echo - normal LV function    a/p     1) Acute on chronic systolic CHF - increase lasix to 60 mg q12, SOB improving  2d echo shows normal LV function normal AVR, elevated gradient of MVR, severe pulm HTN for GIACOMO tomorrow cont lopressor and enalapril     2) HTN - cont lopressor and enalapril and norvasc    3) Afib - cont coumadin and lopressor    4) Severe pulm HTN - GIACOMO tomorrow CT chest negative for PE, shows pulm edema
ekg - afib @ [facility]  Echo - pending     a/p     1) Acute on chronic systolic CHF - cont lasix 40 mg q12, SOB improving will get 2d echo cont lopressor and enalapril     2) HTN - cont lopressor and enalapril    3) Afib - cont coumadin and lopressor

## 2019-10-17 NOTE — DISCHARGE NOTE PROVIDER - PROVIDER TOKENS
PROVIDER:[TOKEN:[[id number]]],PROVIDER:[TOKEN:[[id number]]] PROVIDER:[TOKEN:[[id number]]],PROVIDER:[TOKEN:[[id number]]],FREE:[LAST:[Curry],FIRST:[Cristobal],PHONE:[(   )    -],FAX:[(   )    -],ADDRESS:[Cardiologist]] PROVIDER:[TOKEN:[[id number]]],PROVIDER:[TOKEN:[[id number]]],FREE:[LAST:[Dugan],FIRST:[S.],PHONE:[(   )    -],FAX:[(   )    -],ADDRESS:[[address] [place] [address]]

## 2019-10-17 NOTE — DISCHARGE NOTE PROVIDER - HOSPITAL COURSE
HPI:    This is an 79F with history of HFrEF (last EF 42%), CAD s/p stents and CABG, s/p bioprosthetic AVR/MVR, HTN, HLD, AFib on Coumadin, and DM2 (not on meds as per patient) who presents to the hospital with complaints of worsening SOB over the past few days. Patient said that [place] near [place] and is visiting her daughter currently. Said that over the past few days she started to notice some SOB associated with worsening LE edema b/l. Said that today her SOB seemed worse than usual and therefore decided to come to the hospital for evaluation. Denied having any associated coughing, chest pain, pleuritic chest pain, or orthopnea (though states that she chronically has to sleep with the head of her bed elevated). Said that her LE edema is improved currently. Said that she has been compliant with her diet and her medications. Denies any other complaints.         On arrival to the ED, her vitals were T 98, P 91, /79, RR 16, O2 sat 97% RA. Her lab work showed mild leukocytosis subtherapeutic INR and an elevated pro-BNP (around the same level as prior when patient was admitted for sepsis c/b NSTEMI and ADHF). She had a CXR that showed a new small left pleural effusion. She was given lasix 40mg IVP x1 and was admitted to telemetry under Dr. Trejo. (09 Oct 2019 03:12)        On admission:    TTE: 1. Bioprosthetic mitral valve replacement. Minimal mitral    regurgitation. Mean transmitral valve gradient equals 6 mm    Hg, which is elevated even in the setting of a prosthetic    valve.    2. Bioprosthetic aortic valve replacement. Peak transaortic    valve gradient equals 30 mm Hg, mean transaortic valve    gradient equals 18 mm Hg, which is probably normal in the    presence of a prosthetic valve. Minimal aortic    regurgitation.    3. Severely dilated left atrium.  LA volume index = 59    cc/m2.    4. Mild concentric left ventricular hypertrophy.    5. Normal left ventricular systolic function. No segmental    wall motion abnormalities.    6. Normal right ventricular size and function.    7. Estimated right ventricular systolic pressure equals 73    mm Hg, assuming right atrial pressure equals 10 mm Hg,    consistent with severe pulmonary hypertension.    *** Compared with echocardiogram of 2/9/2019, the estimated    pulmonary artery systolic pressure has increased.    GIACOMO: Limited focus study due to patient's clinical status    1. Bioprosthetic mitral valve replacement, with normal    leaflet mobility. Minimal mitral regurgitation. Mean    transmitral valve gradient equals 7 mm Hg (HR 89)    2. Bioprosthetic aortic valve replacement, with normal    leaflet mobility.    3. Th distal third of the left atrial appendage thrombus is    entriely opacified from thrombus. The remaining appendage    contains spontaneous echocontrast 'smoke'    4. Agitated saline injection demonstrates no evidence of a    patent foramen ovale.    CXR: Moderate left pleural effusion and trace right pleural effusion.    CTA chest: No definite evidence of pulmonary thromboembolic disease, although the peripheral vessels are difficult to evaluate on this exam. Diffuse mosaic attenuation of the bilateral upper lobes, moderate left     pleural effusion, and cardiomegaly suggesting mild congestive heart failure. Enlarged pulmonary artery, likely secondary to pulmonary hypertension. Stable 9 mm right apical nodule.        Evaluated by cardiology: ekg - afib @ [facility]    Echo - normal LV function    1) Acute on chronic systolic CHF - s/p diuresis with IV lasix.  Switched back to po lasix 40mg BID 10/17. SOB improving  2d echo shows normal LV function normal AVR, elevated gradient of MVR, severe pulm HTN. cont lopressor and enalapril. GIACOMO as above.  Continue coumadin for LAYLA thrombus.     2) HTN - cont lopressor and enalapril and norvasc    3) Afib - cont coumadin and lopressor.  INR noted to be supratherapeutic 10/17 (INR 3.26).  As per Dr. Trejo, hold coumadin 10/17.  Then resume coumadin 3mg po qd starting 10/18.  Check INR as outpatient in 2-3 days.       4) Severe pulm HTN - GIACOMO as above. CT chest negative for PE, shows pulm edema        INCOMPLETE        Patient to f/u with cardiology & PCP within 1-2 weeks. HPI:    This is an 79F with history of HFrEF (last EF 42%), CAD s/p stents and CABG, s/p bioprosthetic AVR/MVR, HTN, HLD, AFib on Coumadin, and DM2 (not on meds as per patient) who presents to the hospital with complaints of worsening SOB over the past few days. Patient said that she [place]state near [place] and is visiting her daughter currently. Said that over the past few days she started to notice some SOB associated with worsening LE edema b/l. Said that today her SOB seemed worse than usual and therefore decided to come to the hospital for evaluation. Denied having any associated coughing, chest pain, pleuritic chest pain, or orthopnea (though states that she chronically has to sleep with the head of her bed elevated). Said that her LE edema is improved currently. Said that she has been compliant with her diet and her medications. Denies any other complaints.         On arrival to the ED, her vitals were T 98, P 91, /79, RR 16, O2 sat 97% RA. Her lab work showed mild leukocytosis subtherapeutic INR and an elevated pro-BNP (around the same level as prior when patient was admitted for sepsis c/b NSTEMI and ADHF). She had a CXR that showed a new small left pleural effusion. She was given lasix 40mg IVP x1 and was admitted to telemetry under Dr. Trejo. (09 Oct 2019 03:12)        On admission:    TTE: 1. Bioprosthetic mitral valve replacement. Minimal mitral regurgitation. Mean transmitral valve gradient equals 6 mm Hg, which is elevated even in the setting of a prosthetic valve. 2. Bioprosthetic aortic valve replacement. Peak transaortic valve gradient equals 30 mm Hg, mean transaortic valve gradient equals 18 mm Hg, which is probably normal in the presence of a prosthetic valve. Minimal aortic regurgitation. 3. Severely dilated left atrium.  LA volume index = 59 cc/m2. 4. Mild concentric left ventricular hypertrophy. 5. Normal left ventricular systolic function. No segmental wall motion abnormalities.6. Normal right ventricular size and function.7. Estimated right ventricular systolic pressure equals 73mm Hg, assuming right atrial pressure equals 10 mm Hg, consistent with severe pulmonary hypertension.*** Compared with echocardiogram of 2/9/2019, the estimated pulmonary artery systolic pressure has increased.    GIACOMO: Limited focus study due to patient's clinical status 1. Bioprosthetic mitral valve replacement, with normal leaflet mobility. Minimal mitral regurgitation. Mean transmitral valve gradient equals 7 mm Hg (HR 89) 2. Bioprosthetic aortic valve replacement, with normal leaflet mobility. 3. Th distal third of the left atrial appendage thrombus is entriely opacified from thrombus. The remaining appendage contains spontaneous echocontrast 'smoke' 4. Agitated saline injection demonstrates no evidence of a    patent foramen ovale.     CXR: Moderate left pleural effusion and trace right pleural effusion.    CTA chest: No definite evidence of pulmonary thromboembolic disease, although the peripheral vessels are difficult to evaluate on this exam. Diffuse mosaic attenuation of the bilateral upper lobes, moderate left     pleural effusion, and cardiomegaly suggesting mild congestive heart failure. Enlarged pulmonary artery, likely secondary to pulmonary hypertension. Stable 9 mm right apical nodule.        Evaluated by cardiology: ekg - afib @ [facility]    Echo - normal LV function    1) Acute on chronic systolic CHF - s/p diuresis with IV lasix.  Switched back to po lasix 40mg BID 10/17. SOB improving  2d echo shows normal LV function normal AVR, elevated gradient of MVR, severe pulm HTN. cont lopressor and enalapril. GIACOMO as above.  Continue coumadin for LAYLA thrombus.     2) HTN - cont lopressor and enalapril and norvasc    3) Afib - cont coumadin and lopressor.  INR noted to be supratherapeutic 10/17 (INR 3.26).  As per Dr. Trejo, hold coumadin 10/17.  Then resume coumadin 3mg po qd starting 10/18.  Check INR as outpatient in 2-3 days.       4) Severe pulm HTN - GIACOMO as above. CT chest negative for PE, shows pulm edema        Patient noted with leukocytosis.  WBC improving.  Monitored off abx.  Patient told to f/u with PCP within 1 week for repeat CBC to monitor WBC.  Patient without fevers/chills or obvious infectious source.          Patient cleared for d/c home as per Dr. Trejo. Patient to f/u with cardiology & PCP within 1-2 weeks. HPI:    This is an 79F with history of HFrEF (last EF 42%), CAD s/p stents and CABG, s/p bioprosthetic AVR/MVR, HTN, HLD, AFib on Coumadin, and DM2 (not on meds as per patient) who presents to the hospital with complaints of worsening SOB over the past few days. Patient said that she usually lives upstate near [place] and is visiting her daughter currently. Said that over the past few days she started to notice some SOB associated with worsening LE edema b/l. Said that today her SOB seemed worse than usual and therefore decided to come to the hospital for evaluation. Denied having any associated coughing, chest pain, pleuritic chest pain, or orthopnea (though states that she chronically has to sleep with the head of her bed elevated). Said that her LE edema is improved currently. Said that she has been compliant with her diet and her medications. Denies any other complaints.         On arrival to the ED, her vitals were T 98, P 91, /79, RR 16, O2 sat 97% RA. Her lab work showed mild leukocytosis subtherapeutic INR and an elevated pro-BNP (around the same level as prior when patient was admitted for sepsis c/b NSTEMI and ADHF). She had a CXR that showed a new small left pleural effusion. She was given lasix 40mg IVP x1 and was admitted to telemetry under Dr. Trejo. (09 Oct 2019 03:12)        On admission:    TTE: 1. Bioprosthetic mitral valve replacement. Minimal mitral regurgitation. Mean transmitral valve gradient equals 6 mm Hg, which is elevated even in the setting of a prosthetic valve. 2. Bioprosthetic aortic valve replacement. Peak transaortic valve gradient equals 30 mm Hg, mean transaortic valve gradient equals 18 mm Hg, which is probably normal in the presence of a prosthetic valve. Minimal aortic regurgitation. 3. Severely dilated left atrium.  LA volume index = 59 cc/m2. 4. Mild concentric left ventricular hypertrophy. 5. Normal left ventricular systolic function. No segmental wall motion abnormalities.6. Normal right ventricular size and function.7. Estimated right ventricular systolic pressure equals 73mm Hg, assuming right atrial pressure equals 10 mm Hg, consistent with severe pulmonary hypertension.*** Compared with echocardiogram of 2/9/2019, the estimated pulmonary artery systolic pressure has increased.    GIACOMO: Limited focus study due to patient's clinical status 1. Bioprosthetic mitral valve replacement, with normal leaflet mobility. Minimal mitral regurgitation. Mean transmitral valve gradient equals 7 mm Hg (HR 89) 2. Bioprosthetic aortic valve replacement, with normal leaflet mobility. 3. Th distal third of the left atrial appendage thrombus is entriely opacified from thrombus. The remaining appendage contains spontaneous echocontrast 'smoke' 4. Agitated saline injection demonstrates no evidence of a    patent foramen ovale.     CXR: Moderate left pleural effusion and trace right pleural effusion.    CTA chest: No definite evidence of pulmonary thromboembolic disease, although the peripheral vessels are difficult to evaluate on this exam. Diffuse mosaic attenuation of the bilateral upper lobes, moderate left     pleural effusion, and cardiomegaly suggesting mild congestive heart failure. Enlarged pulmonary artery, likely secondary to pulmonary hypertension. Stable 9 mm right apical nodule.        Evaluated by cardiology: ekg - afib @ [facility]    Echo - normal LV function    1) Acute on chronic systolic CHF - s/p diuresis with IV lasix.  Switched back to po lasix 40mg BID 10/17. SOB improving  2d echo shows normal LV function normal AVR, elevated gradient of MVR, severe pulm HTN. cont lopressor and enalapril. GIACOMO as above.  Continue coumadin for LAYLA thrombus.     2) HTN - cont lopressor and enalapril and norvasc    3) Afib - cont coumadin and lopressor.  INR noted to be supratherapeutic 10/17 (INR 3.26).  As per Dr. Trejo, hold coumadin 10/17.  Then resume coumadin 3mg po qd starting 10/18.  Check INR as outpatient in 2-3 days.       4) Severe pulm HTN - GIACOMO as above. CT chest negative for PE, shows pulm edema        HgA1C 6.9.  Patient states she used to be on metformin, but no longer taking this.  Confirmed this with her pharmacy who stated she last picked up metformin in 2-17.  Discussed with Dr. Trejo.  Discharge off metformin.  Encouraged lifestyle & diet modification.  Patient needs f/u with PCP within 1 week for ongoing management of her DM2.         Patient noted with leukocytosis.  WBC improving.  Monitored off abx.  Patient told to f/u with PCP within 1 week for repeat CBC to monitor WBC.  Patient without fevers/chills or obvious infectious source.          Patient cleared for d/c home as per Dr. Trejo. Patient to f/u with cardiology & PCP within 1-2 weeks. HPI:    This is an 79F with history of HFrEF (last EF 42%), CAD s/p stents and CABG, s/p bioprosthetic AVR/MVR, HTN, HLD, AFib on Coumadin, and DM2 (not on meds as per patient) who presents to the hospital with complaints of worsening SOB over the past few days. Patient said that she usually lives [place]state near [place] and is visiting her daughter currently. Said that over the past few days she started to notice some SOB associated with worsening LE edema b/l. Said that today her SOB seemed worse than usual and therefore decided to come to the hospital for evaluation. Denied having any associated coughing, chest pain, pleuritic chest pain, or orthopnea (though states that she chronically has to sleep with the head of her bed elevated). Said that her LE edema is improved currently. Said that she has been compliant with her diet and her medications. Denies any other complaints.         On arrival to the ED, her vitals were T 98, P 91, /79, RR 16, O2 sat 97% RA. Her lab work showed mild leukocytosis subtherapeutic INR and an elevated pro-BNP (around the same level as prior when patient was admitted for sepsis c/b NSTEMI and ADHF). She had a CXR that showed a new small left pleural effusion. She was given lasix 40mg IVP x1 and was admitted to telemetry under Dr. Trejo. (09 Oct 2019 03:12)        On admission:    TTE: 1. Bioprosthetic mitral valve replacement. Minimal mitral regurgitation. Mean transmitral valve gradient equals 6 mm Hg, which is elevated even in the setting of a prosthetic valve. 2. Bioprosthetic aortic valve replacement. Peak transaortic valve gradient equals 30 mm Hg, mean transaortic valve gradient equals 18 mm Hg, which is probably normal in the presence of a prosthetic valve. Minimal aortic regurgitation. 3. Severely dilated left atrium.  LA volume index = 59 cc/m2. 4. Mild concentric left ventricular hypertrophy. 5. Normal left ventricular systolic function. No segmental wall motion abnormalities.6. Normal right ventricular size and function.7. Estimated right ventricular systolic pressure equals 73mm Hg, assuming right atrial pressure equals 10 mm Hg, consistent with severe pulmonary hypertension.*** Compared with echocardiogram of 2/9/2019, the estimated pulmonary artery systolic pressure has increased.    GIACOMO: Limited focus study due to patient's clinical status 1. Bioprosthetic mitral valve replacement, with normal leaflet mobility. Minimal mitral regurgitation. Mean transmitral valve gradient equals 7 mm Hg (HR 89) 2. Bioprosthetic aortic valve replacement, with normal leaflet mobility. 3. Th distal third of the left atrial appendage thrombus is entriely opacified from thrombus. The remaining appendage contains spontaneous echocontrast 'smoke' 4. Agitated saline injection demonstrates no evidence of a    patent foramen ovale.     CXR: Moderate left pleural effusion and trace right pleural effusion.    CTA chest: No definite evidence of pulmonary thromboembolic disease, although the peripheral vessels are difficult to evaluate on this exam. Diffuse mosaic attenuation of the bilateral upper lobes, moderate left     pleural effusion, and cardiomegaly suggesting mild congestive heart failure. Enlarged pulmonary artery, likely secondary to pulmonary hypertension. Stable 9 mm right apical nodule.        Evaluated by cardiology: ekg - afib @ [facility]    Echo - normal LV function    1) Acute on chronic systolic CHF - s/p diuresis with IV lasix.  Switched back to po lasix 40mg BID 10/17. SOB improving  2d echo shows normal LV function normal AVR, elevated gradient of MVR, severe pulm HTN. cont lopressor and enalapril. GIACOMO as above.  Continue coumadin for LAYLA thrombus.     2) HTN - cont lopressor and enalapril and norvasc    3) Afib - cont coumadin and lopressor.  INR noted to be supratherapeutic 10/17 (INR 3.26).  As per Dr. Trejo, hold coumadin 10/17.  Then resume coumadin 3mg po qd starting 10/18.  Check INR as outpatient in 2-3 days.       4) Severe pulm HTN - GIACOMO as above. CT chest negative for PE, shows pulm edema        HgA1C 6.9.  Patient states she used to be on metformin, but no longer taking this.  Confirmed this with her pharmacy who stated she last picked up metformin in 2-17.  Discussed with Dr. Trejo.  Discharge off metformin.  Encouraged lifestyle & diet modification.  Patient needs f/u with PCP within 1 week for ongoing management of her DM2.         Patient noted with leukocytosis.  WBC improving.  Monitored off abx.  Patient told to f/u with PCP within 1 week for repeat CBC to monitor WBC.  Patient without fevers/chills or obvious infectious source.          Patient cleared for d/c home as per Dr. Trejo. Patient to f/u with cardiology & PCP within 1-2 weeks.     Appointment made with Dr. ROSANGELA Dugan (cardiologist) on 10/25/19 at 4:30pm.

## 2019-10-17 NOTE — PROGRESS NOTE ADULT - PROVIDER SPECIALTY LIST ADULT
Anesthesia
Cardiology
Internal Medicine
Cardiology

## 2019-10-17 NOTE — DISCHARGE NOTE NURSING/CASE MANAGEMENT/SOCIAL WORK - PATIENT PORTAL LINK FT
You can access the FollowMyHealth Patient Portal offered by [facility] by registering at the following website: [email or web address]. By joining [email or web address]’s FollowMyHealth portal, you will also be able to view your health information using other applications (apps) compatible with our system.

## 2019-10-18 RX ORDER — WARFARIN SODIUM 2.5 MG/1
1 TABLET ORAL
Qty: 7 | Refills: 0
Start: 2019-10-18 | End: 2019-10-24

## 2019-10-23 ENCOUNTER — APPOINTMENT (OUTPATIENT)
Dept: CARE COORDINATION | Facility: HOME HEALTH | Age: 79
End: 2019-10-23
Payer: MEDICARE

## 2019-10-23 VITALS
WEIGHT: 184 LBS | HEIGHT: 58 IN | DIASTOLIC BLOOD PRESSURE: 70 MMHG | HEART RATE: 62 BPM | RESPIRATION RATE: 18 BRPM | TEMPERATURE: 97.6 F | BODY MASS INDEX: 38.62 KG/M2 | SYSTOLIC BLOOD PRESSURE: 180 MMHG | OXYGEN SATURATION: 94 %

## 2019-10-23 PROCEDURE — 99496 TRANSJ CARE MGMT HIGH F2F 7D: CPT

## 2019-10-23 RX ORDER — ECONAZOLE NITRATE 10 MG/G
1 CREAM TOPICAL
Qty: 85 | Refills: 0 | Status: DISCONTINUED | COMMUNITY
Start: 2019-07-25

## 2019-10-23 RX ORDER — CRISABOROLE 20 MG/G
2 OINTMENT TOPICAL
Qty: 60 | Refills: 0 | Status: DISCONTINUED | COMMUNITY
Start: 2019-07-30

## 2019-10-23 RX ORDER — FLUCONAZOLE 150 MG/1
150 TABLET ORAL
Qty: 5 | Refills: 0 | Status: DISCONTINUED | COMMUNITY
Start: 2019-07-25

## 2019-10-23 RX ORDER — HYDROCORTISONE 1 %
12 CREAM (GRAM) TOPICAL
Qty: 400 | Refills: 0 | Status: DISCONTINUED | COMMUNITY
Start: 2019-07-25

## 2019-10-23 RX ORDER — POTASSIUM CHLORIDE 1500 MG/1
20 TABLET, FILM COATED, EXTENDED RELEASE ORAL
Qty: 30 | Refills: 0 | Status: DISCONTINUED | COMMUNITY
Start: 2019-08-06

## 2019-10-23 RX ORDER — ERGOCALCIFEROL 1.25 MG/1
1.25 MG CAPSULE, LIQUID FILLED ORAL
Qty: 8 | Refills: 0 | Status: DISCONTINUED | COMMUNITY
Start: 2019-06-24

## 2019-10-23 RX ORDER — CLOBETASOL PROPIONATE 0.5 MG/G
0.05 CREAM TOPICAL
Qty: 60 | Refills: 0 | Status: DISCONTINUED | COMMUNITY
Start: 2019-07-25

## 2019-10-23 RX ORDER — COLCHICINE 0.6 MG/1
0.6 TABLET ORAL
Qty: 5 | Refills: 0 | Status: DISCONTINUED | COMMUNITY
Start: 2019-06-25

## 2019-10-23 RX ORDER — CHLORHEXIDINE GLUCONATE 4 %
1000 LIQUID (ML) TOPICAL
Qty: 30 | Refills: 0 | Status: DISCONTINUED | COMMUNITY
Start: 2019-05-08

## 2019-10-23 RX ORDER — PREDNISONE 10 MG/1
10 TABLET ORAL
Qty: 13 | Refills: 0 | Status: DISCONTINUED | COMMUNITY
Start: 2019-07-11

## 2019-10-23 RX ORDER — AMLODIPINE BESYLATE 10 MG/1
10 TABLET ORAL DAILY
Refills: 0 | Status: ACTIVE | COMMUNITY
Start: 2019-10-17

## 2019-10-23 RX ORDER — WARFARIN 2 MG/1
2 TABLET ORAL
Qty: 60 | Refills: 0 | Status: DISCONTINUED | COMMUNITY
Start: 2019-05-22

## 2019-10-23 RX ORDER — METOPROLOL TARTRATE 50 MG/1
50 TABLET, FILM COATED ORAL
Qty: 60 | Refills: 0 | Status: DISCONTINUED | COMMUNITY
Start: 2019-05-08

## 2019-10-23 RX ORDER — BIOTIN 1 MG
W/DEVICE TABLET ORAL
Qty: 1 | Refills: 0 | Status: DISCONTINUED | COMMUNITY
Start: 2019-06-24

## 2019-10-23 NOTE — PHYSICAL EXAM
[No Acute Distress] : no acute distress [Well Nourished] : well nourished [PERRL] : pupils equal round and reactive to light [EOMI] : extraocular movements intact [No JVD] : no jugular venous distention [Supple] : supple [Clear to Auscultation] : lungs were clear to auscultation bilaterally [No Accessory Muscle Use] : no accessory muscle use [No Respiratory Distress] : no respiratory distress  [Normal Rate] : normal rate  [Normal S1, S2] : normal S1 and S2 [No Murmur] : no murmur heard [Pedal Pulses Present] : the pedal pulses are present [No Edema] : there was no peripheral edema [Soft] : abdomen soft [Non-distended] : non-distended [Non Tender] : non-tender [Normal Bowel Sounds] : normal bowel sounds [No Rash] : no rash [No Focal Deficits] : no focal deficits [Alert and Oriented x3] : oriented to person, place, and time [de-identified] : irregular, irregular

## 2019-10-23 NOTE — COUNSELING
[Fall prevention counseling provided] : Fall prevention counseling provided [Use recommended devices] : Use recommended devices [Needs reinforcement, provided] : Patient needs reinforcement on understanding of disease, goals and obesity follow-up plan; reinforcement was provided [None] : None

## 2019-10-23 NOTE — HISTORY OF PRESENT ILLNESS
[Family Member] : family member [FreeTextEntry1] : "F/u CHF and SOB" Pt is homebound due to limited endurance and SOB. Pt discharged from [facility] 10/17/2019. Discharge summary reviewed by myself and meds reconciled by WANG Lazo within 48 hours of discharge. Seen for follow in home for [place] [facility] program. Is A&O x 3, speaks Maori, requests use of daughter for translation.  Pt visiting daughter in - plans to return to [facility] next week.  Diagnosed with severe pulmonary HTN during hospitalization.  Denies hx of asthma, obstructive lung disease, smoking or exposure to smoke or environmental irritants.  Denies congestion, rhinorrea, fever. .  Reports that she has recurrent SOB with exertion since d/c home. SOB worsens with activity and resolved with rest. On increased dose of lasix 40 mg bid with good urine output. Denies LE edema, orthopnea, cough, chest pain. Reports not checking weights since discharge. On lopresser 12.5 mg bud for afib, denies fatigue, palpitations. On coumadin for afib- unable to get to [facility] until next week when she returns home. BP elevated on exam- on enalapril 20mg QD, norvasc, 5 mg QD, lopressor 12.5mg bid. Has not take enalapril as of yet.  Pt denies headache, dizziness, visual changes, weakness, numbness.  Ambulates with unsteady gait throughout home, refuses rollator. [de-identified] : Hospital Course: HPI:\par This is an 79F with history of HFrEF (last EF 42%), CAD s/p stents and CABG, s/p bioprosthetic AVR/MVR, HTN, HLD, AFib on Coumadin, and DM2 (not on meds as per patient) who presents to the hospital with complaints of worsening SOB over the past few days. Patient said that [place] and is visiting her daughter currently. Said that over the past few days she started to notice some SOB associated with worsening LE edema b/l. Said that today her SOB seemed worse than usual and therefore decided to come to the hospital for evaluation. Denied having any associated coughing, chest pain, pleuritic chest pain, or orthopnea (though states that she chronically has to sleep with the head of her bed elevated). Said that her LE edema is improved currently. Said that she has been compliant with her diet and her medications. Denies any other complaints. \par On arrival to the ED, her vitals were T 98, P 91, /79, RR 16, O2 sat 97% RA. Her lab work showed mild leukocytosis subtherapeutic INR and an elevated pro-BNP (around the same level as prior when patient was admitted for sepsis c/b NSTEMI and ADHF). She had a CXR that showed a new small left pleural effusion. She was given lasix 40mg IVP x1 and was admitted to telemetry under Dr. Trejo. (09 Oct 2019 03:12)\par On admission:\par TTE: 1. Bioprosthetic mitral valve replacement. Minimal mitral regurgitation. Mean transmitral valve gradient equals 6 mm Hg, which is elevated even in the setting of a prosthetic valve. 2. Bioprosthetic aortic valve replacement. Peak transaortic valve gradient equals 30 mm Hg, mean transaortic valve gradient equals 18 mm Hg, which is probably normal in the presence of a prosthetic valve. Minimal aortic regurgitation. 3. Severely dilated left atrium. LA volume index = 59 cc/m2. 4. Mild concentric left ventricular hypertrophy. 5. Normal left ventricular systolic function. No segmental wall motion abnormalities.6. Normal right ventricular size and function.7. Estimated right ventricular systolic pressure equals 73mm Hg, assuming right atrial pressure equals 10 mm Hg, consistent with severe pulmonary hypertension.*** Compared with echocardiogram of 2/9/2019, the estimated pulmonary artery systolic pressure has increased.\par GIACOMO: Limited focus study due to patient's clinical status 1. Bioprosthetic mitral valve replacement, with normal leaflet mobility. Minimal mitral regurgitation. Mean transmitral valve gradient equals 7 mm Hg (HR 89) 2. Bioprosthetic aortic valve replacement, with normal leaflet mobility. 3. Th distal third of the left atrial appendage thrombus is entriely opacified from thrombus. The remaining appendage contains spontaneous echocontrast 'smoke' 4. Agitated saline injection demonstrates no evidence of a\par patent foramen ovale. \par CXR: Moderate left pleural effusion and trace right pleural effusion.\par CTA chest: No definite evidence of pulmonary thromboembolic disease, although the peripheral vessels are difficult to evaluate on this exam. Diffuse mosaic attenuation of the bilateral upper lobes, moderate left \par pleural effusion, and cardiomegaly suggesting mild congestive heart failure. Enlarged pulmonary artery, likely secondary to pulmonary hypertension. Stable 9 mm right apical nodule.\par Evaluated by cardiology: ekg - afib @ LBBB\par Echo - normal LV function\par 1) Acute on chronic systolic CHF - s/p diuresis with IV lasix. Switched back to po lasix 40mg BID 10/17. SOB improving 2d echo shows normal LV function normal AVR, elevated gradient of MVR, severe pulm HTN. cont lopressor and enalapril. GIACOMO as above. Continue coumadin for LAYLA thrombus. \par 2) HTN - cont lopressor and enalapril and norvasc\par 3) Afib - cont coumadin and lopressor. INR noted to be supratherapeutic 10/17 (INR 3.26). As per Dr. Trejo, hold coumadin 10/17. Then resume coumadin 3mg po qd starting 10/18. Check INR as outpatient in 2-3 days. \par 4) Severe pulm HTN - GIACOMO as above. CT chest negative for PE, shows pulm edema\par HgA1C 6.9. Patient states she used to be on metformin, but no longer taking this. Confirmed this with her pharmacy who stated she last picked up metformin in 2-17. Discussed with Dr. Trejo. Discharge off metformin. Encouraged lifestyle & diet modification. Patient needs f/u with PCP within 1 week for ongoing management of her DM2. \par Patient noted with leukocytosis. WBC improving. Monitored off abx. Patient told to f/u with PCP within 1 week for repeat CBC to monitor WBC. Patient without fevers/chills or obvious infectious source. \par Patient cleared for d/c home as per Dr. Trejo. Patient to f/u with cardiology & PCP within 1-2 weeks. \par Appointment made with Dr. ROSANGELA Dugan (cardiologist) on 10/25/19 at 4:30pm.\par \par

## 2019-10-24 ENCOUNTER — LABORATORY RESULT (OUTPATIENT)
Age: 79
End: 2019-10-24

## 2019-10-25 ENCOUNTER — APPOINTMENT (OUTPATIENT)
Dept: INTERNAL MEDICINE | Facility: CLINIC | Age: 79
End: 2019-10-25
Payer: MEDICARE

## 2019-10-25 ENCOUNTER — INPATIENT (INPATIENT)
Facility: HOSPITAL | Age: 79
LOS: 6 days | Discharge: HOME HEALTH | DRG: 291 | End: 2019-11-01
Attending: FAMILY MEDICINE | Admitting: FAMILY MEDICINE
Payer: MEDICARE

## 2019-10-25 VITALS
BODY MASS INDEX: 38.41 KG/M2 | OXYGEN SATURATION: 89 % | SYSTOLIC BLOOD PRESSURE: 132 MMHG | WEIGHT: 183 LBS | TEMPERATURE: 98.1 F | HEART RATE: 78 BPM | DIASTOLIC BLOOD PRESSURE: 60 MMHG | RESPIRATION RATE: 26 BRPM | HEIGHT: 58 IN

## 2019-10-25 VITALS
RESPIRATION RATE: 19 BRPM | DIASTOLIC BLOOD PRESSURE: 75 MMHG | SYSTOLIC BLOOD PRESSURE: 164 MMHG | TEMPERATURE: 98 F | HEIGHT: 62 IN | WEIGHT: 145.06 LBS | HEART RATE: 82 BPM | OXYGEN SATURATION: 95 %

## 2019-10-25 DIAGNOSIS — I10 ESSENTIAL (PRIMARY) HYPERTENSION: ICD-10-CM

## 2019-10-25 DIAGNOSIS — Z95.4 PRESENCE OF OTHER HEART-VALVE REPLACEMENT: Chronic | ICD-10-CM

## 2019-10-25 DIAGNOSIS — I48.91 UNSPECIFIED ATRIAL FIBRILLATION: ICD-10-CM

## 2019-10-25 DIAGNOSIS — I50.9 HEART FAILURE, UNSPECIFIED: ICD-10-CM

## 2019-10-25 DIAGNOSIS — E11.9 TYPE 2 DIABETES MELLITUS W/OUT COMPLICATIONS: ICD-10-CM

## 2019-10-25 DIAGNOSIS — R09.02 HYPOXEMIA: ICD-10-CM

## 2019-10-25 LAB
ALBUMIN SERPL ELPH-MCNC: 3.1 G/DL — LOW (ref 3.3–5)
ALP SERPL-CCNC: 89 U/L — SIGNIFICANT CHANGE UP (ref 40–120)
ALT FLD-CCNC: 22 U/L — SIGNIFICANT CHANGE UP (ref 12–78)
ANION GAP SERPL CALC-SCNC: 9 MMOL/L — SIGNIFICANT CHANGE UP (ref 5–17)
APTT BLD: 40.3 SEC — HIGH (ref 27.5–36.3)
AST SERPL-CCNC: 20 U/L — SIGNIFICANT CHANGE UP (ref 15–37)
BILIRUB SERPL-MCNC: 0.6 MG/DL — SIGNIFICANT CHANGE UP (ref 0.2–1.2)
BUN SERPL-MCNC: 20 MG/DL — SIGNIFICANT CHANGE UP (ref 7–23)
CALCIUM SERPL-MCNC: 8.9 MG/DL — SIGNIFICANT CHANGE UP (ref 8.5–10.1)
CHLORIDE SERPL-SCNC: 104 MMOL/L — SIGNIFICANT CHANGE UP (ref 96–108)
CO2 SERPL-SCNC: 28 MMOL/L — SIGNIFICANT CHANGE UP (ref 22–31)
CREAT SERPL-MCNC: 0.94 MG/DL — SIGNIFICANT CHANGE UP (ref 0.5–1.3)
GLUCOSE SERPL-MCNC: 137 MG/DL — HIGH (ref 70–99)
HCT VFR BLD CALC: 37.9 % — SIGNIFICANT CHANGE UP (ref 34.5–45)
HGB BLD-MCNC: 11.4 G/DL — LOW (ref 11.5–15.5)
INR BLD: 2.89 RATIO — HIGH (ref 0.88–1.16)
MAGNESIUM SERPL-MCNC: 1.9 MG/DL — SIGNIFICANT CHANGE UP (ref 1.6–2.6)
MCHC RBC-ENTMCNC: 24.9 PG — LOW (ref 27–34)
MCHC RBC-ENTMCNC: 30.1 GM/DL — LOW (ref 32–36)
MCV RBC AUTO: 82.9 FL — SIGNIFICANT CHANGE UP (ref 80–100)
PLATELET # BLD AUTO: 342 K/UL — SIGNIFICANT CHANGE UP (ref 150–400)
POTASSIUM SERPL-MCNC: 3.5 MMOL/L — SIGNIFICANT CHANGE UP (ref 3.5–5.3)
POTASSIUM SERPL-SCNC: 3.5 MMOL/L — SIGNIFICANT CHANGE UP (ref 3.5–5.3)
PROT SERPL-MCNC: 7.7 GM/DL — SIGNIFICANT CHANGE UP (ref 6–8.3)
PROTHROM AB SERPL-ACNC: 33.2 SEC — HIGH (ref 10–12.9)
RBC # BLD: 4.57 M/UL — SIGNIFICANT CHANGE UP (ref 3.8–5.2)
RBC # FLD: 15.2 % — HIGH (ref 10.3–14.5)
SODIUM SERPL-SCNC: 141 MMOL/L — SIGNIFICANT CHANGE UP (ref 135–145)
TROPONIN I SERPL-MCNC: 0.02 NG/ML — SIGNIFICANT CHANGE UP (ref 0.01–0.04)
WBC # BLD: 13.79 K/UL — HIGH (ref 3.8–10.5)
WBC # FLD AUTO: 13.79 K/UL — HIGH (ref 3.8–10.5)

## 2019-10-25 PROCEDURE — 99204 OFFICE O/P NEW MOD 45 MIN: CPT

## 2019-10-25 PROCEDURE — 71045 X-RAY EXAM CHEST 1 VIEW: CPT | Mod: 26

## 2019-10-25 RX ORDER — FOLIC ACID 0.8 MG
1 TABLET ORAL DAILY
Refills: 0 | Status: DISCONTINUED | OUTPATIENT
Start: 2019-10-25 | End: 2019-11-01

## 2019-10-25 RX ORDER — INSULIN LISPRO 100/ML
VIAL (ML) SUBCUTANEOUS
Refills: 0 | Status: DISCONTINUED | OUTPATIENT
Start: 2019-10-25 | End: 2019-10-29

## 2019-10-25 RX ORDER — FUROSEMIDE 40 MG
10 TABLET ORAL ONCE
Refills: 0 | Status: COMPLETED | OUTPATIENT
Start: 2019-10-25 | End: 2019-10-25

## 2019-10-25 RX ORDER — ATORVASTATIN CALCIUM 80 MG/1
80 TABLET, FILM COATED ORAL AT BEDTIME
Refills: 0 | Status: DISCONTINUED | OUTPATIENT
Start: 2019-10-25 | End: 2019-11-01

## 2019-10-25 RX ORDER — METOPROLOL TARTRATE 50 MG
12.5 TABLET ORAL
Refills: 0 | Status: DISCONTINUED | OUTPATIENT
Start: 2019-10-25 | End: 2019-10-28

## 2019-10-25 RX ORDER — INSULIN LISPRO 100/ML
VIAL (ML) SUBCUTANEOUS AT BEDTIME
Refills: 0 | Status: DISCONTINUED | OUTPATIENT
Start: 2019-10-25 | End: 2019-10-29

## 2019-10-25 RX ORDER — DEXTROSE 50 % IN WATER 50 %
12.5 SYRINGE (ML) INTRAVENOUS ONCE
Refills: 0 | Status: DISCONTINUED | OUTPATIENT
Start: 2019-10-25 | End: 2019-11-01

## 2019-10-25 RX ORDER — DEXTROSE 50 % IN WATER 50 %
25 SYRINGE (ML) INTRAVENOUS ONCE
Refills: 0 | Status: DISCONTINUED | OUTPATIENT
Start: 2019-10-25 | End: 2019-11-01

## 2019-10-25 RX ORDER — AMLODIPINE BESYLATE 2.5 MG/1
10 TABLET ORAL DAILY
Refills: 0 | Status: DISCONTINUED | OUTPATIENT
Start: 2019-10-25 | End: 2019-11-01

## 2019-10-25 RX ORDER — ASPIRIN/CALCIUM CARB/MAGNESIUM 324 MG
81 TABLET ORAL ONCE
Refills: 0 | Status: COMPLETED | OUTPATIENT
Start: 2019-10-25 | End: 2019-10-25

## 2019-10-25 RX ORDER — GLUCAGON INJECTION, SOLUTION 0.5 MG/.1ML
1 INJECTION, SOLUTION SUBCUTANEOUS ONCE
Refills: 0 | Status: DISCONTINUED | OUTPATIENT
Start: 2019-10-25 | End: 2019-11-01

## 2019-10-25 RX ORDER — FUROSEMIDE 40 MG
40 TABLET ORAL
Refills: 0 | Status: DISCONTINUED | OUTPATIENT
Start: 2019-10-25 | End: 2019-10-27

## 2019-10-25 RX ORDER — ASPIRIN/CALCIUM CARB/MAGNESIUM 324 MG
81 TABLET ORAL DAILY
Refills: 0 | Status: DISCONTINUED | OUTPATIENT
Start: 2019-10-25 | End: 2019-11-01

## 2019-10-25 RX ORDER — WARFARIN SODIUM 2.5 MG/1
3 TABLET ORAL ONCE
Refills: 0 | Status: COMPLETED | OUTPATIENT
Start: 2019-10-25 | End: 2019-10-25

## 2019-10-25 RX ORDER — DEXTROSE 50 % IN WATER 50 %
15 SYRINGE (ML) INTRAVENOUS ONCE
Refills: 0 | Status: DISCONTINUED | OUTPATIENT
Start: 2019-10-25 | End: 2019-11-01

## 2019-10-25 RX ADMIN — Medication 10 MILLIGRAM(S): at 21:39

## 2019-10-25 RX ADMIN — Medication 10 MILLIGRAM(S): at 18:12

## 2019-10-25 NOTE — ED PROVIDER NOTE - PROGRESS NOTE DETAILS
78 y/o F presents with SOB. Pt was seen last week at [facility] for CHF exacerbation and dc home. Pt was sent in by Dr. Casiano today for tachypnea and LE swelling. Pt reports worsening SOB on exertion and LE swelling. Denies fever/chills, n/v/d, CP, abd pain, or other complaints at this time. -Sulaiman Ocampo PA-C Discussed case with Dr. west who will admit pt. -Sulaiman Ocampo PA-C

## 2019-10-25 NOTE — H&P ADULT - HISTORY OF PRESENT ILLNESS
78 yo F with a PMH HTN, HLD, DM2 (A1C 6.9, not on meds), atrial fibrillation (on Coumadin), bioprosthetic aortic and mitral valves, HFpEF (EF 65%), CAD s/p CABG, CVA, and gout who presents with SOB. She was seen at [facility] for CHF exacerbation and discharged 8 days prior. She felt her SOB had never completely resolved and over the last 3-4 days has been having increasing dyspnea on exertion. She has also endorsed bilateral leg swelling during this time. She feels good at rest. She denies orthopnea, fevers, chills, cough, CP, palpitations, rhinorrhea, headache, nausea, vomiting, dysuria, or diarrhea. She has taken all of her medicines as appropriate including Lasix 40 mg BID PO, which was what she was discharged on. Her BP was elevated at her PCP appointment this week and therefore 2 days ago, her Amlodipine was increased from 5 mg to 10 mg.    In the ED, she was given aspirin 81 mg PO x1 and Lasix 10 mg IV x2. Translation provided by daniel at bedside per patient request.  80 yo Upper sorbian speaking F with a PMH HTN, HLD, DM2 (A1C 6.9, not on meds), atrial fibrillation (on Coumadin), bioprosthetic aortic and mitral valves, HFpEF (EF 65%), CAD s/p stent and CABG, CVA, and gout who presents with SOB. She was seen at [facility] for CHF exacerbation and discharged 8 days prior. She felt her SOB had never completely resolved and over the last 3-4 days has been having increasing dyspnea on exertion. She has also endorsed bilateral leg swelling during this time. She feels good at rest. She denies orthopnea, fevers, chills, cough, CP, palpitations, rhinorrhea, headache, nausea, vomiting, dysuria, or diarrhea. She has taken all of her medicines as appropriate including Lasix 40 mg BID PO, which was what she was discharged on. Her BP was elevated at her PCP appointment this week and therefore 2 days ago, her Amlodipine was increased from 5 mg to 10 mg.  She was sent in by her outpatient provider for her symptoms and due to her being 86-89% on room air in the office.    In the ED, she was given aspirin 81 mg PO x1 and Lasix 10 mg IV x2. Translation provided by daniel at bedside per patient request.  78 yo Mongolian speaking F with a PMH HTN, HLD, DM2 (A1C 6.9, not on meds), atrial fibrillation (on Coumadin), bioprosthetic aortic and mitral valves, severe pulmonary hypertension, HFpEF/HFrEF (EF 65% October 2019), CAD s/p stent and CABG, CVA, and gout who presents with SOB. She was seen at [facility] for CHF exacerbation and discharged 8 days prior. She felt her SOB had never completely resolved and over the last 3-4 days has been having increasing dyspnea on exertion. She has also endorsed bilateral leg swelling during this time. She feels good at rest. She denies orthopnea, fevers, chills, cough, CP, palpitations, rhinorrhea, headache, nausea, vomiting, dysuria, or diarrhea. She has taken all of her medicines as appropriate including Lasix 40 mg BID PO, which was what she was discharged on. Her BP was elevated at her PCP appointment this week and therefore 2 days ago, her Amlodipine was increased from 5 mg to 10 mg.  She was sent in by her outpatient provider for her symptoms and due to her being 86-89% on room air in the office.    In the ED, she was given aspirin 81 mg PO x1 and Lasix 10 mg IV x2.

## 2019-10-25 NOTE — ED PROVIDER NOTE - NS_ ATTENDINGSCRIBEDETAILS _ED_A_ED_FT
I Pepito Martell MD saw and examined the patient. Scribe documented for me and under my supervision. I have modified the scribe's documentation where necessary to reflect my history, physical exam and other relevant documentations pertinent to the care of the patient.

## 2019-10-25 NOTE — H&P ADULT - NSHPPHYSICALEXAM_GEN_ALL_CORE
Vital Signs Last 24 Hrs  T(C): 36.7 (25 Oct 2019 22:50), Max: 36.9 (25 Oct 2019 21:39)  T(F): 98.1 (25 Oct 2019 22:50), Max: 98.5 (25 Oct 2019 21:39)  HR: 68 (25 Oct 2019 22:50) (68 - 82)  BP: 151/57 (25 Oct 2019 22:50) (151/57 - 164/75)  BP(mean): --  RR: 18 (25 Oct 2019 22:50) (18 - 19)  SpO2: 98% (25 Oct 2019 22:50) (95% - 98%)    GENERAL: No acute distress  HEENT: PERRL, EOMI, MMM, no oropharyngeal lesions  NECK: supple, no stiffness, no JVD, no thyromegaly  PULM: respiration non-labored, clear to auscultation bilaterally, no rales, rhonchi, or wheezes  CV: regular rate and rhythm, no murmurs, gallops, or rubs  GI: abdomen soft, nontender, nondistended, no masses felt, normal bowel sounds  : no genital lesions or ulcers  MSK: strength 5/5 bilateral upper/lower extremities. No joint swelling, erythema, or warmth.  LYMPH: no anterior cervical, posterior cervical, supraclavicular, or inguinal lymphadenopathy  NEURO: A&Ox3, no tremors, sensation intact  SKIN: no rashes, lesions, or edema Vital Signs Last 24 Hrs  T(C): 36.7 (25 Oct 2019 22:50), Max: 36.9 (25 Oct 2019 21:39)  T(F): 98.1 (25 Oct 2019 22:50), Max: 98.5 (25 Oct 2019 21:39)  HR: 68 (25 Oct 2019 22:50) (68 - 82)  BP: 151/57 (25 Oct 2019 22:50) (151/57 - 164/75)  BP(mean): --  RR: 18 (25 Oct 2019 22:50) (18 - 19)  SpO2: 98% (25 Oct 2019 22:50) (95% - 98%)    GENERAL: No acute distress  HEENT: PERRL, EOMI, MMM, no oropharyngeal lesions  NECK: supple, no stiffness, + HJR but no JVP, no thyromegaly  PULM: respirations non-labored, decreased breath sounds RLL and rales LLL. No rhonchi or wheezes  CV: regular rate, irregular rhythm, harsh II/VI apical systolic murmur. No gallops or rubs  GI: abdomen soft, nontender, nondistended, no masses felt, normal bowel sounds  MSK: strength 5/5 bilateral upper/lower extremities. No joint swelling, erythema, or warmth.  LYMPH: no anterior cervical, posterior cervical, supraclavicular, or inguinal lymphadenopathy  NEURO: A&Ox3, no tremors, sensation intact  SKIN: 1+ bilateral pitting edema. No rashes or lesions

## 2019-10-25 NOTE — ED PROVIDER NOTE - ATTENDING CONTRIBUTION TO CARE
I Pepito Martell MD saw and examined the patient. MLP saw and examined the patient under my supervision. I discussed the care of the patient with MLP and agree with MLP's plan, assessment and care of the patient while in the ED.

## 2019-10-25 NOTE — ED PROVIDER NOTE - PMH
Afib  (on Warfarin)  CAD (Coronary Artery Disease)  s/p stents and CABG  CHF (congestive heart failure)    CVA (Cerebral Infarction)  2011  Diabetes mellitus  not on meds  Gout    HTN (Hypertension)    Mitral Regurgitation    Upper GI bleed

## 2019-10-25 NOTE — H&P ADULT - NSHPLABSRESULTS_GEN_ALL_CORE
Labs personally reviewed and interpreted. Notable for    CXR personally reviewed and interpreted. Notable for  CT personally reviewed and interpreted. Notable for    EKG personally reviewed. Rate Labs personally reviewed and interpreted. Notable for stable leukocytosis (WBC 13.79, baseline 10-15), Hb 11.4, plts 342. Na 141, K 3.5, HCO3 28, BUN/creatinine at baseline at 20/0.94. Calcium 8.9 (corrected for albumin is 9.6), Mg 1.9. LFTs wnl. Troponin 0.016 x1.    CXR personally reviewed and interpreted. Notable for increased vascular congestion with small pleural effusions; LLL border unable to be completely seen. No obvious cardiomegaly.    EKG personally reviewed. Atrial fibrillation, normal axis. Unchanged LBBB. Unchanged TWIs in lead I and aVL but new Q wave in lead III. No S1Q3T3 pattern. Rate 79, QTc 465.

## 2019-10-25 NOTE — H&P ADULT - NSHPREVIEWOFSYSTEMS_GEN_ALL_CORE
Gen: negative for fevers, chills, weight loss, weight gain, malaise, fatigue  Eyes: no blurred vision or lacrimation  ENT: no tinnitus, vertigo, or decreased hearing  Resp: + dyspnea. No wheezing, cough, pleuritic chest pain, hemoptysis, or orthopnea  CV: + HERNANDEZ> No chest pain or palpitations  GI: no nausea, vomiting, abdominal pain, diarrhea, constipation, melena, or hematochezia  : no dysuria, hematuria, discharge, or incontinence  MSK: no arthralgias, joint swelling, or myalgias  Neuro: no focal deficits, confusion, weakness, dizziness, tremors, or seizures  Skin: + edema. No rash or lesions

## 2019-10-25 NOTE — ASSESSMENT
[FreeTextEntry1] : #1 hypoxia on room air. History of severe pulmonary hypertension. Likely exacerbation of CHF. She was placed on 2 L/min nasal cannula O2 and will be sent immediately to the emergency room. I s/w Keisha there. \par \par #2 AF.\par \par #3 s/p MVR, AVR.\par \par #4 severe pulmonary hypertension.\par \par #5 HTN.

## 2019-10-25 NOTE — H&P ADULT - NSICDXPASTMEDICALHX_GEN_ALL_CORE_FT
PAST MEDICAL HISTORY:  Afib (on Warfarin)    CAD (Coronary Artery Disease) s/p stent and CABG    CHF (congestive heart failure) EF 65% Oct 2019    CVA (Cerebral Infarction) 2011    Diabetes mellitus type 2, not on meds    Gout     History of heart valve replacement with bioprosthetic valve mitral and aortic    HTN (Hypertension)     Hyperlipidemia     Upper GI bleed

## 2019-10-25 NOTE — HISTORY OF PRESENT ILLNESS
[FreeTextEntry1] : low O2 sat on RA [de-identified] : The history is obtained by the patient's daughter for this visit. This is a 79-year-old female with a history including recent hospitalization for acute and chronic CHF at [facility], severe pulmonary hypertension, status post bioprosthetic mitral valve and bioprosthetic aortic aortic valve replacement, atrial fibrillation, hypertension, HPL. The patient was seen 2 days ago by her primary physician. On arrival here she was noted to be tachypneic with an O2 sat of 86 to 89% on room air. She currently is not having chest pain or lightheadedness. She does have chronic swelling of the lower extremities. Daughter tells me she seems to be more short of breath at nighttime and has a mild cough and nighttime too. She is not having fever or chills.

## 2019-10-25 NOTE — PHYSICAL EXAM
[No Acute Distress] : no acute distress [Well Nourished] : well nourished [Well Developed] : well developed [Normal Sclera/Conjunctiva] : normal sclera/conjunctiva [Normal Outer Ear/Nose] : the outer ears and nose were normal in appearance [Normal Oropharynx] : the oropharynx was normal [No Lymphadenopathy] : no lymphadenopathy [Supple] : supple [No Respiratory Distress] : no respiratory distress  [No Accessory Muscle Use] : no accessory muscle use [Normal Rate] : normal rate  [Normal S1, S2] : normal S1 and S2 [No Varicosities] : no varicosities [No Extremity Clubbing/Cyanosis] : no extremity clubbing/cyanosis [Soft] : abdomen soft [Non Tender] : non-tender [Non-distended] : non-distended [No HSM] : no HSM [Normal Bowel Sounds] : normal bowel sounds [Normal Anterior Cervical Nodes] : no anterior cervical lymphadenopathy [No Rash] : no rash [No Focal Deficits] : no focal deficits [de-identified] : obese [de-identified] : decreased aud BS's, few bibasilar crackles [de-identified] : HR 72, irreg, 2'6 sys rafat [de-identified] : chronic LE swelling

## 2019-10-25 NOTE — H&P ADULT - ASSESSMENT
78 yo F with a PMH HTN, HLD, DM2 (A1C 6.9, not on meds), atrial fibrillation (on Coumadin), bioprosthetic aortic and mitral valves, HFpEF (EF 65%), CAD s/p CABG, CVA, and gout who presents with SOB, due to CHF exacerbation.    1) CHF exacerbation    2) CAD    3) HTN    4) Atrial fibrillation    5) Diabetes mellitus, type 2  - Relatively controlled off medications, A1C 6.9 this month  - Inpatient will cehck FS QAC + QHS and give low dose SSI 78 yo Azeri speaking F with a PMH HTN, HLD, DM2 (A1C 6.9, not on meds), atrial fibrillation (on Coumadin), bioprosthetic aortic and mitral valves, HFpEF (EF 65%), CAD s/p stent and CABG, CVA, and gout who presents with SOB, due to CHF exacerbation.    1) Acute hypoxic respiratory failure  - patient was hypoxic reportedly to 86-89% on room air at outpatient provider's office. O2 sats improving to 92% after IV Lasix  - Patient with increased swelling, dyspnea on exertion, and vascular congestion on CXR consistent with CHF exacerbation  - No obvious infectious etiology (ex. pneumonia)  - Place on observation on telemetry  - Will check pro-BNP  - Troponin 0.016, repeat to r/o ACS as a cause  - Given 10 mg IV Lasix x2 in ED, will increase to 40 mg IV BID  - Daily weights, strict I/Os, DASH diet  - Continue with beta blocker 12.5 mg BID and Enalapril 20 mg QD  - O2 PRN for hypoxia    2) CHF exacerbation  - likely cause of hypoxia  - Management as above    3) CAD  - S/p stent in 2011 and multivessel CABG  - C/w aspirin, high intensity statin, low silva beta blocker, and ACEi    4) HTN  - C/w Amlodipine 10 mg QD  - C/w Enalapril 20 mg QD  - C/w Metoprolol 12.5 mg BID    5) Atrial fibrillation  - [id number] of 8, on Coumadin 3 mg daily  - Will dose 3 mg tonight, check daily INRs, goal 2-3  - Of note, patient has bioprosthetic aortic and mitral valves, but INR goal remains 2-3    6) Diabetes mellitus, type 2  - Relatively controlled off medications, A1C 6.9 this month  - Inpatient will cehck FS QAC + QHS and give low dose SSI    7) Prophylactic measure  - DVT PPX: IMPROVE score of 1, but patient on Coumadin  - Diet: DASH/consistent carbs  - Dispo: pending improvement in breathing 78 yo Mohawk speaking F with a PMH HTN, HLD, DM2 (A1C 6.9, not on meds), atrial fibrillation (on Coumadin), bioprosthetic aortic and mitral valves, severe pulmonary hypertension, HFpEF/HFrEF (EF 65% October 2019), CAD s/p stent and CABG, CVA, and gout who presents with SOB, due to CHF exacerbation.    1) Acute respiratory failure with hypoxia  - patient was hypoxic reportedly to 86-89% on room air at outpatient provider's office. O2 sats improving to 92% after IV Lasix  - Patient with increased swelling, dyspnea on exertion, and vascular congestion on CXR consistent with CHF exacerbation  - No obvious infectious etiology (ex. pneumonia)  - Place on observation on telemetry  - Will check pro-BNP  - Troponin 0.016, repeat to r/o ACS as a cause  - Given 10 mg IV Lasix x2 in ED, will increase to 40 mg IV BID  - Daily weights, strict I/Os, DASH diet  - Continue with beta blocker 12.5 mg BID and Enalapril 20 mg QD  - O2 PRN for hypoxia    2) CHF exacerbation  - likely cause of hypoxia  - Management as above    3) CAD  - S/p stent in 2011 and multivessel CABG  - C/w aspirin, high intensity statin, low silva beta blocker, and ACEi    4) HTN  - C/w Amlodipine 10 mg QD  - C/w Enalapril 20 mg QD  - C/w Metoprolol 12.5 mg BID    5) Atrial fibrillation  - [id number] of 8, on Coumadin 3 mg daily  - Will dose 3 mg tonight, check daily INRs, goal 2-3  - Of note, patient has bioprosthetic aortic and mitral valves, but INR goal remains 2-3    6) Diabetes mellitus, type 2  - Relatively controlled off medications, A1C 6.9 this month  - Inpatient will cehck FS QAC + QHS and give low dose SSI    7) Prophylactic measure  - DVT PPX: IMPROVE score of 1, but patient on Coumadin  - Diet: DASH/consistent carbs  - Dispo: pending improvement in breathing 78 yo Greenlandic speaking F with a PMH HTN, HLD, DM2 (A1C 6.9, not on meds), atrial fibrillation (on Coumadin), bioprosthetic aortic and mitral valves, severe pulmonary hypertension, HFpEF/HFrEF (EF 65% October 2019), CAD s/p stent and CABG, CVA, and gout who presents with SOB, due to CHF exacerbation.    1) Acute respiratory failure with hypoxia  - patient was hypoxic reportedly to 86-89% on room air at outpatient provider's office. O2 sats improving to 92% after IV Lasix  - Patient with increased swelling, dyspnea on exertion, and vascular congestion on CXR consistent with CHF exacerbation  - No obvious infectious etiology (ex. pneumonia)  - Place on observation on telemetry  - Will check pro-BNP  - Troponin 0.016, repeat to r/o ACS as a cause  - Given 10 mg IV Lasix x2 in ED, will increase to 40 mg IV BID  - Daily weights, strict I/Os, DASH diet  - Continue with beta blocker 12.5 mg BID and Enalapril 20 mg QD  - O2 PRN for hypoxia    2) CHF exacerbation  - likely cause of hypoxia  - Management as above    3) CAD  - S/p stent in 2011 and multivessel CABG  - C/w aspirin, high intensity statin, low silva beta blocker, and ACEi    4) HTN  - C/w Amlodipine 10 mg QD  - C/w Enalapril 20 mg QD  - C/w Metoprolol 12.5 mg BID    5) Atrial fibrillation  - [id number] of 8, on Coumadin 3 mg daily  - Will dose 3 mg tonight, check daily INRs, goal 2-3  - Of note, patient has bioprosthetic aortic and mitral valves, but INR goal remains 2-3    6) Diabetes mellitus, type 2  - Relatively controlled off medications, A1C 6.9 this month  - Inpatient will cehck FS QAC + QHS and give low dose SSI    7) Neutrophilic Leukocytosis  - Chronic for nearly one year, mild WBC 10-15  - No evidence of acute infection, and pt not on steroids  - Would obtain peripheral smear  - Pt does have lung nodule in right apex that is stable from previous but cannot r/o malignancy  - Needs to f/u outpatient    8) Prophylactic measure  - DVT PPX: IMPROVE score of 1, but patient on Coumadin  - Diet: DASH/consistent carbs  - Dispo: pending improvement in breathing 78 yo Nepali speaking F with a PMH HTN, HLD, DM2 (A1C 6.9, not on meds), atrial fibrillation (on Coumadin), bioprosthetic aortic and mitral valves, severe pulmonary hypertension, HFpEF/HFrEF (EF 65% October 2019), CAD s/p stent and CABG, CVA, and gout who presents with SOB, due to CHF exacerbation.    1) Acute respiratory failure with hypoxia  - patient was hypoxic reportedly to 86-89% on room air at outpatient provider's office. O2 sats improving to 92% after IV Lasix  - Patient with increased swelling, dyspnea on exertion, and vascular congestion on CXR consistent with CHF exacerbation  - No obvious infectious etiology (ex. pneumonia)  - Place on observation on telemetry  - Will check pro-BNP  - Troponin 0.016, repeat to r/o ACS as a cause  - Given 10 mg IV Lasix x2 in ED, will increase to 40 mg IV BID  - Daily weights, strict I/Os, DASH diet  - Continue with beta blocker 12.5 mg BID and Enalapril 20 mg QD  - O2 PRN for hypoxia  - Cardiology consult    2) CHF exacerbation  - likely cause of hypoxia  - Management as above    3) CAD  - S/p stent in 2011 and multivessel CABG  - C/w aspirin, high intensity statin, low silva beta blocker, and ACEi    4) HTN  - C/w Amlodipine 10 mg QD  - C/w Enalapril 20 mg QD  - C/w Metoprolol 12.5 mg BID    5) Atrial fibrillation  - [id number] of 8, on Coumadin 3 mg daily, has known left atrial appendage thrombus  - Will dose 3 mg tonight, check daily INRs, goal 2-3  - Of note, patient has bioprosthetic aortic and mitral valves, but INR goal remains 2-3    6) Diabetes mellitus, type 2  - Relatively controlled off medications, A1C 6.9 this month  - Inpatient will cehck FS QAC + QHS and give low dose SSI    7) Neutrophilic Leukocytosis  - Chronic for nearly one year, mild WBC 10-15  - No evidence of acute infection, and pt not on steroids  - Would obtain peripheral smear  - Pt does have lung nodule in right apex that is stable from previous but cannot r/o malignancy  - Needs to f/u outpatient    8) Prophylactic measure  - DVT PPX: IMPROVE score of 1, but patient on Coumadin  - Diet: DASH/consistent carbs  - Dispo: pending improvement in breathing

## 2019-10-25 NOTE — REVIEW OF SYSTEMS
[Lower Ext Edema] : lower extremity edema [Orthopnea] : orthopnea [Shortness Of Breath] : shortness of breath [Cough] : cough [Fever] : no fever [Chills] : no chills [Chest Pain] : no chest pain [Wheezing] : no wheezing

## 2019-10-25 NOTE — ED PROVIDER NOTE - CLINICAL SUMMARY MEDICAL DECISION MAKING FREE TEXT BOX
Plan; XR, labs, due to history of CHF and MI with SOB and increased swelling to BL LE, will admit for CHF vs ACS.

## 2019-10-25 NOTE — ED PROVIDER NOTE - OBJECTIVE STATEMENT
78 y/o F with PMHx of Afib on Warfarin CAD s/p stents and CABG, MI, CHF, CVA, DM, Gout, HTN, Mitral Regurgitation, Upper GI bleed presenting to the ED c/o SOB. Patient reports that she was seen at [facility] for CHF exacerbation last week, and was d/c home. Patient still has SOB, and came to the ED today for further evaluation. +increased swelling to LE. 78 y/o female with PMHx of Afib on Warfarin CAD s/p stents and CABG, MI, CHF, CVA, DM, Gout, HTN, Mitral Regurgitation, Upper GI bleed presenting to the ED c/o SOB. Patient reports that she was seen at [facility] for CHF exacerbation last week, and was d/c'ed home. Patient still has SOB, and came to the ED today for further evaluation. +increased swelling to LE.

## 2019-10-25 NOTE — ED ADULT NURSE NOTE - CHIEF COMPLAINT QUOTE
Pt. to the ED BIBA from Pulmonologist C/O SOB and CHF- As per physician pt. was recently D/C from [facility] for CHF exacerbation and refuses to F/U with Cardiology- Reports SOB and Low Oxygen on RA with Tachypnea- Hx. of CHF, Afib, MVP

## 2019-10-25 NOTE — ED ADULT NURSE NOTE - NSIMPLEMENTINTERV_GEN_ALL_ED
Implemented All Universal Safety Interventions:  Fox to call system. Call bell, personal items and telephone within reach. Instruct patient to call for assistance. Room bathroom lighting operational. Non-slip footwear when patient is off stretcher. Physically safe environment: no spills, clutter or unnecessary equipment. Stretcher in lowest position, wheels locked, appropriate side rails in place.

## 2019-10-25 NOTE — ED ADULT TRIAGE NOTE - CHIEF COMPLAINT QUOTE
Pt. to the ED BIBA from Pulmonologist C/O SOB and CHF- As per physician pt. was recently D/C from [facility] for CHF exacerbation and refuses to F/U with Cardiology- Reports SOB and Low Oxygen on RA with Tachypnea- Hx. of CHF, Afib, MVP

## 2019-10-26 DIAGNOSIS — Z95.3 PRESENCE OF XENOGENIC HEART VALVE: ICD-10-CM

## 2019-10-26 DIAGNOSIS — I27.23 PULMONARY HYPERTENSION DUE TO LUNG DISEASES AND HYPOXIA: ICD-10-CM

## 2019-10-26 DIAGNOSIS — I48.91 UNSPECIFIED ATRIAL FIBRILLATION: ICD-10-CM

## 2019-10-26 DIAGNOSIS — I27.20 PULMONARY HYPERTENSION, UNSPECIFIED: ICD-10-CM

## 2019-10-26 DIAGNOSIS — R09.02 HYPOXEMIA: ICD-10-CM

## 2019-10-26 DIAGNOSIS — E11.9 TYPE 2 DIABETES MELLITUS WITHOUT COMPLICATIONS: ICD-10-CM

## 2019-10-26 DIAGNOSIS — I50.9 HEART FAILURE, UNSPECIFIED: ICD-10-CM

## 2019-10-26 DIAGNOSIS — I25.10 ATHEROSCLEROTIC HEART DISEASE OF NATIVE CORONARY ARTERY WITHOUT ANGINA PECTORIS: ICD-10-CM

## 2019-10-26 DIAGNOSIS — R06.09 OTHER FORMS OF DYSPNEA: ICD-10-CM

## 2019-10-26 LAB
ANION GAP SERPL CALC-SCNC: 7 MMOL/L — SIGNIFICANT CHANGE UP (ref 5–17)
APTT BLD: 37 SEC — HIGH (ref 27.5–36.3)
BASE EXCESS BLDA CALC-SCNC: 3.1 MMOL/L — HIGH (ref -2–2)
BASOPHILS # BLD AUTO: 0.1 K/UL — SIGNIFICANT CHANGE UP (ref 0–0.2)
BASOPHILS NFR BLD AUTO: 0.9 % — SIGNIFICANT CHANGE UP (ref 0–2)
BLOOD GAS COMMENTS ARTERIAL: SIGNIFICANT CHANGE UP
BUN SERPL-MCNC: 20 MG/DL — SIGNIFICANT CHANGE UP (ref 7–23)
CALCIUM SERPL-MCNC: 8.3 MG/DL — LOW (ref 8.5–10.1)
CHLORIDE SERPL-SCNC: 106 MMOL/L — SIGNIFICANT CHANGE UP (ref 96–108)
CO2 SERPL-SCNC: 29 MMOL/L — SIGNIFICANT CHANGE UP (ref 22–31)
CREAT SERPL-MCNC: 0.91 MG/DL — SIGNIFICANT CHANGE UP (ref 0.5–1.3)
D DIMER BLD IA.RAPID-MCNC: 164 NG/ML DDU — SIGNIFICANT CHANGE UP
EOSINOPHIL # BLD AUTO: 0.46 K/UL — SIGNIFICANT CHANGE UP (ref 0–0.5)
EOSINOPHIL NFR BLD AUTO: 3.9 % — SIGNIFICANT CHANGE UP (ref 0–6)
GLUCOSE SERPL-MCNC: 129 MG/DL — HIGH (ref 70–99)
HCO3 BLDA-SCNC: 27 MMOL/L — SIGNIFICANT CHANGE UP (ref 21–29)
HCT VFR BLD CALC: 35.8 % — SIGNIFICANT CHANGE UP (ref 34.5–45)
HGB BLD-MCNC: 11 G/DL — LOW (ref 11.5–15.5)
IMM GRANULOCYTES NFR BLD AUTO: 0.3 % — SIGNIFICANT CHANGE UP (ref 0–1.5)
INR BLD: 2.27 RATIO — HIGH (ref 0.88–1.16)
LYMPHOCYTES # BLD AUTO: 1.39 K/UL — SIGNIFICANT CHANGE UP (ref 1–3.3)
LYMPHOCYTES # BLD AUTO: 11.9 % — LOW (ref 13–44)
MAGNESIUM SERPL-MCNC: 1.9 MG/DL — SIGNIFICANT CHANGE UP (ref 1.6–2.6)
MCHC RBC-ENTMCNC: 25.5 PG — LOW (ref 27–34)
MCHC RBC-ENTMCNC: 30.7 GM/DL — LOW (ref 32–36)
MCV RBC AUTO: 82.9 FL — SIGNIFICANT CHANGE UP (ref 80–100)
MONOCYTES # BLD AUTO: 0.88 K/UL — SIGNIFICANT CHANGE UP (ref 0–0.9)
MONOCYTES NFR BLD AUTO: 7.5 % — SIGNIFICANT CHANGE UP (ref 2–14)
NEUTROPHILS # BLD AUTO: 8.86 K/UL — HIGH (ref 1.8–7.4)
NEUTROPHILS NFR BLD AUTO: 75.5 % — SIGNIFICANT CHANGE UP (ref 43–77)
PCO2 BLDA: 41 MMHG — SIGNIFICANT CHANGE UP (ref 32–46)
PH BLDA: 7.44 — SIGNIFICANT CHANGE UP (ref 7.35–7.45)
PHOSPHATE SERPL-MCNC: 4.1 MG/DL — SIGNIFICANT CHANGE UP (ref 2.5–4.5)
PLATELET # BLD AUTO: 291 K/UL — SIGNIFICANT CHANGE UP (ref 150–400)
PO2 BLDA: 75 MMHG — SIGNIFICANT CHANGE UP (ref 74–108)
POTASSIUM SERPL-MCNC: 3.5 MMOL/L — SIGNIFICANT CHANGE UP (ref 3.5–5.3)
POTASSIUM SERPL-SCNC: 3.5 MMOL/L — SIGNIFICANT CHANGE UP (ref 3.5–5.3)
PROTHROM AB SERPL-ACNC: 25.8 SEC — HIGH (ref 10–12.9)
RBC # BLD: 4.32 M/UL — SIGNIFICANT CHANGE UP (ref 3.8–5.2)
RBC # FLD: 15.3 % — HIGH (ref 10.3–14.5)
SAO2 % BLDA: 94 % — SIGNIFICANT CHANGE UP (ref 92–96)
SODIUM SERPL-SCNC: 142 MMOL/L — SIGNIFICANT CHANGE UP (ref 135–145)
TROPONIN I SERPL-MCNC: 0.05 NG/ML — HIGH (ref 0.01–0.04)
TROPONIN I SERPL-MCNC: 0.05 NG/ML — HIGH (ref 0.01–0.04)
WBC # BLD: 11.72 K/UL — HIGH (ref 3.8–10.5)
WBC # FLD AUTO: 11.72 K/UL — HIGH (ref 3.8–10.5)

## 2019-10-26 RX ORDER — ACETAMINOPHEN 500 MG
650 TABLET ORAL EVERY 6 HOURS
Refills: 0 | Status: DISCONTINUED | OUTPATIENT
Start: 2019-10-26 | End: 2019-11-01

## 2019-10-26 RX ADMIN — Medication 650 MILLIGRAM(S): at 23:00

## 2019-10-26 RX ADMIN — Medication 0: at 22:09

## 2019-10-26 RX ADMIN — ATORVASTATIN CALCIUM 80 MILLIGRAM(S): 80 TABLET, FILM COATED ORAL at 22:02

## 2019-10-26 RX ADMIN — Medication 40 MILLIGRAM(S): at 17:42

## 2019-10-26 RX ADMIN — Medication 81 MILLIGRAM(S): at 13:08

## 2019-10-26 RX ADMIN — Medication 650 MILLIGRAM(S): at 22:02

## 2019-10-26 RX ADMIN — Medication 1: at 17:41

## 2019-10-26 RX ADMIN — WARFARIN SODIUM 3 MILLIGRAM(S): 2.5 TABLET ORAL at 00:11

## 2019-10-26 RX ADMIN — Medication 12.5 MILLIGRAM(S): at 05:55

## 2019-10-26 RX ADMIN — Medication 40 MILLIGRAM(S): at 00:11

## 2019-10-26 RX ADMIN — Medication 12.5 MILLIGRAM(S): at 17:43

## 2019-10-26 RX ADMIN — Medication 40 MILLIGRAM(S): at 05:55

## 2019-10-26 RX ADMIN — Medication 20 MILLIGRAM(S): at 05:55

## 2019-10-26 RX ADMIN — Medication 12.5 MILLIGRAM(S): at 00:11

## 2019-10-26 RX ADMIN — AMLODIPINE BESYLATE 10 MILLIGRAM(S): 2.5 TABLET ORAL at 00:11

## 2019-10-26 RX ADMIN — Medication 1 MILLIGRAM(S): at 13:08

## 2019-10-26 RX ADMIN — AMLODIPINE BESYLATE 10 MILLIGRAM(S): 2.5 TABLET ORAL at 05:55

## 2019-10-26 NOTE — CONSULT NOTE ADULT - PROBLEM SELECTOR RECOMMENDATION 5
controlled rate , with episodes of slow ventricular rate during sleep , c  continue current medication , anticoagulation

## 2019-10-26 NOTE — PROVIDER CONTACT NOTE (CHANGE IN STATUS NOTIFICATION) - ASSESSMENT
With  #[id number]. Pt c/o of mild abdominal pain, tenderness noted to RUQ.  Pt states she had BM today and passing gas but still having this abdominal pain.
Current every day smoker

## 2019-10-26 NOTE — CONSULT NOTE ADULT - PROBLEM SELECTOR RECOMMENDATION 6
severe pulmonary hypertension possibly secondary  ,due to left heart failure ? r/o obesity sleep apnea ?  would benefit from home oxygen , recommend pulmonary evaluation for possible home oxygen ,

## 2019-10-26 NOTE — CONSULT NOTE ADULT - PROBLEM SELECTOR RECOMMENDATION 9
multifactorial possible acute on chronic diastolic heart failure uncontrolled hypertension , chronic afib , possible obesity hypoventilation ,?   advised the patient to follow low salt diet , BP control multifactorial possible acute on chronic diastolic heart failure uncontrolled hypertension , chronic afib ,  bilateral upper lobe lung ? interstitial fibrosis > possible obesity hypoventilation ,?   advised the patient to follow low salt diet , BP control  continue diuretic , recommend pulmonary evaluation for her CT chest findings

## 2019-10-26 NOTE — CONSULT NOTE ADULT - SUBJECTIVE AND OBJECTIVE BOX
HPI:  Translation provided by daniel at bedside per patient request.  80 yo Wolof speaking F with a PMH HTN, HLD, DM2 (A1C 6.9, not on meds), atrial fibrillation (on Coumadin), bioprosthetic aortic and mitral valves, severe pulmonary hypertension, HFpEF/HFrEF (EF 65% October 2019), CAD s/p stent and CABG, CVA, and gout who presents with SOB. She was seen at [facility] for CHF exacerbation and discharged 8 days prior. She felt her SOB had never completely resolved and over the last 3-4 days has been having increasing dyspnea on exertion. She has also endorsed bilateral leg swelling during this time. She feels good at rest. She denies orthopnea, fevers, chills, cough, CP, palpitations, rhinorrhea, headache, nausea, vomiting, dysuria, or diarrhea. She has taken all of her medicines as appropriate including Lasix 40 mg BID PO, which was what she was discharged on. Her BP was elevated at her PCP appointment this week and therefore 2 days ago, her Amlodipine was increased from 5 mg to 10 mg.  She was sent in by her outpatient provider for her symptoms and due to her being 86-89% on room air in the office.    In the ED, she was given aspirin 81 mg PO x1 and Lasix 10 mg IV x2. (25 Oct 2019 23:52)    10/26/19  Patient was seen , chart reviewed , patient admitted with HERNANDEZ with worsening LE and hypoxia   patient is feeling better after receiving Lasix , patient appears to be non compliance to low salt diet  denies any chest pain . patient had recent hospitalization had GIACOMO showed LAYLA thrombus , normal function prosthetic valves ,  patient had severe pulmonary hypertension on TTE  done recently       PAST MEDICAL & SURGICAL HISTORY:  Hyperlipidemia  History of heart valve replacement with bioprosthetic valve: mitral and aortic  Diabetes mellitus: type 2, not on meds  Gout  Upper GI bleed  CHF (congestive heart failure): EF 65% Oct 2019  CVA (Cerebral Infarction): 2011  CAD (Coronary Artery Disease): s/p stent and CABG  Afib: (on Warfarin)  HTN (Hypertension)  H/O mitral valve replacement: 9/22/14  H/O aortic valve replacement: 9/22/14  S/P CABG x 1: (2000)  S/P angioplasty with stent: 2011      Allergies    No Known Allergies    Intolerances        SOCIAL HISTORY: non smoker     FAMILY HISTORY:  Family history of acute myocardial infarction: mother      MEDICATIONS:  MEDICATIONS  (STANDING):  amLODIPine   Tablet 10 milliGRAM(s) Oral daily  aspirin enteric coated 81 milliGRAM(s) Oral daily  atorvastatin 80 milliGRAM(s) Oral at bedtime  dextrose 50% Injectable 12.5 Gram(s) IV Push once  dextrose 50% Injectable 25 Gram(s) IV Push once  dextrose 50% Injectable 25 Gram(s) IV Push once  enalapril 20 milliGRAM(s) Oral daily  folic acid 1 milliGRAM(s) Oral daily  furosemide   Injectable 40 milliGRAM(s) IV Push two times a day  insulin lispro (HumaLOG) corrective regimen sliding scale   SubCutaneous three times a day before meals  insulin lispro (HumaLOG) corrective regimen sliding scale   SubCutaneous at bedtime  metoprolol tartrate 12.5 milliGRAM(s) Oral two times a day    MEDICATIONS  (PRN):  dextrose 40% Gel 15 Gram(s) Oral once PRN Blood Glucose LESS THAN 70 milliGRAM(s)/deciliter  glucagon  Injectable 1 milliGRAM(s) IntraMuscular once PRN Glucose LESS THAN 70 milligrams/deciliter      REVIEW OF SYSTEMS:    CONSTITUTIONAL: No weakness, fevers or chills  EYES/ENT: No visual changes;  No vertigo or throat pain   NECK: No pain or stiffness  RESPIRATORY: No cough, wheezing, hemoptysis; positive for shortness of breath  CARDIOVASCULAR: No chest pain or palpitations  GASTROINTESTINAL: No abdominal or epigastric pain. No nausea, vomiting, or hematemesis; No diarrhea or constipation. No melena or hematochezia.  GENITOURINARY: No dysuria, frequency or hematuria  NEUROLOGICAL: No numbness or weakness  SKIN: No itching, burning, rashes, or lesions   All other review of systems is negative unless indicated above    Vital Signs Last 24 Hrs  T(C): 36.8 (26 Oct 2019 05:52), Max: 37.2 (26 Oct 2019 02:20)  T(F): 98.2 (26 Oct 2019 05:52), Max: 99 (26 Oct 2019 02:20)  HR: 60 (26 Oct 2019 05:52) (51 - 82)  BP: 139/42 (26 Oct 2019 05:52) (139/42 - 164/75)  BP(mean): --  RR: 18 (26 Oct 2019 05:52) (18 - 19)  SpO2: 94% (26 Oct 2019 05:52) (94% - 98%)    I&O's Summary    25 Oct 2019 07:01  -  26 Oct 2019 07:00  --------------------------------------------------------  IN: 0 mL / OUT: 400 mL / NET: -400 mL        PHYSICAL EXAM:    Constitutional: NAD, awake and alert, obesity   HEENT: PERR, EOMI,  No oral cyananosis.  Neck:  supple,  No JVD  Respiratory: Breath sounds are clear bilaterally, No wheezing, rales or rhonchi  Cardiovascular: S1 and S2,  irregularly Irregular rate and rhythm, ESM   Gastrointestinal: Bowel Sounds present, soft, nontender.   Extremities: No peripheral edema. No clubbing or cyanosis.  Vascular: 2+ peripheral pulses  Neurological: A/O x 3, no focal deficits  Musculoskeletal: no calf tenderness.  Skin: No rashes.      LABS: All Labs Reviewed:                        11.0   11.72 )-----------( 291      ( 26 Oct 2019 07:19 )             35.8                         11.4   13.79 )-----------( 342      ( 25 Oct 2019 18:22 )             37.9     26 Oct 2019 07:19    142    |  106    |  20     ----------------------------<  129    3.5     |  29     |  0.91   25 Oct 2019 18:22    141    |  104    |  20     ----------------------------<  137    3.5     |  28     |  0.94     Ca    8.3        26 Oct 2019 07:19  Ca    8.9        25 Oct 2019 18:22  Phos  4.1       26 Oct 2019 07:19  Mg     1.9       26 Oct 2019 07:19  Mg     1.9       25 Oct 2019 18:22    TPro  7.7    /  Alb  3.1    /  TBili  0.6    /  DBili  x      /  AST  20     /  ALT  22     /  AlkPhos  89     25 Oct 2019 18:22    PT/INR - ( 26 Oct 2019 07:19 )   PT: 25.8 sec;   INR: 2.27 ratio         PTT - ( 26 Oct 2019 07:19 )  PTT:37.0 sec  CARDIAC MARKERS ( 26 Oct 2019 07:19 )  0.049 ng/mL / x     / x     / x     / x      CARDIAC MARKERS ( 26 Oct 2019 00:28 )  0.052 ng/mL / x     / x     / x     / x      CARDIAC MARKERS ( 25 Oct 2019 18:22 )  0.016 ng/mL / x     / x     / x     / x          Blood Culture:   10-25 @ 18:22  Pro Bnp [id number]        RADIOLOGY/EKG:   afib with controlled rate  left axis , IVCD   no significant change from prior   < from: Transesophageal Echocardiogram w/o TTE (10.16.19 @ 14:50) >  CONCLUSIONS:  Limited focus study due to patient's clinical status  1. Bioprosthetic mitral valve replacement, with normal  leaflet mobility. Minimal mitral regurgitation. Mean  transmitral valve gradient equals 7 mm Hg (HR 89)  2. Bioprosthetic aortic valve replacement, with normal  leaflet mobility.  3. Th distal third of the left atrial appendage thrombus is  entriely opacified from thrombus. The remaining appendage  contains spontaneous echocontrast 'smoke'  4. Agitated saline injection demonstrates no evidence of a  patent foramen ovale.  ------------------------------------------------------------------------    < end of copied text >    < from: Transthoracic Echocardiogram (10.10.19 @ 18:07) >  CONCLUSIONS:  1. Bioprosthetic mitral valve replacement. Minimal mitral  regurgitation. Mean transmitral valve gradient equals 6 mm  Hg, which is elevated even in the setting of a prosthetic  valve.  2. Bioprosthetic aortic valve replacement. Peak transaortic  valve gradient equals 30 mm Hg, mean transaortic valve  gradient equals 18 mm Hg, which is probably normal in the  presence of a prosthetic valve. Minimal aortic  regurgitation.  3. Severely dilated left atrium.  LA volume index = 59  cc/m2.  4. Mild concentric left ventricular hypertrophy.        Monitor afib with controlled rate with episodes of brief SLow ventriculaer rate at night   5. Normal left ventricular systolic function. No segmental  wall motion abnormalities.  6. Normal right ventricular size and function.  7. Estimated right ventricular systolic pressure equals 73  mm Hg, assuming right atrial pressure equals 10 mm Hg,  consistent with severe pulmonary hypertension.  *** Compared with echocardiogram of 2/9/2019, the estimated  pulmonary artery systolic pressure has increased.  ------------------------------------------------------------------------  Confirmed on  10/11/2019 - 08:55:53 by Neel Johnson M.D.  ------------------------------------------------------------------------    < end of copied text >

## 2019-10-26 NOTE — PROGRESS NOTE ADULT - SUBJECTIVE AND OBJECTIVE BOX
CC:  Patient is a 79y old  Female who presents with a chief complaint of CHF exacerbation (26 Oct 2019 09:03)    SUBJECTIVE:     -no new complaints or issues at current time.    ROS:  all other review of systems are negative unless indicated above.    amLODIPine   Tablet 10 milliGRAM(s) Oral daily  aspirin enteric coated 81 milliGRAM(s) Oral daily  atorvastatin 80 milliGRAM(s) Oral at bedtime  dextrose 40% Gel 15 Gram(s) Oral once PRN  dextrose 50% Injectable 12.5 Gram(s) IV Push once  dextrose 50% Injectable 25 Gram(s) IV Push once  dextrose 50% Injectable 25 Gram(s) IV Push once  enalapril 20 milliGRAM(s) Oral daily  folic acid 1 milliGRAM(s) Oral daily  furosemide   Injectable 40 milliGRAM(s) IV Push two times a day  glucagon  Injectable 1 milliGRAM(s) IntraMuscular once PRN  insulin lispro (HumaLOG) corrective regimen sliding scale   SubCutaneous three times a day before meals  insulin lispro (HumaLOG) corrective regimen sliding scale   SubCutaneous at bedtime  metoprolol tartrate 12.5 milliGRAM(s) Oral two times a day    T(C): 36.8 (10-26-19 @ 05:52), Max: 37.2 (10-26-19 @ 02:20)  HR: 60 (10-26-19 @ 05:52) (51 - 82)  BP: 139/42 (10-26-19 @ 05:52) (139/42 - 164/75)  RR: 18 (10-26-19 @ 05:52) (18 - 19)  SpO2: 94% (10-26-19 @ 05:52) (94% - 98%)    Constitutional: NAD.   HEENT: PERRL, EOMI, MMM.  Neck: Soft and supple, No carotid bruit, No JVD  Respiratory: Breath sounds are clear bilaterally, No wheezing, rales or rhonchi  Cardiovascular: S1 and S2, regular rate and rhythm, no murmur, rub or gallop.  Gastrointestinal: Bowel Sounds present, soft, nontender, nondistended, no guarding, no rebound, no mass.  Extremities: No peripheral edema  Vascular: 2+ peripheral pulses  Neurological: A/O x , no focal deficits  Musculoskeletal: 5/5 strength b/l upper and lower extremities  Skin:  no visible rashes.                         11.0   11.72 )-----------( 291      ( 26 Oct 2019 07:19 )             35.8     PT/INR - ( 26 Oct 2019 07:19 )   PT: 25.8 sec;   INR: 2.27 ratio         PTT - ( 26 Oct 2019 07:19 )  PTT:37.0 sec  10-26    142  |  106  |  20  ----------------------------<  129<H>  3.5   |  29  |  0.91    Ca    8.3<L>      26 Oct 2019 07:19  Phos  4.1     10-26  Mg     1.9     10-26    TPro  7.7  /  Alb  3.1<L>  /  TBili  0.6  /  DBili  x   /  AST  20  /  ALT  22  /  AlkPhos  89  10-25

## 2019-10-26 NOTE — PROGRESS NOTE ADULT - ASSESSMENT
Impression:  79F.  admitted 10/25/2019.  presented to ED c/o dyspnea due to exacerbation of HF.    PMHx:  HFpEF EF 65% (10/2019);  CAD-PCI + CABG;  AF (w);  CVA;  AVR + MVR (bio);  DM;  HTN;  severe pHTN;  gout.    Assessment:  1.	acute hypoxic respiratory failure due to HFpEF exacerbation.  2.	hypoxia.  hx severe HTN.  3.	mild leukocytosis.  no evidence for infection.  patient not on systemic steroids.  4.	stable 9mm R apical lung nodule.  5.	hx AF, stable.  rate controlled.  6.	hx CAD, stable.  no evidence of coronary ischemia.    Plan:  -telemetry and SpO2 monitoring.  -CBC noted.  continue to monitor.  -c/w Lasix 40mg iv q12.  monitor renal function daily.  -c/w BB + ACEI.  -c/w ASA + statin.  -Cardiology following.  -Pulmonology consult.    -c/w chronic medical issue management.  -DVT prophylaxis, VKA.  -d/w RN. Impression:  79F.  admitted 10/25/2019.  presented to ED c/o dyspnea due to exacerbation of HF.    PMHx:  HFpEF EF 65% (10/2019);  CAD-PCI + CABG;  AF (w);  CVA;  AVR + MVR (bio);  DM;  HTN;  severe pHTN;  gout.    Assessment:  1.	acute hypoxic respiratory failure due to HFpEF exacerbation.  2.	hypoxia.  hx severe HTN.  3.	mild leukocytosis.  no evidence for infection.  patient not on systemic steroids.  4.	stable 9mm R apical lung nodule.  5.	hx AF, stable.  rate controlled.  6.	hx CAD, stable.  no evidence of coronary ischemia.    Plan:  -telemetry and SpO2 monitoring.  -repeat CXR ordered for the AM.  if congestion and pleural effusion improved, consider deescalating Lasix.  -CBC noted.  continue to monitor.  -c/w Lasix 40mg iv q12.  monitor renal function daily.  -c/w BB + ACEI.  -c/w ASA + statin.  -Cardiology following.  -Pulmonology consult.    -c/w chronic medical issue management.  -DVT prophylaxis, VKA.  -d/w RN.

## 2019-10-26 NOTE — CONSULT NOTE ADULT - ASSESSMENT
CXR shows mild increase in PVC c/w previous CXR on 10/8/19 c/w CHF.  CT of 10/13 reviewed, shows some bilat mosaic findings, also L effusion, cardiomegaly c/w CHF.  Has stable 9 mm pulmonary nodule, stable c/w CT of 2/2019. Will need following CT scan of chest in 6 months to ensure stability of nodule.       Has severe pulmonary hypertension.    Recommend arrange for home O2.  Recheck O2 sat on room air, may need mild walk test on RA.

## 2019-10-27 LAB
ANION GAP SERPL CALC-SCNC: 6 MMOL/L — SIGNIFICANT CHANGE UP (ref 5–17)
BUN SERPL-MCNC: 23 MG/DL — SIGNIFICANT CHANGE UP (ref 7–23)
CALCIUM SERPL-MCNC: 8.3 MG/DL — LOW (ref 8.5–10.1)
CHLORIDE SERPL-SCNC: 103 MMOL/L — SIGNIFICANT CHANGE UP (ref 96–108)
CO2 SERPL-SCNC: 29 MMOL/L — SIGNIFICANT CHANGE UP (ref 22–31)
CREAT SERPL-MCNC: 0.85 MG/DL — SIGNIFICANT CHANGE UP (ref 0.5–1.3)
GLUCOSE SERPL-MCNC: 131 MG/DL — HIGH (ref 70–99)
HCT VFR BLD CALC: 36.5 % — SIGNIFICANT CHANGE UP (ref 34.5–45)
HGB BLD-MCNC: 10.9 G/DL — LOW (ref 11.5–15.5)
INR BLD: 2.2 RATIO — HIGH (ref 0.88–1.16)
MCHC RBC-ENTMCNC: 25 PG — LOW (ref 27–34)
MCHC RBC-ENTMCNC: 29.9 GM/DL — LOW (ref 32–36)
MCV RBC AUTO: 83.7 FL — SIGNIFICANT CHANGE UP (ref 80–100)
PLATELET # BLD AUTO: 305 K/UL — SIGNIFICANT CHANGE UP (ref 150–400)
POTASSIUM SERPL-MCNC: 3.5 MMOL/L — SIGNIFICANT CHANGE UP (ref 3.5–5.3)
POTASSIUM SERPL-SCNC: 3.5 MMOL/L — SIGNIFICANT CHANGE UP (ref 3.5–5.3)
PROTHROM AB SERPL-ACNC: 25 SEC — HIGH (ref 10–12.9)
RBC # BLD: 4.36 M/UL — SIGNIFICANT CHANGE UP (ref 3.8–5.2)
RBC # FLD: 15.2 % — HIGH (ref 10.3–14.5)
SODIUM SERPL-SCNC: 138 MMOL/L — SIGNIFICANT CHANGE UP (ref 135–145)
WBC # BLD: 10.98 K/UL — HIGH (ref 3.8–10.5)
WBC # FLD AUTO: 10.98 K/UL — HIGH (ref 3.8–10.5)

## 2019-10-27 RX ORDER — WARFARIN SODIUM 2.5 MG/1
3 TABLET ORAL DAILY
Refills: 0 | Status: DISCONTINUED | OUTPATIENT
Start: 2019-10-27 | End: 2019-10-28

## 2019-10-27 RX ORDER — FUROSEMIDE 40 MG
40 TABLET ORAL DAILY
Refills: 0 | Status: DISCONTINUED | OUTPATIENT
Start: 2019-10-28 | End: 2019-10-29

## 2019-10-27 RX ADMIN — AMLODIPINE BESYLATE 10 MILLIGRAM(S): 2.5 TABLET ORAL at 05:55

## 2019-10-27 RX ADMIN — Medication 12.5 MILLIGRAM(S): at 05:55

## 2019-10-27 RX ADMIN — Medication 40 MILLIGRAM(S): at 05:55

## 2019-10-27 RX ADMIN — Medication 81 MILLIGRAM(S): at 12:27

## 2019-10-27 RX ADMIN — Medication 1 MILLIGRAM(S): at 12:27

## 2019-10-27 RX ADMIN — WARFARIN SODIUM 3 MILLIGRAM(S): 2.5 TABLET ORAL at 21:44

## 2019-10-27 RX ADMIN — ATORVASTATIN CALCIUM 80 MILLIGRAM(S): 80 TABLET, FILM COATED ORAL at 21:44

## 2019-10-27 RX ADMIN — Medication 20 MILLIGRAM(S): at 05:55

## 2019-10-27 RX ADMIN — Medication 12.5 MILLIGRAM(S): at 17:29

## 2019-10-27 RX ADMIN — Medication 1: at 12:26

## 2019-10-27 NOTE — PROGRESS NOTE ADULT - ASSESSMENT
Impression:  79F.  admitted 10/25/2019.  presented to ED c/o dyspnea due to exacerbation of HF.    PMHx:  HFpEF EF 65% (10/2019);  CAD-PCI + CABG;  AF (w);  CVA;  AVR + MVR (bio);  DM;  HTN;  severe pHTN;  gout.    Assessment:  1.	acute hypoxic respiratory failure due to HFpEF exacerbation.  2.	hypoxia.  hx severe HTN.  3.	mild leukocytosis.  no evidence for infection.  patient not on systemic steroids.  4.	stable 9mm R apical lung nodule.  5.	hx AF, stable.  rate controlled.  6.	hx CAD, stable.  no evidence of coronary ischemia.    Plan:  -telemetry and SpO2 monitoring.  -document RA SpO2 at rest and upon ambulation.  -repeat CXR in AM to document improvement of pulmonary vascular congestion and pleural effusion.  -CBC noted.  continue to monitor.  -subjective improvement in breathing.  lungs clear on PE today.  will decrease Lasix to 40mg iv qd.  continue to monitor renal function daily.  -c/w BB + ACEI.  -c/w ASA + statin.  -Cardiology following.  -Pulmonology consult.    -c/w chronic medical issue management.  -DVT prophylaxis, VKA.  -d/w RN.

## 2019-10-27 NOTE — PROGRESS NOTE ADULT - PROBLEM SELECTOR PLAN 1
Breathing Improved. Dyspnea perhaps multifactorial possibly 2/2 acute on chronic diastolic heart failure,  uncontrolled hypertension, chronic afib,  possible obesity hypoventilation. daughter at bedside advised low sodium diet, weight reduction, BP control, continue diuretic.  Lung nodule seen by Pulmonary will monitor

## 2019-10-27 NOTE — PROGRESS NOTE ADULT - PROBLEM SELECTOR PLAN 6
Severe pulmonary hypertension possibly secondary sleep apnea/obesity would benefit from home oxygen.  Pulmonary consulted

## 2019-10-27 NOTE — PROGRESS NOTE ADULT - SUBJECTIVE AND OBJECTIVE BOX
CC:  Patient is a 79y old  Female who presents with a chief complaint of CHF exacerbation (26 Oct 2019 15:13)    SUBJECTIVE: via .    -no cough.  -breathing comfortably.  -does not feel sick or ill.    ROS:  all other review of systems are negative unless indicated above.    acetaminophen   Tablet .. 650 milliGRAM(s) Oral every 6 hours PRN  amLODIPine   Tablet 10 milliGRAM(s) Oral daily  aspirin enteric coated 81 milliGRAM(s) Oral daily  atorvastatin 80 milliGRAM(s) Oral at bedtime  dextrose 40% Gel 15 Gram(s) Oral once PRN  dextrose 50% Injectable 12.5 Gram(s) IV Push once  dextrose 50% Injectable 25 Gram(s) IV Push once  dextrose 50% Injectable 25 Gram(s) IV Push once  enalapril 20 milliGRAM(s) Oral daily  folic acid 1 milliGRAM(s) Oral daily  furosemide   Injectable 40 milliGRAM(s) IV Push two times a day  glucagon  Injectable 1 milliGRAM(s) IntraMuscular once PRN  insulin lispro (HumaLOG) corrective regimen sliding scale   SubCutaneous three times a day before meals  insulin lispro (HumaLOG) corrective regimen sliding scale   SubCutaneous at bedtime  metoprolol tartrate 12.5 milliGRAM(s) Oral two times a day  warfarin 3 milliGRAM(s) Oral daily    T(C): 36.6 (10-27-19 @ 05:40), Max: 37.1 (10-26-19 @ 17:31)  HR: 66 (10-27-19 @ 05:40) (66 - 76)  BP: 114/41 (10-27-19 @ 05:40) (114/41 - 158/49)  RR: 17 (10-27-19 @ 05:40) (17 - 18)  SpO2: 98% (10-27-19 @ 05:40) (98% - 98%)    Constitutional: NAD.   HEENT: PERRL, EOMI, MMM.  Neck: Soft and supple, No carotid bruit, No JVD  Respiratory: Breath sounds are clear bilaterally, No wheezing, rales or rhonchi  Cardiovascular: clear lungs.  Gastrointestinal: Bowel Sounds present, soft, nontender, nondistended, no guarding, no rebound, no mass.  Extremities: No peripheral edema  Vascular: 2+ peripheral pulses  Neurological: A/O x , no focal deficits  Musculoskeletal: 5/5 strength b/l upper and lower extremities  Skin:  no visible rashes.                         10.9   10.98 )-----------( 305      ( 27 Oct 2019 07:17 )             36.5     PT/INR - ( 27 Oct 2019 07:17 )   PT: 25.0 sec;   INR: 2.20 ratio         PTT - ( 26 Oct 2019 07:19 )  PTT:37.0 sec  10-27    138  |  103  |  23  ----------------------------<  131<H>  3.5   |  29  |  0.85    Ca    8.3<L>      27 Oct 2019 07:17  Phos  4.1     10-26  Mg     1.9     10-26    TPro  7.7  /  Alb  3.1<L>  /  TBili  0.6  /  DBili  x   /  AST  20  /  ALT  22  /  AlkPhos  89  10-25

## 2019-10-27 NOTE — PROGRESS NOTE ADULT - PROBLEM SELECTOR PLAN 5
Rate controlled  with episodes of slow ventricular rate during sleep  continue current medication  anticoagulation.

## 2019-10-27 NOTE — PROGRESS NOTE ADULT - SUBJECTIVE AND OBJECTIVE BOX
HPI:  Translation provided by daniel at bedside per patient request.  78 yo Belarusian speaking F with a PMH HTN, HLD, DM2 (A1C 6.9, not on meds), atrial fibrillation (on Coumadin), bioprosthetic aortic and mitral valves, severe pulmonary hypertension, HFpEF/HFrEF (EF 65% October 2019), CAD s/p stent and CABG, CVA, and gout who presents with SOB. She was seen at [facility] for CHF exacerbation and discharged 8 days prior. She felt her SOB had never completely resolved and over the last 3-4 days has been having increasing dyspnea on exertion. She has also endorsed bilateral leg swelling during this time. She feels good at rest. She denies orthopnea, fevers, chills, cough, CP, palpitations, rhinorrhea, headache, nausea, vomiting, dysuria, or diarrhea. She has taken all of her medicines as appropriate including Lasix 40 mg BID PO, which was what she was discharged on. Her BP was elevated at her PCP appointment this week and therefore 2 days ago, her Amlodipine was increased from 5 mg to 10 mg.  She was sent in by her outpatient provider for her symptoms and due to her being 86-89% on room air in the office.    In the ED, she was given aspirin 81 mg PO x1 and Lasix 10 mg IV x2. (25 Oct 2019 23:52)    10/26/19  Patient was seen , chart reviewed , patient admitted with HERNANDEZ with worsening LE and hypoxia   patient is feeling better after receiving Lasix , patient appears to be non compliance to low salt diet  denies any chest pain . patient had recent hospitalization had GIACOMO showed LAYLA thrombus , normal function prosthetic valves ,  patient had severe pulmonary hypertension on TTE  done recently     10/27/19 patient awake alert resting comfortably breathing improved daughter at bedside for translation     PAST MEDICAL & SURGICAL HISTORY:  Hyperlipidemia  History of heart valve replacement with bioprosthetic valve: mitral and aortic  Diabetes mellitus: type 2, not on meds  Gout  Upper GI bleed  CHF (congestive heart failure): EF 65% Oct 2019  CVA (Cerebral Infarction): 2011  CAD (Coronary Artery Disease): s/p stent and CABG  Afib: (on Warfarin)  HTN (Hypertension)  H/O mitral valve replacement: 9/22/14  H/O aortic valve replacement: 9/22/14  S/P CABG x 1: (2000)  S/P angioplasty with stent: 2011      Allergies    No Known Allergies    Intolerances    SOCIAL HISTORY: non smoker     FAMILY HISTORY:  Family history of acute myocardial infarction: mother      MEDICATIONS  (STANDING):  amLODIPine   Tablet 10 milliGRAM(s) Oral daily  aspirin enteric coated 81 milliGRAM(s) Oral daily  atorvastatin 80 milliGRAM(s) Oral at bedtime  dextrose 50% Injectable 12.5 Gram(s) IV Push once  dextrose 50% Injectable 25 Gram(s) IV Push once  dextrose 50% Injectable 25 Gram(s) IV Push once  enalapril 20 milliGRAM(s) Oral daily  folic acid 1 milliGRAM(s) Oral daily  insulin lispro (HumaLOG) corrective regimen sliding scale   SubCutaneous three times a day before meals  insulin lispro (HumaLOG) corrective regimen sliding scale   SubCutaneous at bedtime  metoprolol tartrate 12.5 milliGRAM(s) Oral two times a day  warfarin 3 milliGRAM(s) Oral daily    MEDICATIONS  (PRN):  acetaminophen   Tablet .. 650 milliGRAM(s) Oral every 6 hours PRN Temp greater or equal to 38C (100.4F), Mild Pain (1 - 3)  dextrose 40% Gel 15 Gram(s) Oral once PRN Blood Glucose LESS THAN 70 milliGRAM(s)/deciliter  glucagon  Injectable 1 milliGRAM(s) IntraMuscular once PRN Glucose LESS THAN 70 milligrams/deciliter      Vital Signs Last 24 Hrs  T(C): 36.6 (27 Oct 2019 11:40), Max: 37.1 (26 Oct 2019 17:31)  T(F): 97.9 (27 Oct 2019 11:40), Max: 98.7 (26 Oct 2019 17:31)  HR: 54 (27 Oct 2019 11:40) (54 - 76)  BP: 133/30 (27 Oct 2019 11:40) (114/41 - 158/49)  BP(mean): --  RR: 18 (27 Oct 2019 11:40) (17 - 18)  SpO2: 98% (27 Oct 2019 11:40) (98% - 98%)      PHYSICAL EXAM:    Constitutional: NAD, awake and alert, obesity   HEENT: PERR, EOMI,  No oral cyananosis.  Neck:  supple,  No JVD  Respiratory: Breath sounds are clear bilaterally, No wheezing, rales or rhonchi  Cardiovascular: S1 and S2,  irregularly Irregular   Gastrointestinal: soft, nontender.   Extremities: No peripheral edema.   Vascular: 2+ peripheral pulses  Neurological: A/O x 3, no focal deficits  Musculoskeletal: no calf tenderness.  Skin: No rashes.      LABS: All Labs Reviewed:                        11.0   11.72 )-----------( 291      ( 26 Oct 2019 07:19 )             35.8                         11.4   13.79 )-----------( 342      ( 25 Oct 2019 18:22 )             37.9     26 Oct 2019 07:19    142    |  106    |  20     ----------------------------<  129    3.5     |  29     |  0.91   25 Oct 2019 18:22    141    |  104    |  20     ----------------------------<  137    3.5     |  28     |  0.94     Ca    8.3        26 Oct 2019 07:19  Ca    8.9        25 Oct 2019 18:22  Phos  4.1       26 Oct 2019 07:19  Mg     1.9       26 Oct 2019 07:19  Mg     1.9       25 Oct 2019 18:22    TPro  7.7    /  Alb  3.1    /  TBili  0.6    /  DBili  x      /  AST  20     /  ALT  22     /  AlkPhos  89     25 Oct 2019 18:22    PT/INR - ( 26 Oct 2019 07:19 )   PT: 25.8 sec;   INR: 2.27 ratio         PTT - ( 26 Oct 2019 07:19 )  PTT:37.0 sec  CARDIAC MARKERS ( 26 Oct 2019 07:19 )  0.049 ng/mL / x     / x     / x     / x      CARDIAC MARKERS ( 26 Oct 2019 00:28 )  0.052 ng/mL / x     / x     / x     / x      CARDIAC MARKERS ( 25 Oct 2019 18:22 )  0.016 ng/mL / x     / x     / x     / x          Blood Culture:   10-25 @ 18:22  Pro Bnp [id number]        RADIOLOGY/EKG:   afib with controlled rate  left axis , IVCD   no significant change from prior   < from: Transesophageal Echocardiogram w/o TTE (10.16.19 @ 14:50) >  CONCLUSIONS:  Limited focus study due to patient's clinical status  1. Bioprosthetic mitral valve replacement, with normal  leaflet mobility. Minimal mitral regurgitation. Mean  transmitral valve gradient equals 7 mm Hg (HR 89)  2. Bioprosthetic aortic valve replacement, with normal  leaflet mobility.  3. Th distal third of the left atrial appendage thrombus is  entriely opacified from thrombus. The remaining appendage  contains spontaneous echocontrast 'smoke'  4. Agitated saline injection demonstrates no evidence of a  patent foramen ovale.  ------------------------------------------------------------------------    < from: Transthoracic Echocardiogram (10.10.19 @ 18:07) >  CONCLUSIONS:  1. Bioprosthetic mitral valve replacement. Minimal mitral  regurgitation. Mean transmitral valve gradient equals 6 mm  Hg, which is elevated even in the setting of a prosthetic  valve.  2. Bioprosthetic aortic valve replacement. Peak transaortic  valve gradient equals 30 mm Hg, mean transaortic valve  gradient equals 18 mm Hg, which is probably normal in the  presence of a prosthetic valve. Minimal aortic  regurgitation.  3. Severely dilated left atrium.  LA volume index = 59  cc/m2.  4. Mild concentric left ventricular hypertrophy.        5. Normal left ventricular systolic function. No segmental  wall motion abnormalities.  6. Normal right ventricular size and function.  7. Estimated right ventricular systolic pressure equals 73  mm Hg, assuming right atrial pressure equals 10 mm Hg,  consistent with severe pulmonary hypertension.  *** Compared with echocardiogram of 2/9/2019, the estimated  pulmonary artery systolic pressure has increased.  ------------------------------------------------------------------------  Confirmed on  10/11/2019 - 08:55:53 by Neel Johnson M.D.  ------------------------------------------------------------------------    < end of copied text >

## 2019-10-28 DIAGNOSIS — I50.9 HEART FAILURE, UNSPECIFIED: ICD-10-CM

## 2019-10-28 LAB
HCT VFR BLD CALC: 35.7 % — SIGNIFICANT CHANGE UP (ref 34.5–45)
HGB BLD-MCNC: 10.8 G/DL — LOW (ref 11.5–15.5)
INR BLD: 1.97 RATIO — HIGH (ref 0.88–1.16)
MCHC RBC-ENTMCNC: 25.2 PG — LOW (ref 27–34)
MCHC RBC-ENTMCNC: 30.3 GM/DL — LOW (ref 32–36)
MCV RBC AUTO: 83.2 FL — SIGNIFICANT CHANGE UP (ref 80–100)
PLATELET # BLD AUTO: 295 K/UL — SIGNIFICANT CHANGE UP (ref 150–400)
PROTHROM AB SERPL-ACNC: 22.3 SEC — HIGH (ref 10–12.9)
RBC # BLD: 4.29 M/UL — SIGNIFICANT CHANGE UP (ref 3.8–5.2)
RBC # FLD: 15.1 % — HIGH (ref 10.3–14.5)
WBC # BLD: 12.2 K/UL — HIGH (ref 3.8–10.5)
WBC # FLD AUTO: 12.2 K/UL — HIGH (ref 3.8–10.5)

## 2019-10-28 PROCEDURE — 99233 SBSQ HOSP IP/OBS HIGH 50: CPT

## 2019-10-28 PROCEDURE — 85610 PROTHROMBIN TIME: CPT

## 2019-10-28 PROCEDURE — 83880 ASSAY OF NATRIURETIC PEPTIDE: CPT

## 2019-10-28 PROCEDURE — 97163 PT EVAL HIGH COMPLEX 45 MIN: CPT | Mod: GP

## 2019-10-28 PROCEDURE — 82962 GLUCOSE BLOOD TEST: CPT

## 2019-10-28 PROCEDURE — 36415 COLL VENOUS BLD VENIPUNCTURE: CPT

## 2019-10-28 PROCEDURE — 97530 THERAPEUTIC ACTIVITIES: CPT | Mod: GP

## 2019-10-28 PROCEDURE — 85027 COMPLETE CBC AUTOMATED: CPT

## 2019-10-28 PROCEDURE — 71045 X-RAY EXAM CHEST 1 VIEW: CPT | Mod: 26

## 2019-10-28 PROCEDURE — 97116 GAIT TRAINING THERAPY: CPT | Mod: GP

## 2019-10-28 PROCEDURE — 94760 N-INVAS EAR/PLS OXIMETRY 1: CPT

## 2019-10-28 PROCEDURE — 80048 BASIC METABOLIC PNL TOTAL CA: CPT

## 2019-10-28 RX ORDER — WARFARIN SODIUM 2.5 MG/1
5 TABLET ORAL ONCE
Refills: 0 | Status: COMPLETED | OUTPATIENT
Start: 2019-10-28 | End: 2019-10-28

## 2019-10-28 RX ADMIN — Medication 12.5 MILLIGRAM(S): at 06:23

## 2019-10-28 RX ADMIN — WARFARIN SODIUM 5 MILLIGRAM(S): 2.5 TABLET ORAL at 21:17

## 2019-10-28 RX ADMIN — AMLODIPINE BESYLATE 10 MILLIGRAM(S): 2.5 TABLET ORAL at 06:22

## 2019-10-28 RX ADMIN — Medication 1: at 12:38

## 2019-10-28 RX ADMIN — Medication 20 MILLIGRAM(S): at 06:23

## 2019-10-28 RX ADMIN — Medication 1 MILLIGRAM(S): at 12:38

## 2019-10-28 RX ADMIN — Medication 40 MILLIGRAM(S): at 06:23

## 2019-10-28 RX ADMIN — ATORVASTATIN CALCIUM 80 MILLIGRAM(S): 80 TABLET, FILM COATED ORAL at 21:17

## 2019-10-28 RX ADMIN — Medication 81 MILLIGRAM(S): at 12:38

## 2019-10-28 NOTE — PROGRESS NOTE ADULT - PROBLEM SELECTOR PLAN 6
Severe pulmonary hypertension possibly secondary sleep apnea/obesity would benefit from home oxygen.

## 2019-10-28 NOTE — PROGRESS NOTE ADULT - ASSESSMENT
80 yo Malay speaking F with a PMH HTN, HLD, DM2 (A1C 6.9, not on meds), atrial fibrillation (on Coumadin), bioprosthetic aortic and mitral valves, severe pulmonary hypertension, HFpEF/HFrEF (EF 65% October 2019), CAD s/p stent and CABG, CVA, and gout who presents with SOB.  Admitted with CHF exacerbation.      #Acute Hypoxic Respiratory Failure:    Due to CHF exacerbation in setting of underlying severe pulmonary HTN.    Check spo2 on RA with ambulation to determine need for home o2.      #Acute on Chronic Diastolic CHF exacerbation:    Cont lasix IV 40 daily.    Cont home meds.    ECHO.    Cardio f/u.      #Mild Leukocytosis.  no evidence for infection.  patient not on systemic steroids.    #Stable 9mm R apical lung nodule.    #AFIB:  cont home meds.    No coumadin given 10/26-- give 5 tonight and resume 3 10/29.      #CAD:  Cont home meds.

## 2019-10-28 NOTE — PROGRESS NOTE ADULT - SUBJECTIVE AND OBJECTIVE BOX
78 yo German speaking F with a PMH HTN, HLD, DM2 (A1C 6.9, not on meds), atrial fibrillation (on Coumadin), bioprosthetic aortic and mitral valves, severe pulmonary hypertension, HFpEF/HFrEF (EF 65% 2019), CAD s/p stent and CABG, CVA, and gout who presents with SOB.  She was seen at [facility] for CHF exacerbation and discharged 8 days prior.  She felt her SOB had never completely resolved and over the last 3-4 days has been having increasing dyspnea on exertion. She has also endorsed bilateral leg swelling during this time. She feels good at rest. She denies orthopnea, fevers, chills, cough, CP, palpitations, rhinorrhea, headache, nausea, vomiting, dysuria, or diarrhea. She has taken all of her medicines as appropriate including Lasix 40 mg BID PO, which was what she was discharged on. Her BP was elevated at her PCP appointment this week and therefore 2 days ago, her Amlodipine was increased from 5 mg to 10 mg.  She was sent in by her outpatient provider for her symptoms and due to her being 86-89% on room air in the office.  In the ED, she was given aspirin 81 mg PO x1 and Lasix 10 mg IV x2.    10/28:  Pt seen.  Still with some SOB.  Oxygen levels remain low.      Review of system- Rest of the review of system are negative except mentioned in HPI    OBJECTIVE:   T(C): 36.5 (10-28-19 @ 12:02), Max: 36.9 (10-28-19 @ 06:16)  HR: 70 (10-28-19 @ 12:02) (70 - 80)  BP: 145/68 (10-28-19 @ 12:02) (135/56 - 153/54)  RR: 18 (10-28-19 @ 12:02) (18 - 18)  SpO2: 98% (10-28-19 @ 12:02) (97% - 98%)  Wt(kg): --  Daily     Daily Weight in k.4 (28 Oct 2019 12:02)    PHYSICAL EXAM:  GENERAL: NAD  NERVOUS SYSTEM:  Alert & Oriented X3, non- focal exam, Motor Strength 5/5 B/L upper and lower extremities; DTRs 2+ intact and symmetric  HEAD:  Atraumatic, Normocephalic  EYES: EOMI, PERRLA, conjunctiva and sclera clear  HEENT: Moist mucous membranes  NECK: Supple, No JVD  CHEST/LUNG: decreased BS bases  HEART: Regular rate and rhythm; No murmurs, rubs, or gallops  ABDOMEN: Soft, Nontender, Nondistended; Bowel sounds present  GENITOURINARY- Voiding, no suprapubic tenderness  EXTREMITIES: trace edema  MUSCULOSKELETAL:- No muscle tenderness, Muscle tone normal, No joint tenderness, no Joint swelling, Joint range of motion-normal  SKIN-no rash, no lesion    LABS:                        10.8   12.20 )-----------( 295      ( 28 Oct 2019 06:49 )             35.7     10    138  |  103  |  23  ----------------------------<  131<H>  3.5   |  29  |  0.85    Ca    8.3<L>      27 Oct 2019 07:17    PT/INR - ( 28 Oct 2019 06:49 )   PT: 22.3 sec;   INR: 1.97 ratio      CAPILLARY BLOOD GLUCOSE  POCT Blood Glucose.: 152 mg/dL (28 Oct 2019 16:39)  POCT Blood Glucose.: 157 mg/dL (28 Oct 2019 12:21)  POCT Blood Glucose.: 131 mg/dL (28 Oct 2019 07:41)  POCT Blood Glucose.: 212 mg/dL (27 Oct 2019 21:41)    Current medications:  acetaminophen   Tablet .. 650 milliGRAM(s) Oral every 6 hours PRN  amLODIPine   Tablet 10 milliGRAM(s) Oral daily  aspirin enteric coated 81 milliGRAM(s) Oral daily  atorvastatin 80 milliGRAM(s) Oral at bedtime  dextrose 40% Gel 15 Gram(s) Oral once PRN  dextrose 50% Injectable 12.5 Gram(s) IV Push once  dextrose 50% Injectable 25 Gram(s) IV Push once  dextrose 50% Injectable 25 Gram(s) IV Push once  enalapril 20 milliGRAM(s) Oral daily  folic acid 1 milliGRAM(s) Oral daily  furosemide   Injectable 40 milliGRAM(s) IV Push daily  glucagon  Injectable 1 milliGRAM(s) IntraMuscular once PRN  insulin lispro (HumaLOG) corrective regimen sliding scale   SubCutaneous at bedtime  insulin lispro (HumaLOG) corrective regimen sliding scale   SubCutaneous three times a day before meals  warfarin 5 milliGRAM(s) Oral once

## 2019-10-28 NOTE — PROGRESS NOTE ADULT - PROBLEM SELECTOR PLAN 5
Rate controlled  with episodes of slow ventricular rate during sleep  continue current medication , anticoagulation.

## 2019-10-28 NOTE — PROGRESS NOTE ADULT - SUBJECTIVE AND OBJECTIVE BOX
HPI:  Translation provided by daniel at bedside per patient request.  78 yo Macedonian speaking F with a PMH HTN, HLD, DM2 (A1C 6.9, not on meds), atrial fibrillation (on Coumadin), bioprosthetic aortic and mitral valves, severe pulmonary hypertension, HFpEF/HFrEF (EF 65% October 2019), CAD s/p stent and CABG, CVA, and gout who presents with SOB. She was seen at [facility] for CHF exacerbation and discharged 8 days prior. She felt her SOB had never completely resolved and over the last 3-4 days has been having increasing dyspnea on exertion. She has also endorsed bilateral leg swelling during this time. She feels good at rest. She denies orthopnea, fevers, chills, cough, CP, palpitations, rhinorrhea, headache, nausea, vomiting, dysuria, or diarrhea. She has taken all of her medicines as appropriate including Lasix 40 mg BID PO, which was what she was discharged on. Her BP was elevated at her PCP appointment this week and therefore 2 days ago, her Amlodipine was increased from 5 mg to 10 mg.  She was sent in by her outpatient provider for her symptoms and due to her being 86-89% on room air in the office.    In the ED, she was given aspirin 81 mg PO x1 and Lasix 10 mg IV x2. (25 Oct 2019 23:52)    10/26/19  Patient was seen , chart reviewed , patient admitted with HERNANDEZ with worsening LE and hypoxia   patient is feeling better after receiving Lasix , patient appears to be non compliance to low salt diet  denies any chest pain . patient had recent hospitalization had GIACOMO showed LAYLA thrombus , normal function prosthetic valves ,  patient had severe pulmonary hypertension on TTE  done recently     10/27/19 patient awake alert resting comfortably breathing improved daughter at bedside for translation   10/28/19 Patient is feeling , but did feel shortness of breath at night , no chest pain  patient had  afib with slow ventricular rate     PAST MEDICAL & SURGICAL HISTORY:  Hyperlipidemia  History of heart valve replacement with bioprosthetic valve: mitral and aortic  Diabetes mellitus: type 2, not on meds  Gout  Upper GI bleed  CHF (congestive heart failure): EF 65% Oct 2019  CVA (Cerebral Infarction): 2011  CAD (Coronary Artery Disease): s/p stent and CABG  Afib: (on Warfarin)  HTN (Hypertension)  H/O mitral valve replacement: 9/22/14  H/O aortic valve replacement: 9/22/14  S/P CABG x 1: (2000)  S/P angioplasty with stent: 2011      Allergies    No Known Allergies    Intolerances    SOCIAL HISTORY: non smoker     FAMILY HISTORY:  Family history of acute myocardial infarction: mother    MEDICATIONS  (STANDING):  amLODIPine   Tablet 10 milliGRAM(s) Oral daily  aspirin enteric coated 81 milliGRAM(s) Oral daily  atorvastatin 80 milliGRAM(s) Oral at bedtime  dextrose 50% Injectable 12.5 Gram(s) IV Push once  dextrose 50% Injectable 25 Gram(s) IV Push once  dextrose 50% Injectable 25 Gram(s) IV Push once  enalapril 20 milliGRAM(s) Oral daily  folic acid 1 milliGRAM(s) Oral daily  furosemide   Injectable 40 milliGRAM(s) IV Push daily  insulin lispro (HumaLOG) corrective regimen sliding scale   SubCutaneous three times a day before meals  insulin lispro (HumaLOG) corrective regimen sliding scale   SubCutaneous at bedtime  metoprolol tartrate 12.5 milliGRAM(s) Oral two times a day  warfarin 3 milliGRAM(s) Oral daily    MEDICATIONS  (PRN):  acetaminophen   Tablet .. 650 milliGRAM(s) Oral every 6 hours PRN Temp greater or equal to 38C (100.4F), Mild Pain (1 - 3)  dextrose 40% Gel 15 Gram(s) Oral once PRN Blood Glucose LESS THAN 70 milliGRAM(s)/deciliter  glucagon  Injectable 1 milliGRAM(s) IntraMuscular once PRN Glucose LESS THAN 70 milligrams/deciliter      Vital Signs Last 24 Hrs  T(C): 36.9 (28 Oct 2019 06:16), Max: 36.9 (28 Oct 2019 06:16)  T(F): 98.4 (28 Oct 2019 06:16), Max: 98.4 (28 Oct 2019 06:16)  HR: 80 (28 Oct 2019 06:16) (54 - 80)  BP: 135/56 (28 Oct 2019 06:16) (133/30 - 153/54)  BP(mean): --  RR: 18 (28 Oct 2019 06:16) (18 - 18)  SpO2: 98% (28 Oct 2019 06:16) (97% - 98%)    I&O's Summary      PHYSICAL EXAM:    Constitutional: NAD, awake and alert, obesity   HEENT: PERR, EOMI,  No oral cyananosis.  Neck:  supple,  No JVD  Respiratory: Breath sounds are clear bilaterally, No wheezing, rales or rhonchi  Cardiovascular: S1 and S2,  irregularly Irregular   Gastrointestinal: soft, nontender.   Extremities: No peripheral edema.   Vascular: 2+ peripheral pulses  Neurological: A/O x 3, no focal deficits  Musculoskeletal: no calf tenderness.  Skin: No rashes.      LABS: All Labs Reviewed:                           10.8   12.20 )-----------( 295      ( 28 Oct 2019 06:49 )             35.7     10-27    138  |  103  |  23  ----------------------------<  131<H>  3.5   |  29  |  0.85    Ca    8.3<L>      27 Oct 2019 07:17            PT/INR - ( 28 Oct 2019 06:49 )   PT: 22.3 sec;   INR: 1.97 ratio         RADIOLOGY/EKG:   afib with controlled rate  left axis , IVCD   no significant change from prior   < from: Transesophageal Echocardiogram w/o TTE (10.16.19 @ 14:50) >  CONCLUSIONS:  Limited focus study due to patient's clinical status  1. Bioprosthetic mitral valve replacement, with normal  leaflet mobility. Minimal mitral regurgitation. Mean  transmitral valve gradient equals 7 mm Hg (HR 89)  2. Bioprosthetic aortic valve replacement, with normal  leaflet mobility.  3. Th distal third of the left atrial appendage thrombus is  entriely opacified from thrombus. The remaining appendage  contains spontaneous echocontrast 'smoke'  4. Agitated saline injection demonstrates no evidence of a  patent foramen ovale.  ------------------------------------------------------------------------    < from: Transthoracic Echocardiogram (10.10.19 @ 18:07) >  CONCLUSIONS:  1. Bioprosthetic mitral valve replacement. Minimal mitral  regurgitation. Mean transmitral valve gradient equals 6 mm  Hg, which is elevated even in the setting of a prosthetic  valve.  2. Bioprosthetic aortic valve replacement. Peak transaortic  valve gradient equals 30 mm Hg, mean transaortic valve  gradient equals 18 mm Hg, which is probably normal in the  presence of a prosthetic valve. Minimal aortic  regurgitation.  3. Severely dilated left atrium.  LA volume index = 59  cc/m2.  4. Mild concentric left ventricular hypertrophy.        5. Normal left ventricular systolic function. No segmental  wall motion abnormalities.  6. Normal right ventricular size and function.  7. Estimated right ventricular systolic pressure equals 73  mm Hg, assuming right atrial pressure equals 10 mm Hg,  consistent with severe pulmonary hypertension.  *** Compared with echocardiogram of 2/9/2019, the estimated  pulmonary artery systolic pressure has increased.  ------------------------------------------------------------------------  Confirmed on  10/11/2019 - 08:55:53 by Neel Johnson M.D.  ------------------------------------------------------------------------    < end of copied text >

## 2019-10-28 NOTE — PROGRESS NOTE ADULT - PROBLEM SELECTOR PLAN 1
Breathing Improved. Dyspnea perhaps multifactorial possibly 2/2 acute on chronic diastolic heart failure,  uncontrolled hypertension, chronic afib with slow ventricular rate ,  possible obesity hypoventilation. daughter at bedside advised low sodium diet, weight reduction, BP control, continue diuretic.  Lung nodule seen by Pulmonary will monitor, pro bnp Breathing Improved. Dyspnea perhaps multifactorial possibly 2/2 acute on chronic diastolic heart failure,  uncontrolled hypertension, chronic afib with slow ventricular rate ,  possible obesity hypoventilation. daughter at bedside advised low sodium diet, weight reduction, BP control, continue diuretic.  discontinue BB   Lung nodule seen by Pulmonary will monitor, pro bnp

## 2019-10-29 LAB
ANION GAP SERPL CALC-SCNC: 5 MMOL/L — SIGNIFICANT CHANGE UP (ref 5–17)
BUN SERPL-MCNC: 18 MG/DL — SIGNIFICANT CHANGE UP (ref 7–23)
CALCIUM SERPL-MCNC: 8.9 MG/DL — SIGNIFICANT CHANGE UP (ref 8.5–10.1)
CHLORIDE SERPL-SCNC: 101 MMOL/L — SIGNIFICANT CHANGE UP (ref 96–108)
CO2 SERPL-SCNC: 30 MMOL/L — SIGNIFICANT CHANGE UP (ref 22–31)
CREAT SERPL-MCNC: 0.99 MG/DL — SIGNIFICANT CHANGE UP (ref 0.5–1.3)
GLUCOSE SERPL-MCNC: 167 MG/DL — HIGH (ref 70–99)
INR BLD: 1.99 RATIO — HIGH (ref 0.88–1.16)
POTASSIUM SERPL-MCNC: 3.9 MMOL/L — SIGNIFICANT CHANGE UP (ref 3.5–5.3)
POTASSIUM SERPL-SCNC: 3.9 MMOL/L — SIGNIFICANT CHANGE UP (ref 3.5–5.3)
PROTHROM AB SERPL-ACNC: 22.6 SEC — HIGH (ref 10–12.9)
SODIUM SERPL-SCNC: 136 MMOL/L — SIGNIFICANT CHANGE UP (ref 135–145)

## 2019-10-29 PROCEDURE — 99233 SBSQ HOSP IP/OBS HIGH 50: CPT

## 2019-10-29 RX ORDER — INSULIN LISPRO 100/ML
VIAL (ML) SUBCUTANEOUS
Refills: 0 | Status: DISCONTINUED | OUTPATIENT
Start: 2019-10-29 | End: 2019-11-01

## 2019-10-29 RX ORDER — WARFARIN SODIUM 2.5 MG/1
3 TABLET ORAL DAILY
Refills: 0 | Status: COMPLETED | OUTPATIENT
Start: 2019-10-29 | End: 2019-10-31

## 2019-10-29 RX ORDER — FUROSEMIDE 40 MG
20 TABLET ORAL ONCE
Refills: 0 | Status: COMPLETED | OUTPATIENT
Start: 2019-10-29 | End: 2019-10-29

## 2019-10-29 RX ORDER — INSULIN LISPRO 100/ML
VIAL (ML) SUBCUTANEOUS AT BEDTIME
Refills: 0 | Status: DISCONTINUED | OUTPATIENT
Start: 2019-10-29 | End: 2019-11-01

## 2019-10-29 RX ORDER — SODIUM CHLORIDE 9 MG/ML
1000 INJECTION, SOLUTION INTRAVENOUS
Refills: 0 | Status: DISCONTINUED | OUTPATIENT
Start: 2019-10-29 | End: 2019-11-01

## 2019-10-29 RX ORDER — FUROSEMIDE 40 MG
40 TABLET ORAL
Refills: 0 | Status: DISCONTINUED | OUTPATIENT
Start: 2019-10-29 | End: 2019-10-29

## 2019-10-29 RX ORDER — FUROSEMIDE 40 MG
60 TABLET ORAL
Refills: 0 | Status: DISCONTINUED | OUTPATIENT
Start: 2019-10-29 | End: 2019-10-30

## 2019-10-29 RX ADMIN — AMLODIPINE BESYLATE 10 MILLIGRAM(S): 2.5 TABLET ORAL at 06:30

## 2019-10-29 RX ADMIN — Medication 2: at 12:13

## 2019-10-29 RX ADMIN — Medication 81 MILLIGRAM(S): at 11:21

## 2019-10-29 RX ADMIN — ATORVASTATIN CALCIUM 80 MILLIGRAM(S): 80 TABLET, FILM COATED ORAL at 21:37

## 2019-10-29 RX ADMIN — WARFARIN SODIUM 3 MILLIGRAM(S): 2.5 TABLET ORAL at 21:37

## 2019-10-29 RX ADMIN — Medication 20 MILLIGRAM(S): at 06:32

## 2019-10-29 RX ADMIN — Medication 20 MILLIGRAM(S): at 12:12

## 2019-10-29 RX ADMIN — Medication 60 MILLIGRAM(S): at 16:13

## 2019-10-29 RX ADMIN — Medication 40 MILLIGRAM(S): at 06:30

## 2019-10-29 RX ADMIN — Medication 1 MILLIGRAM(S): at 11:21

## 2019-10-29 NOTE — PROGRESS NOTE ADULT - SUBJECTIVE AND OBJECTIVE BOX
78 yo Frisian speaking F with a PMH HTN, HLD, DM2 (A1C 6.9, not on meds), atrial fibrillation (on Coumadin), bioprosthetic aortic and mitral valves, severe pulmonary hypertension, HFpEF/HFrEF (EF 65% October 2019), CAD s/p stent and CABG, CVA, and gout who presents with SOB.  She was seen at [facility] for CHF exacerbation and discharged 8 days prior.  She felt her SOB had never completely resolved and over the last 3-4 days has been having increasing dyspnea on exertion. She has also endorsed bilateral leg swelling during this time. She feels good at rest. She denies orthopnea, fevers, chills, cough, CP, palpitations, rhinorrhea, headache, nausea, vomiting, dysuria, or diarrhea. She has taken all of her medicines as appropriate including Lasix 40 mg BID PO, which was what she was discharged on. Her BP was elevated at her PCP appointment this week and therefore 2 days ago, her Amlodipine was increased from 5 mg to 10 mg.  She was sent in by her outpatient provider for her symptoms and due to her being 86-89% on room air in the office.  In the ED, she was given aspirin 81 mg PO x1 and Lasix 10 mg IV x2.    10/28:  Pt seen.  Still with some SOB.  Oxygen levels remain low.    10/29:  Patient seen.  Still with SOB.      Review of system- Rest of the review of system are negative except mentioned in HPI    Vital Signs Last 24 Hrs  T(C): 36.6 (29 Oct 2019 11:25), Max: 37 (29 Oct 2019 05:25)  T(F): 97.8 (29 Oct 2019 11:25), Max: 98.6 (29 Oct 2019 05:25)  HR: 67 (29 Oct 2019 11:25) (67 - 78)  BP: 131/49 (29 Oct 2019 11:25) (131/49 - 152/57)  BP(mean): --  RR: 18 (29 Oct 2019 11:25) (17 - 18)  SpO2: 97% (29 Oct 2019 11:25) (93% - 97%)    PHYSICAL EXAM:  GENERAL: NAD  NERVOUS SYSTEM:  Alert & Oriented X3, non- focal exam, Motor Strength 5/5 B/L upper and lower extremities; DTRs 2+ intact and symmetric  HEAD:  Atraumatic, Normocephalic  EYES: EOMI, PERRLA, conjunctiva and sclera clear  HEENT: Moist mucous membranes  NECK: Supple, No JVD  CHEST/LUNG: decreased BS bases  HEART: Regular rate and rhythm; No murmurs, rubs, or gallops  ABDOMEN: Soft, Nontender, Nondistended; Bowel sounds present  GENITOURINARY- Voiding, no suprapubic tenderness  EXTREMITIES: trace edema  MUSCULOSKELETAL:- No muscle tenderness, Muscle tone normal, No joint tenderness, no Joint swelling, Joint range of motion-normal  SKIN-no rash, no lesion    LABS:  10-29    136  |  101  |  18  ----------------------------<  167<H>  3.9   |  30  |  0.99    Ca    8.9      29 Oct 2019 08:53                        10.8   12.20 )-----------( 295      ( 28 Oct 2019 06:49 )             35.7     PT/INR - ( 29 Oct 2019 08:53 )   PT: 22.6 sec;   INR: 1.99 ratio        MEDICATIONS  (STANDING):  amLODIPine   Tablet 10 milliGRAM(s) Oral daily  aspirin enteric coated 81 milliGRAM(s) Oral daily  atorvastatin 80 milliGRAM(s) Oral at bedtime  dextrose 5%. 1000 milliLiter(s) (50 mL/Hr) IV Continuous <Continuous>  dextrose 50% Injectable 12.5 Gram(s) IV Push once  dextrose 50% Injectable 25 Gram(s) IV Push once  dextrose 50% Injectable 25 Gram(s) IV Push once  enalapril 20 milliGRAM(s) Oral daily  folic acid 1 milliGRAM(s) Oral daily  furosemide   Injectable 60 milliGRAM(s) IV Push two times a day  insulin lispro (HumaLOG) corrective regimen sliding scale   SubCutaneous three times a day before meals  insulin lispro (HumaLOG) corrective regimen sliding scale   SubCutaneous at bedtime  warfarin 3 milliGRAM(s) Oral daily    MEDICATIONS  (PRN):  acetaminophen   Tablet .. 650 milliGRAM(s) Oral every 6 hours PRN Temp greater or equal to 38C (100.4F), Mild Pain (1 - 3)  dextrose 40% Gel 15 Gram(s) Oral once PRN Blood Glucose LESS THAN 70 milliGRAM(s)/deciliter  glucagon  Injectable 1 milliGRAM(s) IntraMuscular once PRN Glucose LESS THAN 70 milligrams/deciliter

## 2019-10-29 NOTE — PROGRESS NOTE ADULT - SUBJECTIVE AND OBJECTIVE BOX
HPI:  Translation provided by daniel at bedside per patient request.  80 yo Welsh speaking F with a PMH HTN, HLD, DM2 (A1C 6.9, not on meds), atrial fibrillation (on Coumadin), bioprosthetic aortic and mitral valves, severe pulmonary hypertension, HFpEF/HFrEF (EF 65% October 2019), CAD s/p stent and CABG, CVA, and gout who presents with SOB. She was seen at [facility] for CHF exacerbation and discharged 8 days prior. She felt her SOB had never completely resolved and over the last 3-4 days has been having increasing dyspnea on exertion. She has also endorsed bilateral leg swelling during this time. She feels good at rest. She denies orthopnea, fevers, chills, cough, CP, palpitations, rhinorrhea, headache, nausea, vomiting, dysuria, or diarrhea. She has taken all of her medicines as appropriate including Lasix 40 mg BID PO, which was what she was discharged on. Her BP was elevated at her PCP appointment this week and therefore 2 days ago, her Amlodipine was increased from 5 mg to 10 mg.  She was sent in by her outpatient provider for her symptoms and due to her being 86-89% on room air in the office.    In the ED, she was given aspirin 81 mg PO x1 and Lasix 10 mg IV x2. (25 Oct 2019 23:52)    10/26/19  Patient was seen , chart reviewed , patient admitted with HERNANDEZ with worsening LE and hypoxia   patient is feeling better after receiving Lasix , patient appears to be non compliance to low salt diet  denies any chest pain . patient had recent hospitalization had GIACOMO showed LAYLA thrombus , normal function prosthetic valves ,  patient had severe pulmonary hypertension on TTE  done recently     10/27/19 patient awake alert resting comfortably breathing improved daughter at bedside for translation   10/28/19 Patient is feeling , but did feel shortness of breath at night , no chest pain  patient had  afib with slow ventricular rate   10/29/19 comfortable in bed had decrease O2 sat, increased weight, CXR pleural effusion IV diuretics adjusted tele AF 70-80's    PAST MEDICAL & SURGICAL HISTORY:  Hyperlipidemia  History of heart valve replacement with bioprosthetic valve: mitral and aortic  Diabetes mellitus: type 2, not on meds  Gout  Upper GI bleed  CHF (congestive heart failure): EF 65% Oct 2019  CVA (Cerebral Infarction): 2011  CAD (Coronary Artery Disease): s/p stent and CABG  Afib: (on Warfarin)  HTN (Hypertension)  H/O mitral valve replacement: 9/22/14  H/O aortic valve replacement: 9/22/14  S/P CABG x 1: (2000)  S/P angioplasty with stent: 2011      Allergies    No Known Allergies    Intolerances    SOCIAL HISTORY: non smoker     FAMILY HISTORY:  Family history of acute myocardial infarction: mother    MEDICATIONS  (STANDING):  amLODIPine   Tablet 10 milliGRAM(s) Oral daily  aspirin enteric coated 81 milliGRAM(s) Oral daily  atorvastatin 80 milliGRAM(s) Oral at bedtime  dextrose 5%. 1000 milliLiter(s) (50 mL/Hr) IV Continuous <Continuous>  dextrose 50% Injectable 12.5 Gram(s) IV Push once  dextrose 50% Injectable 25 Gram(s) IV Push once  dextrose 50% Injectable 25 Gram(s) IV Push once  enalapril 20 milliGRAM(s) Oral daily  folic acid 1 milliGRAM(s) Oral daily  furosemide   Injectable 60 milliGRAM(s) IV Push two times a day  insulin lispro (HumaLOG) corrective regimen sliding scale   SubCutaneous three times a day before meals  insulin lispro (HumaLOG) corrective regimen sliding scale   SubCutaneous at bedtime  warfarin 3 milliGRAM(s) Oral daily    MEDICATIONS  (PRN):  acetaminophen   Tablet .. 650 milliGRAM(s) Oral every 6 hours PRN Temp greater or equal to 38C (100.4F), Mild Pain (1 - 3)  dextrose 40% Gel 15 Gram(s) Oral once PRN Blood Glucose LESS THAN 70 milliGRAM(s)/deciliter  glucagon  Injectable 1 milliGRAM(s) IntraMuscular once PRN Glucose LESS THAN 70 milligrams/deciliter        Vital Signs Last 24 Hrs  T(C): 36.6 (29 Oct 2019 11:25), Max: 37 (29 Oct 2019 05:25)  T(F): 97.8 (29 Oct 2019 11:25), Max: 98.6 (29 Oct 2019 05:25)  HR: 67 (29 Oct 2019 11:25) (67 - 78)  BP: 131/49 (29 Oct 2019 11:25) (131/49 - 152/57)  BP(mean): --  RR: 18 (29 Oct 2019 11:25) (17 - 18)  SpO2: 97% (29 Oct 2019 11:25) (93% - 97%)    PHYSICAL EXAM:    Constitutional: NAD, awake and alert, obesity   HEENT: PERR, EOMI,  No oral cyananosis.  Neck:  supple,  No JVD  Respiratory: Diminished at bases with ins/exp wheeze   Cardiovascular: S1 and S2,  irregularly Irregular   Gastrointestinal: soft, nontender.   Extremities: No peripheral edema.   Vascular: 2+ peripheral pulses  Neurological: A/O x 3, no focal deficits  Musculoskeletal: no calf tenderness.  Skin: No rashes.      LABS: All Labs Reviewed:                           10.8   12.20 )-----------( 295      ( 28 Oct 2019 06:49 )             35.7     10-27    138  |  103  |  23  ----------------------------<  131<H>  3.5   |  29  |  0.85    Ca    8.3<L>      27 Oct 2019 07:17            PT/INR - ( 28 Oct 2019 06:49 )   PT: 22.3 sec;   INR: 1.97 ratio         RADIOLOGY/EKG:   afib with controlled rate  left axis , IVCD   no significant change from prior   < from: Transesophageal Echocardiogram w/o TTE (10.16.19 @ 14:50) >  CONCLUSIONS:  Limited focus study due to patient's clinical status  1. Bioprosthetic mitral valve replacement, with normal  leaflet mobility. Minimal mitral regurgitation. Mean  transmitral valve gradient equals 7 mm Hg (HR 89)  2. Bioprosthetic aortic valve replacement, with normal  leaflet mobility.  3. Th distal third of the left atrial appendage thrombus is  entriely opacified from thrombus. The remaining appendage  contains spontaneous echocontrast 'smoke'  4. Agitated saline injection demonstrates no evidence of a  patent foramen ovale.  ------------------------------------------------------------------------    < from: Transthoracic Echocardiogram (10.10.19 @ 18:07) >  CONCLUSIONS:  1. Bioprosthetic mitral valve replacement. Minimal mitral  regurgitation. Mean transmitral valve gradient equals 6 mm  Hg, which is elevated even in the setting of a prosthetic  valve.  2. Bioprosthetic aortic valve replacement. Peak transaortic  valve gradient equals 30 mm Hg, mean transaortic valve  gradient equals 18 mm Hg, which is probably normal in the  presence of a prosthetic valve. Minimal aortic  regurgitation.  3. Severely dilated left atrium.  LA volume index = 59  cc/m2.  4. Mild concentric left ventricular hypertrophy.        5. Normal left ventricular systolic function. No segmental  wall motion abnormalities.  6. Normal right ventricular size and function.  7. Estimated right ventricular systolic pressure equals 73  mm Hg, assuming right atrial pressure equals 10 mm Hg,  consistent with severe pulmonary hypertension.  *** Compared with echocardiogram of 2/9/2019, the estimated  pulmonary artery systolic pressure has increased.  ------------------------------------------------------------------------  Confirmed on  10/11/2019 - 08:55:53 by Neel Johnson M.D.  ------------------------------------------------------------------------    < end of copied text >

## 2019-10-29 NOTE — PROGRESS NOTE ADULT - ASSESSMENT
80 yo Kinyarwanda speaking F with a PMH HTN, HLD, DM2 (A1C 6.9, not on meds), atrial fibrillation (on Coumadin), bioprosthetic aortic and mitral valves, severe pulmonary hypertension, HFpEF/HFrEF (EF 65% October 2019), CAD s/p stent and CABG, CVA, and gout who presents with SOB.  Admitted with CHF exacerbation.      #Acute Hypoxic Respiratory Failure:    Due to CHF exacerbation in setting of underlying severe pulmonary HTN.    Check spo2 on RA with ambulation to determine need for home o2.      #Acute on Chronic Diastolic CHF exacerbation:    Repeat CXR 10/28 overall unchanged from admission.    Weights increased 182--183--186.    Increase lasix to 60 IV BID.    Follow labs/ o2 sats.    Cont home meds.    ECHO.    Cardio f/u.      #Mild Leukocytosis.  no evidence for infection.  patient not on systemic steroids.    #Stable 9mm R apical lung nodule.    #AFIB:  cont home meds.    Cont coumadin.  INR 2-3.      #CAD:  Cont home meds.      Dispo:  Cont diuresis 48 hours.  PT eval. 80 yo Syriac speaking F with a PMH HTN, HLD, DM2 (A1C 6.9, not on meds), atrial fibrillation (on Coumadin), bioprosthetic aortic and mitral valves, severe pulmonary hypertension, HFpEF/HFrEF (EF 65% October 2019), CAD s/p stent and CABG, CVA, and gout who presents with SOB.  Admitted with CHF exacerbation.      #Acute Hypoxic Respiratory Failure:    Due to CHF exacerbation in setting of underlying severe pulmonary HTN.    Check spo2 on RA with ambulation to determine need for home o2.      #Acute on Chronic Diastolic CHF exacerbation:    Repeat CXR 10/28 overall unchanged from admission.    Weights increased 182--183--186.    Increase lasix to 60 IV BID.    Follow labs/ o2 sats.    Cont home meds.    ECHO.    Cardio f/u.      #Mild Leukocytosis.  no evidence for infection.  patient not on systemic steroids.    #Stable 9mm R apical lung nodule.    #AFIB:  cont home meds.    Cont coumadin.  INR 2-3.      #Bradycardia:    HR drops to 30's in sleep.  Cardio f/u.      #CAD:  Cont home meds.      Dispo:  Cont diuresis 48 hours.  PT eval.

## 2019-10-29 NOTE — PROGRESS NOTE ADULT - PROBLEM SELECTOR PLAN 1
Dyspnea perhaps multifactorial possibly 2/2 acute on chronic diastolic heart failure,  uncontrolled hypertension, chronic afib,  possible obesity hypoventilation. daughter at bedside advised low sodium diet, weight reduction, BP control, continue diuretic  Lung nodule seen by Pulmonary will monitor

## 2019-10-30 LAB
ADD ON TEST-SPECIMEN IN LAB: SIGNIFICANT CHANGE UP
ANION GAP SERPL CALC-SCNC: 6 MMOL/L — SIGNIFICANT CHANGE UP (ref 5–17)
BUN SERPL-MCNC: 25 MG/DL — HIGH (ref 7–23)
CALCIUM SERPL-MCNC: 8.5 MG/DL — SIGNIFICANT CHANGE UP (ref 8.5–10.1)
CHLORIDE SERPL-SCNC: 100 MMOL/L — SIGNIFICANT CHANGE UP (ref 96–108)
CO2 SERPL-SCNC: 30 MMOL/L — SIGNIFICANT CHANGE UP (ref 22–31)
CREAT SERPL-MCNC: 0.98 MG/DL — SIGNIFICANT CHANGE UP (ref 0.5–1.3)
GLUCOSE SERPL-MCNC: 128 MG/DL — HIGH (ref 70–99)
INR BLD: 2.73 RATIO — HIGH (ref 0.88–1.16)
POTASSIUM SERPL-MCNC: 4.3 MMOL/L — SIGNIFICANT CHANGE UP (ref 3.5–5.3)
POTASSIUM SERPL-SCNC: 4.3 MMOL/L — SIGNIFICANT CHANGE UP (ref 3.5–5.3)
PROTHROM AB SERPL-ACNC: 31.3 SEC — HIGH (ref 10–12.9)
SODIUM SERPL-SCNC: 136 MMOL/L — SIGNIFICANT CHANGE UP (ref 135–145)

## 2019-10-30 PROCEDURE — 99233 SBSQ HOSP IP/OBS HIGH 50: CPT

## 2019-10-30 RX ORDER — FUROSEMIDE 40 MG
80 TABLET ORAL
Refills: 0 | Status: DISCONTINUED | OUTPATIENT
Start: 2019-10-30 | End: 2019-10-31

## 2019-10-30 RX ADMIN — Medication 2: at 11:37

## 2019-10-30 RX ADMIN — Medication 80 MILLIGRAM(S): at 14:38

## 2019-10-30 RX ADMIN — ATORVASTATIN CALCIUM 80 MILLIGRAM(S): 80 TABLET, FILM COATED ORAL at 21:07

## 2019-10-30 RX ADMIN — AMLODIPINE BESYLATE 10 MILLIGRAM(S): 2.5 TABLET ORAL at 06:09

## 2019-10-30 RX ADMIN — Medication 60 MILLIGRAM(S): at 06:09

## 2019-10-30 RX ADMIN — Medication 81 MILLIGRAM(S): at 11:38

## 2019-10-30 RX ADMIN — Medication 20 MILLIGRAM(S): at 06:09

## 2019-10-30 RX ADMIN — Medication 1 MILLIGRAM(S): at 11:38

## 2019-10-30 RX ADMIN — WARFARIN SODIUM 3 MILLIGRAM(S): 2.5 TABLET ORAL at 21:07

## 2019-10-30 NOTE — PROGRESS NOTE ADULT - ASSESSMENT
78 yo Swedish speaking F with a PMH HTN, HLD, DM2 (A1C 6.9, not on meds), atrial fibrillation (on Coumadin), bioprosthetic aortic and mitral valves, severe pulmonary hypertension, HFpEF/HFrEF (EF 65% October 2019), CAD s/p stent and CABG, CVA, and gout who presents with SOB.  Admitted with CHF exacerbation.      #Acute Hypoxic Respiratory Failure:    Due to CHF exacerbation in setting of underlying severe pulmonary HTN.    Check spo2 on RA with ambulation to determine need for home o2.      #Acute on Chronic Diastolic CHF exacerbation:    Repeat CXR 10/28 overall unchanged from admission.    Weights increased 182--183--186-- 188 lbs.    Increase lasix to 80 IV BID.    Follow labs/ o2 sats.    Cont home meds.    ECHO earlier this month.    Cardio f/u.      #Mild Leukocytosis.  no evidence for infection.  patient not on systemic steroids.    #Stable 9mm R apical lung nodule.    #AFIB:  Ceferino to 30's overnight.    Cardiology following.    Cont coumadin.  INR 2-3.      #CAD:  Cont home meds.      Dispo:  Cont diuresis.  Will likely require home O2 @ d/c. 78 yo Serbian speaking F with a PMH HTN, HLD, DM2 (A1C 6.9, not on meds), atrial fibrillation (on Coumadin), bioprosthetic aortic and mitral valves, severe pulmonary hypertension, HFpEF/HFrEF (EF 65% October 2019), CAD s/p stent and CABG, CVA, and gout who presents with SOB.  Admitted with CHF exacerbation.      #Acute Hypoxic Respiratory Failure:    Due to CHF exacerbation in setting of underlying severe pulmonary HTN.    Check spo2 on RA with ambulation to determine need for home o2.      #Acute on Chronic Diastolic CHF exacerbation:    Repeat CXR 10/28 overall unchanged from admission.    Weights increased 182--183--186-- 188 lbs.    Increase lasix to 80 IV BID.    Follow labs/ o2 sats.    Cont home meds.    ECHO earlier this month.    Cardio f/u.      #Mild Leukocytosis.  no evidence for infection.  patient not on systemic steroids.    #Stable 9mm R apical lung nodule.    #Severe Pulmonary HTN:    Likely home O2 @ d/c.      #AFIB:  Ceferino to 30's overnight.    Cardiology following.    Metoprolol stopped.    Cont coumadin.  INR 2-3.      #CAD:  Cont home meds.      Dispo:  Cont diuresis.  Will likely require home O2 @ d/c.

## 2019-10-30 NOTE — PROGRESS NOTE ADULT - SUBJECTIVE AND OBJECTIVE BOX
78 yo Portuguese speaking F with a PMH HTN, HLD, DM2 (A1C 6.9, not on meds), atrial fibrillation (on Coumadin), bioprosthetic aortic and mitral valves, severe pulmonary hypertension, HFpEF/HFrEF (EF 65% October 2019), CAD s/p stent and CABG, CVA, and gout who presents with SOB.  She was seen at [facility] for CHF exacerbation and discharged 8 days prior.  She felt her SOB had never completely resolved and over the last 3-4 days has been having increasing dyspnea on exertion. She has also endorsed bilateral leg swelling during this time. She feels good at rest. She denies orthopnea, fevers, chills, cough, CP, palpitations, rhinorrhea, headache, nausea, vomiting, dysuria, or diarrhea. She has taken all of her medicines as appropriate including Lasix 40 mg BID PO, which was what she was discharged on. Her BP was elevated at her PCP appointment this week and therefore 2 days ago, her Amlodipine was increased from 5 mg to 10 mg.  She was sent in by her outpatient provider for her symptoms and due to her being 86-89% on room air in the office.  In the ED, she was given aspirin 81 mg PO x1 and Lasix 10 mg IV x2.    10/28:  Pt seen.  Still with some SOB.  Oxygen levels remain low.    10/29:  Patient seen.  Still with SOB.    10/30:  Patient seen.  Still c/o SOB overnight.  As per nursing, eating food with lots of salt when family comes in at night.  Weight continues to increase despite IV lasix.      Review of system- Rest of the review of system are negative except mentioned in HPI    Vital Signs Last 24 Hrs  T(C): 36.6 (30 Oct 2019 11:35), Max: 36.8 (30 Oct 2019 05:05)  T(F): 97.9 (30 Oct 2019 11:35), Max: 98.3 (30 Oct 2019 05:05)  HR: 64 (30 Oct 2019 14:37) (64 - 74)  BP: 149/62 (30 Oct 2019 14:37) (126/41 - 156/46)  BP(mean): --  RR: 18 (30 Oct 2019 11:35) (18 - 18)  SpO2: 98% (30 Oct 2019 11:35) (93% - 98%)    PHYSICAL EXAM:  GENERAL: NAD  NERVOUS SYSTEM:  Alert & Oriented X3, non- focal exam, Motor Strength 5/5 B/L upper and lower extremities; DTRs 2+ intact and symmetric  HEAD:  Atraumatic, Normocephalic  EYES: EOMI, PERRLA, conjunctiva and sclera clear  HEENT: Moist mucous membranes  NECK: Supple, No JVD  CHEST/LUNG: decreased BS bases  HEART: Regular rate and rhythm; No murmurs, rubs, or gallops  ABDOMEN: Soft, Nontender, Nondistended; Bowel sounds present  GENITOURINARY- Voiding, no suprapubic tenderness  EXTREMITIES: trace edema  MUSCULOSKELETAL:- No muscle tenderness, Muscle tone normal, No joint tenderness, no Joint swelling, Joint range of motion-normal  SKIN-no rash, no lesion    10-30    136  |  100  |  25<H>  ----------------------------<  128<H>  4.3   |  30  |  0.98    Ca    8.5      30 Oct 2019 14:02    PT/INR - ( 30 Oct 2019 07:14 )   PT: 31.3 sec;   INR: 2.73 ratio         MEDICATIONS  (STANDING):  amLODIPine   Tablet 10 milliGRAM(s) Oral daily  aspirin enteric coated 81 milliGRAM(s) Oral daily  atorvastatin 80 milliGRAM(s) Oral at bedtime  dextrose 5%. 1000 milliLiter(s) (50 mL/Hr) IV Continuous <Continuous>  dextrose 50% Injectable 12.5 Gram(s) IV Push once  dextrose 50% Injectable 25 Gram(s) IV Push once  dextrose 50% Injectable 25 Gram(s) IV Push once  enalapril 20 milliGRAM(s) Oral daily  folic acid 1 milliGRAM(s) Oral daily  furosemide   Injectable 80 milliGRAM(s) IV Push two times a day  insulin lispro (HumaLOG) corrective regimen sliding scale   SubCutaneous three times a day before meals  insulin lispro (HumaLOG) corrective regimen sliding scale   SubCutaneous at bedtime  warfarin 3 milliGRAM(s) Oral daily    MEDICATIONS  (PRN):  acetaminophen   Tablet .. 650 milliGRAM(s) Oral every 6 hours PRN Temp greater or equal to 38C (100.4F), Mild Pain (1 - 3)  dextrose 40% Gel 15 Gram(s) Oral once PRN Blood Glucose LESS THAN 70 milliGRAM(s)/deciliter  glucagon  Injectable 1 milliGRAM(s) IntraMuscular once PRN Glucose LESS THAN 70 milligrams/deciliter

## 2019-10-30 NOTE — PROGRESS NOTE ADULT - PROBLEM SELECTOR PLAN 6
Severe pulmonary hypertension possibly secondary sleep apnea/obesity would benefit from home oxygen. Continue diuresis.

## 2019-10-30 NOTE — PROGRESS NOTE ADULT - PROBLEM SELECTOR PLAN 1
Dyspnea perhaps multifactorial possibly 2/2 acute on chronic diastolic heart failure,  uncontrolled hypertension, pulmonary hypertension  possible obesity hypoventilation. continue diuretic

## 2019-10-30 NOTE — PHYSICAL THERAPY INITIAL EVALUATION ADULT - GENERAL OBSERVATIONS, REHAB EVAL
O2 2L/min nc; HM; pt rec'd seated in recliner; HR 72; O2 Sat taken by WANG Brody: 96% on RA; no c/o pain

## 2019-10-30 NOTE — PHYSICAL THERAPY INITIAL EVALUATION ADULT - MODALITIES TREATMENT COMMENTS
pt left seated in recliner post Eval; chair alarm donned; O2 2L/min nc, HM in place; RN Ronn present; callbell in reach; pt instructed not to get up alone; call nursing for assist; phoebe well; no c/o pain

## 2019-10-30 NOTE — PROGRESS NOTE ADULT - SUBJECTIVE AND OBJECTIVE BOX
PCP:    REQUESTING PHYSICIAN:    REASON FOR CONSULT:    CHIEF COMPLAINT:    HPI:  Translation provided by daniel at bedside per patient request.  78 yo Sinhala speaking F with a PMH HTN, HLD, DM2 (A1C 6.9, not on meds), atrial fibrillation (on Coumadin), bioprosthetic aortic and mitral valves, severe pulmonary hypertension, HFpEF/HFrEF (EF 65% October 2019), CAD s/p stent and CABG, CVA, and gout who presents with SOB. She was seen at [facility] for CHF exacerbation and discharged 8 days prior. She felt her SOB had never completely resolved and over the last 3-4 days has been having increasing dyspnea on exertion. She has also endorsed bilateral leg swelling during this time. She feels good at rest. She denies orthopnea, fevers, chills, cough, CP, palpitations, rhinorrhea, headache, nausea, vomiting, dysuria, or diarrhea. She has taken all of her medicines as appropriate including Lasix 40 mg BID PO, which was what she was discharged on. Her BP was elevated at her PCP appointment this week and therefore 2 days ago, her Amlodipine was increased from 5 mg to 10 mg.  She was sent in by her outpatient provider for her symptoms and due to her being 86-89% on room air in the office.    In the ED, she was given aspirin 81 mg PO x1 and Lasix 10 mg IV x2. (25 Oct 2019 23:52)    10/26/19  Patient was seen , chart reviewed , patient admitted with HERNANDEZ with worsening LE and hypoxia   patient is feeling better after receiving Lasix , patient appears to be non compliance to low salt diet  denies any chest pain . patient had recent hospitalization had GIACOMO showed LAYLA thrombus , normal function prosthetic valves ,  patient had severe pulmonary hypertension on TTE  done recently     10/27/19 patient awake alert resting comfortably breathing improved daughter at bedside for translation   10/28/19 Patient is feeling , but did feel shortness of breath at night , no chest pain  patient had  afib with slow ventricular rate   10/29/19 comfortable in bed had decrease O2 sat, increased weight, CXR pleural effusion IV diuretics adjusted tele AF 70-80's  10/30/19 comfortable in chair but requiring continuous oxygen.     PAST MEDICAL & SURGICAL HISTORY:  Hyperlipidemia  History of heart valve replacement with bioprosthetic valve: mitral and aortic  Diabetes mellitus: type 2, not on meds  Gout  Upper GI bleed  CHF (congestive heart failure): EF 65% Oct 2019  CVA (Cerebral Infarction): 2011  CAD (Coronary Artery Disease): s/p stent and CABG  Afib: (on Warfarin)  HTN (Hypertension)  H/O mitral valve replacement: 9/22/14  H/O aortic valve replacement: 9/22/14  S/P CABG x 1: (2000)  S/P angioplasty with stent: 2011      SOCIAL HISTORY:    FAMILY HISTORY:  Family history of acute myocardial infarction: mother      ALLERGIES:  Allergies    No Known Allergies    Intolerances        MEDICATIONS:    MEDICATIONS  (STANDING):  amLODIPine   Tablet 10 milliGRAM(s) Oral daily  aspirin enteric coated 81 milliGRAM(s) Oral daily  atorvastatin 80 milliGRAM(s) Oral at bedtime  dextrose 5%. 1000 milliLiter(s) (50 mL/Hr) IV Continuous <Continuous>  dextrose 50% Injectable 12.5 Gram(s) IV Push once  dextrose 50% Injectable 25 Gram(s) IV Push once  dextrose 50% Injectable 25 Gram(s) IV Push once  enalapril 20 milliGRAM(s) Oral daily  folic acid 1 milliGRAM(s) Oral daily  furosemide   Injectable 60 milliGRAM(s) IV Push two times a day  insulin lispro (HumaLOG) corrective regimen sliding scale   SubCutaneous three times a day before meals  insulin lispro (HumaLOG) corrective regimen sliding scale   SubCutaneous at bedtime  warfarin 3 milliGRAM(s) Oral daily    MEDICATIONS  (PRN):  acetaminophen   Tablet .. 650 milliGRAM(s) Oral every 6 hours PRN Temp greater or equal to 38C (100.4F), Mild Pain (1 - 3)  dextrose 40% Gel 15 Gram(s) Oral once PRN Blood Glucose LESS THAN 70 milliGRAM(s)/deciliter  glucagon  Injectable 1 milliGRAM(s) IntraMuscular once PRN Glucose LESS THAN 70 milligrams/deciliter      Vital Signs Last 24 Hrs  T(C): 36.8 (30 Oct 2019 05:05), Max: 36.8 (30 Oct 2019 05:05)  T(F): 98.3 (30 Oct 2019 05:05), Max: 98.3 (30 Oct 2019 05:05)  HR: 74 (30 Oct 2019 05:05) (67 - 74)  BP: 126/41 (30 Oct 2019 05:05) (126/41 - 156/46)  BP(mean): --  RR: 18 (30 Oct 2019 05:05) (18 - 18)  SpO2: 95% (30 Oct 2019 05:05) (93% - 97%)Daily     Daily I&O's Summary    29 Oct 2019 07:01  -  30 Oct 2019 07:00  --------------------------------------------------------  IN: 0 mL / OUT: 850 mL / NET: -850 mL        PHYSICAL EXAM:    Constitutional: NAD, awake and alert, well-developed  HEENT: PERR, EOMI,  No oral cyananosis.  Neck:  supple,  No JVD  Respiratory: Breath sounds decreased at bases  Cardiovascular: S1 and S2, irregular rate 1/6 DAVID L and R sternal border  Gastrointestinal: Bowel Sounds present, soft, nontender.   Extremities: No peripheral edema. No clubbing or cyanosis.  Vascular: 2+ peripheral pulses  Neurological: A/O x 3, no focal deficits  Musculoskeletal: no calf tenderness.  Skin: No rashes.      LABS: All Labs Reviewed:                        10.8   12.20 )-----------( 295      ( 28 Oct 2019 06:49 )             35.7     29 Oct 2019 08:53    136    |  101    |  18     ----------------------------<  167    3.9     |  30     |  0.99     Ca    8.9        29 Oct 2019 08:53      PT/INR - ( 30 Oct 2019 07:14 )   PT: 31.3 sec;   INR: 2.73 ratio               Blood Culture:   10-29 @ 08:53  Pro Bnp [id number]        RADIOLOGY/EKG:      ECHO/CARDIAC CATHTERIZATION/STRESS TEST:  < from: Transthoracic Echocardiogram (10.10.19 @ 18:07) >  CONCLUSIONS:  1. Bioprosthetic mitral valve replacement. Minimal mitral  regurgitation. Mean transmitral valve gradient equals 6 mm  Hg, which is elevated even in the setting of a prosthetic  valve.  2. Bioprosthetic aortic valve replacement. Peak transaortic  valve gradient equals 30 mm Hg, mean transaortic valve  gradient equals 18 mm Hg, which is probably normal in the  presence of a prosthetic valve. Minimal aortic  regurgitation.  3. Severely dilated left atrium.  LA volume index = 59  cc/m2.  4. Mild concentric left ventricular hypertrophy.  5. Normal left ventricular systolic function. No segmental  wall motion abnormalities.  6. Normal right ventricular size and function.  7. Estimated right ventricular systolic pressure equals 73  mm Hg, assuming right atrial pressure equals 10 mm Hg,  consistent with severe pulmonary hypertension.  *** Compared with echocardiogram of 2/9/2019, the estimated  pulmonary artery systolic pressure has increased.  ------------------------------------------------------------------------  Confirmed on  10/11/2019 - 08:55:53 by Neel Johnson M.D.    < end of copied text >

## 2019-10-30 NOTE — PHYSICAL THERAPY INITIAL EVALUATION ADULT - IMPAIRMENTS CONTRIBUTING TO GAIT DEVIATIONS, PT EVAL
O2 Sat decreased to 87% during ambulation on RA; O2 2L/min nc applied by WANG Brody; O2 Sat 97% upon recovery seated/impaired balance

## 2019-10-31 LAB
ANION GAP SERPL CALC-SCNC: 4 MMOL/L — LOW (ref 5–17)
BUN SERPL-MCNC: 23 MG/DL — SIGNIFICANT CHANGE UP (ref 7–23)
CALCIUM SERPL-MCNC: 8.7 MG/DL — SIGNIFICANT CHANGE UP (ref 8.5–10.1)
CHLORIDE SERPL-SCNC: 102 MMOL/L — SIGNIFICANT CHANGE UP (ref 96–108)
CO2 SERPL-SCNC: 30 MMOL/L — SIGNIFICANT CHANGE UP (ref 22–31)
CREAT SERPL-MCNC: 0.92 MG/DL — SIGNIFICANT CHANGE UP (ref 0.5–1.3)
GLUCOSE SERPL-MCNC: 134 MG/DL — HIGH (ref 70–99)
INR BLD: 3.13 RATIO — HIGH (ref 0.88–1.16)
POTASSIUM SERPL-MCNC: 4 MMOL/L — SIGNIFICANT CHANGE UP (ref 3.5–5.3)
POTASSIUM SERPL-SCNC: 4 MMOL/L — SIGNIFICANT CHANGE UP (ref 3.5–5.3)
PROTHROM AB SERPL-ACNC: 36 SEC — HIGH (ref 10–12.9)
SODIUM SERPL-SCNC: 136 MMOL/L — SIGNIFICANT CHANGE UP (ref 135–145)

## 2019-10-31 PROCEDURE — 99233 SBSQ HOSP IP/OBS HIGH 50: CPT

## 2019-10-31 RX ORDER — FUROSEMIDE 40 MG
40 TABLET ORAL
Refills: 0 | Status: DISCONTINUED | OUTPATIENT
Start: 2019-10-31 | End: 2019-11-01

## 2019-10-31 RX ORDER — METOPROLOL TARTRATE 50 MG
12.5 TABLET ORAL DAILY
Refills: 0 | Status: DISCONTINUED | OUTPATIENT
Start: 2019-10-31 | End: 2019-11-01

## 2019-10-31 RX ORDER — SPIRONOLACTONE 25 MG/1
50 TABLET, FILM COATED ORAL DAILY
Refills: 0 | Status: DISCONTINUED | OUTPATIENT
Start: 2019-10-31 | End: 2019-11-01

## 2019-10-31 RX ORDER — SPIRONOLACTONE 25 MG/1
25 TABLET, FILM COATED ORAL DAILY
Refills: 0 | Status: DISCONTINUED | OUTPATIENT
Start: 2019-10-31 | End: 2019-10-31

## 2019-10-31 RX ADMIN — Medication 81 MILLIGRAM(S): at 12:22

## 2019-10-31 RX ADMIN — Medication 20 MILLIGRAM(S): at 05:37

## 2019-10-31 RX ADMIN — ATORVASTATIN CALCIUM 80 MILLIGRAM(S): 80 TABLET, FILM COATED ORAL at 21:21

## 2019-10-31 RX ADMIN — Medication 2: at 12:20

## 2019-10-31 RX ADMIN — Medication 12.5 MILLIGRAM(S): at 12:22

## 2019-10-31 RX ADMIN — AMLODIPINE BESYLATE 10 MILLIGRAM(S): 2.5 TABLET ORAL at 05:37

## 2019-10-31 RX ADMIN — Medication 40 MILLIGRAM(S): at 17:45

## 2019-10-31 RX ADMIN — SPIRONOLACTONE 50 MILLIGRAM(S): 25 TABLET, FILM COATED ORAL at 12:22

## 2019-10-31 RX ADMIN — Medication 1 MILLIGRAM(S): at 12:22

## 2019-10-31 RX ADMIN — Medication 80 MILLIGRAM(S): at 05:37

## 2019-10-31 NOTE — PROGRESS NOTE ADULT - SUBJECTIVE AND OBJECTIVE BOX
Assessment and Plan:       80 yo Divehi speaking F with a PMH HTN, HLD, DM2 (A1C 6.9, not on meds), atrial fibrillation (on Coumadin), bioprosthetic aortic and mitral valves, severe pulmonary hypertension, HFpEF/HFrEF (EF 65% October 2019), CAD s/p stent and CABG, CVA, and gout who presents with SOB.  Admitted with CHF exacerbation.      #Acute Hypoxic Respiratory Failure:    Due to CHF exacerbation in setting of underlying severe pulmonary HTN.    Check spo2 on RA with ambulation to determine need for home o2.      #Acute on Chronic Diastolic CHF exacerbation:    Repeat CXR 10/28 overall unchanged from admission.    Weights increased 182--183--186-- 188 lbs.    c/w lasix 40 iv q12h  Follow labs/ o2 sats.    Cont home meds.    ECHO earlier this month.    Cardio f/u.      #Mild Leukocytosis.  no evidence for infection.  patient not on systemic steroids.    #Stable 9mm R apical lung nodule.    #Severe Pulmonary HTN:    Likely home O2 @ d/c.      #AFIB:  Ceferino to 30's overnight.    Cardiology following.    Metoprolol stopped.    Cont coumadin.  INR 2-3.      #CAD:  Cont home meds.      Dispo:  Cont diuresis.  Will likely require home O2 @ d/c. CHIEF COMPLAINT/Diagnosis: afib/ acute diastolic chf/ severe pulm htn    SUBJECTIVE: no complaints    REVIEW OF SYSTEMS:    CONSTITUTIONAL: No weakness, fevers or chills  EYES/ENT: No visual changes;  No vertigo or throat pain   NECK: No pain or stiffness  RESPIRATORY: No cough, wheezing, hemoptysis; No shortness of breath  CARDIOVASCULAR: No chest pain or palpitations  GASTROINTESTINAL: No abdominal or epigastric pain. No nausea, vomiting, or hematemesis; No diarrhea or constipation. No melena or hematochezia.  GENITOURINARY: No dysuria, frequency or hematuria  NEUROLOGICAL: No numbness or weakness  SKIN: No itching, burning, rashes, or lesions   All other review of systems is negative unless indicated above    Vital Signs Last 24 Hrs  T(C): 36.8 (31 Oct 2019 05:29), Max: 36.8 (31 Oct 2019 05:29)  T(F): 98.2 (31 Oct 2019 05:29), Max: 98.2 (31 Oct 2019 05:29)  HR: 81 (31 Oct 2019 05:29) (64 - 81)  BP: 147/45 (31 Oct 2019 05:29) (131/48 - 149/62)  BP(mean): --  RR: 18 (31 Oct 2019 05:29) (18 - 18)  SpO2: 96% (31 Oct 2019 05:29) (96% - 98%)    I&O's Summary    30 Oct 2019 07:01  -  31 Oct 2019 07:00  --------------------------------------------------------  IN: 0 mL / OUT: 2800 mL / NET: -2800 mL        CAPILLARY BLOOD GLUCOSE      POCT Blood Glucose.: 135 mg/dL (31 Oct 2019 07:58)  POCT Blood Glucose.: 226 mg/dL (30 Oct 2019 21:06)  POCT Blood Glucose.: 124 mg/dL (30 Oct 2019 16:46)  POCT Blood Glucose.: 172 mg/dL (30 Oct 2019 11:27)      PHYSICAL EXAM:    Constitutional: NAD, awake and alert, well-developed  HEENT: PERR, EOMI, Normal Hearing, MMM  Neck: Soft and supple, No LAD, No JVD  Respiratory: Breath sounds are clear bilaterally, No wheezing, rales or rhonchi  Cardiovascular: S1 and S2, regular rate and rhythm, no Murmurs, gallops or rubs  Gastrointestinal: Bowel Sounds present, soft, nontender, nondistended, no guarding, no rebound  Extremities: No peripheral edema  Vascular: 2+ peripheral pulses  Neurological: A/O x 3, no focal deficits  Musculoskeletal: 5/5 strength b/l upper and lower extremities  Skin: No rashes    MEDICATIONS:  MEDICATIONS  (STANDING):  amLODIPine   Tablet 10 milliGRAM(s) Oral daily  aspirin enteric coated 81 milliGRAM(s) Oral daily  atorvastatin 80 milliGRAM(s) Oral at bedtime  dextrose 5%. 1000 milliLiter(s) (50 mL/Hr) IV Continuous <Continuous>  dextrose 50% Injectable 12.5 Gram(s) IV Push once  dextrose 50% Injectable 25 Gram(s) IV Push once  dextrose 50% Injectable 25 Gram(s) IV Push once  enalapril 20 milliGRAM(s) Oral daily  folic acid 1 milliGRAM(s) Oral daily  furosemide   Injectable 40 milliGRAM(s) IV Push two times a day  insulin lispro (HumaLOG) corrective regimen sliding scale   SubCutaneous three times a day before meals  insulin lispro (HumaLOG) corrective regimen sliding scale   SubCutaneous at bedtime  metoprolol tartrate 12.5 milliGRAM(s) Oral daily  spironolactone 25 milliGRAM(s) Oral daily  spironolactone 50 milliGRAM(s) Oral daily  warfarin 3 milliGRAM(s) Oral daily      LABS: All Labs Reviewed:    10-31    136  |  102  |  23  ----------------------------<  134<H>  4.0   |  30  |  0.92    Ca    8.7      31 Oct 2019 07:16      PT/INR - ( 31 Oct 2019 07:16 )   PT: 36.0 sec;   INR: 3.13 ratio             Assessment and Plan:       80 yo Mongolian speaking F with a PMH HTN, HLD, DM2 (A1C 6.9, not on meds), atrial fibrillation (on Coumadin), bioprosthetic aortic and mitral valves, severe pulmonary hypertension, HFpEF/HFrEF (EF 65% October 2019), CAD s/p stent and CABG, CVA, and gout who presents with SOB.  Admitted with CHF exacerbation.      #Acute Hypoxic Respiratory Failure:    Due to CHF exacerbation in setting of underlying severe pulmonary HTN.    Check spo2 on RA with ambulation to determine need for home o2.      #Acute on Chronic Diastolic CHF exacerbation:    Repeat CXR 10/28 overall unchanged from admission.    Weights increased 182--183--186-- 188 lbs.    c/w lasix 40 iv q12h  Follow labs/ o2 sats.    Cont home meds.    ECHO earlier this month.    Cardio f/u.    -patient with diastolic chf, will benefit from disease modifying agents. Will start Spironolactone 50mg daily. Monitor on this for 24-48 hours      #Mild Leukocytosis.  no evidence for infection.  patient not on systemic steroids.    #Stable 9mm R apical lung nodule.    #Severe Pulmonary HTN:    Likely home O2 @ d/c.      #AFIB:  -one espisode of bradycardia on 10.28  -bradycardia resolved; HR trending back up.  To prevent rapid Fib, start once a day metoprolol at low dose. c/w monitor 24-48 hours.  Cont coumadin.  INR 2-3.      #CAD:  Cont home meds.      Dispo:  Cont diuresis.  Will likely require home O2 @ d/c.

## 2019-10-31 NOTE — PHARMACOTHERAPY INTERVENTION NOTE - COMMENTS
Orders written for spironolactone 25mg and 50mg daily. Contacted MD. Result was d/c of spironolactone 25mg.

## 2019-10-31 NOTE — PROGRESS NOTE ADULT - PROBLEM SELECTOR PLAN 1
Multifactorial; evidence of severe pulmonary HTN and pulmonary vascular congestion; continue to diurese with IV Lasix.

## 2019-10-31 NOTE — PROGRESS NOTE ADULT - SUBJECTIVE AND OBJECTIVE BOX
REASON FOR VISIT: Dyspnea, Rx management      HPI:  79 year old woman with a history of HTN, HLD, DM, atrial fibrillation (on Coumadin), bioprosthetic aortic and mitral valve replacements, severe pulmonary hypertension, diastolic HF, CAD s/p coronary stent and CABG, CVA, and gout.  She was recently hospitalized at [facility] with CHF; admitted to [facility] on 10/28/19 with recurrent dyspnea, worsening hypoxia.    10/27/19 patient awake alert resting comfortably breathing improved daughter at bedside for translation   10/28/19 Patient is feeling , but did feel shortness of breath at night , no chest pain  patient had  afib with slow ventricular rate   10/29/19 comfortable in bed had decrease O2 sat, increased weight, CXR pleural effusion IV diuretics adjusted tele AF 70-80's  10/30/19 comfortable in chair but requiring continuous oxygen.   10/31/19:  Seated in bedside chair; feels ok    MEDICATIONS  (STANDING):  amLODIPine   Tablet 10 milliGRAM(s) Oral daily  aspirin enteric coated 81 milliGRAM(s) Oral daily  atorvastatin 80 milliGRAM(s) Oral at bedtime  enalapril 20 milliGRAM(s) Oral daily  folic acid 1 milliGRAM(s) Oral daily  furosemide   Injectable 40 milliGRAM(s) IV Push two times a day  insulin lispro (HumaLOG) corrective regimen sliding scale   SubCutaneous three times a day before meals  insulin lispro (HumaLOG) corrective regimen sliding scale   SubCutaneous at bedtime  metoprolol tartrate 12.5 milliGRAM(s) Oral daily  spironolactone 50 milliGRAM(s) Oral daily  warfarin 3 milliGRAM(s) Oral daily    Vital Signs Last 24 Hrs  T(C): 36.8 (31 Oct 2019 05:29), Max: 36.8 (31 Oct 2019 05:29)  T(F): 98.2 (31 Oct 2019 05:29), Max: 98.2 (31 Oct 2019 05:29)  HR: 81 (31 Oct 2019 05:29) (64 - 81)  BP: 147/45 (31 Oct 2019 05:29) (131/48 - 149/62)  RR: 18 (31 Oct 2019 05:29) (18 - 18)  SpO2: 96% (31 Oct 2019 05:29) (96% - 98%)    PHYSICAL EXAM:  Constitutional: Seated in bedside chair, appears comfortable  Respiratory: Nonlabored, receiving supplemental O2 via N/C; no crackles or wheeze  Cardiovascular: Irregular, normal rate  Gastrointestinal: Bowel Sounds present, soft, nontender.   Extremities: LE edema (non-pitting)    LABS:     136  |  102  |  23  ----------------------------<  134<H>  4.0   |  30  |  0.92    Ca    8.7      31 Oct 2019 07:16    Transthoracic Echocardiogram (10.10.19 @ 18:07) >  1. Bioprosthetic mitral valve replacement. Minimal mitralregurgitation. Mean transmitral valve gradient equals 6 mmHg, which is elevated even in the setting of a prosthetic valve.  2. Bioprosthetic aortic valve replacement. Peak transaortic valve gradient equals 30 mm Hg, mean transaortic valve gradient equals 18 mm Hg, which is probably normal in the presence of a prosthetic valve. Minimal aortic regurgitation.  3. Severely dilated left atrium.  LA volume index = 59 cc/m2.  4. Mild concentric left ventricular hypertrophy.  5. Normal left ventricular systolic function. No segmental wall motion abnormalities.  6. Normal right ventricular size and function.  7. Estimated right ventricular systolic pressure equals 73 mm Hg, assuming right atrial pressure equals 10 mm Hg, consistent with severe pulmonary hypertension.  *** Compared with echocardiogram of 2/9/2019, the estimated pulmonary artery systolic pressure has increased.    Xray Chest 1 View- PORTABLE-Routine (10.28.19 @ 11:56):  Heart/Mediastinum/Lungs: Cardiomegaly. Pulmonary vascular congestion; Left-sided pleural effusion.

## 2019-11-01 ENCOUNTER — TRANSCRIPTION ENCOUNTER (OUTPATIENT)
Age: 79
End: 2019-11-01

## 2019-11-01 VITALS
OXYGEN SATURATION: 99 % | SYSTOLIC BLOOD PRESSURE: 148 MMHG | DIASTOLIC BLOOD PRESSURE: 43 MMHG | RESPIRATION RATE: 17 BRPM | HEART RATE: 68 BPM | TEMPERATURE: 98 F

## 2019-11-01 LAB
ANION GAP SERPL CALC-SCNC: 4 MMOL/L — LOW (ref 5–17)
BUN SERPL-MCNC: 31 MG/DL — HIGH (ref 7–23)
CALCIUM SERPL-MCNC: 8.4 MG/DL — LOW (ref 8.5–10.1)
CHLORIDE SERPL-SCNC: 100 MMOL/L — SIGNIFICANT CHANGE UP (ref 96–108)
CO2 SERPL-SCNC: 32 MMOL/L — HIGH (ref 22–31)
CREAT SERPL-MCNC: 1.07 MG/DL — SIGNIFICANT CHANGE UP (ref 0.5–1.3)
GLUCOSE SERPL-MCNC: 133 MG/DL — HIGH (ref 70–99)
HCT VFR BLD CALC: 35.2 % — SIGNIFICANT CHANGE UP (ref 34.5–45)
HGB BLD-MCNC: 10.6 G/DL — LOW (ref 11.5–15.5)
INR BLD: 2.77 RATIO — HIGH (ref 0.88–1.16)
MCHC RBC-ENTMCNC: 25.1 PG — LOW (ref 27–34)
MCHC RBC-ENTMCNC: 30.1 GM/DL — LOW (ref 32–36)
MCV RBC AUTO: 83.2 FL — SIGNIFICANT CHANGE UP (ref 80–100)
PLATELET # BLD AUTO: 275 K/UL — SIGNIFICANT CHANGE UP (ref 150–400)
POTASSIUM SERPL-MCNC: 4 MMOL/L — SIGNIFICANT CHANGE UP (ref 3.5–5.3)
POTASSIUM SERPL-SCNC: 4 MMOL/L — SIGNIFICANT CHANGE UP (ref 3.5–5.3)
PROTHROM AB SERPL-ACNC: 31.7 SEC — HIGH (ref 10–12.9)
RBC # BLD: 4.23 M/UL — SIGNIFICANT CHANGE UP (ref 3.8–5.2)
RBC # FLD: 15.4 % — HIGH (ref 10.3–14.5)
SODIUM SERPL-SCNC: 136 MMOL/L — SIGNIFICANT CHANGE UP (ref 135–145)
WBC # BLD: 10.94 K/UL — HIGH (ref 3.8–10.5)
WBC # FLD AUTO: 10.94 K/UL — HIGH (ref 3.8–10.5)

## 2019-11-01 PROCEDURE — 99233 SBSQ HOSP IP/OBS HIGH 50: CPT

## 2019-11-01 RX ORDER — WARFARIN SODIUM 2.5 MG/1
2 TABLET ORAL ONCE
Refills: 0 | Status: DISCONTINUED | OUTPATIENT
Start: 2019-11-01 | End: 2019-11-01

## 2019-11-01 RX ORDER — SPIRONOLACTONE 25 MG/1
2 TABLET, FILM COATED ORAL
Qty: 60 | Refills: 0
Start: 2019-11-01 | End: 2019-11-30

## 2019-11-01 RX ORDER — METOPROLOL TARTRATE 50 MG
12.5 TABLET ORAL
Qty: 0 | Refills: 0 | DISCHARGE
Start: 2019-11-01

## 2019-11-01 RX ORDER — FUROSEMIDE 40 MG
40 TABLET ORAL
Refills: 0 | Status: DISCONTINUED | OUTPATIENT
Start: 2019-11-01 | End: 2019-11-01

## 2019-11-01 RX ADMIN — Medication 20 MILLIGRAM(S): at 06:14

## 2019-11-01 RX ADMIN — SPIRONOLACTONE 50 MILLIGRAM(S): 25 TABLET, FILM COATED ORAL at 06:14

## 2019-11-01 RX ADMIN — AMLODIPINE BESYLATE 10 MILLIGRAM(S): 2.5 TABLET ORAL at 06:14

## 2019-11-01 RX ADMIN — Medication 1 MILLIGRAM(S): at 12:06

## 2019-11-01 RX ADMIN — Medication 81 MILLIGRAM(S): at 12:06

## 2019-11-01 RX ADMIN — Medication 40 MILLIGRAM(S): at 17:09

## 2019-11-01 RX ADMIN — Medication 12.5 MILLIGRAM(S): at 06:14

## 2019-11-01 RX ADMIN — Medication 40 MILLIGRAM(S): at 06:13

## 2019-11-01 RX ADMIN — Medication 2: at 11:58

## 2019-11-01 NOTE — PROGRESS NOTE ADULT - PROVIDER SPECIALTY LIST ADULT
Cardiology
Hospitalist
Cardiology

## 2019-11-01 NOTE — PROGRESS NOTE ADULT - PROBLEM SELECTOR PLAN 2
Acute/chronic diastolic HF; continue to diurese while monitoring serum potassium and renal function.
Improved as above continue diuresis.
Improved as above continue diuresis.
Increased weight, decreased O2 RA IV diuretics adjusted
Increased weight, decreased O2 RA IV diuretics adjusted, monitor weight
K & Cr WNL w/ diuresis.

## 2019-11-01 NOTE — PROGRESS NOTE ADULT - SUBJECTIVE AND OBJECTIVE BOX
79 year old woman with a history of HTN, HLD, DM, atrial fibrillation (on Coumadin), bioprosthetic aortic and mitral valve replacements,   severe pulmonary hypertension, diastolic HF, CAD s/p coronary stent and CABG, CVA, and gout.    She was recently hospitalized at [facility] with CHF; admitted to [facility] on 10/28/19 with recurrent dyspnea, worsening hypoxia.    10/27/19 patient awake alert resting comfortably breathing improved daughter at bedside for translation   10/28/19 Patient is feeling , but did feel shortness of breath at night , no chest pain  patient had  afib with slow ventricular rate   10/29/19 comfortable in bed had decrease O2 sat, increased weight, CXR pleural effusion IV diuretics adjusted tele AF 70-80's  10/30/19 comfortable in chair but requiring continuous oxygen.   10/31/19:  Seated in bedside chair; feels ok  19: denies dyspnea, PND or orthopnea, but on continous O2.    PAST MEDICAL & SURGICAL HISTORY:  Hyperlipidemia  History of heart valve replacement with bioprosthetic valve: mitral and aortic  Diabetes mellitus: type 2, not on meds  Gout  Upper GI bleed  CHF (congestive heart failure): EF 65% Oct 2019  CVA (Cerebral Infarction):   CAD (Coronary Artery Disease): s/p stent and CABG  Afib: (on Warfarin)  HTN (Hypertension)  H/O mitral valve replacement: 14  H/O aortic valve replacement: 14  S/P CABG x 1: ()  S/P angioplasty with stent:     MEDICATIONS:    MEDICATIONS  (STANDING):  amLODIPine   Tablet 10 milliGRAM(s) Oral daily  aspirin enteric coated 81 milliGRAM(s) Oral daily  atorvastatin 80 milliGRAM(s) Oral at bedtime  dextrose 5%. 1000 milliLiter(s) (50 mL/Hr) IV Continuous <Continuous>  dextrose 50% Injectable 12.5 Gram(s) IV Push once  dextrose 50% Injectable 25 Gram(s) IV Push once  dextrose 50% Injectable 25 Gram(s) IV Push once  enalapril 20 milliGRAM(s) Oral daily  folic acid 1 milliGRAM(s) Oral daily  furosemide    Tablet 40 milliGRAM(s) Oral two times a day  insulin lispro (HumaLOG) corrective regimen sliding scale   SubCutaneous three times a day before meals  insulin lispro (HumaLOG) corrective regimen sliding scale   SubCutaneous at bedtime  metoprolol tartrate 12.5 milliGRAM(s) Oral daily  spironolactone 50 milliGRAM(s) Oral daily  warfarin 2 milliGRAM(s) Oral once    MEDICATIONS  (PRN):  acetaminophen   Tablet .. 650 milliGRAM(s) Oral every 6 hours PRN Temp greater or equal to 38C (100.4F), Mild Pain (1 - 3)  dextrose 40% Gel 15 Gram(s) Oral once PRN Blood Glucose LESS THAN 70 milliGRAM(s)/deciliter  glucagon  Injectable 1 milliGRAM(s) IntraMuscular once PRN Glucose LESS THAN 70 milligrams/deciliter      Vital Signs Last 24 Hrs  T(C): 36.6 (2019 10:18), Max: 36.8 (31 Oct 2019 16:33)  T(F): 97.9 (2019 10:18), Max: 98.3 (31 Oct 2019 16:33)  HR: 66 (2019 10:18) (60 - 66)  BP: 133/52 (2019 10:18) (129/48 - 141/43)  BP(mean): --  RR: 18 (2019 10:18) (17 - 18)  SpO2: 100% (2019 10:18) (93% - 100%)Daily     Daily Weight in k.7 (2019 09:35)I&O's Summary    31 Oct 2019 07:01  -  2019 07:00  --------------------------------------------------------  IN: 0 mL / OUT: 975 mL / NET: -975 mL        PHYSICAL EXAM:    Constitutional: Seated in bedside chair, appears comfortable  Respiratory: Nonlabored, receiving supplemental O2 via N/C; no crackles or wheeze  Cardiovascular: Irregular, normal rate  Gastrointestinal: Bowel Sounds present, soft, nontender.   Extremities: LE edema (non-pitting)      LABS: All Labs Reviewed:                        10.6   10.94 )-----------( 275      ( 2019 07:28 )             35.2     2019 07:28    136    |  100    |  31     ----------------------------<  133    4.0     |  32     |  1.07   31 Oct 2019 07:16    136    |  102    |  23     ----------------------------<  134    4.0     |  30     |  0.92   30 Oct 2019 14:02    136    |  100    |  25     ----------------------------<  128    4.3     |  30     |  0.98     Ca    8.4        2019 07:28  Ca    8.7        31 Oct 2019 07:16  Ca    8.5        30 Oct 2019 14:02      PT/INR - ( 2019 07:28 )   PT: 31.7 sec;   INR: 2.77 ratio           Transthoracic Echocardiogram (10.10.19 @ 18:07) >  1. Bioprosthetic mitral valve replacement. Minimal mitralregurgitation. Mean transmitral valve gradient equals 6 mmHg, which is elevated even in the setting of a prosthetic valve.  2. Bioprosthetic aortic valve replacement. Peak transaortic valve gradient equals 30 mm Hg, mean transaortic valve gradient equals 18 mm Hg, which is probably normal in the presence of a prosthetic valve. Minimal aortic regurgitation.  3. Severely dilated left atrium.  LA volume index = 59 cc/m2.  4. Mild concentric left ventricular hypertrophy.  5. Normal left ventricular systolic function. No segmental wall motion abnormalities.  6. Normal right ventricular size and function.  7. Estimated right ventricular systolic pressure equals 73 mm Hg, assuming right atrial pressure equals 10 mm Hg, consistent with severe pulmonary hypertension.  *** Compared with echocardiogram of 2019, the estimated pulmonary artery systolic pressure has increased.    Xray Chest 1 View- PORTABLE-Routine (10.28.19 @ 11:56):  Heart/Mediastinum/Lungs: Cardiomegaly. Pulmonary vascular congestion; Left-sided pleural effusion.

## 2019-11-01 NOTE — PROGRESS NOTE ADULT - PROBLEM SELECTOR PROBLEM 3
CAD (Coronary Artery Disease)

## 2019-11-01 NOTE — PROGRESS NOTE ADULT - PROBLEM SELECTOR PROBLEM 2
CHF (congestive heart failure)

## 2019-11-01 NOTE — DISCHARGE NOTE PROVIDER - CARE PROVIDER_API CALL
Eulogio Benavidez)  Cardiovascular Disease; Internal Medicine  [address] [place] [address]  Phone: [phone]  Fax: [phone]  Follow Up Time:

## 2019-11-01 NOTE — PROGRESS NOTE ADULT - PROBLEM SELECTOR PLAN 1
Multifactorial; evidence of severe pulmonary HTN and pulmonary vascular congestion; now on lasix 40 mg po BID.

## 2019-11-01 NOTE — CHART NOTE - NSCHARTNOTEFT_GEN_A_CORE
78 yo Romanian speaking F with a PMH HTN, HLD, DM2 (A1C 6.9, not on meds), atrial fibrillation (on Coumadin), bioprosthetic aortic and mitral valves, severe pulmonary hypertension, HFpEF/HFrEF (EF 65% October 2019), CAD s/p stent and CABG, CVA, and gout who presents with SOB.    Has severe pulmonary HTN.     Due to diagnosis of chronic diastolic CHF and severe pulmonary htn, pt requires home oxygen. I have met with the patient and discussed it with them. The CHF/ Pulm HTN, are in a chronic  and stable state. Pts o2 sat at rest on RA is 88%.

## 2019-11-01 NOTE — PROGRESS NOTE ADULT - PROBLEM SELECTOR PROBLEM 4
History of heart valve replacement with bioprosthetic valve

## 2019-11-01 NOTE — PROGRESS NOTE ADULT - PROBLEM SELECTOR PROBLEM 1
HERNANDEZ (dyspnea on exertion)

## 2019-11-01 NOTE — PROGRESS NOTE ADULT - PROBLEM SELECTOR PLAN 4
GIACOMO showed normal prosthetic valve function.
GIACOMO showed normal prosthetic valve function continue anticoagulation.
GIACOMO showed normal prosthetic valve function.

## 2019-11-01 NOTE — PROGRESS NOTE ADULT - PROBLEM SELECTOR PLAN 3
CAD s/p CABG and prior PCI; cath in February 2019 revealed patent LIMA to LAD, SVG to OM1, and patent LCx stents with  of RCA (now collateralized); continue medical management.
CABG normal LVEF no active cardiac complaints continue current medication.
CAD s/p CABG and prior PCI; cath in February 2019 revealed patent LIMA to LAD, SVG to OM1, and patent LCx stents with  of RCA (now collateralized); continue medical management.

## 2019-11-01 NOTE — DISCHARGE NOTE NURSING/CASE MANAGEMENT/SOCIAL WORK - PATIENT PORTAL LINK FT
You can access the FollowMyHealth Patient Portal offered by [facility] by registering at the following website: [email or web address]. By joining [facility]’s FollowMyHealth portal, you will also be able to view your health information using other applications (apps) compatible with our system.

## 2019-11-01 NOTE — PROGRESS NOTE ADULT - REASON FOR ADMISSION
CHF exacerbation

## 2019-11-01 NOTE — DISCHARGE NOTE PROVIDER - NSDCMRMEDTOKEN_GEN_ALL_CORE_FT
amLODIPine 10 mg oral tablet: 1 tab(s) orally once a day  aspirin 81 mg oral delayed release tablet: 1 tab(s) orally once a day  atorvastatin 80 mg oral tablet: 1 tab(s) orally once a day (at bedtime)  Coumadin 3 mg oral tablet: 1 tab(s) orally once a day  enalapril 20 mg oral tablet: 1 tab(s) orally once a day  folic acid 1 mg oral tablet: 1 tab(s) orally once a day  furosemide 40 mg oral tablet: 1 tab(s) orally 2 times a day   metoprolol: 12.5 milligram(s) orally once a day  spironolactone 25 mg oral tablet: 2 tab(s) orally once a day

## 2019-11-01 NOTE — DISCHARGE NOTE PROVIDER - NSDCCPCAREPLAN_GEN_ALL_CORE_FT
PRINCIPAL DISCHARGE DIAGNOSIS  Diagnosis: CHF exacerbation  Assessment and Plan of Treatment: continue with medications as prescribed; Change your home dose of Metoprolol from 12.5mg twice a day to ONCE A DAY.   Start spironolactone 50mg daily.   Continue with lasix 40 bid.  Repeat blood work in one week to ensure tolrating these meds.      SECONDARY DISCHARGE DIAGNOSES  Diagnosis: Atrial fibrillation  Assessment and Plan of Treatment: Atrial fibrillation; continue with coumadin. Todays inr 2.77. Repeat INR check on monday.

## 2019-11-01 NOTE — DISCHARGE NOTE PROVIDER - HOSPITAL COURSE
Vital Signs Last 24 Hrs    T(C): 36.6 (01 Nov 2019 10:18), Max: 36.8 (31 Oct 2019 16:33)    T(F): 97.9 (01 Nov 2019 10:18), Max: 98.3 (31 Oct 2019 16:33)    HR: 66 (01 Nov 2019 10:18) (60 - 66)    BP: 133/52 (01 Nov 2019 10:18) (129/48 - 141/43)    BP(mean): --    RR: 18 (01 Nov 2019 10:18) (17 - 18)    SpO2: 100% (01 Nov 2019 10:18) (93% - 100%)        HEENT:   pupils equal and reactive, EOMI, no oropharyngeal lesions, erythema, exudates, oral thrush        NECK:   supple, no carotid bruits, no palpable lymph nodes, no thyromegaly        CV:  +S1, +S2, regular, no murmurs or rubs        RESP:   lungs clear to auscultation bilaterally, no wheezing, rales, rhonchi, good air entry bilaterally        BREAST:  not examined        GI:  abdomen soft, non-tender, non-distended, normal BS, no bruits, no abdominal masses, no palpable masses        RECTAL:  not examined        :  not examined        MSK:   normal muscle tone, no atrophy, no rigidity, no contractions        EXT:   no clubbing, no cyanosis, no edema, no calf pain, swelling or erythema        VASCULAR:  pulses equal and symmetric in the upper and lower extremities        NEURO:  AAOX3, no focal neurological deficits, follows all commands, able to move extremities spontaneously        SKIN:  no ulcers, lesions or rashes        01 Nov 2019 07:28        136    |  100    |  31       ----------------------------<  133      4.0     |  32     |  1.07         Ca    8.4        01 Nov 2019 07:28        PT/INR - ( 01 Nov 2019 07:28 )   PT: 31.7 sec;   INR: 2.77 ratio               CBC Full  -  ( 01 Nov 2019 07:28 )    WBC Count : 10.94 K/uL    Hemoglobin : 10.6 g/dL    Hematocrit : 35.2 %    Platelet Count - Automated : 275 K/uL    Mean Cell Volume : 83.2 fl    Mean Cell Hemoglobin : 25.1 pg    Mean Cell Hemoglobin Concentration : 30.1 gm/dL            Hospital course:        78 yo Thai speaking F with a PMH HTN, HLD, DM2 (A1C 6.9, not on meds), atrial fibrillation (on Coumadin), bioprosthetic aortic and mitral valves, severe pulmonary hypertension, HFpEF/HFrEF (EF 65% October 2019), CAD s/p stent and CABG, CVA, and gout who presents with SOB.  Admitted with CHF exacerbation.          #Acute Hypoxic Respiratory Failure:      Due to Diastolic CHF exacerbation in setting of underlying severe pulmonary HTN.      [place] was aggressively diuresed during her admission. She is now comfortable and is not in any distress.     -patient with diastolic chf, will benefit from disease modifying agents. Will start Spironolactone 50mg daily along with other meds.    Patient will require home o2 for discharge for resting o2 of 88. she states she was previously placed on home o2 but was not compliant with it. Patient is extensively    educated at bedside in Thai about adherance to home o2. she will c/w f/u outpatient with cardiology and primary care provider.             #AFIB:    -bradycardia resolved; HR trending back up.  To prevent rapid Fib, start once a day metoprolol at low dose.     Cont coumadin.  INR 2-3.

## 2019-11-07 DIAGNOSIS — I51.3 INTRACARDIAC THROMBOSIS, NOT ELSEWHERE CLASSIFIED: ICD-10-CM

## 2019-11-07 DIAGNOSIS — Z95.3 PRESENCE OF XENOGENIC HEART VALVE: ICD-10-CM

## 2019-11-07 DIAGNOSIS — E66.2 MORBID (SEVERE) OBESITY WITH ALVEOLAR HYPOVENTILATION: ICD-10-CM

## 2019-11-07 DIAGNOSIS — I51.7 CARDIOMEGALY: ICD-10-CM

## 2019-11-07 DIAGNOSIS — D72.828 OTHER ELEVATED WHITE BLOOD CELL COUNT: ICD-10-CM

## 2019-11-07 DIAGNOSIS — I50.33 ACUTE ON CHRONIC DIASTOLIC (CONGESTIVE) HEART FAILURE: ICD-10-CM

## 2019-11-07 DIAGNOSIS — I27.20 PULMONARY HYPERTENSION, UNSPECIFIED: ICD-10-CM

## 2019-11-07 DIAGNOSIS — E11.9 TYPE 2 DIABETES MELLITUS WITHOUT COMPLICATIONS: ICD-10-CM

## 2019-11-07 DIAGNOSIS — Z86.73 PERSONAL HISTORY OF TRANSIENT ISCHEMIC ATTACK (TIA), AND CEREBRAL INFARCTION WITHOUT RESIDUAL DEFICITS: ICD-10-CM

## 2019-11-07 DIAGNOSIS — Z79.01 LONG TERM (CURRENT) USE OF ANTICOAGULANTS: ICD-10-CM

## 2019-11-07 DIAGNOSIS — Z91.19 PATIENT'S NONCOMPLIANCE WITH OTHER MEDICAL TREATMENT AND REGIMEN: ICD-10-CM

## 2019-11-07 DIAGNOSIS — I48.20 CHRONIC ATRIAL FIBRILLATION, UNSPECIFIED: ICD-10-CM

## 2019-11-07 DIAGNOSIS — E78.5 HYPERLIPIDEMIA, UNSPECIFIED: ICD-10-CM

## 2019-11-07 DIAGNOSIS — M10.9 GOUT, UNSPECIFIED: ICD-10-CM

## 2019-11-07 DIAGNOSIS — Z95.1 PRESENCE OF AORTOCORONARY BYPASS GRAFT: ICD-10-CM

## 2019-11-07 DIAGNOSIS — J96.01 ACUTE RESPIRATORY FAILURE WITH HYPOXIA: ICD-10-CM

## 2019-11-07 DIAGNOSIS — Z91.11 PATIENT'S NONCOMPLIANCE WITH DIETARY REGIMEN: ICD-10-CM

## 2019-11-07 DIAGNOSIS — Z95.5 PRESENCE OF CORONARY ANGIOPLASTY IMPLANT AND GRAFT: ICD-10-CM

## 2019-11-07 DIAGNOSIS — R91.1 SOLITARY PULMONARY NODULE: ICD-10-CM

## 2019-11-07 DIAGNOSIS — I11.0 HYPERTENSIVE HEART DISEASE WITH HEART FAILURE: ICD-10-CM

## 2019-11-07 DIAGNOSIS — I25.10 ATHEROSCLEROTIC HEART DISEASE OF NATIVE CORONARY ARTERY WITHOUT ANGINA PECTORIS: ICD-10-CM

## 2020-05-20 NOTE — PATIENT PROFILE ADULT. - HAS THE PATIENT HAD A SIGNIFICANT CHANGE IN FUNCTIONAL STATUS DUE TO CVA, HEAD TRAUMA, ORTHOPEDIC TRAUMA/SURGERY, OR FALL, WITH THE WEEK PRIOR TO ADMISSION
Bedside and Verbal shift change report given to Shea (oncoming nurse) by Diamond WONG (offgoing nurse). Report included the following information SBAR, Kardex, ED Summary, Intake/Output and Recent Results. no

## 2020-06-30 NOTE — H&P ADULT - RS GEN PE MLT RESP DETAILS PC
Essentially severe hearing loss in the left ear and essentially profound hearing loss in the right ear, 250-8kHz; asymmetry noted 2k-6kHz, right ear poorer than left. Unmasked bone conduction suggests mixed hearing loss in at least one ear (unable to mask).     Recommendations:  1) Follow-up with ENT re: bilateral hearing loss  2) Complete audiological evaluation as outpatient once discharged   3) Trial of binaural amplification, pending medical clearance  4) Further recommendations per ENT
good air movement/airway patent/respirations non-labored/breath sounds equal/clear to auscultation bilaterally

## 2020-09-22 NOTE — CONSULT NOTE ADULT - SUBJECTIVE AND OBJECTIVE BOX
HPI:  Translation provided by daniel at bedside per patient request.  78 yo Danish speaking F with a PMH HTN, HLD, DM2 (A1C 6.9, not on meds), atrial fibrillation (on Coumadin), bioprosthetic aortic and mitral valves, severe pulmonary hypertension, HFpEF/HFrEF (EF 65% October 2019), CAD s/p stent and CABG, CVA, and gout who presents with SOB. She was seen at [facility] for CHF exacerbation and discharged 8 days prior. She felt her SOB had never completely resolved and over the last 3-4 days has been having increasing dyspnea on exertion. She has also endorsed bilateral leg swelling during this time. She feels good at rest. She denies orthopnea, fevers, chills, cough, CP, palpitations, rhinorrhea, headache, nausea, vomiting, dysuria, or diarrhea. She has taken all of her medicines as appropriate including Lasix 40 mg BID PO, which was what she was discharged on. Her BP was elevated at her PCP appointment this week and therefore 2 days ago, her Amlodipine was increased from 5 mg to 10 mg.  She was sent in by her outpatient provider for her symptoms and due to her being 86-89% on room air in the office.    In the ED, she was given aspirin 81 mg PO x1 and Lasix 10 mg IV x2. (25 Oct 2019 23:52)    10/26: pt feeling better today.       PAST MEDICAL & SURGICAL HISTORY:  Hyperlipidemia  History of heart valve replacement with bioprosthetic valve: mitral and aortic  Diabetes mellitus: type 2, not on meds  Gout  Upper GI bleed  CHF (congestive heart failure): EF 65% Oct 2019  CVA (Cerebral Infarction): 2011  CAD (Coronary Artery Disease): s/p stent and CABG  Afib: (on Warfarin)  HTN (Hypertension)  H/O mitral valve replacement: 9/22/14  H/O aortic valve replacement: 9/22/14  S/P CABG x 1: (2000)  S/P angioplasty with stent: 2011      MEDICATIONS  (STANDING):  amLODIPine   Tablet 10 milliGRAM(s) Oral daily  aspirin enteric coated 81 milliGRAM(s) Oral daily  atorvastatin 80 milliGRAM(s) Oral at bedtime  dextrose 50% Injectable 12.5 Gram(s) IV Push once  dextrose 50% Injectable 25 Gram(s) IV Push once  dextrose 50% Injectable 25 Gram(s) IV Push once  enalapril 20 milliGRAM(s) Oral daily  folic acid 1 milliGRAM(s) Oral daily  furosemide   Injectable 40 milliGRAM(s) IV Push two times a day  insulin lispro (HumaLOG) corrective regimen sliding scale   SubCutaneous three times a day before meals  insulin lispro (HumaLOG) corrective regimen sliding scale   SubCutaneous at bedtime  metoprolol tartrate 12.5 milliGRAM(s) Oral two times a day    MEDICATIONS  (PRN):  dextrose 40% Gel 15 Gram(s) Oral once PRN Blood Glucose LESS THAN 70 milliGRAM(s)/deciliter  glucagon  Injectable 1 milliGRAM(s) IntraMuscular once PRN Glucose LESS THAN 70 milligrams/deciliter      Allergies    No Known Allergies    Intolerances        SOCIAL HISTORY: Denies tobacco, etoh abuse or illicit drug use    FAMILY HISTORY:  Family history of acute myocardial infarction: mother      Vital Signs Last 24 Hrs  T(C): 36.8 (26 Oct 2019 05:52), Max: 37.2 (26 Oct 2019 02:20)  T(F): 98.2 (26 Oct 2019 05:52), Max: 99 (26 Oct 2019 02:20)  HR: 60 (26 Oct 2019 05:52) (51 - 82)  BP: 139/42 (26 Oct 2019 05:52) (139/42 - 164/75)  BP(mean): --  RR: 18 (26 Oct 2019 05:52) (18 - 19)  SpO2: 94% (26 Oct 2019 05:52) (94% - 98%)    REVIEW OF SYSTEMS:    CONSTITUTIONAL:  As per HPI.  HEENT:  Eyes:  No diplopia or blurred vision. ENT:  No earache, sore throat or runny nose.  CARDIOVASCULAR:  No pressure, squeezing, tightness, heaviness or aching about the chest, neck, axilla or epigastrium.  RESPIRATORY:  No cough, shortness of breath, PND or orthopnea.  GASTROINTESTINAL:  No nausea, vomiting or diarrhea.  GENITOURINARY:  No dysuria, frequency or urgency.  MUSCULOSKELETAL:  As per HPI.  SKIN:  No change in skin, hair or nails.  NEUROLOGIC:  No paresthesias, fasciculations, seizures or weakness.  PSYCHIATRIC:  No disorder of thought or mood.  ENDOCRINE:  No heat or cold intolerance, polyuria or polydipsia.  HEMATOLOGICAL:  No easy bruising or bleedings:  .     PHYSICAL EXAMINATION:    GENERAL APPEARANCE:  Pt. is not currently dyspneic, in no distress. Pt. is alert, oriented, and pleasant.  HEENT:  Pupils are normal and react normally. No icterus. Mucous membranes well colored.  NECK:  Supple. No lymphadenopathy. Jugular venous pressure not elevated. Carotids equal.   HEART:   The cardiac impulse has a normal quality. Regular. HR 60, 2/6 sys murmer.  CHEST:  Chest is clear to auscultation.   ABDOMEN:  Soft and nontender.   EXTREMITIES:  There is no cyanosis, clubbing. Chr LE swelling.  SKIN:  No rash or significant lesions are noted.  Neuro: Alert, awake, and O x 3.      LABS:                        11.0   11.72 )-----------( 291      ( 26 Oct 2019 07:19 )             35.8     10-26    142  |  106  |  20  ----------------------------<  129<H>  3.5   |  29  |  0.91    Ca    8.3<L>      26 Oct 2019 07:19  Phos  4.1     10-26  Mg     1.9     10-26    TPro  7.7  /  Alb  3.1<L>  /  TBili  0.6  /  DBili  x   /  AST  20  /  ALT  22  /  AlkPhos  89  10-25    LIVER FUNCTIONS - ( 25 Oct 2019 18:22 )  Alb: 3.1 g/dL / Pro: 7.7 gm/dL / ALK PHOS: 89 U/L / ALT: 22 U/L / AST: 20 U/L / GGT: x           PT/INR - ( 26 Oct 2019 07:19 )   PT: 25.8 sec;   INR: 2.27 ratio         PTT - ( 26 Oct 2019 07:19 )  PTT:37.0 sec  CARDIAC MARKERS ( 26 Oct 2019 07:19 )  0.049 ng/mL / x     / x     / x     / x      CARDIAC MARKERS ( 26 Oct 2019 00:28 )  0.052 ng/mL / x     / x     / x     / x      CARDIAC MARKERS ( 25 Oct 2019 18:22 )  0.016 ng/mL / x     / x     / x     / x            ABG - ( 26 Oct 2019 06:34 )  pH, Arterial: 7.44  pH, Blood: x     /  pCO2: 41    /  pO2: 75    / HCO3: 27    / Base Excess: 3.1   /  SaO2: 94    (on 4L/min NC O2).              RADIOLOGY & ADDITIONAL STUDIES:     EXAM:  XR CHEST PORTABLE IMMED 1V                            PROCEDURE DATE:  10/25/2019          INTERPRETATION:  HISTORY: ; ; SOB, HERNANDEZ;  TECHNIQUE: Portable frontal view of the chest, 1 view.  COMPARISON: 10/8/2019.  FINDINGS:   Prosthetic aorticvalve.  HEART:  Enlarged  LUNGS: There is opacity at the left base compatible with   effusion/infiltrate. Bronchovascular markings are prominent.     Patient is status post sternotomy with sternal wires present    IMPRESSION:     Congestive heart failure    Left effusion/infiltrate.          EXAM:  CT ANGIO CHEST (W)AW IC        PROCEDURE DATE:  Oct 13 2019         INTERPRETATION:  CLINICAL INFORMATION: Shortness of breath, elevated   gradient seen on recent echo. History of atrial fibrillation with left   heart failure. Pleural effusions.    COMPARISON: 2/8/2019    PROCEDURE:   CT Angiography of the Chest.  90 ml of Omnipaque 350 was injected intravenously. 10 ml were discarded.  Sagittal and coronal reformats were performed as well as 3D (MIP)   reconstructions.      FINDINGS:    LUNGS AND AIRWAYS: Patent central airways. Mild dependent atelectasis   seen within the dependent posterior aspect of the right lung. Scattered   mosaic appearance most significantly affecting the bilateral upper lobes.   A nodular within the right apex which measures 9 mm (5, 84) which does   not appear significantly changed from prior exam.    PLEURA: Left pleural effusion with compressive atelectasis. .    MEDIASTINUM AND MAITE: No lymphadenopathy.    VESSELS: Mildly limited study, no pulmonaryembolus or filling defect   seen within the main pulmonary arteries, segmental, or subsegmental   branches. The peripheral vessels are difficult to evaluate on this exam.   Atherosclerotic change of the aorta. Enlargement of the pulmonary artery   measuring up to 3.8 cm.    HEART: Cardiomegaly. No pericardial effusion. Mitral annular and aortic   valve repair. Coronary artery calcification.    CHEST WALL AND LOWER NECK: Sternotomy from CABG.    VISUALIZED UPPER ABDOMEN: Atherosclerotic change in the visualized upper   abdominal vessels.    BONES: Degenerative changes    IMPRESSION:     No definite evidence of pulmonary thromboembolic disease, although the   peripheral vessels are difficult to evaluate on this exam.    Diffuse mosaic attenuation of the bilateral upper lobes, moderate left   pleural effusion, and cardiomegaly suggesting mild congestive heart   failure.    Enlarged pulmonary artery, likely secondary to pulmonary hypertension.    Stable 9 mm right apical nodule. Detail Level: Detailed Detail Level: Zone

## 2021-01-12 NOTE — H&P ADULT - NSHPLANGPREFER_GEN_A_CORE
She should be ok until Vumerity comes in, if she develops new or worsening symptoms in the mean time let me know.    Rich

## 2021-02-20 NOTE — PROGRESS NOTE ADULT - PROBLEM SELECTOR PLAN 3
Assessment and plan-  Shortness of breath-etiology unclear, patient shows pulmonary congestion on the CT with bilateral basilar infiltrate/consolidation, patient started on diuretics, procalcitonin is normal BNP significantly elevated at 3500 troponins are normal, continue antibiotics for possible underlying pneumonia, continue diuretics, patient breathing improving  New onset congestive heart failure-cardiology consulted  History of hypertension-controlled at present  We will continue to follow closely, thanks for consultation  Chief complaint-shortness of breath    History of presenting illness-  Patient is 73-year-old female with history of hypertension hypercholesterolemia who came in with complaints of increasing shortness of breath for 3 weeks duration.  Patient also had abnormal EKG, chest x-ray showed pulmonary congestion with elevated BNP.  Patient had significant dust exposure from work done at  her house  Patient denied any focal weakness or numbness no new vision voice or hearing changes no chest pain no abdominal pain no frequency urgency dysuria no cough runny nose or sore throat no skin eruptions.  Pulmonary was consulted for the shortness of breath      Review of systems-- all systems negative other than what I enumerated in the hpi     Past Medical History  Past Medical History:   Diagnosis Date   • Essential (primary) hypertension    • High cholesterol          Surgical History  History reviewed. No pertinent surgical history.       Social History  Social History     Tobacco Use   • Smoking status: Never Smoker   • Smokeless tobacco: Never Used   Substance Use Topics   • Alcohol use: Yes     Alcohol/week: 1.0 - 2.0 standard drinks     Types: 1 - 2 Glasses of wine per week   • Drug use: Yes     Types: Marijuana         Family History  History reviewed. No pertinent family history.       Allergies  ALLERGIES:  Patient has no known allergies.        Inpatient Medications  Current  Facility-Administered Medications   Medication   • AMIODarone 150 mg in dextrose 100 mL bolus IVPB   • piperacillin-tazobactam (ZOSYN) 3.375 g in sodium chloride 0.9 % 100 mL IVPB   • furosemide (LASIX INJECT) injection 40 mg   • sodium chloride 0.9 % flush bag 25 mL   • sodium chloride (PF) 0.9 % injection 2 mL   • Potassium Standard Replacement Protocol   • Magnesium Standard Replacement Protocol   • ondansetron (ZOFRAN) injection 4 mg   • acetaminophen (TYLENOL) tablet 650 mg   • traMADol (ULTRAM) tablet 50 mg   • polyethylene glycol (MIRALAX) packet 17 g   • docusate sodium-sennosides (SENOKOT S) 50-8.6 MG 1 tablet   • aluminum-magnesium hydroxide-simethicone (MAALOX) 200-200-20 MG/5ML suspension 20 mL   • sodium chloride 0.9 % flush bag 25 mL   • sodium chloride (NORMAL SALINE) 0.9 % bolus 500 mL   • heparin (porcine) injection 5,000 Units          Lines/Tubes/Drain      In/Out  No intake or output data in the 24 hours ending 02/20/21 1019         Labs   Recent Results (from the past 72 hour(s))   POCT HEMOGLOBIN    Collection Time: 02/19/21 12:58 PM   Result Value Ref Range    POCT Hemoglobin 11.9 g/dL   POCT BLOOD SUGAR HAND HELD DEVICE    Collection Time: 02/19/21 12:59 PM   Result Value Ref Range    Glucose Blood,  (A) 65 - 99 mg/dL    FASTING STATUS, POC no    ELECTROCARDIOGRAM 12-LEAD    Collection Time: 02/19/21 12:59 PM   Result Value Ref Range    EKG                +     Electrocardiogram 12-Lead    Collection Time: 02/19/21  3:03 PM   Result Value Ref Range    Ventricular Rate EKG/Min (BPM) 105     Atrial Rate (BPM) 105     SD-Interval (MSEC) 134     QRS-Interval (MSEC) 90     QT-Interval (MSEC) 350     QTc 463     P Axis (Degrees) 75     R Axis (Degrees) 76     T Axis (Degrees) 75     REPORT TEXT       Sinus tachycardia  with  premature supraventricular complexes  Left ventricular hypertrophy  with repolarization abnormality  Septal infarct  , age undetermined  Abnormal ECG  No previous ECGs  available  Confirmed by VINH VIDAL MD ([id number]) on 2/19/2021 6:20:15 PM     Comprehensive Metabolic Panel    Collection Time: 02/19/21  3:08 PM   Result Value Ref Range    Fasting Status      Sodium 145 135 - 145 mmol/L    Potassium 3.5 3.4 - 5.1 mmol/L    Chloride 105 98 - 107 mmol/L    Carbon Dioxide 30 21 - 32 mmol/L    Anion Gap 14 10 - 20 mmol/L    Glucose 102 (H) 65 - 99 mg/dL    BUN 15 6 - 20 mg/dL    Creatinine 0.80 0.51 - 0.95 mg/dL    Glomerular Filtration Rate 84 (L) >90 mL/min/1.73m2    BUN/ Creatinine Ratio 19 7 - 25    Calcium 8.6 8.4 - 10.2 mg/dL    Bilirubin, Total 1.2 (H) 0.2 - 1.0 mg/dL    GOT/AST 93 (H) <=37 Units/L    GPT/ (H) <64 Units/L    Alkaline Phosphatase 200 (H) 45 - 117 Units/L    Albumin 3.4 (L) 3.6 - 5.1 g/dL    Protein, Total 7.1 6.4 - 8.2 g/dL    Globulin 3.7 2.0 - 4.0 g/dL    A/G Ratio 0.9 (L) 1.0 - 2.4   CBC with Automated Differential (performable only)    Collection Time: 02/19/21  3:08 PM   Result Value Ref Range    WBC 4.6 4.2 - 11.0 K/mcL    RBC 4.44 4.00 - 5.20 mil/mcL    HGB 12.7 12.0 - 15.5 g/dL    HCT 38.9 36.0 - 46.5 %    MCV 87.6 78.0 - 100.0 fl    MCH 28.6 26.0 - 34.0 pg    MCHC 32.6 32.0 - 36.5 g/dL    RDW-CV 14.6 11.0 - 15.0 %    RDW-SD 46.9 39.0 - 50.0 fL     140 - 450 K/mcL    NRBC 0 <=0 /100 WBC    Neutrophil, Percent 58 %    Lymphocytes, Percent 30 %    Mono, Percent 9 %    Eosinophils, Percent 2 %    Basophils, Percent 1 %    Immature Granulocytes 0 %    Absolute Neutrophils 2.7 1.8 - 7.7 K/mcL    Absolute Lymphocytes 1.4 1.0 - 4.0 K/mcL    Absolute Monocytes 0.4 0.3 - 0.9 K/mcL    Absolute Eosinophils  0.1 0.0 - 0.5 K/mcL    Absolute Basophils 0.1 0.0 - 0.3 K/mcL    Absolute Immmature Granulocytes 0.0 0.0 - 0.2 K/mcL   Troponin I Ultra Sensitive    Collection Time: 02/19/21  3:08 PM   Result Value Ref Range    Troponin I, Ultra Sensitive 0.05 (HH) <=0.04 ng/mL   NT proBNP    Collection Time: 02/19/21  3:08 PM   Result Value Ref Range    NT-proBNP  3,516 (H) <=125 pg/mL   Rapid SARS-CoV-2 by PCR    Collection Time: 02/19/21 11:47 PM    Specimen: Nasopharyngeal; Swab   Result Value Ref Range    Rapid SARS-COV-2 by PCR Not Detected Not Detected / Detected / Presumptive Positive / Inhibitors present    Isolation Guidelines      Procedural Comment     Troponin I Ultra Sensitive    Collection Time: 02/20/21 12:04 AM   Result Value Ref Range    Troponin I, Ultra Sensitive 0.05 (HH) <=0.04 ng/mL   D Dimer, Quantitative    Collection Time: 02/20/21  2:26 AM   Result Value Ref Range    D Dimer, Quantitative 2.21 (H) <0.57 mg/L (FEU)   ECG    Collection Time: 02/20/21  2:26 AM   Result Value Ref Range    Ventricular Rate EKG/Min (BPM) 114     Atrial Rate (BPM) 114     NC-Interval (MSEC) 138     QRS-Interval (MSEC) 96     QT-Interval (MSEC) 338     QTc 465     P Axis (Degrees) 60     R Axis (Degrees) 80     T Axis (Degrees) -111     REPORT TEXT       Sinus tachycardia  with frequent  premature ventricular complexes  Left ventricular hypertrophy  with repolarization abnormality  Abnormal ECG  When compared with ECG of  19-FEB-2021 15:03,  premature ventricular complexes  are now  present  premature supraventricular complexes  are no longer  present  T wave inversion now evident in  Inferior leads  Inverted T waves have replaced nonspecific T wave abnormality in  Lateral leads  Confirmed by YOUNG ALMAZAN, VINH ([id number]) on 2/20/2021 8:15:28 AM     BLOOD GAS, ARTERIAL WITH COOXIMETRY - RESPIRATORY    Collection Time: 02/20/21  3:18 AM   Result Value Ref Range    BASE EXCESS / DEFICIT, ARTERIAL - RESPIRATORY 1 -2 - 3 mmol/L    HCO3, ARTERIAL - RESPIRATORY 27 22 - 28 mmol/L    O2 CONTENT, ARTERIAL - RESPIRATORY 17 15 - 23 %    PCO2, ARTERIAL - RESPIRATORY 49 (H) 32 - 45 mm Hg    PH, ARTERIAL - RESPIRATORY 7.35 7.35 - 7.45 Units    PO2, ARTERIAL - RESPIRATORY 162 (H) 83 - 108 mm Hg    O2 SATURATION, ARTERIAL - RESPIRATORY 100 (H) 95 - 99 %    CONDITION - RESPIRATORY ;BIPAP 10 5 RR 14  FIO2 100%     CARBOXYHEMOGLOBIN - RESPIRATORY 1.8 1.5 - 15.0 %    FIO2 - RESPIRATORY 100 %    HEMOGLOBIN - RESPIRATORY 12.4 12.0 - 15.5 g/dL    METHEMOGLOBIN - RESPIRATORY 0.3 <=1.6 %    OXYHEMOGLOBIN, ARTERIAL - RESPIRATORY 97.8 94.0 - 98.0 %    P/F RATIO - RESPIRATORY 162 (L) 300 - 500    SITE - RESPIRATORY Right Radial     TEMPERATURE - RESPIRATORY 37.0 degrees   ELECTROLYTE PANEL - RESPIRATORY    Collection Time: 02/20/21  3:18 AM   Result Value Ref Range    SODIUM - RESPIRATORY 137 135 - 145 mmol/L    POTASSIUM - RESPIRATORY 2.9 (L) 3.4 - 5.1 mmol/L    CHLORIDE - RESPIRATORY 102 98 - 107 mmol/L   GLUCOSE - RESPIRATORY    Collection Time: 02/20/21  3:18 AM   Result Value Ref Range    GLUCOSE - RESPIRATORY 245 (H) 65 - 99 mg/dL   CALCIUM, IONIZED - RESPIRATORY    Collection Time: 02/20/21  3:18 AM   Result Value Ref Range    CALCIUM, IONIZED - RESPIRATORY 1.22 1.15 - 1.29 mmol/L   LACTIC ACID, ARTERIAL - RESPIRATORY    Collection Time: 02/20/21  3:18 AM   Result Value Ref Range    LACTIC ACID, ARTERIAL - RESPIRATORY 1.1 <1.6 mmol/L         Physical Exam  Vitals:    02/20/21 0644 02/20/21 0645 02/20/21 0744   Temp:   97.5 °F (36.4 °C)   Pulse: 87 70 (!) 54   Resp: 16 15    SpO2: 99%  100%   BP:  121/68 104/66          General: Awake, Alert, NAD.  Head: Normocephalic, Atraumatic,  Neck: Supple, non-tender. No cervical LAD.  ENT: MMM.  Pulm: Clear to Auscultation Bilaterally. No crackles or wheezing. No tachypnea or increased work of breathing.  Cardiac: S1 and S2 present. No murmurs or extra heart sounds appreciated.  Abdomen: Soft, non-tender, non-distended abdomen. + BS  Musculoskeletal: Warm, well perfused, non-tender, non-edematous LEs.  Skin: No facial rash.  Neuro: Alert and oriented. No gross motor deficits.   Psych: Cooperative        Imaging  CTA CHEST PULMONARY EMBOLISM W CONTRAST   Final Result   1. Hyperinflation with diffuse interstitial edema in both lungs, consolidation in the bases and small pleural  effusions.    2. Cardiomegaly.    3. Vascular calcifications including coronary artery calcifications.    .      Electronically Signed by: CINDI GUSTAFSON M.D.   Signed on: 02/20/2021 05:14 AM      XR CHEST PA AND LATERAL 2 VIEWS   Final Result   The heart is enlarged.  Thoracic aorta is tortuous.  Mild bilateral   perihilar interstitial opacities.  Lower lung interstitial opacities seen.          Electronically Signed by: HAYLEE PRATT MD    Signed on: 2/19/2021 3:16 PM              Microbiology Results     None                       continue statin

## 2021-10-05 NOTE — PHYSICAL THERAPY INITIAL EVALUATION ADULT - LEVEL OF INDEPENDENCE: STAND/SIT, REHAB EVAL
GYN PROGRESS NOTE    SUBJECTIVE  Bhaskar reports she is feeling much better. Her abdomen is still mildly tender, but improved overall. Reports some light bleeding. Tolerating general diet. No other concerns.    OBJECTIVE  Physical Exam  Visit Vitals  /74 (BP Location: LUE - Left upper extremity, Patient Position: Semi-Lipscomb's)   Pulse 65   Temp 97.2 °F (36.2 °C) (Temporal)   Resp 16   Ht 5' 5\" (1.651 m)   Wt 66 kg (145 lb 8.1 oz)   SpO2 97%   BMI 24.21 kg/m²     Temp (24hrs), Av.3 °F (36.8 °C), Min:96.9 °F (36.1 °C), Max:100.2 °F (37.9 °C)  Post-procedure Tmax was 100.2 at 1154 on 10/4  Last febrile (100.4 or higher) was 0937 on 10/4    General:  Alert and oriented x 3, in no apparent distress.  Lungs:  Unlabored, respiratory rate appears normal.  Cardiovascular:  Peripheral vasculature appears normal.  Abdomen: soft, non-distended, mildly tender in the midline lower abdomen. No rebound or guarding.  Extremities:  No edema.    No intake/output data recorded.  I/O last 3 completed shifts:  In: 5275.8 [P.O.:1630; I.V.:3369.9; IV Piggyback:275.9]  Out: 2100 [Urine:2100]      Labs:   Results for BHASKAR MATHEW (MRN [id number]) as of 10/5/2021 10:13   Ref. Range 10/5/2021 05:56   WBC Latest Ref Range: 4.2 - 11.0 K/mcL 5.0   RBC Latest Ref Range: 4.00 - 5.20 mil/mcL 3.95 (L)   HGB Latest Ref Range: 12.0 - 15.5 g/dL 11.6 (L)   HCT Latest Ref Range: 36.0 - 46.5 % 34.6 (L)   MCV Latest Ref Range: 78.0 - 100.0 fl 87.6   MCH Latest Ref Range: 26.0 - 34.0 pg 29.4   MCHC Latest Ref Range: 32.0 - 36.5 g/dL 33.5   RDW-SD Latest Ref Range: 39.0 - 50.0 fL 42.5   RDW-CV Latest Ref Range: 11.0 - 15.0 % 13.2   PLT Latest Ref Range: 140 - 450 K/mcL 136 (L)   NRBC Latest Ref Range: <=0 /100 WBC 0       ASSESSMENT  S/p suction D&C for possible retained products of conception resulting in septic Ab  On IV antibiotics   Improving      PLAN  Continue antibiotics for 48 hours afebrile.   Will need to discharge on PO antibiotics for 14  days, typically doxycycline 100 mg BID and flagyl 500 mg BID for 14 days. I typically  patients that they should take with food. Unfortunately nausea is a common side effect of this regimen, so I will often send with zofran ODT q 8 hrs PRN, and discuss that if nausea is an issue, they can take the zofran 30 minutes prior to their antibiotic dose. I will typically dispense 30 tabs.  Likely home tomorrow after 48 hours of IV antibiotics    Fanta Russell MD       supervision

## 2021-11-09 NOTE — PROGRESS NOTE ADULT - ASSESSMENT
76 yo F p/w worsening SOB/ HERNANDEZ
78 yo F p/w worsening SOB/ HERNANDEZ
Include Location In Plan?: No
Detail Level: Detailed
Additional Note: Reassured of benignity

## 2022-01-01 NOTE — PROVIDER CONTACT NOTE (OTHER) - SITUATION
This note was copied from the mother's chart. Infant Name: baby girl  Gestation: 38.4  Day of Life: 2  Birth weight: 7-7.9 lb (3400g)  Yesterday's weight: 7-6.5 lb (3360g)  Today's weight: 6-15.8 lb (3170g)  Weight loss: -6.76%  24 hour summary of feeds: breastfeeding x 7  Voids: 3  Stools: 2  Assistive device: nipple shield  Maternal History: , tonsillectomy  Maternal Medications: PNV  Maternal Goal: one day at a melina  Breast pump for home:  yes      Due to mother using a nipple shield and difficulty with latching, Encouraged mother to start pumping with our hospital grade electric pump and with her pump when she goes home. Instructions for set up and cleaning given. Instructed to breastfeed every 2-3 hours for 15-20 mins each side or on demand. Hand expression and breast compression encouraged to increase milk transfer while breastfeeding and pumping. Instructed to pump for 15 mins after breastfeeding, giving baby every drop of colostrum/EBM give baby every drop of EBM up to 2 oz. Reminded mother about supply and demand. Mother and baby will be discharged home, weight check to follow. Instructions and handouts given over management of sore nipples, engorgement, plugged ducts, mastitis, hydration, nutrition, and medications that could effect milk supply. Mother knows when to call MD if needed. All questions answered. Mother denies sore nipples. Lactation number and hours provided. Mother knows she can call and make appointment for pre and post feeding weights whenever needed or can call with questions or concerns her entire breastfeeding journey. All questions at this time answered. Support and Encouragement given. spoke with Angela in answering service. aware of consult

## 2022-01-16 NOTE — PATIENT PROFILE ADULT - NSPROPTRIGHTREPNAME_GEN_A__NUR
pre lunch blood sugar 493
patient blood glucose level 431 repeat immediately 451
patient blood glucose level 537
pre meal blood glucose for dinner 395
Fitz Long

## 2022-02-15 NOTE — ED ADULT NURSE NOTE - NSFALLRSKASSESSTYPE_ED_ALL_ED
Mohs Case Number: [id number] Previous Accession (Optional): [id number] Biopsy Photograph Reviewed: Yes Referring Physician (Optional): ARLETH Martines Consent Type: Consent 1 (Standard) Eye Shield Used: No Surgeon Performing Repair (Optional): Delma Initial Size Of Lesion: 0.6 Number Of Stages: 2 Primary Defect Length In Cm (Final Defect Size - Required For Flaps/Grafts): 0.9 Repair Type: Flap Oculoplastic Surgeon (A): Manjit Oculoplastic Surgeon Procedure Text (A): After obtaining clear surgical margins the patient was sent to oculoplastics for surgical repair.  The patient understands they will receive post-surgical care and follow-up from the referring physician's office. Otolaryngologist Procedure Text (A): After obtaining clear surgical margins the patient was sent to otolaryngology for surgical repair.  The patient understands they will receive post-surgical care and follow-up from the referring physician's office. Plastic Surgeon Procedure Text (A): After obtaining clear surgical margins the patient was sent to plastics for surgical repair.  The patient understands they will receive post-surgical care and follow-up from the referring physician's office. Mid-Level Procedure Text (A): After obtaining clear surgical margins the patient was sent to a mid-level provider for surgical repair.  The patient understands they will receive post-surgical care and follow-up from the mid-level provider. Provider Procedure Text (A): After obtaining clear surgical margins the defect was repaired by another provider. Asc Procedure Text (A): After obtaining clear surgical margins the patient was sent to an ASC for surgical repair.  The patient understands they will receive post-surgical care and follow-up from the ASC physician. Simple / Intermediate / Complex Repair - Final Wound Length In Cm: 0 Suturegard Retention Suture: 2-0 Nylon Retention Suture Bite Size: 3 mm Length To Time In Minutes Device Was In Place: 10 Number Of Hemigard Strips Per Side: 1 Undermining Type: Entire Wound Debridement Text: The wound edges were debrided prior to proceeding with the closure to facilitate wound healing. Helical Rim Text: The closure involved the helical rim. Vermilion Border Text: The closure involved the vermilion border. Nostril Rim Text: The closure involved the nostril rim. Retention Suture Text: Retention sutures were placed to support the closure and prevent dehiscence. Flap Type: Burow's Advancement Flap Secondary Defect Length In Cm (Required For Flaps): 3.2 Secondary Defect Width In Cm (Required For Flaps): 2.9 Area H Indication Text: Tumors in this location are included in Area H (eyelids, eyebrows, nose, lips, chin, ear, pre-auricular, post-auricular, temple, genitalia, hands, feet, ankles and areola).  Tissue conservation is critical in these anatomic locations. Area M Indication Text: Tumors in this location are included in Area M (cheek, forehead, scalp, neck, jawline and pretibial skin).  Mohs surgery is indicated for tumors in these anatomic locations. Area L Indication Text: Tumors in this location are included in Area L (trunk and extremities).  Mohs surgery is indicated for larger tumors, or tumors with aggressive histologic features, in these anatomic locations. Surgical Defect Length In Cm (Optional): 0.7 Special Stains Stage 1 - Results: Base On Clearance Noted Above Stage 2: Additional Anesthesia Type: 1% lidocaine with 1:100,000 epinephrine and 408mcg clindamycin/ml and a 1:10 solution of 8.4% sodium bicarbonate Stage 4: Additional Anesthesia Type: 1% lidocaine with epinephrine Include Tumor Staging In Mohs Note?: Please Select the Appropriate Response Staging Info: By selecting yes to the question above you will include information on AJCC 8 tumor staging in your Mohs note. Information on tumor staging will be automatically added for SCCs on the head and neck. AJCC 8 includes tumor size, tumor depth, perineural involvement and bone invasion. Tumor Depth: Less than 6mm from granular layer and no invasion beyond the subcutaneous fat Medical Necessity Statement: Based on my medical judgement, Mohs surgery is the most appropriate treatment for this cancer compared to other treatments. Alternatives Discussed Intro (Do Not Add Period): I discussed alternative treatments to Mohs surgery and specifically discussed the risks and benefits of Consent 1/Introductory Paragraph: The rationale for Mohs was explained to the patient and consent was obtained. The risks, benefits and alternatives to therapy were discussed in detail. Specifically, the risks of infection, scarring, bleeding, prolonged wound healing, incomplete removal, allergy to anesthesia, nerve injury and recurrence were addressed. Prior to the procedure, the treatment site was clearly identified and confirmed by the patient. All components of Universal Protocol/PAUSE Rule completed. Consent 2/Introductory Paragraph: Mohs surgery was explained to the patient and consent was obtained. The risks, benefits and alternatives to therapy were discussed in detail. Specifically, the risks of infection, scarring, bleeding, prolonged wound healing, incomplete removal, allergy to anesthesia, nerve injury and recurrence were addressed. Prior to the procedure, the treatment site was clearly identified and confirmed by the patient. All components of Universal Protocol/PAUSE Rule completed. Initial (On Arrival) Consent 3/Introductory Paragraph: I gave the patient a chance to ask questions they had about the procedure.  Following this I explained the Mohs procedure and consent was obtained. The risks, benefits and alternatives to therapy were discussed in detail. Specifically, the risks of infection, scarring, bleeding, prolonged wound healing, incomplete removal, allergy to anesthesia, nerve injury and recurrence were addressed. Prior to the procedure, the treatment site was clearly identified and confirmed by the patient. All components of Universal Protocol/PAUSE Rule completed. Consent (Temporal Branch)/Introductory Paragraph: The rationale for Mohs was explained to the patient and consent was obtained. The risks, benefits and alternatives to therapy were discussed in detail. Specifically, the risks of damage to the temporal branch of the facial nerve, infection, scarring, bleeding, prolonged wound healing, incomplete removal, allergy to anesthesia, and recurrence were addressed. Prior to the procedure, the treatment site was clearly identified and confirmed by the patient. All components of Universal Protocol/PAUSE Rule completed. Consent (Marginal Mandibular)/Introductory Paragraph: The rationale for Mohs was explained to the patient and consent was obtained. The risks, benefits and alternatives to therapy were discussed in detail. Specifically, the risks of damage to the marginal mandibular branch of the facial nerve, infection, scarring, bleeding, prolonged wound healing, incomplete removal, allergy to anesthesia, and recurrence were addressed. Prior to the procedure, the treatment site was clearly identified and confirmed by the patient. All components of Universal Protocol/PAUSE Rule completed. Consent (Spinal Accessory)/Introductory Paragraph: The rationale for Mohs was explained to the patient and consent was obtained. The risks, benefits and alternatives to therapy were discussed in detail. Specifically, the risks of damage to the spinal accessory nerve, infection, scarring, bleeding, prolonged wound healing, incomplete removal, allergy to anesthesia, and recurrence were addressed. Prior to the procedure, the treatment site was clearly identified and confirmed by the patient. All components of Universal Protocol/PAUSE Rule completed. Consent (Near Eyelid Margin)/Introductory Paragraph: The rationale for Mohs was explained to the patient and consent was obtained. The risks, benefits and alternatives to therapy were discussed in detail. Specifically, the risks of ectropion or eyelid deformity, infection, scarring, bleeding, prolonged wound healing, incomplete removal, allergy to anesthesia, nerve injury and recurrence were addressed. Prior to the procedure, the treatment site was clearly identified and confirmed by the patient. All components of Universal Protocol/PAUSE Rule completed. Consent (Ear)/Introductory Paragraph: The rationale for Mohs was explained to the patient and consent was obtained. The risks, benefits and alternatives to therapy were discussed in detail. Specifically, the risks of ear deformity, infection, scarring, bleeding, prolonged wound healing, incomplete removal, allergy to anesthesia, nerve injury and recurrence were addressed. Prior to the procedure, the treatment site was clearly identified and confirmed by the patient. All components of Universal Protocol/PAUSE Rule completed. Consent (Nose)/Introductory Paragraph: The rationale for Mohs was explained to the patient and consent was obtained. The risks, benefits and alternatives to therapy were discussed in detail. Specifically, the risks of nasal deformity, changes in the flow of air through the nose, infection, scarring, bleeding, prolonged wound healing, incomplete removal, allergy to anesthesia, nerve injury and recurrence were addressed. Prior to the procedure, the treatment site was clearly identified and confirmed by the patient. All components of Universal Protocol/PAUSE Rule completed. Consent (Lip)/Introductory Paragraph: The rationale for Mohs was explained to the patient and consent was obtained. The risks, benefits and alternatives to therapy were discussed in detail. Specifically, the risks of lip deformity, changes in the oral aperture, infection, scarring, bleeding, prolonged wound healing, incomplete removal, allergy to anesthesia, nerve injury and recurrence were addressed. Prior to the procedure, the treatment site was clearly identified and confirmed by the patient. All components of Universal Protocol/PAUSE Rule completed. Consent (Scalp)/Introductory Paragraph: The rationale for Mohs was explained to the patient and consent was obtained. The risks, benefits and alternatives to therapy were discussed in detail. Specifically, the risks of changes in hair growth pattern secondary to repair, infection, scarring, bleeding, prolonged wound healing, incomplete removal, allergy to anesthesia, nerve injury and recurrence were addressed. Prior to the procedure, the treatment site was clearly identified and confirmed by the patient. All components of Universal Protocol/PAUSE Rule completed. Detail Level: Detailed Postop Diagnosis: same Anesthesia Type: 0.5% lidocaine with 1:200,000 epinephrine and a 1:10 solution of 8.4% sodium bicarbonate and 408mcg clindamycin/ml Anesthesia Volume In Cc: 6 Hemostasis: Electrocautery Estimated Blood Loss (Cc): less than 5 cc Repair Anesthesia Method: local infiltration Brow Lift Text: A midfrontal incision was made medially to the defect to allow access to the tissues just superior to the left eyebrow. Following careful dissection inferiorly in a supraperiosteal plane to the level of the left eyebrow, several 3-0 monocryl sutures were used to resuspend the eyebrow orbicularis oculi muscular unit to the superior frontal bone periosteum. This resulted in an appropriate reapproximation of static eyebrow symmetry and correction of the left brow ptosis. Deep Sutures: 5-0 Polysorb Epidermal Sutures: 5-0 Prolene Epidermal Closure: running cuticular Suturegard Intro: Intraoperative tissue expansion was performed, utilizing the SUTUREGARD device, in order to reduce wound tension. Suturegard Body: The suture ends were repeatedly re-tightened and re-clamped to achieve the desired tissue expansion. Hemigard Intro: Due to skin fragility and wound tension, it was decided to use HEMIGARD adhesive retention suture devices to permit a linear closure. The skin was cleaned and dried for a 6cm distance away from the wound. Excessive hair, if present, was removed to allow for adhesion. Hemigard Postcare Instructions: The HEMIGARD strips are to remain completely dry for at least 5-7 days. Donor Site Anesthesia Type: same as repair anesthesia Graft Basting Suture (Optional): 5-0 Fast Absorbing Gut Graft Donor Site Epidermal Sutures (Optional): 5-0 Ethibond Epidermal Closure Graft Donor Site (Optional): simple interrupted Graft Donor Site Bandage (Optional-Leave Blank If You Don't Want In Note): Aquaphor and telefa placed on wound. Pressure dressing applied to donor site Closure 2 Information: This tab is for additional flaps and grafts, including complex repair and grafts and complex repair and flaps. You can also specify a different location for the additional defect, if the location is the same you do not need to select a new one. We will insert the automated text for the repair you select below just as we do for solitary flaps and grafts. Please note that at this time if you select a location with a different insurance zone you will need to override the ICD10 and CPT if appropriate. Closure 3 Information: This tab is for additional flaps and grafts above and beyond our usual structured repairs.  Please note if you enter information here it will not currently bill and you will need to add the billing information manually. Wound Care: Aquaphor Dressing: dry sterile dressing Wound Care (No Sutures): Petrolatum Suture Removal: 7 days Unna Boot Text: An Unna boot was placed to help immobilize the limb and facilitate more rapid healing. Home Suture Removal Text: Patient was provided instructions on removing sutures and will remove their sutures at home.  If they have any questions or difficulties they will call the office. Post-Care Instructions: I reviewed with the patient in detail post-care instructions. Patient is not to engage in any heavy lifting, exercise, or swimming for the next 14 days. Should the patient develop any fevers, chills, bleeding, severe pain patient will contact the office immediately. Pain Refusal Text: I offered to prescribe pain medication but the patient refused to take this medication. Mauc Instructions: By selecting yes to the question below the MAUC number will be added into the note.  This will be calculated automatically based on the diagnosis chosen, the size entered, the body zone selected (H,M,L) and the specific indications you chose. You will also have the option to override the Mohs AUC if you disagree with the automatically calculated number and this option is found in the Case Summary tab. Where Do You Want The Question To Include Opioid Counseling Located?: Case Summary Tab Eye Protection Verbiage: Before proceeding with the stage, a plastic scleral shield was inserted. The globe was anesthetized with a few drops of 1% lidocaine with 1:100,000 epinephrine. Then, an appropriate sized scleral shield was chosen and coated with lacrilube ointment. The shield was gently inserted and left in place for the duration of each stage. After the stage was completed, the shield was gently removed. Mohs Method Verbiage: An incision at a 45 degree angle following the standard Mohs approach was done and the specimen was harvested as a microscopic controlled layer. Surgeon/Pathologist Verbiage (Will Incorporate Name Of Surgeon From Intro If Not Blank): operated in two distinct and integrated capacities as the surgeon and pathologist. Mohs Histo Method Verbiage: Each section was then chromacoded and processed in the Mohs lab using the Mohs protocol and submitted for frozen section. Subsequent Stages Histo Method Verbiage: Using a similar technique to that described above, a thin layer of tissue was removed from all areas where tumor was visible on the previous stage.  The tissue was again oriented, mapped, dyed, and processed as above. Mohs Rapid Report Verbiage: The area of clinically evident tumor was marked with skin marking ink and appropriately hatched.  The initial incision was made following the Mohs approach through the skin.  The specimen was taken to the lab, divided into the necessary number of pieces, chromacoded and processed according to the Mohs protocol.  This was repeated in successive stages until a tumor free defect was achieved. Complex Repair Preamble Text (Leave Blank If You Do Not Want): Extensive wide undermining was performed at least 2 cm in all directions. Intermediate Repair Preamble Text (Leave Blank If You Do Not Want): Undermining was performed with blunt dissection. M-Plasty Complex Repair Preamble Text (Leave Blank If You Do Not Want): Extensive wide undermining was performed. Non-Graft Cartilage Fenestration Text: The cartilage was fenestrated with a 2mm punch biopsy to help facilitate healing. Graft Cartilage Fenestration Text: The cartilage was fenestrated with a 2mm punch biopsy to help facilitate graft survival and healing. Secondary Intention Text (Leave Blank If You Do Not Want): The defect will heal with secondary intention. No Repair - Repaired With Adjacent Surgical Defect Text (Leave Blank If You Do Not Want): After obtaining clear surgical margins the defect was repaired concurrently with another surgical defect which was in close approximation. Adjacent Tissue Transfer Text: The defect edges were debeveled with a #15 scalpel blade.  Given the location of the defect and the proximity to free margins an adjacent tissue transfer was deemed most appropriate.  Using a sterile surgical marker, an appropriate flap was drawn incorporating the defect and placing the expected incisions within the relaxed skin tension lines where possible.    The area thus outlined was incised deep to adipose tissue with a #15 scalpel blade.  The skin margins were undermined to an appropriate distance in all directions utilizing iris scissors. Advancement Flap (Single) Text: The defect edges were debeveled with a #15 scalpel blade.  Given the location of the defect and the proximity to free margins a single advancement flap was deemed most appropriate.  Using a sterile surgical marker, an appropriate advancement flap was drawn incorporating the defect and placing the expected incisions within the relaxed skin tension lines where possible.    The area thus outlined was incised deep to adipose tissue with a #15 scalpel blade.  The skin margins were undermined to an appropriate distance in all directions utilizing iris scissors. Advancement Flap (Double) Text: The defect edges were debeveled with a #15 scalpel blade.  Given the location of the defect and the proximity to free margins a double advancement flap was deemed most appropriate.  Using a sterile surgical marker, the appropriate advancement flaps were drawn incorporating the defect and placing the expected incisions within the relaxed skin tension lines where possible.    The area thus outlined was incised deep to adipose tissue with a #15 scalpel blade.  The skin margins were undermined to an appropriate distance in all directions utilizing iris scissors. Burow's Advancement Flap Text: The defect edges were debeveled with a #15 scalpel blade.  Given the location of the defect and the proximity to free margins a Burow's advancement flap was deemed most appropriate.  Using a sterile surgical marker, the appropriate advancement flap was drawn incorporating the defect and placing the expected incisions within the relaxed skin tension lines where possible.    The area thus outlined was incised deep to adipose tissue with a #15 scalpel blade.  The skin margins were undermined to an appropriate distance in all directions utilizing iris scissors. Chonodrocutaneous Helical Advancement Flap Text: The defect edges were debeveled with a #15 scalpel blade.  Given the location of the defect and the proximity to free margins a chondrocutaneous helical advancement flap was deemed most appropriate.  Using a sterile surgical marker, the appropriate advancement flap was drawn incorporating the defect and placing the expected incisions within the relaxed skin tension lines where possible.    The area thus outlined was incised deep to adipose tissue with a #15 scalpel blade.  The skin margins were undermined to an appropriate distance in all directions utilizing iris scissors. Crescentic Advancement Flap Text: The defect edges were debeveled with a #15 scalpel blade.  Given the location of the defect and the proximity to free margins a crescentic advancement flap was deemed most appropriate.  Using a sterile surgical marker, the appropriate advancement flap was drawn incorporating the defect and placing the expected incisions within the relaxed skin tension lines where possible.    The area thus outlined was incised deep to adipose tissue with a #15 scalpel blade.  The skin margins were undermined to an appropriate distance in all directions utilizing iris scissors. A-T Advancement Flap Text: The defect edges were debeveled with a #15 scalpel blade.  Given the location of the defect, shape of the defect and the proximity to free margins an A-T advancement flap was deemed most appropriate.  Using a sterile surgical marker, an appropriate advancement flap was drawn incorporating the defect and placing the expected incisions within the relaxed skin tension lines where possible.    The area thus outlined was incised deep to adipose tissue with a #15 scalpel blade.  The skin margins were undermined to an appropriate distance in all directions utilizing iris scissors. O-T Advancement Flap Text: The defect edges were debeveled with a #15 scalpel blade.  Given the location of the defect, shape of the defect and the proximity to free margins an O-T advancement flap was deemed most appropriate.  Using a sterile surgical marker, an appropriate advancement flap was drawn incorporating the defect and placing the expected incisions within the relaxed skin tension lines where possible.    The area thus outlined was incised deep to adipose tissue with a #15 scalpel blade.  The skin margins were undermined to an appropriate distance in all directions utilizing iris scissors. O-L Flap Text: The defect edges were debeveled with a #15 scalpel blade.  Given the location of the defect, shape of the defect and the proximity to free margins an O-L flap was deemed most appropriate.  Using a sterile surgical marker, an appropriate advancement flap was drawn incorporating the defect and placing the expected incisions within the relaxed skin tension lines where possible.    The area thus outlined was incised deep to adipose tissue with a #15 scalpel blade.  The skin margins were undermined to an appropriate distance in all directions utilizing iris scissors. O-Z Flap Text: The defect edges were debeveled with a #15 scalpel blade.  Given the location of the defect, shape of the defect and the proximity to free margins an O-Z flap was deemed most appropriate.  Using a sterile surgical marker, an appropriate transposition flap was drawn incorporating the defect and placing the expected incisions within the relaxed skin tension lines where possible. The area thus outlined was incised deep to adipose tissue with a #15 scalpel blade.  The skin margins were undermined to an appropriate distance in all directions utilizing iris scissors. Double O-Z Flap Text: The defect edges were debeveled with a #15 scalpel blade.  Given the location of the defect, shape of the defect and the proximity to free margins a Double O-Z flap was deemed most appropriate.  Using a sterile surgical marker, an appropriate transposition flap was drawn incorporating the defect and placing the expected incisions within the relaxed skin tension lines where possible. The area thus outlined was incised deep to adipose tissue with a #15 scalpel blade.  The skin margins were undermined to an appropriate distance in all directions utilizing iris scissors. V-Y Flap Text: The defect edges were debeveled with a #15 scalpel blade.  Given the location of the defect, shape of the defect and the proximity to free margins a V-Y flap was deemed most appropriate.  Using a sterile surgical marker, an appropriate advancement flap was drawn incorporating the defect and placing the expected incisions within the relaxed skin tension lines where possible.    The area thus outlined was incised deep to adipose tissue with a #15 scalpel blade.  The skin margins were undermined to an appropriate distance in all directions utilizing iris scissors. Advancement-Rotation Flap Text: The defect edges were debeveled with a #15 scalpel blade.  Given the location of the defect, shape of the defect and the proximity to free margins an advancement-rotation flap was deemed most appropriate.  Using a sterile surgical marker, an appropriate flap was drawn incorporating the defect and placing the expected incisions within the relaxed skin tension lines where possible. The area thus outlined was incised deep to adipose tissue with a #15 scalpel blade.  The skin margins were undermined to an appropriate distance in all directions utilizing iris scissors. Mercedes Flap Text: The defect edges were debeveled with a #15 scalpel blade.  Given the location of the defect, shape of the defect and the proximity to free margins a Mercedes flap was deemed most appropriate.  Using a sterile surgical marker, an appropriate advancement flap was drawn incorporating the defect and placing the expected incisions within the relaxed skin tension lines where possible. The area thus outlined was incised deep to adipose tissue with a #15 scalpel blade.  The skin margins were undermined to an appropriate distance in all directions utilizing iris scissors. Modified Advancement Flap Text: The defect edges were debeveled with a #15 scalpel blade.  Given the location of the defect, shape of the defect and the proximity to free margins a modified advancement flap was deemed most appropriate.  Using a sterile surgical marker, an appropriate advancement flap was drawn incorporating the defect and placing the expected incisions within the relaxed skin tension lines where possible.    The area thus outlined was incised deep to adipose tissue with a #15 scalpel blade.  The skin margins were undermined to an appropriate distance in all directions utilizing iris scissors. Mucosal Advancement Flap Text: Given the location of the defect, shape of the defect and the proximity to free margins a mucosal advancement flap was deemed most appropriate. Incisions were made with a 15 blade scalpel in the appropriate fashion along the cutaneous vermilion border and the mucosal lip. The remaining actinically damaged mucosal tissue was excised.  The mucosal advancement flap was then elevated to the gingival sulcus with care taken to preserve the neurovascular structures and advanced into the primary defect. Care was taken to ensure that precise realignment of the vermilion border was achieved. Peng Advancement Flap Text: The defect edges were debeveled with a #15 scalpel blade.  Given the location of the defect, shape of the defect and the proximity to free margins a Peng advancement flap was deemed most appropriate.  Using a sterile surgical marker, an appropriate advancement flap was drawn incorporating the defect and placing the expected incisions within the relaxed skin tension lines where possible. The area thus outlined was incised deep to adipose tissue with a #15 scalpel blade.  The skin margins were undermined to an appropriate distance in all directions utilizing iris scissors. Hatchet Flap Text: The defect edges were debeveled with a #15 scalpel blade.  Given the location of the defect, shape of the defect and the proximity to free margins a hatchet flap based from the glabella was deemed most appropriate.  Using a sterile surgical marker, an appropriate glabellar hatchet flap was drawn incorporating the defect and placing the expected incisions within the relaxed skin tension lines where possible.    The area thus outlined was incised deep to adipose tissue with a #15 scalpel blade.  The skin margins were undermined to an appropriate distance in all directions utilizing iris scissors. Rotation Flap Text: The defect edges were debeveled with a #15 scalpel blade.  Given the location of the defect, shape of the defect and the proximity to free margins a rotation flap was deemed most appropriate.  Using a sterile surgical marker, an appropriate rotation flap was drawn incorporating the defect and placing the expected incisions within the relaxed skin tension lines where possible.    The area thus outlined was incised deep to adipose tissue with a #15 scalpel blade.  The skin margins were undermined to an appropriate distance in all directions utilizing iris scissors. Spiral Flap Text: The defect edges were debeveled with a #15 scalpel blade.  Given the location of the defect, shape of the defect and the proximity to free margins a spiral flap was deemed most appropriate.  Using a sterile surgical marker, an appropriate rotation flap was drawn incorporating the defect and placing the expected incisions within the relaxed skin tension lines where possible. The area thus outlined was incised deep to adipose tissue with a #15 scalpel blade.  The skin margins were undermined to an appropriate distance in all directions utilizing iris scissors. Staged Advancement Flap Text: The defect edges were debeveled with a #15 scalpel blade.  Given the location of the defect, shape of the defect and the proximity to free margins a staged advancement flap was deemed most appropriate.  Using a sterile surgical marker, an appropriate advancement flap was drawn incorporating the defect and placing the expected incisions within the relaxed skin tension lines where possible. The area thus outlined was incised deep to adipose tissue with a #15 scalpel blade.  The skin margins were undermined to an appropriate distance in all directions utilizing iris scissors. Star Wedge Flap Text: The defect edges were debeveled with a #15 scalpel blade.  Given the location of the defect, shape of the defect and the proximity to free margins a star wedge flap was deemed most appropriate.  Using a sterile surgical marker, an appropriate rotation flap was drawn incorporating the defect and placing the expected incisions within the relaxed skin tension lines where possible. The area thus outlined was incised deep to adipose tissue with a #15 scalpel blade.  The skin margins were undermined to an appropriate distance in all directions utilizing iris scissors. Transposition Flap Text: The defect edges were debeveled with a #15 scalpel blade.  Given the location of the defect and the proximity to free margins a transposition flap was deemed most appropriate.  Using a sterile surgical marker, an appropriate transposition flap was drawn incorporating the defect.    The area thus outlined was incised deep to adipose tissue with a #15 scalpel blade.  The skin margins were undermined to an appropriate distance in all directions utilizing iris scissors. Muscle Hinge Flap Text: The defect edges were debeveled with a #15 scalpel blade.  Given the size, depth and location of the defect and the proximity to free margins a muscle hinge flap was deemed most appropriate.  Using a sterile surgical marker, an appropriate hinge flap was drawn incorporating the defect. The area thus outlined was incised with a #15 scalpel blade.  The skin margins were undermined to an appropriate distance in all directions utilizing iris scissors. Mustarde Flap Text: The defect edges were debeveled with a #15 scalpel blade.  Given the size, depth and location of the defect and the proximity to free margins a Mustarde flap was deemed most appropriate.  Using a sterile surgical marker, an appropriate flap was drawn incorporating the defect. The area thus outlined was incised with a #15 scalpel blade.  The skin margins were undermined to an appropriate distance in all directions utilizing iris scissors. Nasal Turnover Hinge Flap Text: The defect edges were debeveled with a #15 scalpel blade.  Given the size, depth, location of the defect and the defect being full thickness a nasal turnover hinge flap was deemed most appropriate.  Using a sterile surgical marker, an appropriate hinge flap was drawn incorporating the defect. The area thus outlined was incised with a #15 scalpel blade. The flap was designed to recreate the nasal mucosal lining and the alar rim. The skin margins were undermined to an appropriate distance in all directions utilizing iris scissors. Nasalis-Muscle-Based Myocutaneous Island Pedicle Flap Text: Using a #15 blade, an incision was made around the donor flap to the level of the nasalis muscle. Wide lateral undermining was then performed in both the subcutaneous plane above the nasalis muscle, and in a submuscular plane just above periosteum. This allowed the formation of a free nasalis muscle axial pedicle (based on the angular artery) which was still attached to the actual cutaneous flap, increasing its mobility and vascular viability. Hemostasis was obtained with pinpoint electrocoagulation. The flap was mobilized into position and the pivotal anchor points positioned and stabilized with buried interrupted sutures. Subcutaneous and dermal tissues were closed in a multilayered fashion with sutures. Tissue redundancies were excised, and the epidermal edges were apposed without significant tension and sutured with sutures. Orbicularis Oris Muscle Flap Text: The defect edges were debeveled with a #15 scalpel blade.  Given that the defect affected the competency of the oral sphincter an orbicularis oris muscle flap was deemed most appropriate to restore this competency and normal muscle function.  Using a sterile surgical marker, an appropriate flap was drawn incorporating the defect. The area thus outlined was incised with a #15 scalpel blade. Melolabial Transposition Flap Text: The defect edges were debeveled with a #15 scalpel blade.  Given the location of the defect and the proximity to free margins a melolabial flap was deemed most appropriate.  Using a sterile surgical marker, an appropriate melolabial transposition flap was drawn incorporating the defect.    The area thus outlined was incised deep to adipose tissue with a #15 scalpel blade.  The skin margins were undermined to an appropriate distance in all directions utilizing iris scissors. Rhombic Flap Text: The defect edges were debeveled with a #15 scalpel blade.  Given the location of the defect and the proximity to free margins a rhombic flap was deemed most appropriate.  Using a sterile surgical marker, an appropriate rhombic flap was drawn incorporating the defect.    The area thus outlined was incised deep to adipose tissue with a #15 scalpel blade.  The skin margins were undermined to an appropriate distance in all directions utilizing iris scissors. Rhomboid Transposition Flap Text: The defect edges were debeveled with a #15 scalpel blade.  Given the location of the defect and the proximity to free margins a rhomboid transposition flap was deemed most appropriate.  Using a sterile surgical marker, an appropriate rhomboid flap was drawn incorporating the defect.    The area thus outlined was incised deep to adipose tissue with a #15 scalpel blade.  The skin margins were undermined to an appropriate distance in all directions utilizing iris scissors. Bi-Rhombic Flap Text: The defect edges were debeveled with a #15 scalpel blade.  Given the location of the defect and the proximity to free margins a bi-rhombic flap was deemed most appropriate.  Using a sterile surgical marker, an appropriate rhombic flap was drawn incorporating the defect. The area thus outlined was incised deep to adipose tissue with a #15 scalpel blade.  The skin margins were undermined to an appropriate distance in all directions utilizing iris scissors. Helical Rim Advancement Flap Text: The defect edges were debeveled with a #15 blade scalpel.  Given the location of the defect and the proximity to free margins (helical rim) a double helical rim advancement flap was deemed most appropriate.  Using a sterile surgical marker, the appropriate advancement flaps were drawn incorporating the defect and placing the expected incisions between the helical rim and antihelix where possible.  The area thus outlined was incised through and through with a #15 scalpel blade.  With a skin hook and iris scissors, the flaps were gently and sharply undermined and freed up. Bilateral Helical Rim Advancement Flap Text: The defect edges were debeveled with a #15 blade scalpel.  Given the location of the defect and the proximity to free margins (helical rim) a bilateral helical rim advancement flap was deemed most appropriate.  Using a sterile surgical marker, the appropriate advancement flaps were drawn incorporating the defect and placing the expected incisions between the helical rim and antihelix where possible.  The area thus outlined was incised through and through with a #15 scalpel blade.  With a skin hook and iris scissors, the flaps were gently and sharply undermined and freed up. Ear Star Wedge Flap Text: The defect edges were debeveled with a #15 blade scalpel.  Given the location of the defect and the proximity to free margins (helical rim) an ear star wedge flap was deemed most appropriate.  Using a sterile surgical marker, the appropriate flap was drawn incorporating the defect and placing the expected incisions between the helical rim and antihelix where possible.  The area thus outlined was incised through and through with a #15 scalpel blade. Banner Transposition Flap Text: The defect edges were debeveled with a #15 scalpel blade.  Given the location of the defect and the proximity to free margins a Banner transposition flap was deemed most appropriate.  Using a sterile surgical marker, an appropriate flap drawn around the defect. The area thus outlined was incised deep to adipose tissue with a #15 scalpel blade.  The skin margins were undermined to an appropriate distance in all directions utilizing iris scissors. Bilobed Flap Text: The defect edges were debeveled with a #15 scalpel blade.  Given the location of the defect and the proximity to free margins a bilobe flap was deemed most appropriate.  Using a sterile surgical marker, an appropriate bilobe flap drawn around the defect.    The area thus outlined was incised deep to adipose tissue with a #15 scalpel blade.  The skin margins were undermined to an appropriate distance in all directions utilizing iris scissors. Bilobed Transposition Flap Text: The defect edges were debeveled with a #15 scalpel blade.  Given the location of the defect and the proximity to free margins a bilobed transposition flap was deemed most appropriate.  Using a sterile surgical marker, an appropriate bilobe flap drawn around the defect.    The area thus outlined was incised deep to adipose tissue with a #15 scalpel blade.  The skin margins were undermined to an appropriate distance in all directions utilizing iris scissors. Trilobed Flap Text: The defect edges were debeveled with a #15 scalpel blade.  Given the location of the defect and the proximity to free margins a trilobed flap was deemed most appropriate.  Using a sterile surgical marker, an appropriate trilobed flap drawn around the defect.    The area thus outlined was incised deep to adipose tissue with a #15 scalpel blade.  The skin margins were undermined to an appropriate distance in all directions utilizing iris scissors. Dorsal Nasal Flap Text: The defect edges were debeveled with a #15 scalpel blade.  Given the location of the defect and the proximity to free margins a dorsal nasal flap,based upon the glabellar folds, was deemed most appropriate.  Using a sterile surgical marker, an appropriate dorsal nasal flap was drawn around the defect.    The area thus outlined was incised deep to adipose tissue with a #15 scalpel blade.  The skin margins were undermined to an appropriate distance in all directions utilizing iris scissors. Island Pedicle Flap Text: The defect edges were debeveled with a #15 scalpel blade.  Given the location of the defect, shape of the defect and the proximity to free margins an island pedicle advancement flap was deemed most appropriate.  Using a sterile surgical marker, an appropriate advancement flap was drawn incorporating the defect, outlining the appropriate donor tissue and placing the expected incisions within the relaxed skin tension lines where possible.    The area thus outlined was incised deep to adipose tissue with a #15 scalpel blade.  The skin margins were undermined to an appropriate distance in all directions around the primary defect and laterally outward around the island pedicle utilizing iris scissors.  There was minimal undermining beneath the pedicle flap. Island Pedicle Flap With Canthal Suspension Text: The defect edges were debeveled with a #15 scalpel blade.  Given the location of the defect, shape of the defect and the proximity to free margins an island pedicle advancement flap was deemed most appropriate.  Using a sterile surgical marker, an appropriate advancement flap was drawn incorporating the defect, outlining the appropriate donor tissue and placing the expected incisions within the relaxed skin tension lines where possible. The area thus outlined was incised deep to adipose tissue with a #15 scalpel blade.  The skin margins were undermined to an appropriate distance in all directions around the primary defect and laterally outward around the island pedicle utilizing iris scissors.  There was minimal undermining beneath the pedicle flap. A suspension suture was placed in the canthal tendon to prevent tension and prevent ectropion. Alar Island Pedicle Flap Text: The defect edges were debeveled with a #15 scalpel blade.  Given the location of the defect, shape of the defect and the proximity to the alar rim an island pedicle advancement flap was deemed most appropriate.  Using a sterile surgical marker, an appropriate advancement flap was drawn incorporating the defect, outlining the appropriate donor tissue and placing the expected incisions within the nasal ala running parallel to the alar rim. The area thus outlined was incised with a #15 scalpel blade.  The skin margins were undermined minimally to an appropriate distance in all directions around the primary defect and laterally outward around the island pedicle utilizing iris scissors.  There was minimal undermining beneath the pedicle flap. Double Island Pedicle Flap Text: The defect edges were debeveled with a #15 scalpel blade.  Given the location of the defect, shape of the defect and the proximity to free margins a double island pedicle advancement flap was deemed most appropriate.  Using a sterile surgical marker, an appropriate advancement flap was drawn incorporating the defect, outlining the appropriate donor tissue and placing the expected incisions within the relaxed skin tension lines where possible.    The area thus outlined was incised deep to adipose tissue with a #15 scalpel blade.  The skin margins were undermined to an appropriate distance in all directions around the primary defect and laterally outward around the island pedicle utilizing iris scissors.  There was minimal undermining beneath the pedicle flap. Island Pedicle Flap-Requiring Vessel Identification Text: The defect edges were debeveled with a #15 scalpel blade.  Given the location of the defect, shape of the defect and the proximity to free margins an island pedicle advancement flap was deemed most appropriate.  Using a sterile surgical marker, an appropriate advancement flap was drawn, based on the axial vessel mentioned above, incorporating the defect, outlining the appropriate donor tissue and placing the expected incisions within the relaxed skin tension lines where possible.    The area thus outlined was incised deep to adipose tissue with a #15 scalpel blade.  The skin margins were undermined to an appropriate distance in all directions around the primary defect and laterally outward around the island pedicle utilizing iris scissors.  There was minimal undermining beneath the pedicle flap. Keystone Flap Text: The defect edges were debeveled with a #15 scalpel blade.  Given the location of the defect, shape of the defect a keystone flap was deemed most appropriate.  Using a sterile surgical marker, an appropriate keystone flap was drawn incorporating the defect, outlining the appropriate donor tissue and placing the expected incisions within the relaxed skin tension lines where possible. The area thus outlined was incised deep to adipose tissue with a #15 scalpel blade.  The skin margins were undermined to an appropriate distance in all directions around the primary defect and laterally outward around the flap utilizing iris scissors. O-T Plasty Text: The defect edges were debeveled with a #15 scalpel blade.  Given the location of the defect, shape of the defect and the proximity to free margins an O-T plasty was deemed most appropriate.  Using a sterile surgical marker, an appropriate O-T plasty was drawn incorporating the defect and placing the expected incisions within the relaxed skin tension lines where possible.    The area thus outlined was incised deep to adipose tissue with a #15 scalpel blade.  The skin margins were undermined to an appropriate distance in all directions utilizing iris scissors. O-Z Plasty Text: The defect edges were debeveled with a #15 scalpel blade.  Given the location of the defect, shape of the defect and the proximity to free margins an O-Z plasty (double transposition flap) was deemed most appropriate.  Using a sterile surgical marker, the appropriate transposition flaps were drawn incorporating the defect and placing the expected incisions within the relaxed skin tension lines where possible.    The area thus outlined was incised deep to adipose tissue with a #15 scalpel blade.  The skin margins were undermined to an appropriate distance in all directions utilizing iris scissors.  Hemostasis was achieved with electrocautery.  The flaps were then transposed into place, one clockwise and the other counterclockwise, and anchored with interrupted buried subcutaneous sutures. Double O-Z Plasty Text: The defect edges were debeveled with a #15 scalpel blade.  Given the location of the defect, shape of the defect and the proximity to free margins a Double O-Z plasty (double transposition flap) was deemed most appropriate.  Using a sterile surgical marker, the appropriate transposition flaps were drawn incorporating the defect and placing the expected incisions within the relaxed skin tension lines where possible. The area thus outlined was incised deep to adipose tissue with a #15 scalpel blade.  The skin margins were undermined to an appropriate distance in all directions utilizing iris scissors.  Hemostasis was achieved with electrocautery.  The flaps were then transposed into place, one clockwise and the other counterclockwise, and anchored with interrupted buried subcutaneous sutures. V-Y Plasty Text: The defect edges were debeveled with a #15 scalpel blade.  Given the location of the defect, shape of the defect and the proximity to free margins an V-Y advancement flap was deemed most appropriate.  Using a sterile surgical marker, an appropriate advancement flap was drawn incorporating the defect and placing the expected incisions within the relaxed skin tension lines where possible.    The area thus outlined was incised deep to adipose tissue with a #15 scalpel blade.  The skin margins were undermined to an appropriate distance in all directions utilizing iris scissors. H Plasty Text: Given the location of the defect, shape of the defect and the proximity to free margins a H-plasty was deemed most appropriate for repair.  Using a sterile surgical marker, the appropriate advancement arms of the H-plasty were drawn incorporating the defect and placing the expected incisions within the relaxed skin tension lines where possible. The area thus outlined was incised deep to adipose tissue with a #15 scalpel blade. The skin margins were undermined to an appropriate distance in all directions utilizing iris scissors.  The opposing advancement arms were then advanced into place in opposite direction and anchored with interrupted buried subcutaneous sutures. W Plasty Text: The lesion was extirpated to the level of the fat with a #15 scalpel blade.  Given the location of the defect, shape of the defect and the proximity to free margins a W-plasty was deemed most appropriate for repair.  Using a sterile surgical marker, the appropriate transposition arms of the W-plasty were drawn incorporating the defect and placing the expected incisions within the relaxed skin tension lines where possible.    The area thus outlined was incised deep to adipose tissue with a #15 scalpel blade.  The skin margins were undermined to an appropriate distance in all directions utilizing iris scissors.  The opposing transposition arms were then transposed into place in opposite direction and anchored with interrupted buried subcutaneous sutures. Z Plasty Text: The lesion was extirpated to the level of the fat with a #15 scalpel blade.  Given the location of the defect, shape of the defect and the proximity to free margins a Z-plasty was deemed most appropriate for repair.  Using a sterile surgical marker, the appropriate transposition arms of the Z-plasty were drawn incorporating the defect and placing the expected incisions within the relaxed skin tension lines where possible.    The area thus outlined was incised deep to adipose tissue with a #15 scalpel blade.  The skin margins were undermined to an appropriate distance in all directions utilizing iris scissors.  The opposing transposition arms were then transposed into place in opposite direction and anchored with interrupted buried subcutaneous sutures. Zygomaticofacial Flap Text: Given the location of the defect, shape of the defect and the proximity to free margins a zygomaticofacial flap was deemed most appropriate for repair.  Using a sterile surgical marker, the appropriate flap was drawn incorporating the defect and placing the expected incisions within the relaxed skin tension lines where possible. The area thus outlined was incised deep to adipose tissue with a #15 scalpel blade with preservation of a vascular pedicle.  The skin margins were undermined to an appropriate distance in all directions utilizing iris scissors.  The flap was then placed into the defect and anchored with interrupted buried subcutaneous sutures. Cheek Interpolation Flap Text: A decision was made to reconstruct the defect utilizing an interpolation axial flap and a staged reconstruction.  A telfa template was made of the defect.  This telfa template was then used to outline the Cheek Interpolation flap.  The donor area for the pedicle flap was then injected with anesthesia.  The flap was excised through the skin and subcutaneous tissue down to the layer of the underlying musculature.  The interpolation flap was carefully excised within this deep plane to maintain its blood supply.  The edges of the donor site were undermined.   The donor site was closed in a primary fashion.  The pedicle was then rotated into position and sutured.  Once the tube was sutured into place, adequate blood supply was confirmed with blanching and refill.  The pedicle was then wrapped with xeroform gauze and dressed appropriately with a telfa and gauze bandage to ensure continued blood supply and protect the attached pedicle. Cheek-To-Nose Interpolation Flap Text: A decision was made to reconstruct the defect utilizing an interpolation axial flap and a staged reconstruction.  A telfa template was made of the defect.  This telfa template was then used to outline the Cheek-To-Nose Interpolation flap.  The donor area for the pedicle flap was then injected with anesthesia.  The flap was excised through the skin and subcutaneous tissue down to the layer of the underlying musculature.  The interpolation flap was carefully excised within this deep plane to maintain its blood supply.  The edges of the donor site were undermined.   The donor site was closed in a primary fashion.  The pedicle was then rotated into position and sutured.  Once the tube was sutured into place, adequate blood supply was confirmed with blanching and refill.  The pedicle was then wrapped with xeroform gauze and dressed appropriately with a telfa and gauze bandage to ensure continued blood supply and protect the attached pedicle. Interpolation Flap Text: A decision was made to reconstruct the defect utilizing an interpolation axial flap and a staged reconstruction.  A telfa template was made of the defect.  This telfa template was then used to outline the interpolation flap.  The donor area for the pedicle flap was then injected with anesthesia.  The flap was excised through the skin and subcutaneous tissue down to the layer of the underlying musculature.  The interpolation flap was carefully excised within this deep plane to maintain its blood supply.  The edges of the donor site were undermined.   The donor site was closed in a primary fashion.  The pedicle was then rotated into position and sutured.  Once the tube was sutured into place, adequate blood supply was confirmed with blanching and refill.  The pedicle was then wrapped with xeroform gauze and dressed appropriately with a telfa and gauze bandage to ensure continued blood supply and protect the attached pedicle. Melolabial Interpolation Flap Text: A decision was made to reconstruct the defect utilizing an interpolation axial flap and a staged reconstruction.  A telfa template was made of the defect.  This telfa template was then used to outline the melolabial interpolation flap.  The donor area for the pedicle flap was then injected with anesthesia.  The flap was excised through the skin and subcutaneous tissue down to the layer of the underlying musculature.  The pedicle flap was carefully excised within this deep plane to maintain its blood supply.  The edges of the donor site were undermined.   The donor site was closed in a primary fashion.  The pedicle was then rotated into position and sutured.  Once the tube was sutured into place, adequate blood supply was confirmed with blanching and refill.  The pedicle was then wrapped with xeroform gauze and dressed appropriately with a telfa and gauze bandage to ensure continued blood supply and protect the attached pedicle. Mastoid Interpolation Flap Text: A decision was made to reconstruct the defect utilizing an interpolation axial flap and a staged reconstruction.  A telfa template was made of the defect.  This telfa template was then used to outline the mastoid interpolation flap.  The donor area for the pedicle flap was then injected with anesthesia.  The flap was excised through the skin and subcutaneous tissue down to the layer of the underlying musculature.  The pedicle flap was carefully excised within this deep plane to maintain its blood supply.  The edges of the donor site were undermined.   The donor site was closed in a primary fashion.  The pedicle was then rotated into position and sutured.  Once the tube was sutured into place, adequate blood supply was confirmed with blanching and refill.  The pedicle was then wrapped with xeroform gauze and dressed appropriately with a telfa and gauze bandage to ensure continued blood supply and protect the attached pedicle. Posterior Auricular Interpolation Flap Text: A decision was made to reconstruct the defect utilizing an interpolation axial flap and a staged reconstruction.  A telfa template was made of the defect.  This telfa template was then used to outline the posterior auricular interpolation flap.  The donor area for the pedicle flap was then injected with anesthesia.  The flap was excised through the skin and subcutaneous tissue down to the layer of the underlying musculature.  The pedicle flap was carefully excised within this deep plane to maintain its blood supply.  The edges of the donor site were undermined.   The donor site was closed in a primary fashion.  The pedicle was then rotated into position and sutured.  Once the tube was sutured into place, adequate blood supply was confirmed with blanching and refill.  The pedicle was then wrapped with xeroform gauze and dressed appropriately with a telfa and gauze bandage to ensure continued blood supply and protect the attached pedicle. Paramedian Forehead Flap Text: A decision was made to reconstruct the defect utilizing an interpolation axial flap and a staged reconstruction.  A telfa template was made of the defect.  This telfa template was then used to outline the paramedian forehead pedicle flap.  The donor area for the pedicle flap was then injected with anesthesia.  The flap was excised through the skin and subcutaneous tissue down to the layer of the underlying musculature.  The pedicle flap was carefully excised within this deep plane to maintain its blood supply.  The edges of the donor site were undermined.   The donor site was closed in a primary fashion.  The pedicle was then rotated into position and sutured.  Once the tube was sutured into place, adequate blood supply was confirmed with blanching and refill.  The pedicle was then wrapped with xeroform gauze and dressed appropriately with a telfa and gauze bandage to ensure continued blood supply and protect the attached pedicle. Cheiloplasty (Less Than 50%) Text: A decision was made to reconstruct the defect with a  cheiloplasty.  The defect was undermined extensively.  Additional obicularis oris muscle was excised with a 15 blade scalpel.  The defect was converted into a full thickness wedge, of less than 50% of the vertical height of the lip, to facilite a better cosmetic result.  Small vessels were then tied off with 5-0 monocyrl. The obicularis oris, superficial fascia, adipose and dermis were then reapproximated.  After the deeper layers were approximated the epidermis was reapproximated with particular care given to realign the vermilion border. Cheiloplasty (Complex) Text: A decision was made to reconstruct the defect with a  cheiloplasty.  The defect was undermined extensively.  Additional obicularis oris muscle was excised with a 15 blade scalpel.  The defect was converted into a full thickness wedge to facilite a better cosmetic result.  Small vessels were then tied off with 5-0 monocyrl. The obicularis oris, superficial fascia, adipose and dermis were then reapproximated.  After the deeper layers were approximated the epidermis was reapproximated with particular care given to realign the vermilion border. Ear Wedge Repair Text: A wedge excision was completed by carrying down an excision through the full thickness of the ear and cartilage with an inward facing Burow's triangle. The wound was then closed in a layered fashion. Full Thickness Lip Wedge Repair (Flap) Text: Given the location of the defect and the proximity to free margins a full thickness wedge repair was deemed most appropriate.  Using a sterile surgical marker, the appropriate repair was drawn incorporating the defect and placing the expected incisions perpendicular to the vermilion border.  The vermilion border was also meticulously outlined to ensure appropriate reapproximation during the repair.  The area thus outlined was incised through and through with a #15 scalpel blade.  The muscularis and dermis were reaproximated with deep sutures following hemostasis. Care was taken to realign the vermilion border before proceeding with the superficial closure.  Once the vermilion was realigned the superfical and mucosal closure was finished. Ftsg Text: The defect edges were debeveled with a #15 scalpel blade.  Given the location of the defect, shape of the defect and the proximity to free margins a full thickness skin graft was deemed most appropriate.  Using a sterile surgical marker, the primary defect shape was transferred to the donor site. The area thus outlined was incised deep to adipose tissue with a #15 scalpel blade.  The harvested graft was then trimmed of adipose tissue until only dermis and epidermis was left.  The skin margins of the secondary defect were undermined to an appropriate distance in all directions utilizing iris scissors.  The secondary defect was closed with interrupted buried subcutaneous sutures.  The skin edges were then re-apposed with running  sutures.  The skin graft was then placed in the primary defect and oriented appropriately. Split-Thickness Skin Graft Text: The defect edges were debeveled with a #15 scalpel blade.  Given the location of the defect, shape of the defect and the proximity to free margins a split thickness skin graft was deemed most appropriate.  Using a sterile surgical marker, the primary defect shape was transferred to the donor site. The split thickness graft was then harvested.  The skin graft was then placed in the primary defect and oriented appropriately. Burow's Graft Text: The defect edges were debeveled with a #15 scalpel blade.  Given the location of the defect, shape of the defect, the proximity to free margins and the presence of a standing cone deformity a Burow's skin graft was deemed most appropriate. The standing cone was removed and this tissue was then trimmed to the shape of the primary defect. The adipose tissue was also removed until only dermis and epidermis were left.  The skin margins of the secondary defect were undermined to an appropriate distance in all directions utilizing iris scissors.  The secondary defect was closed with interrupted buried subcutaneous sutures.  The skin edges were then re-apposed with running  sutures.  The skin graft was then placed in the primary defect and oriented appropriately. Cartilage Graft Text: The defect edges were debeveled with a #15 scalpel blade.  Given the location of the defect, shape of the defect, the fact the defect involved a full thickness cartilage defect a cartilage graft was deemed most appropriate.  An appropriate donor site was identified, cleansed, and anesthetized. The cartilage graft was then harvested and transferred to the recipient site, oriented appropriately and then sutured into place.  The secondary defect was then repaired using a primary closure. Composite Graft Text: The defect edges were debeveled with a #15 scalpel blade.  Given the location of the defect, shape of the defect, the proximity to free margins and the fact the defect was full thickness a composite graft was deemed most appropriate.  The defect was outline and then transferred to the donor site.  A full thickness graft was then excised from the donor site. The graft was then placed in the primary defect, oriented appropriately and then sutured into place.  The secondary defect was then repaired using a primary closure. Epidermal Autograft Text: The defect edges were debeveled with a #15 scalpel blade.  Given the location of the defect, shape of the defect and the proximity to free margins an epidermal autograft was deemed most appropriate.  Using a sterile surgical marker, the primary defect shape was transferred to the donor site. The epidermal graft was then harvested.  The skin graft was then placed in the primary defect and oriented appropriately. Dermal Autograft Text: The defect edges were debeveled with a #15 scalpel blade.  Given the location of the defect, shape of the defect and the proximity to free margins a dermal autograft was deemed most appropriate.  Using a sterile surgical marker, the primary defect shape was transferred to the donor site. The area thus outlined was incised deep to adipose tissue with a #15 scalpel blade.  The harvested graft was then trimmed of adipose and epidermal tissue until only dermis was left.  The skin graft was then placed in the primary defect and oriented appropriately. Skin Substitute Text: The defect edges were debeveled with a #15 scalpel blade.  Given the location of the defect, shape of the defect and the proximity to free margins a skin substitute graft was deemed most appropriate.  The graft material was trimmed to fit the size of the defect. The graft was then placed in the primary defect and oriented appropriately. Tissue Cultured Epidermal Autograft Text: The defect edges were debeveled with a #15 scalpel blade.  Given the location of the defect, shape of the defect and the proximity to free margins a tissue cultured epidermal autograft was deemed most appropriate.  The graft was then trimmed to fit the size of the defect.  The graft was then placed in the primary defect and oriented appropriately. Xenograft Text: The defect edges were debeveled with a #15 scalpel blade.  Given the location of the defect, shape of the defect and the proximity to free margins a xenograft was deemed most appropriate.  The graft was then trimmed to fit the size of the defect.  The graft was then placed in the primary defect and oriented appropriately. Purse String (Simple) Text: Given the location of the defect and the characteristics of the surrounding skin a purse string closure was deemed most appropriate.  Undermining was performed circumfirentially around the surgical defect.  A purse string suture was then placed and tightened. Purse String (Intermediate) Text: Given the location of the defect and the characteristics of the surrounding skin a purse string intermediate closure was deemed most appropriate.  Undermining was performed circumfirentially around the surgical defect.  A purse string suture was then placed and tightened. Partial Purse String (Simple) Text: Given the location of the defect and the characteristics of the surrounding skin a simple purse string closure was deemed most appropriate.  Undermining was performed circumfirentially around the surgical defect.  A purse string suture was then placed and tightened. Wound tension only allowed a partial closure of the circular defect. Partial Purse String (Intermediate) Text: Given the location of the defect and the characteristics of the surrounding skin an intermediate purse string closure was deemed most appropriate.  Undermining was performed circumfirentially around the surgical defect.  A purse string suture was then placed and tightened. Wound tension only allowed a partial closure of the circular defect. Localized Dermabrasion With Wire Brush Text: The patient was draped in routine manner.  Localized dermabrasion using 3 x 17 mm wire brush was performed in routine manner to papillary dermis. This spot dermabrasion is being performed to complete skin cancer reconstruction. It also will eliminate the other sun damaged precancerous cells that are known to be part of the regional effect of a lifetime's worth of sun exposure. This localized dermabrasion is therapeutic and should not be considered cosmetic in any regard. Tarsorrhaphy Text: A tarsorrhaphy was performed using Frost sutures. Complex Repair And Flap Additional Text (Will Appearing After The Standard Complex Repair Text): The complex repair was not sufficient to completely close the primary defect. The remaining additional defect was repaired with the flap mentioned below. Complex Repair And Graft Additional Text (Will Appearing After The Standard Complex Repair Text): The complex repair was not sufficient to completely close the primary defect. The remaining additional defect was repaired with the graft mentioned below. Unique Flap 1 Name: Myocutaneous Island pedicle Flap Unique Flap 2 Name: Peng Flap Unique Flap 3 Name: Mercedes Flap Unique Flap 4 Name: Banner Flap Unique Flap 5 Name: tunneled myocutaneous flap Unique Flap 6 Name: Susan-B?ch flap Unique Flap 7 Name: Mustarde flap Unique Flap 8 Name: East to West Flap Unique Flap 1 Text: A decision was made to reconstruct the defect utilizing a myocutaneous Island pedicle Flap based on the levator labii superioris muscle.  A telfa template was made of the defect.  This telfa template was then used to outline the myocutaneous flap, based along the meilolabial fold.  The donor area for the pedicle flap was then injected with anesthesia.  The flap was excised through the skin and subcutaneous tissue down to the layer of the underlying musculature.  The myocutaneous flap was carefully excised within this deep plane to maintain its blood supply. Based on the muscle. The edges of the donor site were undermined.   The donor site was closed in a primary fashion to the point of transposition.  The pedicle was then transposed into position and sutured.  Once the flap was sutured into place, adequate blood supply was confirmed with blanching and refill. Unique Flap 2 Text: A decision was made to reconstruct the defect utilizing a Peng Flap (Bilateral Advancement Rotation Flap). Given the location of the defect and the proximity to free margins, this flap was deemed most appropriate.  Using a sterile surgical marker, the appropriate rotation flaps were drawn incorporating the defect and placing the expected incisions within the relaxed skin tension lines where possible.    The area thus outlined was incised deep to adipose tissue with a #15 scalpel blade.  The skin margins were undermined to an appropriate distance in all directions utilizing iris scissors. Unique Flap 3 Text: The defect edges were debeveled with a #15 scalpel blade.  Given the location of the defect, shape of the defect and the proximity to free margins a Mercedes (double advancement flap) was deemed most appropriate.  Using a sterile surgical marker, the appropriate transposition flaps were drawn incorporating the defect and placing the expected incisions within the relaxed skin tension lines where possible.    The area thus outlined was incised deep to adipose tissue with a #15 scalpel blade.  The skin margins were undermined to an appropriate distance in all directions utilizing iris scissors.  Hemostasis was achieved with electrocautery.  The flaps were then advanced into the defect and anchored with interrupted buried subcutaneous sutures. Unique Flap 4 Text: The defect edges were debeveled with a #15 scalpel blade.  Given the location of the defect and the proximity to free margins a Banner transposition flap was deemed most appropriate.  Using a sterile surgical marker, an appropriate Banner transposition flap was drawn incorporating the defect.    The area thus outlined was incised deep to adipose tissue with a #15 scalpel blade.  The skin margins were undermined to an appropriate distance in all directions utilizing iris scissors. Unique Flap 5 Text: A decision was made to reconstruct the defect utilizing a tunneled myocutaneous Island pedicle Flap based on the anterior auricularis muscle.  A telfa template was made of the defect.  This telfa template was then used to outline the myocutaneous flap, based along the preauricular fold.  The donor area for the pedicle flap was then injected with anesthesia.  The flap was excised through the skin and subcutaneous tissue down to the layer of the underlying musculature.  The myocutaneous flap was carefully excised within this deep plane to maintain its blood supply based on the muscle. The edges of the donor site were undermined.   The donor site was closed in a primary fashion to the point of transposition.  The pedicle was then transposed through a tunnel into position and sutured.  Once the flap was sutured into place, adequate blood supply was confirmed with blanching and refill. Unique Flap 6 Text: A decision was made to reconstruct the defect utilizing an Anti-aging-B?ch Flap (Bilateral helical Advancement Rotation Flap). Given the location of the defect and the proximity to free margins, this flap was deemed most appropriate.  Using a sterile surgical marker, the appropriate flaps were drawn incorporating the defect and placing the expected incisions within the relaxed skin tension lines where possible.  The area thus outlined was incised deep to adipose tissue with a #15 scalpel blade.  The skin margins were undermined to an appropriate distance in all directions utilizing iris scissors. Cartilage was incorporated into the flap arms to maintain helical anatomy. Unique Flap 7 Text: A decision was made to reconstruct the defect utilizing a Mustarde Flap (Advancement Rotation Flap). Given the location of the defect and the proximity to free margins, this flap was deemed most appropriate.  Using a sterile surgical marker, the appropriate rotation flap was drawn incorporating the defect and placing the expected incisions within the relaxed skin tension lines where possible.    The area thus outlined was incised deep to adipose tissue with a #15 scalpel blade.  The skin margins were undermined to an appropriate distance in all directions utilizing iris scissors. The flap was advanced and rotated under the eyelid with a sling created laterally to keep ectropion minimal. Unique Flap 8 Text: A decision was made to reconstruct the defect utilizing an East to West Flap (Modified Burows Advancement Flap). Given the location of the defect and the proximity to free margins, this flap was deemed most appropriate.  Using a sterile surgical marker, the appropriate advancement flaps were drawn incorporating the defect and placing the expected incisions within the relaxed skin tension lines where possible.    The area thus outlined was incised deep to adipose tissue with a #15 scalpel blade.  The skin margins were undermined to an appropriate distance in all directions utilizing iris scissors. Minimal alar distortion was created with flap approximation. Manual Repair Warning Statement: We plan on removing the manually selected variable below in favor of our much easier automatic structured text blocks found in the previous tab. We decided to do this to help make the flow better and give you the full power of structured data. Manual selection is never going to be ideal in our platform and I would encourage you to avoid using manual selection from this point on, especially since I will be sunsetting this feature. It is important that you do one of two things with the customized text below. First, you can save all of the text in a word file so you can have it for future reference. Second, transfer the text to the appropriate area in the Library tab. Lastly, if there is a flap or graft type which we do not have you need to let us know right away so I can add it in before the variable is hidden. No need to panic, we plan to give you roughly 6 months to make the change. Same Histology In Subsequent Stages Text: The pattern and morphology of the tumor is as described in the first stage. No Residual Tumor Seen Histology Text: There were no malignant cells seen in the sections examined. Inflammation Suggestive Of Cancer Camouflage Histology Text: There was a dense lymphocytic infiltrate which prevented adequate histologic evaluation of adjacent structures. Bcc Histology Text: There were numerous aggregates of basaloid cells. Bcc Infiltrative Histology Text: There were numerous aggregates of basaloid cells demonstrating an infiltrative pattern. Mart-1 - Positive Histology Text: MART-1 staining demonstrates areas of higher density and clustering of melanocytes with Pagetoid spread upwards within the epidermis. The surgical margins are positive for tumor cells. Mart-1 - Negative Histology Text: MART-1 staining demonstrates a normal density and pattern of melanocytes along the dermal-epidermal junction. The surgical margins are negative for tumor cells. Information: Selecting Yes will display possible errors in your note based on the variables you have selected. This validation is only offered as a suggestion for you. PLEASE NOTE THAT THE VALIDATION TEXT WILL BE REMOVED WHEN YOU FINALIZE YOUR NOTE. IF YOU WANT TO FAX A PRELIMINARY NOTE YOU WILL NEED TO TOGGLE THIS TO 'NO' IF YOU DO NOT WANT IT IN YOUR FAXED NOTE. Body Location Override (Optional - Billing Will Still Be Based On Selected Body Map Location If Applicable): left nasal dorsum Mohs Case Number: [id number] Previous Accession (Optional): [id number] Primary Defect Length In Cm (Final Defect Size - Required For Flaps/Grafts): 1.1 Repair Type: No repair - repaired with adjacent defect Surgical Defect Width In Cm (Optional): 0.8 Surgical Defect Width In Cm (Optional): 1.0

## 2022-03-27 ENCOUNTER — INPATIENT (INPATIENT)
Facility: HOSPITAL | Age: 82
LOS: 16 days | Discharge: HOME HEALTH | End: 2022-04-13
Attending: INTERNAL MEDICINE | Admitting: INTERNAL MEDICINE
Payer: MEDICARE

## 2022-03-27 VITALS
OXYGEN SATURATION: 93 % | SYSTOLIC BLOOD PRESSURE: 188 MMHG | HEART RATE: 58 BPM | DIASTOLIC BLOOD PRESSURE: 67 MMHG | HEIGHT: 62 IN | TEMPERATURE: 99 F | RESPIRATION RATE: 20 BRPM

## 2022-03-27 DIAGNOSIS — E11.9 TYPE 2 DIABETES MELLITUS WITHOUT COMPLICATIONS: ICD-10-CM

## 2022-03-27 DIAGNOSIS — Z29.9 ENCOUNTER FOR PROPHYLACTIC MEASURES, UNSPECIFIED: ICD-10-CM

## 2022-03-27 DIAGNOSIS — I51.3 INTRACARDIAC THROMBOSIS, NOT ELSEWHERE CLASSIFIED: ICD-10-CM

## 2022-03-27 DIAGNOSIS — Z95.4 PRESENCE OF OTHER HEART-VALVE REPLACEMENT: Chronic | ICD-10-CM

## 2022-03-27 DIAGNOSIS — I63.9 CEREBRAL INFARCTION, UNSPECIFIED: ICD-10-CM

## 2022-03-27 DIAGNOSIS — I50.23 ACUTE ON CHRONIC SYSTOLIC (CONGESTIVE) HEART FAILURE: ICD-10-CM

## 2022-03-27 DIAGNOSIS — J96.01 ACUTE RESPIRATORY FAILURE WITH HYPOXIA: ICD-10-CM

## 2022-03-27 DIAGNOSIS — I10 ESSENTIAL (PRIMARY) HYPERTENSION: ICD-10-CM

## 2022-03-27 DIAGNOSIS — J96.02 ACUTE RESPIRATORY FAILURE WITH HYPERCAPNIA: ICD-10-CM

## 2022-03-27 DIAGNOSIS — I50.33 ACUTE ON CHRONIC DIASTOLIC (CONGESTIVE) HEART FAILURE: ICD-10-CM

## 2022-03-27 DIAGNOSIS — R06.89 OTHER ABNORMALITIES OF BREATHING: ICD-10-CM

## 2022-03-27 DIAGNOSIS — J90 PLEURAL EFFUSION, NOT ELSEWHERE CLASSIFIED: ICD-10-CM

## 2022-03-27 DIAGNOSIS — E78.5 HYPERLIPIDEMIA, UNSPECIFIED: ICD-10-CM

## 2022-03-27 DIAGNOSIS — I48.20 CHRONIC ATRIAL FIBRILLATION, UNSPECIFIED: ICD-10-CM

## 2022-03-27 DIAGNOSIS — R21 RASH AND OTHER NONSPECIFIC SKIN ERUPTION: ICD-10-CM

## 2022-03-27 DIAGNOSIS — D72.10 EOSINOPHILIA, UNSPECIFIED: ICD-10-CM

## 2022-03-27 DIAGNOSIS — I27.20 PULMONARY HYPERTENSION, UNSPECIFIED: ICD-10-CM

## 2022-03-27 DIAGNOSIS — I25.10 ATHEROSCLEROTIC HEART DISEASE OF NATIVE CORONARY ARTERY WITHOUT ANGINA PECTORIS: ICD-10-CM

## 2022-03-27 PROBLEM — Z95.3 PRESENCE OF XENOGENIC HEART VALVE: Chronic | Status: ACTIVE | Noted: 2019-10-26

## 2022-03-27 LAB
ALBUMIN SERPL ELPH-MCNC: 3.3 G/DL — SIGNIFICANT CHANGE UP (ref 3.3–5)
ALP SERPL-CCNC: 127 U/L — HIGH (ref 40–120)
ALT FLD-CCNC: 18 U/L — SIGNIFICANT CHANGE UP (ref 4–33)
ANION GAP SERPL CALC-SCNC: 13 MMOL/L — SIGNIFICANT CHANGE UP (ref 7–14)
APTT BLD: 38.9 SEC — HIGH (ref 27–36.3)
AST SERPL-CCNC: 22 U/L — SIGNIFICANT CHANGE UP (ref 4–32)
BASOPHILS # BLD AUTO: 0.09 K/UL — SIGNIFICANT CHANGE UP (ref 0–0.2)
BASOPHILS NFR BLD AUTO: 1 % — SIGNIFICANT CHANGE UP (ref 0–2)
BILIRUB SERPL-MCNC: 1 MG/DL — SIGNIFICANT CHANGE UP (ref 0.2–1.2)
BLOOD GAS VENOUS COMPREHENSIVE RESULT: SIGNIFICANT CHANGE UP
BUN SERPL-MCNC: 19 MG/DL — SIGNIFICANT CHANGE UP (ref 7–23)
CALCIUM SERPL-MCNC: 8.7 MG/DL — SIGNIFICANT CHANGE UP (ref 8.4–10.5)
CHLORIDE SERPL-SCNC: 101 MMOL/L — SIGNIFICANT CHANGE UP (ref 98–107)
CO2 SERPL-SCNC: 25 MMOL/L — SIGNIFICANT CHANGE UP (ref 22–31)
CREAT SERPL-MCNC: 0.99 MG/DL — SIGNIFICANT CHANGE UP (ref 0.5–1.3)
EGFR: 57 ML/MIN/1.73M2 — LOW
EOSINOPHIL # BLD AUTO: 0.69 K/UL — HIGH (ref 0–0.5)
EOSINOPHIL NFR BLD AUTO: 7.5 % — HIGH (ref 0–6)
FLUAV AG NPH QL: SIGNIFICANT CHANGE UP
FLUBV AG NPH QL: SIGNIFICANT CHANGE UP
GLUCOSE SERPL-MCNC: 126 MG/DL — HIGH (ref 70–99)
HCT VFR BLD CALC: 39 % — SIGNIFICANT CHANGE UP (ref 34.5–45)
HGB BLD-MCNC: 12.4 G/DL — SIGNIFICANT CHANGE UP (ref 11.5–15.5)
IANC: 6.04 K/UL — SIGNIFICANT CHANGE UP (ref 1.8–7.4)
IMM GRANULOCYTES NFR BLD AUTO: 0.4 % — SIGNIFICANT CHANGE UP (ref 0–1.5)
INR BLD: 2 RATIO — HIGH (ref 0.88–1.16)
LIDOCAIN IGE QN: 11 U/L — SIGNIFICANT CHANGE UP (ref 7–60)
LYMPHOCYTES # BLD AUTO: 1.3 K/UL — SIGNIFICANT CHANGE UP (ref 1–3.3)
LYMPHOCYTES # BLD AUTO: 14.1 % — SIGNIFICANT CHANGE UP (ref 13–44)
MAGNESIUM SERPL-MCNC: 1.6 MG/DL — SIGNIFICANT CHANGE UP (ref 1.6–2.6)
MCHC RBC-ENTMCNC: 28.6 PG — SIGNIFICANT CHANGE UP (ref 27–34)
MCHC RBC-ENTMCNC: 31.8 GM/DL — LOW (ref 32–36)
MCV RBC AUTO: 89.9 FL — SIGNIFICANT CHANGE UP (ref 80–100)
MONOCYTES # BLD AUTO: 1.04 K/UL — HIGH (ref 0–0.9)
MONOCYTES NFR BLD AUTO: 11.3 % — SIGNIFICANT CHANGE UP (ref 2–14)
NEUTROPHILS # BLD AUTO: 6.04 K/UL — SIGNIFICANT CHANGE UP (ref 1.8–7.4)
NEUTROPHILS NFR BLD AUTO: 65.7 % — SIGNIFICANT CHANGE UP (ref 43–77)
NRBC # BLD: 0 /100 WBCS — SIGNIFICANT CHANGE UP
NRBC # FLD: 0 K/UL — SIGNIFICANT CHANGE UP
NT-PROBNP SERPL-SCNC: 6309 PG/ML — HIGH
PLATELET # BLD AUTO: 154 K/UL — SIGNIFICANT CHANGE UP (ref 150–400)
POTASSIUM SERPL-MCNC: 4.2 MMOL/L — SIGNIFICANT CHANGE UP (ref 3.5–5.3)
POTASSIUM SERPL-SCNC: 4.2 MMOL/L — SIGNIFICANT CHANGE UP (ref 3.5–5.3)
PROT SERPL-MCNC: 6.6 G/DL — SIGNIFICANT CHANGE UP (ref 6–8.3)
PROTHROM AB SERPL-ACNC: 23.4 SEC — HIGH (ref 10.5–13.4)
RBC # BLD: 4.34 M/UL — SIGNIFICANT CHANGE UP (ref 3.8–5.2)
RBC # FLD: 15.6 % — HIGH (ref 10.3–14.5)
RSV RNA NPH QL NAA+NON-PROBE: SIGNIFICANT CHANGE UP
SARS-COV-2 RNA SPEC QL NAA+PROBE: SIGNIFICANT CHANGE UP
SODIUM SERPL-SCNC: 139 MMOL/L — SIGNIFICANT CHANGE UP (ref 135–145)
TROPONIN T, HIGH SENSITIVITY RESULT: 37 NG/L — SIGNIFICANT CHANGE UP
TROPONIN T, HIGH SENSITIVITY RESULT: 37 NG/L — SIGNIFICANT CHANGE UP
WBC # BLD: 9.2 K/UL — SIGNIFICANT CHANGE UP (ref 3.8–10.5)
WBC # FLD AUTO: 9.2 K/UL — SIGNIFICANT CHANGE UP (ref 3.8–10.5)

## 2022-03-27 PROCEDURE — 99223 1ST HOSP IP/OBS HIGH 75: CPT | Mod: GC

## 2022-03-27 PROCEDURE — 99285 EMERGENCY DEPT VISIT HI MDM: CPT

## 2022-03-27 PROCEDURE — 71250 CT THORAX DX C-: CPT | Mod: 26

## 2022-03-27 PROCEDURE — 71045 X-RAY EXAM CHEST 1 VIEW: CPT | Mod: 26

## 2022-03-27 RX ORDER — AMLODIPINE BESYLATE 2.5 MG/1
1 TABLET ORAL
Qty: 0 | Refills: 0 | DISCHARGE

## 2022-03-27 RX ORDER — FUROSEMIDE 40 MG
40 TABLET ORAL
Refills: 0 | Status: DISCONTINUED | OUTPATIENT
Start: 2022-03-27 | End: 2022-03-27

## 2022-03-27 RX ORDER — GABAPENTIN 400 MG/1
100 CAPSULE ORAL THREE TIMES A DAY
Refills: 0 | Status: DISCONTINUED | OUTPATIENT
Start: 2022-03-27 | End: 2022-04-05

## 2022-03-27 RX ORDER — LOSARTAN POTASSIUM 100 MG/1
1 TABLET, FILM COATED ORAL
Qty: 0 | Refills: 0 | DISCHARGE

## 2022-03-27 RX ORDER — FERROUS SULFATE 325(65) MG
1 TABLET ORAL
Qty: 0 | Refills: 0 | DISCHARGE

## 2022-03-27 RX ORDER — DEXTROSE 50 % IN WATER 50 %
15 SYRINGE (ML) INTRAVENOUS ONCE
Refills: 0 | Status: DISCONTINUED | OUTPATIENT
Start: 2022-03-27 | End: 2022-04-06

## 2022-03-27 RX ORDER — DEXTROSE 50 % IN WATER 50 %
25 SYRINGE (ML) INTRAVENOUS ONCE
Refills: 0 | Status: DISCONTINUED | OUTPATIENT
Start: 2022-03-27 | End: 2022-04-06

## 2022-03-27 RX ORDER — ISOSORBIDE DINITRATE 5 MG/1
1 TABLET ORAL
Qty: 0 | Refills: 0 | DISCHARGE

## 2022-03-27 RX ORDER — ACETAMINOPHEN 500 MG
650 TABLET ORAL EVERY 6 HOURS
Refills: 0 | Status: DISCONTINUED | OUTPATIENT
Start: 2022-03-27 | End: 2022-04-05

## 2022-03-27 RX ORDER — ASPIRIN/CALCIUM CARB/MAGNESIUM 324 MG
81 TABLET ORAL DAILY
Refills: 0 | Status: DISCONTINUED | OUTPATIENT
Start: 2022-03-27 | End: 2022-04-05

## 2022-03-27 RX ORDER — FUROSEMIDE 40 MG
1 TABLET ORAL
Qty: 0 | Refills: 0 | DISCHARGE

## 2022-03-27 RX ORDER — GLUCAGON INJECTION, SOLUTION 0.5 MG/.1ML
1 INJECTION, SOLUTION SUBCUTANEOUS ONCE
Refills: 0 | Status: DISCONTINUED | OUTPATIENT
Start: 2022-03-27 | End: 2022-04-06

## 2022-03-27 RX ORDER — ISOSORBIDE DINITRATE 5 MG/1
10 TABLET ORAL
Refills: 0 | Status: DISCONTINUED | OUTPATIENT
Start: 2022-03-27 | End: 2022-03-28

## 2022-03-27 RX ORDER — FUROSEMIDE 40 MG
40 TABLET ORAL
Refills: 0 | Status: DISCONTINUED | OUTPATIENT
Start: 2022-03-27 | End: 2022-03-28

## 2022-03-27 RX ORDER — FUROSEMIDE 40 MG
40 TABLET ORAL ONCE
Refills: 0 | Status: COMPLETED | OUTPATIENT
Start: 2022-03-27 | End: 2022-03-27

## 2022-03-27 RX ORDER — CARVEDILOL PHOSPHATE 80 MG/1
12.5 CAPSULE, EXTENDED RELEASE ORAL EVERY 12 HOURS
Refills: 0 | Status: DISCONTINUED | OUTPATIENT
Start: 2022-03-27 | End: 2022-03-28

## 2022-03-27 RX ORDER — WARFARIN SODIUM 2.5 MG/1
2 TABLET ORAL ONCE
Refills: 0 | Status: COMPLETED | OUTPATIENT
Start: 2022-03-27 | End: 2022-03-27

## 2022-03-27 RX ORDER — SODIUM CHLORIDE 9 MG/ML
1000 INJECTION, SOLUTION INTRAVENOUS
Refills: 0 | Status: DISCONTINUED | OUTPATIENT
Start: 2022-03-27 | End: 2022-04-05

## 2022-03-27 RX ORDER — LOSARTAN POTASSIUM 100 MG/1
25 TABLET, FILM COATED ORAL DAILY
Refills: 0 | Status: DISCONTINUED | OUTPATIENT
Start: 2022-03-27 | End: 2022-03-28

## 2022-03-27 RX ORDER — INSULIN LISPRO 100/ML
VIAL (ML) SUBCUTANEOUS
Refills: 0 | Status: DISCONTINUED | OUTPATIENT
Start: 2022-03-27 | End: 2022-04-06

## 2022-03-27 RX ORDER — ATORVASTATIN CALCIUM 80 MG/1
20 TABLET, FILM COATED ORAL AT BEDTIME
Refills: 0 | Status: DISCONTINUED | OUTPATIENT
Start: 2022-03-27 | End: 2022-04-05

## 2022-03-27 RX ORDER — LANOLIN ALCOHOL/MO/W.PET/CERES
3 CREAM (GRAM) TOPICAL AT BEDTIME
Refills: 0 | Status: DISCONTINUED | OUTPATIENT
Start: 2022-03-27 | End: 2022-04-05

## 2022-03-27 RX ORDER — ATORVASTATIN CALCIUM 80 MG/1
1 TABLET, FILM COATED ORAL
Qty: 0 | Refills: 0 | DISCHARGE

## 2022-03-27 RX ORDER — GABAPENTIN 400 MG/1
1 CAPSULE ORAL
Qty: 0 | Refills: 0 | DISCHARGE

## 2022-03-27 RX ORDER — INSULIN LISPRO 100/ML
VIAL (ML) SUBCUTANEOUS AT BEDTIME
Refills: 0 | Status: DISCONTINUED | OUTPATIENT
Start: 2022-03-27 | End: 2022-04-06

## 2022-03-27 RX ORDER — FERROUS SULFATE 325(65) MG
325 TABLET ORAL DAILY
Refills: 0 | Status: DISCONTINUED | OUTPATIENT
Start: 2022-03-27 | End: 2022-04-05

## 2022-03-27 RX ORDER — ONDANSETRON 8 MG/1
4 TABLET, FILM COATED ORAL EVERY 8 HOURS
Refills: 0 | Status: DISCONTINUED | OUTPATIENT
Start: 2022-03-27 | End: 2022-03-27

## 2022-03-27 RX ORDER — DEXTROSE 50 % IN WATER 50 %
12.5 SYRINGE (ML) INTRAVENOUS ONCE
Refills: 0 | Status: DISCONTINUED | OUTPATIENT
Start: 2022-03-27 | End: 2022-04-13

## 2022-03-27 RX ORDER — FOLIC ACID 0.8 MG
1 TABLET ORAL DAILY
Refills: 0 | Status: DISCONTINUED | OUTPATIENT
Start: 2022-03-27 | End: 2022-04-05

## 2022-03-27 RX ADMIN — WARFARIN SODIUM 2 MILLIGRAM(S): 2.5 TABLET ORAL at 20:59

## 2022-03-27 RX ADMIN — Medication 40 MILLIGRAM(S): at 19:28

## 2022-03-27 RX ADMIN — CARVEDILOL PHOSPHATE 12.5 MILLIGRAM(S): 80 CAPSULE, EXTENDED RELEASE ORAL at 21:12

## 2022-03-27 RX ADMIN — ATORVASTATIN CALCIUM 20 MILLIGRAM(S): 80 TABLET, FILM COATED ORAL at 21:12

## 2022-03-27 RX ADMIN — GABAPENTIN 100 MILLIGRAM(S): 400 CAPSULE ORAL at 21:12

## 2022-03-27 NOTE — ED ADULT NURSE NOTE - OBJECTIVE STATEMENT
Receive pt in spot 11 alert and oriented x 4 c/o sob x 3 days 02 90%  RA, and nausea.  Denies chest pain, cough, fever chills.  Resp labored.  Pt NSR on cardiac monitoring.  B/L LE edema and B/L UE arm noted.  Pt with multiple scratch yesenia and redness noted  .  Daughter at bedside, Humberto Long  [phone]  translated . Pt Serbian speaking   IV 20 G L H .  Meds given

## 2022-03-27 NOTE — H&P ADULT - NSHPLABSRESULTS_GEN_ALL_CORE
--personally interpreted EKG    --personally reviewed labs below:      --personally reviewed --personally interpreted EKG    --personally interpreted CXR: clear rt lung, moderate size loculated pleural effusion on the lt with resulting atelectasis    --personally reviewed labs below:                          12.4   9.20  )-----------( 154      ( 27 Mar 2022 18:14 )             39.0   03-27    139  |  101  |  19  ----------------------------<  126<H>  4.2   |  25  |  0.99    Ca    8.7      27 Mar 2022 18:14  Mg     1.60     03-27    TPro  6.6  /  Alb  3.3  /  TBili  1.0  /  DBili  x   /  AST  22  /  ALT  18  /  AlkPhos  127<H>  03-27 --personally interpreted EKG Afib with slow ventricular response, 54bpm, qtc 421, no acute Tw or ST changes     --personally interpreted CXR: clear rt lung, moderate size loculated pleural effusion on the lt with resulting atelectasis    --personally reviewed labs below:                          12.4   9.20  )-----------( 154      ( 27 Mar 2022 18:14 )             39.0   03-27    139  |  101  |  19  ----------------------------<  126<H>  4.2   |  25  |  0.99    Ca    8.7      27 Mar 2022 18:14  Mg     1.60     03-27    TPro  6.6  /  Alb  3.3  /  TBili  1.0  /  DBili  x   /  AST  22  /  ALT  18  /  AlkPhos  127<H>  03-27

## 2022-03-27 NOTE — ED ADULT NURSE NOTE - CHIEF COMPLAINT QUOTE
Maori speaking patient brought to the ed by her daughter for SOB  x 3 days and nausea, O2 sat  = 90-92 % in triage. Patient has extensive cardiac history, no chest pain .

## 2022-03-27 NOTE — H&P ADULT - PROBLEM SELECTOR PLAN 1
Acute on chronic diastolic HF likely in setting of decreased diuretic dose  Volume overloaded on exam with JVD and peripheral nonpitting edema to thighs  -Lasix 40 IV BID  -daily weights  -Intake and output strict  -elevate head of bed  -supplemental o2 PRN  -elevate head of bed  -monitor on telemetry and continuous pulse ox  -fluid restrict to 1500ml, DASH diet  -TTE Acute on chronic HFpEF likely in setting of decreased diuretic dose and severe pulm HTN as evidenced by prior TTE dated 10/10/2019;  Volume overloaded on exam with JVD and peripheral nonpitting edema to thighs  -Started Lasix 40 IV BID  -daily weights  -Intake and output strict  -elevate head of bed  -supplemental o2 PRN  -elevate head of bed  -monitor on telemetry and continuous pulse ox  -fluid restrict to 1500ml, DASH diet  -TTE  -Cardiology c/s in AM

## 2022-03-27 NOTE — H&P ADULT - NSHPSOCIALHISTORY_GEN_ALL_CORE
Lives with son in [facility] although travels to see daughter  Medication administration currently assisted by son  Ambulatory at baseline refuses to use her cane, denies hx of falls  Previously immigrated from [place] ~10 y ago    No alcohol, tobacco, or drug use Lives with son in [facility] although travels to see daughter  Medication administration currently assisted by son  Ambulatory at baseline refuses to use her cane, denies hx of falls  Previously immigrated from [place] ~10 y ago    Reports no h/o alcohol, tobacco, or drug use Lives with son in [facility] although travels to see daughter  Medication administration currently assisted by son  Ambulatory at baseline refuses to use her cane, denies hx of falls  Previously immigrated from [place] ~10 y ago  Reports no h/o alcohol, tobacco, or drug use

## 2022-03-27 NOTE — H&P ADULT - PROBLEM SELECTOR PLAN 3
-further characterize with CT chest non con  if unresolving or worsening hypoxia or hypercapnia could consider thoracentesis. Persisting loculated effusion with hypercapnia  -further characterize with CT chest non con  -if nonresolving or worsening hypoxia/ hypercapnia consider thoracentesis  -f/u CT chest  -VS q4h with pulse-ox

## 2022-03-27 NOTE — H&P ADULT - PROBLEM SELECTOR PROBLEM 1
Acute on chronic diastolic congestive heart failure Acute on chronic heart failure with preserved ejection fraction

## 2022-03-27 NOTE — ED PROVIDER NOTE - CLINICAL SUMMARY MEDICAL DECISION MAKING FREE TEXT BOX
81 F with extensive cardiac history, severe pulm HTN, T2DM, Afib on coumadin, presenting with SOB and nausea. Pt usually with SOB at baseline. Nausea started 3 days ago, no vomiting. 81 F with extensive cardiac history, severe pulm HTN, T2DM, Afib on coumadin, presenting with SOB and nausea. Pt usually with SOB at baseline. Nausea started 3 days ago, no vomiting. Rash appears chronic, ongoing for past year. SOB may be due to underlying pulm HTN, however patient with O2 requirement here. Ddx include HF exacerbation, viral illness. CXR demonstrating stable L loculated pleural effusion. EKG without acute changes. Will check labs, coags. Lower suspicion for PE at this time, but consider CTA if INR subtherapeutic or patient becomes tachycardic or hypotensive.

## 2022-03-27 NOTE — H&P ADULT - HISTORY OF PRESENT ILLNESS
81y F pmhx CAD (CABG 2000, stent 2011), Bioprosthetic aortic and mitral valve replacement 9/2014, ?CKD per outside records, afib on warfarin, T2DM, HTN, HLD, gout who presents for 3-4d history of worsening dyspnea on exertion and lower extremity swelling as well as 1 year history of generalized rash. Usual state of health until 3-4d prior when daughter noticed increasing HERNANDEZ on ambulation to the bathroom not accompanied by chest pain, no vomiting, no neck or arm pain, no posterior headache, no blurry vision. Has nausea at baseline and has had poor po tolerance and a chronic component of fatigue over past years. Denies dietary indiscretion, no recent illness, no recent change to her medication although notes months to years ago had been taking lasix 40mg BID and is currently taking once daily dosing.     Collateral provided by daughter at bedside, prior [facility] records, and paper discharge summary provided by daughter from [facility]    Patient refused professional translation services and translation was provided in Lithuanian by daughter at bedside    CARDIOLOGIST Dr. Sean Robbins [phone]  PMD Dr. Jeff Sanchez [phone] 81y F pmhx CAD (CABG 2000, stent 2011), Bioprosthetic aortic and mitral valve replacement 9/2014, ?CKD per outside records, afib on warfarin, chronic L loculated pleural effusion,  T2DM, HTN, HLD, gout who presents for 3-4d history of worsening dyspnea on exertion and lower extremity swelling as well as 1 year history of generalized rash. Usual state of health until 3-4d prior when daughter noticed increasing HERNANDEZ on ambulation to the bathroom not accompanied by chest pain, no vomiting, no neck or arm pain, no posterior headache, no blurry vision. Has nausea at baseline and has had poor po tolerance and a chronic component of fatigue over past years. Denies dietary indiscretion, no recent illness, no recent change to her medication although notes months to years ago had been taking lasix 40mg BID and is currently taking once daily dosing. Nonadherent to home o2. Denies orthopnea sleeps on 1-2 pillows per night with no recent increase. Denies cough.     BP baseline has been 160s-180s systolic per daughter and is consistent with BP reading in ED. She denies symptomatic hypertension. Previously discontinued hydralazine ~1y ago for concerns relating to her generalized rash. Previously discharged on losartan January 2022 with thirty day supply and since discontinued the medication unclear if due to lack of refill versus DC by medical team.     Collateral provided by daughter at bedside, prior [facility] records, and paper discharge summary provided by daughter from [facility]. Medication reconciliation provided from those same sources with option to further verify in the AM with her PMD and cardiologist as below. Last dose of all of her medications was today and states that she takes warfarin at night.     Generalized excoriated rash has been present for ~1y duration and is present on shoulders, buttocks, thighs, arms, and perhaps even "all over" as per daughter. At times with breaking of skin and exposed areas. Poor hand washing hygiene and itching per daughter.     Patient refused professional translation services and translation was provided in Icelandic by daughter at bedside    CARDIOLOGIST Dr. Sean Robbins [phone]  PMD Dr. Jeff Sanchez [phone] 81y F pmhx HFpEF ef 65% CAD (CABG 2000, stent 2011), Bioprosthetic aortic and mitral valve replacement 9/2014, ?CKD per outside records, afib on warfarin, chronic L loculated pleural effusion,  T2DM, HTN, HLD, gout who presents for 3-4d history of worsening dyspnea on exertion and lower extremity swelling as well as 1 year history of generalized rash. Usual state of health until 3-4d prior when daughter noticed increasing HERNANDEZ on ambulation to the bathroom not accompanied by chest pain, no vomiting, no neck or arm pain, no posterior headache, no blurry vision. Has nausea at baseline and has had poor po tolerance and a chronic component of fatigue over past years. Denies dietary indiscretion, no recent illness, no recent change to her medication although notes months to years ago had been taking lasix 40mg BID and is currently taking once daily dosing. Nonadherent to home o2. Denies orthopnea sleeps on 1-2 pillows per night with no recent increase. Denies cough.     BP baseline has been 160s-180s systolic per daughter and is consistent with BP reading in ED. She denies symptomatic hypertension. Previously discontinued hydralazine ~1y ago for concerns relating to her generalized rash. Previously discharged on losartan January 2022 with thirty day supply and since discontinued the medication unclear if due to lack of refill versus DC by medical team.     Collateral provided by daughter at bedside, prior [facility] records, and paper discharge summary provided by daughter from [facility]. Medication reconciliation provided from those same sources with option to further verify in the AM with her PMD and cardiologist as below. Last dose of all of her medications was today and states that she takes warfarin at night.     Generalized excoriated rash has been present for ~1y duration and is present on shoulders, buttocks, thighs, arms, and perhaps even "all over" as per daughter. At times with breaking of skin and exposed areas. Poor hand washing hygiene and itching per daughter.     Patient refused professional translation services and translation was provided in Bulgarian by daughter at bedside    CARDIOLOGIST Dr. Sean Robbins [phone]  PMD Dr. Jeff Sanchez [phone] 80yo Female with mhx of HFpEF ef 65% CAD (CABG 2000, stent 2011), severe pulm HTN, Bioprosthetic aortic and mitral valve replacement 9/2014, ?CKD per outside records, afib on warfarin, chronic Lt loculated pleural effusion,  Type 2 DM, HTN, HLD, gout who presents for 3-4d history of worsening dyspnea on exertion and lower extremity swelling as well as 1 year history of generalized rash. Usual state of health until 3-4d prior when daughter noticed increasing HERNANDEZ on ambulation to the bathroom not accompanied by chest pain, no vomiting, no neck or arm pain, no posterior headache, no blurry vision. Has nausea at baseline and has had poor po tolerance and a chronic component of fatigue over past years. Denies dietary indiscretion, no recent illness, no recent change to her medication although notes months to years ago had been taking lasix 40mg BID and is currently taking once daily dosing. Nonadherent to home o2. Denies orthopnea sleeps on 1-2 pillows per night with no recent increase. Denies cough.     BP baseline has been 160s-180s systolic per daughter and is consistent with BP reading in ED. She denies symptomatic hypertension. Previously discontinued hydralazine ~1y ago for concerns relating to her generalized rash. Previously discharged on losartan January 2022 with thirty day supply and since discontinued the medication unclear if due to lack of refill versus DC by medical team.     Collateral provided by daughter at bedside, prior [facility] records, and paper discharge summary provided by daughter from [facility]. Medication reconciliation provided from those same sources with option to further verify in the AM with her PMD and cardiologist as below. Last dose of all of her medications was today and states that she takes warfarin at night.     Generalized excoriated rash has been present for ~1y duration and is present on shoulders, buttocks, thighs, arms, and perhaps even "all over" as per daughter. At times with breaking of skin and exposed areas. Poor hand washing hygiene and itching per daughter.     Patient refused professional translation services and translation was provided in Malay by daughter at bedside    CARDIOLOGIST Dr. Sean Robbins [phone]  PMD Dr. Jeff Sanchez [phone] 80yo Female with mhx of HFpEF ef 65% CAD (CABG 2000, stent 2011), severe pulm HTN, Bioprosthetic aortic and mitral valve replacement 9/2014, ?CKD per outside records, afib on warfarin, chronic Lt loculated pleural effusion,  Type 2 DM, HTN, HLD, gout c/o 3-4 days of worsening dyspnea on exertion and lower extremity swelling as well as 1 year history of generalized rash. Usual state of health until 3-4d prior when daughter noticed increasing HERNANDEZ on ambulation to the bathroom not accompanied by chest pain, vomiting,  neck or arm pain,  posterior headache,  blurry vision. Has nausea at baseline and has had poor po tolerance and a chronic component of fatigue over past years. Denies dietary indiscretion, no recent illness, no recent change to her medication although notes months to years ago had been taking lasix 40mg PO BID and is currently taking once daily dosing. Nonadherent to home o2 via [place]. Reports chronic orthopnea, sleeps on 1-2 pillows per night with no recent increase. No new cough.   BP baseline has been 160s-180s systolic per daughter and is consistent with BP reading in ED. She denies symptomatic hypertension. Previously discontinued hydralazine ~1y ago for concerns relating to her generalized rash. Previously discharged on losartan January 2022 with thirty day supply and since discontinued the medication unclear if due to lack of refill versus DC by medical team.   Collateral provided by daughter at bedside, prior [facility] records, and paper discharge summary provided by daughter from [facility]. Medication reconciliation provided from those same sources with option to further verify in the AM with her PMD and cardiologist as below. Last dose of all of her medications was today and states that she takes warfarin at night.     Generalized excoriated rash has been present for ~1y duration and is present on shoulders, buttocks, thighs, arms, and perhaps even "all over" as per daughter. At times with breaking of skin and exposed areas. Poor hand washing hygiene and itching per daughter.     Patient declined professional translation services and translation was provided in Estonian by daughter at bedside    CARDIOLOGIST Dr. Sean Robbins [phone]  PMD Dr. Jeff Sanchez [phone] 82yo Female with mhx of HFpEF ef 65% CAD (CABG 2000, stent 2011), severe pulm HTN, Bioprosthetic aortic and mitral valve replacement 9/2014, ?CKD per outside records, afib on warfarin, chronic Lt loculated pleural effusion, Type 2 DM, HTN, HLD, gout c/o 3-4 days of worsening dyspnea on exertion and lower extremity swelling as well as 1 year history of generalized rash. Usual state of health until 3-4d prior when daughter noticed increasing HERNANDEZ on ambulation to the bathroom not accompanied by chest pain, vomiting,  neck or arm pain,  posterior headache,  blurry vision. Has nausea at baseline and has had poor po tolerance and a chronic component of fatigue over past years. Denies dietary indiscretion, no recent illness, no recent change to her medication although notes months to years ago had been taking lasix 40mg PO BID and is currently taking once daily dosing. Nonadherent to home o2 via [place]. Reports chronic orthopnea, sleeps on 1-2 pillows per night with no recent increase. No new cough.   BP baseline has been 160s-180s systolic per daughter and is consistent with BP reading in ED. She denies symptomatic hypertension. Previously discontinued hydralazine ~1y ago for concerns relating to her generalized rash. Previously discharged on losartan January 2022 with thirty day supply and since discontinued the medication unclear if due to lack of refill versus DC by medical team.   Collateral provided by daughter at bedside, prior [facility] records, and paper discharge summary provided by daughter from [facility]. Medication reconciliation provided from those same sources with option to further verify in the AM with her PMD and cardiologist as below. Last dose of all of her medications was today and states that she takes warfarin at night.     Generalized excoriated rash has been present for ~1y duration and is present on shoulders, buttocks, thighs, arms, and perhaps even "all over" as per daughter. At times with breaking of skin and exposed areas. Poor hand washing hygiene and itching per daughter.     Patient declined professional translation services and translation was provided in French by daughter at bedside    CARDIOLOGIST Dr. Sean Robbins [phone]  PMD Dr. Jeff Sanchez [phone]

## 2022-03-27 NOTE — H&P ADULT - PROBLEM SELECTOR PROBLEM 2
Acute respiratory failure with hypoxia and hypercapnia Acute respiratory failure with hypercapnia Hypercapnia

## 2022-03-27 NOTE — H&P ADULT - NSHPREVIEWOFSYSTEMS_GEN_ALL_CORE
REVIEW OF SYSTEMS:    CONSTITUTIONAL: No weakness, fevers or chills  EYES: No visual changes  ENT: No vertigo or throat pain   NECK: No pain or stiffness  RESPIRATORY: No cough, wheezing, hemoptysis; No shortness of breath  CARDIOVASCULAR: No chest pain or palpitations  GASTROINTESTINAL: No abdominal or epigastric pain. No nausea, vomiting, or hematemesis; No diarrhea or constipation. No melena or hematochezia.  GENITOURINARY: No dysuria, frequency or hematuria  NEUROLOGICAL: No numbness or weakness  SKIN: No itching, burning, rashes, or lesions   LYMPHATICS: No swollen lymph nodes.  All other review of systems is negative unless indicated above. REVIEW OF SYSTEMS:    CONSTITUTIONAL: +weakness without fever or chills  EYES: No visual changes  ENT: No vertigo or throat pain   NECK: No pain or stiffness  RESPIRATORY: +HERNANDEZ No cough, wheezing, hemoptysis  CARDIOVASCULAR: No chest pain or palpitations  GASTROINTESTINAL: +nausea , No abdominal or epigastric pain. No  vomiting, or hematemesis; No diarrhea or constipation. No melena or hematochezia.  GENITOURINARY: No dysuria, frequency or hematuria  NEUROLOGICAL: No numbness or weakness  SKIN: +generalized pruritic rash   LYMPHATICS: No swollen lymph nodes.  All other review of systems is negative unless indicated above.

## 2022-03-27 NOTE — H&P ADULT - PROBLEM SELECTOR PLAN 2
With transient desaturation to 87% when discussed with tele tech, HERNANDEZ at home nonadherent to home o2  VBG 7.38/51/72/30  -Supplemental o2 PRN  -treat underlying acute on chronic diastolic HF exacerbation likely driving respiratory failure  -Less likely pleural effusion as unchanged from 2019 however if unresolving or worsening hypoxia or hypercapnia could consider thoracentesis. With transient desaturation to 87% when discussed with tele tech, HERNANDEZ at home nonadherent to home o2  VBG 7.38/pCO2=51, prior pCO2 in 40s   -Supplemental o2 PRN  -treat underlying acute on chronic HFpEF exacerbation, likely driving respiratory failure  -Less likely pleural effusion as unchanged from 2019 however if unresolving or worsening hypoxia or hypercapnia could consider thoracentesis.  -recheck VBG in AM  - will hold off BiPAP given normal pH and no respiratory distress

## 2022-03-27 NOTE — ED PROVIDER NOTE - ATTENDING CONTRIBUTION TO CARE
Brief HPI:  81 F, Vietnamese speaking, hx of HTN, HLD, T2DM, Afib on coumadin, bioprosthetic aortic and mitral valves, severe pulm HTN, HFpEF (EF 65%), CAD s/p stent and CABG, CVA, gout, presenting with complaints of SOB, nausea, and leg swelling for 3-4 days.  Patient also reports generalized itching rash for 1 year.  Denies cp, fever, chills, vomiting, dysuria.      Vitals:   Reviewed    Exam:    GEN:  Non-toxic appearing, non-distressed, speaking full sentences, non-diaphoretic, AAOx3  HEENT:  NCAT, neck supple, EOMI, PERRLA, sclera anicteric, no conjunctival pallor or injection, no stridor, normal voice, no tonsillar exudate, uvula midline  CV:  regular rhythm and rate, s1/s2 audible, no murmurs, rubs or gallops, peripheral pulses 2+ and symmetric  PULM:  non-labored respirations, lungs clear to auscultation bilaterally, no wheezes, crackles or rales  ABD:  non distended, non-tender, no rebound, no guarding, negative Maxwell's sign, bowel sounds normal, no cvat  MSK:  no gross deformity, non-tender extremities and joints, range of motion grossly normal appearing, b/l lower extremity edema, extremities warm and well perfused   NEURO:  AAOx3, CN II-XII intact, motor 5/5 in upper and lower extremities bilaterally, sensation grossly intact in extremities and trunk  SKIN:  diffuse, blanching, maculopapular rash; nikolsky negative     A/P:  81 F, Vietnamese speaking, hx of HTN, HLD, T2DM, Afib on coumadin, bioprosthetic aortic and mitral valves, severe pulm HTN, HFpEF (EF 65%), CAD s/p stent and CABG, CVA, gout, presenting with complaints of SOB, nausea, and leg swelling for 3-4 days.  VSS.  EKG non-ischemic.  Appears volume overloaded on exam, no respiratory distress.  Suspect decompensated heart failure.  Rash not c/f infectious etiology or ten/sjs.  Plan for labs, cxr, supportive care.  Anticipate admission.

## 2022-03-27 NOTE — H&P ADULT - PROBLEM SELECTOR PLAN 7
As above for papular rash Generalized papular rash of 1y duration previously attributed to hydralazine which was discontinued without improvement of rash  Uses hydrocortisone 2.5% cream intermittently with mild improvement of pruritis  With chronic eosinophilia noted per chart  -Emollients for now  -immunoglobulin panel in AM  -Full medication review may be beneficial of meds possibly causing eosinophilia  -Would benefit from dermatology consult in AM versus allergy and immunology

## 2022-03-27 NOTE — H&P ADULT - PROBLEM SELECTOR PLAN 6
Generalized papular rash of 1y duration previously attributed to hydralazine which was discontinued without improvement of rash  Uses hydrocortisone 2.5% cream intermittently with mild improvement of pruritis  With chronic eosinophilia noted per chart  -Emollients for now  -immunoglobulin panel in AM  -Full medication review may be beneficial of meds possibly causing eosinophilia  -Would benefit from dermatology consult in AM versus allergy and immunology Eosinophils= 7.5%; Likely clinically manifested as papular rash; Previously attributed to hydralazine which was discontinued without improvement of rash;  -Would benefit from dermatology consult in AM versus allergy and immunology inpt vs outpt  -Plan as below

## 2022-03-27 NOTE — ED ADULT TRIAGE NOTE - CHIEF COMPLAINT QUOTE
Upper sorbian speaking patient brought to the ed by her daughter for SOB  x 3 days and nausea, O2 sat  = 90-92 % in triage. Patient has extensive cardiac history, no chest pain .

## 2022-03-27 NOTE — H&P ADULT - ATTENDING COMMENTS
80yo Female with MHx of HFpEF ef 65% CAD (CABG 2000, stent 2011), severe pulm HTN, Bioprosthetic aortic and mitral valve replacement 9/2014, ?CKD per outside records, Afib on warfarin, chronic Lt loculated pleural effusion,  Type 2 DM, HTN, HLD, gout a/w acute on chronic HFpEF i/s/o severe pulm HTN c/b hypercapnia; Found to have persisting moderate size loculated lt pleural effusion with atelectasis;     Assessment and plan supplemented and modified by attending where indicated;

## 2022-03-27 NOTE — H&P ADULT - NSHPLANGTRANSLATORFT_GEN_A_CORE
Patient refused professional translation services and translation was provided in Korean by daughter at bedside Patient declined professional translation services; Translation was provided in Portuguese by daughter at bedside

## 2022-03-27 NOTE — ED PROVIDER NOTE - OBJECTIVE STATEMENT
81 F, Korean speaking, hx of HTN, HLD, T2DM, Afib on coumadin, bioprosthetic aortic and mitral valves, severe pulm HTN, HFpEF (EF 65%), CAD s/p stent and CABG, CVA, gout, presenting with complaints of SOB and nausea. Per daughter at beside, patient has SOB at baseline, but recently felt that her symptoms were a bit worse. Also with nausea for the past 3 days but no vomiting. Denies any fevers, chills, chest pain, palpitations, diaphoresis, abdominal pain, constipation or diarrhea. Per daughter, patient mostly in bed all day, only getting up to use restroom or to eat. Pt takes her medications everyday, did not miss any doses recently. Because of symptoms, also with decreased PO intake. Daughter concerned about patient's diffuse itchiness.

## 2022-03-27 NOTE — H&P ADULT - NSHPPHYSICALEXAM_GEN_ALL_CORE
T(C): 36.4 (03-27-22 @ 20:35), Max: 37.3 (03-27-22 @ 16:03)  HR: 65 (03-27-22 @ 20:35) (58 - 65)  BP: 178/75 (03-27-22 @ 20:35) (178/75 - 211/78)  RR: 17 (03-27-22 @ 20:35) (17 - 222)  SpO2: 97% (03-27-22 @ 20:35) (93% - 100%)    GENERAL: patient appears well, no acute distress, appropriate, pleasant  EYES: sclera clear, no exudates  ENMT: oropharynx clear without erythema, no exudates, moist mucous membranes  NECK: +JVD, supple, soft, no thyromegaly noted  LUNGS: +decreased breath sounds at left base. good air entry bilaterally,no wheezing or rhonchi appreciated  HEART: Irregularly irregular with holosystolic murmur left sternal border, 2-3+ non pitting edema to thighs  GASTROINTESTINAL: abdomen is soft, nontender, nondistended, normoactive bowel sounds, no palpable masses  INTEGUMENT: generalized papular rash with excoriations in various stages of healing   MUSCULOSKELETAL: no clubbing or cyanosis, no obvious deformity  NEUROLOGIC: awake, alert, oriented x3, good muscle tone in 4 extremities, no obvious sensory deficits  PSYCHIATRIC: mood is good, affect is congruent, linear and logical thought process  HEME/LYMPH: no palpable supraclavicular nodules, no obvious ecchymosis or petechiae Vital Signs Last 24 Hrs  T(C): 36.3 (27 Mar 2022 21:07), Max: 37.3 (27 Mar 2022 16:03)  T(F): 97.3 (27 Mar 2022 21:07), Max: 99.2 (27 Mar 2022 16:03)  HR: 65 (27 Mar 2022 21:07) (58 - 65)  BP: 185/70 (27 Mar 2022 21:07) (178/75 - 211/78)  BP(mean): --  RR: 20 (27 Mar 2022 21:07) (17 - 222)  SpO2: 100% (27 Mar 2022 21:07) (93% - 100%)    GENERAL: patient appears well, no acute distress, appropriate, pleasant  EYES: sclera clear, no exudates  ENMT: oropharynx clear without erythema, no exudates, moist mucous membranes  NECK: +JVD, supple, soft, no thyromegaly noted  LUNGS: +decreased breath sounds at left base. good air entry bilaterally, no wheezing or rhonchi appreciated  HEART: Irregularly irregular with holosystolic murmur left sternal border, 2-3+ non pitting edema to thighs  GASTROINTESTINAL: abdomen is soft, nontender, nondistended, normoactive bowel sounds, no palpable masses  INTEGUMENT: generalized papular rash with excoriations in various stages of healing   MUSCULOSKELETAL: no clubbing or cyanosis, no obvious deformity  NEUROLOGIC: awake, alert, oriented x3, good muscle tone in 4 extremities, no obvious sensory deficits  PSYCHIATRIC: mood is good, affect is congruent, linear and logical thought process  HEME/LYMPH: no palpable supraclavicular nodules, no obvious ecchymosis or petechiae Vital Signs Last 24 Hrs  T(C): 36.3 (27 Mar 2022 21:07), Max: 37.3 (27 Mar 2022 16:03)  T(F): 97.3 (27 Mar 2022 21:07), Max: 99.2 (27 Mar 2022 16:03)  HR: 65 (27 Mar 2022 21:07) (58 - 65)  BP: 185/70 (27 Mar 2022 21:07) (178/75 - 211/78)  BP(mean): --  RR: 20 (27 Mar 2022 21:07) (17 - 222)  SpO2: 100% (27 Mar 2022 21:07) (93% - 100%)    GENERAL: patient appears well, no acute distress, appropriate, pleasant  EYES: sclera clear, no exudates  ENMT: oropharynx clear without erythema, no exudates, moist mucous membranes  NECK: +JVD, supple, soft, no thyromegaly noted  LUNGS: +decreased breath sounds at left base. good air entry bilaterally, no wheezing or rhonchi appreciated  HEART: bradycardic, Irregularly irregular with holosystolic murmur left sternal border, 3+ non pitting edema to b/l thighs  GASTROINTESTINAL: abdomen is soft, nontender, nondistended, normoactive bowel sounds, no palpable masses  INTEGUMENT: generalized papular rash with excoriations in various stages of healing   MUSCULOSKELETAL: no clubbing or cyanosis, no obvious deformity  NEUROLOGIC: awake, alert, oriented x3, good muscle tone in 4 extremities, no obvious sensory deficits  PSYCHIATRIC: mood is good, affect is congruent, linear and logical thought process  HEME/LYMPH: no palpable supraclavicular nodules, no obvious ecchymosis or petechiae

## 2022-03-27 NOTE — H&P ADULT - PROBLEM SELECTOR PLAN 4
Continue home coreg 12.5mg BID with hold parameters  Continue home warfarin 2mg qhs target INR 2-3  monitor on telemetry [id number] risk stratification = 7  Continue home coreg 12.5mg BID with hold parameters  Continue home warfarin 2mg qhs target INR 2-3  monitor on telemetry  INR=2

## 2022-03-27 NOTE — H&P ADULT - PROBLEM SELECTOR PLAN 12
Continue GDMT     #prophylactic measure  Anticoagulation: Warfarin  Diet Fluid restricted 1500mL DASH TLC  Dispo PT consult Continue GDMT    #LAYLA thrombus  -cont warfarin  -tte    #prophylactic  AC warfarin  Diet 1500mL fluid restrict DASH  Dispo PT

## 2022-03-27 NOTE — H&P ADULT - ASSESSMENT
81y F pmhx CAD (CABG 2000, stent 2011), Bioprosthetic aortic and mitral valve replacement 9/2014, ?CKD per outside records, afib on warfarin, chronic L loculated pleural effusion,  T2DM, HTN, HLD, gout who presents for 3-4d history of worsening dyspnea on exertion and lower extremity swelling consistent with acute on chronic diastolic heart failure exacerbation with acute hypoxic and hypercarbic respiratory failure as well as 1 year history of generalized rash with chronic eosinophilia of undetermined etiology suspect drug rash.  81y F pmhx HFpEF 65% CAD (CABG 2000, stent 2011), Bioprosthetic aortic and mitral valve replacement 9/2014, ?CKD per outside records, afib on warfarin, chronic L loculated pleural effusion,  T2DM, HTN, HLD, gout who presents for 3-4d history of worsening dyspnea on exertion and lower extremity swelling consistent with acute on chronic diastolic heart failure exacerbation with acute hypoxic and hypercarbic respiratory failure as well as 1 year history of generalized rash with chronic eosinophilia of undetermined etiology suspect drug rash.  82yo Female with mhx of HFpEF ef 65% CAD (CABG 2000, stent 2011), severe pulm HTN, Bioprosthetic aortic and mitral valve replacement 9/2014, ?CKD per outside records, afib on warfarin, chronic Lt loculated pleural effusion,  Type 2 DM, HTN, HLD, gout a/w acute on chronic HFpEF i/s/p severe pHTN;     80yo Female with mhx of HFpEF ef 65% CAD (CABG 2000, stent 2011), severe pulm HTN, Bioprosthetic aortic and mitral valve replacement 9/2014, ?CKD per outside records, afib on warfarin, chronic Lt loculated pleural effusion,  Type 2 DM, HTN, HLD, gout a/w acute on chronic HFpEF i/s/p severe pHTN c/b hypercapnia; Found to have persisting moderate size loculated lt pleural effusion with atelectasis;

## 2022-03-28 LAB
ALBUMIN SERPL ELPH-MCNC: 3.2 G/DL — LOW (ref 3.3–5)
ALP SERPL-CCNC: 109 U/L — SIGNIFICANT CHANGE UP (ref 40–120)
ALT FLD-CCNC: 14 U/L — SIGNIFICANT CHANGE UP (ref 4–33)
ANION GAP SERPL CALC-SCNC: 12 MMOL/L — SIGNIFICANT CHANGE UP (ref 7–14)
APTT BLD: 31 SEC — SIGNIFICANT CHANGE UP (ref 27–36.3)
AST SERPL-CCNC: 22 U/L — SIGNIFICANT CHANGE UP (ref 4–32)
BASOPHILS # BLD AUTO: 0.08 K/UL — SIGNIFICANT CHANGE UP (ref 0–0.2)
BASOPHILS NFR BLD AUTO: 1 % — SIGNIFICANT CHANGE UP (ref 0–2)
BILIRUB SERPL-MCNC: 1.1 MG/DL — SIGNIFICANT CHANGE UP (ref 0.2–1.2)
BUN SERPL-MCNC: 19 MG/DL — SIGNIFICANT CHANGE UP (ref 7–23)
CALCIUM SERPL-MCNC: 8.6 MG/DL — SIGNIFICANT CHANGE UP (ref 8.4–10.5)
CHLORIDE SERPL-SCNC: 100 MMOL/L — SIGNIFICANT CHANGE UP (ref 98–107)
CO2 SERPL-SCNC: 27 MMOL/L — SIGNIFICANT CHANGE UP (ref 22–31)
CREAT SERPL-MCNC: 1.01 MG/DL — SIGNIFICANT CHANGE UP (ref 0.5–1.3)
EGFR: 56 ML/MIN/1.73M2 — LOW
EOSINOPHIL # BLD AUTO: 0.83 K/UL — HIGH (ref 0–0.5)
EOSINOPHIL NFR BLD AUTO: 10.8 % — HIGH (ref 0–6)
GLUCOSE BLDC GLUCOMTR-MCNC: 120 MG/DL — HIGH (ref 70–99)
GLUCOSE BLDC GLUCOMTR-MCNC: 123 MG/DL — HIGH (ref 70–99)
GLUCOSE BLDC GLUCOMTR-MCNC: 147 MG/DL — HIGH (ref 70–99)
GLUCOSE SERPL-MCNC: 98 MG/DL — SIGNIFICANT CHANGE UP (ref 70–99)
HCT VFR BLD CALC: 38 % — SIGNIFICANT CHANGE UP (ref 34.5–45)
HGB BLD-MCNC: 11.9 G/DL — SIGNIFICANT CHANGE UP (ref 11.5–15.5)
IANC: 4.67 K/UL — SIGNIFICANT CHANGE UP (ref 1.8–7.4)
IMM GRANULOCYTES NFR BLD AUTO: 0.4 % — SIGNIFICANT CHANGE UP (ref 0–1.5)
INR BLD: 1.71 RATIO — HIGH (ref 0.88–1.16)
LYMPHOCYTES # BLD AUTO: 1.2 K/UL — SIGNIFICANT CHANGE UP (ref 1–3.3)
LYMPHOCYTES # BLD AUTO: 15.6 % — SIGNIFICANT CHANGE UP (ref 13–44)
MAGNESIUM SERPL-MCNC: 1.6 MG/DL — SIGNIFICANT CHANGE UP (ref 1.6–2.6)
MCHC RBC-ENTMCNC: 28.4 PG — SIGNIFICANT CHANGE UP (ref 27–34)
MCHC RBC-ENTMCNC: 31.3 GM/DL — LOW (ref 32–36)
MCV RBC AUTO: 90.7 FL — SIGNIFICANT CHANGE UP (ref 80–100)
MONOCYTES # BLD AUTO: 0.86 K/UL — SIGNIFICANT CHANGE UP (ref 0–0.9)
MONOCYTES NFR BLD AUTO: 11.2 % — SIGNIFICANT CHANGE UP (ref 2–14)
NEUTROPHILS # BLD AUTO: 4.67 K/UL — SIGNIFICANT CHANGE UP (ref 1.8–7.4)
NEUTROPHILS NFR BLD AUTO: 61 % — SIGNIFICANT CHANGE UP (ref 43–77)
NRBC # BLD: 0 /100 WBCS — SIGNIFICANT CHANGE UP
NRBC # FLD: 0 K/UL — SIGNIFICANT CHANGE UP
PHOSPHATE SERPL-MCNC: 3.8 MG/DL — SIGNIFICANT CHANGE UP (ref 2.5–4.5)
PLATELET # BLD AUTO: 150 K/UL — SIGNIFICANT CHANGE UP (ref 150–400)
POTASSIUM SERPL-MCNC: 3.6 MMOL/L — SIGNIFICANT CHANGE UP (ref 3.5–5.3)
POTASSIUM SERPL-SCNC: 3.6 MMOL/L — SIGNIFICANT CHANGE UP (ref 3.5–5.3)
PROT SERPL-MCNC: 6.4 G/DL — SIGNIFICANT CHANGE UP (ref 6–8.3)
PROTHROM AB SERPL-ACNC: 20 SEC — HIGH (ref 10.5–13.4)
RBC # BLD: 4.19 M/UL — SIGNIFICANT CHANGE UP (ref 3.8–5.2)
RBC # FLD: 15.7 % — HIGH (ref 10.3–14.5)
SODIUM SERPL-SCNC: 139 MMOL/L — SIGNIFICANT CHANGE UP (ref 135–145)
WBC # BLD: 7.67 K/UL — SIGNIFICANT CHANGE UP (ref 3.8–10.5)
WBC # FLD AUTO: 7.67 K/UL — SIGNIFICANT CHANGE UP (ref 3.8–10.5)

## 2022-03-28 PROCEDURE — 99223 1ST HOSP IP/OBS HIGH 75: CPT | Mod: GC

## 2022-03-28 RX ORDER — FUROSEMIDE 40 MG
40 TABLET ORAL
Refills: 0 | Status: DISCONTINUED | OUTPATIENT
Start: 2022-03-28 | End: 2022-04-05

## 2022-03-28 RX ORDER — POTASSIUM CHLORIDE 20 MEQ
40 PACKET (EA) ORAL ONCE
Refills: 0 | Status: COMPLETED | OUTPATIENT
Start: 2022-03-28 | End: 2022-03-28

## 2022-03-28 RX ORDER — CARVEDILOL PHOSPHATE 80 MG/1
12.5 CAPSULE, EXTENDED RELEASE ORAL EVERY 12 HOURS
Refills: 0 | Status: DISCONTINUED | OUTPATIENT
Start: 2022-03-28 | End: 2022-03-28

## 2022-03-28 RX ORDER — WARFARIN SODIUM 2.5 MG/1
5 TABLET ORAL ONCE
Refills: 0 | Status: COMPLETED | OUTPATIENT
Start: 2022-03-28 | End: 2022-03-28

## 2022-03-28 RX ORDER — ISOSORBIDE DINITRATE 5 MG/1
10 TABLET ORAL
Refills: 0 | Status: DISCONTINUED | OUTPATIENT
Start: 2022-03-28 | End: 2022-04-04

## 2022-03-28 RX ORDER — MAGNESIUM OXIDE 400 MG ORAL TABLET 241.3 MG
400 TABLET ORAL
Refills: 0 | Status: COMPLETED | OUTPATIENT
Start: 2022-03-28 | End: 2022-03-28

## 2022-03-28 RX ORDER — LOSARTAN POTASSIUM 100 MG/1
25 TABLET, FILM COATED ORAL DAILY
Refills: 0 | Status: DISCONTINUED | OUTPATIENT
Start: 2022-03-28 | End: 2022-04-04

## 2022-03-28 RX ADMIN — Medication 40 MILLIGRAM(S): at 06:09

## 2022-03-28 RX ADMIN — CARVEDILOL PHOSPHATE 12.5 MILLIGRAM(S): 80 CAPSULE, EXTENDED RELEASE ORAL at 09:59

## 2022-03-28 RX ADMIN — Medication 81 MILLIGRAM(S): at 12:01

## 2022-03-28 RX ADMIN — Medication 325 MILLIGRAM(S): at 12:02

## 2022-03-28 RX ADMIN — GABAPENTIN 100 MILLIGRAM(S): 400 CAPSULE ORAL at 21:10

## 2022-03-28 RX ADMIN — LOSARTAN POTASSIUM 25 MILLIGRAM(S): 100 TABLET, FILM COATED ORAL at 06:11

## 2022-03-28 RX ADMIN — ATORVASTATIN CALCIUM 20 MILLIGRAM(S): 80 TABLET, FILM COATED ORAL at 21:10

## 2022-03-28 RX ADMIN — Medication 40 MILLIEQUIVALENT(S): at 12:00

## 2022-03-28 RX ADMIN — Medication 40 MILLIGRAM(S): at 18:07

## 2022-03-28 RX ADMIN — MAGNESIUM OXIDE 400 MG ORAL TABLET 400 MILLIGRAM(S): 241.3 TABLET ORAL at 12:02

## 2022-03-28 RX ADMIN — GABAPENTIN 100 MILLIGRAM(S): 400 CAPSULE ORAL at 13:59

## 2022-03-28 RX ADMIN — GABAPENTIN 100 MILLIGRAM(S): 400 CAPSULE ORAL at 06:09

## 2022-03-28 RX ADMIN — MAGNESIUM OXIDE 400 MG ORAL TABLET 400 MILLIGRAM(S): 241.3 TABLET ORAL at 18:07

## 2022-03-28 RX ADMIN — Medication 1 MILLIGRAM(S): at 12:01

## 2022-03-28 RX ADMIN — WARFARIN SODIUM 5 MILLIGRAM(S): 2.5 TABLET ORAL at 18:07

## 2022-03-28 NOTE — PROGRESS NOTE ADULT - ASSESSMENT
80yo Female with mhx of HFpEF ef 65% CAD (CABG 2000, stent 2011), severe pulm HTN, Bioprosthetic aortic and mitral valve replacement 9/2014, ?CKD per outside records, afib on warfarin, chronic Lt loculated pleural effusion,  Type 2 DM, HTN, HLD, gout a/w acute on chronic HFpEF i/s/p severe pHTN c/b hypercapnia; Found to have persisting moderate size loculated lt pleural effusion with atelectasis;         Problem/Plan - 1:  ·  Problem: Acute on chronic heart failure with preserved ejection fraction.   ·  Plan: Acute on chronic HFpEF likely in setting of decreased diuretic dose and severe pulm HTN as evidenced by prior TTE dated 10/10/2019;  Volume overloaded on exam with JVD and peripheral nonpitting edema to thighs  -Started Lasix 40 IV BID  -daily weights  -Intake and output strict  -elevate head of bed  -supplemental o2 PRN  -elevate head of bed  -monitor on telemetry and continuous pulse ox  -fluid restrict to 1500ml, DASH diet  -TTE  -Cardiology help appreciated.      Problem/Plan - 2:  ·  Problem: Hypercapnia.   ·  Plan: With transient desaturation to 87% when discussed with tele tech, HERNANDEZ at home nonadherent to home o2  VBG 7.38/pCO2=51, prior pCO2 in 40s   -Supplemental o2 PRN  -treat underlying acute on chronic HFpEF exacerbation, likely driving respiratory failure  -Less likely pleural effusion as unchanged from 2019 however if unresolving or worsening hypoxia or hypercapnia could consider thoracentesis.  -recheck VBG in AM  - will hold off BiPAP given normal pH and no respiratory distress.     Problem/Plan - 3:  ·  Problem: Loculated pleural effusion.   ·  Plan: Persisting loculated effusion so Pulmonary helping.   < from: CT Chest No Cont (03.27.22 @ 20:25) >  IMPRESSION:    Small left pleural effusion with a loculated component in the left major   fissure and along left anterior chest wall. The loculated components are   new when compared to prior CT exam dated 10/13/2019. Associated partial   left lower lobe and left upper lobe collapse.    New pleural thickening and/or loculated fluid medially along the left   upper hemithorax.    Small right pleural effusion and adjacent passive atelectasis.    Stable 9 mm right apical nodule.    < end of copied text >     Problem/Plan - 4:  ·  Problem: Chronic atrial fibrillation.   ·  Plan: [id number] risk stratification = 7  Continue home coreg 12.5mg BID with hold parameters  Continue home warfarin 2mg qhs target INR 2-3  monitor on telemetry  INR=2.     Problem/Plan - 5:  ·  Problem: Pulmonary HTN.   ·  Plan: Severe pulm HTN likely class II  Repeat TTE this admission.     Problem/Plan - 6:  ·  Problem: Eosinophilia.  ·  Plan: Eosinophils= 7.5%; Likely clinically manifested as papular rash; Previously attributed to hydralazine which was discontinued without improvement of rash;  -Would benefit from dermatology consult in AM versus allergy and immunology inpt vs outpt  -Plan as below.     Problem/Plan - 7:  ·  Problem: Papular rash, generalized.  ·  Plan: Generalized papular rash of 1y duration previously attributed to hydralazine which was discontinued without improvement of rash  Uses hydrocortisone 2.5% cream intermittently with mild improvement of pruritis  With chronic eosinophilia noted per chart  -Emollients for now  -immunoglobulin panel in AM  -Full medication review may be beneficial of meds possibly causing eosinophilia  -Would benefit from dermatology consult in AM versus allergy and immunology.     Problem/Plan - 8:  ·  Problem: Hypertension.   ·  Plan: Restart home losartan, pressures can tolerate SBP 180s in ED  Cont Carvedilol.     Problem/Plan - 9:  ·  Problem: Hyperlipidemia.   ·  Plan: Continue home statin.     Problem/Plan - 10:  ·  Problem: T2DM (type 2 diabetes mellitus).   ·  Plan; HOLD home glipizide while inpatient   Continue home gabapentin for diabetic neuropathy  KRYSTYNA.     Problem/Plan - 11:  ·  Problem: Cerebrovascular accident (CVA).   ·  Plan: Without residual deficits at this time per daughter and patient although unclear clinical course occurring years ago. Continue to monitor and continue ASA and statin.     Problem/Plan - 12:  ·  Problem: CAD (coronary artery disease).   ·  Plan: Continue GDMT    #LAYLA thrombus  -cont warfarin  -tte    #prophylactic  AC warfarin  Diet 1500mL fluid restrict DASH  Dispo PT.

## 2022-03-28 NOTE — CONSULT NOTE ADULT - ASSESSMENT
80 yo Female with HFpEF, CAD (CABG 2000, stent 2011), severe pulm HTN, Bioprosthetic aortic and mitral valve replacement 9/2014, Afib on warfarin, chronic Lt loculated pleural effusion, Type 2 DM, HTN, HLD, gout admitted for acute congestive heart failure exacerbation. Pulmonary consulted for chronic left side pleural effusion.     Plan  - Pleural effusion present on CT chest from 2019   - Simple and small appearing on bedside POCUS   - agree with diuresis with IV Lasix 40mg BID   - Strict I/Os   - TTE to assess cardiac function   - No role for thoracentesis  - Continue to wean off supplemental O2         Bossman Wilks MD   Pulmonary/Critical Care Fellow PGY-6   [facility] Pager #: [phone]  Spectra #: [id number]

## 2022-03-28 NOTE — CONSULT NOTE ADULT - SUBJECTIVE AND OBJECTIVE BOX
CHIEF COMPLAINT:Patient is a 81y old  Female who presents with a chief complaint of Acute on chronic diastolic heart failure exacerbation (28 Mar 2022 12:00)      HPI:  80 yo Female with HFpEF, CAD (CABG 2000, stent 2011), severe pulm HTN, Bioprosthetic aortic and mitral valve replacement 9/2014, Afib on warfarin, chronic Lt loculated pleural effusion, Type 2 DM, HTN, HLD, gout c/o 3-4 days of worsening dyspnea on exertion and lower extremity swelling as well as 1 year history of generalized rash. Usual state of health until 3-4d prior when daughter noticed increasing HERNANDEZ on ambulation to the bathroom not accompanied by chest pain, vomiting,  neck or arm pain,  posterior headache,  blurry vision. Has nausea at baseline and has had poor po tolerance and a chronic component of fatigue over past years. Denies dietary indiscretion, no recent illness, no recent change to her medication although notes months to years ago had been taking lasix 40mg PO BID and is currently taking once daily dosing. Nonadherent to home o2 via NC. Reports chronic orthopnea, sleeps on 1-2 pillows per night with no recent increase. No new cough.   BP baseline has been 160s-180s systolic per daughter and is consistent with BP reading in ED. She denies symptomatic hypertension. Previously discontinued hydralazine ~1y ago for concerns relating to her generalized rash. Previously discharged on losartan January 2022 with thirty day supply and since discontinued the medication unclear if due to lack of refill versus DC by medical team.   Collateral provided by daughter at bedside, prior [facility] records, and paper discharge summary provided by daughter from [facility]. Medication reconciliation provided from those same sources with option to further verify in the AM with her PMD and cardiologist as below. Last dose of all of her medications was today and states that she takes warfarin at night.         PAST MEDICAL & SURGICAL HISTORY:  HTN (Hypertension)    Afib  (on Warfarin)    CAD (Coronary Artery Disease)  s/p stent and CABG    CVA (Cerebral Infarction)  2011    CHF (congestive heart failure)  EF 65% Oct 2019    Upper GI bleed    Gout    Diabetes mellitus  type 2, not on meds    History of heart valve replacement with bioprosthetic valve  mitral and aortic    Hyperlipidemia    S/P angioplasty with stent  2011    S/P CABG x 1  (2000)    H/O aortic valve replacement  9/22/14    H/O mitral valve replacement  9/22/14        FAMILY HISTORY:  Family history of acute myocardial infarction  mother        SOCIAL HISTORY:  Smoking: [ x] Never Smoked [ ] Former Smoker (__ packs x ___ years) [ ] Current Smoker  (__ packs x ___ years)  Substance Use: [x ] Never Used [ ] Used ____  EtOH Use:  Marital Status: [ ] Single [ x]  [ ]  [ ]   Sexual History:   Occupation: homemaker   Recent Travel:  Country of Birth:  Advance Directives:    Allergies    No Known Allergies    Intolerances        HOME MEDICATIONS:  Home Medications:  atorvastatin 20 mg oral tablet: 1 tab(s) orally once a day (27 Mar 2022 20:30)  carvedilol 12.5 mg oral tablet: 1 tab(s) orally 2 times a day (27 Mar 2022 20:30)  Coumadin 2 mg oral tablet: 1 tab(s) orally once a day (27 Mar 2022 20:30)  ferrous sulfate 325 mg (65 mg elemental iron) oral tablet: 1 tab(s) orally once a day (27 Mar 2022 20:30)  furosemide 40 mg oral tablet: 1 tab(s) orally once a day (27 Mar 2022 20:30)  gabapentin 100 mg oral capsule: 1 cap(s) orally 3 times a day (27 Mar 2022 20:30)  glipiZIDE 2.5 mg oral tablet, extended release: 1 tab(s) orally once a day (27 Mar 2022 20:30)  isosorbide dinitrate 10 mg oral tablet: 1 tab(s) orally 2 times a day (27 Mar 2022 20:30)  losartan 25 mg oral tablet: 1 tab(s) orally once a day (27 Mar 2022 20:30)      REVIEW OF SYSTEMS:  Constitutional: [x ] negative [ ] fevers [ ] chills [ ] weight loss [ ] weight gain  HEENT: [x ] negative [ ] dry eyes [ ] eye irritation [ ] postnasal drip [ ] nasal congestion  CV: [x ] negative  [ ] chest pain [ ] orthopnea [ ] palpitations [ ] murmur  Resp: [x ] negative [ ] cough [ ] shortness of breath [ ] dyspnea [ ] wheezing [ ] sputum [ ] hemoptysis  GI: [x ] negative [ ] nausea [ ] vomiting [ ] diarrhea [ ] constipation [ ] abd pain [ ] dysphagia   : [x ] negative [ ] dysuria [ ] nocturia [ ] hematuria [ ] increased urinary frequency  Musculoskeletal: [x ] negative [ ] back pain [ ] myalgias [ ] arthralgias [ ] fracture  Skin: [x ] negative [ ] rash [ ] itch  Neurological: [x ] negative [ ] headache [ ] dizziness [ ] syncope [ ] weakness [ ] numbness  Psychiatric: [x ] negative [ ] anxiety [ ] depression  Endocrine: [x ] negative [ ] diabetes [ ] thyroid problem  Hematologic/Lymphatic: [x ] negative [ ] anemia [ ] bleeding problem  Allergic/Immunologic: [x ] negative [ ] itchy eyes [ ] nasal discharge [ ] hives [ ] angioedema  [ ] All other systems negative  [ ] Unable to assess ROS because ________    OBJECTIVE:  ICU Vital Signs Last 24 Hrs  T(C): 36.6 (28 Mar 2022 15:20), Max: 37.2 (27 Mar 2022 18:21)  T(F): 97.9 (28 Mar 2022 15:20), Max: 99 (27 Mar 2022 18:21)  HR: 48 (28 Mar 2022 15:20) (41 - 67)  BP: 148/49 (28 Mar 2022 15:20) (148/49 - 211/78)  BP(mean): --  ABP: --  ABP(mean): --  RR: 20 (28 Mar 2022 15:20) (17 - 222)  SpO2: 99% (28 Mar 2022 15:20) (97% - 100%)        CAPILLARY BLOOD GLUCOSE      POCT Blood Glucose.: 120 mg/dL (28 Mar 2022 12:42)      PHYSICAL EXAM:  GENERAL: NAD, well-groomed, well-developed  EYES: EOMI, PERRLA, conjunctiva and sclera clear  ENMT: No tonsillar erythema, exudates, or enlargement; Moist mucous membranes, Good dentition, No lesions  CHEST/LUNG: Decreased BS on the left side   HEART: Regular rate and rhythm; No murmurs, rubs, or gallops  ABDOMEN: Soft, Nontender, Nondistended; Bowel sounds present  VASCULAR:  2+ Peripheral Pulses, No clubbing, cyanosis, or edema  LYMPH: No lymphadenopathy noted  SKIN: No rashes or lesions  NERVOUS SYSTEM:  Alert & Oriented X3, Good concentration      HOSPITAL MEDICATIONS:  Standing Meds:  aspirin enteric coated 81 milliGRAM(s) Oral daily  atorvastatin 20 milliGRAM(s) Oral at bedtime  dextrose 5%. 1000 milliLiter(s) IV Continuous <Continuous>  dextrose 5%. 1000 milliLiter(s) IV Continuous <Continuous>  dextrose 50% Injectable 25 Gram(s) IV Push once  dextrose 50% Injectable 12.5 Gram(s) IV Push once  dextrose 50% Injectable 25 Gram(s) IV Push once  ferrous    sulfate 325 milliGRAM(s) Oral daily  folic acid 1 milliGRAM(s) Oral daily  furosemide   Injectable 40 milliGRAM(s) IV Push two times a day  gabapentin 100 milliGRAM(s) Oral three times a day  glucagon  Injectable 1 milliGRAM(s) IntraMuscular once  insulin lispro (ADMELOG) corrective regimen sliding scale   SubCutaneous three times a day before meals  insulin lispro (ADMELOG) corrective regimen sliding scale   SubCutaneous at bedtime  isosorbide   dinitrate Tablet (ISORDIL) 10 milliGRAM(s) Oral two times a day  losartan 25 milliGRAM(s) Oral daily  magnesium oxide 400 milliGRAM(s) Oral three times a day with meals  warfarin 5 milliGRAM(s) Oral once      PRN Meds:  acetaminophen     Tablet .. 650 milliGRAM(s) Oral every 6 hours PRN  aluminum hydroxide/magnesium hydroxide/simethicone Suspension 30 milliLiter(s) Oral every 4 hours PRN  dextrose Oral Gel 15 Gram(s) Oral once PRN  melatonin 3 milliGRAM(s) Oral at bedtime PRN      LABS:    The Labs were reviewed by me   The Radiology was reviewed by me    EKG tracing reviewed by me    03-28    139  |  100  |  19  ----------------------------<  98  3.6   |  27  |  1.01  03-27    139  |  101  |  19  ----------------------------<  126<H>  4.2   |  25  |  0.99    Ca    8.6      28 Mar 2022 06:18  Ca    8.7      27 Mar 2022 18:14  Phos  3.8     03-28  Mg     1.60     03-28    TPro  6.4  /  Alb  3.2<L>  /  TBili  1.1  /  DBili  x   /  AST  22  /  ALT  14  /  AlkPhos  109  03-28  TPro  6.6  /  Alb  3.3  /  TBili  1.0  /  DBili  x   /  AST  22  /  ALT  18  /  AlkPhos  127<H>  03-27    Magnesium, Serum: 1.60 mg/dL (03-28-22 @ 06:18)  Magnesium, Serum: 1.60 mg/dL (03-27-22 @ 18:14)    Phosphorus Level, Serum: 3.8 mg/dL (03-28-22 @ 06:18)      PT/INR - ( 28 Mar 2022 06:33 )   PT: 20.0 sec;   INR: 1.71 ratio         PTT - ( 28 Mar 2022 06:33 )  PTT:31.0 sec                                        11.9   7.67  )-----------( 150      ( 28 Mar 2022 06:18 )             38.0                         12.4   9.20  )-----------( 154      ( 27 Mar 2022 18:14 )             39.0     CAPILLARY BLOOD GLUCOSE      POCT Blood Glucose.: 120 mg/dL (28 Mar 2022 12:42)  POCT Blood Glucose.: 96 mg/dL (28 Mar 2022 09:10)  POCT Blood Glucose.: 101 mg/dL (27 Mar 2022 21:54)  POCT Blood Glucose.: 108 mg/dL (27 Mar 2022 20:31)        MICROBIOLOGY:     ECHO:  < from: Transthoracic Echocardiogram (02.09.19 @ 07:28) >  CONCLUSIONS:  1. Bioprosthetic mitral valve replacement. Mean transmitral  valve gradient equals 5 mm Hg, which is probably normal in  the setting of a prosthetic valve.  2. Bioprosthetic aortic valve replacement. Peak transaortic  valve gradient equals 25 mm Hg, mean transaortic valve  gradient equals 11 mm Hg, which is probably normal in the  presence of a prosthetic valve.  3. Mild left atrial enlargement.  4. Increased relative wall thickness with normal left  ventricular mass index, consistent with concentric left  ventricular remodeling.  5. Endocardium not well visualized; moderate left  ventricular dysfunction.  6. Mild right atrial enlargement.  7. Right ventricular enlargement with normal right  ventricular systolic function.  8. Normal tricuspid valve.  Severe tricuspid regurgitation.  9. Estimated pulmonary artery systolic pressure equals 40  mm Hg, assuming right atrial pressure equals 10  mm Hg,  consistent with mild pulmonary hypertension.    < end of copied text >      RADIOLOGY:  [ ] Reviewed and interpreted by me    PULMONARY FUNCTION TESTS:    EKG:

## 2022-03-28 NOTE — PATIENT PROFILE ADULT - FALL HARM RISK - RISK INTERVENTIONS

## 2022-03-28 NOTE — CONSULT NOTE ADULT - ATTENDING COMMENTS
81 year old female with HFpEF, severe pulm HTN, bioprosthetic aortic and mitral valve replacement, Afib on warfarin, presents with dyspnea on exertion admitted for heart failure exacerbation. Pulmonary team consulted for left pleural effusion. Reviewed prior CTs and has been present since 2019. POCUS shows simple appearing fluid with a very small window for safe access. Given chronicity, it is not likely to be infectious or malignant. Will hold off on thoracentesis at this time. Recommend to continue diuresis. If fluid enlarges, can reconsider thoracentesis at that time. Please re-consult as needed.

## 2022-03-28 NOTE — CONSULT NOTE ADULT - SUBJECTIVE AND OBJECTIVE BOX
Cardiovascular Disease Initial Evaluation    CHIEF COMPLAINT: CHF    HISTORY OF PRESENT ILLNESS: Ms. West is an 80yo Female with mhx of HFpEF ef 65% CAD (CABG 2000 LIMA to LAD and SVG to OM1 and subsequent PCI to LCx,in 2011), severe pulm HTN, Bioprosthetic aortic and mitral valve replacement 9/2014, CKD per outside records, afib on warfarin, chronic Lt loculated pleural effusion, Type 2 DM, HTN, HLD, gout c/o 3-4 days of worsening dyspnea on exertion and lower extremity swelling as well as 1 year history of generalized rash. Denies dietary indiscretion, no recent illness, no recent change to her medication although notes months to years ago had been taking lasix 40mg PO BID and is currently taking once daily dosing. Nonadherent to home o2 via NC. Reports chronic orthopnea, sleeps on 1-2 pillows per night with no recent increase. No new cough.           Allergies    No Known Allergies    Intolerances    	    MEDICATIONS:  aspirin enteric coated 81 milliGRAM(s) Oral daily  carvedilol 12.5 milliGRAM(s) Oral every 12 hours  furosemide   Injectable 40 milliGRAM(s) IV Push two times a day  isosorbide   dinitrate Tablet (ISORDIL) 10 milliGRAM(s) Oral two times a day  losartan 25 milliGRAM(s) Oral daily  warfarin 5 milliGRAM(s) Oral once        acetaminophen     Tablet .. 650 milliGRAM(s) Oral every 6 hours PRN  gabapentin 100 milliGRAM(s) Oral three times a day  melatonin 3 milliGRAM(s) Oral at bedtime PRN    aluminum hydroxide/magnesium hydroxide/simethicone Suspension 30 milliLiter(s) Oral every 4 hours PRN    atorvastatin 20 milliGRAM(s) Oral at bedtime  dextrose 50% Injectable 25 Gram(s) IV Push once  dextrose 50% Injectable 12.5 Gram(s) IV Push once  dextrose 50% Injectable 25 Gram(s) IV Push once  dextrose Oral Gel 15 Gram(s) Oral once PRN  glucagon  Injectable 1 milliGRAM(s) IntraMuscular once  insulin lispro (ADMELOG) corrective regimen sliding scale   SubCutaneous three times a day before meals  insulin lispro (ADMELOG) corrective regimen sliding scale   SubCutaneous at bedtime    dextrose 5%. 1000 milliLiter(s) IV Continuous <Continuous>  dextrose 5%. 1000 milliLiter(s) IV Continuous <Continuous>  ferrous    sulfate 325 milliGRAM(s) Oral daily  folic acid 1 milliGRAM(s) Oral daily  magnesium oxide 400 milliGRAM(s) Oral three times a day with meals  potassium chloride    Tablet ER 40 milliEquivalent(s) Oral once      PAST MEDICAL & SURGICAL HISTORY:  HTN (Hypertension)    Afib  (on Warfarin)    CAD (Coronary Artery Disease)  s/p stent and CABG    CVA (Cerebral Infarction)  2011    CHF (congestive heart failure)  EF 65% Oct 2019    Upper GI bleed    Gout    Diabetes mellitus  type 2, not on meds    History of heart valve replacement with bioprosthetic valve  mitral and aortic    Hyperlipidemia    S/P angioplasty with stent  2011    S/P CABG x 1  (2000)    H/O aortic valve replacement  9/22/14    H/O mitral valve replacement  9/22/14        FAMILY HISTORY:  Family history of acute myocardial infarction  mother        SOCIAL HISTORY:    The patient is a nonsmoker       REVIEW OF SYSTEMS:  See HPI, otherwise complete 14 point review of systems negative    [x ] All others negative	  [ ] Unable to obtain    PHYSICAL EXAM:  T(C): 36.6 (03-28-22 @ 09:55), Max: 37.3 (03-27-22 @ 16:03)  HR: 67 (03-28-22 @ 09:55) (55 - 67)  BP: 161/87 (03-28-22 @ 09:55) (150/50 - 211/78)  RR: 20 (03-28-22 @ 09:55) (17 - 222)  SpO2: 99% (03-28-22 @ 09:55) (93% - 100%)  Wt(kg): --  I&O's Summary      Appearance: No Acute Distress; resting comfortably  HEENT:  Normal oral mucosa, PERRL, EOMI	  Cardiovascular: Normal S1 S2, No JVD, No murmurs/rubs/gallops  Respiratory: Decreased breath sounds with bilateral crackles at the lung bases.   Gastrointestinal:  Soft, Non-tender, + BS	  Skin: Diffuse rash throughout body with erythema,   Neurologic: Non-focal; no weakness  Extremities: 2+ Pitting edema in LE bilaterally.   Vascular: Peripheral pulses palpable 2+ bilaterally  Psychiatry: A & O x 3, Mood & affect appropriate    Laboratory Data:	 	    CBC Full  -  ( 28 Mar 2022 06:18 )  WBC Count : 7.67 K/uL  Hemoglobin : 11.9 g/dL  Hematocrit : 38.0 %  Platelet Count - Automated : 150 K/uL  Mean Cell Volume : 90.7 fL  Mean Cell Hemoglobin : 28.4 pg  Mean Cell Hemoglobin Concentration : 31.3 gm/dL  Auto Neutrophil # : 4.67 K/uL  Auto Lymphocyte # : 1.20 K/uL  Auto Monocyte # : 0.86 K/uL  Auto Eosinophil # : 0.83 K/uL  Auto Basophil # : 0.08 K/uL  Auto Neutrophil % : 61.0 %  Auto Lymphocyte % : 15.6 %  Auto Monocyte % : 11.2 %  Auto Eosinophil % : 10.8 %  Auto Basophil % : 1.0 %    03-28    139  |  100  |  19  ----------------------------<  98  3.6   |  27  |  1.01  03-27    139  |  101  |  19  ----------------------------<  126<H>  4.2   |  25  |  0.99    Ca    8.6      28 Mar 2022 06:18  Ca    8.7      27 Mar 2022 18:14  Phos  3.8     03-28  Mg     1.60     03-28  Mg     1.60     03-27    TPro  6.4  /  Alb  3.2<L>  /  TBili  1.1  /  DBili  x   /  AST  22  /  ALT  14  /  AlkPhos  109  03-28  TPro  6.6  /  Alb  3.3  /  TBili  1.0  /  DBili  x   /  AST  22  /  ALT  18  /  AlkPhos  127<H>  03-27      proBNP: Serum Pro-Brain Natriuretic Peptide: 6309 pg/mL (03-27 @ 18:14)    Lipid Profile:   HgA1c:   TSH:       CARDIAC MARKERS: 37-37            Interpretation of Telemetry: afib with slow ventricular response.   ECG:  Afib with slow ventricular response	  RADIOLOGY:   OTHER: 	    PREVIOUS DIAGNOSTIC TESTING:    [x ] Echocardiogram: 10/2019: LVEF 65% with severe pulmonary hypertension. Well seated bioprosthetic AV, Mechanical MV with elevated gradient of 6 mmHg.   [x ] Catheterization: 2019: Patent grafts and patent stent in Lcx  [ ] Stress Test:  	    Assessment: 80yo Female with mhx of HFpEF ef 65% CAD s/p CABG 2000 LIMA to LAD and SVG to OM1 and subsequent PCI to LCx,in 2011, severe pulm HTN, Bioprosthetic aortic and mitral valve replacement 9/2014, CKD per outside records, afib on warfarin, chronic Lt loculated pleural effusion, Type 2 DM, HTN, HLD, gout c/o 3-4 days of worsening dyspnea on exertion and lower extremity swelling    Plan of Care:    #Acute decompensated diastolic heart failure  - Likely 2/2 medical noncompliance  - Continue IV diuresis with Lasix 40mg IV BID  - I and Os, Daily weights, replenish lytes as needed  - ACS ruled out  - EKG shows AFib with slow ventricular response  - Hold BB in setting of bradycardia  - If no improvement, consider eps evaluation for tachy felecia syndrome  - Obtain TTE    #Rash  - Pt with diffuse rash over body  - Ongoing for a year  - Management as per primary team    #CAD s/p CABG and PCI  - CABG x2 in 2000 (LIMA to LAD, SVG-OM1)  - PCI in 2011 to LCX  - Last LHC in 2019 revealed patent stent and grafts  - Continue ASA, statin, isordil, and losartan  - Hold BB at this time due to bradycardia    #S/p MVR  - TTE from 2019 with elevated gradient of 6 mmHg  - Will reassess with TTE  - Continue Coumadin with INR goal of 2.5-3.5    #S/p AVR  - Continue to monitor    #ACP (advance care planning)-  Advanced care planning was addressed. Will continue with current treatment plan outlined above. Risks, benefits and alternatives of medical treatment and procedures were addressed. 30 minutes spent addressing advance care plans.        72 minutes spent on total encounter; more than 50% of the visit was spent counseling and/or coordinating care by the attending physician.   	  Etienne Person D.O.   Cardiovascular Diseases  [phone]

## 2022-03-28 NOTE — PROGRESS NOTE ADULT - SUBJECTIVE AND OBJECTIVE BOX
Date of Service  : 03-28-22 @ 11:35    INTERVAL HPI/OVERNIGHT EVENTS:  Vital Signs Last 24 Hrs  T(C): 36.6 (28 Mar 2022 09:55), Max: 37.3 (27 Mar 2022 16:03)  T(F): 97.9 (28 Mar 2022 09:55), Max: 99.2 (27 Mar 2022 16:03)  HR: 67 (28 Mar 2022 09:55) (55 - 67)  BP: 161/87 (28 Mar 2022 09:55) (150/50 - 211/78)  BP(mean): --  RR: 20 (28 Mar 2022 09:55) (17 - 222)  SpO2: 99% (28 Mar 2022 09:55) (93% - 100%)  I&O's Summary    MEDICATIONS  (STANDING):  aspirin enteric coated 81 milliGRAM(s) Oral daily  atorvastatin 20 milliGRAM(s) Oral at bedtime  carvedilol 12.5 milliGRAM(s) Oral every 12 hours  dextrose 5%. 1000 milliLiter(s) (100 mL/Hr) IV Continuous <Continuous>  dextrose 5%. 1000 milliLiter(s) (50 mL/Hr) IV Continuous <Continuous>  dextrose 50% Injectable 25 Gram(s) IV Push once  dextrose 50% Injectable 12.5 Gram(s) IV Push once  dextrose 50% Injectable 25 Gram(s) IV Push once  ferrous    sulfate 325 milliGRAM(s) Oral daily  folic acid 1 milliGRAM(s) Oral daily  furosemide   Injectable 40 milliGRAM(s) IV Push two times a day  gabapentin 100 milliGRAM(s) Oral three times a day  glucagon  Injectable 1 milliGRAM(s) IntraMuscular once  insulin lispro (ADMELOG) corrective regimen sliding scale   SubCutaneous three times a day before meals  insulin lispro (ADMELOG) corrective regimen sliding scale   SubCutaneous at bedtime  isosorbide   dinitrate Tablet (ISORDIL) 10 milliGRAM(s) Oral two times a day  losartan 25 milliGRAM(s) Oral daily  magnesium oxide 400 milliGRAM(s) Oral three times a day with meals  potassium chloride    Tablet ER 40 milliEquivalent(s) Oral once  warfarin 5 milliGRAM(s) Oral once    MEDICATIONS  (PRN):  acetaminophen     Tablet .. 650 milliGRAM(s) Oral every 6 hours PRN Temp greater or equal to 38C (100.4F), Mild Pain (1 - 3)  aluminum hydroxide/magnesium hydroxide/simethicone Suspension 30 milliLiter(s) Oral every 4 hours PRN Dyspepsia  dextrose Oral Gel 15 Gram(s) Oral once PRN Blood Glucose LESS THAN 70 milliGRAM(s)/deciliter  melatonin 3 milliGRAM(s) Oral at bedtime PRN Insomnia    LABS:                        11.9   7.67  )-----------( 150      ( 28 Mar 2022 06:18 )             38.0     03-28    139  |  100  |  19  ----------------------------<  98  3.6   |  27  |  1.01    Ca    8.6      28 Mar 2022 06:18  Phos  3.8     03-28  Mg     1.60     03-28    TPro  6.4  /  Alb  3.2<L>  /  TBili  1.1  /  DBili  x   /  AST  22  /  ALT  14  /  AlkPhos  109  03-28    PT/INR - ( 28 Mar 2022 06:33 )   PT: 20.0 sec;   INR: 1.71 ratio         PTT - ( 28 Mar 2022 06:33 )  PTT:31.0 sec    CAPILLARY BLOOD GLUCOSE      POCT Blood Glucose.: 96 mg/dL (28 Mar 2022 09:10)  POCT Blood Glucose.: 101 mg/dL (27 Mar 2022 21:54)  POCT Blood Glucose.: 108 mg/dL (27 Mar 2022 20:31)          REVIEW OF SYSTEMS:  CONSTITUTIONAL: No fever, weight loss, or fatigue  EYES: No eye pain, visual disturbances, or discharge  ENMT:  No difficulty hearing, tinnitus, vertigo; No sinus or throat pain  NECK: No pain or stiffness  RESPIRATORY: No cough, wheezing, chills or hemoptysis; No shortness of breath  CARDIOVASCULAR: No chest pain, palpitations, dizziness, or leg swelling  GASTROINTESTINAL: No abdominal or epigastric pain. No nausea, vomiting, or hematemesis; No diarrhea or constipation. No melena or hematochezia.  GENITOURINARY: No dysuria, frequency, hematuria, or incontinence  NEUROLOGICAL: No headaches, memory loss, loss of strength, numbness, or tremors  SKIN: No itching, burning, rashes, or lesions   ENDOCRINE: No heat or cold intolerance; No hair loss  MUSCULOSKELETAL: No joint pain or swelling; No muscle, back, or extremity pain  PSYCHIATRIC: No depression, anxiety, mood swings, or difficulty sleeping  HEME/LYMPH: No easy bruising, or bleeding gums  ALLERY AND IMMUNOLOGIC: No hives or eczema    RADIOLOGY & ADDITIONAL TESTS:    Consultant(s) Notes Reviewed:  [x ] YES  [ ] NO    PHYSICAL EXAM:  GENERAL: NAD, well-groomed, well-developed,not in any distress ,  HEAD:  Atraumatic, Normocephalic  EYES: EOMI, PERRLA, conjunctiva and sclera clear  ENMT: No tonsillar erythema, exudates, or enlargement; Moist mucous membranes, Good dentition, No lesions  NECK: Supple, No JVD, Normal thyroid  NERVOUS SYSTEM:  Alert & Oriented X3, No focal deficit   CHEST/LUNG: Good air entry bilateral with no  rales, rhonchi, wheezing, or rubs  HEART: Regular rate and rhythm; No murmurs, rubs, or gallops  ABDOMEN: Soft, Nontender, Nondistended; Bowel sounds present  EXTREMITIES:  2+ Peripheral Pulses, No clubbing, cyanosis, or edema  SKIN: No rashes or lesions    Care Discussed with Consultants/Other Providers [ x] YES  [ ] NO Date of Service  : 03-28-22    INTERVAL HPI/OVERNIGHT EVENTS: I feel better.   Vital Signs Last 24 Hrs  T(C): 36.6 (28 Mar 2022 09:55), Max: 37.3 (27 Mar 2022 16:03)  T(F): 97.9 (28 Mar 2022 09:55), Max: 99.2 (27 Mar 2022 16:03)  HR: 67 (28 Mar 2022 09:55) (55 - 67)  BP: 161/87 (28 Mar 2022 09:55) (150/50 - 211/78)  BP(mean): --  RR: 20 (28 Mar 2022 09:55) (17 - 222)  SpO2: 99% (28 Mar 2022 09:55) (93% - 100%)  I&O's Summary    MEDICATIONS  (STANDING):  aspirin enteric coated 81 milliGRAM(s) Oral daily  atorvastatin 20 milliGRAM(s) Oral at bedtime  carvedilol 12.5 milliGRAM(s) Oral every 12 hours  dextrose 5%. 1000 milliLiter(s) (100 mL/Hr) IV Continuous <Continuous>  dextrose 5%. 1000 milliLiter(s) (50 mL/Hr) IV Continuous <Continuous>  dextrose 50% Injectable 25 Gram(s) IV Push once  dextrose 50% Injectable 12.5 Gram(s) IV Push once  dextrose 50% Injectable 25 Gram(s) IV Push once  ferrous    sulfate 325 milliGRAM(s) Oral daily  folic acid 1 milliGRAM(s) Oral daily  furosemide   Injectable 40 milliGRAM(s) IV Push two times a day  gabapentin 100 milliGRAM(s) Oral three times a day  glucagon  Injectable 1 milliGRAM(s) IntraMuscular once  insulin lispro (ADMELOG) corrective regimen sliding scale   SubCutaneous three times a day before meals  insulin lispro (ADMELOG) corrective regimen sliding scale   SubCutaneous at bedtime  isosorbide   dinitrate Tablet (ISORDIL) 10 milliGRAM(s) Oral two times a day  losartan 25 milliGRAM(s) Oral daily  magnesium oxide 400 milliGRAM(s) Oral three times a day with meals  potassium chloride    Tablet ER 40 milliEquivalent(s) Oral once  warfarin 5 milliGRAM(s) Oral once    MEDICATIONS  (PRN):  acetaminophen     Tablet .. 650 milliGRAM(s) Oral every 6 hours PRN Temp greater or equal to 38C (100.4F), Mild Pain (1 - 3)  aluminum hydroxide/magnesium hydroxide/simethicone Suspension 30 milliLiter(s) Oral every 4 hours PRN Dyspepsia  dextrose Oral Gel 15 Gram(s) Oral once PRN Blood Glucose LESS THAN 70 milliGRAM(s)/deciliter  melatonin 3 milliGRAM(s) Oral at bedtime PRN Insomnia    LABS:                        11.9   7.67  )-----------( 150      ( 28 Mar 2022 06:18 )             38.0     03-28    139  |  100  |  19  ----------------------------<  98  3.6   |  27  |  1.01    Ca    8.6      28 Mar 2022 06:18  Phos  3.8     03-28  Mg     1.60     03-28    TPro  6.4  /  Alb  3.2<L>  /  TBili  1.1  /  DBili  x   /  AST  22  /  ALT  14  /  AlkPhos  109  03-28    PT/INR - ( 28 Mar 2022 06:33 )   PT: 20.0 sec;   INR: 1.71 ratio         PTT - ( 28 Mar 2022 06:33 )  PTT:31.0 sec    CAPILLARY BLOOD GLUCOSE      POCT Blood Glucose.: 96 mg/dL (28 Mar 2022 09:10)  POCT Blood Glucose.: 101 mg/dL (27 Mar 2022 21:54)  POCT Blood Glucose.: 108 mg/dL (27 Mar 2022 20:31)          REVIEW OF SYSTEMS:  CONSTITUTIONAL: No fever, weight loss, or fatigue  EYES: No eye pain, visual disturbances, or discharge  ENMT:  No difficulty hearing, tinnitus, vertigo; No sinus or throat pain  NECK: No pain or stiffness  RESPIRATORY: No cough, wheezing, chills or hemoptysis; No shortness of breath  CARDIOVASCULAR: No chest pain, palpitations, dizziness, or leg swelling  GASTROINTESTINAL: No abdominal or epigastric pain. No nausea, vomiting, or hematemesis; No diarrhea or constipation. No melena or hematochezia.  GENITOURINARY: No dysuria, frequency, hematuria, or incontinence  NEUROLOGICAL: No headaches, memory loss, loss of strength, numbness, or tremors      Consultant(s) Notes Reviewed:  [x ] YES  [ ] NO    PHYSICAL EXAM:  GENERAL: NAD, ,not in any distress ,  HEAD:  Atraumatic, Normocephalic  NECK: Supple, No JVD, Normal thyroid  NERVOUS SYSTEM:  Alert & Oriented X3, No focal deficit   CHEST/LUNG: Good air entry bilateral with no  rales, rhonchi, wheezing, or rubs  HEART: Regular rate and rhythm; No murmurs, rubs, or gallops  ABDOMEN: Soft, Nontender, Nondistended; Bowel sounds present  EXTREMITIES:  2+ Peripheral Pulses, No clubbing, cyanosis, or edema    Care Discussed with Consultants/Other Providers [ x] YES  [ ] NO

## 2022-03-29 LAB
ANION GAP SERPL CALC-SCNC: 10 MMOL/L — SIGNIFICANT CHANGE UP (ref 7–14)
APTT BLD: 37.6 SEC — HIGH (ref 27–36.3)
BUN SERPL-MCNC: 24 MG/DL — HIGH (ref 7–23)
CALCIUM SERPL-MCNC: 8.4 MG/DL — SIGNIFICANT CHANGE UP (ref 8.4–10.5)
CHLORIDE SERPL-SCNC: 100 MMOL/L — SIGNIFICANT CHANGE UP (ref 98–107)
CO2 SERPL-SCNC: 29 MMOL/L — SIGNIFICANT CHANGE UP (ref 22–31)
CREAT SERPL-MCNC: 1.09 MG/DL — SIGNIFICANT CHANGE UP (ref 0.5–1.3)
EGFR: 51 ML/MIN/1.73M2 — LOW
GAS PNL BLDV: SIGNIFICANT CHANGE UP
GLUCOSE BLDC GLUCOMTR-MCNC: 116 MG/DL — HIGH (ref 70–99)
GLUCOSE BLDC GLUCOMTR-MCNC: 133 MG/DL — HIGH (ref 70–99)
GLUCOSE BLDC GLUCOMTR-MCNC: 138 MG/DL — HIGH (ref 70–99)
GLUCOSE BLDC GLUCOMTR-MCNC: 78 MG/DL — SIGNIFICANT CHANGE UP (ref 70–99)
GLUCOSE BLDC GLUCOMTR-MCNC: 98 MG/DL — SIGNIFICANT CHANGE UP (ref 70–99)
GLUCOSE SERPL-MCNC: 88 MG/DL — SIGNIFICANT CHANGE UP (ref 70–99)
HCT VFR BLD CALC: 39.7 % — SIGNIFICANT CHANGE UP (ref 34.5–45)
HGB BLD-MCNC: 12.1 G/DL — SIGNIFICANT CHANGE UP (ref 11.5–15.5)
IGA FLD-MCNC: 520 MG/DL — HIGH (ref 84–499)
IGD SER-MCNC: <1 MG/DL — SIGNIFICANT CHANGE UP
IGE SERPL-ACNC: 2265 KU/L — HIGH
IGG FLD-MCNC: 1288 MG/DL — SIGNIFICANT CHANGE UP (ref 610–1660)
IGM SERPL-MCNC: 47 MG/DL — SIGNIFICANT CHANGE UP (ref 35–242)
INR BLD: 1.88 RATIO — HIGH (ref 0.88–1.16)
KAPPA LC SER QL IFE: 5.24 MG/DL — HIGH (ref 0.33–1.94)
KAPPA/LAMBDA FREE LIGHT CHAIN RATIO, SERUM: 1.43 RATIO — SIGNIFICANT CHANGE UP (ref 0.26–1.65)
LAMBDA LC SER QL IFE: 3.66 MG/DL — HIGH (ref 0.57–2.63)
MAGNESIUM SERPL-MCNC: 1.8 MG/DL — SIGNIFICANT CHANGE UP (ref 1.6–2.6)
MCHC RBC-ENTMCNC: 28.3 PG — SIGNIFICANT CHANGE UP (ref 27–34)
MCHC RBC-ENTMCNC: 30.5 GM/DL — LOW (ref 32–36)
MCV RBC AUTO: 93 FL — SIGNIFICANT CHANGE UP (ref 80–100)
NRBC # BLD: 0 /100 WBCS — SIGNIFICANT CHANGE UP
NRBC # FLD: 0 K/UL — SIGNIFICANT CHANGE UP
PHOSPHATE SERPL-MCNC: 3.6 MG/DL — SIGNIFICANT CHANGE UP (ref 2.5–4.5)
PLATELET # BLD AUTO: 145 K/UL — LOW (ref 150–400)
POTASSIUM SERPL-MCNC: 4.6 MMOL/L — SIGNIFICANT CHANGE UP (ref 3.5–5.3)
POTASSIUM SERPL-SCNC: 4.6 MMOL/L — SIGNIFICANT CHANGE UP (ref 3.5–5.3)
PROTHROM AB SERPL-ACNC: 21.9 SEC — HIGH (ref 10.5–13.4)
RBC # BLD: 4.27 M/UL — SIGNIFICANT CHANGE UP (ref 3.8–5.2)
RBC # FLD: 15.7 % — HIGH (ref 10.3–14.5)
SODIUM SERPL-SCNC: 139 MMOL/L — SIGNIFICANT CHANGE UP (ref 135–145)
WBC # BLD: 7.37 K/UL — SIGNIFICANT CHANGE UP (ref 3.8–10.5)
WBC # FLD AUTO: 7.37 K/UL — SIGNIFICANT CHANGE UP (ref 3.8–10.5)

## 2022-03-29 PROCEDURE — 93010 ELECTROCARDIOGRAM REPORT: CPT

## 2022-03-29 RX ORDER — WARFARIN SODIUM 2.5 MG/1
5 TABLET ORAL ONCE
Refills: 0 | Status: COMPLETED | OUTPATIENT
Start: 2022-03-29 | End: 2022-03-29

## 2022-03-29 RX ORDER — IPRATROPIUM/ALBUTEROL SULFATE 18-103MCG
3 AEROSOL WITH ADAPTER (GRAM) INHALATION ONCE
Refills: 0 | Status: COMPLETED | OUTPATIENT
Start: 2022-03-29 | End: 2022-03-29

## 2022-03-29 RX ADMIN — GABAPENTIN 100 MILLIGRAM(S): 400 CAPSULE ORAL at 05:14

## 2022-03-29 RX ADMIN — ATORVASTATIN CALCIUM 20 MILLIGRAM(S): 80 TABLET, FILM COATED ORAL at 21:11

## 2022-03-29 RX ADMIN — GABAPENTIN 100 MILLIGRAM(S): 400 CAPSULE ORAL at 13:37

## 2022-03-29 RX ADMIN — ISOSORBIDE DINITRATE 10 MILLIGRAM(S): 5 TABLET ORAL at 18:16

## 2022-03-29 RX ADMIN — Medication 3 MILLILITER(S): at 15:02

## 2022-03-29 RX ADMIN — Medication 40 MILLIGRAM(S): at 18:17

## 2022-03-29 RX ADMIN — Medication 325 MILLIGRAM(S): at 13:37

## 2022-03-29 RX ADMIN — Medication 40 MILLIGRAM(S): at 05:14

## 2022-03-29 RX ADMIN — GABAPENTIN 100 MILLIGRAM(S): 400 CAPSULE ORAL at 21:11

## 2022-03-29 RX ADMIN — Medication 81 MILLIGRAM(S): at 13:37

## 2022-03-29 RX ADMIN — WARFARIN SODIUM 5 MILLIGRAM(S): 2.5 TABLET ORAL at 18:16

## 2022-03-29 RX ADMIN — Medication 1 MILLIGRAM(S): at 13:36

## 2022-03-29 RX ADMIN — LOSARTAN POTASSIUM 25 MILLIGRAM(S): 100 TABLET, FILM COATED ORAL at 05:15

## 2022-03-29 NOTE — PROGRESS NOTE ADULT - SUBJECTIVE AND OBJECTIVE BOX
Date of Service  : 03-29-22     INTERVAL HPI/OVERNIGHT EVENTS: I feel better.   Vital Signs Last 24 Hrs  T(C): 36.9 (29 Mar 2022 17:19), Max: 36.9 (29 Mar 2022 17:19)  T(F): 98.5 (29 Mar 2022 17:19), Max: 98.5 (29 Mar 2022 17:19)  HR: 52 (29 Mar 2022 17:19) (52 - 60)  BP: 166/52 (29 Mar 2022 17:19) (142/54 - 166/52)  BP(mean): --  RR: 17 (29 Mar 2022 17:19) (17 - 25)  SpO2: 100% (29 Mar 2022 17:19) (94% - 100%)  I&O's Summary    29 Mar 2022 07:01  -  29 Mar 2022 17:43  --------------------------------------------------------  IN: 0 mL / OUT: 200 mL / NET: -200 mL      MEDICATIONS  (STANDING):  aspirin enteric coated 81 milliGRAM(s) Oral daily  atorvastatin 20 milliGRAM(s) Oral at bedtime  dextrose 5%. 1000 milliLiter(s) (100 mL/Hr) IV Continuous <Continuous>  dextrose 5%. 1000 milliLiter(s) (50 mL/Hr) IV Continuous <Continuous>  dextrose 50% Injectable 25 Gram(s) IV Push once  dextrose 50% Injectable 12.5 Gram(s) IV Push once  dextrose 50% Injectable 25 Gram(s) IV Push once  ferrous    sulfate 325 milliGRAM(s) Oral daily  folic acid 1 milliGRAM(s) Oral daily  furosemide   Injectable 40 milliGRAM(s) IV Push two times a day  gabapentin 100 milliGRAM(s) Oral three times a day  glucagon  Injectable 1 milliGRAM(s) IntraMuscular once  insulin lispro (ADMELOG) corrective regimen sliding scale   SubCutaneous three times a day before meals  insulin lispro (ADMELOG) corrective regimen sliding scale   SubCutaneous at bedtime  isosorbide   dinitrate Tablet (ISORDIL) 10 milliGRAM(s) Oral two times a day  losartan 25 milliGRAM(s) Oral daily  warfarin 5 milliGRAM(s) Oral once    MEDICATIONS  (PRN):  acetaminophen     Tablet .. 650 milliGRAM(s) Oral every 6 hours PRN Temp greater or equal to 38C (100.4F), Mild Pain (1 - 3)  aluminum hydroxide/magnesium hydroxide/simethicone Suspension 30 milliLiter(s) Oral every 4 hours PRN Dyspepsia  dextrose Oral Gel 15 Gram(s) Oral once PRN Blood Glucose LESS THAN 70 milliGRAM(s)/deciliter  melatonin 3 milliGRAM(s) Oral at bedtime PRN Insomnia    LABS:                        12.1   7.37  )-----------( 145      ( 29 Mar 2022 07:04 )             39.7     03-29    139  |  100  |  24<H>  ----------------------------<  88  4.6   |  29  |  1.09    Ca    8.4      29 Mar 2022 07:04  Phos  3.6     03-29  Mg     1.80     03-29    TPro  6.4  /  Alb  3.2<L>  /  TBili  1.1  /  DBili  x   /  AST  22  /  ALT  14  /  AlkPhos  109  03-28    PT/INR - ( 29 Mar 2022 07:04 )   PT: 21.9 sec;   INR: 1.88 ratio         PTT - ( 29 Mar 2022 07:04 )  PTT:37.6 sec    CAPILLARY BLOOD GLUCOSE  147 (28 Mar 2022 21:20)      POCT Blood Glucose.: 116 mg/dL (29 Mar 2022 16:41)  POCT Blood Glucose.: 138 mg/dL (29 Mar 2022 12:07)  POCT Blood Glucose.: 98 mg/dL (29 Mar 2022 08:32)  POCT Blood Glucose.: 78 mg/dL (29 Mar 2022 08:07)  POCT Blood Glucose.: 147 mg/dL (28 Mar 2022 21:16)  POCT Blood Glucose.: 123 mg/dL (28 Mar 2022 17:45)              Consultant(s) Notes Reviewed:  [x ] YES  [ ] NO    PHYSICAL EXAM:  GENERAL: NAD, Oxygen   HEAD:  Atraumatic, Normocephalic  NECK: Supple, No JVD, Normal thyroid  NERVOUS SYSTEM:  Alert & Oriented X3, No focal deficit   CHEST/LUNG: Good air entry bilateral with no  rales, rhonchi, wheezing, or rubs  HEART: Irregular rate and rhythm; No murmurs, rubs, or gallops  ABDOMEN: Soft, Nontender, Nondistended; Bowel sounds present  EXTREMITIES:  2+ Peripheral Pulses, No clubbing, cyanosis, or edema    Care Discussed with Consultants/Other Providers [ x] YES  [ ] NO

## 2022-03-29 NOTE — PROGRESS NOTE ADULT - ASSESSMENT
82yo Female with mhx of HFpEF ef 65% CAD s/p CABG 2000 LIMA to LAD and SVG to OM1 and subsequent PCI to LCx,in 2011, severe pulm HTN, Bioprosthetic aortic and mitral valve replacement 9/2014, CKD per outside records, afib on warfarin, chronic Lt loculated pleural effusion, Type 2 DM, HTN, HLD, gout c/o 3-4 days of worsening dyspnea on exertion and lower extremity swelling    Tele: afib slow ventricular response 40/50s    1. Acute decompensated diastolic heart failure  - Likely 2/2 medical noncompliance  - I and Os, Daily weights, replenish lytes as needed  - ACS ruled out  - EKG shows AFib with slow ventricular response  - BB held yesterday in setting bradycardia. continue to monitor on tele  - echo pending  -patient appears somnolent with diaphragmatic breathing, recommend nebs and ABG.  -Continue IV diuresis with Lasix 40mg IV BID. if not improvement may need lasix gtt    2. Rash  - Pt with diffuse rash over body  - Ongoing for a year  - Management as per primary team    3. CAD s/p CABG and PCI  - CABG x2 in 2000 (LIMA to LAD, SVG-OM1)  - PCI in 2011 to LCX  - Last LHC in 2019 revealed patent stent and grafts  - Continue ASA, statin, isordil, and losartan  - continue to hold BB at this time due to bradycardia    3. S/p MVR & AVR  -s/p bioprosthetic valves  - TTE from 2019 with elevated gradient of 6 mmHg  - Will reassess with TTE  - INR 1.88., start hep gtt to bridge with  Coumadin    80yo Female with mhx of HFpEF ef 65% CAD s/p CABG 2000 LIMA to LAD and SVG to OM1 and subsequent PCI to LCx,in 2011, severe pulm HTN, Bioprosthetic aortic and mitral valve replacement 9/2014, CKD per outside records, afib on warfarin, chronic Lt loculated pleural effusion, Type 2 DM, HTN, HLD, gout c/o 3-4 days of worsening dyspnea on exertion and lower extremity swelling    Tele: afib slow ventricular response 40/50s    1. Acute decompensated diastolic heart failure  - Likely 2/2 medical noncompliance  - I and Os, Daily weights, replenish lytes as needed  - ACS ruled out  - EKG shows AFib with slow ventricular response  - BB held yesterday in setting bradycardia. continue to monitor on tele  - echo pending  -patient appears somnolent with diaphragmatic breathing, recommend nebs and ABG.  -Continue IV diuresis with Lasix 40mg IV BID. if not improvement may need lasix gtt    2. Rash  - Pt with diffuse rash over body  - Ongoing for a year  - Management as per primary team    3. CAD s/p CABG and PCI  - CABG x2 in 2000 (LIMA to LAD, SVG-OM1)  - PCI in 2011 to LCX  - Last LHC in 2019 revealed patent stent and grafts  - Continue ASA, statin, isordil, and losartan  - continue to hold BB at this time due to bradycardia    3. S/p MVR & AVR  -s/p bioprosthetic valves  - TTE from 2019 with elevated gradient of 6 mmHg  - Will reassess with TTE  -c/w Coumadin    82yo Female with mhx of HFpEF ef 65% CAD s/p CABG 2000 LIMA to LAD and SVG to OM1 and subsequent PCI to LCx,in 2011, severe pulm HTN, Bioprosthetic aortic and mitral valve replacement 9/2014, CKD per outside records, afib on warfarin, chronic Lt loculated pleural effusion, Type 2 DM, HTN, HLD, gout c/o 3-4 days of worsening dyspnea on exertion and lower extremity swelling    Tele: afib slow ventricular response 40/50s    1. Acute decompensated diastolic heart failure  - Likely 2/2 medical noncompliance  - I and Os, Daily weights, replenish lytes as needed  - ACS ruled out  - EKG shows AFib with slow ventricular response  - BB held yesterday in setting bradycardia. continue to monitor on tele  - echo pending  -patient appears somnolent with diaphragmatic breathing,   ABG shows PCO2 > 70 consider BIPAP and pulm follow up  -Continue IV diuresis with Lasix 40mg IV BID. if not improvement may need lasix gtt    2. Rash  - Pt with diffuse rash over body  - Ongoing for a year  - Management as per primary team    3. CAD s/p CABG and PCI  - CABG x2 in 2000 (LIMA to LAD, SVG-OM1)  - PCI in 2011 to LCX  - Last LHC in 2019 revealed patent stent and grafts  - Continue ASA, statin, isordil, and losartan  - continue to hold BB at this time due to bradycardia    3. S/p MVR & AVR  -s/p bioprosthetic valves  - TTE from 2019 with elevated gradient of 6 mmHg  - Will reassess with TTE  -c/w Coumadin

## 2022-03-29 NOTE — PROGRESS NOTE ADULT - SUBJECTIVE AND OBJECTIVE BOX
Collin Trejo MD  Interventional Cardiology / Advance Heart Failure and Cardiac Transplant Specialist  [place] Office : [phone] [place] [address] [place] [address]  Tel:   [place] Office : [phone] [facility] [place] [address]  Tel: [phone]      Subjective/Overnight events: Pt is lying in bed somnolent this AM. Denies CP. diaphragmatic breathing  	  MEDICATIONS:  aspirin enteric coated 81 milliGRAM(s) Oral daily  furosemide   Injectable 40 milliGRAM(s) IV Push two times a day  isosorbide   dinitrate Tablet (ISORDIL) 10 milliGRAM(s) Oral two times a day  losartan 25 milliGRAM(s) Oral daily  warfarin 5 milliGRAM(s) Oral once      albuterol/ipratropium for Nebulization. 3 milliLiter(s) Nebulizer once    acetaminophen     Tablet .. 650 milliGRAM(s) Oral every 6 hours PRN  gabapentin 100 milliGRAM(s) Oral three times a day  melatonin 3 milliGRAM(s) Oral at bedtime PRN    aluminum hydroxide/magnesium hydroxide/simethicone Suspension 30 milliLiter(s) Oral every 4 hours PRN    atorvastatin 20 milliGRAM(s) Oral at bedtime  dextrose 50% Injectable 25 Gram(s) IV Push once  dextrose 50% Injectable 12.5 Gram(s) IV Push once  dextrose 50% Injectable 25 Gram(s) IV Push once  dextrose Oral Gel 15 Gram(s) Oral once PRN  glucagon  Injectable 1 milliGRAM(s) IntraMuscular once  insulin lispro (ADMELOG) corrective regimen sliding scale   SubCutaneous three times a day before meals  insulin lispro (ADMELOG) corrective regimen sliding scale   SubCutaneous at bedtime    dextrose 5%. 1000 milliLiter(s) IV Continuous <Continuous>  dextrose 5%. 1000 milliLiter(s) IV Continuous <Continuous>  ferrous    sulfate 325 milliGRAM(s) Oral daily  folic acid 1 milliGRAM(s) Oral daily      PAST MEDICAL/SURGICAL HISTORY  PAST MEDICAL & SURGICAL HISTORY:  HTN (Hypertension)    Afib  (on Warfarin)    CAD (Coronary Artery Disease)  s/p stent and CABG    CVA (Cerebral Infarction)  2011    CHF (congestive heart failure)  EF 65% Oct 2019    Upper GI bleed    Gout    Diabetes mellitus  type 2, not on meds    History of heart valve replacement with bioprosthetic valve  mitral and aortic    Hyperlipidemia    S/P angioplasty with stent  2011    S/P CABG x 1  (2000)    H/O aortic valve replacement  9/22/14    H/O mitral valve replacement  9/22/14        SOCIAL HISTORY: Substance Use (street drugs): ( x ) never used  (  ) other:    FAMILY HISTORY:  Family history of acute myocardial infarction  mother        PHYSICAL EXAM:  T(C): 36.8 (03-29-22 @ 10:41), Max: 36.8 (03-28-22 @ 20:30)  HR: 56 (03-29-22 @ 10:41) (41 - 60)  BP: 155/58 (03-29-22 @ 10:41) (142/54 - 165/79)  RR: 20 (03-29-22 @ 10:41) (19 - 25)  SpO2: 100% (03-29-22 @ 10:41) (94% - 100%)  Wt(kg): --  I&O's Summary      Weight (kg): 68.5 (03-28 @ 20:30)    GENERAL: somnolent  EYES:  conjunctiva and sclera clear  ENMT: No tonsillar erythema, exudates, or enlargement;   Cardiovascular: Normal S1 S2, No JVD, No murmurs, No edema  Respiratory: Lungs exp wheezing to auscultation	  Gastrointestinal:  Soft, Non-tender, + BS	  Extremities: +2 LE edema                                    12.1   7.37  )-----------( 145      ( 29 Mar 2022 07:04 )             39.7     03-29    139  |  100  |  24<H>  ----------------------------<  88  4.6   |  29  |  1.09    Ca    8.4      29 Mar 2022 07:04  Phos  3.6     03-29  Mg     1.80     03-29    TPro  6.4  /  Alb  3.2<L>  /  TBili  1.1  /  DBili  x   /  AST  22  /  ALT  14  /  AlkPhos  109  03-28    proBNP:   Lipid Profile:   HgA1c:   TSH:     Consultant(s) Notes Reviewed:  [x ] YES  [ ] NO    Care Discussed with Consultants/Other Providers [ x] YES  [ ] NO    Imaging Personally Reviewed independently:  [x] YES  [ ] NO    All labs, radiologic studies, vitals, orders and medications list reviewed. Patient is seen and examined at bedside. Case discussed with medical team.

## 2022-03-29 NOTE — CHART NOTE - NSCHARTNOTEFT_GEN_A_CORE
RN notified writer of <3sec pauses on telemonitoring. Pt asymptomatic, resting in bed. A&Ox4.     ICU Vital Signs Last 24 Hrs  T(C): 36.8 (28 Mar 2022 20:30), Max: 36.8 (28 Mar 2022 20:30)  T(F): 98.3 (28 Mar 2022 20:30), Max: 98.3 (28 Mar 2022 20:30)  HR: 57 (28 Mar 2022 20:30) (41 - 67)  BP: 142/54 (28 Mar 2022 20:30) (142/54 - 165/79)  RR: 19 (28 Mar 2022 20:30) (19 - 20)  SpO2: 95% (28 Mar 2022 20:30) (95% - 99%)    Plan:  >STAT EKG ordered...reviewed, similar to previous from 3/27: A fib with slow ventricular response, LAD.  >continue to hold BB.  >consult EP in AM re tachy felecia syndrome and <3sec pauses.  >continue to monitor overnight for any acute changes.

## 2022-03-29 NOTE — PROGRESS NOTE ADULT - ASSESSMENT
82yo Female with mhx of HFpEF ef 65% CAD (CABG 2000, stent 2011), severe pulm HTN, Bioprosthetic aortic and mitral valve replacement 9/2014, ?CKD per outside records, afib on warfarin, chronic Lt loculated pleural effusion,  Type 2 DM, HTN, HLD, gout a/w acute on chronic HFpEF i/s/p severe pHTN c/b hypercapnia; Found to have persisting moderate size loculated lt pleural effusion with atelectasis;         Problem/Plan - 1:  ·  Problem: Acute on chronic heart failure with preserved ejection fraction.   ·  Plan: Acute on chronic HFpEF likely in setting of decreased diuretic dose and severe pulm HTN as evidenced by prior TTE dated 10/10/2019;  Volume overloaded on exam with JVD and peripheral nonpitting edema to thighs  -Started Lasix 40 IV BID  -daily weights  -Intake and output strict  -elevate head of bed  -supplemental o2 PRN  -elevate head of bed  -monitor on telemetry and continuous pulse ox  -fluid restrict to 1500ml, DASH diet  -TTE  -Cardiology help appreciated.      Problem/Plan - 2:  ·  Problem: Hypercapnia.   ·  Plan: With transient desaturation to 87% when discussed with tele tech, HERNANDEZ at home nonadherent to home o2  VBG 7.38/pCO2=51, prior pCO2 in 40s   -Supplemental o2 PRN  -treat underlying acute on chronic HFpEF exacerbation, likely driving respiratory failure  -Less likely pleural effusion as unchanged from 2019 however if unresolving or worsening hypoxia or hypercapnia could consider thoracentesis.  -recheck VBG in AM  - will hold off BiPAP given normal pH and no respiratory distress.     Problem/Plan - 3:  ·  Problem: Loculated pleural effusion.   ·  Plan: Persisting loculated effusion so Pulmonary helping. No thoracentesis  < from: CT Chest No Cont (03.27.22 @ 20:25) >  IMPRESSION:    Small left pleural effusion with a loculated component in the left major   fissure and along left anterior chest wall. The loculated components are   new when compared to prior CT exam dated 10/13/2019. Associated partial   left lower lobe and left upper lobe collapse.    New pleural thickening and/or loculated fluid medially along the left   upper hemithorax.    Small right pleural effusion and adjacent passive atelectasis.    Stable 9 mm right apical nodule.    < end of copied text >     Problem/Plan - 4:  ·  Problem: Chronic atrial fibrillation.   ·  Plan: [id number] risk stratification = 7  Continue home coreg 12.5mg BID with hold parameters  Continue home warfarin 2mg qhs target INR 2-3  monitor on telemetry  INR=2.     Problem/Plan - 5:  ·  Problem: Pulmonary HTN.   ·  Plan: Severe pulm HTN likely class II  Repeat TTE this admission.     Problem/Plan - 6:  ·  Problem: Eosinophilia.  ·  Plan: Eosinophils= 7.5%; Likely clinically manifested as papular rash; Previously attributed to hydralazine which was discontinued without improvement of rash;  -Would benefit from dermatology consult in AM versus allergy and immunology inpt vs outpt  -Plan as below.     Problem/Plan - 7:  ·  Problem: Papular rash, generalized.  ·  Plan: Generalized papular rash of 1y duration previously attributed to hydralazine which was discontinued without improvement of rash  Uses hydrocortisone 2.5% cream intermittently with mild improvement of pruritis  With chronic eosinophilia noted per chart  -Emollients for now  -immunoglobulin panel in AM  -Full medication review may be beneficial of meds possibly causing eosinophilia  -Will consult dermatology consult in AM      Problem/Plan - 8:  ·  Problem: Hypertension.   ·  Plan: Restart home losartan, pressures can tolerate SBP 180s in ED  Cont Carvedilol.     Problem/Plan - 9:  ·  Problem: Hyperlipidemia.   ·  Plan: Continue home statin.     Problem/Plan - 10:  ·  Problem: T2DM (type 2 diabetes mellitus).   ·  Plan; HOLD home glipizide while inpatient   Continue home gabapentin for diabetic neuropathy  KRYSTYNA.     Problem/Plan - 11:  ·  Problem: Cerebrovascular accident (CVA).   ·  Plan: Without residual deficits at this time per daughter and patient although unclear clinical course occurring years ago. Continue to monitor and continue ASA and statin.     Problem/Plan - 12:  ·  Problem: CAD (coronary artery disease).   ·  Plan: Continue GDMT    #LAYLA thrombus  -cont warfarin  -tte    #prophylactic  AC warfarin  Diet 1500mL fluid restrict DASH  Dispo PT.

## 2022-03-30 LAB
ANION GAP SERPL CALC-SCNC: 10 MMOL/L — SIGNIFICANT CHANGE UP (ref 7–14)
APTT BLD: 39.1 SEC — HIGH (ref 27–36.3)
BUN SERPL-MCNC: 29 MG/DL — HIGH (ref 7–23)
CALCIUM SERPL-MCNC: 8.5 MG/DL — SIGNIFICANT CHANGE UP (ref 8.4–10.5)
CHLORIDE SERPL-SCNC: 101 MMOL/L — SIGNIFICANT CHANGE UP (ref 98–107)
CO2 SERPL-SCNC: 25 MMOL/L — SIGNIFICANT CHANGE UP (ref 22–31)
CREAT SERPL-MCNC: 1.02 MG/DL — SIGNIFICANT CHANGE UP (ref 0.5–1.3)
EGFR: 55 ML/MIN/1.73M2 — LOW
GAS PNL BLDV: SIGNIFICANT CHANGE UP
GLUCOSE BLDC GLUCOMTR-MCNC: 114 MG/DL — HIGH (ref 70–99)
GLUCOSE BLDC GLUCOMTR-MCNC: 116 MG/DL — HIGH (ref 70–99)
GLUCOSE BLDC GLUCOMTR-MCNC: 129 MG/DL — HIGH (ref 70–99)
GLUCOSE BLDC GLUCOMTR-MCNC: 161 MG/DL — HIGH (ref 70–99)
GLUCOSE SERPL-MCNC: 145 MG/DL — HIGH (ref 70–99)
HCT VFR BLD CALC: 38 % — SIGNIFICANT CHANGE UP (ref 34.5–45)
HGB BLD-MCNC: 11.6 G/DL — SIGNIFICANT CHANGE UP (ref 11.5–15.5)
INR BLD: 2.62 RATIO — HIGH (ref 0.88–1.16)
MCHC RBC-ENTMCNC: 28.4 PG — SIGNIFICANT CHANGE UP (ref 27–34)
MCHC RBC-ENTMCNC: 30.5 GM/DL — LOW (ref 32–36)
MCV RBC AUTO: 93.1 FL — SIGNIFICANT CHANGE UP (ref 80–100)
NRBC # BLD: 0 /100 WBCS — SIGNIFICANT CHANGE UP
NRBC # FLD: 0 K/UL — SIGNIFICANT CHANGE UP
PLATELET # BLD AUTO: 233 K/UL — SIGNIFICANT CHANGE UP (ref 150–400)
POTASSIUM SERPL-MCNC: 4.8 MMOL/L — SIGNIFICANT CHANGE UP (ref 3.5–5.3)
POTASSIUM SERPL-SCNC: 4.8 MMOL/L — SIGNIFICANT CHANGE UP (ref 3.5–5.3)
PROTHROM AB SERPL-ACNC: 30.7 SEC — HIGH (ref 10.5–13.4)
RBC # BLD: 4.08 M/UL — SIGNIFICANT CHANGE UP (ref 3.8–5.2)
RBC # FLD: 15.8 % — HIGH (ref 10.3–14.5)
SODIUM SERPL-SCNC: 136 MMOL/L — SIGNIFICANT CHANGE UP (ref 135–145)
WBC # BLD: 9.6 K/UL — SIGNIFICANT CHANGE UP (ref 3.8–10.5)
WBC # FLD AUTO: 9.6 K/UL — SIGNIFICANT CHANGE UP (ref 3.8–10.5)

## 2022-03-30 PROCEDURE — 93306 TTE W/DOPPLER COMPLETE: CPT | Mod: 26

## 2022-03-30 RX ORDER — WARFARIN SODIUM 2.5 MG/1
5 TABLET ORAL ONCE
Refills: 0 | Status: COMPLETED | OUTPATIENT
Start: 2022-03-30 | End: 2022-03-30

## 2022-03-30 RX ADMIN — ISOSORBIDE DINITRATE 10 MILLIGRAM(S): 5 TABLET ORAL at 18:07

## 2022-03-30 RX ADMIN — Medication 81 MILLIGRAM(S): at 12:01

## 2022-03-30 RX ADMIN — LOSARTAN POTASSIUM 25 MILLIGRAM(S): 100 TABLET, FILM COATED ORAL at 06:18

## 2022-03-30 RX ADMIN — Medication 40 MILLIGRAM(S): at 06:18

## 2022-03-30 RX ADMIN — Medication 40 MILLIGRAM(S): at 18:03

## 2022-03-30 RX ADMIN — GABAPENTIN 100 MILLIGRAM(S): 400 CAPSULE ORAL at 18:02

## 2022-03-30 RX ADMIN — Medication 1: at 12:15

## 2022-03-30 RX ADMIN — Medication 325 MILLIGRAM(S): at 12:01

## 2022-03-30 RX ADMIN — Medication 1 MILLIGRAM(S): at 12:01

## 2022-03-30 RX ADMIN — ISOSORBIDE DINITRATE 10 MILLIGRAM(S): 5 TABLET ORAL at 06:19

## 2022-03-30 RX ADMIN — WARFARIN SODIUM 5 MILLIGRAM(S): 2.5 TABLET ORAL at 18:07

## 2022-03-30 RX ADMIN — GABAPENTIN 100 MILLIGRAM(S): 400 CAPSULE ORAL at 06:18

## 2022-03-30 RX ADMIN — ATORVASTATIN CALCIUM 20 MILLIGRAM(S): 80 TABLET, FILM COATED ORAL at 21:12

## 2022-03-30 RX ADMIN — GABAPENTIN 100 MILLIGRAM(S): 400 CAPSULE ORAL at 21:12

## 2022-03-30 NOTE — PROGRESS NOTE ADULT - ASSESSMENT
82yo Female with mhx of HFpEF ef 65% CAD (CABG 2000, stent 2011), severe pulm HTN, Bioprosthetic aortic and mitral valve replacement 9/2014, ?CKD per outside records, afib on warfarin, chronic Lt loculated pleural effusion,  Type 2 DM, HTN, HLD, gout a/w acute on chronic HFpEF i/s/p severe pHTN c/b hypercapnia; Found to have persisting moderate size loculated lt pleural effusion with atelectasis;         Problem/Plan - 1:  ·  Problem: Acute on chronic diastolic heart failure .   ·  Plan: Acute on chronic HFpEF likely in setting of decreased diuretic dose and severe pulm HTN as evidenced by prior TTE dated 10/10/2019;  Volume overloaded on exam with JVD and peripheral nonpitting edema to thighs  -Started Lasix 40 IV BID  -daily weights  -Intake and output strict  -elevate head of bed  -supplemental o2 PRN  -elevate head of bed  -monitor on telemetry and continuous pulse ox  -fluid restrict to 1500ml, DASH diet  -TTE  -Cardiology help appreciated.      Problem/Plan - 2:  ·  Problem: Hypercapnia.   ·  Plan: With transient desaturation to 87% when discussed with tele tech, HERNANDEZ at home nonadherent to home o2  VBG 7.38/pCO2=51, prior pCO2 in 40s   -Supplemental o2 PRN  -Pulmonary helping.      Problem/Plan - 3:  ·  Problem: Loculated pleural effusion.   ·  Plan: Persisting loculated effusion so Pulmonary helping. No thoracentesis  < from: CT Chest No Cont (03.27.22 @ 20:25) >  IMPRESSION:    Small left pleural effusion with a loculated component in the left major   fissure and along left anterior chest wall. The loculated components are   new when compared to prior CT exam dated 10/13/2019. Associated partial   left lower lobe and left upper lobe collapse.    New pleural thickening and/or loculated fluid medially along the left   upper hemithorax.    Small right pleural effusion and adjacent passive atelectasis.    Stable 9 mm right apical nodule.    < end of copied text >     Problem/Plan - 4:  ·  Problem: Chronic atrial fibrillation.   ·  Plan: [id number] risk stratification = 7  Continue home coreg 12.5mg BID with hold parameters  Continue home warfarin 2mg qhs target INR 2-3  monitor on telemetry  INR=2.     Problem/Plan - 5:  ·  Problem: Pulmonary HTN.   ·  Plan: Severe pulm HTN likely class II  Repeat TTE this admission.     Problem/Plan - 6:  ·  Problem: LAYLA Thrombus .  ·  Plan: On AC.      Problem/Plan - 7:  ·  Problem: Papular rash, generalized with Eosinophilia .  ·  Plan: Generalized papular rash of 1y duration previously attributed to hydralazine which was discontinued without improvement of rash  Uses hydrocortisone 2.5% cream intermittently with mild improvement of pruritis  With chronic eosinophilia noted per chart  -Full medication review may be beneficial of meds possibly causing eosinophilia  -Will consult dermatology consult in AM      Problem/Plan - 8:  ·  Problem: Hypertension.   ·  Plan: Restart home losartan, pressures can tolerate SBP 180s in ED  Cont Carvedilol.     Problem/Plan - 9:  ·  Problem: Hyperlipidemia.   ·  Plan: Continue home statin.     Problem/Plan - 10:  ·  Problem: T2DM (type 2 diabetes mellitus).   ·  Plan; HOLD home glipizide while inpatient   Continue home gabapentin for diabetic neuropathy  KRYSTYNA.     Problem/Plan - 11:  ·  Problem: Cerebrovascular accident (CVA).   ·  Plan: Without residual deficits at this time per daughter and patient although unclear clinical course occurring years ago. Continue to monitor and continue ASA and statin.     Problem/Plan - 12:  ·  Problem: CAD (coronary artery disease).   ·  Plan: Continue GDMT    Full code.   Dispo PT.

## 2022-03-30 NOTE — PROGRESS NOTE ADULT - SUBJECTIVE AND OBJECTIVE BOX
Collin Trejo MD  Interventional Cardiology / Advance Heart Failure and Cardiac Transplant Specialist  [place] Office : [phone] [place] [address] [place] [address]  Tel:   [place] Office : [address] [facility] [place] [address]  Tel: [phone]      Subjective/Overnight events: Pt is lying in bed somonlent  	  MEDICATIONS:  aspirin enteric coated 81 milliGRAM(s) Oral daily  furosemide   Injectable 40 milliGRAM(s) IV Push two times a day  isosorbide   dinitrate Tablet (ISORDIL) 10 milliGRAM(s) Oral two times a day  losartan 25 milliGRAM(s) Oral daily        acetaminophen     Tablet .. 650 milliGRAM(s) Oral every 6 hours PRN  gabapentin 100 milliGRAM(s) Oral three times a day  melatonin 3 milliGRAM(s) Oral at bedtime PRN    aluminum hydroxide/magnesium hydroxide/simethicone Suspension 30 milliLiter(s) Oral every 4 hours PRN    atorvastatin 20 milliGRAM(s) Oral at bedtime  dextrose 50% Injectable 25 Gram(s) IV Push once  dextrose 50% Injectable 12.5 Gram(s) IV Push once  dextrose 50% Injectable 25 Gram(s) IV Push once  dextrose Oral Gel 15 Gram(s) Oral once PRN  glucagon  Injectable 1 milliGRAM(s) IntraMuscular once  insulin lispro (ADMELOG) corrective regimen sliding scale   SubCutaneous three times a day before meals  insulin lispro (ADMELOG) corrective regimen sliding scale   SubCutaneous at bedtime    dextrose 5%. 1000 milliLiter(s) IV Continuous <Continuous>  dextrose 5%. 1000 milliLiter(s) IV Continuous <Continuous>  ferrous    sulfate 325 milliGRAM(s) Oral daily  folic acid 1 milliGRAM(s) Oral daily      PAST MEDICAL/SURGICAL HISTORY  PAST MEDICAL & SURGICAL HISTORY:  HTN (Hypertension)    Afib  (on Warfarin)    CAD (Coronary Artery Disease)  s/p stent and CABG    CVA (Cerebral Infarction)  2011    CHF (congestive heart failure)  EF 65% Oct 2019    Upper GI bleed    Gout    Diabetes mellitus  type 2, not on meds    History of heart valve replacement with bioprosthetic valve  mitral and aortic    Hyperlipidemia    S/P angioplasty with stent  2011    S/P CABG x 1  (2000)    H/O aortic valve replacement  9/22/14    H/O mitral valve replacement  9/22/14        SOCIAL HISTORY: Substance Use (street drugs): ( x ) never used  (  ) other:    FAMILY HISTORY:  Family history of acute myocardial infarction  mother          PHYSICAL EXAM:  T(C): 36.9 (03-30-22 @ 10:30), Max: 36.9 (03-29-22 @ 17:19)  HR: 70 (03-30-22 @ 10:30) (52 - 70)  BP: 156/70 (03-30-22 @ 10:30) (126/50 - 166/52)  RR: 18 (03-30-22 @ 10:30) (17 - 20)  SpO2: 100% (03-30-22 @ 10:30) (96% - 100%)  Wt(kg): --  I&O's Summary    29 Mar 2022 07:01  -  30 Mar 2022 07:00  --------------------------------------------------------  IN: 0 mL / OUT: 750 mL / NET: -750 mL    30 Mar 2022 07:01  -  30 Mar 2022 11:26  --------------------------------------------------------  IN: 320 mL / OUT: 0 mL / NET: 320 mL          GENERAL: somnolent  EYES:  conjunctiva and sclera clear  ENMT: No tonsillar erythema, exudates, or enlargement;   Cardiovascular: Normal S1 S2, No JVD, No murmurs, No edema  Respiratory: Lungs exp wheezing to auscultation	  Gastrointestinal:  Soft, Non-tender, + BS	  Extremities: +2 LE edema                            11.6   9.60  )-----------( 233      ( 30 Mar 2022 07:40 )             38.0     03-30    136  |  101  |  29<H>  ----------------------------<  145<H>  4.8   |  25  |  1.02    Ca    8.5      30 Mar 2022 07:40  Phos  3.6     03-29  Mg     1.80     03-29      proBNP:   Lipid Profile:   HgA1c:   TSH:     Consultant(s) Notes Reviewed:  [x ] YES  [ ] NO    Care Discussed with Consultants/Other Providers [ x] YES  [ ] NO    Imaging Personally Reviewed independently:  [x] YES  [ ] NO    All labs, radiologic studies, vitals, orders and medications list reviewed. Patient is seen and examined at bedside. Case discussed with medical team.

## 2022-03-30 NOTE — PROGRESS NOTE ADULT - SUBJECTIVE AND OBJECTIVE BOX
Date of Service  : 03-30-22     INTERVAL HPI/OVERNIGHT EVENTS: I am getting better.   Vital Signs Last 24 Hrs  T(C): 37 (30 Mar 2022 18:00), Max: 37 (30 Mar 2022 14:30)  T(F): 98.6 (30 Mar 2022 18:00), Max: 98.6 (30 Mar 2022 14:30)  HR: 63 (30 Mar 2022 18:00) (57 - 70)  BP: 163/66 (30 Mar 2022 18:00) (126/50 - 163/66)  BP(mean): --  RR: 18 (30 Mar 2022 18:00) (17 - 20)  SpO2: 98% (30 Mar 2022 18:00) (96% - 100%)  I&O's Summary    29 Mar 2022 07:01  -  30 Mar 2022 07:00  --------------------------------------------------------  IN: 0 mL / OUT: 750 mL / NET: -750 mL    30 Mar 2022 07:01  -  30 Mar 2022 19:46  --------------------------------------------------------  IN: 680 mL / OUT: 500 mL / NET: 180 mL      MEDICATIONS  (STANDING):  aspirin enteric coated 81 milliGRAM(s) Oral daily  atorvastatin 20 milliGRAM(s) Oral at bedtime  dextrose 5%. 1000 milliLiter(s) (100 mL/Hr) IV Continuous <Continuous>  dextrose 5%. 1000 milliLiter(s) (50 mL/Hr) IV Continuous <Continuous>  dextrose 50% Injectable 25 Gram(s) IV Push once  dextrose 50% Injectable 12.5 Gram(s) IV Push once  dextrose 50% Injectable 25 Gram(s) IV Push once  ferrous    sulfate 325 milliGRAM(s) Oral daily  folic acid 1 milliGRAM(s) Oral daily  furosemide   Injectable 40 milliGRAM(s) IV Push two times a day  gabapentin 100 milliGRAM(s) Oral three times a day  glucagon  Injectable 1 milliGRAM(s) IntraMuscular once  insulin lispro (ADMELOG) corrective regimen sliding scale   SubCutaneous three times a day before meals  insulin lispro (ADMELOG) corrective regimen sliding scale   SubCutaneous at bedtime  isosorbide   dinitrate Tablet (ISORDIL) 10 milliGRAM(s) Oral two times a day  losartan 25 milliGRAM(s) Oral daily    MEDICATIONS  (PRN):  acetaminophen     Tablet .. 650 milliGRAM(s) Oral every 6 hours PRN Temp greater or equal to 38C (100.4F), Mild Pain (1 - 3)  aluminum hydroxide/magnesium hydroxide/simethicone Suspension 30 milliLiter(s) Oral every 4 hours PRN Dyspepsia  dextrose Oral Gel 15 Gram(s) Oral once PRN Blood Glucose LESS THAN 70 milliGRAM(s)/deciliter  melatonin 3 milliGRAM(s) Oral at bedtime PRN Insomnia    LABS:                        11.6   9.60  )-----------( 233      ( 30 Mar 2022 07:40 )             38.0     03-30    136  |  101  |  29<H>  ----------------------------<  145<H>  4.8   |  25  |  1.02    Ca    8.5      30 Mar 2022 07:40  Phos  3.6     03-29  Mg     1.80     03-29      PT/INR - ( 30 Mar 2022 07:40 )   PT: 30.7 sec;   INR: 2.62 ratio         PTT - ( 30 Mar 2022 07:40 )  PTT:39.1 sec    CAPILLARY BLOOD GLUCOSE      POCT Blood Glucose.: 129 mg/dL (30 Mar 2022 17:05)  POCT Blood Glucose.: 161 mg/dL (30 Mar 2022 12:02)  POCT Blood Glucose.: 116 mg/dL (30 Mar 2022 08:34)  POCT Blood Glucose.: 133 mg/dL (29 Mar 2022 21:37)              Consultant(s) Notes Reviewed:  [x ] YES  [ ] NO    PHYSICAL EXAM:  GENERAL: NAD, Oxygen   HEAD:  Atraumatic, Normocephalic  NECK: Supple, No JVD, Normal thyroid  NERVOUS SYSTEM:  Alert & Oriented X3, No focal deficit   CHEST/LUNG: Good air entry bilateral with no  rales, rhonchi, wheezing, or rubs  HEART: Regular rate and rhythm; No murmurs, rubs, or gallops  ABDOMEN: Soft, Nontender, Nondistended; Bowel sounds present  EXTREMITIES:  Less edema      Care Discussed with Consultants/Other Providers [ x] YES  [ ] NO

## 2022-03-30 NOTE — PROGRESS NOTE ADULT - ASSESSMENT
80yo Female with mhx of HFpEF ef 65% CAD s/p CABG 2000 LIMA to LAD and SVG to OM1 and subsequent PCI to LCx,in 2011, severe pulm HTN, Bioprosthetic aortic and mitral valve replacement 9/2014, CKD per outside records, afib on warfarin, chronic Lt loculated pleural effusion, Type 2 DM, HTN, HLD, gout c/o 3-4 days of worsening dyspnea on exertion and lower extremity swelling    Tele: Afib 60s    1. Acute decompensated diastolic heart failure  - Likely 2/2 medical noncompliance  - I and Os, Daily weights, replenish lytes as needed  - ACS ruled out  - EKG shows AFib with slow ventricular response  - BB held yesterday in setting bradycardia. continue to monitor on tele now improved  - echo pending  -patient appears somnolent with diaphragmatic breathing,   ABG shows PCO2 > 70 patient only wore bipap for 2 hours and is nor refusing. CO2 this AM improved. f/u pulm recs  -Continue IV diuresis with Lasix 40mg IV BID. monitor strict I&Os. primafit placed by RN please obtain PVR bladder scan     2. Rash  - Pt with diffuse rash over body  - Ongoing for a year  - Management as per primary team    3. CAD s/p CABG and PCI  - CABG x2 in 2000 (LIMA to LAD, SVG-OM1)  - PCI in 2011 to LCX  - Last C in 2019 revealed patent stent and grafts  - Continue ASA, statin, isordil, and losartan  - continue to hold BB at this time due to bradycardia    3. S/p MVR & AVR  -s/p bioprosthetic valves  -echo with bioprosthetic MVR, AVR, grossly normal LV systolic function, decreased RV function and severe pHTN      4. Afib  -rate in 60s  -continue to hold BB  -c/w coumadin INR 2.62 today

## 2022-03-31 LAB
ANION GAP SERPL CALC-SCNC: 10 MMOL/L — SIGNIFICANT CHANGE UP (ref 7–14)
APTT BLD: 41.3 SEC — HIGH (ref 27–36.3)
BUN SERPL-MCNC: 26 MG/DL — HIGH (ref 7–23)
CALCIUM SERPL-MCNC: 8.5 MG/DL — SIGNIFICANT CHANGE UP (ref 8.4–10.5)
CHLORIDE SERPL-SCNC: 98 MMOL/L — SIGNIFICANT CHANGE UP (ref 98–107)
CO2 SERPL-SCNC: 26 MMOL/L — SIGNIFICANT CHANGE UP (ref 22–31)
CREAT SERPL-MCNC: 0.92 MG/DL — SIGNIFICANT CHANGE UP (ref 0.5–1.3)
EGFR: 62 ML/MIN/1.73M2 — SIGNIFICANT CHANGE UP
GLUCOSE BLDC GLUCOMTR-MCNC: 112 MG/DL — HIGH (ref 70–99)
GLUCOSE BLDC GLUCOMTR-MCNC: 121 MG/DL — HIGH (ref 70–99)
GLUCOSE BLDC GLUCOMTR-MCNC: 179 MG/DL — HIGH (ref 70–99)
GLUCOSE BLDC GLUCOMTR-MCNC: 186 MG/DL — HIGH (ref 70–99)
GLUCOSE SERPL-MCNC: 107 MG/DL — HIGH (ref 70–99)
HCT VFR BLD CALC: 38.8 % — SIGNIFICANT CHANGE UP (ref 34.5–45)
HGB BLD-MCNC: 11.9 G/DL — SIGNIFICANT CHANGE UP (ref 11.5–15.5)
INR BLD: 3.34 RATIO — HIGH (ref 0.88–1.16)
MAGNESIUM SERPL-MCNC: 1.7 MG/DL — SIGNIFICANT CHANGE UP (ref 1.6–2.6)
MCHC RBC-ENTMCNC: 28 PG — SIGNIFICANT CHANGE UP (ref 27–34)
MCHC RBC-ENTMCNC: 30.7 GM/DL — LOW (ref 32–36)
MCV RBC AUTO: 91.3 FL — SIGNIFICANT CHANGE UP (ref 80–100)
NRBC # BLD: 0 /100 WBCS — SIGNIFICANT CHANGE UP
NRBC # FLD: 0 K/UL — SIGNIFICANT CHANGE UP
PHOSPHATE SERPL-MCNC: 2.5 MG/DL — SIGNIFICANT CHANGE UP (ref 2.5–4.5)
PLATELET # BLD AUTO: 158 K/UL — SIGNIFICANT CHANGE UP (ref 150–400)
POTASSIUM SERPL-MCNC: 4.6 MMOL/L — SIGNIFICANT CHANGE UP (ref 3.5–5.3)
POTASSIUM SERPL-SCNC: 4.6 MMOL/L — SIGNIFICANT CHANGE UP (ref 3.5–5.3)
PROTHROM AB SERPL-ACNC: 39.2 SEC — HIGH (ref 10.5–13.4)
RBC # BLD: 4.25 M/UL — SIGNIFICANT CHANGE UP (ref 3.8–5.2)
RBC # FLD: 15.5 % — HIGH (ref 10.3–14.5)
SODIUM SERPL-SCNC: 134 MMOL/L — LOW (ref 135–145)
WBC # BLD: 10.11 K/UL — SIGNIFICANT CHANGE UP (ref 3.8–10.5)
WBC # FLD AUTO: 10.11 K/UL — SIGNIFICANT CHANGE UP (ref 3.8–10.5)

## 2022-03-31 PROCEDURE — 71045 X-RAY EXAM CHEST 1 VIEW: CPT | Mod: 26

## 2022-03-31 RX ORDER — MAGNESIUM SULFATE 500 MG/ML
2 VIAL (ML) INJECTION ONCE
Refills: 0 | Status: COMPLETED | OUTPATIENT
Start: 2022-03-31 | End: 2022-03-31

## 2022-03-31 RX ORDER — HYDROCORTISONE 1 %
1 OINTMENT (GRAM) TOPICAL
Refills: 0 | Status: DISCONTINUED | OUTPATIENT
Start: 2022-03-31 | End: 2022-04-02

## 2022-03-31 RX ORDER — PIPERACILLIN AND TAZOBACTAM 4; .5 G/20ML; G/20ML
3.38 INJECTION, POWDER, LYOPHILIZED, FOR SOLUTION INTRAVENOUS ONCE
Refills: 0 | Status: COMPLETED | OUTPATIENT
Start: 2022-03-31 | End: 2022-03-31

## 2022-03-31 RX ORDER — VANCOMYCIN HCL 1 G
1000 VIAL (EA) INTRAVENOUS ONCE
Refills: 0 | Status: COMPLETED | OUTPATIENT
Start: 2022-03-31 | End: 2022-03-31

## 2022-03-31 RX ORDER — HYDROCORTISONE 1 %
1 OINTMENT (GRAM) TOPICAL
Refills: 0 | Status: DISCONTINUED | OUTPATIENT
Start: 2022-03-31 | End: 2022-03-31

## 2022-03-31 RX ORDER — PIPERACILLIN AND TAZOBACTAM 4; .5 G/20ML; G/20ML
3.38 INJECTION, POWDER, LYOPHILIZED, FOR SOLUTION INTRAVENOUS EVERY 8 HOURS
Refills: 0 | Status: COMPLETED | OUTPATIENT
Start: 2022-03-31 | End: 2022-04-07

## 2022-03-31 RX ADMIN — Medication 325 MILLIGRAM(S): at 09:20

## 2022-03-31 RX ADMIN — Medication 250 MILLIGRAM(S): at 18:20

## 2022-03-31 RX ADMIN — Medication 40 MILLIGRAM(S): at 19:19

## 2022-03-31 RX ADMIN — Medication 81 MILLIGRAM(S): at 09:20

## 2022-03-31 RX ADMIN — Medication 650 MILLIGRAM(S): at 18:16

## 2022-03-31 RX ADMIN — GABAPENTIN 100 MILLIGRAM(S): 400 CAPSULE ORAL at 06:32

## 2022-03-31 RX ADMIN — GABAPENTIN 100 MILLIGRAM(S): 400 CAPSULE ORAL at 12:23

## 2022-03-31 RX ADMIN — Medication 40 MILLIGRAM(S): at 06:37

## 2022-03-31 RX ADMIN — ATORVASTATIN CALCIUM 20 MILLIGRAM(S): 80 TABLET, FILM COATED ORAL at 21:05

## 2022-03-31 RX ADMIN — GABAPENTIN 100 MILLIGRAM(S): 400 CAPSULE ORAL at 21:04

## 2022-03-31 RX ADMIN — ISOSORBIDE DINITRATE 10 MILLIGRAM(S): 5 TABLET ORAL at 19:19

## 2022-03-31 RX ADMIN — PIPERACILLIN AND TAZOBACTAM 200 GRAM(S): 4; .5 INJECTION, POWDER, LYOPHILIZED, FOR SOLUTION INTRAVENOUS at 18:20

## 2022-03-31 RX ADMIN — Medication 1 MILLIGRAM(S): at 09:20

## 2022-03-31 RX ADMIN — Medication 1 APPLICATION(S): at 19:20

## 2022-03-31 RX ADMIN — ISOSORBIDE DINITRATE 10 MILLIGRAM(S): 5 TABLET ORAL at 06:32

## 2022-03-31 RX ADMIN — Medication 1: at 12:24

## 2022-03-31 RX ADMIN — LOSARTAN POTASSIUM 25 MILLIGRAM(S): 100 TABLET, FILM COATED ORAL at 06:33

## 2022-03-31 RX ADMIN — Medication 25 GRAM(S): at 12:25

## 2022-03-31 NOTE — CHART NOTE - NSCHARTNOTEFT_GEN_A_CORE
temp 101.6 - tylenol, ua, blood cx, cxr, incentive spirometry ordered.  will continue to monitor temp 101.6 - tylenol, ua, blood cx, cxr, incentive spirometry ordered.  has not cooperated with PT, will give 1 dose vanco and start zosyn.  will continue to monitor

## 2022-03-31 NOTE — PROGRESS NOTE ADULT - SUBJECTIVE AND OBJECTIVE BOX
Date of Service  : 03-31-22     INTERVAL HPI/OVERNIGHT EVENTS: I do not feel good. Spiked temp .   Vital Signs Last 24 Hrs  T(C): 37.8 (31 Mar 2022 19:11), Max: 38.7 (31 Mar 2022 17:11)  T(F): 100 (31 Mar 2022 19:11), Max: 101.6 (31 Mar 2022 17:11)  HR: 77 (31 Mar 2022 19:11) (70 - 77)  BP: 148/60 (31 Mar 2022 19:11) (140/77 - 157/45)  BP(mean): --  RR: 16 (31 Mar 2022 19:11) (16 - 18)  SpO2: 100% (31 Mar 2022 19:11) (98% - 100%)  I&O's Summary    30 Mar 2022 07:01  -  31 Mar 2022 07:00  --------------------------------------------------------  IN: 680 mL / OUT: 500 mL / NET: 180 mL      MEDICATIONS  (STANDING):  aspirin enteric coated 81 milliGRAM(s) Oral daily  atorvastatin 20 milliGRAM(s) Oral at bedtime  dextrose 5%. 1000 milliLiter(s) (100 mL/Hr) IV Continuous <Continuous>  dextrose 5%. 1000 milliLiter(s) (50 mL/Hr) IV Continuous <Continuous>  dextrose 50% Injectable 25 Gram(s) IV Push once  dextrose 50% Injectable 12.5 Gram(s) IV Push once  dextrose 50% Injectable 25 Gram(s) IV Push once  ferrous    sulfate 325 milliGRAM(s) Oral daily  folic acid 1 milliGRAM(s) Oral daily  furosemide   Injectable 40 milliGRAM(s) IV Push two times a day  gabapentin 100 milliGRAM(s) Oral three times a day  glucagon  Injectable 1 milliGRAM(s) IntraMuscular once  hydrocortisone 2.5% Ointment 1 Application(s) Topical two times a day  insulin lispro (ADMELOG) corrective regimen sliding scale   SubCutaneous three times a day before meals  insulin lispro (ADMELOG) corrective regimen sliding scale   SubCutaneous at bedtime  isosorbide   dinitrate Tablet (ISORDIL) 10 milliGRAM(s) Oral two times a day  losartan 25 milliGRAM(s) Oral daily  piperacillin/tazobactam IVPB.. 3.375 Gram(s) IV Intermittent every 8 hours    MEDICATIONS  (PRN):  acetaminophen     Tablet .. 650 milliGRAM(s) Oral every 6 hours PRN Temp greater or equal to 38C (100.4F), Mild Pain (1 - 3)  aluminum hydroxide/magnesium hydroxide/simethicone Suspension 30 milliLiter(s) Oral every 4 hours PRN Dyspepsia  dextrose Oral Gel 15 Gram(s) Oral once PRN Blood Glucose LESS THAN 70 milliGRAM(s)/deciliter  melatonin 3 milliGRAM(s) Oral at bedtime PRN Insomnia    LABS:                        11.9   10.11 )-----------( 158      ( 31 Mar 2022 07:44 )             38.8     03-31    134<L>  |  98  |  26<H>  ----------------------------<  107<H>  4.6   |  26  |  0.92    Ca    8.5      31 Mar 2022 07:44  Phos  2.5     03-31  Mg     1.70     03-31      PT/INR - ( 31 Mar 2022 07:44 )   PT: 39.2 sec;   INR: 3.34 ratio         PTT - ( 31 Mar 2022 07:44 )  PTT:41.3 sec    CAPILLARY BLOOD GLUCOSE      POCT Blood Glucose.: 186 mg/dL (31 Mar 2022 21:18)  POCT Blood Glucose.: 121 mg/dL (31 Mar 2022 17:04)  POCT Blood Glucose.: 179 mg/dL (31 Mar 2022 12:21)  POCT Blood Glucose.: 112 mg/dL (31 Mar 2022 08:27)  POCT Blood Glucose.: 114 mg/dL (30 Mar 2022 22:10)          REVIEW OF SYSTEMS:  CONSTITUTIONAL: No fever, weight loss, or fatigue  EYES: No eye pain, visual disturbances, or discharge  ENMT:  No difficulty hearing, tinnitus, vertigo; No sinus or throat pain  NECK: No pain or stiffness  RESPIRATORY: No cough, wheezing, chills or hemoptysis; No shortness of breath  CARDIOVASCULAR: No chest pain, palpitations, dizziness, or leg swelling  GASTROINTESTINAL: No abdominal or epigastric pain. No nausea, vomiting, or hematemesis; No diarrhea or constipation. No melena or hematochezia.  GENITOURINARY: No dysuria, frequency, hematuria, or incontinence  NEUROLOGICAL: No headaches, memory loss, loss of strength, numbness, or tremors      Consultant(s) Notes Reviewed:  [x ] YES  [ ] NO    PHYSICAL EXAM:  GENERAL: NAD, not in any distress ,Oxygen   HEAD:  Atraumatic, Normocephalic  NECK: Supple, No JVD, Normal thyroid  NERVOUS SYSTEM:  Alert & Oriented X3, No focal deficit   CHEST/LUNG: Good air entry bilateral   HEART: Regular rate and rhythm; No murmurs, rubs, or gallops  ABDOMEN: Soft, Nontender, Nondistended; Bowel sounds present  EXTREMITIES:  2+ Peripheral Pulses, No clubbing, cyanosis, or edema    Care Discussed with Consultants/Other Providers [ x] YES  [ ] NO

## 2022-03-31 NOTE — PROGRESS NOTE ADULT - ASSESSMENT
80yo Female with mhx of HFpEF ef 65% CAD s/p CABG 2000 LIMA to LAD and SVG to OM1 and subsequent PCI to LCx,in 2011, severe pulm HTN, Bioprosthetic aortic and mitral valve replacement 9/2014, CKD per outside records, afib on warfarin, chronic Lt loculated pleural effusion, Type 2 DM, HTN, HLD, gout c/o 3-4 days of worsening dyspnea on exertion and lower extremity swelling    Tele: Afib 60s    1. Acute decompensated diastolic heart failure  - Likely 2/2 medical noncompliance  - I and Os, Daily weights, replenish lytes as needed  - ACS ruled out  - EKG shows AFib with slow ventricular response  - BB held yesterday in setting bradycardia. continue to monitor on tele now improved  - echo shows normaL AVR  and MVR normal LV function decreased RV function sev pulm HTN   -patient appears somnolent with diaphragmatic breathing,   ABG shows PCO2 > 70 patient only wore bipap for 2 hours and is nor refusing. CO2 this AM improved. f/u pulm recs  -Continue IV diuresis with Lasix 40mg IV BID. monitor strict I&Os. primafit placed by RN please obtain PVR bladder scan     2. Rash  - Pt with diffuse rash over body  - Ongoing for a year  - Management as per primary team    3. CAD s/p CABG and PCI  - CABG x2 in 2000 (LIMA to LAD, SVG-OM1)  - PCI in 2011 to LCX  - Last LHC in 2019 revealed patent stent and grafts  - Continue ASA, statin, isordil, and losartan  - continue to hold BB at this time due to bradycardia    3. S/p MVR & AVR  -s/p bioprosthetic valves  -echo with bioprosthetic MVR, AVR, grossly normal LV systolic function, decreased RV function and severe pHTN      4. Afib  -rate in 60s  -continue to hold BB  -c/w coumadin INR 2.62 today  82yo Female with mhx of HFpEF ef 65% CAD s/p CABG 2000 LIMA to LAD and SVG to OM1 and subsequent PCI to LCx,in 2011, severe pulm HTN, Bioprosthetic aortic and mitral valve replacement 9/2014, CKD per outside records, afib on warfarin, chronic Lt loculated pleural effusion, Type 2 DM, HTN, HLD, gout c/o 3-4 days of worsening dyspnea on exertion and lower extremity swelling    Tele: Afib 60s    1. Acute decompensated diastolic heart failure  - Likely 2/2 medical noncompliance  - I and Os, Daily weights, replenish lytes as needed  - ACS ruled out  - EKG shows AFib with slow ventricular response  - BB held yesterday in setting bradycardia. continue to monitor on tele now improved  - echo shows normaL AVR  and MVR normal LV function decreased RV function sev pulm HTN   -patient appears somnolent with diaphragmatic breathing,   ABG shows PCO2 > 70 patient only wore bipap for 2 hours and is nor refusing. CO2 this AM improved. f/u pulm recs  -Continue IV diuresis with Lasix 40mg IV BID. monitor strict I&Os. primafit placed by RN please obtain PVR bladder scan     2. Rash  - Pt with diffuse rash over body  - Ongoing for a year  - Management as per primary team    3. CAD s/p CABG and PCI  - CABG x2 in 2000 (LIMA to LAD, SVG-OM1)  - PCI in 2011 to LCX  - Last LHC in 2019 revealed patent stent and grafts  - Continue ASA, statin, isordil, and losartan  - continue to hold BB at this time due to bradycardia    3. S/p MVR & AVR  -s/p bioprosthetic valves  -echo with bioprosthetic MVR, AVR, grossly normal LV systolic function, decreased RV function and severe pHTN      4. Afib  -rate in 60s  -continue to hold BB  -c/w coumadin INR 3.3 hold coumadin

## 2022-03-31 NOTE — PROGRESS NOTE ADULT - SUBJECTIVE AND OBJECTIVE BOX
Collin Trejo MD  Interventional Cardiology / Advance Heart Failure and Cardiac Transplant Specialist  [place] Office : [address] [place] [address] [place] [address]  Tel:   [place] Office : [address] [facility] [place] [address]  Tel: [phone]       Pt is lying in bed comfortable not in distress, no chest pains some  SOB no palpitations  	  MEDICATIONS:  aspirin enteric coated 81 milliGRAM(s) Oral daily  furosemide   Injectable 40 milliGRAM(s) IV Push two times a day  isosorbide   dinitrate Tablet (ISORDIL) 10 milliGRAM(s) Oral two times a day  losartan 25 milliGRAM(s) Oral daily        acetaminophen     Tablet .. 650 milliGRAM(s) Oral every 6 hours PRN  gabapentin 100 milliGRAM(s) Oral three times a day  melatonin 3 milliGRAM(s) Oral at bedtime PRN    aluminum hydroxide/magnesium hydroxide/simethicone Suspension 30 milliLiter(s) Oral every 4 hours PRN    atorvastatin 20 milliGRAM(s) Oral at bedtime  dextrose 50% Injectable 25 Gram(s) IV Push once  dextrose 50% Injectable 12.5 Gram(s) IV Push once  dextrose 50% Injectable 25 Gram(s) IV Push once  dextrose Oral Gel 15 Gram(s) Oral once PRN  glucagon  Injectable 1 milliGRAM(s) IntraMuscular once  insulin lispro (ADMELOG) corrective regimen sliding scale   SubCutaneous three times a day before meals  insulin lispro (ADMELOG) corrective regimen sliding scale   SubCutaneous at bedtime    dextrose 5%. 1000 milliLiter(s) IV Continuous <Continuous>  dextrose 5%. 1000 milliLiter(s) IV Continuous <Continuous>  ferrous    sulfate 325 milliGRAM(s) Oral daily  folic acid 1 milliGRAM(s) Oral daily  hydrocortisone 2.5% Ointment 1 Application(s) Topical two times a day      PAST MEDICAL/SURGICAL HISTORY  PAST MEDICAL & SURGICAL HISTORY:  HTN (Hypertension)    Afib  (on Warfarin)    CAD (Coronary Artery Disease)  s/p stent and CABG    CVA (Cerebral Infarction)  2011    CHF (congestive heart failure)  EF 65% Oct 2019    Upper GI bleed    Gout    Diabetes mellitus  type 2, not on meds    History of heart valve replacement with bioprosthetic valve  mitral and aortic    Hyperlipidemia    S/P angioplasty with stent  2011    S/P CABG x 1  (2000)    H/O aortic valve replacement  9/22/14    H/O mitral valve replacement  9/22/14        SOCIAL HISTORY: Substance Use (street drugs): ( x ) never used  (  ) other:    FAMILY HISTORY:  Family history of acute myocardial infarction  mother        PHYSICAL EXAM:  T(C): 38.7 (03-31-22 @ 17:11), Max: 38.7 (03-31-22 @ 17:11)  HR: 70 (03-31-22 @ 17:11) (63 - 77)  BP: 157/45 (03-31-22 @ 17:11) (135/60 - 163/66)  RR: 18 (03-31-22 @ 17:11) (16 - 18)  SpO2: 98% (03-31-22 @ 12:32) (98% - 99%)  Wt(kg): --  I&O's Summary    30 Mar 2022 07:01  -  31 Mar 2022 07:00  --------------------------------------------------------  IN: 680 mL / OUT: 500 mL / NET: 180 mL            EYES:   PERRLA   ENMT:   Moist mucous membranes, Good dentition, No lesions  Cardiovascular: Normal S1 S2, No JVD, No murmurs, No edema  Respiratory: b/l rhonchi   Gastrointestinal:  Soft, Non-tender, + BS	  Extremities: no edema                                    11.9   10.11 )-----------( 158      ( 31 Mar 2022 07:44 )             38.8     03-31    134<L>  |  98  |  26<H>  ----------------------------<  107<H>  4.6   |  26  |  0.92    Ca    8.5      31 Mar 2022 07:44  Phos  2.5     03-31  Mg     1.70     03-31      proBNP:   Lipid Profile:   HgA1c:   TSH:     Consultant(s) Notes Reviewed:  [x ] YES  [ ] NO    Care Discussed with Consultants/Other Providers [ x] YES  [ ] NO    Imaging Personally Reviewed independently:  [x] YES  [ ] NO    All labs, radiologic studies, vitals, orders and medications list reviewed. Patient is seen and examined at bedside. Case discussed with medical team.

## 2022-03-31 NOTE — PROGRESS NOTE ADULT - ASSESSMENT
80yo Female with mhx of HFpEF ef 65% CAD (CABG 2000, stent 2011), severe pulm HTN, Bioprosthetic aortic and mitral valve replacement 9/2014, ?CKD per outside records, afib on warfarin, chronic Lt loculated pleural effusion,  Type 2 DM, HTN, HLD, gout a/w acute on chronic HFpEF i/s/p severe pHTN c/b hypercapnia; Found to have persisting moderate size loculated lt pleural effusion with atelectasis;         Problem/Plan - 1:  ·  Problem: Acute on chronic diastolic heart failure .   ·  Plan: Acute on chronic HFpEF likely in setting of decreased diuretic dose and severe pulm HTN as evidenced by prior TTE dated 10/10/2019;  Volume overloaded on exam with JVD and peripheral nonpitting edema to thighs  -Started Lasix 40 IV BID  -daily weights  -Intake and output strict  -elevate head of bed  -supplemental o2 PRN  -elevate head of bed  -monitor on telemetry and continuous pulse ox  -fluid restrict to 1500ml, DASH diet  -TTE  -Cardiology help appreciated.      Problem/Plan - 2:  ·  Problem: Fever .   ·  Plan: Sepsis work up in progress.  IV Abxs started .  Will consult ID in AM.      Problem/Plan - 3:  ·  Problem: Loculated pleural effusion with Hypercapnia .   ·  Plan: Persisting loculated effusion so Pulmonary helping. No thoracentesis  < from: CT Chest No Cont (03.27.22 @ 20:25) >  IMPRESSION:    Small left pleural effusion with a loculated component in the left major   fissure and along left anterior chest wall. The loculated components are   new when compared to prior CT exam dated 10/13/2019. Associated partial   left lower lobe and left upper lobe collapse.    New pleural thickening and/or loculated fluid medially along the left   upper hemithorax.    Small right pleural effusion and adjacent passive atelectasis.    Stable 9 mm right apical nodule.    < end of copied text >     Problem/Plan - 4:  ·  Problem: Chronic atrial fibrillation.   ·  Plan: [id number] risk stratification = 7  Continue home coreg 12.5mg BID with hold parameters  Adjusting Coumadin  target INR 2-3     Problem/Plan - 5:  ·  Problem: Pulmonary HTN.   ·  Plan: Severe pulm HTN likely class II  Repeat TTE this admission.     Problem/Plan - 6:  ·  Problem: LAYLA Thrombus .  ·  Plan: On AC.      Problem/Plan - 7:  ·  Problem: Papular rash, generalized with Eosinophilia .  ·  Plan: Generalized papular rash of 1y duration previously attributed to hydralazine which was discontinued without improvement of rash  Uses hydrocortisone 2.5% cream intermittently with mild improvement of pruritis  With chronic eosinophilia noted per chart  -Full medication review may be beneficial of meds possibly causing eosinophilia  -Will consult dermatology consult in AM      Problem/Plan - 8:  ·  Problem: Hypertension.   ·  Plan: Restart home losartan, pressures can tolerate SBP 180s in ED  Cont Carvedilol.     Problem/Plan - 9:  ·  Problem: Hyperlipidemia.   ·  Plan: Continue home statin.     Problem/Plan - 10:  ·  Problem: T2DM (type 2 diabetes mellitus).   ·  Plan; HOLD home glipizide while inpatient   Continue home gabapentin for diabetic neuropathy  KRYSTYNA.     Problem/Plan - 11:  ·  Problem: Cerebrovascular accident (CVA).   ·  Plan: Without residual deficits at this time per daughter and patient although unclear clinical course occurring years ago. Continue to monitor and continue ASA and statin.     Problem/Plan - 12:  ·  Problem: CAD (coronary artery disease).   ·  Plan: Continue GDMT    Full code.

## 2022-04-01 DIAGNOSIS — R50.9 FEVER, UNSPECIFIED: ICD-10-CM

## 2022-04-01 LAB
ANION GAP SERPL CALC-SCNC: 10 MMOL/L — SIGNIFICANT CHANGE UP (ref 7–14)
APPEARANCE UR: CLEAR — SIGNIFICANT CHANGE UP
B PERT DNA SPEC QL NAA+PROBE: SIGNIFICANT CHANGE UP
B PERT+PARAPERT DNA PNL SPEC NAA+PROBE: SIGNIFICANT CHANGE UP
BACTERIA # UR AUTO: NEGATIVE — SIGNIFICANT CHANGE UP
BILIRUB UR-MCNC: NEGATIVE — SIGNIFICANT CHANGE UP
BORDETELLA PARAPERTUSSIS (RAPRVP): SIGNIFICANT CHANGE UP
BUN SERPL-MCNC: 29 MG/DL — HIGH (ref 7–23)
C PNEUM DNA SPEC QL NAA+PROBE: SIGNIFICANT CHANGE UP
CALCIUM SERPL-MCNC: 8.3 MG/DL — LOW (ref 8.4–10.5)
CHLORIDE SERPL-SCNC: 97 MMOL/L — LOW (ref 98–107)
CO2 SERPL-SCNC: 27 MMOL/L — SIGNIFICANT CHANGE UP (ref 22–31)
COLOR SPEC: YELLOW — SIGNIFICANT CHANGE UP
CREAT SERPL-MCNC: 1.12 MG/DL — SIGNIFICANT CHANGE UP (ref 0.5–1.3)
DIFF PNL FLD: ABNORMAL
EGFR: 49 ML/MIN/1.73M2 — LOW
EPI CELLS # UR: 0 /HPF — SIGNIFICANT CHANGE UP (ref 0–5)
FLUAV SUBTYP SPEC NAA+PROBE: SIGNIFICANT CHANGE UP
FLUBV RNA SPEC QL NAA+PROBE: SIGNIFICANT CHANGE UP
GLUCOSE BLDC GLUCOMTR-MCNC: 130 MG/DL — HIGH (ref 70–99)
GLUCOSE BLDC GLUCOMTR-MCNC: 135 MG/DL — HIGH (ref 70–99)
GLUCOSE BLDC GLUCOMTR-MCNC: 163 MG/DL — HIGH (ref 70–99)
GLUCOSE BLDC GLUCOMTR-MCNC: 169 MG/DL — HIGH (ref 70–99)
GLUCOSE SERPL-MCNC: 99 MG/DL — SIGNIFICANT CHANGE UP (ref 70–99)
GLUCOSE UR QL: NEGATIVE — SIGNIFICANT CHANGE UP
HADV DNA SPEC QL NAA+PROBE: SIGNIFICANT CHANGE UP
HCOV 229E RNA SPEC QL NAA+PROBE: SIGNIFICANT CHANGE UP
HCOV HKU1 RNA SPEC QL NAA+PROBE: SIGNIFICANT CHANGE UP
HCOV NL63 RNA SPEC QL NAA+PROBE: SIGNIFICANT CHANGE UP
HCOV OC43 RNA SPEC QL NAA+PROBE: SIGNIFICANT CHANGE UP
HCT VFR BLD CALC: 37.9 % — SIGNIFICANT CHANGE UP (ref 34.5–45)
HGB BLD-MCNC: 12 G/DL — SIGNIFICANT CHANGE UP (ref 11.5–15.5)
HMPV RNA SPEC QL NAA+PROBE: SIGNIFICANT CHANGE UP
HPIV1 RNA SPEC QL NAA+PROBE: SIGNIFICANT CHANGE UP
HPIV2 RNA SPEC QL NAA+PROBE: SIGNIFICANT CHANGE UP
HPIV3 RNA SPEC QL NAA+PROBE: SIGNIFICANT CHANGE UP
HPIV4 RNA SPEC QL NAA+PROBE: SIGNIFICANT CHANGE UP
INR BLD: 3.22 RATIO — HIGH (ref 0.88–1.16)
KETONES UR-MCNC: NEGATIVE — SIGNIFICANT CHANGE UP
LEUKOCYTE ESTERASE UR-ACNC: NEGATIVE — SIGNIFICANT CHANGE UP
M PNEUMO DNA SPEC QL NAA+PROBE: SIGNIFICANT CHANGE UP
MAGNESIUM SERPL-MCNC: 2.1 MG/DL — SIGNIFICANT CHANGE UP (ref 1.6–2.6)
MCHC RBC-ENTMCNC: 28.7 PG — SIGNIFICANT CHANGE UP (ref 27–34)
MCHC RBC-ENTMCNC: 31.7 GM/DL — LOW (ref 32–36)
MCV RBC AUTO: 90.7 FL — SIGNIFICANT CHANGE UP (ref 80–100)
NITRITE UR-MCNC: NEGATIVE — SIGNIFICANT CHANGE UP
NRBC # BLD: 0 /100 WBCS — SIGNIFICANT CHANGE UP
NRBC # FLD: 0 K/UL — SIGNIFICANT CHANGE UP
PH UR: 8 — SIGNIFICANT CHANGE UP (ref 5–8)
PHOSPHATE SERPL-MCNC: 4 MG/DL — SIGNIFICANT CHANGE UP (ref 2.5–4.5)
PLATELET # BLD AUTO: 147 K/UL — LOW (ref 150–400)
POTASSIUM SERPL-MCNC: 4.4 MMOL/L — SIGNIFICANT CHANGE UP (ref 3.5–5.3)
POTASSIUM SERPL-SCNC: 4.4 MMOL/L — SIGNIFICANT CHANGE UP (ref 3.5–5.3)
PROT UR-MCNC: NEGATIVE — SIGNIFICANT CHANGE UP
PROTHROM AB SERPL-ACNC: 37.8 SEC — HIGH (ref 10.5–13.4)
RAPID RVP RESULT: SIGNIFICANT CHANGE UP
RBC # BLD: 4.18 M/UL — SIGNIFICANT CHANGE UP (ref 3.8–5.2)
RBC # FLD: 15.4 % — HIGH (ref 10.3–14.5)
RBC CASTS # UR COMP ASSIST: 5 /HPF — HIGH (ref 0–4)
RSV RNA SPEC QL NAA+PROBE: SIGNIFICANT CHANGE UP
RV+EV RNA SPEC QL NAA+PROBE: SIGNIFICANT CHANGE UP
SARS-COV-2 RNA SPEC QL NAA+PROBE: SIGNIFICANT CHANGE UP
SODIUM SERPL-SCNC: 134 MMOL/L — LOW (ref 135–145)
SP GR SPEC: 1.01 — SIGNIFICANT CHANGE UP (ref 1–1.05)
UROBILINOGEN FLD QL: SIGNIFICANT CHANGE UP
WBC # BLD: 8.07 K/UL — SIGNIFICANT CHANGE UP (ref 3.8–10.5)
WBC # FLD AUTO: 8.07 K/UL — SIGNIFICANT CHANGE UP (ref 3.8–10.5)
WBC UR QL: 1 /HPF — SIGNIFICANT CHANGE UP (ref 0–5)

## 2022-04-01 PROCEDURE — 99223 1ST HOSP IP/OBS HIGH 75: CPT

## 2022-04-01 RX ADMIN — Medication 81 MILLIGRAM(S): at 09:10

## 2022-04-01 RX ADMIN — ATORVASTATIN CALCIUM 20 MILLIGRAM(S): 80 TABLET, FILM COATED ORAL at 22:04

## 2022-04-01 RX ADMIN — Medication 1 MILLIGRAM(S): at 09:10

## 2022-04-01 RX ADMIN — PIPERACILLIN AND TAZOBACTAM 25 GRAM(S): 4; .5 INJECTION, POWDER, LYOPHILIZED, FOR SOLUTION INTRAVENOUS at 02:16

## 2022-04-01 RX ADMIN — PIPERACILLIN AND TAZOBACTAM 25 GRAM(S): 4; .5 INJECTION, POWDER, LYOPHILIZED, FOR SOLUTION INTRAVENOUS at 09:10

## 2022-04-01 RX ADMIN — GABAPENTIN 100 MILLIGRAM(S): 400 CAPSULE ORAL at 05:53

## 2022-04-01 RX ADMIN — Medication 40 MILLIGRAM(S): at 16:42

## 2022-04-01 RX ADMIN — GABAPENTIN 100 MILLIGRAM(S): 400 CAPSULE ORAL at 22:05

## 2022-04-01 RX ADMIN — PIPERACILLIN AND TAZOBACTAM 25 GRAM(S): 4; .5 INJECTION, POWDER, LYOPHILIZED, FOR SOLUTION INTRAVENOUS at 23:51

## 2022-04-01 RX ADMIN — LOSARTAN POTASSIUM 25 MILLIGRAM(S): 100 TABLET, FILM COATED ORAL at 05:53

## 2022-04-01 RX ADMIN — Medication 1 APPLICATION(S): at 06:50

## 2022-04-01 RX ADMIN — PIPERACILLIN AND TAZOBACTAM 25 GRAM(S): 4; .5 INJECTION, POWDER, LYOPHILIZED, FOR SOLUTION INTRAVENOUS at 16:49

## 2022-04-01 RX ADMIN — GABAPENTIN 100 MILLIGRAM(S): 400 CAPSULE ORAL at 12:33

## 2022-04-01 RX ADMIN — Medication 1: at 12:32

## 2022-04-01 RX ADMIN — Medication 40 MILLIGRAM(S): at 05:52

## 2022-04-01 RX ADMIN — Medication 1 APPLICATION(S): at 16:43

## 2022-04-01 RX ADMIN — Medication 325 MILLIGRAM(S): at 09:10

## 2022-04-01 RX ADMIN — ISOSORBIDE DINITRATE 10 MILLIGRAM(S): 5 TABLET ORAL at 16:42

## 2022-04-01 NOTE — PROGRESS NOTE ADULT - SUBJECTIVE AND OBJECTIVE BOX
Collin Trejo MD  Interventional Cardiology / Advance Heart Failure and Cardiac Transplant Specialist  [place] Office : [address] [place] [address] [place] [address]  Tel:   [place] Office : [address] [facility] [place] [address]  Tel: [phone]       Pt is lying in bed comfortable not in distress, no chest pains no SOB no palpitations  	  MEDICATIONS:  aspirin enteric coated 81 milliGRAM(s) Oral daily  furosemide   Injectable 40 milliGRAM(s) IV Push two times a day  isosorbide   dinitrate Tablet (ISORDIL) 10 milliGRAM(s) Oral two times a day  losartan 25 milliGRAM(s) Oral daily    piperacillin/tazobactam IVPB.. 3.375 Gram(s) IV Intermittent every 8 hours      acetaminophen     Tablet .. 650 milliGRAM(s) Oral every 6 hours PRN  gabapentin 100 milliGRAM(s) Oral three times a day  melatonin 3 milliGRAM(s) Oral at bedtime PRN    aluminum hydroxide/magnesium hydroxide/simethicone Suspension 30 milliLiter(s) Oral every 4 hours PRN    atorvastatin 20 milliGRAM(s) Oral at bedtime  dextrose 50% Injectable 25 Gram(s) IV Push once  dextrose 50% Injectable 12.5 Gram(s) IV Push once  dextrose 50% Injectable 25 Gram(s) IV Push once  dextrose Oral Gel 15 Gram(s) Oral once PRN  glucagon  Injectable 1 milliGRAM(s) IntraMuscular once  insulin lispro (ADMELOG) corrective regimen sliding scale   SubCutaneous three times a day before meals  insulin lispro (ADMELOG) corrective regimen sliding scale   SubCutaneous at bedtime    dextrose 5%. 1000 milliLiter(s) IV Continuous <Continuous>  dextrose 5%. 1000 milliLiter(s) IV Continuous <Continuous>  ferrous    sulfate 325 milliGRAM(s) Oral daily  folic acid 1 milliGRAM(s) Oral daily  hydrocortisone 2.5% Ointment 1 Application(s) Topical two times a day      PAST MEDICAL/SURGICAL HISTORY  PAST MEDICAL & SURGICAL HISTORY:  HTN (Hypertension)    Afib  (on Warfarin)    CAD (Coronary Artery Disease)  s/p stent and CABG    CVA (Cerebral Infarction)  2011    CHF (congestive heart failure)  EF 65% Oct 2019    Upper GI bleed    Gout    Diabetes mellitus  type 2, not on meds    History of heart valve replacement with bioprosthetic valve  mitral and aortic    Hyperlipidemia    S/P angioplasty with stent  2011    S/P CABG x 1  (2000)    H/O aortic valve replacement  9/22/14    H/O mitral valve replacement  9/22/14        SOCIAL HISTORY: Substance Use (street drugs): ( x ) never used  (  ) other:    FAMILY HISTORY:  Family history of acute myocardial infarction  mother           PHYSICAL EXAM:  T(C): 37.2 (04-01-22 @ 22:00), Max: 37.2 (04-01-22 @ 17:04)  HR: 80 (04-01-22 @ 22:53) (54 - 89)  BP: 129/51 (04-01-22 @ 22:00) (129/51 - 159/57)  RR: 18 (04-01-22 @ 22:00) (16 - 18)  SpO2: 98% (04-01-22 @ 22:53) (96% - 100%)  Wt(kg): --  I&O's Summary           EYES:   PERRLA   ENMT:   Moist mucous membranes, Good dentition, No lesions  Cardiovascular: Normal S1 S2, No JVD, No murmurs, No edema  Respiratory: b/l rhonchi   Gastrointestinal:  Soft, Non-tender, + BS	  Extremities: no edema                                    12.0   8.07  )-----------( 147      ( 01 Apr 2022 07:35 )             37.9     04-01    134<L>  |  97<L>  |  29<H>  ----------------------------<  99  4.4   |  27  |  1.12    Ca    8.3<L>      01 Apr 2022 07:35  Phos  4.0     04-01  Mg     2.10     04-01      proBNP:   Lipid Profile:   HgA1c:   TSH:     Consultant(s) Notes Reviewed:  [x ] YES  [ ] NO    Care Discussed with Consultants/Other Providers [ x] YES  [ ] NO    Imaging Personally Reviewed independently:  [x] YES  [ ] NO    All labs, radiologic studies, vitals, orders and medications list reviewed. Patient is seen and examined at bedside. Case discussed with medical team.

## 2022-04-01 NOTE — PROGRESS NOTE ADULT - ASSESSMENT
82yo Female with mhx of HFpEF ef 65% CAD s/p CABG 2000 LIMA to LAD and SVG to OM1 and subsequent PCI to LCx,in 2011, severe pulm HTN, Bioprosthetic aortic and mitral valve replacement 9/2014, CKD per outside records, afib on warfarin, chronic Lt loculated pleural effusion, Type 2 DM, HTN, HLD, gout c/o 3-4 days of worsening dyspnea on exertion and lower extremity swelling    Tele: Afib 60s    1. Acute decompensated diastolic heart failure  - Likely 2/2 medical noncompliance  - I and Os, Daily weights, replenish lytes as needed  - ACS ruled out  - EKG shows AFib with slow ventricular response  - BB held yesterday in setting bradycardia. continue to monitor on tele now improved  - echo shows normaL AVR  and MVR normal LV function decreased RV function sev pulm HTN   -patient appears somnolent with diaphragmatic breathing,   ABG shows PCO2 > 70 patient only wore bipap for 2 hours and is nor refusing. CO2 this AM improved. f/u pulm recs  -Continue IV diuresis with Lasix 40mg IV BID. monitor strict I&Os. primafit placed by RN please obtain PVR bladder scan     2. Rash  - Pt with diffuse rash over body  - Ongoing for a year  - Management as per primary team    3. CAD s/p CABG and PCI  - CABG x2 in 2000 (LIMA to LAD, SVG-OM1)  - PCI in 2011 to LCX  - Last LHC in 2019 revealed patent stent and grafts  - Continue ASA, statin, isordil, and losartan  - continue to hold BB at this time due to bradycardia    3. S/p MVR & AVR  -s/p bioprosthetic valves  -echo with bioprosthetic MVR, AVR, grossly normal LV systolic function, decreased RV function and severe pHTN      4. Afib  -rate in 60s  -continue to hold BB  -c/w coumadin INR 3.3 hold coumadin

## 2022-04-01 NOTE — CONSULT NOTE ADULT - SUBJECTIVE AND OBJECTIVE BOX
HPI:  81 f with DM, HTN, CAD s/p CABG, HFpEF  severe pulm HTN, Bioprosthetic aortic and mitral valve replacement 9/2014, ?CKD per outside records, afib on warfarin, chronic Lt loculated pleural effusion, gout, presented 3/27 with worsening dyspnea on exertion and lower extremity swelling as well as 1 year history of generalized rash and was admitted for CHF exacerbation  initially afebrile, WBC normal  Chest CT: Small left pleural effusion with a loculated component in the left major fissure and along left anterior chest wall.  partial LLL and LEXI collapse. New pleural thickening and/or loculated fluid medially along the left upper hemithorax. Small right pleural effusion and adjacent passive atelectasis. Stable 9 mm right apical nodule.  pt spiked to 101.6 on 3/31 and WBC normal    PAST MEDICAL & SURGICAL HISTORY:  HTN (Hypertension)    Afib  (on Warfarin)    CAD (Coronary Artery Disease)  s/p stent and CABG    CVA (Cerebral Infarction)  2011    CHF (congestive heart failure)  EF 65% Oct 2019    Upper GI bleed    Gout    Diabetes mellitus  type 2, not on meds    History of heart valve replacement with bioprosthetic valve  mitral and aortic    Hyperlipidemia    S/P angioplasty with stent  2011    S/P CABG x 1  (2000)    H/O aortic valve replacement  9/22/14    H/O mitral valve replacement  9/22/14        Allergies    No Known Allergies    Intolerances        ANTIMICROBIALS:  piperacillin/tazobactam IVPB.. 3.375 every 8 hours      OTHER MEDS:  acetaminophen     Tablet .. 650 milliGRAM(s) Oral every 6 hours PRN  aluminum hydroxide/magnesium hydroxide/simethicone Suspension 30 milliLiter(s) Oral every 4 hours PRN  aspirin enteric coated 81 milliGRAM(s) Oral daily  atorvastatin 20 milliGRAM(s) Oral at bedtime  dextrose 5%. 1000 milliLiter(s) IV Continuous <Continuous>  dextrose 5%. 1000 milliLiter(s) IV Continuous <Continuous>  dextrose 50% Injectable 25 Gram(s) IV Push once  dextrose 50% Injectable 12.5 Gram(s) IV Push once  dextrose 50% Injectable 25 Gram(s) IV Push once  dextrose Oral Gel 15 Gram(s) Oral once PRN  ferrous    sulfate 325 milliGRAM(s) Oral daily  folic acid 1 milliGRAM(s) Oral daily  furosemide   Injectable 40 milliGRAM(s) IV Push two times a day  gabapentin 100 milliGRAM(s) Oral three times a day  glucagon  Injectable 1 milliGRAM(s) IntraMuscular once  hydrocortisone 2.5% Ointment 1 Application(s) Topical two times a day  insulin lispro (ADMELOG) corrective regimen sliding scale   SubCutaneous three times a day before meals  insulin lispro (ADMELOG) corrective regimen sliding scale   SubCutaneous at bedtime  isosorbide   dinitrate Tablet (ISORDIL) 10 milliGRAM(s) Oral two times a day  losartan 25 milliGRAM(s) Oral daily  melatonin 3 milliGRAM(s) Oral at bedtime PRN      SOCIAL HISTORY:  from [place], came to [place] over [place] years ago, lives with family  no smoking, alcohol or drug abuse  no recent travel    FAMILY HISTORY:  Family history of acute myocardial infarction  mother        ROS:    All other systems negative     Constitutional: + fever  Eye: no eye pain, no redness, no vision changes  ENT:  no sore throat, no rhinorrhea  Cardiovascular:  no chest pain, no palpitation  Respiratory:  + SOB, no cough  GI:  no abd pain, no vomiting, no diarrhea  urinary: no dysuria, no hematuria, no flank pain  : no vaginal discharge or bleeding  musculoskeletal:  no joint pain, no joint swelling  skin: +rash  neurology:  no headache, no seizure, no change in mental status  psych: no anxiety, no depression     Physical Exam:    General:   non toxic  Head: atraumatic, normocephalic  Eyes: normal sclera and conjunctiva  ENT:   no oropharyngeal lesions, no LAD, neck supple  Cardio:    irregular S1,S2  Respiratory:  poor air entry on R side  abd:   soft, BS +, not tender  :     no CVAT, no suprapubic tenderness, no vega  Musculoskeletal : no joint swelling, no edema  Skin:   diffuse macular rash with scabs and areas of hyperpigmenations  vascular: no phlebitis  Neurologic:     no focal deficits  psych: normal affect      Drug Dosing Weight  Height (cm): 157.5 (27 Mar 2022 16:03)  Weight (kg): 68.5 (28 Mar 2022 20:30)  BMI (kg/m2): 27.6 (28 Mar 2022 20:30)  BSA (m2): 1.7 (28 Mar 2022 20:30)    Vital Signs Last 24 Hrs  T(F): 98.1 (04-01-22 @ 11:10), Max: 101.6 (03-31-22 @ 17:11)    Vital Signs Last 24 Hrs  HR: 65 (04-01-22 @ 11:10) (54 - 89)  BP: 136/50 (04-01-22 @ 11:10) (126/50 - 159/57)  RR: 18 (04-01-22 @ 11:10)  SpO2: 100% (04-01-22 @ 11:10) (95% - 100%)  Wt(kg): --                          12.0   8.07  )-----------( 147      ( 01 Apr 2022 07:35 )             37.9       04-01    134<L>  |  97<L>  |  29<H>  ----------------------------<  99  4.4   |  27  |  1.12    Ca    8.3<L>      01 Apr 2022 07:35  Phos  4.0     04-01  Mg     2.10     04-01            MICROBIOLOGY:  v              RADIOLOGY:    Images independently visualized and reviewed personally,  findings as below    < from: CT Chest No Cont (03.27.22 @ 20:25) >  IMPRESSION:    Small left pleural effusion with a loculated component in the left major   fissure and along left anterior chest wall. The loculated components are   new when compared to prior CT exam dated 10/13/2019. Associated partial   left lower lobe and left upper lobe collapse.    New pleural thickening and/or loculated fluid medially along the left   upper hemithorax.    Small right pleural effusion and adjacent passive atelectasis.    Stable 9 mm right apical nodule.    < end of copied text >  < from: Transthoracic Echocardiogram (03.30.22 @ 09:41) >  CONCLUSIONS:  1. Bioprosthetic mitral valve replacement. Minimal mitral  regurgitation. Mean transmitral valve gradient equals 5 mm  Hg, which is probably normal in the setting of a prosthetic  valve.  2. Bioprosthetic aortic valve replacement, which is well  seated with normal leaflet motion. Peak transaortic valve  gradient equals 28 mm Hg, mean transaortic valve gradient  equals 14 mm Hg, which is probably normal in the presence  of a prosthetic valve.  3. Severely dilated left atrium.  LA volume index = 74  cc/m2.  4. Normal left ventricular internal dimensions and wall  thicknesses.  5. Endocardium not well visualized; grossly normal left  ventricular systolic function.  6. Severe right atrial enlargement.  7. Right ventricular enlargement with decreased right  ventricular systolic function.  8. Estimated right ventricular systolic pressure equals 65  mm Hg, assuming right atrial pressure equals 10 mm Hg,  consistent with severe pulmonary hypertension.  9. Normal tricuspid valve.  Severe tricuspid regurgitation.  10. Left pleural effusion.    < end of copied text >

## 2022-04-01 NOTE — PROGRESS NOTE ADULT - ASSESSMENT
80yo Female with mhx of HFpEF ef 65% CAD (CABG 2000, stent 2011), severe pulm HTN, Bioprosthetic aortic and mitral valve replacement 9/2014, ?CKD per outside records, afib on warfarin, chronic Lt loculated pleural effusion,  Type 2 DM, HTN, HLD, gout a/w acute on chronic HFpEF i/s/p severe pHTN c/b hypercapnia; Found to have persisting moderate size loculated lt pleural effusion with atelectasis;         Problem/Plan - 1:  ·  Problem: Acute on chronic diastolic heart failure .   ·  Plan: Acute on chronic HFpEF likely in setting of decreased diuretic dose and severe pulm HTN as evidenced by prior TTE dated 10/10/2019;  Volume overloaded on exam with JVD and peripheral nonpitting edema to thighs  -Started Lasix 40 IV BID  -daily weights  -Intake and output strict  -elevate head of bed  -supplemental o2 PRN  -elevate head of bed  -monitor on telemetry and continuous pulse ox  -fluid restrict to 1500ml, DASH diet  -TTE  -Cardiology help appreciated.      Problem/Plan - 2:  ·  Problem: Fever .   ·  Plan: Sepsis work up in progress.  IV Abxs started .  D helping.      Problem/Plan - 3:  ·  Problem: Loculated pleural effusion with Hypercapnia .   ·  Plan: Persisting loculated effusion so Pulmonary helping. No thoracentesis  < from: CT Chest No Cont (03.27.22 @ 20:25) >  IMPRESSION:    Small left pleural effusion with a loculated component in the left major   fissure and along left anterior chest wall. The loculated components are   new when compared to prior CT exam dated 10/13/2019. Associated partial   left lower lobe and left upper lobe collapse.    New pleural thickening and/or loculated fluid medially along the left   upper hemithorax.    Small right pleural effusion and adjacent passive atelectasis.    Stable 9 mm right apical nodule.    < end of copied text >     Problem/Plan - 4:  ·  Problem: Chronic atrial fibrillation.   ·  Plan: [id number] risk stratification = 7  Continue home coreg 12.5mg BID with hold parameters  Adjusting Coumadin  target INR 2-3     Problem/Plan - 5:  ·  Problem: Pulmonary HTN.   ·  Plan: Severe pulm HTN likely class II  Repeat TTE this admission.     Problem/Plan - 6:  ·  Problem: LAYLA Thrombus .  ·  Plan: On AC.      Problem/Plan - 7:  ·  Problem: Papular rash, generalized with Eosinophilia .  ·  Plan: Generalized papular rash of 1y duration previously attributed to hydralazine which was discontinued without improvement of rash  Uses hydrocortisone 2.5% cream intermittently with mild improvement of pruritis  With chronic eosinophilia noted per chart  -Full medication review may be beneficial of meds possibly causing eosinophilia  -Dermatology consult pending      Problem/Plan - 8:  ·  Problem: Hypertension.   ·  Plan: Restart home losartan, pressures can tolerate SBP 180s in ED  Cont Carvedilol.     Problem/Plan - 9:  ·  Problem: Hyperlipidemia.   ·  Plan: Continue home statin.     Problem/Plan - 10:  ·  Problem: T2DM (type 2 diabetes mellitus).   ·  Plan; HOLD home glipizide while inpatient   Continue home gabapentin for diabetic neuropathy  KRYSTYNA.     Problem/Plan - 11:  ·  Problem: Cerebrovascular accident (CVA).   ·  Plan: Without residual deficits at this time per daughter and patient although unclear clinical course occurring years ago. Continue to monitor and continue ASA and statin.     Problem/Plan - 12:  ·  Problem: CAD (coronary artery disease).   ·  Plan: Continue GDMT    Full code.

## 2022-04-01 NOTE — CONSULT NOTE ADULT - PROBLEM SELECTOR RECOMMENDATION 2
? etiology: do rvp: do ct scan chest : her chest xray done yesterday to me looks worse then before: ct chest without contrast:  on empiric zosyn: do speech and swallow!

## 2022-04-01 NOTE — PROGRESS NOTE ADULT - SUBJECTIVE AND OBJECTIVE BOX
Date of Service  : 04-01-22     INTERVAL HPI/OVERNIGHT EVENTS: I feel okay.   Vital Signs Last 24 Hrs  T(C): 37.2 (01 Apr 2022 17:04), Max: 37.8 (31 Mar 2022 19:11)  T(F): 99 (01 Apr 2022 17:04), Max: 100 (31 Mar 2022 19:11)  HR: 66 (01 Apr 2022 17:04) (54 - 89)  BP: 154/60 (01 Apr 2022 17:04) (126/50 - 159/57)  BP(mean): --  RR: 16 (01 Apr 2022 17:04) (16 - 18)  SpO2: 98% (01 Apr 2022 17:04) (95% - 100%)  I&O's Summary    MEDICATIONS  (STANDING):  aspirin enteric coated 81 milliGRAM(s) Oral daily  atorvastatin 20 milliGRAM(s) Oral at bedtime  dextrose 5%. 1000 milliLiter(s) (100 mL/Hr) IV Continuous <Continuous>  dextrose 5%. 1000 milliLiter(s) (50 mL/Hr) IV Continuous <Continuous>  dextrose 50% Injectable 25 Gram(s) IV Push once  dextrose 50% Injectable 12.5 Gram(s) IV Push once  dextrose 50% Injectable 25 Gram(s) IV Push once  ferrous    sulfate 325 milliGRAM(s) Oral daily  folic acid 1 milliGRAM(s) Oral daily  furosemide   Injectable 40 milliGRAM(s) IV Push two times a day  gabapentin 100 milliGRAM(s) Oral three times a day  glucagon  Injectable 1 milliGRAM(s) IntraMuscular once  hydrocortisone 2.5% Ointment 1 Application(s) Topical two times a day  insulin lispro (ADMELOG) corrective regimen sliding scale   SubCutaneous three times a day before meals  insulin lispro (ADMELOG) corrective regimen sliding scale   SubCutaneous at bedtime  isosorbide   dinitrate Tablet (ISORDIL) 10 milliGRAM(s) Oral two times a day  losartan 25 milliGRAM(s) Oral daily  piperacillin/tazobactam IVPB.. 3.375 Gram(s) IV Intermittent every 8 hours    MEDICATIONS  (PRN):  acetaminophen     Tablet .. 650 milliGRAM(s) Oral every 6 hours PRN Temp greater or equal to 38C (100.4F), Mild Pain (1 - 3)  aluminum hydroxide/magnesium hydroxide/simethicone Suspension 30 milliLiter(s) Oral every 4 hours PRN Dyspepsia  dextrose Oral Gel 15 Gram(s) Oral once PRN Blood Glucose LESS THAN 70 milliGRAM(s)/deciliter  melatonin 3 milliGRAM(s) Oral at bedtime PRN Insomnia    LABS:                        12.0   8.07  )-----------( 147      ( 01 Apr 2022 07:35 )             37.9     04-01    134<L>  |  97<L>  |  29<H>  ----------------------------<  99  4.4   |  27  |  1.12    Ca    8.3<L>      01 Apr 2022 07:35  Phos  4.0     04-01  Mg     2.10     04-01      PT/INR - ( 01 Apr 2022 07:35 )   PT: 37.8 sec;   INR: 3.22 ratio         PTT - ( 31 Mar 2022 07:44 )  PTT:41.3 sec    CAPILLARY BLOOD GLUCOSE      POCT Blood Glucose.: 130 mg/dL (01 Apr 2022 16:38)  POCT Blood Glucose.: 163 mg/dL (01 Apr 2022 12:14)  POCT Blood Glucose.: 135 mg/dL (01 Apr 2022 08:24)  POCT Blood Glucose.: 186 mg/dL (31 Mar 2022 21:18)              Consultant(s) Notes Reviewed:  [x ] YES  [ ] NO    PHYSICAL EXAM:  GENERAL: NAD, oxygen  HEAD:  Atraumatic, Normocephalic  NECK: Supple, No JVD, Normal thyroid  NERVOUS SYSTEM:  Alert & Oriented X3, No focal deficit   CHEST/LUNG: Good air entry bilateral   HEART: Regular rate and rhythm; No murmurs, rubs, or gallops  ABDOMEN: Soft, Nontender, Nondistended; Bowel sounds present  EXTREMITIES:  2+ Peripheral Pulses, No clubbing, cyanosis, or edema      Care Discussed with Consultants/Other Providers [ x] YES  [ ] NO

## 2022-04-01 NOTE — CONSULT NOTE ADULT - ASSESSMENT
81 f with DM, HTN, CAD s/p CABG, HFpEF  severe pulm HTN, Bioprosthetic aortic and mitral valve replacement 9/2014, ?CKD per outside records, afib on warfarin, chronic Lt loculated pleural effusion, gout, presented 3/27 with worsening dyspnea on exertion and lower extremity swelling as well as 1 year history of generalized rash and was admitted for CHF exacerbation  initially afebrile, WBC normal  Chest CT: Small left pleural effusion with a loculated component in the left major fissure and along left anterior chest wall,  partial LLL and LEXI collapse. New pleural thickening and/or loculated fluid medially along the left upper hemithorax. Small right pleural effusion and adjacent passive atelectasis. Stable 9 mm right apical nodule.  pt was seen by cardio and pulmonary, did not recommend any thoracentesis and was being diuresed   pt spiked to 101.6 on 3/31 and WBC normal    dyspnea due to CHF exacerbation, has h/o L loculated pleural effusion but now also with LLL and LEXI collapse, likely the cause of new fever  eosinophilia now 800, was present in previous admissions as well but now also has a rash for about a year    * f/u the blood cx  * check u/a and urine cx  * c/w zosyn for now  * check strongyloides, toxocara and filaria AB  * quantiferon  * f/u with pulmonary for possible tap    The above assessment and plan was discussed with the primary team    Kristy Erickson MD  contact on teams  After 5pm and on weekends call [phone] 81 f with DM, HTN, CAD s/p CABG, HFpEF  severe pulm HTN, Bioprosthetic aortic and mitral valve replacement 9/2014, ?CKD per outside records, afib on warfarin, chronic Lt loculated pleural effusion, gout, presented 3/27 with worsening dyspnea on exertion and lower extremity swelling as well as 1 year history of generalized rash and was admitted for CHF exacerbation  initially afebrile, WBC normal  Chest CT: Small left pleural effusion with a loculated component in the left major fissure and along left anterior chest wall,  partial LLL and LEXI collapse. New pleural thickening and/or loculated fluid medially along the left upper hemithorax. Small right pleural effusion and adjacent passive atelectasis. Stable 9 mm right apical nodule.  pt was seen by cardio and pulmonary, did not recommend any thoracentesis and was being diuresed   pt spiked to 101.6 on 3/31 and WBC normal    dyspnea due to CHF exacerbation, has h/o L loculated pleural effusion but now also with LLL and LEXI collapse, likely the cause of new fever  eosinophilia now 800, was present in previous admissions as well but now also has a rash for about a year    * f/u the blood cx  * check u/a and urine cx  * c/w zosyn for now  * check strongyloides, toxocara and filaria AB  * quantiferon  * derm eval  * f/u with pulmonary for possible tap    The above assessment and plan was discussed with the primary team    Kristy Erickson MD  contact on teams  After 5pm and on weekends call [phone]

## 2022-04-01 NOTE — CONSULT NOTE ADULT - SUBJECTIVE AND OBJECTIVE BOX
04-01-22 @ 12:31    Patient is a 81y old  Female who presents with a chief complaint of Acute on chronic diastolic heart failure exacerbation (01 Apr 2022 11:19)      HPI:  82yo Female with mhx of HFpEF ef 65% CAD (CABG 2000, stent 2011), severe pulm HTN, Bioprosthetic aortic and mitral valve replacement 9/2014, ?CKD per outside records, afib on warfarin, chronic Lt loculated pleural effusion, Type 2 DM, HTN, HLD, gout c/o 3-4 days of worsening dyspnea on exertion and lower extremity swelling as well as 1 year history of generalized rash. Usual state of health until 3-4d prior when daughter noticed increasing SILVA on ambulation to the bathroom not accompanied by chest pain, vomiting,  neck or arm pain,  posterior headache,  blurry vision. Has nausea at baseline and has had poor po tolerance and a chronic component of fatigue over past years. Denies dietary indiscretion, no recent illness, no recent change to her medication although notes months to years ago had been taking lasix 40mg PO BID and is currently taking once daily dosing. Nonadherent to home o2 via NC. Reports chronic orthopnea, sleeps on 1-2 pillows per night with no recent increase. No new cough.   BP baseline has been 160s-180s systolic per daughter and is consistent with BP reading in ED. She denies symptomatic hypertension. Previously discontinued hydralazine ~1y ago for concerns relating to her generalized rash. Previously discharged on losartan January 2022 with thirty day supply and since discontinued the medication unclear if due to lack of refill versus DC by medical team.   Collateral provided by daughter at bedside, prior [facility] records, and paper discharge summary provided by daughter from [facility]. Medication reconciliation provided from those same sources with option to further verify in the AM with her PMD and cardiologist as below. Last dose of all of her medications was today and states that she takes warfarin at night.     Generalized excoriated rash has been present for ~1y duration and is present on shoulders, buttocks, thighs, arms, and perhaps even "all over" as per daughter. At times with breaking of skin and exposed areas. Poor hand washing hygiene and itching per daughter.     Patient declined professional translation services and translation was provided in Mongolian by daughter at bedside    CARDIOLOGIST Dr. Sean Robbins [phone]  PMD Dr. Jeff Sanchez [phone] (27 Mar 2022 20:22)    pt can understand millicent and speak very week:  though she says she is not very clear why she was brought to hospital bu her children:  she denies having any underlying lung disease:  she was initially adm with chf exacerbation and now has developed fever:   called her daughter   she has always beto sob: but has no asthma:  she does not take any pumps for her breathing:   she does not walk much at home:   she has rash on her body for last one year:     she never had tb and is from [place]    ?FOLLOWING PRESENT  [x ] Hx of PE/DVT, [ x] Hx COPD, [ x] Hx of Asthma, [ y] Hx of Hospitalization, [x ]  Hx of BiPAP/CPAP use, [x ] Hx of SABIHA    Allergies    No Known Allergies    Intolerances        PAST MEDICAL & SURGICAL HISTORY:  HTN (Hypertension)    Afib  (on Warfarin)    CAD (Coronary Artery Disease)  s/p stent and CABG    CVA (Cerebral Infarction)  2011    CHF (congestive heart failure)  EF 65% Oct 2019    Upper GI bleed    Gout    Diabetes mellitus  type 2, not on meds    History of heart valve replacement with bioprosthetic valve  mitral and aortic    Hyperlipidemia    S/P angioplasty with stent  2011    S/P CABG x 1  (2000)    H/O aortic valve replacement  9/22/14    H/O mitral valve replacement  9/22/14        FAMILY HISTORY:  Family history of acute myocardial infarction  mother        Social History: [ x ] TOBACCO                  [  x] ETOH                                 [ x ] IVDA/DRUGS    REVIEW OF SYSTEMS      General:x	    Skin/Breast:x  	  Ophthalmologic:x  	  ENMT:	x    Respiratory and Thorax: sob, silva   	  Cardiovascular:	x    Gastrointestinal:	x    Genitourinary:	x    Musculoskeletal:	x    Neurological:	x    Psychiatric:	x    Hematology/Lymphatics:	x    Endocrine:	x    Allergic/Immunologic:	  x  MEDICATIONS  (STANDING):  aspirin enteric coated 81 milliGRAM(s) Oral daily  atorvastatin 20 milliGRAM(s) Oral at bedtime  dextrose 5%. 1000 milliLiter(s) (50 mL/Hr) IV Continuous <Continuous>  dextrose 5%. 1000 milliLiter(s) (100 mL/Hr) IV Continuous <Continuous>  dextrose 50% Injectable 25 Gram(s) IV Push once  dextrose 50% Injectable 12.5 Gram(s) IV Push once  dextrose 50% Injectable 25 Gram(s) IV Push once  ferrous    sulfate 325 milliGRAM(s) Oral daily  folic acid 1 milliGRAM(s) Oral daily  furosemide   Injectable 40 milliGRAM(s) IV Push two times a day  gabapentin 100 milliGRAM(s) Oral three times a day  glucagon  Injectable 1 milliGRAM(s) IntraMuscular once  hydrocortisone 2.5% Ointment 1 Application(s) Topical two times a day  insulin lispro (ADMELOG) corrective regimen sliding scale   SubCutaneous three times a day before meals  insulin lispro (ADMELOG) corrective regimen sliding scale   SubCutaneous at bedtime  isosorbide   dinitrate Tablet (ISORDIL) 10 milliGRAM(s) Oral two times a day  losartan 25 milliGRAM(s) Oral daily  piperacillin/tazobactam IVPB.. 3.375 Gram(s) IV Intermittent every 8 hours    MEDICATIONS  (PRN):  acetaminophen     Tablet .. 650 milliGRAM(s) Oral every 6 hours PRN Temp greater or equal to 38C (100.4F), Mild Pain (1 - 3)  aluminum hydroxide/magnesium hydroxide/simethicone Suspension 30 milliLiter(s) Oral every 4 hours PRN Dyspepsia  dextrose Oral Gel 15 Gram(s) Oral once PRN Blood Glucose LESS THAN 70 milliGRAM(s)/deciliter  melatonin 3 milliGRAM(s) Oral at bedtime PRN Insomnia       Vital Signs Last 24 Hrs  T(C): 36.7 (01 Apr 2022 11:10), Max: 38.7 (31 Mar 2022 17:11)  T(F): 98.1 (01 Apr 2022 11:10), Max: 101.6 (31 Mar 2022 17:11)  HR: 65 (01 Apr 2022 11:10) (54 - 89)  BP: 136/50 (01 Apr 2022 11:10) (126/50 - 159/57)  BP(mean): --  RR: 18 (01 Apr 2022 11:10) (16 - 18)  SpO2: 100% (01 Apr 2022 11:10) (95% - 100%)Orthostatic VS          I&O's Summary      Physical Exam:   GENERAL: Obese+  HEENT: BRADY/   Atraumatic, Normocephalic  ENMT: No tonsillar erythema, exudates, or enlargement; Moist mucous membranes, Good dentition, No lesions  NECK: Supple, No JVD, Normal thyroid  CHEST/LUNG: Clear to auscultation bilaterally;- no wheezing  CVS: Regular rate and rhythm; No murmurs, rubs, or gallops  GI: : Soft, Nontender, Nondistended; Bowel sounds present  NERVOUS SYSTEM:  Alert & Oriented X3tric  EXTREMITIES: + edema: rash+   LYMPH: No lymphadenopathy noted  SKIN: No rashes or lesions  ENDOCRINOLOGY: No Thyromegaly  PSYCH: Appropriate    Labs:  4.9<41<4>>189<<7.465>>4.9<<3><<4><<5<<1899>>, 7.1<78<4>>45<<7.285>>7.1<<3><<4><<5<<459>>, 4.0<51<4>>72<<7.385>>4.0<<3><<4><<5<<729>>                            12.0   8.07  )-----------( 147      ( 01 Apr 2022 07:35 )             37.9                         11.9   10.11 )-----------( 158      ( 31 Mar 2022 07:44 )             38.8                         11.6   9.60  )-----------( 233      ( 30 Mar 2022 07:40 )             38.0                         12.1   7.37  )-----------( 145      ( 29 Mar 2022 07:04 )             39.7     04-01    134<L>  |  97<L>  |  29<H>  ----------------------------<  99  4.4   |  27  |  1.12  03-31    134<L>  |  98  |  26<H>  ----------------------------<  107<H>  4.6   |  26  |  0.92  03-30    136  |  101  |  29<H>  ----------------------------<  145<H>  4.8   |  25  |  1.02  03-29    139  |  100  |  24<H>  ----------------------------<  88  4.6   |  29  |  1.09    Ca    8.3<L>      01 Apr 2022 07:35  Ca    8.5      31 Mar 2022 07:44  Phos  4.0     04-01  Phos  2.5     03-31  Mg     2.10     04-01  Mg     1.70     03-31      CAPILLARY BLOOD GLUCOSE      POCT Blood Glucose.: 163 mg/dL (01 Apr 2022 12:14)  POCT Blood Glucose.: 135 mg/dL (01 Apr 2022 08:24)  POCT Blood Glucose.: 186 mg/dL (31 Mar 2022 21:18)  POCT Blood Glucose.: 121 mg/dL (31 Mar 2022 17:04)      PT/INR - ( 01 Apr 2022 07:35 )   PT: 37.8 sec;   INR: 3.22 ratio         PTT - ( 31 Mar 2022 07:44 )  PTT:41.3 sec    D DImer      Studies  Chest X-RAY  CT SCAN Chest   CT Abdomen  Venous Dopplers: LE:   Others    < from: CT Chest No Cont (03.27.22 @ 20:25) >  VISUALIZED UPPER ABDOMEN: Within normal limits.  BONES: Intact median sternotomy. Degenerative changes.    IMPRESSION:    Small left pleural effusion with a loculated component in the left major   fissure and along left anterior chest wall. The loculated components are   new when compared to prior CT exam dated 10/13/2019. Associated partial   left lower lobe and left upper lobe collapse.    New pleural thickening and/or loculated fluid medially along the left   upper hemithorax.    Small right pleural effusion and adjacent passive atelectasis.    Stable 9 mm right apical nodule.    --- End of Report ---    < end of copied text >            < from: CT Angio Chest w/ IV Cont (10.13.19 @ 11:58) >  seen within the dependent posterior aspect of the right lung. Scattered   mosaic appearance most significantly affecting the bilateral upper lobes.   A nodular within the right apex which measures 9 mm (5, 84) which does   not appear significantly changed from prior exam.    PLEURA: Left pleural effusion with compressive atelectasis. .    MEDIASTINUM AND MAITE: No lymphadenopathy.    VESSELS: Mildly limited study, no pulmonaryembolus or filling defect   seen within the main pulmonary arteries, segmental, or subsegmental   branches. The peripheral vessels are difficult to evaluate on this exam.   Atherosclerotic change of the aorta. Enlargement of the pulmonary artery   measuring up to 3.8 cm.    HEART: Cardiomegaly. No pericardial effusion. Mitral annular and aortic   valve repair. Coronary artery calcification.    CHEST WALL AND LOWER NECK: Sternotomy from CABG.    VISUALIZED UPPER ABDOMEN: Atherosclerotic change in the visualized upper   abdominal vessels.    BONES: Degenerative changes    IMPRESSION:     No definite evidence of pulmonary thromboembolic disease, although the   peripheral vessels are difficult to evaluate on this exam.    Diffuse mosaic attenuation of the bilateral upper lobes, moderate left   pleural effusion, and cardiomegaly suggesting mild congestive heart   failure.    Enlarged pulmonary artery, likely secondary to pulmonary hypertension.    Stable 9 mm right apical nodule.    < end of copied text >  < from: Transthoracic Echocardiogram (03.30.22 @ 09:41) >  Hg, which is probably normal in the setting of a prosthetic  valve.  2. Bioprosthetic aortic valve replacement, which is well  seated with normal leaflet motion. Peak transaortic valve  gradient equals 28 mm Hg, mean transaortic valve gradient  equals 14 mm Hg, which is probably normal in the presence  of a prosthetic valve.  3. Severely dilated left atrium.  LA volume index = 74  cc/m2.  4. Normal left ventricular internal dimensions and wall  thicknesses.  5. Endocardium not well visualized; grossly normal left  ventricular systolic function.  6. Severe right atrial enlargement.  7. Right ventricular enlargement with decreased right  ventricular systolic function.  8. Estimated right ventricular systolic pressure equals 65  mm Hg, assuming right atrial pressure equals 10 mm Hg,  consistent with severe pulmonary hypertension.  9. Normal tricuspid valve.  Severe tricuspid regurgitation.  10. Left pleural effusion.  ------------------------------------------------------------------------  Confirmed on  3/30/2022 - 11:11:07 by Neel Johnson M.D.,  [facility], [facility]    < end of copied text >

## 2022-04-01 NOTE — CHART NOTE - NSCHARTNOTEFT_GEN_A_CORE
82yo Female with mhx of HFpEF ef 65% CAD (CABG 2000, stent 2011), severe pulm HTN, Bioprosthetic aortic and mitral valve replacement 9/2014, CKD, afib on warfarin, chronic Lt loculated pleural effusion, Type 2 DM, HTN, HLD, gout c/o 3-4 days of worsening dyspnea on exertion and lower extremity swelling, found to be volume overload secondary to HF exacerbation, with LLL and LEXI collapse likely cause of fever per ID, on zosyn.     Dermatology consulted for rash x ~1 year. Patient’s daughter notes patient with itchiness and scratches the skin, sometimes leading to breaking of the skin and exposed areas. Patient tried HC for past few days without improvement. Patient denies using any type of moisturizer at home.     Physical exam:   - hyperpigmented macules, angulated white scars, and angulated erosions in reachable areas of upper shoulders, bilateral lower extremities   - xerosis trunk and extremities     A/P:   Generalized pruritus with secondary excoriations most likely secondary to xerosis and diabetes. Patient with eosinophilia dating back to 2019 per chart review.   - recommend work-up for underlying cause of peripheral eosinophilia    - recommend liberal moisturization to body and extremities multiple times per day with cold Sarna lotion (if available) or other emollient   - start topical triamcinolone 0.1% ointment BID to affected areas as needed for itching for up to 2 weeks continuously. Then take 1 week break before re-using if needed   - recommend outpatient follow up at our clinic located at [address] [place] [address] [address], [place] ([phone]) after discharge    The patient's chart was reviewed in addition to being seen and examined at bedside. Photos taken with permission of patient and shared with the dermatology attending Dr. Elizabet Moscoso.  Please page [phone] for further related questions (please leave 10 digit call back number because we cover several facilities).    Noelle Griggs MD  Resident Physician, PGY3  [facility]

## 2022-04-01 NOTE — CONSULT NOTE ADULT - ASSESSMENT
80yo Female with mhx of HFpEF ef 65% CAD (CABG 2000, stent 2011), severe pulm HTN, Bioprosthetic aortic and mitral valve replacement 9/2014, ?CKD per outside records, afib on warfarin, chronic Lt loculated pleural effusion,  Type 2 DM, HTN, HLD, gout a/w acute on chronic HFpEF i/s/p severe pHTN c/b hypercapnia; Found to have persisting moderate size loculated lt pleural effusion with atelectasis;

## 2022-04-02 LAB
ANION GAP SERPL CALC-SCNC: 13 MMOL/L — SIGNIFICANT CHANGE UP (ref 7–14)
BUN SERPL-MCNC: 36 MG/DL — HIGH (ref 7–23)
CALCIUM SERPL-MCNC: 8 MG/DL — LOW (ref 8.4–10.5)
CHLORIDE SERPL-SCNC: 98 MMOL/L — SIGNIFICANT CHANGE UP (ref 98–107)
CO2 SERPL-SCNC: 26 MMOL/L — SIGNIFICANT CHANGE UP (ref 22–31)
CREAT SERPL-MCNC: 1.17 MG/DL — SIGNIFICANT CHANGE UP (ref 0.5–1.3)
EGFR: 47 ML/MIN/1.73M2 — LOW
GLUCOSE BLDC GLUCOMTR-MCNC: 132 MG/DL — HIGH (ref 70–99)
GLUCOSE BLDC GLUCOMTR-MCNC: 155 MG/DL — HIGH (ref 70–99)
GLUCOSE BLDC GLUCOMTR-MCNC: 158 MG/DL — HIGH (ref 70–99)
GLUCOSE BLDC GLUCOMTR-MCNC: 93 MG/DL — SIGNIFICANT CHANGE UP (ref 70–99)
GLUCOSE SERPL-MCNC: 90 MG/DL — SIGNIFICANT CHANGE UP (ref 70–99)
HCT VFR BLD CALC: 36.1 % — SIGNIFICANT CHANGE UP (ref 34.5–45)
HGB BLD-MCNC: 10.9 G/DL — LOW (ref 11.5–15.5)
INR BLD: 3.15 RATIO — HIGH (ref 0.88–1.16)
MAGNESIUM SERPL-MCNC: 2 MG/DL — SIGNIFICANT CHANGE UP (ref 1.6–2.6)
MCHC RBC-ENTMCNC: 27.9 PG — SIGNIFICANT CHANGE UP (ref 27–34)
MCHC RBC-ENTMCNC: 30.2 GM/DL — LOW (ref 32–36)
MCV RBC AUTO: 92.6 FL — SIGNIFICANT CHANGE UP (ref 80–100)
NRBC # BLD: 0 /100 WBCS — SIGNIFICANT CHANGE UP
NRBC # FLD: 0 K/UL — SIGNIFICANT CHANGE UP
PHOSPHATE SERPL-MCNC: 4 MG/DL — SIGNIFICANT CHANGE UP (ref 2.5–4.5)
PLATELET # BLD AUTO: 143 K/UL — LOW (ref 150–400)
POTASSIUM SERPL-MCNC: 4.3 MMOL/L — SIGNIFICANT CHANGE UP (ref 3.5–5.3)
POTASSIUM SERPL-SCNC: 4.3 MMOL/L — SIGNIFICANT CHANGE UP (ref 3.5–5.3)
PROTHROM AB SERPL-ACNC: 37 SEC — HIGH (ref 10.5–13.4)
RBC # BLD: 3.9 M/UL — SIGNIFICANT CHANGE UP (ref 3.8–5.2)
RBC # FLD: 15.1 % — HIGH (ref 10.3–14.5)
SODIUM SERPL-SCNC: 137 MMOL/L — SIGNIFICANT CHANGE UP (ref 135–145)
WBC # BLD: 8.58 K/UL — SIGNIFICANT CHANGE UP (ref 3.8–10.5)
WBC # FLD AUTO: 8.58 K/UL — SIGNIFICANT CHANGE UP (ref 3.8–10.5)

## 2022-04-02 PROCEDURE — 71250 CT THORAX DX C-: CPT | Mod: 26

## 2022-04-02 RX ORDER — WARFARIN SODIUM 2.5 MG/1
1 TABLET ORAL ONCE
Refills: 0 | Status: COMPLETED | OUTPATIENT
Start: 2022-04-02 | End: 2022-04-02

## 2022-04-02 RX ORDER — PETROLATUM,WHITE
1 JELLY (GRAM) TOPICAL EVERY 12 HOURS
Refills: 0 | Status: DISCONTINUED | OUTPATIENT
Start: 2022-04-02 | End: 2022-04-13

## 2022-04-02 RX ORDER — MEN-PHOR .5; .5 G/G; G/G
1 LOTION TOPICAL EVERY 12 HOURS
Refills: 0 | Status: DISCONTINUED | OUTPATIENT
Start: 2022-04-02 | End: 2022-04-02

## 2022-04-02 RX ADMIN — Medication 40 MILLIGRAM(S): at 05:07

## 2022-04-02 RX ADMIN — Medication 1 MILLIGRAM(S): at 09:38

## 2022-04-02 RX ADMIN — PIPERACILLIN AND TAZOBACTAM 25 GRAM(S): 4; .5 INJECTION, POWDER, LYOPHILIZED, FOR SOLUTION INTRAVENOUS at 09:37

## 2022-04-02 RX ADMIN — Medication 81 MILLIGRAM(S): at 09:38

## 2022-04-02 RX ADMIN — Medication 40 MILLIGRAM(S): at 16:05

## 2022-04-02 RX ADMIN — ISOSORBIDE DINITRATE 10 MILLIGRAM(S): 5 TABLET ORAL at 16:05

## 2022-04-02 RX ADMIN — PIPERACILLIN AND TAZOBACTAM 25 GRAM(S): 4; .5 INJECTION, POWDER, LYOPHILIZED, FOR SOLUTION INTRAVENOUS at 16:05

## 2022-04-02 RX ADMIN — Medication 325 MILLIGRAM(S): at 09:38

## 2022-04-02 RX ADMIN — WARFARIN SODIUM 1 MILLIGRAM(S): 2.5 TABLET ORAL at 18:12

## 2022-04-02 RX ADMIN — ATORVASTATIN CALCIUM 20 MILLIGRAM(S): 80 TABLET, FILM COATED ORAL at 22:12

## 2022-04-02 RX ADMIN — LOSARTAN POTASSIUM 25 MILLIGRAM(S): 100 TABLET, FILM COATED ORAL at 09:38

## 2022-04-02 RX ADMIN — ISOSORBIDE DINITRATE 10 MILLIGRAM(S): 5 TABLET ORAL at 09:38

## 2022-04-02 RX ADMIN — Medication 1 APPLICATION(S): at 05:07

## 2022-04-02 RX ADMIN — PIPERACILLIN AND TAZOBACTAM 25 GRAM(S): 4; .5 INJECTION, POWDER, LYOPHILIZED, FOR SOLUTION INTRAVENOUS at 23:51

## 2022-04-02 RX ADMIN — Medication 1 APPLICATION(S): at 22:14

## 2022-04-02 RX ADMIN — GABAPENTIN 100 MILLIGRAM(S): 400 CAPSULE ORAL at 05:06

## 2022-04-02 RX ADMIN — GABAPENTIN 100 MILLIGRAM(S): 400 CAPSULE ORAL at 12:43

## 2022-04-02 RX ADMIN — GABAPENTIN 100 MILLIGRAM(S): 400 CAPSULE ORAL at 22:12

## 2022-04-02 RX ADMIN — Medication 1 APPLICATION(S): at 16:05

## 2022-04-02 RX ADMIN — Medication 1: at 16:14

## 2022-04-02 NOTE — PROGRESS NOTE ADULT - SUBJECTIVE AND OBJECTIVE BOX
Date of Service  : 22     INTERVAL HPI/OVERNIGHT EVENTS: Son and grand son in room . Feeling better.   Vital Signs Last 24 Hrs  T(C): 37.2 (2022 16:18), Max: 37.2 (2022 22:00)  T(F): 98.9 (2022 16:18), Max: 98.9 (2022 22:00)  HR: 66 (2022 16:18) (60 - 81)  BP: 152/60 (2022 16:18) (122/67 - 152/60)  BP(mean): --  RR: 16 (2022 16:18) (16 - 18)  SpO2: 100% (2022 16:18) (95% - 100%)  I&O's Summary    2022 07:01  -  2022 07:00  --------------------------------------------------------  IN: 200 mL / OUT: 600 mL / NET: -400 mL    2022 07:01  -  2022 18:46  --------------------------------------------------------  IN: 640 mL / OUT: 900 mL / NET: -260 mL      MEDICATIONS  (STANDING):  AQUAPHOR (petrolatum Ointment) 1 Application(s) Topical every 12 hours  aspirin enteric coated 81 milliGRAM(s) Oral daily  atorvastatin 20 milliGRAM(s) Oral at bedtime  dextrose 5%. 1000 milliLiter(s) (50 mL/Hr) IV Continuous <Continuous>  dextrose 5%. 1000 milliLiter(s) (100 mL/Hr) IV Continuous <Continuous>  dextrose 50% Injectable 25 Gram(s) IV Push once  dextrose 50% Injectable 12.5 Gram(s) IV Push once  dextrose 50% Injectable 25 Gram(s) IV Push once  ferrous    sulfate 325 milliGRAM(s) Oral daily  folic acid 1 milliGRAM(s) Oral daily  furosemide   Injectable 40 milliGRAM(s) IV Push two times a day  gabapentin 100 milliGRAM(s) Oral three times a day  glucagon  Injectable 1 milliGRAM(s) IntraMuscular once  insulin lispro (ADMELOG) corrective regimen sliding scale   SubCutaneous three times a day before meals  insulin lispro (ADMELOG) corrective regimen sliding scale   SubCutaneous at bedtime  isosorbide   dinitrate Tablet (ISORDIL) 10 milliGRAM(s) Oral two times a day  losartan 25 milliGRAM(s) Oral daily  piperacillin/tazobactam IVPB.. 3.375 Gram(s) IV Intermittent every 8 hours    MEDICATIONS  (PRN):  acetaminophen     Tablet .. 650 milliGRAM(s) Oral every 6 hours PRN Temp greater or equal to 38C (100.4F), Mild Pain (1 - 3)  aluminum hydroxide/magnesium hydroxide/simethicone Suspension 30 milliLiter(s) Oral every 4 hours PRN Dyspepsia  dextrose Oral Gel 15 Gram(s) Oral once PRN Blood Glucose LESS THAN 70 milliGRAM(s)/deciliter  melatonin 3 milliGRAM(s) Oral at bedtime PRN Insomnia  triamcinolone 0.1% Ointment 1 Application(s) Topical every 12 hours PRN itching    LABS:                        10.9   8.58  )-----------( 143      ( 2022 06:43 )             36.1     04-02    137  |  98  |  36<H>  ----------------------------<  90  4.3   |  26  |  1.17    Ca    8.0<L>      2022 06:43  Phos  4.0     04-02  Mg     2.00     04-02      PT/INR - ( 2022 06:43 )   PT: 37.0 sec;   INR: 3.15 ratio           Urinalysis Basic - ( 2022 22:22 )    Color: Yellow / Appearance: Clear / S.014 / pH: x  Gluc: x / Ketone: Negative  / Bili: Negative / Urobili: <2 mg/dL   Blood: x / Protein: Negative / Nitrite: Negative   Leuk Esterase: Negative / RBC: 5 /HPF / WBC 1 /HPF   Sq Epi: x / Non Sq Epi: 0 /HPF / Bacteria: Negative      CAPILLARY BLOOD GLUCOSE      POCT Blood Glucose.: 155 mg/dL (2022 16:09)  POCT Blood Glucose.: 132 mg/dL (2022 12:05)  POCT Blood Glucose.: 93 mg/dL (2022 08:06)  POCT Blood Glucose.: 169 mg/dL (2022 21:35)        Urinalysis Basic - ( 2022 22:22 )    Color: Yellow / Appearance: Clear / S.014 / pH: x  Gluc: x / Ketone: Negative  / Bili: Negative / Urobili: <2 mg/dL   Blood: x / Protein: Negative / Nitrite: Negative   Leuk Esterase: Negative / RBC: 5 /HPF / WBC 1 /HPF   Sq Epi: x / Non Sq Epi: 0 /HPF / Bacteria: Negative      REVIEW OF SYSTEMS:  CONSTITUTIONAL: No fever, weight loss, or fatigue  EYES: No eye pain, visual disturbances, or discharge  ENMT:  No difficulty hearing, tinnitus, vertigo; No sinus or throat pain  NECK: No pain or stiffness  RESPIRATORY: No cough, wheezing, chills or hemoptysis; No shortness of breath  CARDIOVASCULAR: No chest pain, palpitations, dizziness, or leg swelling  GASTROINTESTINAL: No abdominal or epigastric pain. No nausea, vomiting, or hematemesis; No diarrhea or constipation. No melena or hematochezia.  GENITOURINARY: No dysuria, frequency, hematuria, or incontinence  NEUROLOGICAL: No headaches, memory loss, loss of strength, numbness, or tremors      Consultant(s) Notes Reviewed:  [x ] YES  [ ] NO    PHYSICAL EXAM:  GENERAL: NAD, Oxygen   HEAD:  Atraumatic, Normocephalic  NECK: Supple, No JVD, Normal thyroid  NERVOUS SYSTEM:  Alert & Oriented X3, No focal deficit   CHEST/LUNG: Good air entry bilateral  except bases   HEART: Regular rate and rhythm; No murmurs, rubs, or gallops  ABDOMEN: Soft, Nontender, Nondistended; Bowel sounds present  EXTREMITIES:  2+ Peripheral Pulses, No clubbing, cyanosis, or edema      Care Discussed with Consultants/Other Providers [ x] YES  [ ] NO

## 2022-04-02 NOTE — PROGRESS NOTE ADULT - SUBJECTIVE AND OBJECTIVE BOX
Collin Trejo MD  Interventional Cardiology / Advance Heart Failure and Cardiac Transplant Specialist  [place] Office : [phone] [place] [address] [place] [address]  Tel:   [place] Office : [address] [facility] [place] [address]  Tel: [phone]       Pt is lying in bed comfortable not in distress, no chest pains no SOB no palpitations  	  MEDICATIONS:  aspirin enteric coated 81 milliGRAM(s) Oral daily  furosemide   Injectable 40 milliGRAM(s) IV Push two times a day  isosorbide   dinitrate Tablet (ISORDIL) 10 milliGRAM(s) Oral two times a day  losartan 25 milliGRAM(s) Oral daily    piperacillin/tazobactam IVPB.. 3.375 Gram(s) IV Intermittent every 8 hours      acetaminophen     Tablet .. 650 milliGRAM(s) Oral every 6 hours PRN  gabapentin 100 milliGRAM(s) Oral three times a day  melatonin 3 milliGRAM(s) Oral at bedtime PRN    aluminum hydroxide/magnesium hydroxide/simethicone Suspension 30 milliLiter(s) Oral every 4 hours PRN    atorvastatin 20 milliGRAM(s) Oral at bedtime  dextrose 50% Injectable 25 Gram(s) IV Push once  dextrose 50% Injectable 12.5 Gram(s) IV Push once  dextrose 50% Injectable 25 Gram(s) IV Push once  dextrose Oral Gel 15 Gram(s) Oral once PRN  glucagon  Injectable 1 milliGRAM(s) IntraMuscular once  insulin lispro (ADMELOG) corrective regimen sliding scale   SubCutaneous three times a day before meals  insulin lispro (ADMELOG) corrective regimen sliding scale   SubCutaneous at bedtime    AQUAPHOR (petrolatum Ointment) 1 Application(s) Topical every 12 hours  dextrose 5%. 1000 milliLiter(s) IV Continuous <Continuous>  dextrose 5%. 1000 milliLiter(s) IV Continuous <Continuous>  ferrous    sulfate 325 milliGRAM(s) Oral daily  folic acid 1 milliGRAM(s) Oral daily  triamcinolone 0.1% Ointment 1 Application(s) Topical every 12 hours PRN      PAST MEDICAL/SURGICAL HISTORY  PAST MEDICAL & SURGICAL HISTORY:  HTN (Hypertension)    Afib  (on Warfarin)    CAD (Coronary Artery Disease)  s/p stent and CABG    CVA (Cerebral Infarction)  2011    CHF (congestive heart failure)  EF 65% Oct 2019    Upper GI bleed    Gout    Diabetes mellitus  type 2, not on meds    History of heart valve replacement with bioprosthetic valve  mitral and aortic    Hyperlipidemia    S/P angioplasty with stent  2011    S/P CABG x 1  (2000)    H/O aortic valve replacement  9/22/14    H/O mitral valve replacement  9/22/14        SOCIAL HISTORY: Substance Use (street drugs): ( x ) never used  (  ) other:    FAMILY HISTORY:  Family history of acute myocardial infarction  mother    PHYSICAL EXAM:  T(C): 37.2 (04-02-22 @ 16:18), Max: 37.2 (04-02-22 @ 16:18)  HR: 72 (04-02-22 @ 19:10) (60 - 80)  BP: 152/60 (04-02-22 @ 16:18) (122/67 - 152/60)  RR: 16 (04-02-22 @ 16:18) (16 - 18)  SpO2: 100% (04-02-22 @ 19:10) (95% - 100%)  Wt(kg): --  I&O's Summary    01 Apr 2022 07:01  -  02 Apr 2022 07:00  --------------------------------------------------------  IN: 200 mL / OUT: 600 mL / NET: -400 mL    02 Apr 2022 07:01  -  02 Apr 2022 22:21  --------------------------------------------------------  IN: 640 mL / OUT: 900 mL / NET: -260 mL          EYES:   PERRLA   ENMT:   Moist mucous membranes, Good dentition, No lesions  Cardiovascular: Normal S1 S2, No JVD, No murmurs, No edema  Respiratory: b/l rhonchi 	  Gastrointestinal:  Soft, Non-tender, + BS	  Extremities: no edema                                    10.9   8.58  )-----------( 143      ( 02 Apr 2022 06:43 )             36.1     04-02    137  |  98  |  36<H>  ----------------------------<  90  4.3   |  26  |  1.17    Ca    8.0<L>      02 Apr 2022 06:43  Phos  4.0     04-02  Mg     2.00     04-02      proBNP:   Lipid Profile:   HgA1c:   TSH:     Consultant(s) Notes Reviewed:  [x ] YES  [ ] NO    Care Discussed with Consultants/Other Providers [ x] YES  [ ] NO    Imaging Personally Reviewed independently:  [x] YES  [ ] NO    All labs, radiologic studies, vitals, orders and medications list reviewed. Patient is seen and examined at bedside. Case discussed with medical team.

## 2022-04-02 NOTE — PROGRESS NOTE ADULT - ASSESSMENT
80yo Female with mhx of HFpEF ef 65% CAD (CABG 2000, stent 2011), severe pulm HTN, Bioprosthetic aortic and mitral valve replacement 9/2014, ?CKD per outside records, afib on warfarin, chronic Lt loculated pleural effusion,  Type 2 DM, HTN, HLD, gout a/w acute on chronic HFpEF i/s/p severe pHTN c/b hypercapnia; Found to have persisting moderate size loculated lt pleural effusion with atelectasis;         Problem/Plan - 1:  ·  Problem: Acute on chronic diastolic heart failure .   ·  Plan: Acute on chronic HFpEF likely in setting of decreased diuretic dose and severe pulm HTN as evidenced by prior TTE dated 10/10/2019;  Volume overloaded on exam with JVD and peripheral nonpitting edema to thighs  -Started Lasix 40 IV BID  -TTE  -Cardiology help appreciated.      Problem/Plan - 2:  ·  Problem: Fever .   ·  Plan: Sepsis work up in progress.  IV Abxs started .  D helping.      Problem/Plan - 3:  ·  Problem: Loculated pleural effusion with Hypercapnia with Collapse left side .   ·  Plan: Persisting loculated effusion so Pulmonary helping. TS consulted.   < from: CT Chest No Cont (03.27.22 @ 20:25) >  IMPRESSION:    Small left pleural effusion with a loculated component in the left major   fissure and along left anterior chest wall. The loculated components are   new when compared to prior CT exam dated 10/13/2019. Associated partial   left lower lobe and left upper lobe collapse.    New pleural thickening and/or loculated fluid medially along the left   upper hemithorax.    Small right pleural effusion and adjacent passive atelectasis.    Stable 9 mm right apical nodule.    < end of copied text >     Problem/Plan - 4:  ·  Problem: Chronic atrial fibrillation.   ·  Plan: [id number] risk stratification = 7  Continue home coreg 12.5mg BID with hold parameters  Adjusting Coumadin  target INR 2-3     Problem/Plan - 5:  ·  Problem: Pulmonary HTN.   ·  Plan: Severe pulm HTN likely class II  Repeat TTE this admission.     Problem/Plan - 6:  ·  Problem: LAYLA Thrombus .  ·  Plan: On AC.      Problem/Plan - 7:  ·  Problem: Papular rash, generalized with Eosinophilia .  ·  Plan: Generalized papular rash of 1y duration previously attributed to hydralazine which was discontinued without improvement of rash  Uses hydrocortisone 2.5% cream intermittently with mild improvement of pruritis  With chronic eosinophilia noted per chart  -Full medication review may be beneficial of meds possibly causing eosinophilia  -Dermatology consult noted.      Problem/Plan - 8:  ·  Problem: Hypertension.   ·  Plan: Restart home losartan, pressures can tolerate SBP 180s in ED  Cont Carvedilol.     Problem/Plan - 9:  ·  Problem: Hyperlipidemia.   ·  Plan: Continue home statin.     Problem/Plan - 10:  ·  Problem: T2DM (type 2 diabetes mellitus).   ·  Plan; HOLD home glipizide while inpatient   Continue home gabapentin for diabetic neuropathy  KRYSTYNA.     Problem/Plan - 11:  ·  Problem: Cerebrovascular accident (CVA).   ·  Plan: Without residual deficits at this time per daughter and patient although unclear clinical course occurring years ago. Continue to monitor and continue ASA and statin.     Problem/Plan - 12:  ·  Problem: CAD (coronary artery disease).   ·  Plan: Continue GDMT    Full code.

## 2022-04-02 NOTE — PROGRESS NOTE ADULT - ASSESSMENT
80yo Female with mhx of HFpEF ef 65% CAD s/p CABG 2000 LIMA to LAD and SVG to OM1 and subsequent PCI to LCx,in 2011, severe pulm HTN, Bioprosthetic aortic and mitral valve replacement 9/2014, CKD per outside records, afib on warfarin, chronic Lt loculated pleural effusion, Type 2 DM, HTN, HLD, gout c/o 3-4 days of worsening dyspnea on exertion and lower extremity swelling    Tele: Afib 60s    1. Acute decompensated diastolic heart failure  - Likely 2/2 medical noncompliance  - I and Os, Daily weights, replenish lytes as needed  - ACS ruled out  - EKG shows AFib with slow ventricular response  - BB held yesterday in setting bradycardia. continue to monitor on tele now improved  - echo shows normaL AVR  and MVR normal LV function decreased RV function sev pulm HTN   -patient appears somnolent with diaphragmatic breathing,   ABG shows PCO2 > 70 patient only wore bipap for 2 hours and is nor refusing. CO2 this AM improved. f/u pulm recs  -Continue IV diuresis with Lasix 40mg IV BID. monitor strict I&Os. primafit placed by RN please obtain PVR bladder scan     2. Rash  - Pt with diffuse rash over body  - Ongoing for a year  - Management as per primary team    3. CAD s/p CABG and PCI  - CABG x2 in 2000 (LIMA to LAD, SVG-OM1)  - PCI in 2011 to LCX  - Last LHC in 2019 revealed patent stent and grafts  - Continue ASA, statin, isordil, and losartan  - continue to hold BB at this time due to bradycardia    3. S/p MVR & AVR  -s/p bioprosthetic valves  -echo with bioprosthetic MVR, AVR, grossly normal LV systolic function, decreased RV function and severe pHTN      4. Afib  -rate in 60s  -continue to hold BB  -c/w coumadin INR 3.3 hold coumadin

## 2022-04-02 NOTE — PROGRESS NOTE ADULT - SUBJECTIVE AND OBJECTIVE BOX
Date of Service: 22 @ 12:21    Patient is a 81y old  Female who presents with a chief complaint of Acute on chronic diastolic heart failure exacerbation (2022 13:38)      Any change in ROS: doing ok : no SOB    MEDICATIONS  (STANDING):  aspirin enteric coated 81 milliGRAM(s) Oral daily  atorvastatin 20 milliGRAM(s) Oral at bedtime  camphor 0.5%/menthol 0.5% Topical Lotion 1 Application(s) Topical every 12 hours  dextrose 5%. 1000 milliLiter(s) (50 mL/Hr) IV Continuous <Continuous>  dextrose 5%. 1000 milliLiter(s) (100 mL/Hr) IV Continuous <Continuous>  dextrose 50% Injectable 25 Gram(s) IV Push once  dextrose 50% Injectable 12.5 Gram(s) IV Push once  dextrose 50% Injectable 25 Gram(s) IV Push once  ferrous    sulfate 325 milliGRAM(s) Oral daily  folic acid 1 milliGRAM(s) Oral daily  furosemide   Injectable 40 milliGRAM(s) IV Push two times a day  gabapentin 100 milliGRAM(s) Oral three times a day  glucagon  Injectable 1 milliGRAM(s) IntraMuscular once  hydrocortisone 2.5% Ointment 1 Application(s) Topical two times a day  insulin lispro (ADMELOG) corrective regimen sliding scale   SubCutaneous three times a day before meals  insulin lispro (ADMELOG) corrective regimen sliding scale   SubCutaneous at bedtime  isosorbide   dinitrate Tablet (ISORDIL) 10 milliGRAM(s) Oral two times a day  losartan 25 milliGRAM(s) Oral daily  piperacillin/tazobactam IVPB.. 3.375 Gram(s) IV Intermittent every 8 hours    MEDICATIONS  (PRN):  acetaminophen     Tablet .. 650 milliGRAM(s) Oral every 6 hours PRN Temp greater or equal to 38C (100.4F), Mild Pain (1 - 3)  aluminum hydroxide/magnesium hydroxide/simethicone Suspension 30 milliLiter(s) Oral every 4 hours PRN Dyspepsia  dextrose Oral Gel 15 Gram(s) Oral once PRN Blood Glucose LESS THAN 70 milliGRAM(s)/deciliter  melatonin 3 milliGRAM(s) Oral at bedtime PRN Insomnia  triamcinolone 0.1% Ointment 1 Application(s) Topical every 12 hours PRN itching    Vital Signs Last 24 Hrs  T(C): 36.8 (2022 10:50), Max: 37.2 (2022 17:04)  T(F): 98.3 (2022 10:50), Max: 99 (2022 17:04)  HR: 63 (2022 10:50) (60 - 81)  BP: 138/52 (2022 10:50) (127/56 - 154/60)  BP(mean): --  RR: 18 (2022 10:50) (16 - 18)  SpO2: 100% (2022 10:50) (95% - 100%)    I&O's Summary    2022 07:01  -  2022 07:00  --------------------------------------------------------  IN: 200 mL / OUT: 600 mL / NET: -400 mL    2022 07:01  -  2022 12:21  --------------------------------------------------------  IN: 440 mL / OUT: 700 mL / NET: -260 mL          Physical Exam:   GENERAL: NAD, well-groomed, well-developed  HEENT: BRADY/   Atraumatic, Normocephalic  ENMT: No tonsillar erythema, exudates, or enlargement; Moist mucous membranes, Good dentition, No lesions  NECK: Supple, No JVD, Normal thyroid  CHEST/LUNG: decreased air entry left > right   CVS: Regular rate and rhythm; No murmurs, rubs, or gallops  GI: : Soft, Nontender, Nondistended; Bowel sounds present  NERVOUS SYSTEM:  Alert & Oriented X3  EXTREMITIES: - edema  LYMPH: No lymphadenopathy noted  SKIN: No rashes or lesions  ENDOCRINOLOGY: No Thyromegaly  PSYCH: calm     Labs:  29, 37, 30                            10.9   8.58  )-----------( 143      ( 2022 06:43 )             36.1                         12.0   8.07  )-----------( 147      ( 2022 07:35 )             37.9                         11.9   10.11 )-----------( 158      ( 31 Mar 2022 07:44 )             38.8                         11.6   9.60  )-----------( 233      ( 30 Mar 2022 07:40 )             38.0     04-02    137  |  98  |  36<H>  ----------------------------<  90  4.3   |  26  |  1.17  04-01    134<L>  |  97<L>  |  29<H>  ----------------------------<  99  4.4   |  27  |  1.12  03-31    134<L>  |  98  |  26<H>  ----------------------------<  107<H>  4.6   |  26  |  0.92  03-30    136  |  101  |  29<H>  ----------------------------<  145<H>  4.8   |  25  |  1.02    Ca    8.0<L>      2022 06:43  Ca    8.3<L>      2022 07:35  Phos  4.0     04-02  Phos  4.0     04-01  Mg     2.00     04-02  Mg     2.10     04-01      CAPILLARY BLOOD GLUCOSE      POCT Blood Glucose.: 132 mg/dL (2022 12:05)  POCT Blood Glucose.: 93 mg/dL (2022 08:06)  POCT Blood Glucose.: 169 mg/dL (2022 21:35)  POCT Blood Glucose.: 130 mg/dL (2022 16:38)        PT/INR - ( 2022 06:43 )   PT: 37.0 sec;   INR: 3.15 ratio           Urinalysis Basic - ( 2022 22:22 )    Color: Yellow / Appearance: Clear / S.014 / pH: x  Gluc: x / Ketone: Negative  / Bili: Negative / Urobili: <2 mg/dL   Blood: x / Protein: Negative / Nitrite: Negative   Leuk Esterase: Negative / RBC: 5 /HPF / WBC 1 /HPF   Sq Epi: x / Non Sq Epi: 0 /HPF / Bacteria: Negative            RECENT CULTURES:   @ 21:01 .Blood Blood-Peripheral       ra< from: CT Chest No Cont (22 @ 20:25) >  Sagittal and coronal reformats were performed.    FINDINGS:    LUNGS AND AIRWAYS: Patent central airways.  Right apical nodule measures   proximally 9 mm (2:18) and is without significant interval change.   Partial left lower lobe and left upper lobe collapse. Passive atelectasis   in the right lower lobe.  PLEURA: Small left pleural effusion with a loculated component in the   left major fissure and along the anterior chest wall. Pleural   thickening/loculation medially. Loculated components are new when   compared to CT chest dated 10/13/2019. Small right pleural effusion.  MEDIASTINUM AND MAITE: No lymphadenopathy.  VESSELS: Extensive calcifications of the aorta. Dilatation of the   pulmonary artery measuring up to 3.9 cm.  HEART: Status post CABG. Cardiomegaly. No pericardial effusion. Coronary   artery calcifications. Mitral annulus calcification.  CHEST WALL AND LOWER NECK: Within normal limits.  VISUALIZED UPPER ABDOMEN: Within normal limits.  BONES: Intact median sternotomy. Degenerative changes.    IMPRESSION:    Small left pleural effusion with a loculated component in the left major   fissure and along left anterior chest wall. The loculated components are   new when compared to prior CT exam dated 10/13/2019. Associated partial   left lower lobe and left upper lobe collapse.    New pleural thickening and/or loculated fluid medially along the left   upper hemithorax.    Small right pleural effusion and adjacent passive atelectasis.    Stable 9 mm right apical nodule.    --- End of Report ---          JEANIE CAMPBELL M.D., RADIOLOGY RESIDENT  This document has been electronically signed.    < end of copied text >           No growth to date.          RESPIRATORY CULTURES:          Studies  Chest X-RAY  CT SCAN Chest   Venous Dopplers: LE:   CT Abdomen  Others

## 2022-04-03 LAB
ANION GAP SERPL CALC-SCNC: 13 MMOL/L — SIGNIFICANT CHANGE UP (ref 7–14)
BUN SERPL-MCNC: 33 MG/DL — HIGH (ref 7–23)
CALCIUM SERPL-MCNC: 8.3 MG/DL — LOW (ref 8.4–10.5)
CHLORIDE SERPL-SCNC: 96 MMOL/L — LOW (ref 98–107)
CO2 SERPL-SCNC: 28 MMOL/L — SIGNIFICANT CHANGE UP (ref 22–31)
CREAT SERPL-MCNC: 1.16 MG/DL — SIGNIFICANT CHANGE UP (ref 0.5–1.3)
EGFR: 47 ML/MIN/1.73M2 — LOW
GLUCOSE BLDC GLUCOMTR-MCNC: 126 MG/DL — HIGH (ref 70–99)
GLUCOSE BLDC GLUCOMTR-MCNC: 153 MG/DL — HIGH (ref 70–99)
GLUCOSE BLDC GLUCOMTR-MCNC: 161 MG/DL — HIGH (ref 70–99)
GLUCOSE BLDC GLUCOMTR-MCNC: 91 MG/DL — SIGNIFICANT CHANGE UP (ref 70–99)
GLUCOSE SERPL-MCNC: 79 MG/DL — SIGNIFICANT CHANGE UP (ref 70–99)
HCT VFR BLD CALC: 39 % — SIGNIFICANT CHANGE UP (ref 34.5–45)
HGB BLD-MCNC: 11.6 G/DL — SIGNIFICANT CHANGE UP (ref 11.5–15.5)
INR BLD: 2.9 RATIO — HIGH (ref 0.88–1.16)
MAGNESIUM SERPL-MCNC: 2 MG/DL — SIGNIFICANT CHANGE UP (ref 1.6–2.6)
MCHC RBC-ENTMCNC: 27.6 PG — SIGNIFICANT CHANGE UP (ref 27–34)
MCHC RBC-ENTMCNC: 29.7 GM/DL — LOW (ref 32–36)
MCV RBC AUTO: 92.6 FL — SIGNIFICANT CHANGE UP (ref 80–100)
NRBC # BLD: 0 /100 WBCS — SIGNIFICANT CHANGE UP
NRBC # FLD: 0 K/UL — SIGNIFICANT CHANGE UP
PHOSPHATE SERPL-MCNC: 3.4 MG/DL — SIGNIFICANT CHANGE UP (ref 2.5–4.5)
PLATELET # BLD AUTO: 161 K/UL — SIGNIFICANT CHANGE UP (ref 150–400)
POTASSIUM SERPL-MCNC: 4.5 MMOL/L — SIGNIFICANT CHANGE UP (ref 3.5–5.3)
POTASSIUM SERPL-SCNC: 4.5 MMOL/L — SIGNIFICANT CHANGE UP (ref 3.5–5.3)
PROTHROM AB SERPL-ACNC: 34 SEC — HIGH (ref 10.5–13.4)
RBC # BLD: 4.21 M/UL — SIGNIFICANT CHANGE UP (ref 3.8–5.2)
RBC # FLD: 15.1 % — HIGH (ref 10.3–14.5)
SODIUM SERPL-SCNC: 137 MMOL/L — SIGNIFICANT CHANGE UP (ref 135–145)
WBC # BLD: 8.77 K/UL — SIGNIFICANT CHANGE UP (ref 3.8–10.5)
WBC # FLD AUTO: 8.77 K/UL — SIGNIFICANT CHANGE UP (ref 3.8–10.5)

## 2022-04-03 PROCEDURE — 99232 SBSQ HOSP IP/OBS MODERATE 35: CPT

## 2022-04-03 RX ORDER — WARFARIN SODIUM 2.5 MG/1
2 TABLET ORAL ONCE
Refills: 0 | Status: DISCONTINUED | OUTPATIENT
Start: 2022-04-03 | End: 2022-04-03

## 2022-04-03 RX ADMIN — Medication 81 MILLIGRAM(S): at 11:55

## 2022-04-03 RX ADMIN — PIPERACILLIN AND TAZOBACTAM 25 GRAM(S): 4; .5 INJECTION, POWDER, LYOPHILIZED, FOR SOLUTION INTRAVENOUS at 08:38

## 2022-04-03 RX ADMIN — PIPERACILLIN AND TAZOBACTAM 25 GRAM(S): 4; .5 INJECTION, POWDER, LYOPHILIZED, FOR SOLUTION INTRAVENOUS at 15:49

## 2022-04-03 RX ADMIN — Medication 1 MILLIGRAM(S): at 11:55

## 2022-04-03 RX ADMIN — GABAPENTIN 100 MILLIGRAM(S): 400 CAPSULE ORAL at 22:36

## 2022-04-03 RX ADMIN — GABAPENTIN 100 MILLIGRAM(S): 400 CAPSULE ORAL at 05:44

## 2022-04-03 RX ADMIN — Medication 1: at 12:24

## 2022-04-03 RX ADMIN — Medication 40 MILLIGRAM(S): at 05:45

## 2022-04-03 RX ADMIN — Medication 650 MILLIGRAM(S): at 08:38

## 2022-04-03 RX ADMIN — Medication 650 MILLIGRAM(S): at 00:00

## 2022-04-03 RX ADMIN — ATORVASTATIN CALCIUM 20 MILLIGRAM(S): 80 TABLET, FILM COATED ORAL at 22:36

## 2022-04-03 RX ADMIN — GABAPENTIN 100 MILLIGRAM(S): 400 CAPSULE ORAL at 14:02

## 2022-04-03 RX ADMIN — Medication 1 APPLICATION(S): at 17:09

## 2022-04-03 RX ADMIN — Medication 1 APPLICATION(S): at 08:40

## 2022-04-03 RX ADMIN — Medication 325 MILLIGRAM(S): at 11:55

## 2022-04-03 RX ADMIN — PIPERACILLIN AND TAZOBACTAM 25 GRAM(S): 4; .5 INJECTION, POWDER, LYOPHILIZED, FOR SOLUTION INTRAVENOUS at 22:36

## 2022-04-03 RX ADMIN — Medication 40 MILLIGRAM(S): at 17:09

## 2022-04-03 NOTE — PROGRESS NOTE ADULT - ASSESSMENT
82yo Female with mhx of HFpEF ef 65% CAD (CABG 2000, stent 2011), severe pulm HTN, Bioprosthetic aortic and mitral valve replacement 9/2014, ?CKD per outside records, afib on warfarin, chronic Lt loculated pleural effusion,  Type 2 DM, HTN, HLD, gout a/w acute on chronic HFpEF i/s/p severe pHTN c/b hypercapnia; Found to have persisting moderate size loculated lt pleural effusion with atelectasis;

## 2022-04-03 NOTE — CONSULT NOTE ADULT - SUBJECTIVE AND OBJECTIVE BOX
HP:   80yo Female with mhx of HFpEF ef 65% CAD (CABG 2000, stent 2011), severe pulm HTN, Bioprosthetic aortic and mitral valve replacement 9/2014, possible CKD per outside records, afib on warfarin, chronic Lt loculated pleural effusion,  Type 2 DM, HTN, HLD, gout a/w acute on chronic HFpEF i/s/p severe pHTN c/b hypercapnia. Pt was brought to [facility] ER with c/o dyspnea and was she was found to have a moderate size loculated left pleural effusion with atelectasis on 3/27/2022 and increased in size on repeat CT chest 4/2/2022.      REVIEW OF SYSTEMS:  CONSTITUTIONAL: +weakness without fever or chills  EYES: No visual changes  ENT: No vertigo or throat pain   NECK: No pain or stiffness  RESPIRATORY: +HERNANDEZ No cough, wheezing, hemoptysis  CARDIOVASCULAR: No chest pain or palpitations  GASTROINTESTINAL: +nausea , No abdominal or epigastric pain. No  vomiting, or hematemesis; No diarrhea or constipation. No melena or hematochezia.  GENITOURINARY: No dysuria, frequency or hematuria  NEUROLOGICAL: No numbness or weakness  SKIN: +generalized pruritic rash   LYMPHATICS: No swollen lymph nodes.  All other review of systems is negative unless indicated above.        Allergies and Intolerances:   No Known Allergies      PAST MEDICAL HISTORY:  Afib (on Warfarin)  CAD (Coronary Artery Disease) s/p stent and CABG  CHF (congestive heart failure) EF 65% Oct 2019  CVA (Cerebral Infarction) 2011  Diabetes mellitus type 2, not on meds  Gout   History of heart valve replacement with bioprosthetic valve mitral and aortic  HTN (Hypertension)   Hyperlipidemia   Uper GI bleed.     PAST SURGICAL HISTORY:  H/O aortic valve replacement 9/22/14  H/O mitral valve replacement 9/22/14  S/P angioplasty with stent 2011  S/P CABG x 1 (2000).       FAMILY HISTORY:  Family history of acute myocardial infarction, mother.              Social History (marital status, living situation, occupation, tobacco use, alcohol and drug use, and sexual history): Lives with son in [facility] although travels to see daughter  Medication administration currently assisted by son  Ambulatory at baseline refuses to use her cane, denies hx of falls  Previously immigrated from [place] ~10 y ago  Reports no h/o alcohol, tobacco, or drug use              PHYSICAL EXAM  GENERAL: patient appears well, no acute distress, appropriate, pleasant  EYES: sclera clear, no exudates  ENMT: oropharynx clear without erythema, no exudates, moist mucous membranes  NECK: +JVD, supple, soft, no thyromegaly noted  LUNGS: +decreased breath sounds at left base. good air entry bilaterally, no wheezing or rhonchi appreciated  HEART: bradycardic, Irregularly irregular with holosystolic murmur left sternal border, 3+ non pitting edema to b/l thighs  GASTROINTESTINAL: abdomen is soft, nontender, nondistended, normoactive bowel sounds, no palpable masses  INTEGUMENT: generalized papular rash with excoriations in various stages of healing   MUSCULOSKELETAL: no clubbing or cyanosis, no obvious deformity  NEUROLOGIC: awake, alert, oriented x3, good muscle tone in 4 extremities, no obvious sensory deficits  PSYCHIATRIC: mood is good, affect is congruent, linear and logical thought process  HEME/LYMPH: no palpable supraclavicular nodules, no obvious ecchymosis or petechiae        LABS:                        11.6   8.77  )-----------( 161      ( 03 Apr 2022 07:09 )             39.0     04-03    137  |  96<L>  |  33<H>  ----------------------------<  79  4.5   |  28  |  1.16    Ca    8.3<L>      03 Apr 2022 07:09  Phos  3.4     04-03  Mg     2.00     04-03      PT/INR - ( 03 Apr 2022 07:09 )   PT: 34.0 sec;   INR: 2.90 ratio              ACC: [id number] EXAM: CT CHEST  PROCEDURE DATE: 04/02/2022      INTERPRETATION: CLINICAL INFORMATION: Evaluate left sided effusion.  COMPARISON: Chest CT 3/27/2021, 2/8/2019.    CONTRAST/COMPLICATIONS:  IV Contrast: NONE  Oral Contrast: NONE  Complications: None reported at time of study completion    PROCEDURE:  CT of the Chest was performed.  Sagittal and coronal reformats were performed.    FINDINGS:    LUNGS, AIRWAYS, AND PLEURA: New secretions within the left mainstem bronchus with new complete atelectasis of the left upper and lower lobes. A 9 mm right upper lobe nodule is unchanged since 2/8/2019. Large left pleural effusion, increased since 3/27/2022. Unchanged small right pleural effusion.  MEDIASTINUM AND MAITE: No lymphadenopathy.  VESSELS: Atherosclerotic changes of the aorta and coronary arteries.  HEART: Cardiomegaly. No pericardial effusion. Mitral annular calcifications. CABG.  CHEST WALL AND LOWER NECK: Median sternotomy.  VISUALIZED UPPER ABDOMEN: Within normal limits.  BONES: Within normal limits.    IMPRESSION:  Since 3/27/22    New complete atelectasis of the left lung from a mucous plug/secretions in the left mainstem bronchus.    Large left pleural effusion, increased since 3/27/2022.    --- End of Report ---            Diagnosis:     New complete atelectasis of the left lung from a mucous plug/secretions in the left mainstem bronchus.  Large left pleural effusion, increased since 3/27/2022.      A/P  80yo Female with mhx of HFpEF ef 65% CAD (CABG 2000, stent 2011), severe pulm HTN, Bioprosthetic aortic and mitral valve replacement 9/2014, possible CKD per outside records, afib on warfarin, chronic Lt loculated pleural effusion,  Type 2 DM, HTN, HLD, gout a/w acute on chronic HFpEF i/s/p severe pHTN c/b hypercapnia. Pt was brought to [facility] ER with c/o dyspnea and was she was found to have a moderate size loculated left pleural effusion with atelectasis on 3/27/2022 and increased in size on repeat CT chest 4/2/2022.  New complete atelectasis of the left lung from a mucous plug/secretions in the left mainstem bronchus.  Large left pleural effusion, increased since 3/27/2022. Elevated INR 2.9, coumadin on hold.  - continue to hold coumadin   - thoracic surgery will place PTC once INR decreased  - please get f/u INR and CXR in AM  - make pt NPO p MN in case pt requires bronchoscopy tomorrow - not booked, thoracic ACP will f/u plan in AM  - above d/w medicine ACP    Mer PARSON [id number]     HP:   82yo Female with mhx of HFpEF ef 65% CAD (CABG 2000, stent 2011), severe pulm HTN, Bioprosthetic aortic and mitral valve replacement 9/2014, possible CKD per outside records, afib on warfarin, chronic Lt loculated pleural effusion,  Type 2 DM, HTN, HLD, gout a/w acute on chronic HFpEF i/s/p severe pHTN c/b hypercapnia. Pt was brought to [facility] ER with c/o dyspnea and was she was found to have a moderate size loculated left pleural effusion with atelectasis on 3/27/2022 and increased in size on repeat CT chest 4/2/2022.      REVIEW OF SYSTEMS:  CONSTITUTIONAL: +weakness without fever or chills  EYES: No visual changes  ENT: No vertigo or throat pain   NECK: No pain or stiffness  RESPIRATORY: +HERNANDEZ No cough, wheezing, hemoptysis  CARDIOVASCULAR: No chest pain or palpitations  GASTROINTESTINAL: +nausea , No abdominal or epigastric pain. No  vomiting, or hematemesis; No diarrhea or constipation. No melena or hematochezia.  GENITOURINARY: No dysuria, frequency or hematuria  NEUROLOGICAL: No numbness or weakness  SKIN: +generalized pruritic rash   LYMPHATICS: No swollen lymph nodes.  All other review of systems is negative unless indicated above.        Allergies and Intolerances:   No Known Allergies      PAST MEDICAL HISTORY:  Afib (on Warfarin)  CAD (Coronary Artery Disease) s/p stent and CABG  CHF (congestive heart failure) EF 65% Oct 2019  CVA (Cerebral Infarction) 2011  Diabetes mellitus type 2, not on meds  Gout   History of heart valve replacement with bioprosthetic valve mitral and aortic  HTN (Hypertension)   Hyperlipidemia   Uper GI bleed.     PAST SURGICAL HISTORY:  H/O aortic valve replacement 9/22/14  H/O mitral valve replacement 9/22/14  S/P angioplasty with stent 2011  S/P CABG x 1 (2000).       FAMILY HISTORY:  Family history of acute myocardial infarction, mother.              Social History (marital status, living situation, occupation, tobacco use, alcohol and drug use, and sexual history): Lives with son in [facility] although travels to see daughter  Medication administration currently assisted by son  Ambulatory at baseline refuses to use her cane, denies hx of falls  Previously immigrated from [place] ~10 y ago  Reports no h/o alcohol, tobacco, or drug use              PHYSICAL EXAM  GENERAL: patient appears well, no acute distress, appropriate, pleasant  EYES: sclera clear, no exudates  ENMT: oropharynx clear without erythema, no exudates, moist mucous membranes  NECK: +JVD, supple, soft, no thyromegaly noted  LUNGS: +decreased breath sounds at left base. good air entry bilaterally, no wheezing or rhonchi appreciated  HEART: bradycardic, Irregularly irregular with holosystolic murmur left sternal border, 3+ non pitting edema to b/l thighs  GASTROINTESTINAL: abdomen is soft, nontender, nondistended, normoactive bowel sounds, no palpable masses  INTEGUMENT: generalized papular rash with excoriations in various stages of healing   MUSCULOSKELETAL: no clubbing or cyanosis, no obvious deformity  NEUROLOGIC: awake, alert, oriented x3, good muscle tone in 4 extremities, no obvious sensory deficits  PSYCHIATRIC: mood is good, affect is congruent, linear and logical thought process  HEME/LYMPH: no palpable supraclavicular nodules, no obvious ecchymosis or petechiae        LABS:                        11.6   8.77  )-----------( 161      ( 03 Apr 2022 07:09 )             39.0     04-03    137  |  96<L>  |  33<H>  ----------------------------<  79  4.5   |  28  |  1.16    Ca    8.3<L>      03 Apr 2022 07:09  Phos  3.4     04-03  Mg     2.00     04-03      PT/INR - ( 03 Apr 2022 07:09 )   PT: 34.0 sec;   INR: 2.90 ratio              ACC: [id number] EXAM: CT CHEST  PROCEDURE DATE: 04/02/2022      INTERPRETATION: CLINICAL INFORMATION: Evaluate left sided effusion.  COMPARISON: Chest CT 3/27/2021, 2/8/2019.    CONTRAST/COMPLICATIONS:  IV Contrast: NONE  Oral Contrast: NONE  Complications: None reported at time of study completion    PROCEDURE:  CT of the Chest was performed.  Sagittal and coronal reformats were performed.    FINDINGS:    LUNGS, AIRWAYS, AND PLEURA: New secretions within the left mainstem bronchus with new complete atelectasis of the left upper and lower lobes. A 9 mm right upper lobe nodule is unchanged since 2/8/2019. Large left pleural effusion, increased since 3/27/2022. Unchanged small right pleural effusion.  MEDIASTINUM AND MAITE: No lymphadenopathy.  VESSELS: Atherosclerotic changes of the aorta and coronary arteries.  HEART: Cardiomegaly. No pericardial effusion. Mitral annular calcifications. CABG.  CHEST WALL AND LOWER NECK: Median sternotomy.  VISUALIZED UPPER ABDOMEN: Within normal limits.  BONES: Within normal limits.    IMPRESSION:  Since 3/27/22    New complete atelectasis of the left lung from a mucous plug/secretions in the left mainstem bronchus.    Large left pleural effusion, increased since 3/27/2022.    --- End of Report ---            Diagnosis:     New complete atelectasis of the left lung from a mucous plug/secretions in the left mainstem bronchus.  Large left pleural effusion, increased since 3/27/2022.      A/P  82yo Female with mhx of HFpEF ef 65% CAD (CABG 2000, stent 2011), severe pulm HTN, Bioprosthetic aortic and mitral valve replacement 9/2014, possible CKD per outside records, afib on warfarin, chronic Lt loculated pleural effusion,  Type 2 DM, HTN, HLD, gout a/w acute on chronic HFpEF i/s/p severe pHTN c/b hypercapnia. Pt was brought to [facility] ER with c/o dyspnea and was she was found to have a moderate size loculated left pleural effusion with atelectasis on 3/27/2022 and increased in size on repeat CT chest 4/2/2022.  New complete atelectasis of the left lung from a mucous plug/secretions in the left mainstem bronchus.  Large left pleural effusion, increased since 3/27/2022. Elevated INR 2.9, coumadin on hold.  - continue to hold coumadin   - thoracic surgery will place PTC once INR decreased  - please get f/u INR and CXR in AM  - make pt NPO p MN in case pt requires bronchoscopy tomorrow - not booked, thoracic ACP will f/u plan in AM  - agree with aggressive pulmonary toilet  - above d/w medicine ACP    Mer PARSON [id number]

## 2022-04-03 NOTE — PROGRESS NOTE ADULT - SUBJECTIVE AND OBJECTIVE BOX
Date of Service: 22 @ 14:38    Patient is a 81y old  Female who presents with a chief complaint of Acute on chronic diastolic heart failure exacerbation (2022 13:37)      Any change in ROS: She is on 4 L of oxygen : she says her breathng is OK: she is non cooperative:   no cough :       MEDICATIONS  (STANDING):  AQUAPHOR (petrolatum Ointment) 1 Application(s) Topical every 12 hours  aspirin enteric coated 81 milliGRAM(s) Oral daily  atorvastatin 20 milliGRAM(s) Oral at bedtime  dextrose 5%. 1000 milliLiter(s) (100 mL/Hr) IV Continuous <Continuous>  dextrose 5%. 1000 milliLiter(s) (50 mL/Hr) IV Continuous <Continuous>  dextrose 50% Injectable 25 Gram(s) IV Push once  dextrose 50% Injectable 12.5 Gram(s) IV Push once  dextrose 50% Injectable 25 Gram(s) IV Push once  ferrous    sulfate 325 milliGRAM(s) Oral daily  folic acid 1 milliGRAM(s) Oral daily  furosemide   Injectable 40 milliGRAM(s) IV Push two times a day  gabapentin 100 milliGRAM(s) Oral three times a day  glucagon  Injectable 1 milliGRAM(s) IntraMuscular once  insulin lispro (ADMELOG) corrective regimen sliding scale   SubCutaneous three times a day before meals  insulin lispro (ADMELOG) corrective regimen sliding scale   SubCutaneous at bedtime  isosorbide   dinitrate Tablet (ISORDIL) 10 milliGRAM(s) Oral two times a day  losartan 25 milliGRAM(s) Oral daily  piperacillin/tazobactam IVPB.. 3.375 Gram(s) IV Intermittent every 8 hours  warfarin 2 milliGRAM(s) Oral once    MEDICATIONS  (PRN):  acetaminophen     Tablet .. 650 milliGRAM(s) Oral every 6 hours PRN Temp greater or equal to 38C (100.4F), Mild Pain (1 - 3)  aluminum hydroxide/magnesium hydroxide/simethicone Suspension 30 milliLiter(s) Oral every 4 hours PRN Dyspepsia  dextrose Oral Gel 15 Gram(s) Oral once PRN Blood Glucose LESS THAN 70 milliGRAM(s)/deciliter  melatonin 3 milliGRAM(s) Oral at bedtime PRN Insomnia  triamcinolone 0.1% Ointment 1 Application(s) Topical every 12 hours PRN itching    Vital Signs Last 24 Hrs  T(C): 36.7 (2022 11:38), Max: 37.2 (2022 16:18)  T(F): 98.1 (2022 11:38), Max: 98.9 (2022 16:18)  HR: 60 (2022 11:38) (60 - 76)  BP: 111/40 (2022 11:38) (111/40 - 152/60)  BP(mean): --  RR: 18 (2022 11:38) (16 - 18)  SpO2: 100% (2022 11:38) (98% - 100%)    I&O's Summary    2022 07:01  -  2022 07:00  --------------------------------------------------------  IN: 715 mL / OUT: 1250 mL / NET: -535 mL          Physical Exam:   GENERAL: NAD, well-groomed, well-developed  HEENT: BRADY/   Atraumatic, Normocephalic  ENMT: No tonsillar erythema, exudates, or enlargement; Moist mucous membranes, Good dentition, No lesions  NECK: Supple, No JVD, Normal thyroid  CHEST/LUNG:decreased ai rentery left side  CVS: Regular rate and rhythm; No murmurs, rubs, or gallops  GI: : Soft, Nontender, Nondistended; Bowel sounds present  NERVOUS SYSTEM:  Alert & Oriented X3  EXTREMITIES:  2+ Peripheral Pulses, No clubbing, cyanosis, or edema  LYMPH: No lymphadenopathy noted  SKIN: No rashes or lesions  ENDOCRINOLOGY: No Thyromegaly  PSYCH: Appropriate    Labs:  29, 37, 30                            11.6   8.77  )-----------( 161      ( 2022 07:09 )             39.0                         10.9   8.58  )-----------( 143      ( 2022 06:43 )             36.1                         12.0   8.07  )-----------( 147      ( 2022 07:35 )             37.9                         11.9   10.11 )-----------( 158      ( 31 Mar 2022 07:44 )             38.8     04-03    137  |  96<L>  |  33<H>  ----------------------------<  79  4.5   |  28  |  1.16  04-02    137  |  98  |  36<H>  ----------------------------<  90  4.3   |  26  |  1.17  04-01    134<L>  |  97<L>  |  29<H>  ----------------------------<  99  4.4   |  27  |  1.12  03-31    134<L>  |  98  |  26<H>  ----------------------------<  107<H>  4.6   |  26  |  0.92    Ca    8.3<L>      2022 07:09  Ca    8.0<L>      2022 06:43  Phos  3.4     04-03  Phos  4.0     04-02  Mg     2.00     04-03  Mg     2.00     04-02      CAPILLARY BLOOD GLUCOSE      POCT Blood Glucose.: 153 mg/dL (2022 12:08)  POCT Blood Glucose.: 91 mg/dL (2022 08:24)  POCT Blood Glucose.: 158 mg/dL (2022 21:57)  POCT Blood Glucose.: 155 mg/dL (2022 16:09)        PT/INR - ( 2022 07:09 )   PT: 34.0 sec;   INR: 2.90 ratio           Urinalysis Basic - ( 2022 22:22 )    Color: Yellow / Appearance: Clear / S.014 / pH: x  Gluc: x / Ketone: Negative  / Bili: Negative / Urobili: <2 mg/dL   Blood: x / Protein: Negative / Nitrite: Negative   Leuk Esterase: Negative / RBC: 5 /HPF / WBC 1 /HPF   Sq Epi: x / Non Sq Epi: 0 /HPF / Bacteria: Negative            RECENT CULTURES:   @ 21:01 .Blood Blood-Peripheral       r< from: CT Chest No Cont (22 @ 13:48) >  MEDIASTINUM AND MAITE: No lymphadenopathy.  VESSELS: Atherosclerotic changes of the aorta and coronary arteries.  HEART: Cardiomegaly. No pericardial effusion. Mitral annular   calcifications. CABG.  CHEST WALL AND LOWER NECK: Median sternotomy.  VISUALIZED UPPER ABDOMEN: Within normal limits.  BONES: Within normal limits.    IMPRESSION:  Since 3/27/22    New complete atelectasis of the left lung from a mucous plug/secretions   in the left mainstem bronchus.    Large left pleural effusion, increasedsince 3/27/2022.    --- End of Report ---          DAYLIN LORA MD; Resident Radiologist  This document has been electronically signed.  TATYANA FLORES MD; Attending Radiologist  This document has been electronically signed. 2022  2:43PM    < end of copied text >           No growth to date.          RESPIRATORY CULTURES:          Studies  Chest X-RAY  CT SCAN Chest   Venous Dopplers: LE:   CT Abdomen  Others

## 2022-04-03 NOTE — PROGRESS NOTE ADULT - SUBJECTIVE AND OBJECTIVE BOX
Date of Service  : 22 @ 13:37    INTERVAL HPI/OVERNIGHT EVENTS: No new concerns   Vital Signs Last 24 Hrs  T(C): 36.7 (2022 11:38), Max: 37.2 (2022 16:18)  T(F): 98.1 (2022 11:38), Max: 98.9 (2022 16:18)  HR: 60 (2022 11:38) (60 - 76)  BP: 111/40 (2022 11:38) (111/40 - 152/60)  BP(mean): --  RR: 18 (2022 11:38) (16 - 18)  SpO2: 100% (2022 11:38) (98% - 100%)  I&O's Summary    2022 07:01  -  2022 07:00  --------------------------------------------------------  IN: 715 mL / OUT: 1250 mL / NET: -535 mL      MEDICATIONS  (STANDING):  AQUAPHOR (petrolatum Ointment) 1 Application(s) Topical every 12 hours  aspirin enteric coated 81 milliGRAM(s) Oral daily  atorvastatin 20 milliGRAM(s) Oral at bedtime  dextrose 5%. 1000 milliLiter(s) (100 mL/Hr) IV Continuous <Continuous>  dextrose 5%. 1000 milliLiter(s) (50 mL/Hr) IV Continuous <Continuous>  dextrose 50% Injectable 25 Gram(s) IV Push once  dextrose 50% Injectable 12.5 Gram(s) IV Push once  dextrose 50% Injectable 25 Gram(s) IV Push once  ferrous    sulfate 325 milliGRAM(s) Oral daily  folic acid 1 milliGRAM(s) Oral daily  furosemide   Injectable 40 milliGRAM(s) IV Push two times a day  gabapentin 100 milliGRAM(s) Oral three times a day  glucagon  Injectable 1 milliGRAM(s) IntraMuscular once  insulin lispro (ADMELOG) corrective regimen sliding scale   SubCutaneous three times a day before meals  insulin lispro (ADMELOG) corrective regimen sliding scale   SubCutaneous at bedtime  isosorbide   dinitrate Tablet (ISORDIL) 10 milliGRAM(s) Oral two times a day  losartan 25 milliGRAM(s) Oral daily  piperacillin/tazobactam IVPB.. 3.375 Gram(s) IV Intermittent every 8 hours  warfarin 2 milliGRAM(s) Oral once    MEDICATIONS  (PRN):  acetaminophen     Tablet .. 650 milliGRAM(s) Oral every 6 hours PRN Temp greater or equal to 38C (100.4F), Mild Pain (1 - 3)  aluminum hydroxide/magnesium hydroxide/simethicone Suspension 30 milliLiter(s) Oral every 4 hours PRN Dyspepsia  dextrose Oral Gel 15 Gram(s) Oral once PRN Blood Glucose LESS THAN 70 milliGRAM(s)/deciliter  melatonin 3 milliGRAM(s) Oral at bedtime PRN Insomnia  triamcinolone 0.1% Ointment 1 Application(s) Topical every 12 hours PRN itching    LABS:                        11.6   8.77  )-----------( 161      ( 2022 07:09 )             39.0     04-03    137  |  96<L>  |  33<H>  ----------------------------<  79  4.5   |  28  |  1.16    Ca    8.3<L>      2022 07:09  Phos  3.4     04-03  Mg     2.00     04-03      PT/INR - ( 2022 07:09 )   PT: 34.0 sec;   INR: 2.90 ratio           Urinalysis Basic - ( 2022 22:22 )    Color: Yellow / Appearance: Clear / S.014 / pH: x  Gluc: x / Ketone: Negative  / Bili: Negative / Urobili: <2 mg/dL   Blood: x / Protein: Negative / Nitrite: Negative   Leuk Esterase: Negative / RBC: 5 /HPF / WBC 1 /HPF   Sq Epi: x / Non Sq Epi: 0 /HPF / Bacteria: Negative      CAPILLARY BLOOD GLUCOSE      POCT Blood Glucose.: 153 mg/dL (2022 12:08)  POCT Blood Glucose.: 91 mg/dL (2022 08:24)  POCT Blood Glucose.: 158 mg/dL (2022 21:57)  POCT Blood Glucose.: 155 mg/dL (2022 16:09)        Urinalysis Basic - ( 2022 22:22 )    Color: Yellow / Appearance: Clear / S.014 / pH: x  Gluc: x / Ketone: Negative  / Bili: Negative / Urobili: <2 mg/dL   Blood: x / Protein: Negative / Nitrite: Negative   Leuk Esterase: Negative / RBC: 5 /HPF / WBC 1 /HPF   Sq Epi: x / Non Sq Epi: 0 /HPF / Bacteria: Negative      REVIEW OF SYSTEMS:  CONSTITUTIONAL: No fever, weight loss, or fatigue  EYES: No eye pain, visual disturbances, or discharge  ENMT:  No difficulty hearing, tinnitus, vertigo; No sinus or throat pain  NECK: No pain or stiffness  RESPIRATORY: No cough, wheezing, chills or hemoptysis; No shortness of breath  CARDIOVASCULAR: No chest pain, palpitations, dizziness, or leg swelling  GASTROINTESTINAL: No abdominal or epigastric pain. No nausea, vomiting, or hematemesis; No diarrhea or constipation. No melena or hematochezia.  GENITOURINARY: No dysuria, frequency, hematuria, or incontinence  NEUROLOGICAL: No headaches, memory loss, loss of strength, numbness, or tremors      Consultant(s) Notes Reviewed:  [x ] YES  [ ] NO    PHYSICAL EXAM:  GENERAL: NAD, oxygen   HEAD:  Atraumatic, Normocephalic  NECK: Supple, No JVD, Normal thyroid  NERVOUS SYSTEM:  Alert & , No focal deficit   CHEST/LUNG: Good air entry bilateral  except bases   HEART: Regular rate and rhythm; No murmurs, rubs, or gallops  ABDOMEN: Soft, Nontender, Nondistended; Bowel sounds present  EXTREMITIES:  2+ Peripheral Pulses, No clubbing, cyanosis, or edema  SKIN: No rashes or lesions    Care Discussed with Consultants/Other Providers [ x] YES  [ ] NO

## 2022-04-03 NOTE — PROGRESS NOTE ADULT - ASSESSMENT
82yo Female with mhx of HFpEF ef 65% CAD (CABG 2000, stent 2011), severe pulm HTN, Bioprosthetic aortic and mitral valve replacement 9/2014, ?CKD per outside records, afib on warfarin, chronic Lt loculated pleural effusion,  Type 2 DM, HTN, HLD, gout a/w acute on chronic HFpEF i/s/p severe pHTN c/b hypercapnia; Found to have persisting moderate size loculated lt pleural effusion with atelectasis;         Problem/Plan - 1:  ·  Problem: Acute on chronic diastolic heart failure .   ·  Plan: Acute on chronic HFpEF likely in setting of decreased diuretic dose and severe pulm HTN as evidenced by prior TTE dated 10/10/2019;  Volume overloaded on exam with JVD and peripheral nonpitting edema to thighs  -Started Lasix 40 IV BID  -TTE  -Cardiology help appreciated.      Problem/Plan - 2:  ·  Problem: Fever .   ·  Plan: Sepsis work up in progress.  IV Abxs started .  D helping.      Problem/Plan - 3:  ·  Problem: Loculated pleural effusion with Hypercapnia with Collapse left side .   ·  Plan: Persisting loculated effusion so Pulmonary helping. TS consulted.   < from: CT Chest No Cont (03.27.22 @ 20:25) >  IMPRESSION:    Small left pleural effusion with a loculated component in the left major   fissure and along left anterior chest wall. The loculated components are   new when compared to prior CT exam dated 10/13/2019. Associated partial   left lower lobe and left upper lobe collapse.    New pleural thickening and/or loculated fluid medially along the left   upper hemithorax.    Small right pleural effusion and adjacent passive atelectasis.    Stable 9 mm right apical nodule.    < end of copied text >     Problem/Plan - 4:  ·  Problem: Chronic atrial fibrillation.   ·  Plan: [id number] risk stratification = 7  Continue home coreg 12.5mg BID with hold parameters  Adjusting Coumadin  target INR 2-3     Problem/Plan - 5:  ·  Problem: Pulmonary HTN.   ·  Plan: Severe pulm HTN likely class II  Repeat TTE this admission.     Problem/Plan - 6:  ·  Problem: LAYLA Thrombus .  ·  Plan: On AC.      Problem/Plan - 7:  ·  Problem: Papular rash, generalized with Eosinophilia .  ·  Plan: Generalized papular rash of 1y duration previously attributed to hydralazine which was discontinued without improvement of rash  Uses hydrocortisone 2.5% cream intermittently with mild improvement of pruritis  With chronic eosinophilia noted per chart  -Full medication review may be beneficial of meds possibly causing eosinophilia  -Dermatology consult noted.      Problem/Plan - 8:  ·  Problem: Hypertension.   ·  Plan: Restart home losartan, pressures can tolerate SBP 180s in ED  Cont Carvedilol.     Problem/Plan - 9:  ·  Problem: Hyperlipidemia.   ·  Plan: Continue home statin.     Problem/Plan - 10:  ·  Problem: T2DM (type 2 diabetes mellitus).   ·  Plan; HOLD home glipizide while inpatient   Continue home gabapentin for diabetic neuropathy  KRYSTYNA.     Problem/Plan - 11:  ·  Problem: Cerebrovascular accident (CVA).   ·  Plan: Without residual deficits at this time per daughter and patient although unclear clinical course occurring years ago. Continue to monitor and continue ASA and statin.     Problem/Plan - 12:  ·  Problem: CAD (coronary artery disease).   ·  Plan: Continue GDMT    Full code.

## 2022-04-03 NOTE — CONSULT NOTE ADULT - REASON FOR ADMISSION
Acute on chronic diastolic heart failure exacerbation

## 2022-04-03 NOTE — PROGRESS NOTE ADULT - SUBJECTIVE AND OBJECTIVE BOX
Collin Trejo MD  Interventional Cardiology / Advance Heart Failure and Cardiac Transplant Specialist  [place] Office : [address] [place] [address] [place] [address]  Tel:   [place] Office : [address] [facility] [place] [address]  Tel: [phone]       Pt is lying in bed comfortable not in distress, no chest pains no SOB no palpitations  	  MEDICATIONS:  aspirin enteric coated 81 milliGRAM(s) Oral daily  furosemide   Injectable 40 milliGRAM(s) IV Push two times a day  isosorbide   dinitrate Tablet (ISORDIL) 10 milliGRAM(s) Oral two times a day  losartan 25 milliGRAM(s) Oral daily    piperacillin/tazobactam IVPB.. 3.375 Gram(s) IV Intermittent every 8 hours      acetaminophen     Tablet .. 650 milliGRAM(s) Oral every 6 hours PRN  gabapentin 100 milliGRAM(s) Oral three times a day  melatonin 3 milliGRAM(s) Oral at bedtime PRN    aluminum hydroxide/magnesium hydroxide/simethicone Suspension 30 milliLiter(s) Oral every 4 hours PRN    atorvastatin 20 milliGRAM(s) Oral at bedtime  dextrose 50% Injectable 25 Gram(s) IV Push once  dextrose 50% Injectable 12.5 Gram(s) IV Push once  dextrose 50% Injectable 25 Gram(s) IV Push once  dextrose Oral Gel 15 Gram(s) Oral once PRN  glucagon  Injectable 1 milliGRAM(s) IntraMuscular once  insulin lispro (ADMELOG) corrective regimen sliding scale   SubCutaneous three times a day before meals  insulin lispro (ADMELOG) corrective regimen sliding scale   SubCutaneous at bedtime    AQUAPHOR (petrolatum Ointment) 1 Application(s) Topical every 12 hours  dextrose 5%. 1000 milliLiter(s) IV Continuous <Continuous>  dextrose 5%. 1000 milliLiter(s) IV Continuous <Continuous>  ferrous    sulfate 325 milliGRAM(s) Oral daily  folic acid 1 milliGRAM(s) Oral daily  triamcinolone 0.1% Ointment 1 Application(s) Topical every 12 hours PRN      PAST MEDICAL/SURGICAL HISTORY  PAST MEDICAL & SURGICAL HISTORY:  HTN (Hypertension)    Afib  (on Warfarin)    CAD (Coronary Artery Disease)  s/p stent and CABG    CVA (Cerebral Infarction)  2011    CHF (congestive heart failure)  EF 65% Oct 2019    Upper GI bleed    Gout    Diabetes mellitus  type 2, not on meds    History of heart valve replacement with bioprosthetic valve  mitral and aortic    Hyperlipidemia    S/P angioplasty with stent  2011    S/P CABG x 1  (2000)    H/O aortic valve replacement  9/22/14    H/O mitral valve replacement  9/22/14        SOCIAL HISTORY: Substance Use (street drugs): ( x ) never used  (  ) other:    FAMILY HISTORY:  Family history of acute myocardial infarction  mother           PHYSICAL EXAM:  T(C): 36.8 (04-03-22 @ 21:00), Max: 37.1 (04-03-22 @ 04:00)  HR: 73 (04-03-22 @ 21:00) (60 - 76)  BP: 148/60 (04-03-22 @ 21:00) (111/40 - 148/60)  RR: 18 (04-03-22 @ 21:00) (18 - 18)  SpO2: 100% (04-03-22 @ 21:00) (98% - 100%)  Wt(kg): --  I&O's Summary    02 Apr 2022 07:01  -  03 Apr 2022 07:00  --------------------------------------------------------  IN: 715 mL / OUT: 1250 mL / NET: -535 mL    03 Apr 2022 07:01  -  03 Apr 2022 23:08  --------------------------------------------------------  IN: 0 mL / OUT: 1300 mL / NET: -1300 mL             EYES:   PERRLA   ENMT:   Moist mucous membranes, Good dentition, No lesions  Cardiovascular: Normal S1 S2, No JVD, No murmurs, No edema  Respiratory: left sided decreased breath sounds  Gastrointestinal:  Soft, Non-tender, + BS	  Extremities: no edema                                    11.6   8.77  )-----------( 161      ( 03 Apr 2022 07:09 )             39.0     04-03    137  |  96<L>  |  33<H>  ----------------------------<  79  4.5   |  28  |  1.16    Ca    8.3<L>      03 Apr 2022 07:09  Phos  3.4     04-03  Mg     2.00     04-03      proBNP:   Lipid Profile:   HgA1c:   TSH:     Consultant(s) Notes Reviewed:  [x ] YES  [ ] NO    Care Discussed with Consultants/Other Providers [ x] YES  [ ] NO    Imaging Personally Reviewed independently:  [x] YES  [ ] NO    All labs, radiologic studies, vitals, orders and medications list reviewed. Patient is seen and examined at bedside. Case discussed with medical team.

## 2022-04-03 NOTE — PROGRESS NOTE ADULT - ASSESSMENT
82yo Female with mhx of HFpEF ef 65% CAD s/p CABG 2000 LIMA to LAD and SVG to OM1 and subsequent PCI to LCx,in 2011, severe pulm HTN, Bioprosthetic aortic and mitral valve replacement 9/2014, CKD per outside records, afib on warfarin, chronic Lt loculated pleural effusion, Type 2 DM, HTN, HLD, gout c/o 3-4 days of worsening dyspnea on exertion and lower extremity swelling    Tele: Afib 60s    1. Acute decompensated diastolic heart failure  - Likely 2/2 medical noncompliance  - I and Os, Daily weights, replenish lytes as needed  - ACS ruled out  - EKG shows AFib with slow ventricular response  - BB held yesterday in setting bradycardia. continue to monitor on tele now improved  - echo shows normaL AVR  and MVR normal LV function decreased RV function sev pulm HTN   -patient appears somnolent with diaphragmatic breathing,   ABG shows PCO2 > 70 patient only wore bipap for 2 hours and is nor refusing. CO2 this AM improved. f/u pulm recs  -Continue IV diuresis with Lasix 40mg IV BID. monitor strict I&Os. primafit placed by RN please obtain PVR bladder scan   - CT shows large left pleural effusion f/u thoracic surgery     2. Rash  - Pt with diffuse rash over body  - Ongoing for a year  - Management as per primary team    3. CAD s/p CABG and PCI  - CABG x2 in 2000 (LIMA to LAD, SVG-OM1)  - PCI in 2011 to LCX  - Last LHC in 2019 revealed patent stent and grafts  - Continue ASA, statin, isordil, and losartan  - continue to hold BB at this time due to bradycardia    3. S/p MVR & AVR  -s/p bioprosthetic valves  -echo with bioprosthetic MVR, AVR, grossly normal LV systolic function, decreased RV function and severe pHTN      4. Afib  -rate in 60s  -continue to hold BB  -c/w coumadin INR 3.3 hold coumadin

## 2022-04-04 ENCOUNTER — TRANSCRIPTION ENCOUNTER (OUTPATIENT)
Age: 82
End: 2022-04-04

## 2022-04-04 LAB
ANION GAP SERPL CALC-SCNC: 12 MMOL/L — SIGNIFICANT CHANGE UP (ref 7–14)
APTT BLD: 38.6 SEC — HIGH (ref 27–36.3)
BLD GP AB SCN SERPL QL: NEGATIVE — SIGNIFICANT CHANGE UP
BUN SERPL-MCNC: 34 MG/DL — HIGH (ref 7–23)
CALCIUM SERPL-MCNC: 8.3 MG/DL — LOW (ref 8.4–10.5)
CHLORIDE SERPL-SCNC: 97 MMOL/L — LOW (ref 98–107)
CO2 SERPL-SCNC: 26 MMOL/L — SIGNIFICANT CHANGE UP (ref 22–31)
CREAT SERPL-MCNC: 1.15 MG/DL — SIGNIFICANT CHANGE UP (ref 0.5–1.3)
EGFR: 48 ML/MIN/1.73M2 — LOW
GLUCOSE BLDC GLUCOMTR-MCNC: 101 MG/DL — HIGH (ref 70–99)
GLUCOSE BLDC GLUCOMTR-MCNC: 128 MG/DL — HIGH (ref 70–99)
GLUCOSE BLDC GLUCOMTR-MCNC: 132 MG/DL — HIGH (ref 70–99)
GLUCOSE BLDC GLUCOMTR-MCNC: 89 MG/DL — SIGNIFICANT CHANGE UP (ref 70–99)
GLUCOSE SERPL-MCNC: 89 MG/DL — SIGNIFICANT CHANGE UP (ref 70–99)
HCT VFR BLD CALC: 34.4 % — LOW (ref 34.5–45)
HGB BLD-MCNC: 10.7 G/DL — LOW (ref 11.5–15.5)
INR BLD: 2.66 RATIO — HIGH (ref 0.88–1.16)
MAGNESIUM SERPL-MCNC: 1.9 MG/DL — SIGNIFICANT CHANGE UP (ref 1.6–2.6)
MCHC RBC-ENTMCNC: 28.6 PG — SIGNIFICANT CHANGE UP (ref 27–34)
MCHC RBC-ENTMCNC: 31.1 GM/DL — LOW (ref 32–36)
MCV RBC AUTO: 92 FL — SIGNIFICANT CHANGE UP (ref 80–100)
NRBC # BLD: 0 /100 WBCS — SIGNIFICANT CHANGE UP
NRBC # FLD: 0 K/UL — SIGNIFICANT CHANGE UP
PHOSPHATE SERPL-MCNC: 2.9 MG/DL — SIGNIFICANT CHANGE UP (ref 2.5–4.5)
PLATELET # BLD AUTO: 163 K/UL — SIGNIFICANT CHANGE UP (ref 150–400)
POTASSIUM SERPL-MCNC: 4.8 MMOL/L — SIGNIFICANT CHANGE UP (ref 3.5–5.3)
POTASSIUM SERPL-SCNC: 4.8 MMOL/L — SIGNIFICANT CHANGE UP (ref 3.5–5.3)
PROTHROM AB SERPL-ACNC: 31.2 SEC — HIGH (ref 10.5–13.4)
RBC # BLD: 3.74 M/UL — LOW (ref 3.8–5.2)
RBC # FLD: 14.9 % — HIGH (ref 10.3–14.5)
RH IG SCN BLD-IMP: POSITIVE — SIGNIFICANT CHANGE UP
SARS-COV-2 RNA SPEC QL NAA+PROBE: SIGNIFICANT CHANGE UP
SODIUM SERPL-SCNC: 135 MMOL/L — SIGNIFICANT CHANGE UP (ref 135–145)
WBC # BLD: 8.24 K/UL — SIGNIFICANT CHANGE UP (ref 3.8–10.5)
WBC # FLD AUTO: 8.24 K/UL — SIGNIFICANT CHANGE UP (ref 3.8–10.5)

## 2022-04-04 PROCEDURE — 99231 SBSQ HOSP IP/OBS SF/LOW 25: CPT

## 2022-04-04 PROCEDURE — 99232 SBSQ HOSP IP/OBS MODERATE 35: CPT

## 2022-04-04 PROCEDURE — 71045 X-RAY EXAM CHEST 1 VIEW: CPT | Mod: 26

## 2022-04-04 RX ORDER — ISOSORBIDE DINITRATE 5 MG/1
10 TABLET ORAL
Refills: 0 | Status: DISCONTINUED | OUTPATIENT
Start: 2022-04-04 | End: 2022-04-05

## 2022-04-04 RX ORDER — LOSARTAN POTASSIUM 100 MG/1
25 TABLET, FILM COATED ORAL DAILY
Refills: 0 | Status: DISCONTINUED | OUTPATIENT
Start: 2022-04-04 | End: 2022-04-05

## 2022-04-04 RX ADMIN — GABAPENTIN 100 MILLIGRAM(S): 400 CAPSULE ORAL at 13:10

## 2022-04-04 RX ADMIN — ISOSORBIDE DINITRATE 10 MILLIGRAM(S): 5 TABLET ORAL at 05:49

## 2022-04-04 RX ADMIN — ATORVASTATIN CALCIUM 20 MILLIGRAM(S): 80 TABLET, FILM COATED ORAL at 22:23

## 2022-04-04 RX ADMIN — Medication 40 MILLIGRAM(S): at 17:04

## 2022-04-04 RX ADMIN — LOSARTAN POTASSIUM 25 MILLIGRAM(S): 100 TABLET, FILM COATED ORAL at 05:49

## 2022-04-04 RX ADMIN — GABAPENTIN 100 MILLIGRAM(S): 400 CAPSULE ORAL at 05:50

## 2022-04-04 RX ADMIN — ISOSORBIDE DINITRATE 10 MILLIGRAM(S): 5 TABLET ORAL at 15:30

## 2022-04-04 RX ADMIN — Medication 1 APPLICATION(S): at 05:50

## 2022-04-04 RX ADMIN — Medication 81 MILLIGRAM(S): at 09:14

## 2022-04-04 RX ADMIN — LOSARTAN POTASSIUM 25 MILLIGRAM(S): 100 TABLET, FILM COATED ORAL at 22:25

## 2022-04-04 RX ADMIN — PIPERACILLIN AND TAZOBACTAM 25 GRAM(S): 4; .5 INJECTION, POWDER, LYOPHILIZED, FOR SOLUTION INTRAVENOUS at 22:22

## 2022-04-04 RX ADMIN — PIPERACILLIN AND TAZOBACTAM 25 GRAM(S): 4; .5 INJECTION, POWDER, LYOPHILIZED, FOR SOLUTION INTRAVENOUS at 13:12

## 2022-04-04 RX ADMIN — Medication 325 MILLIGRAM(S): at 09:14

## 2022-04-04 RX ADMIN — GABAPENTIN 100 MILLIGRAM(S): 400 CAPSULE ORAL at 22:23

## 2022-04-04 RX ADMIN — Medication 40 MILLIGRAM(S): at 05:47

## 2022-04-04 RX ADMIN — Medication 1 MILLIGRAM(S): at 09:14

## 2022-04-04 RX ADMIN — PIPERACILLIN AND TAZOBACTAM 25 GRAM(S): 4; .5 INJECTION, POWDER, LYOPHILIZED, FOR SOLUTION INTRAVENOUS at 05:45

## 2022-04-04 RX ADMIN — Medication 1 APPLICATION(S): at 17:04

## 2022-04-04 NOTE — PROGRESS NOTE ADULT - SUBJECTIVE AND OBJECTIVE BOX
Date of Service: 04-04-22 @ 12:17    Patient is a 81y old  Female who presents with a chief complaint of Acute on chronic diastolic heart failure exacerbation (04 Apr 2022 11:10)      Any change in ROS: She is alert and awake:  seen by thoracic surgery     MEDICATIONS  (STANDING):  AQUAPHOR (petrolatum Ointment) 1 Application(s) Topical every 12 hours  aspirin enteric coated 81 milliGRAM(s) Oral daily  atorvastatin 20 milliGRAM(s) Oral at bedtime  dextrose 5%. 1000 milliLiter(s) (100 mL/Hr) IV Continuous <Continuous>  dextrose 5%. 1000 milliLiter(s) (50 mL/Hr) IV Continuous <Continuous>  dextrose 50% Injectable 25 Gram(s) IV Push once  dextrose 50% Injectable 12.5 Gram(s) IV Push once  dextrose 50% Injectable 25 Gram(s) IV Push once  ferrous    sulfate 325 milliGRAM(s) Oral daily  folic acid 1 milliGRAM(s) Oral daily  furosemide   Injectable 40 milliGRAM(s) IV Push two times a day  gabapentin 100 milliGRAM(s) Oral three times a day  glucagon  Injectable 1 milliGRAM(s) IntraMuscular once  insulin lispro (ADMELOG) corrective regimen sliding scale   SubCutaneous three times a day before meals  insulin lispro (ADMELOG) corrective regimen sliding scale   SubCutaneous at bedtime  isosorbide   dinitrate Tablet (ISORDIL) 10 milliGRAM(s) Oral two times a day  losartan 25 milliGRAM(s) Oral daily  piperacillin/tazobactam IVPB.. 3.375 Gram(s) IV Intermittent every 8 hours    MEDICATIONS  (PRN):  acetaminophen     Tablet .. 650 milliGRAM(s) Oral every 6 hours PRN Temp greater or equal to 38C (100.4F), Mild Pain (1 - 3)  aluminum hydroxide/magnesium hydroxide/simethicone Suspension 30 milliLiter(s) Oral every 4 hours PRN Dyspepsia  dextrose Oral Gel 15 Gram(s) Oral once PRN Blood Glucose LESS THAN 70 milliGRAM(s)/deciliter  melatonin 3 milliGRAM(s) Oral at bedtime PRN Insomnia  triamcinolone 0.1% Ointment 1 Application(s) Topical every 12 hours PRN itching    Vital Signs Last 24 Hrs  T(C): 37.1 (04 Apr 2022 09:00), Max: 37.1 (04 Apr 2022 09:00)  T(F): 98.8 (04 Apr 2022 09:00), Max: 98.8 (04 Apr 2022 09:00)  HR: 70 (04 Apr 2022 09:00) (62 - 74)  BP: 142/74 (04 Apr 2022 09:00) (126/62 - 148/60)  BP(mean): --  RR: 18 (04 Apr 2022 09:00) (18 - 18)  SpO2: 100% (04 Apr 2022 09:00) (100% - 100%)    I&O's Summary    03 Apr 2022 07:01  -  04 Apr 2022 07:00  --------------------------------------------------------  IN: 75 mL / OUT: 1300 mL / NET: -1225 mL    04 Apr 2022 07:01  -  04 Apr 2022 12:17  --------------------------------------------------------  IN: 240 mL / OUT: 300 mL / NET: -60 mL          Physical Exam:   GENERAL: NAD, well-groomed, well-developed  HEENT: BRADY/   Atraumatic, Normocephalic  ENMT: No tonsillar erythema, exudates, or enlargement; Moist mucous membranes, Good dentition, No lesions  NECK: Supple, No JVD, Normal thyroid  CHEST/LUNG: decreased air entry left side!  CVS: Regular rate and rhythm; No murmurs, rubs, or gallops  GI: : Soft, Nontender, Nondistended; Bowel sounds present  NERVOUS SYSTEM:  Alert & Oriented X3  EXTREMITIES: -edema  LYMPH: No lymphadenopathy noted  SKIN: No rashes or lesions  ENDOCRINOLOGY: No Thyromegaly  PSYCH: Appropriate    Labs:  29, 37                            10.7   8.24  )-----------( 163      ( 04 Apr 2022 07:22 )             34.4                         11.6   8.77  )-----------( 161      ( 03 Apr 2022 07:09 )             39.0                         10.9   8.58  )-----------( 143      ( 02 Apr 2022 06:43 )             36.1                         12.0   8.07  )-----------( 147      ( 01 Apr 2022 07:35 )             37.9     04-04    135  |  97<L>  |  34<H>  ----------------------------<  89  4.8   |  26  |  1.15  04-03    137  |  96<L>  |  33<H>  ----------------------------<  79  4.5   |  28  |  1.16  04-02    137  |  98  |  36<H>  ----------------------------<  90  4.3   |  26  |  1.17  04-01    134<L>  |  97<L>  |  29<H>  ----------------------------<  99  4.4   |  27  |  1.12    Ca    8.3<L>      04 Apr 2022 07:22  Ca    8.3<L>      03 Apr 2022 07:09  Phos  2.9     04-04  Phos  3.4     04-03  Mg     1.90     04-04  Mg     2.00     04-03      CAPILLARY BLOOD GLUCOSE      POCT Blood Glucose.: 101 mg/dL (04 Apr 2022 08:32)  POCT Blood Glucose.: 161 mg/dL (03 Apr 2022 21:53)  POCT Blood Glucose.: 126 mg/dL (03 Apr 2022 17:06)        PT/INR - ( 04 Apr 2022 07:22 )   PT: 31.2 sec;   INR: 2.66 ratio         PTT - ( 04 Apr 2022 07:22 )  PTT:38.6 sec          RECENT CULTURES:  03-31 @ 21:01 .Blood Blood-Peripheral     < from: CT Chest No Cont (04.02.22 @ 13:48) >  MEDIASTINUM AND MAITE: No lymphadenopathy.  VESSELS: Atherosclerotic changes of the aorta and coronary arteries.  HEART: Cardiomegaly. No pericardial effusion. Mitral annular   calcifications. CABG.  CHEST WALL AND LOWER NECK: Median sternotomy.  VISUALIZED UPPER ABDOMEN: Within normal limits.  BONES: Within normal limits.    IMPRESSION:  Since 3/27/22    New complete atelectasis of the left lung from a mucous plug/secretions   in the left mainstem bronchus.    Large left pleural effusion, increasedsince 3/27/2022.    --- End of Report ---          DAYLIN LORA MD; Resident Radiologist  This document has been electronically signed.  TATYANA FLORES MD; Attending Radiologist  This document has been electronically signed. Apr 2 2022  2:43PM    < end of copied text >             No growth to date.          RESPIRATORY CULTURES:          Studies  Chest X-RAY  CT SCAN Chest   Venous Dopplers: LE:   CT Abdomen  Others

## 2022-04-04 NOTE — PROGRESS NOTE ADULT - ASSESSMENT
82yo Female with mhx of HFpEF ef 65% CAD (CABG 2000, stent 2011), severe pulm HTN, Bioprosthetic aortic and mitral valve replacement 9/2014, ?CKD per outside records, afib on warfarin, chronic Lt loculated pleural effusion,  Type 2 DM, HTN, HLD, gout a/w acute on chronic HFpEF i/s/p severe pHTN c/b hypercapnia; Found to have persisting moderate size loculated lt pleural effusion with atelectasis;         Problem/Plan - 1:  ·  Problem: Acute on chronic diastolic heart failure .   ·  Plan: Acute on chronic HFpEF likely in setting of decreased diuretic dose and severe pulm HTN as evidenced by prior TTE dated 10/10/2019;  Volume overloaded on exam with JVD and peripheral nonpitting edema to thighs  -Started Lasix 40 IV BID  -TTE  -Cardiology help appreciated.      Problem/Plan - 2:  ·  Problem: Fever .   ·  Plan: Sepsis work up in progress.  IV Abxs started .  D helping.      Problem/Plan - 3:  ·  Problem: Loculated pleural effusion with Hypercapnia with Collapse left side .   ·  Plan: Persisting loculated effusion so Pulmonary helping.   TS planning Bronchoscopy and Thoracentesis tomorrow.   < from: CT Chest No Cont (03.27.22 @ 20:25) >  IMPRESSION:    Small left pleural effusion with a loculated component in the left major   fissure and along left anterior chest wall. The loculated components are   new when compared to prior CT exam dated 10/13/2019. Associated partial   left lower lobe and left upper lobe collapse.    New pleural thickening and/or loculated fluid medially along the left   upper hemithorax.    Small right pleural effusion and adjacent passive atelectasis.    Stable 9 mm right apical nodule.    < end of copied text >     Problem/Plan - 4:  ·  Problem: Chronic atrial fibrillation.   ·  Plan: [id number] risk stratification = 7  Continue home coreg 12.5mg BID with hold parameters  Holding  Coumadin for Procedure.      Problem/Plan - 5:  ·  Problem: Pulmonary HTN.   ·  Plan: Severe pulm HTN likely class II  Repeat TTE this admission.     Problem/Plan - 6:  ·  Problem: LAYLA Thrombus .  ·  Plan: On AC.      Problem/Plan - 7:  ·  Problem: Papular rash, generalized with Eosinophilia .  ·  Plan: Generalized papular rash of 1y duration previously attributed to hydralazine which was discontinued without improvement of rash  Uses hydrocortisone 2.5% cream intermittently with mild improvement of pruritis  With chronic eosinophilia noted per chart  -Full medication review may be beneficial of meds possibly causing eosinophilia  -Dermatology consult noted.      Problem/Plan - 8:  ·  Problem: Hypertension.   ·  Plan: Restart home losartan, pressures can tolerate SBP 180s in ED  Cont Carvedilol.     Problem/Plan - 9:  ·  Problem: Hyperlipidemia.   ·  Plan: Continue home statin.     Problem/Plan - 10:  ·  Problem: T2DM (type 2 diabetes mellitus).   ·  Plan; HOLD home glipizide while inpatient   Continue home gabapentin for diabetic neuropathy  KRYSTYNA.     Problem/Plan - 11:  ·  Problem: Cerebrovascular accident (CVA).   ·  Plan: Without residual deficits at this time per daughter and patient although unclear clinical course occurring years ago. Continue to monitor and continue ASA and statin.     Problem/Plan - 12:  ·  Problem: CAD (coronary artery disease).   ·  Plan: Continue GDMT    Full code.   Called daughter Humberto and left message.

## 2022-04-04 NOTE — PROGRESS NOTE ADULT - SUBJECTIVE AND OBJECTIVE BOX
Collin Trejo MD  Interventional Cardiology / Endovascular Specialist  [place] Office : [address] [place] [address] [place] [address]  Tel:   [place] Office : [address] [facility] [place] [address]  Tel: [phone]      Subjective/Overnight events: Patient lying in bed comfortably. No acute distress.   	  MEDICATIONS:  aspirin enteric coated 81 milliGRAM(s) Oral daily  furosemide   Injectable 40 milliGRAM(s) IV Push two times a day  isosorbide   dinitrate Tablet (ISORDIL) 10 milliGRAM(s) Oral two times a day  losartan 25 milliGRAM(s) Oral daily    piperacillin/tazobactam IVPB.. 3.375 Gram(s) IV Intermittent every 8 hours      acetaminophen     Tablet .. 650 milliGRAM(s) Oral every 6 hours PRN  gabapentin 100 milliGRAM(s) Oral three times a day  melatonin 3 milliGRAM(s) Oral at bedtime PRN    aluminum hydroxide/magnesium hydroxide/simethicone Suspension 30 milliLiter(s) Oral every 4 hours PRN    atorvastatin 20 milliGRAM(s) Oral at bedtime  dextrose 50% Injectable 25 Gram(s) IV Push once  dextrose 50% Injectable 12.5 Gram(s) IV Push once  dextrose 50% Injectable 25 Gram(s) IV Push once  dextrose Oral Gel 15 Gram(s) Oral once PRN  glucagon  Injectable 1 milliGRAM(s) IntraMuscular once  insulin lispro (ADMELOG) corrective regimen sliding scale   SubCutaneous three times a day before meals  insulin lispro (ADMELOG) corrective regimen sliding scale   SubCutaneous at bedtime    AQUAPHOR (petrolatum Ointment) 1 Application(s) Topical every 12 hours  dextrose 5%. 1000 milliLiter(s) IV Continuous <Continuous>  dextrose 5%. 1000 milliLiter(s) IV Continuous <Continuous>  ferrous    sulfate 325 milliGRAM(s) Oral daily  folic acid 1 milliGRAM(s) Oral daily  triamcinolone 0.1% Ointment 1 Application(s) Topical every 12 hours PRN      PAST MEDICAL/SURGICAL HISTORY  PAST MEDICAL & SURGICAL HISTORY:  HTN (Hypertension)    Afib  (on Warfarin)    CAD (Coronary Artery Disease)  s/p stent and CABG    CVA (Cerebral Infarction)  2011    CHF (congestive heart failure)  EF 65% Oct 2019    Upper GI bleed    Gout    Diabetes mellitus  type 2, not on meds    History of heart valve replacement with bioprosthetic valve  mitral and aortic    Hyperlipidemia    S/P angioplasty with stent  2011    S/P CABG x 1  (2000)    H/O aortic valve replacement  9/22/14    H/O mitral valve replacement  9/22/14        SOCIAL HISTORY: Substance Use (street drugs): ( x ) never used  (  ) other:    FAMILY HISTORY:  Family history of acute myocardial infarction  mother          PHYSICAL EXAM:  T(C): 37.1 (04-04-22 @ 09:00), Max: 37.1 (04-04-22 @ 09:00)  HR: 70 (04-04-22 @ 09:00) (60 - 74)  BP: 142/74 (04-04-22 @ 09:00) (111/40 - 148/60)  RR: 18 (04-04-22 @ 09:00) (18 - 18)  SpO2: 100% (04-04-22 @ 09:00) (100% - 100%)  Wt(kg): --  I&O's Summary    03 Apr 2022 07:01  -  04 Apr 2022 07:00  --------------------------------------------------------  IN: 75 mL / OUT: 1300 mL / NET: -1225 mL    04 Apr 2022 07:01  -  04 Apr 2022 11:11  --------------------------------------------------------  IN: 240 mL / OUT: 300 mL / NET: -60 mL            EYES:   PERRLA   ENMT:   Moist mucous membranes, Good dentition, No lesions  Cardiovascular: Normal S1 S2, No JVD, No murmurs, No edema  Respiratory: left sided decreased breath sounds  Gastrointestinal:  Soft, Non-tender, + BS	  Extremities: no edema                                    10.7   8.24  )-----------( 163      ( 04 Apr 2022 07:22 )             34.4     04-04    135  |  97<L>  |  34<H>  ----------------------------<  89  4.8   |  26  |  1.15    Ca    8.3<L>      04 Apr 2022 07:22  Phos  2.9     04-04  Mg     1.90     04-04      proBNP:   Lipid Profile:   HgA1c:   TSH:     Consultant(s) Notes Reviewed:  [x ] YES  [ ] NO    Care Discussed with Consultants/Other Providers [ x] YES  [ ] NO    Imaging Personally Reviewed independently:  [x] YES  [ ] NO    All labs, radiologic studies, vitals, orders and medications list reviewed. Patient is seen and examined at bedside. Case discussed with medical team.

## 2022-04-04 NOTE — PROGRESS NOTE ADULT - ASSESSMENT
81 f with DM, HTN, CAD s/p CABG, HFpEF  severe pulm HTN, Bioprosthetic aortic and mitral valve replacement 9/2014, ?CKD per outside records, afib on warfarin, chronic Lt loculated pleural effusion, gout, presented 3/27 with worsening dyspnea on exertion and lower extremity swelling as well as 1 year history of generalized rash and was admitted for CHF exacerbation  initially afebrile, WBC normal  Chest CT: Small left pleural effusion with a loculated component in the left major fissure and along left anterior chest wall,  partial LLL and LEXI collapse. New pleural thickening and/or loculated fluid medially along the left upper hemithorax. Small right pleural effusion and adjacent passive atelectasis. Stable 9 mm right apical nodule.  pt was seen by cardio and pulmonary, did not recommend any thoracentesis and was being diuresed   pt spiked to 101.6 on 3/31 and WBC normal    dyspnea due to CHF exacerbation, has h/o L loculated pleural effusion but now also with LLL and LEXI collapse, likely the cause of new fever, CT 4/2 showed new complete atelectasis of L lung from a mucous plug in L mainstem broncus  eosinophilia now 800, was present in previous admissions as well but now also has a rash for about a year, derm's impression was xerosis    * blood cx negative and now pt afebrile, pt was seen by pulmonary and CT surgery now plan for drainage of the pleural effusion  * c/w zosyn, started 3/31, will complete a 7 day course  * f/u quantiferon, strongyloides, toxocara and filaria AB    The above assessment and plan was discussed with the primary team    Kristy Erickson MD  contact on teams  After 5pm and on weekends call [phone]

## 2022-04-04 NOTE — PROGRESS NOTE ADULT - ASSESSMENT
80yo Female with mhx of HFpEF ef 65% CAD s/p CABG 2000 LIMA to LAD and SVG to OM1 and subsequent PCI to LCx,in 2011, severe pulm HTN, Bioprosthetic aortic and mitral valve replacement 9/2014, CKD per outside records, afib on warfarin, chronic Lt loculated pleural effusion, Type 2 DM, HTN, HLD, gout c/o 3-4 days of worsening dyspnea on exertion and lower extremity swelling    Tele: Afib 60s    1. Acute decompensated diastolic heart failure  - Likely 2/2 medical noncompliance  - BB held  in setting bradycardia. continue to monitor on tele now improved  - echo shows normaL AVR  and MVR normal LV function decreased RV function sev pulm HTN   -Continue IV diuresis with Lasix 40mg IV BID. monitor strict I&Os.   - CT shows large left pleural effusion f/u thoracic surgery plan for bronch    2. Rash  - Pt with diffuse rash over body  - Ongoing for a year  - Management as per primary team    3. CAD s/p CABG and PCI  - CABG x2 in 2000 (LIMA to LAD, SVG-OM1)  - PCI in 2011 to LCX  - Last LHC in 2019 revealed patent stent and grafts  - Continue ASA, statin, isordil, and losartan  - continue to hold BB at this time due to bradycardia    3. S/p MVR & AVR  -s/p bioprosthetic valves  -echo with bioprosthetic MVR, AVR, grossly normal LV systolic function, decreased RV function and severe pHTN    4. Afib  -rate in 60s  -continue to hold BB  -INR 2.66 coumadin on hold for bronch

## 2022-04-04 NOTE — PROGRESS NOTE ADULT - SUBJECTIVE AND OBJECTIVE BOX
Pt seen this morning with Dr. Yady Conteh discussed results of CT scan and   OR plans with pt in [facility] using  phone  Pt states she feels better since admission  No events overnight.   Coumadin on hold    Vital Signs Last 24 Hrs  T(C): 36.3 (04 Apr 2022 12:22), Max: 37.1 (04 Apr 2022 09:00)  T(F): 97.3 (04 Apr 2022 12:22), Max: 98.8 (04 Apr 2022 09:00)  HR: 55 (04 Apr 2022 12:22) (55 - 74)  BP: 135/87 (04 Apr 2022 12:22) (126/62 - 148/60)  BP(mean): --  RR: 18 (04 Apr 2022 12:22) (18 - 18)  SpO2: 100% (04 Apr 2022 12:22) (100% - 100%)    CC: [id number] EXAM:  CT CHEST                          PROCEDURE DATE:  04/02/2022          INTERPRETATION:  CLINICAL INFORMATION: Evaluate left sided effusion.    COMPARISON: Chest CT 3/27/2021, 2/8/2019.    CONTRAST/COMPLICATIONS:  IV Contrast: NONE  Oral Contrast: NONE  Complications: None reported at time of study completion    PROCEDURE:  CT of the Chest was performed.  Sagittal and coronal reformats were performed.    FINDINGS:    LUNGS, AIRWAYS, AND PLEURA: New secretions within the left mainstem   bronchus with new complete atelectasis of the left upper and lower lobes.    A 9 mm right upper lobe nodule is unchanged since 2/8/2019. Large left   pleural effusion, increased since 3/27/2022. Unchanged small right   pleural effusion.  MEDIASTINUM AND MAITE: No lymphadenopathy.  VESSELS: Atherosclerotic changes of the aorta and coronary arteries.  HEART: Cardiomegaly. No pericardial effusion. Mitral annular   calcifications. CABG.  CHEST WALL AND LOWER NECK: Median sternotomy.  VISUALIZED UPPER ABDOMEN: Within normal limits.  BONES: Within normal limits.    IMPRESSION:  Since 3/27/22    New complete atelectasis of the left lung from a mucous plug/secretions   in the left mainstem bronchus.    Large left pleural effusion, increased since 3/27/2022.    --- End of Report ---

## 2022-04-04 NOTE — PROGRESS NOTE ADULT - SUBJECTIVE AND OBJECTIVE BOX
Follow Up:  fever, lung collapse    Interval History: pt afebrile, CT s/o lung collapse due to mucous plug now plan for pleural tap    ROS:      All other systems negative    Constitutional: no fever, no chills  Cardiovascular:  no chest pain, no palpitation  Respiratory:  SOB, no cough  GI:  no abd pain, no vomiting, no diarrhea  urinary: no dysuria, no hematuria, no flank pain  musculoskeletal:  no joint pain, no joint swelling  skin:  no rash  neurology:  no headache, no seizure      Allergies  No Known Allergies        ANTIMICROBIALS:  piperacillin/tazobactam IVPB.. 3.375 every 8 hours      OTHER MEDS:  acetaminophen     Tablet .. 650 milliGRAM(s) Oral every 6 hours PRN  aluminum hydroxide/magnesium hydroxide/simethicone Suspension 30 milliLiter(s) Oral every 4 hours PRN  AQUAPHOR (petrolatum Ointment) 1 Application(s) Topical every 12 hours  aspirin enteric coated 81 milliGRAM(s) Oral daily  atorvastatin 20 milliGRAM(s) Oral at bedtime  dextrose 5%. 1000 milliLiter(s) IV Continuous <Continuous>  dextrose 5%. 1000 milliLiter(s) IV Continuous <Continuous>  dextrose 50% Injectable 25 Gram(s) IV Push once  dextrose 50% Injectable 12.5 Gram(s) IV Push once  dextrose 50% Injectable 25 Gram(s) IV Push once  dextrose Oral Gel 15 Gram(s) Oral once PRN  ferrous    sulfate 325 milliGRAM(s) Oral daily  folic acid 1 milliGRAM(s) Oral daily  furosemide   Injectable 40 milliGRAM(s) IV Push two times a day  gabapentin 100 milliGRAM(s) Oral three times a day  glucagon  Injectable 1 milliGRAM(s) IntraMuscular once  insulin lispro (ADMELOG) corrective regimen sliding scale   SubCutaneous three times a day before meals  insulin lispro (ADMELOG) corrective regimen sliding scale   SubCutaneous at bedtime  isosorbide   dinitrate Tablet (ISORDIL) 10 milliGRAM(s) Oral two times a day  losartan 25 milliGRAM(s) Oral daily  melatonin 3 milliGRAM(s) Oral at bedtime PRN  triamcinolone 0.1% Ointment 1 Application(s) Topical every 12 hours PRN      Vital Signs Last 24 Hrs  T(C): 36.3 (04 Apr 2022 12:22), Max: 37.1 (04 Apr 2022 09:00)  T(F): 97.3 (04 Apr 2022 12:22), Max: 98.8 (04 Apr 2022 09:00)  HR: 55 (04 Apr 2022 12:22) (55 - 74)  BP: 135/87 (04 Apr 2022 12:22) (126/62 - 148/60)  BP(mean): --  RR: 18 (04 Apr 2022 12:22) (18 - 18)  SpO2: 100% (04 Apr 2022 12:22) (100% - 100%)    Physical Exam:  General:   non toxic  Cardio:    irregular S1,S2  Respiratory:  poor air entry on R side  abd:   soft, BS +, not tender  :     no CVAT, no suprapubic tenderness, no vega  Musculoskeletal : no joint swelling, no edema  Skin:   diffuse macular rash with scabs and areas of hyperpigmentations  vascular: no phlebitis                          10.7   8.24  )-----------( 163      ( 04 Apr 2022 07:22 )             34.4       04-04    135  |  97<L>  |  34<H>  ----------------------------<  89  4.8   |  26  |  1.15    Ca    8.3<L>      04 Apr 2022 07:22  Phos  2.9     04-04  Mg     1.90     04-04            MICROBIOLOGY:  v  .Blood Blood-Peripheral  03-31-22   No growth to date.  --  --          Rapid RVP Result: NotDetec (04-01 @ 13:08)        RADIOLOGY:  Images independently visualized and reviewed personally, findings as below  < from: CT Chest No Cont (04.02.22 @ 13:48) >    IMPRESSION:  Since 3/27/22    New complete atelectasis of the left lung from a mucous plug/secretions   in the left mainstem bronchus.    Large left pleural effusion, increasedsince 3/27/2022.    < end of copied text >

## 2022-04-04 NOTE — PROGRESS NOTE ADULT - SUBJECTIVE AND OBJECTIVE BOX
Date of Service  : 04-04-22     INTERVAL HPI/OVERNIGHT EVENTS: NO new concerns.   Vital Signs Last 24 Hrs  T(C): 36.3 (04 Apr 2022 12:22), Max: 37.1 (04 Apr 2022 09:00)  T(F): 97.3 (04 Apr 2022 12:22), Max: 98.8 (04 Apr 2022 09:00)  HR: 55 (04 Apr 2022 12:22) (55 - 74)  BP: 135/87 (04 Apr 2022 12:22) (126/62 - 148/60)  BP(mean): --  RR: 18 (04 Apr 2022 12:22) (18 - 18)  SpO2: 100% (04 Apr 2022 12:22) (100% - 100%)  I&O's Summary    03 Apr 2022 07:01  -  04 Apr 2022 07:00  --------------------------------------------------------  IN: 75 mL / OUT: 1300 mL / NET: -1225 mL    04 Apr 2022 07:01  -  04 Apr 2022 15:30  --------------------------------------------------------  IN: 240 mL / OUT: 300 mL / NET: -60 mL      MEDICATIONS  (STANDING):  AQUAPHOR (petrolatum Ointment) 1 Application(s) Topical every 12 hours  aspirin enteric coated 81 milliGRAM(s) Oral daily  atorvastatin 20 milliGRAM(s) Oral at bedtime  dextrose 5%. 1000 milliLiter(s) (50 mL/Hr) IV Continuous <Continuous>  dextrose 5%. 1000 milliLiter(s) (100 mL/Hr) IV Continuous <Continuous>  dextrose 50% Injectable 25 Gram(s) IV Push once  dextrose 50% Injectable 12.5 Gram(s) IV Push once  dextrose 50% Injectable 25 Gram(s) IV Push once  ferrous    sulfate 325 milliGRAM(s) Oral daily  folic acid 1 milliGRAM(s) Oral daily  furosemide   Injectable 40 milliGRAM(s) IV Push two times a day  gabapentin 100 milliGRAM(s) Oral three times a day  glucagon  Injectable 1 milliGRAM(s) IntraMuscular once  insulin lispro (ADMELOG) corrective regimen sliding scale   SubCutaneous three times a day before meals  insulin lispro (ADMELOG) corrective regimen sliding scale   SubCutaneous at bedtime  isosorbide   dinitrate Tablet (ISORDIL) 10 milliGRAM(s) Oral two times a day  losartan 25 milliGRAM(s) Oral daily  piperacillin/tazobactam IVPB.. 3.375 Gram(s) IV Intermittent every 8 hours    MEDICATIONS  (PRN):  acetaminophen     Tablet .. 650 milliGRAM(s) Oral every 6 hours PRN Temp greater or equal to 38C (100.4F), Mild Pain (1 - 3)  aluminum hydroxide/magnesium hydroxide/simethicone Suspension 30 milliLiter(s) Oral every 4 hours PRN Dyspepsia  dextrose Oral Gel 15 Gram(s) Oral once PRN Blood Glucose LESS THAN 70 milliGRAM(s)/deciliter  melatonin 3 milliGRAM(s) Oral at bedtime PRN Insomnia  triamcinolone 0.1% Ointment 1 Application(s) Topical every 12 hours PRN itching    LABS:                        10.7   8.24  )-----------( 163      ( 04 Apr 2022 07:22 )             34.4     04-04    135  |  97<L>  |  34<H>  ----------------------------<  89  4.8   |  26  |  1.15    Ca    8.3<L>      04 Apr 2022 07:22  Phos  2.9     04-04  Mg     1.90     04-04      PT/INR - ( 04 Apr 2022 07:22 )   PT: 31.2 sec;   INR: 2.66 ratio         PTT - ( 04 Apr 2022 07:22 )  PTT:38.6 sec    CAPILLARY BLOOD GLUCOSE      POCT Blood Glucose.: 89 mg/dL (04 Apr 2022 12:19)  POCT Blood Glucose.: 101 mg/dL (04 Apr 2022 08:32)  POCT Blood Glucose.: 161 mg/dL (03 Apr 2022 21:53)  POCT Blood Glucose.: 126 mg/dL (03 Apr 2022 17:06)              Consultant(s) Notes Reviewed:  [x ] YES  [ ] NO    PHYSICAL EXAM:  GENERAL: NAD, well-groomed, well-developed ,not in any distress ,  HEAD:  Atraumatic, Normocephalic  NECK: Supple, No JVD, Normal thyroid  NERVOUS SYSTEM:  Alert & Oriented X3, No focal deficit   CHEST/LUNG: Good air entry bilateral except bases   HEART: Regular rate and rhythm; No murmurs, rubs, or gallops  ABDOMEN: Soft, Nontender, Nondistended; Bowel sounds present  EXTREMITIES:  2+ Peripheral Pulses, No clubbing, cyanosis, or edema      Care Discussed with Consultants/Other Providers [ x] YES  [ ] NO

## 2022-04-04 NOTE — PROGRESS NOTE ADULT - ASSESSMENT
80yo F with large left pleural effusion, atelectasis of left lung with white out on CXR    -Coumadin on hold. INR today 2.6  -Pt scheduled for Bronch, left VATS, bx and drainage of effusion tomorrow  with DR. Conteh  -Repeat INR STAT at 4am tomorrow, if above 1.8, will need FFP to reverse for OR.   -NPO after 12mn tonight  -Pt needs another Type and Screen and COVID swab today  Above d/w Dr. Pitts by Dr. Conteh  Will follow

## 2022-04-05 ENCOUNTER — RESULT REVIEW (OUTPATIENT)
Age: 82
End: 2022-04-05

## 2022-04-05 ENCOUNTER — APPOINTMENT (OUTPATIENT)
Dept: THORACIC SURGERY | Facility: HOSPITAL | Age: 82
End: 2022-04-05

## 2022-04-05 LAB
ANION GAP SERPL CALC-SCNC: 10 MMOL/L — SIGNIFICANT CHANGE UP (ref 7–14)
ANION GAP SERPL CALC-SCNC: 15 MMOL/L — HIGH (ref 7–14)
APTT BLD: 35.8 SEC — SIGNIFICANT CHANGE UP (ref 27–36.3)
APTT BLD: 37.1 SEC — HIGH (ref 27–36.3)
APTT BLD: 40.1 SEC — HIGH (ref 27–36.3)
BLD GP AB SCN SERPL QL: NEGATIVE — SIGNIFICANT CHANGE UP
BLOOD GAS ARTERIAL - LYTES,HGB,ICA,LACT RESULT: SIGNIFICANT CHANGE UP
BLOOD GAS ARTERIAL COMPREHENSIVE RESULT: SIGNIFICANT CHANGE UP
BUN SERPL-MCNC: 31 MG/DL — HIGH (ref 7–23)
BUN SERPL-MCNC: 32 MG/DL — HIGH (ref 7–23)
CALCIUM SERPL-MCNC: 8.7 MG/DL — SIGNIFICANT CHANGE UP (ref 8.4–10.5)
CALCIUM SERPL-MCNC: 9 MG/DL — SIGNIFICANT CHANGE UP (ref 8.4–10.5)
CHLORIDE SERPL-SCNC: 95 MMOL/L — LOW (ref 98–107)
CHLORIDE SERPL-SCNC: 98 MMOL/L — SIGNIFICANT CHANGE UP (ref 98–107)
CO2 SERPL-SCNC: 28 MMOL/L — SIGNIFICANT CHANGE UP (ref 22–31)
CO2 SERPL-SCNC: 29 MMOL/L — SIGNIFICANT CHANGE UP (ref 22–31)
CREAT SERPL-MCNC: 1.02 MG/DL — SIGNIFICANT CHANGE UP (ref 0.5–1.3)
CREAT SERPL-MCNC: 1.07 MG/DL — SIGNIFICANT CHANGE UP (ref 0.5–1.3)
CULTURE RESULTS: SIGNIFICANT CHANGE UP
CULTURE RESULTS: SIGNIFICANT CHANGE UP
EGFR: 52 ML/MIN/1.73M2 — LOW
EGFR: 55 ML/MIN/1.73M2 — LOW
GLUCOSE BLDC GLUCOMTR-MCNC: 154 MG/DL — HIGH (ref 70–99)
GLUCOSE BLDC GLUCOMTR-MCNC: 93 MG/DL — SIGNIFICANT CHANGE UP (ref 70–99)
GLUCOSE SERPL-MCNC: 104 MG/DL — HIGH (ref 70–99)
GLUCOSE SERPL-MCNC: 165 MG/DL — HIGH (ref 70–99)
GRAM STN FLD: SIGNIFICANT CHANGE UP
HCT VFR BLD CALC: 34.2 % — LOW (ref 34.5–45)
HCT VFR BLD CALC: 35.2 % — SIGNIFICANT CHANGE UP (ref 34.5–45)
HCT VFR BLD CALC: 37.1 % — SIGNIFICANT CHANGE UP (ref 34.5–45)
HGB BLD-MCNC: 10.7 G/DL — LOW (ref 11.5–15.5)
HGB BLD-MCNC: 10.8 G/DL — LOW (ref 11.5–15.5)
HGB BLD-MCNC: 11.6 G/DL — SIGNIFICANT CHANGE UP (ref 11.5–15.5)
INR BLD: 1.43 RATIO — HIGH (ref 0.88–1.16)
INR BLD: 1.67 RATIO — HIGH (ref 0.88–1.16)
INR BLD: 2.08 RATIO — HIGH (ref 0.88–1.16)
MAGNESIUM SERPL-MCNC: 1.9 MG/DL — SIGNIFICANT CHANGE UP (ref 1.6–2.6)
MAGNESIUM SERPL-MCNC: 1.9 MG/DL — SIGNIFICANT CHANGE UP (ref 1.6–2.6)
MCHC RBC-ENTMCNC: 27.8 PG — SIGNIFICANT CHANGE UP (ref 27–34)
MCHC RBC-ENTMCNC: 27.9 PG — SIGNIFICANT CHANGE UP (ref 27–34)
MCHC RBC-ENTMCNC: 28 PG — SIGNIFICANT CHANGE UP (ref 27–34)
MCHC RBC-ENTMCNC: 30.7 GM/DL — LOW (ref 32–36)
MCHC RBC-ENTMCNC: 31.3 GM/DL — LOW (ref 32–36)
MCHC RBC-ENTMCNC: 31.3 GM/DL — LOW (ref 32–36)
MCV RBC AUTO: 89.2 FL — SIGNIFICANT CHANGE UP (ref 80–100)
MCV RBC AUTO: 89.5 FL — SIGNIFICANT CHANGE UP (ref 80–100)
MCV RBC AUTO: 90.5 FL — SIGNIFICANT CHANGE UP (ref 80–100)
NRBC # BLD: 0 /100 WBCS — SIGNIFICANT CHANGE UP
NRBC # FLD: 0 K/UL — SIGNIFICANT CHANGE UP
PHOSPHATE SERPL-MCNC: 3 MG/DL — SIGNIFICANT CHANGE UP (ref 2.5–4.5)
PHOSPHATE SERPL-MCNC: 3.6 MG/DL — SIGNIFICANT CHANGE UP (ref 2.5–4.5)
PLATELET # BLD AUTO: 170 K/UL — SIGNIFICANT CHANGE UP (ref 150–400)
PLATELET # BLD AUTO: 174 K/UL — SIGNIFICANT CHANGE UP (ref 150–400)
PLATELET # BLD AUTO: 180 K/UL — SIGNIFICANT CHANGE UP (ref 150–400)
POTASSIUM SERPL-MCNC: 3.6 MMOL/L — SIGNIFICANT CHANGE UP (ref 3.5–5.3)
POTASSIUM SERPL-MCNC: 4 MMOL/L — SIGNIFICANT CHANGE UP (ref 3.5–5.3)
POTASSIUM SERPL-SCNC: 3.6 MMOL/L — SIGNIFICANT CHANGE UP (ref 3.5–5.3)
POTASSIUM SERPL-SCNC: 4 MMOL/L — SIGNIFICANT CHANGE UP (ref 3.5–5.3)
PROTHROM AB SERPL-ACNC: 16.7 SEC — HIGH (ref 10.5–13.4)
PROTHROM AB SERPL-ACNC: 19.5 SEC — HIGH (ref 10.5–13.4)
PROTHROM AB SERPL-ACNC: 24.3 SEC — HIGH (ref 10.5–13.4)
RBC # BLD: 3.82 M/UL — SIGNIFICANT CHANGE UP (ref 3.8–5.2)
RBC # BLD: 3.89 M/UL — SIGNIFICANT CHANGE UP (ref 3.8–5.2)
RBC # BLD: 4.16 M/UL — SIGNIFICANT CHANGE UP (ref 3.8–5.2)
RBC # FLD: 14.6 % — HIGH (ref 10.3–14.5)
RBC # FLD: 14.8 % — HIGH (ref 10.3–14.5)
RBC # FLD: 14.9 % — HIGH (ref 10.3–14.5)
RH IG SCN BLD-IMP: POSITIVE — SIGNIFICANT CHANGE UP
SODIUM SERPL-SCNC: 137 MMOL/L — SIGNIFICANT CHANGE UP (ref 135–145)
SODIUM SERPL-SCNC: 138 MMOL/L — SIGNIFICANT CHANGE UP (ref 135–145)
SPECIMEN SOURCE: SIGNIFICANT CHANGE UP
STRONGYLOIDES AB SER-ACNC: NEGATIVE — SIGNIFICANT CHANGE UP
WBC # BLD: 8.07 K/UL — SIGNIFICANT CHANGE UP (ref 3.8–10.5)
WBC # BLD: 8.51 K/UL — SIGNIFICANT CHANGE UP (ref 3.8–10.5)
WBC # BLD: 9.17 K/UL — SIGNIFICANT CHANGE UP (ref 3.8–10.5)
WBC # FLD AUTO: 8.07 K/UL — SIGNIFICANT CHANGE UP (ref 3.8–10.5)
WBC # FLD AUTO: 8.51 K/UL — SIGNIFICANT CHANGE UP (ref 3.8–10.5)
WBC # FLD AUTO: 9.17 K/UL — SIGNIFICANT CHANGE UP (ref 3.8–10.5)

## 2022-04-05 PROCEDURE — 32609 THORACOSCOPY W/BX PLEURA: CPT

## 2022-04-05 PROCEDURE — 32550 INSERT PLEURAL CATH: CPT

## 2022-04-05 PROCEDURE — 88342 IMHCHEM/IMCYTCHM 1ST ANTB: CPT | Mod: 26

## 2022-04-05 PROCEDURE — 99292 CRITICAL CARE ADDL 30 MIN: CPT

## 2022-04-05 PROCEDURE — 31624 DX BRONCHOSCOPE/LAVAGE: CPT

## 2022-04-05 PROCEDURE — 99232 SBSQ HOSP IP/OBS MODERATE 35: CPT

## 2022-04-05 PROCEDURE — 88112 CYTOPATH CELL ENHANCE TECH: CPT | Mod: 26

## 2022-04-05 PROCEDURE — 88341 IMHCHEM/IMCYTCHM EA ADD ANTB: CPT | Mod: 26

## 2022-04-05 PROCEDURE — 88305 TISSUE EXAM BY PATHOLOGIST: CPT | Mod: 26

## 2022-04-05 PROCEDURE — 71045 X-RAY EXAM CHEST 1 VIEW: CPT | Mod: 26

## 2022-04-05 PROCEDURE — 88305 TISSUE EXAM BY PATHOLOGIST: CPT | Mod: 26,59

## 2022-04-05 PROCEDURE — 99291 CRITICAL CARE FIRST HOUR: CPT

## 2022-04-05 DEVICE — PLEURX CATHETER KIT: Type: IMPLANTABLE DEVICE | Site: LEFT | Status: FUNCTIONAL

## 2022-04-05 RX ORDER — MAGNESIUM SULFATE 500 MG/ML
1 VIAL (ML) INJECTION ONCE
Refills: 0 | Status: COMPLETED | OUTPATIENT
Start: 2022-04-05 | End: 2022-04-05

## 2022-04-05 RX ORDER — ACETAMINOPHEN 500 MG
1000 TABLET ORAL ONCE
Refills: 0 | Status: DISCONTINUED | OUTPATIENT
Start: 2022-04-05 | End: 2022-04-13

## 2022-04-05 RX ORDER — PROPOFOL 10 MG/ML
50 INJECTION, EMULSION INTRAVENOUS
Qty: 1000 | Refills: 0 | Status: DISCONTINUED | OUTPATIENT
Start: 2022-04-05 | End: 2022-04-07

## 2022-04-05 RX ORDER — HYDROMORPHONE HYDROCHLORIDE 2 MG/ML
0.5 INJECTION INTRAMUSCULAR; INTRAVENOUS; SUBCUTANEOUS
Refills: 0 | Status: DISCONTINUED | OUTPATIENT
Start: 2022-04-05 | End: 2022-04-11

## 2022-04-05 RX ORDER — HYDRALAZINE HCL 50 MG
10 TABLET ORAL ONCE
Refills: 0 | Status: COMPLETED | OUTPATIENT
Start: 2022-04-05 | End: 2022-04-05

## 2022-04-05 RX ORDER — ALBUMIN HUMAN 25 %
250 VIAL (ML) INTRAVENOUS
Refills: 0 | Status: COMPLETED | OUTPATIENT
Start: 2022-04-05 | End: 2022-04-05

## 2022-04-05 RX ORDER — FUROSEMIDE 40 MG
10 TABLET ORAL ONCE
Refills: 0 | Status: COMPLETED | OUTPATIENT
Start: 2022-04-05 | End: 2022-04-05

## 2022-04-05 RX ORDER — FENTANYL CITRATE 50 UG/ML
25 INJECTION INTRAVENOUS
Refills: 0 | Status: DISCONTINUED | OUTPATIENT
Start: 2022-04-05 | End: 2022-04-05

## 2022-04-05 RX ORDER — ACETAMINOPHEN 500 MG
1000 TABLET ORAL ONCE
Refills: 0 | Status: DISCONTINUED | OUTPATIENT
Start: 2022-04-06 | End: 2022-04-13

## 2022-04-05 RX ORDER — IPRATROPIUM/ALBUTEROL SULFATE 18-103MCG
3 AEROSOL WITH ADAPTER (GRAM) INHALATION EVERY 6 HOURS
Refills: 0 | Status: DISCONTINUED | OUTPATIENT
Start: 2022-04-05 | End: 2022-04-13

## 2022-04-05 RX ORDER — ONDANSETRON 8 MG/1
4 TABLET, FILM COATED ORAL ONCE
Refills: 0 | Status: DISCONTINUED | OUTPATIENT
Start: 2022-04-05 | End: 2022-04-05

## 2022-04-05 RX ORDER — HYDRALAZINE HCL 50 MG
5 TABLET ORAL EVERY 4 HOURS
Refills: 0 | Status: DISCONTINUED | OUTPATIENT
Start: 2022-04-05 | End: 2022-04-13

## 2022-04-05 RX ORDER — SODIUM CHLORIDE 9 MG/ML
1000 INJECTION, SOLUTION INTRAVENOUS
Refills: 0 | Status: DISCONTINUED | OUTPATIENT
Start: 2022-04-05 | End: 2022-04-06

## 2022-04-05 RX ORDER — NOREPINEPHRINE BITARTRATE/D5W 8 MG/250ML
0.05 PLASTIC BAG, INJECTION (ML) INTRAVENOUS
Qty: 8 | Refills: 0 | Status: DISCONTINUED | OUTPATIENT
Start: 2022-04-05 | End: 2022-04-07

## 2022-04-05 RX ORDER — CHLORHEXIDINE GLUCONATE 213 G/1000ML
15 SOLUTION TOPICAL EVERY 12 HOURS
Refills: 0 | Status: DISCONTINUED | OUTPATIENT
Start: 2022-04-05 | End: 2022-04-06

## 2022-04-05 RX ORDER — ASPIRIN/CALCIUM CARB/MAGNESIUM 324 MG
81 TABLET ORAL DAILY
Refills: 0 | Status: DISCONTINUED | OUTPATIENT
Start: 2022-04-06 | End: 2022-04-13

## 2022-04-05 RX ORDER — POTASSIUM CHLORIDE 20 MEQ
10 PACKET (EA) ORAL
Refills: 0 | Status: COMPLETED | OUTPATIENT
Start: 2022-04-05 | End: 2022-04-05

## 2022-04-05 RX ADMIN — HYDROMORPHONE HYDROCHLORIDE 0.5 MILLIGRAM(S): 2 INJECTION INTRAMUSCULAR; INTRAVENOUS; SUBCUTANEOUS at 19:01

## 2022-04-05 RX ADMIN — Medication 40 MILLIGRAM(S): at 06:26

## 2022-04-05 RX ADMIN — HYDROMORPHONE HYDROCHLORIDE 0.5 MILLIGRAM(S): 2 INJECTION INTRAMUSCULAR; INTRAVENOUS; SUBCUTANEOUS at 22:41

## 2022-04-05 RX ADMIN — Medication 100 MILLIEQUIVALENT(S): at 20:52

## 2022-04-05 RX ADMIN — PROPOFOL 20.6 MICROGRAM(S)/KG/MIN: 10 INJECTION, EMULSION INTRAVENOUS at 13:52

## 2022-04-05 RX ADMIN — PIPERACILLIN AND TAZOBACTAM 25 GRAM(S): 4; .5 INJECTION, POWDER, LYOPHILIZED, FOR SOLUTION INTRAVENOUS at 06:29

## 2022-04-05 RX ADMIN — HYDROMORPHONE HYDROCHLORIDE 0.5 MILLIGRAM(S): 2 INJECTION INTRAMUSCULAR; INTRAVENOUS; SUBCUTANEOUS at 23:15

## 2022-04-05 RX ADMIN — PROPOFOL 20.6 MICROGRAM(S)/KG/MIN: 10 INJECTION, EMULSION INTRAVENOUS at 21:24

## 2022-04-05 RX ADMIN — Medication 1: at 17:21

## 2022-04-05 RX ADMIN — CHLORHEXIDINE GLUCONATE 15 MILLILITER(S): 213 SOLUTION TOPICAL at 17:06

## 2022-04-05 RX ADMIN — Medication 1 APPLICATION(S): at 17:21

## 2022-04-05 RX ADMIN — Medication 1 APPLICATION(S): at 06:27

## 2022-04-05 RX ADMIN — Medication 100 GRAM(S): at 20:53

## 2022-04-05 RX ADMIN — PIPERACILLIN AND TAZOBACTAM 25 GRAM(S): 4; .5 INJECTION, POWDER, LYOPHILIZED, FOR SOLUTION INTRAVENOUS at 14:19

## 2022-04-05 RX ADMIN — Medication 10 MILLIGRAM(S): at 22:11

## 2022-04-05 RX ADMIN — GABAPENTIN 100 MILLIGRAM(S): 400 CAPSULE ORAL at 06:27

## 2022-04-05 RX ADMIN — SODIUM CHLORIDE 30 MILLILITER(S): 9 INJECTION, SOLUTION INTRAVENOUS at 22:02

## 2022-04-05 RX ADMIN — Medication 3 MILLILITER(S): at 21:29

## 2022-04-05 RX ADMIN — Medication 250 MILLILITER(S): at 16:52

## 2022-04-05 RX ADMIN — Medication 10 MILLIGRAM(S): at 14:50

## 2022-04-05 RX ADMIN — PIPERACILLIN AND TAZOBACTAM 25 GRAM(S): 4; .5 INJECTION, POWDER, LYOPHILIZED, FOR SOLUTION INTRAVENOUS at 23:00

## 2022-04-05 RX ADMIN — Medication 1: at 13:49

## 2022-04-05 RX ADMIN — Medication 100 MILLIEQUIVALENT(S): at 21:57

## 2022-04-05 RX ADMIN — SODIUM CHLORIDE 30 MILLILITER(S): 9 INJECTION, SOLUTION INTRAVENOUS at 15:00

## 2022-04-05 RX ADMIN — ISOSORBIDE DINITRATE 10 MILLIGRAM(S): 5 TABLET ORAL at 06:27

## 2022-04-05 RX ADMIN — Medication 6.42 MICROGRAM(S)/KG/MIN: at 13:54

## 2022-04-05 RX ADMIN — LOSARTAN POTASSIUM 25 MILLIGRAM(S): 100 TABLET, FILM COATED ORAL at 06:28

## 2022-04-05 RX ADMIN — Medication 100 MILLIEQUIVALENT(S): at 22:45

## 2022-04-05 NOTE — BRIEF OPERATIVE NOTE - OPERATION/FINDINGS
Flexible Bronchoscopy, Left VATS, Pleural Biopsy, Drainage of Pleural Effusion, PleurX Catheter Placement    1,100ml serous pleural fluid drained    Luis Angel MOSLEY PA-C, was the first assistant in this operation, including but not limited to helping to provide exposure for the surgeon and wound closure.

## 2022-04-05 NOTE — PROGRESS NOTE ADULT - ASSESSMENT
No 80yo Female with mhx of HFpEF ef 65% CAD (CABG 2000, stent 2011), severe pulm HTN, Bioprosthetic aortic and mitral valve replacement 9/2014, ?CKD per outside records, afib on warfarin, chronic Lt loculated pleural effusion,  Type 2 DM, HTN, HLD, gout a/w acute on chronic HFpEF i/s/p severe pHTN c/b hypercapnia; Found to have persisting moderate size loculated lt pleural effusion with atelectasis;

## 2022-04-05 NOTE — PROGRESS NOTE ADULT - SUBJECTIVE AND OBJECTIVE BOX
Date of Service: 04-05-22 @ 13:34    Patient is a 81y old  Female who presents with a chief complaint of Acute on chronic diastolic heart failure exacerbation (04 Apr 2022 15:30)      Any change in ROS: s/p vats o left side:   now intubated  had hemoptysis after the procedure:  still in PACU : going to MICU     MEDICATIONS  (STANDING):  AQUAPHOR (petrolatum Ointment) 1 Application(s) Topical every 12 hours  chlorhexidine 0.12% Liquid 15 milliLiter(s) Oral Mucosa every 12 hours  dextrose 50% Injectable 25 Gram(s) IV Push once  dextrose 50% Injectable 12.5 Gram(s) IV Push once  dextrose 50% Injectable 25 Gram(s) IV Push once  glucagon  Injectable 1 milliGRAM(s) IntraMuscular once  insulin lispro (ADMELOG) corrective regimen sliding scale   SubCutaneous three times a day before meals  insulin lispro (ADMELOG) corrective regimen sliding scale   SubCutaneous at bedtime  lactated ringers. 1000 milliLiter(s) (30 mL/Hr) IV Continuous <Continuous>  piperacillin/tazobactam IVPB.. 3.375 Gram(s) IV Intermittent every 8 hours  propofol Infusion 50 MICROgram(s)/kG/Min (20.6 mL/Hr) IV Continuous <Continuous>    MEDICATIONS  (PRN):  dextrose Oral Gel 15 Gram(s) Oral once PRN Blood Glucose LESS THAN 70 milliGRAM(s)/deciliter  fentaNYL    Injectable 25 MICROGram(s) IV Push every 5 minutes PRN Moderate Pain (4 - 6)  ondansetron Injectable 4 milliGRAM(s) IV Push once PRN Nausea and/or Vomiting  triamcinolone 0.1% Ointment 1 Application(s) Topical every 12 hours PRN itching    Vital Signs Last 24 Hrs  T(C): 36.5 (05 Apr 2022 12:25), Max: 37.2 (05 Apr 2022 04:25)  T(F): 97.7 (05 Apr 2022 12:25), Max: 99 (05 Apr 2022 04:25)  HR: 73 (05 Apr 2022 13:15) (62 - 81)  BP: 176/57 (05 Apr 2022 13:15) (81/33 - 180/61)  BP(mean): 89 (05 Apr 2022 13:15) (49 - 89)  RR: 17 (05 Apr 2022 12:25) (17 - 18)  SpO2: 99% (05 Apr 2022 13:15) (96% - 100%)    I&O's Summary    04 Apr 2022 07:01  -  05 Apr 2022 07:00  --------------------------------------------------------  IN: 240 mL / OUT: 300 mL / NET: -60 mL          Physical Exam:   GENERAL: NAD, well-groomed, well-developed  HEENT: BRADY/   Atraumatic, Normocephalic  ENMT: No tonsillar erythema, exudates, or enlargement; Moist mucous membranes, Good dentition, No lesions  NECK: Supple, No JVD, Normal thyroid  CHEST/LUNG: Clear to auscultaion,  CVS: Regular rate and rhythm; No murmurs, rubs, or gallops  GI: : Soft, Nontender, Nondistended; Bowel sounds present  NERVOUS SYSTEM:  Intuabted and sedated  EXTREMITIES:-edema  LYMPH: No lymphadenopathy noted  SKIN: No rashes or lesions  ENDOCRINOLOGY: No Thyromegaly  PSYCH: cam +    Labs:  29                            10.8   8.07  )-----------( 170      ( 05 Apr 2022 07:17 )             35.2                         10.7   8.51  )-----------( 174      ( 05 Apr 2022 02:54 )             34.2                         10.7   8.24  )-----------( 163      ( 04 Apr 2022 07:22 )             34.4                         11.6   8.77  )-----------( 161      ( 03 Apr 2022 07:09 )             39.0                         10.9   8.58  )-----------( 143      ( 02 Apr 2022 06:43 )             36.1     04-05    137  |  98  |  32<H>  ----------------------------<  104<H>  4.0   |  29  |  1.07  04-04    135  |  97<L>  |  34<H>  ----------------------------<  89  4.8   |  26  |  1.15  04-03    137  |  96<L>  |  33<H>  ----------------------------<  79  4.5   |  28  |  1.16  04-02    137  |  98  |  36<H>  ----------------------------<  90  4.3   |  26  |  1.17    Ca    8.7      05 Apr 2022 02:54  Ca    8.3<L>      04 Apr 2022 07:22  Phos  3.0     04-05  Phos  2.9     04-04  Mg     1.90     04-05  Mg     1.90     04-04      CAPILLARY BLOOD GLUCOSE      POCT Blood Glucose.: 93 mg/dL (05 Apr 2022 06:40)  POCT Blood Glucose.: 128 mg/dL (04 Apr 2022 22:12)  POCT Blood Glucose.: 132 mg/dL (04 Apr 2022 17:15)        PT/INR - ( 05 Apr 2022 07:17 )   PT: 19.5 sec;   INR: 1.67 ratio         PTT - ( 05 Apr 2022 07:17 )  PTT:37.1 sec          RECENT CULTURES:  03-31 @ 21:01 .Blood Blood-Peripheral                No growth to date.      r< from: CT Chest No Cont (04.02.22 @ 13:48) >  IV Contrast: NONE  Oral Contrast: NONE  Complications: None reported at time of study completion    PROCEDURE:  CT of the Chest was performed.  Sagittal and coronal reformats were performed.    FINDINGS:    LUNGS, AIRWAYS, AND PLEURA: New secretions within the leftmainstem   bronchus with new complete atelectasis of the left upper and lower lobes.    A 9 mm right upper lobe nodule is unchanged since 2/8/2019. Large left   pleural effusion, increased since 3/27/2022. Unchanged small right   pleural effusion.  MEDIASTINUM AND MAITE: No lymphadenopathy.  VESSELS: Atherosclerotic changes of the aorta and coronary arteries.  HEART: Cardiomegaly. No pericardial effusion. Mitral annular   calcifications. CABG.  CHEST WALL AND LOWER NECK: Median sternotomy.  VISUALIZED UPPER ABDOMEN: Within normal limits.  BONES: Within normal limits.    IMPRESSION:  Since 3/27/22    New complete atelectasis of the left lung from a mucous plug/secretions   in the left mainstem bronchus.    Large left pleural effusion, increasedsince 3/27/2022.    --- End of Report ---          DAYLIN LORA MD; Resident Radiologist  This document has been electronically signed.  TATYANA FLORES MD; Attending Radiologist  This document has been electronically signed. Apr 2 2022  2:43PM    < end of copied text >      RESPIRATORY CULTURES:          Studies  Chest X-RAY  CT SCAN Chest   Venous Dopplers: LE:   CT Abdomen  Others

## 2022-04-05 NOTE — PROGRESS NOTE ADULT - ASSESSMENT
81 f with DM, HTN, CAD s/p CABG, HFpEF  severe pulm HTN, Bioprosthetic aortic and mitral valve replacement 9/2014, ?CKD per outside records, afib on warfarin, chronic Lt loculated pleural effusion, gout, presented 3/27 with worsening dyspnea on exertion and lower extremity swelling as well as 1 year history of generalized rash and was admitted for CHF exacerbation  initially afebrile, WBC normal  Chest CT: Small left pleural effusion with a loculated component in the left major fissure and along left anterior chest wall,  partial LLL and LEXI collapse. New pleural thickening and/or loculated fluid medially along the left upper hemithorax. Small right pleural effusion and adjacent passive atelectasis. Stable 9 mm right apical nodule.  pt was seen by cardio and pulmonary, did not recommend any thoracentesis and was being diuresed   pt spiked to 101.6 on 3/31 and WBC normal    dyspnea due to CHF exacerbation, has h/o L loculated pleural effusion but now also with LLL and LEXI collapse, likely the cause of new fever, CT 4/2 showed new complete atelectasis of L lung from a mucous plug in L mainstem bronchus, s/p Flexible Bronchoscopy, Left VATS, Pleural Biopsy, Drainage of Pleural Effusion, PleurX Catheter Placement, 1,100ml serous pleural fluid drained, still intubated with hemoptysis going to CTU  eosinophilia to 800, was present in previous admissions as well but now also has a rash for about a year, derm's impression was xerosis    * blood cx negative and now pt afebrile  * f/u the OR cultures  * c/w zosyn, started 3/31, will complete for now  * f/u quantiferon, strongyloides, toxocara and filaria AB        Kristy Erickson MD  contact on teams  After 5pm and on weekends call [phone]

## 2022-04-05 NOTE — PROGRESS NOTE ADULT - SUBJECTIVE AND OBJECTIVE BOX
CATRACHITO WOODS      81y   Female   MRN-[id number]         No Known Allergies             Daily Height in cm: 149.86 (05 Apr 2022 08:11)    Daily Drug Dosing Weight  Height (cm): 149.9 (05 Apr 2022 08:11)  Weight (kg): 68.5 (28 Mar 2022 20:30)  BMI (kg/m2): 30.5 (05 Apr 2022 08:11)  BSA (m2): 1.64 (05 Apr 2022 08:11)    HPI:  80yo Female with mhx of HFpEF ef 65% CAD (CABG 2000, stent 2011), severe pulm HTN, Bioprosthetic aortic and mitral valve replacement 9/2014, ?CKD per outside records, afib on warfarin, chronic Lt loculated pleural effusion, Type 2 DM, HTN, HLD, gout c/o 3-4 days of worsening dyspnea on exertion and lower extremity swelling as well as 1 year history of generalized rash. Usual state of health until 3-4d prior when daughter noticed increasing HERNANDEZ on ambulation to the bathroom not accompanied by chest pain, vomiting,  neck or arm pain,  posterior headache,  blurry vision. Has nausea at baseline and has had poor po tolerance and a chronic component of fatigue over past years. Denies dietary indiscretion, no recent illness, no recent change to her medication although notes months to years ago had been taking lasix 40mg PO BID and is currently taking once daily dosing. Nonadherent to home o2 via NC. Reports chronic orthopnea, sleeps on 1-2 pillows per night with no recent increase. No new cough.   BP baseline has been 160s-180s systolic per daughter and is consistent with BP reading in ED. She denies symptomatic hypertension. Previously discontinued hydralazine ~1y ago for concerns relating to her generalized rash. Previously discharged on losartan January 2022 with thirty day supply and since discontinued the medication unclear if due to lack of refill versus DC by medical team.   Collateral provided by daughter at bedside, prior [facility] records, and paper discharge summary provided by daughter from [facility]. Medication reconciliation provided from those same sources with option to further verify in the AM with her PMD and cardiologist as below. Last dose of all of her medications was today and states that she takes warfarin at night.     Generalized excoriated rash has been present for ~1y duration and is present on shoulders, buttocks, thighs, arms, and perhaps even "all over" as per daughter. At times with breaking of skin and exposed areas. Poor hand washing hygiene and itching per daughter.     Patient declined professional translation services and translation was provided in Nepali by daughter at bedside    CARDIOLOGIST Dr. Sean Robbins [phone]  PMD Dr. Jeff Sanchez [phone] (27 Mar 2022 20:22)    Procedure:  Flexible Bronchoscopy, Left VATS, Pleural Biopsy, Drainage of Pleural Effusion, PleurX Catheter Placement, 1,100ml serous pleural fluid drained  4/5/2022                     Issues:              Acute respiratory failure unclear hypoxic or hypercarbic  Hemodynamic instability requiring Phenylephrine   Left pleural effusion  CAD s/p CABG-1  HFpEF  HTN  Pulmonary hypertension              Postop pain              Chest tube in place  DM-2   Chronic atrial fibrillation on Coumadin  S/P AVR / MVR  Hx of UGI Bleed                Home Medications:  atorvastatin 20 mg oral tablet: 1 tab(s) orally once a day (27 Mar 2022 20:30)  carvedilol 12.5 mg oral tablet: 1 tab(s) orally 2 times a day (27 Mar 2022 20:30)  Coumadin 2 mg oral tablet: 1 tab(s) orally once a day (27 Mar 2022 20:30)  ferrous sulfate 325 mg (65 mg elemental iron) oral tablet: 1 tab(s) orally once a day (27 Mar 2022 20:30)  furosemide 40 mg oral tablet: 1 tab(s) orally once a day (27 Mar 2022 20:30)  gabapentin 100 mg oral capsule: 1 cap(s) orally 3 times a day (27 Mar 2022 20:30)  glipiZIDE 2.5 mg oral tablet, extended release: 1 tab(s) orally once a day (27 Mar 2022 20:30)  isosorbide dinitrate 10 mg oral tablet: 1 tab(s) orally 2 times a day (27 Mar 2022 20:30)  losartan 25 mg oral tablet: 1 tab(s) orally once a day (27 Mar 2022 20:30)    PAST MEDICAL & SURGICAL HISTORY:  HTN (Hypertension)    Afib  (on Warfarin)    CAD (Coronary Artery Disease)  s/p stent and CABG    CVA (Cerebral Infarction)  2011    CHF (congestive heart failure)  EF 65% Oct 2019    Upper GI bleed    Gout    Diabetes mellitus  type 2, not on meds    History of heart valve replacement with bioprosthetic valve  mitral and aortic    Hyperlipidemia    S/P angioplasty with stent  2011    S/P CABG x 1  (2000)    H/O aortic valve replacement  9/22/14    H/O mitral valve replacement  9/22/14      Vital Signs Last 24 Hrs  T(C): 36.5 (05 Apr 2022 12:25), Max: 37.2 (05 Apr 2022 04:25)  T(F): 97.7 (05 Apr 2022 12:25), Max: 99 (05 Apr 2022 04:25)  HR: 61 (05 Apr 2022 14:00) (61 - 81)  BP: 177/70 (05 Apr 2022 14:00) (81/33 - 190/60)  BP(mean): 99 (05 Apr 2022 14:00) (49 - 99)  RR: 17 (05 Apr 2022 12:25) (17 - 18)  SpO2: 98% (05 Apr 2022 14:00) (96% - 100%)  I&O's Detail    04 Apr 2022 07:01  -  05 Apr 2022 07:00  --------------------------------------------------------  IN:    Oral Fluid: 240 mL  Total IN: 240 mL    OUT:    Incontinent per Collection Bag (mL): 300 mL  Total OUT: 300 mL    Total NET: -60 mL      05 Apr 2022 07:01  -  05 Apr 2022 15:20  --------------------------------------------------------  IN:    Lactated Ringers: 60 mL    Norepinephrine: 15.5 mL    Propofol: 24.7 mL  Total IN: 100.2 mL    OUT:    Chest Tube (mL): 140 mL    Ureteral Catheter (mL): 500 mL  Total OUT: 640 mL    Total NET: -539.8 mL      CAPILLARY BLOOD GLUCOSE      POCT Blood Glucose.: 93 mg/dL (05 Apr 2022 06:40)  POCT Blood Glucose.: 128 mg/dL (04 Apr 2022 22:12)  POCT Blood Glucose.: 132 mg/dL (04 Apr 2022 17:15)    Home Medications:  atorvastatin 20 mg oral tablet: 1 tab(s) orally once a day (27 Mar 2022 20:30)  carvedilol 12.5 mg oral tablet: 1 tab(s) orally 2 times a day (27 Mar 2022 20:30)  Coumadin 2 mg oral tablet: 1 tab(s) orally once a day (27 Mar 2022 20:30)  ferrous sulfate 325 mg (65 mg elemental iron) oral tablet: 1 tab(s) orally once a day (27 Mar 2022 20:30)  furosemide 40 mg oral tablet: 1 tab(s) orally once a day (27 Mar 2022 20:30)  gabapentin 100 mg oral capsule: 1 cap(s) orally 3 times a day (27 Mar 2022 20:30)  glipiZIDE 2.5 mg oral tablet, extended release: 1 tab(s) orally once a day (27 Mar 2022 20:30)  isosorbide dinitrate 10 mg oral tablet: 1 tab(s) orally 2 times a day (27 Mar 2022 20:30)  losartan 25 mg oral tablet: 1 tab(s) orally once a day (27 Mar 2022 20:30)    MEDICATIONS  (STANDING):  AQUAPHOR (petrolatum Ointment) 1 Application(s) Topical every 12 hours  chlorhexidine 0.12% Liquid 15 milliLiter(s) Oral Mucosa every 12 hours  dextrose 50% Injectable 12.5 Gram(s) IV Push once  dextrose 50% Injectable 25 Gram(s) IV Push once  dextrose 50% Injectable 25 Gram(s) IV Push once  glucagon  Injectable 1 milliGRAM(s) IntraMuscular once  hydrALAZINE Injectable 10 milliGRAM(s) IV Push once  insulin lispro (ADMELOG) corrective regimen sliding scale   SubCutaneous three times a day before meals  insulin lispro (ADMELOG) corrective regimen sliding scale   SubCutaneous at bedtime  lactated ringers. 1000 milliLiter(s) (30 mL/Hr) IV Continuous <Continuous>  norepinephrine Infusion 0.05 MICROgram(s)/kG/Min (6.42 mL/Hr) IV Continuous <Continuous>  piperacillin/tazobactam IVPB.. 3.375 Gram(s) IV Intermittent every 8 hours  propofol Infusion 50 MICROgram(s)/kG/Min (20.6 mL/Hr) IV Continuous <Continuous>    MEDICATIONS  (PRN):  dextrose Oral Gel 15 Gram(s) Oral once PRN Blood Glucose LESS THAN 70 milliGRAM(s)/deciliter  triamcinolone 0.1% Ointment 1 Application(s) Topical every 12 hours PRN itching      Physical exam:                             General:               Pt is awake, alert,  appears to be in pain but not in  distress                                                  Neuro:                  Nonfocal                             Cardiovascular:   S1 & S2,  irregular                          Respiratory:         Air entry is fair and equal on both sides, has bilateral conducted sounds                           GI:                          Soft, nondistended and nontender, Bowel sounds active                            Ext:                        No cyanosis or edema     Labs:                                                                           10.8   8.07  )-----------( 170      ( 05 Apr 2022 07:17 )             35.2             04-05    137  |  98  |  32<H>  ----------------------------<  104<H>  4.0   |  29  |  1.07    Ca    8.7      05 Apr 2022 02:54  Phos  3.0     04-05  Mg     1.90     04-05                    PT/INR - ( 05 Apr 2022 07:17 )   PT: 19.5 sec;   INR: 1.67 ratio       PTT - ( 05 Apr 2022 07:17 )  PTT:37.1 sec          CXR:  Postop changes, left pleurX catheter in place      Plan:  General: 81yFemale s/p Flexible Bronchoscopy, Left VATS, Pleural Biopsy, Drainage of Pleural Effusion, PleurX Catheter Placement, 1,100ml serous pleural fluid drained  4/5/2022 , experiencing  pain with deep breathing.                             Neuro:                                          Pain control with Dilaudid  /  Tylenol PRN     On Propofol for sedation                            Cardiovascular:                                           Hemodynamic instability requiring pressors on / off.  Continue hemodynamic monitoring.     CAD / HLD: Restart Isordil   Hold BB / Losartan                             Chronic A-fib; Rate controlled  Will restart BB when more stable. No anticoagulation for now    Respiratory:                                         Pt is on full mechanical vent support YK--60-5                                         Wean FiO2 as tolerated                                         Monitor chest tube output                                         Chest tube to water seal                                                                  Continue bronchodilators, pulmonary toilet                            GI                                         NPO                                         Continue Zofran / Reglan for nausea - PRN                                         Continue bowel regimen  	                                                                 Renal:                                         Continue LR  30cc/hr                                         Monitor I/Os and electrolytes                                                                                        Hem/ Onc:                                         DVT prophylaxis with SQ Heparin and SCDs                                         Monitor chest tube output &  signs of bleeding.                                          Follow CBC in AM                           Infectious disease:     Pleural effusion / Empyema: On Zosyn  Follow O.R cultures                                          Monitor for fever / leukocytosis.                                          All surgical incision / chest tube  sites look clean                            Endocrine                                             DM-2 / Hyperglycemia: Continue Accu-Checks with coverage  Hold oral hypoglycemic meds       Pertinent clinical, laboratory, radiographic, hemodynamic, echocardiographic, respiratory data, microbiologic data and chart were reviewed and analyzed frequently throughout the course of the day and night  Patient seen, examined and plan discussed with CT Surgeon Dr. Conteh  / CTICU team during rounds.    Status discussed with  patient's family and updated plan of care    I have spent  80 minutes of critical care time with this pt between  3pm and 11.59pm monitoring hemodynamic status, managing fluid resuscitation /  pressors to prevent / worsening of shock and ventilator management.           Rikki Scott MD

## 2022-04-05 NOTE — PROGRESS NOTE ADULT - SUBJECTIVE AND OBJECTIVE BOX
Follow Up:  fever, lung collapse    Interval History: went for Flexible Bronchoscopy, Left VATS, Pleural Biopsy, Drainage of Pleural Effusion, PleurX Catheter Placement, 1,100ml serous pleural fluid drained        ROS:    Unobtainable because: intubated        Allergies  No Known Allergies        ANTIMICROBIALS:  piperacillin/tazobactam IVPB.. 3.375 every 8 hours      OTHER MEDS:  AQUAPHOR (petrolatum Ointment) 1 Application(s) Topical every 12 hours  chlorhexidine 0.12% Liquid 15 milliLiter(s) Oral Mucosa every 12 hours  dextrose 50% Injectable 12.5 Gram(s) IV Push once  dextrose 50% Injectable 25 Gram(s) IV Push once  dextrose 50% Injectable 25 Gram(s) IV Push once  dextrose Oral Gel 15 Gram(s) Oral once PRN  glucagon  Injectable 1 milliGRAM(s) IntraMuscular once  insulin lispro (ADMELOG) corrective regimen sliding scale   SubCutaneous three times a day before meals  insulin lispro (ADMELOG) corrective regimen sliding scale   SubCutaneous at bedtime  lactated ringers. 1000 milliLiter(s) IV Continuous <Continuous>  norepinephrine Infusion 0.05 MICROgram(s)/kG/Min IV Continuous <Continuous>  propofol Infusion 50 MICROgram(s)/kG/Min IV Continuous <Continuous>  triamcinolone 0.1% Ointment 1 Application(s) Topical every 12 hours PRN      Vital Signs Last 24 Hrs  T(C): 36.5 (05 Apr 2022 12:25), Max: 37.2 (05 Apr 2022 04:25)  T(F): 97.7 (05 Apr 2022 12:25), Max: 99 (05 Apr 2022 04:25)  HR: 61 (05 Apr 2022 14:00) (61 - 81)  BP: 177/70 (05 Apr 2022 14:00) (81/33 - 190/60)  BP(mean): 99 (05 Apr 2022 14:00) (49 - 99)  RR: 17 (05 Apr 2022 12:25) (17 - 18)  SpO2: 98% (05 Apr 2022 14:00) (96% - 100%)    Physical Exam:  General:    intubated in PACU  Cardio:     regular S1, S2  Respiratory:   intubated, had hemoptysis post OR, chest tube  abd:     soft,   BS +  :     vega  Musculoskeletal:   no joint swelling  vascular: no phlebitis  Skin:    no rash                          10.8   8.07  )-----------( 170      ( 05 Apr 2022 07:17 )             35.2       04-05    137  |  98  |  32<H>  ----------------------------<  104<H>  4.0   |  29  |  1.07    Ca    8.7      05 Apr 2022 02:54  Phos  3.0     04-05  Mg     1.90     04-05            MICROBIOLOGY:  v  .Blood Blood-Peripheral  03-31-22   No growth to date.  --  --          Rapid RVP Result: NotDetec (04-01 @ 13:08)        RADIOLOGY:  Images independently visualized and reviewed personally, findings as below  < from: CT Chest No Cont (04.02.22 @ 13:48) >  IMPRESSION:  Since 3/27/22    New complete atelectasis of the left lung from a mucous plug/secretions   in the left mainstem bronchus.    Large left pleural effusion, increasedsince 3/27/2022.    < end of copied text >

## 2022-04-06 LAB
ANION GAP SERPL CALC-SCNC: 14 MMOL/L — SIGNIFICANT CHANGE UP (ref 7–14)
ANION GAP SERPL CALC-SCNC: 18 MMOL/L — HIGH (ref 7–14)
APTT BLD: 32 SEC — SIGNIFICANT CHANGE UP (ref 27–36.3)
APTT BLD: 33.4 SEC — SIGNIFICANT CHANGE UP (ref 27–36.3)
BASE EXCESS BLDA CALC-SCNC: 9.5 MMOL/L — HIGH (ref -2–3)
BLOOD GAS ARTERIAL COMPREHENSIVE RESULT: SIGNIFICANT CHANGE UP
BLOOD GAS ARTERIAL COMPREHENSIVE RESULT: SIGNIFICANT CHANGE UP
BUN SERPL-MCNC: 33 MG/DL — HIGH (ref 7–23)
BUN SERPL-MCNC: 35 MG/DL — HIGH (ref 7–23)
CALCIUM SERPL-MCNC: 8.5 MG/DL — SIGNIFICANT CHANGE UP (ref 8.4–10.5)
CALCIUM SERPL-MCNC: 8.8 MG/DL — SIGNIFICANT CHANGE UP (ref 8.4–10.5)
CHLORIDE SERPL-SCNC: 96 MMOL/L — LOW (ref 98–107)
CHLORIDE SERPL-SCNC: 97 MMOL/L — LOW (ref 98–107)
CO2 BLDA-SCNC: 35 MMOL/L — HIGH (ref 19–24)
CO2 SERPL-SCNC: 25 MMOL/L — SIGNIFICANT CHANGE UP (ref 22–31)
CO2 SERPL-SCNC: 26 MMOL/L — SIGNIFICANT CHANGE UP (ref 22–31)
CREAT SERPL-MCNC: 1.02 MG/DL — SIGNIFICANT CHANGE UP (ref 0.5–1.3)
CREAT SERPL-MCNC: 1.3 MG/DL — SIGNIFICANT CHANGE UP (ref 0.5–1.3)
EGFR: 41 ML/MIN/1.73M2 — LOW
EGFR: 55 ML/MIN/1.73M2 — LOW
FILARIA IGG SER-ACNC: 0.88 INDEX — SIGNIFICANT CHANGE UP
GAMMA INTERFERON BACKGROUND BLD IA-ACNC: 0.01 IU/ML — SIGNIFICANT CHANGE UP
GAS PNL BLDA: SIGNIFICANT CHANGE UP
GLUCOSE BLDC GLUCOMTR-MCNC: 132 MG/DL — HIGH (ref 70–99)
GLUCOSE BLDC GLUCOMTR-MCNC: 133 MG/DL — HIGH (ref 70–99)
GLUCOSE BLDC GLUCOMTR-MCNC: 134 MG/DL — HIGH (ref 70–99)
GLUCOSE BLDC GLUCOMTR-MCNC: 136 MG/DL — HIGH (ref 70–99)
GLUCOSE SERPL-MCNC: 153 MG/DL — HIGH (ref 70–99)
GLUCOSE SERPL-MCNC: 157 MG/DL — HIGH (ref 70–99)
HCO3 BLDA-SCNC: 34 MMOL/L — HIGH (ref 21–28)
HCT VFR BLD CALC: 31.6 % — LOW (ref 34.5–45)
HCT VFR BLD CALC: 32.8 % — LOW (ref 34.5–45)
HGB BLD-MCNC: 10.2 G/DL — LOW (ref 11.5–15.5)
HGB BLD-MCNC: 10.4 G/DL — LOW (ref 11.5–15.5)
INR BLD: 1.35 RATIO — HIGH (ref 0.88–1.16)
INR BLD: 1.42 RATIO — HIGH (ref 0.88–1.16)
M TB IFN-G BLD-IMP: NEGATIVE — SIGNIFICANT CHANGE UP
M TB IFN-G CD4+ BCKGRND COR BLD-ACNC: 0 IU/ML — SIGNIFICANT CHANGE UP
M TB IFN-G CD4+CD8+ BCKGRND COR BLD-ACNC: 0.01 IU/ML — SIGNIFICANT CHANGE UP
MAGNESIUM SERPL-MCNC: 2.2 MG/DL — SIGNIFICANT CHANGE UP (ref 1.6–2.6)
MAGNESIUM SERPL-MCNC: 2.2 MG/DL — SIGNIFICANT CHANGE UP (ref 1.6–2.6)
MCHC RBC-ENTMCNC: 27.8 PG — SIGNIFICANT CHANGE UP (ref 27–34)
MCHC RBC-ENTMCNC: 28.3 PG — SIGNIFICANT CHANGE UP (ref 27–34)
MCHC RBC-ENTMCNC: 31.7 GM/DL — LOW (ref 32–36)
MCHC RBC-ENTMCNC: 32.3 GM/DL — SIGNIFICANT CHANGE UP (ref 32–36)
MCV RBC AUTO: 87.5 FL — SIGNIFICANT CHANGE UP (ref 80–100)
MCV RBC AUTO: 87.7 FL — SIGNIFICANT CHANGE UP (ref 80–100)
NIGHT BLUE STAIN TISS: SIGNIFICANT CHANGE UP
NON-GYNECOLOGICAL CYTOLOGY STUDY: SIGNIFICANT CHANGE UP
NRBC # BLD: 0 /100 WBCS — SIGNIFICANT CHANGE UP
NRBC # BLD: 0 /100 WBCS — SIGNIFICANT CHANGE UP
NRBC # FLD: 0 K/UL — SIGNIFICANT CHANGE UP
NRBC # FLD: 0 K/UL — SIGNIFICANT CHANGE UP
PCO2 BLDA: 42 MMHG — HIGH (ref 32–35)
PH BLDA: 7.51 — HIGH (ref 7.35–7.45)
PHOSPHATE SERPL-MCNC: 3.5 MG/DL — SIGNIFICANT CHANGE UP (ref 2.5–4.5)
PHOSPHATE SERPL-MCNC: 4.4 MG/DL — SIGNIFICANT CHANGE UP (ref 2.5–4.5)
PLATELET # BLD AUTO: 175 K/UL — SIGNIFICANT CHANGE UP (ref 150–400)
PLATELET # BLD AUTO: 205 K/UL — SIGNIFICANT CHANGE UP (ref 150–400)
PO2 BLDA: 144 MMHG — HIGH (ref 83–108)
POTASSIUM SERPL-MCNC: 3.9 MMOL/L — SIGNIFICANT CHANGE UP (ref 3.5–5.3)
POTASSIUM SERPL-MCNC: 4.3 MMOL/L — SIGNIFICANT CHANGE UP (ref 3.5–5.3)
POTASSIUM SERPL-SCNC: 3.9 MMOL/L — SIGNIFICANT CHANGE UP (ref 3.5–5.3)
POTASSIUM SERPL-SCNC: 4.3 MMOL/L — SIGNIFICANT CHANGE UP (ref 3.5–5.3)
PROTHROM AB SERPL-ACNC: 15.7 SEC — HIGH (ref 10.5–13.4)
PROTHROM AB SERPL-ACNC: 16.5 SEC — HIGH (ref 10.5–13.4)
QUANT TB PLUS MITOGEN MINUS NIL: 5.91 IU/ML — SIGNIFICANT CHANGE UP
RBC # BLD: 3.61 M/UL — LOW (ref 3.8–5.2)
RBC # BLD: 3.74 M/UL — LOW (ref 3.8–5.2)
RBC # FLD: 14.7 % — HIGH (ref 10.3–14.5)
RBC # FLD: 14.9 % — HIGH (ref 10.3–14.5)
SAO2 % BLDA: 99.4 % — HIGH (ref 94–98)
SODIUM SERPL-SCNC: 136 MMOL/L — SIGNIFICANT CHANGE UP (ref 135–145)
SODIUM SERPL-SCNC: 140 MMOL/L — SIGNIFICANT CHANGE UP (ref 135–145)
SPECIMEN SOURCE: SIGNIFICANT CHANGE UP
WBC # BLD: 12.26 K/UL — HIGH (ref 3.8–10.5)
WBC # BLD: 7.9 K/UL — SIGNIFICANT CHANGE UP (ref 3.8–10.5)
WBC # FLD AUTO: 12.26 K/UL — HIGH (ref 3.8–10.5)
WBC # FLD AUTO: 7.9 K/UL — SIGNIFICANT CHANGE UP (ref 3.8–10.5)

## 2022-04-06 PROCEDURE — 99292 CRITICAL CARE ADDL 30 MIN: CPT

## 2022-04-06 PROCEDURE — 99291 CRITICAL CARE FIRST HOUR: CPT

## 2022-04-06 PROCEDURE — 99232 SBSQ HOSP IP/OBS MODERATE 35: CPT

## 2022-04-06 PROCEDURE — 71045 X-RAY EXAM CHEST 1 VIEW: CPT | Mod: 26,76

## 2022-04-06 PROCEDURE — 36011 PLACE CATHETER IN VEIN: CPT

## 2022-04-06 RX ORDER — SODIUM CHLORIDE 9 MG/ML
1000 INJECTION, SOLUTION INTRAVENOUS
Refills: 0 | Status: DISCONTINUED | OUTPATIENT
Start: 2022-04-06 | End: 2022-04-13

## 2022-04-06 RX ORDER — FENTANYL CITRATE 50 UG/ML
50 INJECTION INTRAVENOUS ONCE
Refills: 0 | Status: DISCONTINUED | OUTPATIENT
Start: 2022-04-06 | End: 2022-04-06

## 2022-04-06 RX ORDER — POTASSIUM CHLORIDE 20 MEQ
20 PACKET (EA) ORAL ONCE
Refills: 0 | Status: DISCONTINUED | OUTPATIENT
Start: 2022-04-06 | End: 2022-04-06

## 2022-04-06 RX ORDER — DEXTROSE 50 % IN WATER 50 %
15 SYRINGE (ML) INTRAVENOUS ONCE
Refills: 0 | Status: DISCONTINUED | OUTPATIENT
Start: 2022-04-06 | End: 2022-04-13

## 2022-04-06 RX ORDER — GLUCAGON INJECTION, SOLUTION 0.5 MG/.1ML
1 INJECTION, SOLUTION SUBCUTANEOUS ONCE
Refills: 0 | Status: DISCONTINUED | OUTPATIENT
Start: 2022-04-06 | End: 2022-04-13

## 2022-04-06 RX ORDER — POTASSIUM CHLORIDE 20 MEQ
10 PACKET (EA) ORAL ONCE
Refills: 0 | Status: COMPLETED | OUTPATIENT
Start: 2022-04-06 | End: 2022-04-06

## 2022-04-06 RX ORDER — FUROSEMIDE 40 MG
20 TABLET ORAL ONCE
Refills: 0 | Status: COMPLETED | OUTPATIENT
Start: 2022-04-06 | End: 2022-04-06

## 2022-04-06 RX ORDER — FUROSEMIDE 40 MG
40 TABLET ORAL ONCE
Refills: 0 | Status: COMPLETED | OUTPATIENT
Start: 2022-04-06 | End: 2022-04-06

## 2022-04-06 RX ORDER — INSULIN LISPRO 100/ML
VIAL (ML) SUBCUTANEOUS EVERY 6 HOURS
Refills: 0 | Status: DISCONTINUED | OUTPATIENT
Start: 2022-04-06 | End: 2022-04-12

## 2022-04-06 RX ORDER — FUROSEMIDE 40 MG
10 TABLET ORAL ONCE
Refills: 0 | Status: DISCONTINUED | OUTPATIENT
Start: 2022-04-06 | End: 2022-04-06

## 2022-04-06 RX ORDER — ACETAZOLAMIDE 250 MG/1
250 TABLET ORAL ONCE
Refills: 0 | Status: COMPLETED | OUTPATIENT
Start: 2022-04-06 | End: 2022-04-06

## 2022-04-06 RX ADMIN — Medication 20 MILLIGRAM(S): at 23:41

## 2022-04-06 RX ADMIN — Medication 3 MILLILITER(S): at 21:37

## 2022-04-06 RX ADMIN — FENTANYL CITRATE 50 MICROGRAM(S): 50 INJECTION INTRAVENOUS at 14:30

## 2022-04-06 RX ADMIN — CHLORHEXIDINE GLUCONATE 15 MILLILITER(S): 213 SOLUTION TOPICAL at 18:27

## 2022-04-06 RX ADMIN — FENTANYL CITRATE 50 MICROGRAM(S): 50 INJECTION INTRAVENOUS at 16:42

## 2022-04-06 RX ADMIN — Medication 81 MILLIGRAM(S): at 11:18

## 2022-04-06 RX ADMIN — Medication 40 MILLIGRAM(S): at 10:19

## 2022-04-06 RX ADMIN — FENTANYL CITRATE 50 MICROGRAM(S): 50 INJECTION INTRAVENOUS at 13:38

## 2022-04-06 RX ADMIN — Medication 3 MILLILITER(S): at 10:41

## 2022-04-06 RX ADMIN — PIPERACILLIN AND TAZOBACTAM 25 GRAM(S): 4; .5 INJECTION, POWDER, LYOPHILIZED, FOR SOLUTION INTRAVENOUS at 14:31

## 2022-04-06 RX ADMIN — ACETAZOLAMIDE 250 MILLIGRAM(S): 250 TABLET ORAL at 06:38

## 2022-04-06 RX ADMIN — Medication 1 APPLICATION(S): at 18:27

## 2022-04-06 RX ADMIN — Medication 3 MILLILITER(S): at 16:41

## 2022-04-06 RX ADMIN — Medication 3 MILLILITER(S): at 04:43

## 2022-04-06 RX ADMIN — PIPERACILLIN AND TAZOBACTAM 25 GRAM(S): 4; .5 INJECTION, POWDER, LYOPHILIZED, FOR SOLUTION INTRAVENOUS at 22:07

## 2022-04-06 RX ADMIN — PIPERACILLIN AND TAZOBACTAM 25 GRAM(S): 4; .5 INJECTION, POWDER, LYOPHILIZED, FOR SOLUTION INTRAVENOUS at 05:58

## 2022-04-06 RX ADMIN — Medication 1 APPLICATION(S): at 05:58

## 2022-04-06 RX ADMIN — CHLORHEXIDINE GLUCONATE 15 MILLILITER(S): 213 SOLUTION TOPICAL at 05:58

## 2022-04-06 RX ADMIN — FENTANYL CITRATE 50 MICROGRAM(S): 50 INJECTION INTRAVENOUS at 14:56

## 2022-04-06 NOTE — PROGRESS NOTE ADULT - ASSESSMENT
80yo Female with mhx of HFpEF ef 65% CAD s/p CABG 2000 LIMA to LAD and SVG to OM1 and subsequent PCI to LCx,in 2011, severe pulm HTN, Bioprosthetic aortic and mitral valve replacement 9/2014, CKD per outside records, afib on warfarin, chronic Lt loculated pleural effusion, Type 2 DM, HTN, HLD, gout c/o 3-4 days of worsening dyspnea on exertion and lower extremity swelling    Tele: Afib 60s    1. Acute decompensated diastolic heart failure  - intubated and sedated planned for bronch today and possible extubation after   - BB held  in setting bradycardia. continue to monitor on tele now improved  - echo shows normaL AVR  and MVR normal LV function decreased RV function sev pulm HTN   - lasix held. monitor strict I&Os.   - CT shows large left pleural effusion s/p chest tube planned for bronch     2. Rash  - Pt with diffuse rash over body  - Ongoing for a year  - Management as per primary team    3. CAD s/p CABG and PCI  - CABG x2 in 2000 (LIMA to LAD, SVG-OM1)  - PCI in 2011 to LCX  - Last LHC in 2019 revealed patent stent and grafts  - Continue ASA, statin, isordil, and losartan  - continue to hold BB at this time due to bradycardia    3. S/p MVR & AVR  -s/p bioprosthetic valves  -echo with bioprosthetic MVR, AVR, grossly normal LV systolic function, decreased RV function and severe pHTN    4. Afib  -rate in 60s  -continue to hold BB  - INR 1.4 restart coumadin once ok with surgery

## 2022-04-06 NOTE — PROGRESS NOTE ADULT - SUBJECTIVE AND OBJECTIVE BOX
Follow Up:      Inverval History/ROS:Patient is a 81y old  Female who presents with a chief complaint of Acute on chronic diastolic heart failure exacerbation (05 Apr 2022 15:20)    Intubated. FiO2 @ 50 %  Off pressors    Allergies    No Known Allergies    Intolerances        ANTIMICROBIALS:  piperacillin/tazobactam IVPB.. 3.375 every 8 hours      OTHER MEDS:  acetaminophen   IVPB .. 1000 milliGRAM(s) IV Intermittent once PRN  acetaminophen   IVPB .. 1000 milliGRAM(s) IV Intermittent once PRN  albuterol/ipratropium for Nebulization 3 milliLiter(s) Nebulizer every 6 hours  AQUAPHOR (petrolatum Ointment) 1 Application(s) Topical every 12 hours  aspirin enteric coated 81 milliGRAM(s) Oral daily  chlorhexidine 0.12% Liquid 15 milliLiter(s) Oral Mucosa every 12 hours  dextrose 5%. 1000 milliLiter(s) IV Continuous <Continuous>  dextrose 5%. 1000 milliLiter(s) IV Continuous <Continuous>  dextrose 50% Injectable 12.5 Gram(s) IV Push once  dextrose Oral Gel 15 Gram(s) Oral once PRN  glucagon  Injectable 1 milliGRAM(s) IntraMuscular once  hydrALAZINE Injectable 5 milliGRAM(s) IV Push every 4 hours PRN  HYDROmorphone  Injectable 0.5 milliGRAM(s) IV Push every 3 hours PRN  insulin lispro (ADMELOG) corrective regimen sliding scale   SubCutaneous every 6 hours  lactated ringers. 1000 milliLiter(s) IV Continuous <Continuous>  norepinephrine Infusion 0.05 MICROgram(s)/kG/Min IV Continuous <Continuous>  propofol Infusion 50 MICROgram(s)/kG/Min IV Continuous <Continuous>  triamcinolone 0.1% Ointment 1 Application(s) Topical every 12 hours PRN      Vital Signs Last 24 Hrs  T(C): 36.8 (06 Apr 2022 08:00), Max: 37.2 (05 Apr 2022 16:00)  T(F): 98.3 (06 Apr 2022 08:00), Max: 99 (05 Apr 2022 16:00)  HR: 66 (06 Apr 2022 11:00) (49 - 85)  BP: 123/32 (06 Apr 2022 09:00) (81/33 - 190/60)  BP(mean): 64 (06 Apr 2022 09:00) (49 - 114)  RR: 16 (06 Apr 2022 11:00) (16 - 20)  SpO2: 100% (06 Apr 2022 11:00) (98% - 100%)    PHYSICAL EXAM:  General: [x ] non-toxic  HEAD/EYES: [ ] PERRL [x ] white sclera [ ] icterus  ENT:  [ ] normal [ ] supple [ ] thrush [ ] pharyngeal exudate  Cardiovascular:   [ ] murmur [ ] normal [ ] PPM/AICD  Respiratory:  [x ] clear to ausculation bilaterally  GI:  [x ] soft, non-tender, normal bowel sounds  :  [ ] vega [ x] no CVA tenderness   Musculoskeletal:  [ ] no synovitis  Neurologic:  [ ] non-focal exam   Skin:  [x ] no rash  Lymph: [ x] no lymphadenopathy  Psychiatric:  [ ] appropriate affect [ ] alert & oriented  Lines:  [x ] no phlebitis [ ] central line                                10.4   7.90  )-----------( 175      ( 06 Apr 2022 04:51 )             32.8       04-06    136  |  96<L>  |  33<H>  ----------------------------<  157<H>  4.3   |  26  |  1.02    Ca    8.5      06 Apr 2022 04:51  Phos  3.5     04-06  Mg     2.20     04-06            MICROBIOLOGY:Culture Results:   Testing in progress (04-05-22 @ 16:21)  Culture Results:   No Growth Final (03-31-22 @ 18:12)  Culture Results:   No Growth Final (03-31-22 @ 18:00)      RADIOLOGY:

## 2022-04-06 NOTE — PROCEDURE NOTE - ADDITIONAL PROCEDURE DETAILS
20 gauge, 12cm long catheter placed to left basilic vein using ultrasound guidance with sterile precautions for venous access.

## 2022-04-06 NOTE — PROGRESS NOTE ADULT - SUBJECTIVE AND OBJECTIVE BOX
CHIEF COMPLAINT: FOLLOW UP IN ICU FOR POSTOPERATIVE CARE OF PATIENT WHO IS S/P  LVATS, drainage of left pleural effusion          PROCEDURES:         Flexible Bronchoscopy, Left VATS, Pleural Biopsy, Drainage of Pleural Effusion, PleurX Catheter Placement, 1,100ml serous pleural fluid drained  4/5/2022       ISSUES:                  Acute respiratory failure   Bioprosthetic mitral and aortic valve  HFpEF, severe pulmonary HTN, RV dysfunction  Left pleural effusion  CAD s/p CABG-1  HTN              Postop pain              Chest tube in place         INTERVAL EVENTS:      Remains intubated on minimal vent settings. Off pressors.   Bloody secretions in airway, avoiding aggressive suction. Off therapeutic anticoagulation. Bronchoscopy planned this afternoon to reassess airway mucosa prior to extubation.        HISTORY:      Patient reports moderate pain at chest wall incision sites which is worse with coughing and deep breathing without associated fever or dyspnea. Pain is improved with use of pain meds.           PHYSICAL EXAM:      Gen: Comfortable, No acute distress     Eyes: Sclera white, Conjunctiva normal, Eyelids normal, Pupils symmetrical      ENT: Mucous membranes moist,  ,  ,       Neck: Trachea midline,  ,  ,  ,  ,  ,       CV: Rate regular, Rhythm regular,  ,  ,       Resp: Breath sounds clear, No accessory muscles use, L chest tube in place,  ,       Abd: Soft, Non-distended, Non-tender, Bowel sounds normal,  ,  ,       Skin: Warm,  peripheral edema of upper>lower extremities,  ,       : vega     Neuro: Moving all 4 extremities,       Psych: Sedated, moves ext when called              ASSESSMENT AND PLAN:           NEURO:     Sedation with propofol. Tylenol for pain as needed.    Wean and stop sedation after bronchoscopy this afternoon, possible extubation today           RESPIRATORY:     Mechanical ventilation - AC/VC, vt 350 RR 16 Fio2 40% PEEP 5.  SBT trial after bronchoscopy today. Avoid suction ETT to prevent airway bleeding unless elevated PIP.    Chest tube – Pleurevac regulated suctioning. Monitor chest tube output.         CARDIOVASCULAR:     Hemodynamically stable - Not on pressors. Continue hemodynamic monitoring.     Telemetry (medical test) - Reviewed by me today independently. Normal sinus rhythm.     Dilated RV hypokinetic with reduced function, systolic hepatic venous flow reversal on POCUS. Lasix 40mg iv and repeat dose this evening to aim for negative 1 liter fluid balance.      RENAL:     Stable - Monitor IOs and electrolytes. Keep K above 4.0 and Mg above 2.0.           GASTROINTESTINAL:     GI prophylaxis not indicated     Zofran and Reglan IV PRN for nausea     NPO for bronch and extubation today          HEMATOLOGIC:     No signs of active bleeding. Monitor Hgb in CBC in AM     DVT prophylaxis with heparin subQ and SCDs.           INFECTIOUS DISEASE:     All surgical sites appear clean. No signs of active infection. Will monitor for fever and leukocytosis.           ENDOCRINE:     Stable – Monitor glucose fingersticks for goal 120-180.           Pertinent clinical, laboratory, radiographic, hemodynamic, echocardiographic, respiratory data, microbiologic data and chart were reviewed by myself and analyzed frequently throughout the course of the day and night by myself.     Plan discussed at length with the CTICU staff and Attending CT Surgeon -   Dr. Dominic Conteh    Patient's status was discussed with patient at bedside.     Critical Care time spent 75 mins from 7AM to 11:59PM.    ________________________________________________     _________________________  VITAL SIGNS:  Vital Signs Last 24 Hrs  T(C): 36.8 (06 Apr 2022 08:00), Max: 37.2 (05 Apr 2022 16:00)  T(F): 98.3 (06 Apr 2022 08:00), Max: 99 (05 Apr 2022 16:00)  HR: 66 (06 Apr 2022 11:00) (49 - 85)  BP: 123/32 (06 Apr 2022 09:00) (81/33 - 190/60)  BP(mean): 64 (06 Apr 2022 09:00) (49 - 114)  RR: 16 (06 Apr 2022 11:00) (16 - 20)  SpO2: 100% (06 Apr 2022 11:00) (98% - 100%)  I/Os:   I&O's Detail    05 Apr 2022 07:01  -  06 Apr 2022 07:00  --------------------------------------------------------  IN:    IV PiggyBack: 400 mL    Lactated Ringers: 390 mL    Norepinephrine: 15.5 mL    Propofol: 236.1 mL  Total IN: 1041.6 mL    OUT:    Chest Tube (mL): 420 mL    Ureteral Catheter (mL): 1435 mL  Total OUT: 1855 mL    Total NET: -813.4 mL      06 Apr 2022 07:01  -  06 Apr 2022 12:21  --------------------------------------------------------  IN:    IV PiggyBack: 125 mL    Lactated Ringers: 150 mL    Propofol: 41 mL  Total IN: 316 mL    OUT:    Chest Tube (mL): 425 mL    Ureteral Catheter (mL): 260 mL  Total OUT: 685 mL    Total NET: -369 mL          Mode: AC/ CMV (Assist Control/ Continuous Mandatory Ventilation)  RR (machine): 16  TV (machine): 350  FiO2: 50  PEEP: 5  ITime: 0.7  MAP: 9  PIP: 21      MEDICATIONS:  MEDICATIONS  (STANDING):  albuterol/ipratropium for Nebulization 3 milliLiter(s) Nebulizer every 6 hours  AQUAPHOR (petrolatum Ointment) 1 Application(s) Topical every 12 hours  aspirin enteric coated 81 milliGRAM(s) Oral daily  chlorhexidine 0.12% Liquid 15 milliLiter(s) Oral Mucosa every 12 hours  dextrose 5%. 1000 milliLiter(s) (50 mL/Hr) IV Continuous <Continuous>  dextrose 5%. 1000 milliLiter(s) (100 mL/Hr) IV Continuous <Continuous>  dextrose 50% Injectable 12.5 Gram(s) IV Push once  glucagon  Injectable 1 milliGRAM(s) IntraMuscular once  insulin lispro (ADMELOG) corrective regimen sliding scale   SubCutaneous every 6 hours  lactated ringers. 1000 milliLiter(s) (30 mL/Hr) IV Continuous <Continuous>  norepinephrine Infusion 0.05 MICROgram(s)/kG/Min (6.42 mL/Hr) IV Continuous <Continuous>  piperacillin/tazobactam IVPB.. 3.375 Gram(s) IV Intermittent every 8 hours  propofol Infusion 50 MICROgram(s)/kG/Min (20.6 mL/Hr) IV Continuous <Continuous>    MEDICATIONS  (PRN):  acetaminophen   IVPB .. 1000 milliGRAM(s) IV Intermittent once PRN Temp greater or equal to 38C (100.4F), Mild Pain (1 - 3)  acetaminophen   IVPB .. 1000 milliGRAM(s) IV Intermittent once PRN Temp greater or equal to 38C (100.4F), Mild Pain (1 - 3)  dextrose Oral Gel 15 Gram(s) Oral once PRN Blood Glucose LESS THAN 70 milliGRAM(s)/deciliter  hydrALAZINE Injectable 5 milliGRAM(s) IV Push every 4 hours PRN SBP>160  HYDROmorphone  Injectable 0.5 milliGRAM(s) IV Push every 3 hours PRN Moderate Pain (4 - 6)  triamcinolone 0.1% Ointment 1 Application(s) Topical every 12 hours PRN itching      LABS:  Laboratory data was independently reviewed by me today.                           10.4   7.90  )-----------( 175      ( 06 Apr 2022 04:51 )             32.8     04-06    136  |  96<L>  |  33<H>  ----------------------------<  157<H>  4.3   |  26  |  1.02    Ca    8.5      06 Apr 2022 04:51  Phos  3.5     04-06  Mg     2.20     04-06        PT/INR - ( 06 Apr 2022 04:51 )   PT: 16.5 sec;   INR: 1.42 ratio         PTT - ( 06 Apr 2022 04:51 )  PTT:33.4 sec  ABG - ( 06 Apr 2022 04:51 )  pH, Arterial: 7.51  pH, Blood: x     /  pCO2: 42    /  pO2: 144   / HCO3: 34    / Base Excess: 9.5   /  SaO2: 99.4                  RADIOLOGY:   Radiology images were independently reviewed by me today. Reports were reviewed by me today.    Xray Chest 1 View AP/PA:   ACC: [id number] EXAM:  XR CHEST AP OR PA 1V                          PROCEDURE DATE:  04/06/2022          INTERPRETATION:  TIME OF EXAM: April 6, 2022 at 5:36 AM    CLINICAL INFORMATION: Postop    TECHNIQUE:   Portable chest    INTERPRETATION:    Endotracheal and enteric tube in addition to left-sided Pleurx catheter   is present.. Decreasing subcutaneous emphysema. Lungs are free of focal   abnormalities. Heart is enlarged but stable. No pneumothorax.      COMPARISON:  April 5      IMPRESSION:  Status post left thoracotomy with chest tube.    --- End of Report ---            HERMAN CLARK MD; Attending Radiologist  This document has been electronically signed. Apr 6 2022 11:13AM (04-06-22 @ 05:56)  Xray Chest 1 View- PORTABLE-Urgent:   ACC: [id number] EXAM:  XR CHEST PORTABLE URGENT 1V                          PROCEDURE DATE:  04/05/2022          INTERPRETATION:  CLINICAL INFORMATION: Postoperative radiograph.    TECHNIQUE: Frontal view of the chest    COMPARISON: Chest radiograph4/4/2022.    FINDINGS:  Endotracheal tube terminates above the karen.  The lungs are clear. Left-sided chest tube with interval resolution of   previously noted large left pleural effusion.. Left chest wall   subcutaneous emphysema.    Heart size isnot accurately assessed in this projection. Median   sternotomy with aortic and mitral valve prostheses.    No pneumothorax.        IMPRESSION:    Interval placement of left-sided chest tube with resolution of large left   pleural effusion. Associated subcutaneous emphysema of the left chest   wall.        --- End of Report ---          REJI SIN MD; Resident Radiology  This document has been electronically signed.  HERMAN CLARK MD; Attending Radiologist  This document has been electronically signed. Apr 5 2022  6:12PM (04-05-22 @ 15:23)  Xray Chest 1 View- PORTABLE-Routine:   ACC: [id number] EXAM:  XR CHEST PORTABLE ROUTINE 1V                          PROCEDURE DATE:  04/04/2022          INTERPRETATION:  Indication: Left lower lobe and left upper lobe collapse.    TECHNIQUE: Single portable view of the chest    COMPARISON: 3/31/2022    FINDINGS: The heart size cannot be adequately assessed on this single   view. The patient is status post median sternotomy and valve repair.   There is complete opacification of the left hemithorax, not significantly   changed. The right lung is grossly clear. There is bilateral glenohumeral   arthrosis.    IMPRESSION: Complete opacification of the left hemithorax, likely   secondary to left pleural effusion and left lung atelectasis, unchanged.        --- End of Report ---            DARLING LARA MD; Attending Radiologist  This document has been electronically signed. Apr 5 2022  5:56PM (04-04-22 @ 07:37)

## 2022-04-06 NOTE — CHART NOTE - NSCHARTNOTEFT_GEN_A_CORE
Bedside Bronchoscopy performed by Dr. Conteh this afternoon  Sedated w/ propofol drip  Clot suctioned from Right mainstem airway  Minor oozing noted  No complications  CXR s/p bronchoscopy unchanged  Plan is to transfuse 2uFFP and wean sedation to extubate

## 2022-04-06 NOTE — PROGRESS NOTE ADULT - SUBJECTIVE AND OBJECTIVE BOX
Date of Service: 04-06-22 @ 12:55    Patient is a 81y old  Female who presents with a chief complaint of Acute on chronic diastolic heart failure exacerbation (06 Apr 2022 12:14)      Any change in ROS: still intuabted:     MEDICATIONS  (STANDING):  albuterol/ipratropium for Nebulization 3 milliLiter(s) Nebulizer every 6 hours  AQUAPHOR (petrolatum Ointment) 1 Application(s) Topical every 12 hours  aspirin enteric coated 81 milliGRAM(s) Oral daily  chlorhexidine 0.12% Liquid 15 milliLiter(s) Oral Mucosa every 12 hours  dextrose 5%. 1000 milliLiter(s) (50 mL/Hr) IV Continuous <Continuous>  dextrose 5%. 1000 milliLiter(s) (100 mL/Hr) IV Continuous <Continuous>  dextrose 50% Injectable 12.5 Gram(s) IV Push once  glucagon  Injectable 1 milliGRAM(s) IntraMuscular once  insulin lispro (ADMELOG) corrective regimen sliding scale   SubCutaneous every 6 hours  norepinephrine Infusion 0.05 MICROgram(s)/kG/Min (6.42 mL/Hr) IV Continuous <Continuous>  piperacillin/tazobactam IVPB.. 3.375 Gram(s) IV Intermittent every 8 hours  propofol Infusion 50 MICROgram(s)/kG/Min (20.6 mL/Hr) IV Continuous <Continuous>    MEDICATIONS  (PRN):  acetaminophen   IVPB .. 1000 milliGRAM(s) IV Intermittent once PRN Temp greater or equal to 38C (100.4F), Mild Pain (1 - 3)  acetaminophen   IVPB .. 1000 milliGRAM(s) IV Intermittent once PRN Temp greater or equal to 38C (100.4F), Mild Pain (1 - 3)  dextrose Oral Gel 15 Gram(s) Oral once PRN Blood Glucose LESS THAN 70 milliGRAM(s)/deciliter  hydrALAZINE Injectable 5 milliGRAM(s) IV Push every 4 hours PRN SBP>160  HYDROmorphone  Injectable 0.5 milliGRAM(s) IV Push every 3 hours PRN Moderate Pain (4 - 6)  triamcinolone 0.1% Ointment 1 Application(s) Topical every 12 hours PRN itching    Vital Signs Last 24 Hrs  T(C): 36.3 (06 Apr 2022 12:00), Max: 37.2 (05 Apr 2022 16:00)  T(F): 97.3 (06 Apr 2022 12:00), Max: 99 (05 Apr 2022 16:00)  HR: 65 (06 Apr 2022 12:00) (49 - 85)  BP: 123/32 (06 Apr 2022 09:00) (107/40 - 190/60)  BP(mean): 64 (06 Apr 2022 09:00) (55 - 114)  RR: 16 (06 Apr 2022 12:00) (16 - 20)  SpO2: 100% (06 Apr 2022 12:00) (98% - 100%)  Mode: AC/ CMV (Assist Control/ Continuous Mandatory Ventilation)  RR (machine): 16  TV (machine): 350  FiO2: 50  PEEP: 5  ITime: 0.7  MAP: 9  PIP: 21    I&O's Summary    05 Apr 2022 07:01  -  06 Apr 2022 07:00  --------------------------------------------------------  IN: 1041.6 mL / OUT: 1855 mL / NET: -813.4 mL    06 Apr 2022 07:01  -  06 Apr 2022 12:55  --------------------------------------------------------  IN: 316 mL / OUT: 685 mL / NET: -369 mL          Physical Exam:   GENERAL: NAD, well-groomed, well-developed  HEENT: BRADY/   Atraumatic, Normocephalic  ENMT: No tonsillar erythema, exudates, or enlargement; Moist mucous membranes, Good dentition, No lesions  NECK: Supple, No JVD, Normal thyroid  CHEST/LUNG: Clear to auscultaion,  CVS: Regular rate and rhythm; No murmurs, rubs, or gallops  GI: : Soft, Nontender, Nondistended; Bowel sounds present  NERVOUS SYSTEM:  sedated and intuabted  EXTREMITIES:  - edema  LYMPH: No lymphadenopathy noted  SKIN: No rashes or lesions  ENDOCRINOLOGY: No Thyromegaly  PSYCH: sedated    Labs:  ABG - ( 06 Apr 2022 04:51 )  pH, Arterial: 7.51  pH, Blood: x     /  pCO2: 42    /  pO2: 144   / HCO3: 34    / Base Excess: 9.5   /  SaO2: 99.4                                        10.4   7.90  )-----------( 175      ( 06 Apr 2022 04:51 )             32.8                         11.6   9.17  )-----------( 180      ( 05 Apr 2022 19:09 )             37.1                         10.8   8.07  )-----------( 170      ( 05 Apr 2022 07:17 )             35.2                         10.7   8.51  )-----------( 174      ( 05 Apr 2022 02:54 )             34.2                         10.7   8.24  )-----------( 163      ( 04 Apr 2022 07:22 )             34.4                         11.6   8.77  )-----------( 161      ( 03 Apr 2022 07:09 )             39.0     04-06    136  |  96<L>  |  33<H>  ----------------------------<  157<H>  4.3   |  26  |  1.02  04-05    138  |  95<L>  |  31<H>  ----------------------------<  165<H>  3.6   |  28  |  1.02  04-05    137  |  98  |  32<H>  ----------------------------<  104<H>  4.0   |  29  |  1.07  04-04    135  |  97<L>  |  34<H>  ----------------------------<  89  4.8   |  26  |  1.15  04-03    137  |  96<L>  |  33<H>  ----------------------------<  79  4.5   |  28  |  1.16    Ca    8.5      06 Apr 2022 04:51  Ca    9.0      05 Apr 2022 19:09  Ca    8.7      05 Apr 2022 02:54  Phos  3.5     04-06  Phos  3.6     04-05  Phos  3.0     04-05  Mg     2.20     04-06  Mg     1.90     04-05  Mg     1.90     04-05      CAPILLARY BLOOD GLUCOSE      POCT Blood Glucose.: 133 mg/dL (06 Apr 2022 12:13)  POCT Blood Glucose.: 136 mg/dL (06 Apr 2022 06:47)  POCT Blood Glucose.: 154 mg/dL (05 Apr 2022 17:13)     (04-05 @ 10:45)      PT/INR - ( 06 Apr 2022 04:51 )   PT: 16.5 sec;   INR: 1.42 ratio         PTT - ( 06 Apr 2022 04:51 )  PTT:33.4 sec    Fluid Source --  Albumin, Fluid--  Glucose, Fluid--  Protein total, Fluid--  Lacatate Dehydrogenase, Fluid--  pH, Fluid--  Cytopathology-Non Gyn Report  ACCESSION No:  [id number]  Patient:   CATRACHITO WOODS      Accession:                             [id number]    Collected Date/Time:                   4/5/2022 10:45 EDT  Received Date/Time:                    4/5/2022 15:12 EDT    Non-Gynecologic Report - Auth (Verified)    Specimen(s) Submitted  PLEURAL FLUID, LEFT    Final Diagnosis  PLEURAL FLUID, LEFT  NEGATIVE FOR MALIGNANT CELLS.    Cytology slides and cell block shows histiocytes and scattered benign  mesothelial cells.      Pleasesee concurrent pathology report:   20-A-  Screened by: Richmond BISWAS(ASCP)  Verified by: Wilmer Carrera MD  (Electronic Signature)  Reported on: 04/06/22 12:13 EDT, [facility], [address] [place] [address]  Phone: [phone]   Fax: [phone]  Cytology technical processing performed at [address] [place] [id number]  _________________________________________________________________      Clinical Information  Pleural effusion.    Gross Description  Received: 90  ml of yellow fluid in CytoLyt  Prepared: 1 ThinPrep slide, 1 cell block, smear        RECENT CULTURES:  04-05 @ 16:21 .Bronchial left bronchial lavage       No polymorphonuclear leukocytes seen per low power field  No Squamous epithelial cells seen per low power field  No organisms seen per oil power field           Testing in progress    03-31 @ 18:12 .Blood Blood-Venous                No Growth Final    03-31 @ 18:00 .Blood Blood-Peripheral                No Growth Final  radrad< from: Xray Chest 1 View AP/PA (04.06.22 @ 05:56) >    ACC: [id number] EXAM:  XR CHEST AP OR PA 1V                          PROCEDURE DATE:  04/06/2022          INTERPRETATION:  TIME OF EXAM: April 6, 2022 at 5:36 AM    CLINICAL INFORMATION: Postop    TECHNIQUE:   Portable chest    INTERPRETATION:    Endotracheal and enteric tube in addition to left-sided Pleurx catheter   is present.. Decreasing subcutaneous emphysema. Lungs are free of focal   abnormalities. Heart is enlarged but stable. No pneumothorax.      COMPARISON:  April 5      IMPRESSION:  Status post left thoracotomy with chest tube.    --- End of Report ---            HERMAN CLARK MD; Attending Radiologist  This document has been electronically signed. Apr 6 2022 11:13AM    < end of copied text >          RESPIRATORY CULTURES:          Studies  Chest X-RAY  CT SCAN Chest   Venous Dopplers: LE:   CT Abdomen  Others

## 2022-04-06 NOTE — PROGRESS NOTE ADULT - SUBJECTIVE AND OBJECTIVE BOX
Collin Trejo MD  Interventional Cardiology / Endovascular Specialist  [place] Office : [address] [place] [address] [place] [address]  Tel:   [place] Office : [address] [facility] [place] [address]  Tel: [phone]    Subjective/Overnight events: Patient lying in bed intubated and sedated .   	  MEDICATIONS:  aspirin enteric coated 81 milliGRAM(s) Oral daily  hydrALAZINE Injectable 5 milliGRAM(s) IV Push every 4 hours PRN  norepinephrine Infusion 0.05 MICROgram(s)/kG/Min IV Continuous <Continuous>    piperacillin/tazobactam IVPB.. 3.375 Gram(s) IV Intermittent every 8 hours    albuterol/ipratropium for Nebulization 3 milliLiter(s) Nebulizer every 6 hours    acetaminophen   IVPB .. 1000 milliGRAM(s) IV Intermittent once PRN  acetaminophen   IVPB .. 1000 milliGRAM(s) IV Intermittent once PRN  fentaNYL    Injectable 50 MICROGram(s) IV Push once  HYDROmorphone  Injectable 0.5 milliGRAM(s) IV Push every 3 hours PRN  propofol Infusion 50 MICROgram(s)/kG/Min IV Continuous <Continuous>      dextrose 50% Injectable 12.5 Gram(s) IV Push once  dextrose Oral Gel 15 Gram(s) Oral once PRN  glucagon  Injectable 1 milliGRAM(s) IntraMuscular once  insulin lispro (ADMELOG) corrective regimen sliding scale   SubCutaneous every 6 hours    AQUAPHOR (petrolatum Ointment) 1 Application(s) Topical every 12 hours  chlorhexidine 0.12% Liquid 15 milliLiter(s) Oral Mucosa every 12 hours  dextrose 5%. 1000 milliLiter(s) IV Continuous <Continuous>  dextrose 5%. 1000 milliLiter(s) IV Continuous <Continuous>  triamcinolone 0.1% Ointment 1 Application(s) Topical every 12 hours PRN      PAST MEDICAL/SURGICAL HISTORY  PAST MEDICAL & SURGICAL HISTORY:  HTN (Hypertension)    Afib  (on Warfarin)    CAD (Coronary Artery Disease)  s/p stent and CABG    CVA (Cerebral Infarction)  2011    CHF (congestive heart failure)  EF 65% Oct 2019    Upper GI bleed    Gout    Diabetes mellitus  type 2, not on meds    History of heart valve replacement with bioprosthetic valve  mitral and aortic    Hyperlipidemia    S/P angioplasty with stent  2011    S/P CABG x 1  (2000)    H/O aortic valve replacement  9/22/14    H/O mitral valve replacement  9/22/14        SOCIAL HISTORY: Substance Use (street drugs): ( x ) never used  (  ) other:    FAMILY HISTORY:  Family history of acute myocardial infarction  mother        REVIEW OF SYSTEMS:  unable to obtain     PHYSICAL EXAM:  T(C): 36.3 (04-06-22 @ 12:00), Max: 37.2 (04-05-22 @ 16:00)  HR: 65 (04-06-22 @ 13:00) (49 - 85)  BP: 123/32 (04-06-22 @ 09:00) (107/40 - 177/70)  RR: 16 (04-06-22 @ 13:00) (16 - 20)  SpO2: 100% (04-06-22 @ 13:00) (98% - 100%)  Wt(kg): --  I&O's Summary    05 Apr 2022 07:01  -  06 Apr 2022 07:00  --------------------------------------------------------  IN: 1041.6 mL / OUT: 1855 mL / NET: -813.4 mL    06 Apr 2022 07:01  -  06 Apr 2022 13:58  --------------------------------------------------------  IN: 316 mL / OUT: 685 mL / NET: -369 mL      EYES:   PERRLA   ENMT:   Moist mucous membranes, Good dentition, No lesions  Cardiovascular: Normal S1 S2, No JVD, No murmurs, No edema  Respiratory: left sided decreased breath sounds  Gastrointestinal:  Soft, Non-tender, + BS	  Extremities: no edema                            10.4   7.90  )-----------( 175      ( 06 Apr 2022 04:51 )             32.8     04-06    136  |  96<L>  |  33<H>  ----------------------------<  157<H>  4.3   |  26  |  1.02    Ca    8.5      06 Apr 2022 04:51  Phos  3.5     04-06  Mg     2.20     04-06      proBNP:   Lipid Profile:   HgA1c:   TSH:     Consultant(s) Notes Reviewed:  [x ] YES  [ ] NO    Care Discussed with Consultants/Other Providers [ x] YES  [ ] NO    Imaging Personally Reviewed independently:  [x] YES  [ ] NO    All labs, radiologic studies, vitals, orders and medications list reviewed. Patient is seen and examined at bedside. Case discussed with medical team.

## 2022-04-06 NOTE — PROGRESS NOTE ADULT - SUBJECTIVE AND OBJECTIVE BOX
Date of Service  : 04-06-22     INTERVAL HPI/OVERNIGHT EVENTS: remains intubated.   Vital Signs Last 24 Hrs  T(C): 36.4 (06 Apr 2022 16:00), Max: 36.8 (06 Apr 2022 08:00)  T(F): 97.6 (06 Apr 2022 16:00), Max: 98.3 (06 Apr 2022 08:00)  HR: 70 (06 Apr 2022 17:00) (49 - 70)  BP: 123/32 (06 Apr 2022 09:00) (123/32 - 126/38)  BP(mean): 64 (06 Apr 2022 09:00) (59 - 64)  RR: 16 (06 Apr 2022 17:00) (16 - 16)  SpO2: 100% (06 Apr 2022 17:00) (97% - 100%)  I&O's Summary    05 Apr 2022 07:01  -  06 Apr 2022 07:00  --------------------------------------------------------  IN: 1041.6 mL / OUT: 1855 mL / NET: -813.4 mL    06 Apr 2022 07:01  -  06 Apr 2022 18:23  --------------------------------------------------------  IN: 784.4 mL / OUT: 1020 mL / NET: -235.6 mL      MEDICATIONS  (STANDING):  albuterol/ipratropium for Nebulization 3 milliLiter(s) Nebulizer every 6 hours  AQUAPHOR (petrolatum Ointment) 1 Application(s) Topical every 12 hours  aspirin enteric coated 81 milliGRAM(s) Oral daily  chlorhexidine 0.12% Liquid 15 milliLiter(s) Oral Mucosa every 12 hours  dextrose 5%. 1000 milliLiter(s) (50 mL/Hr) IV Continuous <Continuous>  dextrose 5%. 1000 milliLiter(s) (100 mL/Hr) IV Continuous <Continuous>  dextrose 50% Injectable 12.5 Gram(s) IV Push once  glucagon  Injectable 1 milliGRAM(s) IntraMuscular once  insulin lispro (ADMELOG) corrective regimen sliding scale   SubCutaneous every 6 hours  norepinephrine Infusion 0.05 MICROgram(s)/kG/Min (6.42 mL/Hr) IV Continuous <Continuous>  piperacillin/tazobactam IVPB.. 3.375 Gram(s) IV Intermittent every 8 hours  propofol Infusion 50 MICROgram(s)/kG/Min (20.6 mL/Hr) IV Continuous <Continuous>    MEDICATIONS  (PRN):  acetaminophen   IVPB .. 1000 milliGRAM(s) IV Intermittent once PRN Temp greater or equal to 38C (100.4F), Mild Pain (1 - 3)  acetaminophen   IVPB .. 1000 milliGRAM(s) IV Intermittent once PRN Temp greater or equal to 38C (100.4F), Mild Pain (1 - 3)  dextrose Oral Gel 15 Gram(s) Oral once PRN Blood Glucose LESS THAN 70 milliGRAM(s)/deciliter  hydrALAZINE Injectable 5 milliGRAM(s) IV Push every 4 hours PRN SBP>160  HYDROmorphone  Injectable 0.5 milliGRAM(s) IV Push every 3 hours PRN Moderate Pain (4 - 6)  triamcinolone 0.1% Ointment 1 Application(s) Topical every 12 hours PRN itching    LABS:                        10.4   7.90  )-----------( 175      ( 06 Apr 2022 04:51 )             32.8     04-06    136  |  96<L>  |  33<H>  ----------------------------<  157<H>  4.3   |  26  |  1.02    Ca    8.5      06 Apr 2022 04:51  Phos  3.5     04-06  Mg     2.20     04-06      PT/INR - ( 06 Apr 2022 04:51 )   PT: 16.5 sec;   INR: 1.42 ratio         PTT - ( 06 Apr 2022 04:51 )  PTT:33.4 sec    CAPILLARY BLOOD GLUCOSE      POCT Blood Glucose.: 134 mg/dL (06 Apr 2022 17:53)  POCT Blood Glucose.: 133 mg/dL (06 Apr 2022 12:13)  POCT Blood Glucose.: 136 mg/dL (06 Apr 2022 06:47)      ABG - ( 06 Apr 2022 04:51 )  pH, Arterial: 7.51  pH, Blood: x     /  pCO2: 42    /  pO2: 144   / HCO3: 34    / Base Excess: 9.5   /  SaO2: 99.4                    Consultant(s) Notes Reviewed:  [x ] YES  [ ] NO    PHYSICAL EXAM:  GENERAL: NADIntubated   HEAD:  Atraumatic, Normocephalic  NECK: Supple, No JVD, Normal thyroid  NERVOUS SYSTEM:  Sedated.   CHEST/LUNG: Good air entry bilateral with no  rales, rhonchi, wheezing, or rubs  HEART: Regular rate and rhythm; No murmurs, rubs, or gallops  ABDOMEN: Soft, Nontender, Nondistended; Bowel sounds present  EXTREMITIES:  2+ Peripheral Pulses, No clubbing, cyanosis, or edema      Care Discussed with Consultants/Other Providers [ x] YES  [ ] NO

## 2022-04-06 NOTE — PROGRESS NOTE ADULT - ASSESSMENT
82yo Female with mhx of HFpEF ef 65% CAD (CABG 2000, stent 2011), severe pulm HTN, Bioprosthetic aortic and mitral valve replacement 9/2014, ?CKD per outside records, afib on warfarin, chronic Lt loculated pleural effusion,  Type 2 DM, HTN, HLD, gout a/w acute on chronic HFpEF i/s/p severe pHTN c/b hypercapnia; Found to have persisting moderate size loculated lt pleural effusion with atelectasis;         Problem/Plan - 1:  ·  Problem: Acute on chronic diastolic heart failure .   ·  Plan: Acute on chronic HFpEF likely in setting of decreased diuretic dose and severe pulm HTN as evidenced by prior TTE dated 10/10/2019;  Volume overloaded on exam with JVD and peripheral nonpitting edema to thighs  -Started Lasix 40 IV BID  -TTE  -Cardiology help appreciated.      Problem/Plan - 2:  ·  Problem: Fever .   ·  Plan: Sepsis work up in progress.  IV Abxs started .  D helping.      Problem/Plan - 3:  ·  Problem: Loculated pleural effusion with Hypercapnia with Collapse left side .   ·  Plan: Persisting loculated effusion so Pulmonary helping.   S/P Flexible Bronchoscopy, Left VATS, Pleural Biopsy, Drainage of Pleural Effusion, PleurX Catheter Placement  < from: CT Chest No Cont (03.27.22 @ 20:25) >  IMPRESSION:    Small left pleural effusion with a loculated component in the left major   fissure and along left anterior chest wall. The loculated components are   new when compared to prior CT exam dated 10/13/2019. Associated partial   left lower lobe and left upper lobe collapse.    New pleural thickening and/or loculated fluid medially along the left   upper hemithorax.    Small right pleural effusion and adjacent passive atelectasis.    Stable 9 mm right apical nodule.    < end of copied text >     Problem/Plan - 4:  ·  Problem: Chronic atrial fibrillation.   ·  Plan: [id number] risk stratification = 7  Continue home coreg 12.5mg BID with hold parameters  Holding  Coumadin for Procedure.      Problem/Plan - 5:  ·  Problem: Pulmonary HTN.   ·  Plan: Severe pulm HTN likely class II  Repeat TTE this admission.     Problem/Plan - 6:  ·  Problem: LAYLA Thrombus .  ·  Plan: On AC.      Problem/Plan - 7:  ·  Problem: Papular rash, generalized with Eosinophilia .  ·  Plan: Generalized papular rash of 1y duration previously attributed to hydralazine which was discontinued without improvement of rash  Uses hydrocortisone 2.5% cream intermittently with mild improvement of pruritis  With chronic eosinophilia noted per chart  -Full medication review may be beneficial of meds possibly causing eosinophilia  -Dermatology consult noted.      Problem/Plan - 8:  ·  Problem: Hypertension.   ·  Plan: Restart home losartan, pressures can tolerate SBP 180s in ED  Cont Carvedilol.     Problem/Plan - 9:  ·  Problem: Hyperlipidemia.   ·  Plan: Continue home statin.     Problem/Plan - 10:  ·  Problem: T2DM (type 2 diabetes mellitus).   ·  Plan; HOLD home glipizide while inpatient   Continue home gabapentin for diabetic neuropathy  KRYSTYNA.     Problem/Plan - 11:  ·  Problem: Cerebrovascular accident (CVA).   ·  Plan: Without residual deficits at this time per daughter and patient although unclear clinical course occurring years ago. Continue to monitor and continue ASA and statin.     Problem/Plan - 12:  ·  Problem: CAD (coronary artery disease).   ·  Plan: Continue GDMT    Full code.   Called daughter Humberto and left message.

## 2022-04-06 NOTE — PROGRESS NOTE ADULT - ASSESSMENT
81 f with DM, HTN, CAD s/p CABG, HFpEF  severe pulm HTN, Bioprosthetic aortic and mitral valve replacement 9/2014, ?CKD per outside records, afib on warfarin, chronic Lt loculated pleural effusion, gout, presented 3/27 with worsening dyspnea on exertion and lower extremity swelling as well as 1 year history of generalized rash and was admitted for CHF exacerbation  initially afebrile, WBC normal  Chest CT: Small left pleural effusion with a loculated component in the left major fissure and along left anterior chest wall,  partial LLL and LEXI collapse. New pleural thickening and/or loculated fluid medially along the left upper hemithorax. Small right pleural effusion and adjacent passive atelectasis. Stable 9 mm right apical nodule.  pt was seen by cardio and pulmonary, did not recommend any thoracentesis and was being diuresed   pt spiked to 101.6 on 3/31 and WBC normal    dyspnea due to CHF exacerbation, has h/o L loculated pleural effusion but now also with LLL and LEXI collapse, likely the cause of new fever, CT 4/2 showed new complete atelectasis of L lung from a mucous plug in L mainstem bronchus, s/p Flexible Bronchoscopy, Left VATS, Pleural Biopsy, Drainage of Pleural Effusion, PleurX Catheter Placement, 1,100ml serous pleural fluid drained, still intubated with hemoptysis going to CTU  eosinophilia to 800, was present in previous admissions as well but now also has a rash for about a year, derm's impression was xerosis    * blood cx negative and now pt afebrile  * f/u the OR cultures  * c/w zosyn, started 3/31  * f/u quantiferon, strongyloides, toxocara and filaria AB

## 2022-04-06 NOTE — PROGRESS NOTE ADULT - SUBJECTIVE AND OBJECTIVE BOX
POST ANESTHESIA EVALUATION    81y Female POSTOP DAY 1     MENTAL STATUS: Patient participation [  ] Awake     [  ] Arousable     [ x ] Sedated    AIRWAY PATENCY: [ x ] Satisfactory  [  ] Other:     Vital Signs Last 24 Hrs  T(C): 36.3 (06 Apr 2022 12:00), Max: 37.2 (05 Apr 2022 16:00)  T(F): 97.3 (06 Apr 2022 12:00), Max: 99 (05 Apr 2022 16:00)  HR: 65 (06 Apr 2022 13:00) (49 - 85)  BP: 123/32 (06 Apr 2022 09:00) (107/40 - 190/60)  BP(mean): 64 (06 Apr 2022 09:00) (55 - 114)  RR: 16 (06 Apr 2022 13:00) (16 - 20)  SpO2: 100% (06 Apr 2022 13:00) (98% - 100%)  I&O's Summary    05 Apr 2022 07:01  -  06 Apr 2022 07:00  --------------------------------------------------------  IN: 1041.6 mL / OUT: 1855 mL / NET: -813.4 mL    06 Apr 2022 07:01  -  06 Apr 2022 13:28  --------------------------------------------------------  IN: 316 mL / OUT: 685 mL / NET: -369 mL          NAUSEA/ VOMITTING:  [ x ] NONE  [  ] CONTROLLED [  ] OTHER     PAIN: [  x] CONTROLLED WITH CURRENT REGIMEN  [  ] OTHER    [ x ] NO APPARENT ANESTHESIA COMPLICATIONS      Comments: Discussed with ICU team member

## 2022-04-07 LAB
ANION GAP SERPL CALC-SCNC: 14 MMOL/L — SIGNIFICANT CHANGE UP (ref 7–14)
BUN SERPL-MCNC: 36 MG/DL — HIGH (ref 7–23)
CALCIUM SERPL-MCNC: 8.8 MG/DL — SIGNIFICANT CHANGE UP (ref 8.4–10.5)
CHLORIDE SERPL-SCNC: 97 MMOL/L — LOW (ref 98–107)
CO2 SERPL-SCNC: 27 MMOL/L — SIGNIFICANT CHANGE UP (ref 22–31)
CREAT SERPL-MCNC: 1.24 MG/DL — SIGNIFICANT CHANGE UP (ref 0.5–1.3)
CULTURE RESULTS: NO GROWTH — SIGNIFICANT CHANGE UP
EGFR: 43 ML/MIN/1.73M2 — LOW
GAS PNL BLDA: SIGNIFICANT CHANGE UP
GLUCOSE BLDC GLUCOMTR-MCNC: 117 MG/DL — HIGH (ref 70–99)
GLUCOSE BLDC GLUCOMTR-MCNC: 118 MG/DL — HIGH (ref 70–99)
GLUCOSE BLDC GLUCOMTR-MCNC: 90 MG/DL — SIGNIFICANT CHANGE UP (ref 70–99)
GLUCOSE SERPL-MCNC: 119 MG/DL — HIGH (ref 70–99)
HCT VFR BLD CALC: 31.1 % — LOW (ref 34.5–45)
HGB BLD-MCNC: 9.9 G/DL — LOW (ref 11.5–15.5)
MAGNESIUM SERPL-MCNC: 2.3 MG/DL — SIGNIFICANT CHANGE UP (ref 1.6–2.6)
MCHC RBC-ENTMCNC: 28.3 PG — SIGNIFICANT CHANGE UP (ref 27–34)
MCHC RBC-ENTMCNC: 31.8 GM/DL — LOW (ref 32–36)
MCV RBC AUTO: 88.9 FL — SIGNIFICANT CHANGE UP (ref 80–100)
NRBC # BLD: 0 /100 WBCS — SIGNIFICANT CHANGE UP
NRBC # FLD: 0 K/UL — SIGNIFICANT CHANGE UP
PLATELET # BLD AUTO: 183 K/UL — SIGNIFICANT CHANGE UP (ref 150–400)
POTASSIUM SERPL-MCNC: 3.8 MMOL/L — SIGNIFICANT CHANGE UP (ref 3.5–5.3)
POTASSIUM SERPL-SCNC: 3.8 MMOL/L — SIGNIFICANT CHANGE UP (ref 3.5–5.3)
RBC # BLD: 3.5 M/UL — LOW (ref 3.8–5.2)
RBC # FLD: 15 % — HIGH (ref 10.3–14.5)
SODIUM SERPL-SCNC: 138 MMOL/L — SIGNIFICANT CHANGE UP (ref 135–145)
SPECIMEN SOURCE: SIGNIFICANT CHANGE UP
T CANIS AB FLD-ACNC: NEGATIVE — SIGNIFICANT CHANGE UP
WBC # BLD: 11.36 K/UL — HIGH (ref 3.8–10.5)
WBC # FLD AUTO: 11.36 K/UL — HIGH (ref 3.8–10.5)

## 2022-04-07 PROCEDURE — 99233 SBSQ HOSP IP/OBS HIGH 50: CPT

## 2022-04-07 PROCEDURE — 71045 X-RAY EXAM CHEST 1 VIEW: CPT | Mod: 26

## 2022-04-07 PROCEDURE — 99232 SBSQ HOSP IP/OBS MODERATE 35: CPT

## 2022-04-07 RX ORDER — SODIUM CHLORIDE 9 MG/ML
4 INJECTION INTRAMUSCULAR; INTRAVENOUS; SUBCUTANEOUS EVERY 6 HOURS
Refills: 0 | Status: COMPLETED | OUTPATIENT
Start: 2022-04-07 | End: 2022-04-12

## 2022-04-07 RX ORDER — CARVEDILOL PHOSPHATE 80 MG/1
3.12 CAPSULE, EXTENDED RELEASE ORAL EVERY 12 HOURS
Refills: 0 | Status: DISCONTINUED | OUTPATIENT
Start: 2022-04-07 | End: 2022-04-13

## 2022-04-07 RX ORDER — POTASSIUM CHLORIDE 20 MEQ
20 PACKET (EA) ORAL ONCE
Refills: 0 | Status: COMPLETED | OUTPATIENT
Start: 2022-04-07 | End: 2022-04-07

## 2022-04-07 RX ORDER — CARVEDILOL PHOSPHATE 80 MG/1
3.12 CAPSULE, EXTENDED RELEASE ORAL ONCE
Refills: 0 | Status: COMPLETED | OUTPATIENT
Start: 2022-04-07 | End: 2022-04-07

## 2022-04-07 RX ORDER — DORNASE ALFA 1 MG/ML
2.5 SOLUTION RESPIRATORY (INHALATION)
Refills: 0 | Status: DISCONTINUED | OUTPATIENT
Start: 2022-04-07 | End: 2022-04-13

## 2022-04-07 RX ORDER — ISOSORBIDE DINITRATE 5 MG/1
10 TABLET ORAL
Refills: 0 | Status: DISCONTINUED | OUTPATIENT
Start: 2022-04-07 | End: 2022-04-13

## 2022-04-07 RX ADMIN — DORNASE ALFA 2.5 MILLIGRAM(S): 1 SOLUTION RESPIRATORY (INHALATION) at 09:41

## 2022-04-07 RX ADMIN — DORNASE ALFA 2.5 MILLIGRAM(S): 1 SOLUTION RESPIRATORY (INHALATION) at 23:48

## 2022-04-07 RX ADMIN — Medication 81 MILLIGRAM(S): at 11:56

## 2022-04-07 RX ADMIN — Medication 3 MILLILITER(S): at 19:30

## 2022-04-07 RX ADMIN — Medication 50 MILLIEQUIVALENT(S): at 02:04

## 2022-04-07 RX ADMIN — Medication 3 MILLILITER(S): at 16:05

## 2022-04-07 RX ADMIN — Medication 50 MILLIEQUIVALENT(S): at 06:30

## 2022-04-07 RX ADMIN — PIPERACILLIN AND TAZOBACTAM 25 GRAM(S): 4; .5 INJECTION, POWDER, LYOPHILIZED, FOR SOLUTION INTRAVENOUS at 05:18

## 2022-04-07 RX ADMIN — Medication 5 MILLIGRAM(S): at 00:22

## 2022-04-07 RX ADMIN — ISOSORBIDE DINITRATE 10 MILLIGRAM(S): 5 TABLET ORAL at 17:31

## 2022-04-07 RX ADMIN — Medication 1 APPLICATION(S): at 17:34

## 2022-04-07 RX ADMIN — Medication 3 MILLILITER(S): at 09:41

## 2022-04-07 RX ADMIN — SODIUM CHLORIDE 4 MILLILITER(S): 9 INJECTION INTRAMUSCULAR; INTRAVENOUS; SUBCUTANEOUS at 09:42

## 2022-04-07 RX ADMIN — Medication 1 APPLICATION(S): at 05:21

## 2022-04-07 RX ADMIN — SODIUM CHLORIDE 4 MILLILITER(S): 9 INJECTION INTRAMUSCULAR; INTRAVENOUS; SUBCUTANEOUS at 19:20

## 2022-04-07 RX ADMIN — CARVEDILOL PHOSPHATE 3.12 MILLIGRAM(S): 80 CAPSULE, EXTENDED RELEASE ORAL at 11:56

## 2022-04-07 RX ADMIN — PIPERACILLIN AND TAZOBACTAM 25 GRAM(S): 4; .5 INJECTION, POWDER, LYOPHILIZED, FOR SOLUTION INTRAVENOUS at 14:55

## 2022-04-07 RX ADMIN — SODIUM CHLORIDE 4 MILLILITER(S): 9 INJECTION INTRAMUSCULAR; INTRAVENOUS; SUBCUTANEOUS at 16:08

## 2022-04-07 RX ADMIN — Medication 3 MILLILITER(S): at 05:44

## 2022-04-07 NOTE — PROGRESS NOTE ADULT - SUBJECTIVE AND OBJECTIVE BOX
Follow Up:  fever, lung collapse    Interval History, JATIN: pt extubated and the pleurex capped. no fever, no cough, no diarrhea               Allergies  No Known Allergies        ANTIMICROBIALS:      OTHER MEDS:  acetaminophen   IVPB .. 1000 milliGRAM(s) IV Intermittent once PRN  acetaminophen   IVPB .. 1000 milliGRAM(s) IV Intermittent once PRN  albuterol/ipratropium for Nebulization 3 milliLiter(s) Nebulizer every 6 hours  AQUAPHOR (petrolatum Ointment) 1 Application(s) Topical every 12 hours  aspirin enteric coated 81 milliGRAM(s) Oral daily  carvedilol 3.125 milliGRAM(s) Oral every 12 hours  dextrose 5%. 1000 milliLiter(s) IV Continuous <Continuous>  dextrose 5%. 1000 milliLiter(s) IV Continuous <Continuous>  dextrose 50% Injectable 12.5 Gram(s) IV Push once  dextrose Oral Gel 15 Gram(s) Oral once PRN  dornase balta Solution 2.5 milliGRAM(s) Inhalation two times a day  glucagon  Injectable 1 milliGRAM(s) IntraMuscular once  hydrALAZINE Injectable 5 milliGRAM(s) IV Push every 4 hours PRN  HYDROmorphone  Injectable 0.5 milliGRAM(s) IV Push every 3 hours PRN  insulin lispro (ADMELOG) corrective regimen sliding scale   SubCutaneous every 6 hours  isosorbide   dinitrate Tablet (ISORDIL) 10 milliGRAM(s) Oral two times a day  sodium chloride 3%  Inhalation 4 milliLiter(s) Inhalation every 6 hours  triamcinolone 0.1% Ointment 1 Application(s) Topical every 12 hours PRN      Vital Signs Last 24 Hrs  T(C): 36.8 (07 Apr 2022 14:30), Max: 36.8 (07 Apr 2022 14:30)  T(F): 98.2 (07 Apr 2022 14:30), Max: 98.2 (07 Apr 2022 14:30)  HR: 61 (07 Apr 2022 16:05) (61 - 84)  BP: 143/60 (07 Apr 2022 14:30) (127/61 - 156/60)  BP(mean): 72 (07 Apr 2022 13:40) (65 - 77)  RR: 20 (07 Apr 2022 14:30) (12 - 24)  SpO2: 94% (07 Apr 2022 16:05) (94% - 100%)    Physical Exam:  General:    NAD,  non toxic  Cardio:     regular S1, S2  Respiratory:    clear b/l,    no wheezing  abd:     soft,   BS +,   no tenderness  :   no CVAT,  no suprapubic tenderness  Musculoskeletal:   no joint swelling  vascular: no phlebitis  Skin:    no rash                        9.9    11.36 )-----------( 183      ( 07 Apr 2022 03:34 )             31.1       04-07    138  |  97<L>  |  36<H>  ----------------------------<  119<H>  3.8   |  27  |  1.24    Ca    8.8      07 Apr 2022 03:34  Phos  4.4     04-06  Mg     2.30     04-07            MICROBIOLOGY:  v  .Bronchial left bronchial lavage  04-05-22   No growth  --    No polymorphonuclear leukocytes seen per low power field  No Squamous epithelial cells seen per low power field  No organisms seen per oil power field      .Blood Blood-Venous  03-31-22   No Growth Final  --  --      .Blood Blood-Peripheral  03-31-22   No Growth Final  --  --          Rapid RVP Result: NotDetec (04-01 @ 13:08)        RADIOLOGY:  Images independently visualized and reviewed personally, findings as below    < from: Xray Chest 1 View- PORTABLE-Routine (Xray Chest 1 View- PORTABLE-Routine in AM.) (04.07.22 @ 07:03) >    The endotracheal and enteric tubes have been removed. Persistent opacity   at the right lung base likely lower lobe and possibly middle lobe   atelectasis associated with small effusion. Left lung and right upper   lobe are clear.      COMPARISON:  April 6      IMPRESSION:  Follow-up postop post extubation with possible combined   middle and lower lobe atelectasis.    < end of copied text >

## 2022-04-07 NOTE — PROGRESS NOTE ADULT - SUBJECTIVE AND OBJECTIVE BOX
Date of Service: 04-07-22 @ 12:13    Patient is a 81y old  Female who presents with a chief complaint of Acute on chronic systolic congestive heart failure     (07 Apr 2022 10:37)      Any change in ROS: She is alert and awake:  no SOB: no cough  :  extubated:  no hemoptysis:   pleurax is capped!      MEDICATIONS  (STANDING):  albuterol/ipratropium for Nebulization 3 milliLiter(s) Nebulizer every 6 hours  AQUAPHOR (petrolatum Ointment) 1 Application(s) Topical every 12 hours  aspirin enteric coated 81 milliGRAM(s) Oral daily  carvedilol 3.125 milliGRAM(s) Oral every 12 hours  dextrose 5%. 1000 milliLiter(s) (50 mL/Hr) IV Continuous <Continuous>  dextrose 5%. 1000 milliLiter(s) (100 mL/Hr) IV Continuous <Continuous>  dextrose 50% Injectable 12.5 Gram(s) IV Push once  dornase balta Solution 2.5 milliGRAM(s) Inhalation two times a day  glucagon  Injectable 1 milliGRAM(s) IntraMuscular once  insulin lispro (ADMELOG) corrective regimen sliding scale   SubCutaneous every 6 hours  isosorbide   dinitrate Tablet (ISORDIL) 10 milliGRAM(s) Oral two times a day  piperacillin/tazobactam IVPB.. 3.375 Gram(s) IV Intermittent every 8 hours  sodium chloride 3%  Inhalation 4 milliLiter(s) Inhalation every 6 hours    MEDICATIONS  (PRN):  acetaminophen   IVPB .. 1000 milliGRAM(s) IV Intermittent once PRN Temp greater or equal to 38C (100.4F), Mild Pain (1 - 3)  acetaminophen   IVPB .. 1000 milliGRAM(s) IV Intermittent once PRN Temp greater or equal to 38C (100.4F), Mild Pain (1 - 3)  dextrose Oral Gel 15 Gram(s) Oral once PRN Blood Glucose LESS THAN 70 milliGRAM(s)/deciliter  hydrALAZINE Injectable 5 milliGRAM(s) IV Push every 4 hours PRN SBP>160  HYDROmorphone  Injectable 0.5 milliGRAM(s) IV Push every 3 hours PRN Moderate Pain (4 - 6)  triamcinolone 0.1% Ointment 1 Application(s) Topical every 12 hours PRN itching    Vital Signs Last 24 Hrs  T(C): 36.6 (07 Apr 2022 08:00), Max: 36.7 (06 Apr 2022 20:00)  T(F): 97.9 (07 Apr 2022 08:00), Max: 98 (06 Apr 2022 20:00)  HR: 71 (07 Apr 2022 10:00) (59 - 84)  BP: --  BP(mean): --  RR: 20 (07 Apr 2022 10:00) (12 - 21)  SpO2: 98% (07 Apr 2022 10:00) (95% - 100%)  [facility]    I&O's Summary    06 Apr 2022 07:01  -  07 Apr 2022 07:00  --------------------------------------------------------  IN: 1284.4 mL / OUT: 1835 mL / NET: -550.6 mL    07 Apr 2022 07:01  -  07 Apr 2022 12:13  --------------------------------------------------------  IN: 75 mL / OUT: 190 mL / NET: -115 mL          Physical Exam:   GENERAL: NAD, well-groomed, well-developed  HEENT: BRADY/   Atraumatic, Normocephalic  ENMT: No tonsillar erythema, exudates, or enlargement; Moist mucous membranes, Good dentition, No lesions  NECK: Supple, No JVD, Normal thyroid  CHEST/LUNG: decreased air entry left abse!!  CVS: Regular rate and rhythm; No murmurs, rubs, or gallops  GI: : Soft, Nontender, Nondistended; Bowel sounds present  NERVOUS SYSTEM:  Alert & Oriented X3  EXTREMITIES:  2+ Peripheral Pulses, No clubbing, cyanosis, or edema  LYMPH: No lymphadenopathy noted  SKIN: No rashes or lesions  ENDOCRINOLOGY: No Thyromegaly  PSYCH: Appropriate    Labs:  ABG - ( 07 Apr 2022 03:34 )  pH, Arterial: 7.43  pH, Blood: x     /  pCO2: 50    /  pO2: 84    / HCO3: 33    / Base Excess: 7.7   /  SaO2: 96.6                                        9.9    11.36 )-----------( 183      ( 07 Apr 2022 03:34 )             31.1                         10.2   12.26 )-----------( 205      ( 06 Apr 2022 19:29 )             31.6                         10.4   7.90  )-----------( 175      ( 06 Apr 2022 04:51 )             32.8                         11.6   9.17  )-----------( 180      ( 05 Apr 2022 19:09 )             37.1                         10.8   8.07  )-----------( 170      ( 05 Apr 2022 07:17 )             35.2                         10.7   8.51  )-----------( 174      ( 05 Apr 2022 02:54 )             34.2                         10.7   8.24  )-----------( 163      ( 04 Apr 2022 07:22 )             34.4     04-07    138  |  97<L>  |  36<H>  ----------------------------<  119<H>  3.8   |  27  |  1.24  04-06    140  |  97<L>  |  35<H>  ----------------------------<  153<H>  3.9   |  25  |  1.30  04-06    136  |  96<L>  |  33<H>  ----------------------------<  157<H>  4.3   |  26  |  1.02  04-05    138  |  95<L>  |  31<H>  ----------------------------<  165<H>  3.6   |  28  |  1.02  04-05    137  |  98  |  32<H>  ----------------------------<  104<H>  4.0   |  29  |  1.07  04-04    135  |  97<L>  |  34<H>  ----------------------------<  89  4.8   |  26  |  1.15    Ca    8.8      07 Apr 2022 03:34  Ca    8.8      06 Apr 2022 19:29  Ca    8.5      06 Apr 2022 04:51  Ca    9.0      05 Apr 2022 19:09  Phos  4.4     04-06  Phos  3.5     04-06  Phos  3.6     04-05  Mg     2.30     04-07  Mg     2.20     04-06  Mg     2.20     04-06  Mg     1.90     04-05      CAPILLARY BLOOD GLUCOSE      POCT Blood Glucose.: 90 mg/dL (07 Apr 2022 11:03)  POCT Blood Glucose.: 118 mg/dL (07 Apr 2022 05:31)  POCT Blood Glucose.: 132 mg/dL (06 Apr 2022 23:39)  POCT Blood Glucose.: 134 mg/dL (06 Apr 2022 17:53)     (04-05 @ 10:45)      PT/INR - ( 06 Apr 2022 19:29 )   PT: 15.7 sec;   INR: 1.35 ratio         PTT - ( 06 Apr 2022 19:29 )  PTT:32.0 sec    Fluid Source --  Albumin, Fluid--  Glucose, Fluid--  Protein total, Fluid--  Lacatate Dehydrogenase, Fluid--  pH, Fluid--  Cytopathology-Non Gyn Report  ACCESSION No:  [id number]  Patient:   CATRACHITO WOODS      Accession:                             [id number]    Collected Date/Time:                   4/5/2022 10:45 EDT  Received Date/Time:                    4/5/2022 15:12 EDT    Non-Gynecologic Report - Auth (Verified)    Specimen(s) Submitted  PLEURAL FLUID, LEFT    Final Diagnosis  PLEURAL FLUID, LEFT  NEGATIVE FOR MALIGNANT CELLS.    Cytology slides and cell block shows histiocytes and scattered benign  mesothelial cells.      Pleasesee concurrent pathology report:   [id number]  Screened by: Richmond BISWAS(ASCP)  Verified by: Wilmer Carrera MD  (Electronic Signature)  Reported on: 04/06/22 12:13 EDT, [facility], [address], [place] [address]  Phone: [phone]   Fax: [phone]  Cytology technical processing performed at [address] [place] [address], [place] [address]  _________________________________________________________________      Clinical Information  Pleural effusion.    Gross Description  Received: 90  ml of yellow fluid in CytoLyt  Prepared: 1 ThinPrep slide, 1 cell block, smear        RECENT CULTURES:  04-05 @ 16:21 .Bronchial left bronchial lavage       No polymorphonuclear leukocytes seen per low power field  No Squamous epithelial cells seen per low power field  No organisms seen per oil power field           No growth    03-31 @ 18:12 .Blood Blood-Venous                No Growth Final    03-31 @ 18:00 .Blood Blood-Peripheral                No Growth Final          RESPIRATORY CULTURES:  rad< from: Xray Chest 1 View- PORTABLE-Urgent (Xray Chest 1 View- PORTABLE-Urgent .) (04.06.22 @ 14:46) >    INTERPRETATION:  CLINICAL INFORMATION: History of lung surgery.    TECHNIQUE: Frontal view of the chest    COMPARISON: Chest radiograph  4/6/2022.    FINDINGS:  Endotracheal tube terminates above the karen.  Enteric tube terminates below the diaphragm.    Overlying paraphernalia limits evaluation at the right lung base but with   rotation, there appears to be increased density in thisregion concerning   for pneumonia/atelectasis.  No acute osseous abnormality.  Unchanged left chest tube and chest wall subcutaneous emphysema.      IMPRESSION: The right lower lung is not well evaluated due to overlying   structures, however there is suggestion of a new opacity in region that   could represent effusion or atelectasis.  No pneumothorax.    --- End of Report ---          REJI SIN MD; Resident Radiology  This document has been electronically signed.  HERMAN CLARK MD; Attending Radiologist  This document has been electronically signed. Apr 6 2022  5:49PM    < end of copied text >          Studies  Chest X-RAY  CT SCAN Chest   Venous Dopplers: LE:   CT Abdomen  Others

## 2022-04-07 NOTE — PROGRESS NOTE ADULT - ASSESSMENT
81 f with DM, HTN, CAD s/p CABG, HFpEF  severe pulm HTN, Bioprosthetic aortic and mitral valve replacement 9/2014, ?CKD per outside records, afib on warfarin, chronic Lt loculated pleural effusion, gout, presented 3/27 with worsening dyspnea on exertion and lower extremity swelling as well as 1 year history of generalized rash and was admitted for CHF exacerbation  initially afebrile, WBC normal  Chest CT: Small left pleural effusion with a loculated component in the left major fissure and along left anterior chest wall,  partial LLL and LEXI collapse. New pleural thickening and/or loculated fluid medially along the left upper hemithorax. Small right pleural effusion and adjacent passive atelectasis. Stable 9 mm right apical nodule.  pt was seen by cardio and pulmonary, did not recommend any thoracentesis and was being diuresed   pt spiked to 101.6 on 3/31 and WBC normal    dyspnea due to CHF exacerbation, has h/o L loculated pleural effusion but now also with LLL and LEXI collapse, likely the cause of new fever, CT 4/2 showed new complete atelectasis of L lung from a mucous plug in L mainstem bronchus, s/p Flexible Bronchoscopy, Left VATS, Pleural Biopsy, Drainage of Pleural Effusion, PleurX Catheter Placement, 1,100ml serous pleural fluid drained, still intubated with hemoptysis s/p bronc and clots were suctioned from R mainstem airway  eosinophilia to 800, was present in previous admissions as well but now also has a rash for about a year, derm's impression was xerosis, strongyloides and filaria negative    * blood cx negative, afebrile and extubated, pleurex capped  * BAL cx negative, quanitferon negative, pleural biopsy not back yet  * s/p a 7 day course of zosyn   * f/u  toxocara  AB        Kristy Erickson MD  contact on teams  After 5pm and on weekends call [phone]

## 2022-04-07 NOTE — DIETITIAN INITIAL EVALUATION ADULT - NUTRITIONGOAL OUTCOME1
initiate oral nutrition as medically feasible with clear liquids and advance as tolerated to DASH/TLC diet as tolerated.  Pt to meet at least 75% of estimated needs.

## 2022-04-07 NOTE — DIETITIAN INITIAL EVALUATION ADULT - ADD RECOMMEND
1) Monitor weights, labs, BM's, skin integrity, p.o. intake. 2) Advance diet as tolerated per CTICU team.

## 2022-04-07 NOTE — PROGRESS NOTE ADULT - ASSESSMENT
82yo Female with mhx of HFpEF ef 65% CAD s/p CABG 2000 LIMA to LAD and SVG to OM1 and subsequent PCI to LCx,in 2011, severe pulm HTN, Bioprosthetic aortic and mitral valve replacement 9/2014, CKD per outside records, afib on warfarin, chronic Lt loculated pleural effusion, Type 2 DM, HTN, HLD, gout c/o 3-4 days of worsening dyspnea on exertion and lower extremity swelling    Tele: Afib 60s    1. Acute decompensated diastolic heart failure  - intubated and sedated planned for bronch today and possible extubation after   - BB held  in setting bradycardia. continue to monitor on tele now improved  - echo shows normaL AVR  and MVR normal LV function decreased RV function sev pulm HTN   - lasix held. monitor strict I&Os.   - CT shows large left pleural effusion s/p chest tube planned for bronch     2. Rash  - Pt with diffuse rash over body  - Ongoing for a year  - Management as per primary team    3. CAD s/p CABG and PCI  - CABG x2 in 2000 (LIMA to LAD, SVG-OM1)  - PCI in 2011 to LCX  - Last LHC in 2019 revealed patent stent and grafts  - Continue ASA, statin, isordil, and losartan  - continue to hold BB at this time due to bradycardia    3. S/p MVR & AVR  -s/p bioprosthetic valves  -echo with bioprosthetic MVR, AVR, grossly normal LV systolic function, decreased RV function and severe pHTN    4. Afib  -rate in 60s  -continue to hold BB  - INR 1.4 restart coumadin once ok with surgery

## 2022-04-07 NOTE — PROGRESS NOTE ADULT - SUBJECTIVE AND OBJECTIVE BOX
Date of Service  : 04-07-22    INTERVAL HPI/OVERNIGHT EVENTS: I feel better . S/P Extubation.   Vital Signs Last 24 Hrs  T(C): 36.7 (07 Apr 2022 17:30), Max: 36.8 (07 Apr 2022 14:30)  T(F): 98.1 (07 Apr 2022 17:30), Max: 98.2 (07 Apr 2022 14:30)  HR: 85 (07 Apr 2022 19:30) (61 - 85)  BP: 132/65 (07 Apr 2022 17:30) (127/61 - 156/60)  BP(mean): 72 (07 Apr 2022 13:40) (65 - 77)  RR: 20 (07 Apr 2022 17:30) (12 - 24)  SpO2: 92% (07 Apr 2022 19:30) (92% - 100%)  I&O's Summary    06 Apr 2022 07:01  -  07 Apr 2022 07:00  --------------------------------------------------------  IN: 1284.4 mL / OUT: 1835 mL / NET: -550.6 mL    07 Apr 2022 07:01  -  07 Apr 2022 20:52  --------------------------------------------------------  IN: 315 mL / OUT: 1080 mL / NET: -765 mL      MEDICATIONS  (STANDING):  albuterol/ipratropium for Nebulization 3 milliLiter(s) Nebulizer every 6 hours  AQUAPHOR (petrolatum Ointment) 1 Application(s) Topical every 12 hours  aspirin enteric coated 81 milliGRAM(s) Oral daily  carvedilol 3.125 milliGRAM(s) Oral every 12 hours  dextrose 5%. 1000 milliLiter(s) (50 mL/Hr) IV Continuous <Continuous>  dextrose 5%. 1000 milliLiter(s) (100 mL/Hr) IV Continuous <Continuous>  dextrose 50% Injectable 12.5 Gram(s) IV Push once  dornase balta Solution 2.5 milliGRAM(s) Inhalation two times a day  glucagon  Injectable 1 milliGRAM(s) IntraMuscular once  insulin lispro (ADMELOG) corrective regimen sliding scale   SubCutaneous every 6 hours  isosorbide   dinitrate Tablet (ISORDIL) 10 milliGRAM(s) Oral two times a day  sodium chloride 3%  Inhalation 4 milliLiter(s) Inhalation every 6 hours    MEDICATIONS  (PRN):  acetaminophen   IVPB .. 1000 milliGRAM(s) IV Intermittent once PRN Temp greater or equal to 38C (100.4F), Mild Pain (1 - 3)  acetaminophen   IVPB .. 1000 milliGRAM(s) IV Intermittent once PRN Temp greater or equal to 38C (100.4F), Mild Pain (1 - 3)  dextrose Oral Gel 15 Gram(s) Oral once PRN Blood Glucose LESS THAN 70 milliGRAM(s)/deciliter  hydrALAZINE Injectable 5 milliGRAM(s) IV Push every 4 hours PRN SBP>160  HYDROmorphone  Injectable 0.5 milliGRAM(s) IV Push every 3 hours PRN Moderate Pain (4 - 6)  triamcinolone 0.1% Ointment 1 Application(s) Topical every 12 hours PRN itching    LABS:                        9.9    11.36 )-----------( 183      ( 07 Apr 2022 03:34 )             31.1     04-07    138  |  97<L>  |  36<H>  ----------------------------<  119<H>  3.8   |  27  |  1.24    Ca    8.8      07 Apr 2022 03:34  Phos  4.4     04-06  Mg     2.30     04-07      PT/INR - ( 06 Apr 2022 19:29 )   PT: 15.7 sec;   INR: 1.35 ratio         PTT - ( 06 Apr 2022 19:29 )  PTT:32.0 sec    CAPILLARY BLOOD GLUCOSE  117 (07 Apr 2022 17:49)      POCT Blood Glucose.: 117 mg/dL (07 Apr 2022 17:47)  POCT Blood Glucose.: 90 mg/dL (07 Apr 2022 11:03)  POCT Blood Glucose.: 118 mg/dL (07 Apr 2022 05:31)  POCT Blood Glucose.: 132 mg/dL (06 Apr 2022 23:39)      ABG - ( 07 Apr 2022 03:34 )  pH, Arterial: 7.43  pH, Blood: x     /  pCO2: 50    /  pO2: 84    / HCO3: 33    / Base Excess: 7.7   /  SaO2: 96.6                REVIEW OF SYSTEMS:  CONSTITUTIONAL: No fever, weight loss, or fatigue  EYES: No eye pain, visual disturbances, or discharge  ENMT:  No difficulty hearing, tinnitus, vertigo; No sinus or throat pain  NECK: No pain or stiffness  RESPIRATORY: No cough, wheezing, chills or hemoptysis; No shortness of breath  CARDIOVASCULAR: No chest pain, palpitations, dizziness, or leg swelling  GASTROINTESTINAL: No abdominal or epigastric pain. No nausea, vomiting, or hematemesis; No diarrhea or constipation. No melena or hematochezia.  GENITOURINARY: No dysuria, frequency, hematuria, or incontinence  NEUROLOGICAL: No headaches, memory loss, loss of strength, numbness, or tremors      Consultant(s) Notes Reviewed:  [x ] YES  [ ] NO    PHYSICAL EXAM:  GENERAL: NAD, well-groomed, well-developed,not in any distress ,  HEAD:  Atraumatic, Normocephalic  NECK: Supple, No JVD, Normal thyroid  NERVOUS SYSTEM:  Alert & Oriented X3, No focal deficit   CHEST/LUNG: Good air entry bilateral except bases with Pigtail+  HEART: Regular rate and rhythm; No murmurs, rubs, or gallops  ABDOMEN: Soft, Nontender, Nondistended; Bowel sounds present  EXTREMITIES:  2+ Peripheral Pulses, No clubbing, cyanosis, or edema  SKIN: No rashes or lesions    Care Discussed with Consultants/Other Providers [ x] YES  [ ] NO

## 2022-04-07 NOTE — DIETITIAN INITIAL EVALUATION ADULT - PERTINENT MEDS FT
MEDICATIONS  (STANDING):  albuterol/ipratropium for Nebulization 3 milliLiter(s) Nebulizer every 6 hours  AQUAPHOR (petrolatum Ointment) 1 Application(s) Topical every 12 hours  aspirin enteric coated 81 milliGRAM(s) Oral daily  dextrose 5%. 1000 milliLiter(s) (50 mL/Hr) IV Continuous <Continuous>  dextrose 5%. 1000 milliLiter(s) (100 mL/Hr) IV Continuous <Continuous>  dextrose 50% Injectable 12.5 Gram(s) IV Push once  dornase balta Solution 2.5 milliGRAM(s) Inhalation two times a day  glucagon  Injectable 1 milliGRAM(s) IntraMuscular once  insulin lispro (ADMELOG) corrective regimen sliding scale   SubCutaneous every 6 hours  piperacillin/tazobactam IVPB.. 3.375 Gram(s) IV Intermittent every 8 hours  sodium chloride 3%  Inhalation 4 milliLiter(s) Inhalation every 6 hours    MEDICATIONS  (PRN):  acetaminophen   IVPB .. 1000 milliGRAM(s) IV Intermittent once PRN Temp greater or equal to 38C (100.4F), Mild Pain (1 - 3)  acetaminophen   IVPB .. 1000 milliGRAM(s) IV Intermittent once PRN Temp greater or equal to 38C (100.4F), Mild Pain (1 - 3)  dextrose Oral Gel 15 Gram(s) Oral once PRN Blood Glucose LESS THAN 70 milliGRAM(s)/deciliter  hydrALAZINE Injectable 5 milliGRAM(s) IV Push every 4 hours PRN SBP>160  HYDROmorphone  Injectable 0.5 milliGRAM(s) IV Push every 3 hours PRN Moderate Pain (4 - 6)  triamcinolone 0.1% Ointment 1 Application(s) Topical every 12 hours PRN itching

## 2022-04-07 NOTE — PROGRESS NOTE ADULT - SUBJECTIVE AND OBJECTIVE BOX
Collin Trejo MD  Interventional Cardiology / Endovascular Specialist  [place] Office : [phone] [place] [address] [place] [address]  Tel:   [place] Office : [phone] [facility] [place] [address]  Tel: [phone]    Subjective/Overnight events: Patient lying in bed intubated and sedated .   	  MEDICATIONS:  aspirin enteric coated 81 milliGRAM(s) Oral daily  carvedilol 3.125 milliGRAM(s) Oral every 12 hours  hydrALAZINE Injectable 5 milliGRAM(s) IV Push every 4 hours PRN  isosorbide   dinitrate Tablet (ISORDIL) 10 milliGRAM(s) Oral two times a day    piperacillin/tazobactam IVPB.. 3.375 Gram(s) IV Intermittent every 8 hours    albuterol/ipratropium for Nebulization 3 milliLiter(s) Nebulizer every 6 hours  dornase balta Solution 2.5 milliGRAM(s) Inhalation two times a day  sodium chloride 3%  Inhalation 4 milliLiter(s) Inhalation every 6 hours    acetaminophen   IVPB .. 1000 milliGRAM(s) IV Intermittent once PRN  acetaminophen   IVPB .. 1000 milliGRAM(s) IV Intermittent once PRN  HYDROmorphone  Injectable 0.5 milliGRAM(s) IV Push every 3 hours PRN      dextrose 50% Injectable 12.5 Gram(s) IV Push once  dextrose Oral Gel 15 Gram(s) Oral once PRN  glucagon  Injectable 1 milliGRAM(s) IntraMuscular once  insulin lispro (ADMELOG) corrective regimen sliding scale   SubCutaneous every 6 hours    AQUAPHOR (petrolatum Ointment) 1 Application(s) Topical every 12 hours  dextrose 5%. 1000 milliLiter(s) IV Continuous <Continuous>  dextrose 5%. 1000 milliLiter(s) IV Continuous <Continuous>  triamcinolone 0.1% Ointment 1 Application(s) Topical every 12 hours PRN      PAST MEDICAL/SURGICAL HISTORY  PAST MEDICAL & SURGICAL HISTORY:  HTN (Hypertension)    Afib  (on Warfarin)    CAD (Coronary Artery Disease)  s/p stent and CABG    CVA (Cerebral Infarction)  2011    CHF (congestive heart failure)  EF 65% Oct 2019    Upper GI bleed    Gout    Diabetes mellitus  type 2, not on meds    History of heart valve replacement with bioprosthetic valve  mitral and aortic    Hyperlipidemia    S/P angioplasty with stent  2011    S/P CABG x 1  (2000)    H/O aortic valve replacement  9/22/14    H/O mitral valve replacement  9/22/14        SOCIAL HISTORY: Substance Use (street drugs): ( x ) never used  (  ) other:    FAMILY HISTORY:  Family history of acute myocardial infarction  mother        REVIEW OF SYSTEMS:  CONSTITUTIONAL: No fever, weight loss, or fatigue  EYES: No eye pain, visual disturbances, or discharge  ENMT:  No difficulty hearing, tinnitus, vertigo; No sinus or throat pain  BREASTS: No pain, masses, or nipple discharge  GASTROINTESTINAL: No abdominal or epigastric pain. No nausea, vomiting, or hematemesis; No diarrhea or constipation. No melena or hematochezia.  GENITOURINARY: No dysuria, frequency, hematuria, or incontinence  NEUROLOGICAL: No headaches, memory loss, loss of strength, numbness, or tremors  ENDOCRINE: No heat or cold intolerance; No hair loss  MUSCULOSKELETAL: No joint pain or swelling; No muscle, back, or extremity pain  PSYCHIATRIC: No depression, anxiety, mood swings, or difficulty sleeping  HEME/LYMPH: No easy bruising, or bleeding gums  All others negative    PHYSICAL EXAM:  T(C): 36.6 (04-07-22 @ 12:00), Max: 36.7 (04-06-22 @ 20:00)  HR: 64 (04-07-22 @ 13:00) (59 - 84)  BP: 128/46 (04-07-22 @ 13:00) (128/46 - 156/60)  RR: 20 (04-07-22 @ 13:00) (12 - 24)  SpO2: 97% (04-07-22 @ 13:00) (94% - 100%)  Wt(kg): --  I&O's Summary    06 Apr 2022 07:01  -  07 Apr 2022 07:00  --------------------------------------------------------  IN: 1284.4 mL / OUT: 1835 mL / NET: -550.6 mL    07 Apr 2022 07:01  -  07 Apr 2022 13:37  --------------------------------------------------------  IN: 75 mL / OUT: 280 mL / NET: -205 mL        EYES:   PERRLA   ENMT:   Moist mucous membranes, Good dentition, No lesions  Cardiovascular: Normal S1 S2, No JVD, No murmurs, No edema  Respiratory: left sided decreased breath sounds  Gastrointestinal:  Soft, Non-tender, + BS	  Extremities: no edema               9.9    11.36 )-----------( 183      ( 07 Apr 2022 03:34 )             31.1     04-07    138  |  97<L>  |  36<H>  ----------------------------<  119<H>  3.8   |  27  |  1.24    Ca    8.8      07 Apr 2022 03:34  Phos  4.4     04-06  Mg     2.30     04-07      proBNP:   Lipid Profile:   HgA1c:   TSH:     Consultant(s) Notes Reviewed:  [x ] YES  [ ] NO    Care Discussed with Consultants/Other Providers [ x] YES  [ ] NO    Imaging Personally Reviewed independently:  [x] YES  [ ] NO    All labs, radiologic studies, vitals, orders and medications list reviewed. Patient is seen and examined at bedside. Case discussed with medical team.

## 2022-04-07 NOTE — DIETITIAN INITIAL EVALUATION ADULT - [id number]_GEN_A_CORE
04-06-22 @ 07:01  -  04-07-22 @ 07:00  --------------------------------------------------------  OUT:    Chest Tube (mL): 710 mL  Total OUT: 710 mL    Total NET: -710 mL      04-07-22 @ 07:01  -  04-07-22 @ 10:37  --------------------------------------------------------  OUT:    Chest Tube (mL): 20 mL  Total OUT: 20 mL    Total NET: -20 mL

## 2022-04-07 NOTE — PROGRESS NOTE ADULT - SUBJECTIVE AND OBJECTIVE BOX
CATRACHITO WOODS      81y   Female   MRN-[id number]         No Known Allergies             Daily     Daily Drug Dosing Weight  Height (cm): 149.9 (05 Apr 2022 08:11)  Weight (kg): 68.5 (28 Mar 2022 20:30)  BMI (kg/m2): 30.5 (05 Apr 2022 08:11)  BSA (m2): 1.64 (05 Apr 2022 08:11)      80yo Female with mhx of HFpEF ef 65% CAD (CABG 2000, stent 2011), severe pulm HTN, Bioprosthetic aortic and mitral valve replacement 9/2014, ?CKD per outside records, afib on warfarin, chronic Lt loculated pleural effusion, Type 2 DM, HTN, HLD, gout c/o 3-4 days of worsening dyspnea on exertion and lower extremity swelling as well as 1 year history of generalized rash. Usual state of health until 3-4d prior when daughter noticed increasing HERNANDEZ on ambulation to the bathroom not accompanied by chest pain, vomiting,  neck or arm pain,  posterior headache,  blurry vision. Has nausea at baseline and has had poor po tolerance and a chronic component of fatigue over past years. Denies dietary indiscretion, no recent illness, no recent change to her medication although notes months to years ago had been taking lasix 40mg PO BID and is currently taking once daily dosing. Nonadherent to home o2 via NC. Reports chronic orthopnea, sleeps on 1-2 pillows per night with no recent increase. No new cough.   BP baseline has been 160s-180s systolic per daughter and is consistent with BP reading in ED. She denies symptomatic hypertension. Previously discontinued hydralazine ~1y ago for concerns relating to her generalized rash. Previously discharged on losartan January 2022 with thirty day supply and since discontinued the medication unclear if due to lack of refill versus DC by medical team.   Collateral provided by daughter at bedside, prior [facility] records, and paper discharge summary provided by daughter from [facility]. Medication reconciliation provided from those same sources with option to further verify in the AM with her PMD and cardiologist as below. Last dose of all of her medications was today and states that she takes warfarin at night.     Generalized excoriated rash has been present for ~1y duration and is present on shoulders, buttocks, thighs, arms, and perhaps even "all over" as per daughter. At times with breaking of skin and exposed areas. Poor hand washing hygiene and itching per daughter.     Patient declined professional translation services and translation was provided in Polish by daughter at bedside    CARDIOLOGIST Dr. Sean Robbins [phone]  PMD Dr. Jeff Sanchez [phone] (27 Mar 2022 20:22)    Procedure:  Flexible Bronchoscopy, Left VATS, Pleural Biopsy, Drainage of Pleural Effusion, PleurX Catheter Placement, 1,100ml serous pleural fluid drained  4/5/2022                     Issues:               Left pleural effusion  CAD s/p CABG-1  HFpEF  HTN  Pulmonary hypertension              Postop pain              Chest tube in place  DM-2   Chronic atrial fibrillation on Coumadin  S/P AVR / MVR  Hx of UGI Bleed              Home Medications:  atorvastatin 20 mg oral tablet: 1 tab(s) orally once a day (27 Mar 2022 20:30)  carvedilol 12.5 mg oral tablet: 1 tab(s) orally 2 times a day (27 Mar 2022 20:30)  Coumadin 2 mg oral tablet: 1 tab(s) orally once a day (27 Mar 2022 20:30)  ferrous sulfate 325 mg (65 mg elemental iron) oral tablet: 1 tab(s) orally once a day (27 Mar 2022 20:30)  furosemide 40 mg oral tablet: 1 tab(s) orally once a day (27 Mar 2022 20:30)  gabapentin 100 mg oral capsule: 1 cap(s) orally 3 times a day (27 Mar 2022 20:30)  glipiZIDE 2.5 mg oral tablet, extended release: 1 tab(s) orally once a day (27 Mar 2022 20:30)  isosorbide dinitrate 10 mg oral tablet: 1 tab(s) orally 2 times a day (27 Mar 2022 20:30)  losartan 25 mg oral tablet: 1 tab(s) orally once a day (27 Mar 2022 20:30)    PAST MEDICAL & SURGICAL HISTORY:  HTN (Hypertension)    Afib  (on Warfarin)    CAD (Coronary Artery Disease)  s/p stent and CABG    CVA (Cerebral Infarction)  2011    CHF (congestive heart failure)  EF 65% Oct 2019    Upper GI bleed    Gout    Diabetes mellitus  type 2, not on meds    History of heart valve replacement with bioprosthetic valve  mitral and aortic    Hyperlipidemia    S/P angioplasty with stent  2011    S/P CABG x 1  (2000)    H/O aortic valve replacement  9/22/14    H/O mitral valve replacement  9/22/14      Vital Signs Last 24 Hrs  T(C): 36.6 (07 Apr 2022 08:00), Max: 36.7 (06 Apr 2022 20:00)  T(F): 97.9 (07 Apr 2022 08:00), Max: 98 (06 Apr 2022 20:00)  HR: 76 (07 Apr 2022 09:00) (59 - 84)  BP: --  BP(mean): --  RR: 21 (07 Apr 2022 09:00) (12 - 21)  SpO2: 95% (07 Apr 2022 09:00) (95% - 100%)  I&O's Detail    06 Apr 2022 07:01  -  07 Apr 2022 07:00  --------------------------------------------------------  IN:    IV PiggyBack: 475 mL    Lactated Ringers: 150 mL    Norepinephrine: 2 mL    Plasma: 600 mL    Propofol: 57.4 mL  Total IN: 1284.4 mL    OUT:    Chest Tube (mL): 710 mL    Ureteral Catheter (mL): 1125 mL  Total OUT: 1835 mL    Total NET: -550.6 mL      07 Apr 2022 07:01  -  07 Apr 2022 09:56  --------------------------------------------------------  IN:    IV PiggyBack: 75 mL  Total IN: 75 mL    OUT:    Chest Tube (mL): 20 mL    Ureteral Catheter (mL): 85 mL  Total OUT: 105 mL    Total NET: -30 mL        CAPILLARY BLOOD GLUCOSE      POCT Blood Glucose.: 132 mg/dL (06 Apr 2022 23:39)  POCT Blood Glucose.: 134 mg/dL (06 Apr 2022 17:53)  POCT Blood Glucose.: 133 mg/dL (06 Apr 2022 12:13)    Home Medications:  atorvastatin 20 mg oral tablet: 1 tab(s) orally once a day (27 Mar 2022 20:30)  carvedilol 12.5 mg oral tablet: 1 tab(s) orally 2 times a day (27 Mar 2022 20:30)  Coumadin 2 mg oral tablet: 1 tab(s) orally once a day (27 Mar 2022 20:30)  ferrous sulfate 325 mg (65 mg elemental iron) oral tablet: 1 tab(s) orally once a day (27 Mar 2022 20:30)  furosemide 40 mg oral tablet: 1 tab(s) orally once a day (27 Mar 2022 20:30)  gabapentin 100 mg oral capsule: 1 cap(s) orally 3 times a day (27 Mar 2022 20:30)  glipiZIDE 2.5 mg oral tablet, extended release: 1 tab(s) orally once a day (27 Mar 2022 20:30)  isosorbide dinitrate 10 mg oral tablet: 1 tab(s) orally 2 times a day (27 Mar 2022 20:30)  losartan 25 mg oral tablet: 1 tab(s) orally once a day (27 Mar 2022 20:30)    MEDICATIONS  (STANDING):  albuterol/ipratropium for Nebulization 3 milliLiter(s) Nebulizer every 6 hours  AQUAPHOR (petrolatum Ointment) 1 Application(s) Topical every 12 hours  aspirin enteric coated 81 milliGRAM(s) Oral daily  dextrose 5%. 1000 milliLiter(s) (50 mL/Hr) IV Continuous <Continuous>  dextrose 5%. 1000 milliLiter(s) (100 mL/Hr) IV Continuous <Continuous>  dextrose 50% Injectable 12.5 Gram(s) IV Push once  dornase balta Solution 2.5 milliGRAM(s) Inhalation two times a day  glucagon  Injectable 1 milliGRAM(s) IntraMuscular once  insulin lispro (ADMELOG) corrective regimen sliding scale   SubCutaneous every 6 hours  piperacillin/tazobactam IVPB.. 3.375 Gram(s) IV Intermittent every 8 hours  sodium chloride 3%  Inhalation 4 milliLiter(s) Inhalation every 6 hours    MEDICATIONS  (PRN):  acetaminophen   IVPB .. 1000 milliGRAM(s) IV Intermittent once PRN Temp greater or equal to 38C (100.4F), Mild Pain (1 - 3)  acetaminophen   IVPB .. 1000 milliGRAM(s) IV Intermittent once PRN Temp greater or equal to 38C (100.4F), Mild Pain (1 - 3)  dextrose Oral Gel 15 Gram(s) Oral once PRN Blood Glucose LESS THAN 70 milliGRAM(s)/deciliter  hydrALAZINE Injectable 5 milliGRAM(s) IV Push every 4 hours PRN SBP>160  HYDROmorphone  Injectable 0.5 milliGRAM(s) IV Push every 3 hours PRN Moderate Pain (4 - 6)  triamcinolone 0.1% Ointment 1 Application(s) Topical every 12 hours PRN itching    Mode: standby      Physical exam:                             General:               Pt is awake, alert,  appears to be in pain but not in  distress                                                  Neuro:                  Nonfocal                             Cardiovascular:   S1 & S2, irregular                          Respiratory:         Air entry is fair and equal on both sides, has bilateral conducted sounds                           GI:                          Soft, nondistended and nontender, Bowel sounds active                            Ext:                        No cyanosis, has  edema     Labs:                                                                           9.9    11.36 )-----------( 183      ( 07 Apr 2022 03:34 )             31.1             04-07    138  |  97<L>  |  36<H>  ----------------------------<  119<H>  3.8   |  27  |  1.24    Ca    8.8      07 Apr 2022 03:34  Phos  4.4     04-06  Mg     2.30     04-07                    PT/INR - ( 06 Apr 2022 19:29 )   PT: 15.7 sec;   INR: 1.35 ratio         PTT - ( 06 Apr 2022 19:29 )  PTT:32.0 sec        Culture - Acid Fast - Bronchial w/Smear (collected 05 Apr 2022 16:21)  Source: .Bronchial left bronchial lavage    Culture - Fungal, Bronchial (collected 05 Apr 2022 16:21)  Source: .Bronchial left bronchial lavage  Preliminary Report (06 Apr 2022 07:08):    Testing in progress    Culture - Bronchial (collected 05 Apr 2022 16:21)  Source: .Bronchial left bronchial lavage  Gram Stain (05 Apr 2022 20:55):    No polymorphonuclear leukocytes seen per low power field    No Squamous epithelial cells seen per low power field    No organisms seen per oil power field  Preliminary Report (06 Apr 2022 16:57):    No growth        CXR:  < from: Xray Chest 1 View- PORTABLE-Urgent (Xray Chest 1 View- PORTABLE-Urgent .) (04.06.22 @ 14:46) >  FINDINGS:  Endotracheal tube terminates above the karen.  Enteric tube terminates below the diaphragm.    Overlying paraphernalia limits evaluation at the right lung base but with   rotation, there appears to be increased density in thisregion concerning   for pneumonia/atelectasis.  No acute osseous abnormality.  Unchanged left chest tube and chest wall subcutaneous emphysema.      IMPRESSION: The right lower lung is not well evaluated due to overlying   structures, however there is suggestion of a new opacity in region that   could represent effusion or atelectasis.  No pneumothorax.        Plan:  General: 81yFemale s/p Flexible Bronchoscopy, Left VATS, Pleural Biopsy, Drainage of Pleural Effusion, PleurX Catheter Placement, 1,100ml serous pleural fluid drained  4/5/2022 , experiencing  pain with deep breathing.                             Neuro:                                          Pain control with Tylenol PRN                               Cardiovascular:                                           Continue hemodynamic monitoring.     CAD / HLD: Restart Isordil /  Coreg 3.125 and increase as tolerated                             Chronic A-fib; Rate controlled  Will restart Coreg. No anticoagulation for now    Respiratory:                                         Pt is on N.C and fairly comfortable                                         Wean FiO2 as tolerated                                         Monitor chest tube output.                                          Chest tube to water seal                                                                  Continue bronchodilators, pulmonary toilet                            GI                                         On clears and advance as tolerated                                         Continue Zofran / Reglan for nausea - PRN                                         Continue bowel regimen  	                                                                 Renal:                                         Monitor I/Os and electrolytes     Resume Lasix                                                                                         Hem/ Onc:                                         DVT prophylaxis with SQ Heparin and SCDs                                         Monitor chest tube output &  signs of bleeding.                                          Follow CBC in AM                           Infectious disease:     Pleural effusion / Empyema: On Zosyn  O.R cultures - NGTD                                          Monitor for fever / leukocytosis.                                          All surgical incision / chest tube  sites look clean                            Endocrine                                             DM-2 / Hyperglycemia: Continue Accu-Checks with coverage  Hold oral hypoglycemic meds       Pertinent clinical, laboratory, radiographic, hemodynamic, echocardiographic, respiratory data, microbiologic data and chart were reviewed and analyzed frequently throughout the course of the day and night  Patient seen, examined and plan discussed with CT Surgeon Dr. Conteh  / CTICU team during rounds.    Status discussed with  patient's family and updated plan of care  I have spent 40 minutes with this patient including 20 minutes of time coordinating care.        Rikki Scott MD

## 2022-04-07 NOTE — PROGRESS NOTE ADULT - ASSESSMENT
82yo Female with mhx of HFpEF ef 65% CAD (CABG 2000, stent 2011), severe pulm HTN, Bioprosthetic aortic and mitral valve replacement 9/2014, ?CKD per outside records, afib on warfarin, chronic Lt loculated pleural effusion,  Type 2 DM, HTN, HLD, gout a/w acute on chronic HFpEF i/s/p severe pHTN c/b hypercapnia; Found to have persisting moderate size loculated lt pleural effusion with atelectasis;         Problem/Plan - 1:  ·  Problem: Acute on chronic diastolic heart failure .   ·  Plan: Acute on chronic HFpEF likely in setting of decreased diuretic dose and severe pulm HTN as evidenced by prior TTE dated 10/10/2019;  Volume overloaded on exam with JVD and peripheral nonpitting edema to thighs  -S/P  Lasix 40 IV BID  -Cardiology help appreciated.   -TTE pending.      Problem/Plan - 2:  ·  Problem: Fever .   ·  Plan: Sepsis work up in progress.  IV Abxs started .  D helping.      Problem/Plan - 3:  ·  Problem: Loculated pleural effusion with Hypercapnia with Collapse left side .   ·  Plan: Persisting loculated effusion so Pulmonary helping.   S/P Flexible Bronchoscopy, Left VATS, Pleural Biopsy, Drainage of Pleural Effusion, PleurX Catheter Placement  < from: CT Chest No Cont (03.27.22 @ 20:25) >  IMPRESSION:    Small left pleural effusion with a loculated component in the left major   fissure and along left anterior chest wall. The loculated components are   new when compared to prior CT exam dated 10/13/2019. Associated partial   left lower lobe and left upper lobe collapse.    New pleural thickening and/or loculated fluid medially along the left   upper hemithorax.    Small right pleural effusion and adjacent passive atelectasis.    Stable 9 mm right apical nodule.    < end of copied text >     Problem/Plan - 4:  ·  Problem: Chronic atrial fibrillation.   ·  Plan: [id number] risk stratification = 7  Continue home coreg 12.5mg BID with hold parameters  Will restart   Coumadin once cleared by TS.      Problem/Plan - 5:  ·  Problem: Pulmonary HTN.   ·  Plan: Severe pulm HTN likely class II  Repeat TTE this admission.     Problem/Plan - 6:  ·  Problem: LAYLA Thrombus .  ·  Plan: On AC.      Problem/Plan - 7:  ·  Problem: Papular rash, generalized with Eosinophilia .  ·  Plan: Generalized papular rash of 1y duration previously attributed to hydralazine which was discontinued without improvement of rash  With chronic eosinophilia noted per chart  -Full medication review may be beneficial of meds possibly causing eosinophilia  -Dermatology consult noted.      Problem/Plan - 8:  ·  Problem: Hypertension.   ·  Plan: Restart home losartan, pressures can tolerate SBP 180s in ED  Cont Carvedilol.     Problem/Plan - 9:  ·  Problem: Hyperlipidemia.   ·  Plan: Continue home statin.     Problem/Plan - 10:  ·  Problem: T2DM (type 2 diabetes mellitus).   ·  Plan; HOLD home glipizide while inpatient   Continue home gabapentin for diabetic neuropathy  KRYSTYNA.     Problem/Plan - 11:  ·  Problem: Cerebrovascular accident (CVA).   ·  Plan: Without residual deficits at this time per daughter and patient although unclear clinical course occurring years ago. Continue to monitor and continue ASA and statin.     Problem/Plan - 12:  ·  Problem: CAD (coronary artery disease).   ·  Plan: Continue GDMT    Full code.

## 2022-04-07 NOTE — DIETITIAN INITIAL EVALUATION ADULT - ORAL INTAKE PTA/DIET HISTORY
Met w/pt and sons who provided nutrition hx.  Pt w/o food allergies or difficulty chewing/swallowing PTA.  Pt follows a low salt/low fat diet at home with good appetite.  No recent wt change noted.  Prior to surgery, according to flowsheet, pt with 51-75% food intake on 4/4, % x 2 meals and 51-75% x 1 meal on 4/3 and % intake of 4/2.

## 2022-04-07 NOTE — DIETITIAN INITIAL EVALUATION ADULT - PERTINENT LABORATORY DATA
04-07    138  |  97<L>  |  36<H>  ----------------------------<  119<H>  3.8   |  27  |  1.24    Ca    8.8      07 Apr 2022 03:34  Phos  4.4     04-06  Mg     2.30     04-07    POCT Blood Glucose.: 118 mg/dL (04-07-22 @ 05:31)

## 2022-04-08 LAB
A1C WITH ESTIMATED AVERAGE GLUCOSE RESULT: 5.4 % — SIGNIFICANT CHANGE UP (ref 4–5.6)
ALBUMIN SERPL ELPH-MCNC: 2.9 G/DL — LOW (ref 3.3–5)
ALP SERPL-CCNC: 124 U/L — HIGH (ref 40–120)
ALT FLD-CCNC: 9 U/L — SIGNIFICANT CHANGE UP (ref 4–33)
ANION GAP SERPL CALC-SCNC: 12 MMOL/L — SIGNIFICANT CHANGE UP (ref 7–14)
APTT BLD: 28.9 SEC — SIGNIFICANT CHANGE UP (ref 27–36.3)
AST SERPL-CCNC: 24 U/L — SIGNIFICANT CHANGE UP (ref 4–32)
BILIRUB SERPL-MCNC: 1.2 MG/DL — SIGNIFICANT CHANGE UP (ref 0.2–1.2)
BUN SERPL-MCNC: 38 MG/DL — HIGH (ref 7–23)
CALCIUM SERPL-MCNC: 8.8 MG/DL — SIGNIFICANT CHANGE UP (ref 8.4–10.5)
CHLORIDE SERPL-SCNC: 102 MMOL/L — SIGNIFICANT CHANGE UP (ref 98–107)
CO2 SERPL-SCNC: 26 MMOL/L — SIGNIFICANT CHANGE UP (ref 22–31)
CREAT SERPL-MCNC: 1.33 MG/DL — HIGH (ref 0.5–1.3)
EGFR: 40 ML/MIN/1.73M2 — LOW
ESTIMATED AVERAGE GLUCOSE: 108 — SIGNIFICANT CHANGE UP
GLUCOSE BLDC GLUCOMTR-MCNC: 105 MG/DL — HIGH (ref 70–99)
GLUCOSE BLDC GLUCOMTR-MCNC: 167 MG/DL — HIGH (ref 70–99)
GLUCOSE BLDC GLUCOMTR-MCNC: 95 MG/DL — SIGNIFICANT CHANGE UP (ref 70–99)
GLUCOSE BLDC GLUCOMTR-MCNC: 98 MG/DL — SIGNIFICANT CHANGE UP (ref 70–99)
GLUCOSE SERPL-MCNC: 86 MG/DL — SIGNIFICANT CHANGE UP (ref 70–99)
HCT VFR BLD CALC: 32.7 % — LOW (ref 34.5–45)
HGB BLD-MCNC: 10.1 G/DL — LOW (ref 11.5–15.5)
INR BLD: 1.41 RATIO — HIGH (ref 0.88–1.16)
MAGNESIUM SERPL-MCNC: 2.3 MG/DL — SIGNIFICANT CHANGE UP (ref 1.6–2.6)
MCHC RBC-ENTMCNC: 27.9 PG — SIGNIFICANT CHANGE UP (ref 27–34)
MCHC RBC-ENTMCNC: 30.9 GM/DL — LOW (ref 32–36)
MCV RBC AUTO: 90.3 FL — SIGNIFICANT CHANGE UP (ref 80–100)
NRBC # BLD: 0 /100 WBCS — SIGNIFICANT CHANGE UP
NRBC # FLD: 0 K/UL — SIGNIFICANT CHANGE UP
PLATELET # BLD AUTO: 162 K/UL — SIGNIFICANT CHANGE UP (ref 150–400)
POTASSIUM SERPL-MCNC: 3.6 MMOL/L — SIGNIFICANT CHANGE UP (ref 3.5–5.3)
POTASSIUM SERPL-SCNC: 3.6 MMOL/L — SIGNIFICANT CHANGE UP (ref 3.5–5.3)
PROT SERPL-MCNC: 6.1 G/DL — SIGNIFICANT CHANGE UP (ref 6–8.3)
PROTHROM AB SERPL-ACNC: 16.4 SEC — HIGH (ref 10.5–13.4)
RBC # BLD: 3.62 M/UL — LOW (ref 3.8–5.2)
RBC # FLD: 15.1 % — HIGH (ref 10.3–14.5)
SODIUM SERPL-SCNC: 140 MMOL/L — SIGNIFICANT CHANGE UP (ref 135–145)
WBC # BLD: 8.46 K/UL — SIGNIFICANT CHANGE UP (ref 3.8–10.5)
WBC # FLD AUTO: 8.46 K/UL — SIGNIFICANT CHANGE UP (ref 3.8–10.5)

## 2022-04-08 PROCEDURE — 99232 SBSQ HOSP IP/OBS MODERATE 35: CPT

## 2022-04-08 PROCEDURE — 71045 X-RAY EXAM CHEST 1 VIEW: CPT | Mod: 26

## 2022-04-08 RX ORDER — FUROSEMIDE 40 MG
40 TABLET ORAL ONCE
Refills: 0 | Status: COMPLETED | OUTPATIENT
Start: 2022-04-08 | End: 2022-04-08

## 2022-04-08 RX ORDER — WARFARIN SODIUM 2.5 MG/1
5 TABLET ORAL ONCE
Refills: 0 | Status: COMPLETED | OUTPATIENT
Start: 2022-04-08 | End: 2022-04-08

## 2022-04-08 RX ADMIN — SODIUM CHLORIDE 4 MILLILITER(S): 9 INJECTION INTRAMUSCULAR; INTRAVENOUS; SUBCUTANEOUS at 09:04

## 2022-04-08 RX ADMIN — SODIUM CHLORIDE 4 MILLILITER(S): 9 INJECTION INTRAMUSCULAR; INTRAVENOUS; SUBCUTANEOUS at 15:06

## 2022-04-08 RX ADMIN — SODIUM CHLORIDE 4 MILLILITER(S): 9 INJECTION INTRAMUSCULAR; INTRAVENOUS; SUBCUTANEOUS at 19:05

## 2022-04-08 RX ADMIN — Medication 1 APPLICATION(S): at 18:12

## 2022-04-08 RX ADMIN — CARVEDILOL PHOSPHATE 3.12 MILLIGRAM(S): 80 CAPSULE, EXTENDED RELEASE ORAL at 05:45

## 2022-04-08 RX ADMIN — ISOSORBIDE DINITRATE 10 MILLIGRAM(S): 5 TABLET ORAL at 05:45

## 2022-04-08 RX ADMIN — DORNASE ALFA 2.5 MILLIGRAM(S): 1 SOLUTION RESPIRATORY (INHALATION) at 22:23

## 2022-04-08 RX ADMIN — Medication 40 MILLIGRAM(S): at 13:36

## 2022-04-08 RX ADMIN — Medication 3 MILLILITER(S): at 19:04

## 2022-04-08 RX ADMIN — SODIUM CHLORIDE 4 MILLILITER(S): 9 INJECTION INTRAMUSCULAR; INTRAVENOUS; SUBCUTANEOUS at 03:13

## 2022-04-08 RX ADMIN — Medication 1 APPLICATION(S): at 05:45

## 2022-04-08 RX ADMIN — WARFARIN SODIUM 5 MILLIGRAM(S): 2.5 TABLET ORAL at 18:13

## 2022-04-08 RX ADMIN — ISOSORBIDE DINITRATE 10 MILLIGRAM(S): 5 TABLET ORAL at 18:13

## 2022-04-08 RX ADMIN — DORNASE ALFA 2.5 MILLIGRAM(S): 1 SOLUTION RESPIRATORY (INHALATION) at 09:07

## 2022-04-08 RX ADMIN — Medication 3 MILLILITER(S): at 03:15

## 2022-04-08 RX ADMIN — CARVEDILOL PHOSPHATE 3.12 MILLIGRAM(S): 80 CAPSULE, EXTENDED RELEASE ORAL at 18:13

## 2022-04-08 RX ADMIN — Medication 3 MILLILITER(S): at 15:05

## 2022-04-08 RX ADMIN — Medication 81 MILLIGRAM(S): at 13:37

## 2022-04-08 RX ADMIN — Medication 3 MILLILITER(S): at 09:04

## 2022-04-08 NOTE — PROGRESS NOTE ADULT - SUBJECTIVE AND OBJECTIVE BOX
Collin Trejo MD  Interventional Cardiology / Endovascular Specialist  [place] Office : [phone] [place] [address] [place] [address]  Tel:   [place] Office : [phone] [facility] [place] [address]  Tel: [phone]    Subjective/Overnight events: Patient lying in bed NAD   	  MEDICATIONS:  aspirin enteric coated 81 milliGRAM(s) Oral daily  carvedilol 3.125 milliGRAM(s) Oral every 12 hours  hydrALAZINE Injectable 5 milliGRAM(s) IV Push every 4 hours PRN  isosorbide   dinitrate Tablet (ISORDIL) 10 milliGRAM(s) Oral two times a day  warfarin 5 milliGRAM(s) Oral once      albuterol/ipratropium for Nebulization 3 milliLiter(s) Nebulizer every 6 hours  dornase balta Solution 2.5 milliGRAM(s) Inhalation two times a day  sodium chloride 3%  Inhalation 4 milliLiter(s) Inhalation every 6 hours    acetaminophen   IVPB .. 1000 milliGRAM(s) IV Intermittent once PRN  acetaminophen   IVPB .. 1000 milliGRAM(s) IV Intermittent once PRN  HYDROmorphone  Injectable 0.5 milliGRAM(s) IV Push every 3 hours PRN      dextrose 50% Injectable 12.5 Gram(s) IV Push once  dextrose Oral Gel 15 Gram(s) Oral once PRN  glucagon  Injectable 1 milliGRAM(s) IntraMuscular once  insulin lispro (ADMELOG) corrective regimen sliding scale   SubCutaneous every 6 hours    AQUAPHOR (petrolatum Ointment) 1 Application(s) Topical every 12 hours  dextrose 5%. 1000 milliLiter(s) IV Continuous <Continuous>  dextrose 5%. 1000 milliLiter(s) IV Continuous <Continuous>  triamcinolone 0.1% Ointment 1 Application(s) Topical every 12 hours PRN      PAST MEDICAL/SURGICAL HISTORY  PAST MEDICAL & SURGICAL HISTORY:  HTN (Hypertension)    Afib  (on Warfarin)    CAD (Coronary Artery Disease)  s/p stent and CABG    CVA (Cerebral Infarction)  2011    CHF (congestive heart failure)  EF 65% Oct 2019    Upper GI bleed    Gout    Diabetes mellitus  type 2, not on meds    History of heart valve replacement with bioprosthetic valve  mitral and aortic    Hyperlipidemia    S/P angioplasty with stent  2011    S/P CABG x 1  (2000)    H/O aortic valve replacement  9/22/14    H/O mitral valve replacement  9/22/14        SOCIAL HISTORY: Substance Use (street drugs): ( x ) never used  (  ) other:    FAMILY HISTORY:  Family history of acute myocardial infarction  mother        REVIEW OF SYSTEMS:  CONSTITUTIONAL: No fever, weight loss, or fatigue  EYES: No eye pain, visual disturbances, or discharge  ENMT:  No difficulty hearing, tinnitus, vertigo; No sinus or throat pain  BREASTS: No pain, masses, or nipple discharge  GASTROINTESTINAL: No abdominal or epigastric pain. No nausea, vomiting, or hematemesis; No diarrhea or constipation. No melena or hematochezia.  GENITOURINARY: No dysuria, frequency, hematuria, or incontinence  NEUROLOGICAL: No headaches, memory loss, loss of strength, numbness, or tremors  ENDOCRINE: No heat or cold intolerance; No hair loss  MUSCULOSKELETAL: No joint pain or swelling; No muscle, back, or extremity pain  PSYCHIATRIC: No depression, anxiety, mood swings, or difficulty sleeping  HEME/LYMPH: No easy bruising, or bleeding gums  All others negative    PHYSICAL EXAM:  T(C): 36.9 (04-08-22 @ 05:35), Max: 36.9 (04-07-22 @ 22:25)  HR: 68 (04-08-22 @ 09:09) (61 - 89)  BP: 135/50 (04-08-22 @ 05:35) (120/48 - 143/60)  RR: 16 (04-08-22 @ 05:35) (16 - 20)  SpO2: 97% (04-08-22 @ 09:09) (92% - 97%)  Wt(kg): --  I&O's Summary    07 Apr 2022 07:01  -  08 Apr 2022 07:00  --------------------------------------------------------  IN: 315 mL / OUT: 1080 mL / NET: -765 mL        EYES:   PERRLA   ENMT:   Moist mucous membranes, Good dentition, No lesions  Cardiovascular: Normal S1 S2, No JVD, No murmurs, No edema  Respiratory: left sided decreased breath sounds  Gastrointestinal:  Soft, Non-tender, + BS	  Extremities: no edema                            10.1   8.46  )-----------( 162      ( 08 Apr 2022 07:04 )             32.7     04-08    140  |  102  |  38<H>  ----------------------------<  86  3.6   |  26  |  1.33<H>    Ca    8.8      08 Apr 2022 07:04  Phos  4.4     04-06  Mg     2.30     04-08    TPro  6.1  /  Alb  2.9<L>  /  TBili  1.2  /  DBili  x   /  AST  24  /  ALT  9   /  AlkPhos  124<H>  04-08    proBNP:   Lipid Profile:   HgA1c:   TSH:     Consultant(s) Notes Reviewed:  [x ] YES  [ ] NO    Care Discussed with Consultants/Other Providers [ x] YES  [ ] NO    Imaging Personally Reviewed independently:  [x] YES  [ ] NO    All labs, radiologic studies, vitals, orders and medications list reviewed. Patient is seen and examined at bedside. Case discussed with medical team.

## 2022-04-08 NOTE — PROGRESS NOTE ADULT - PROBLEM SELECTOR PLAN 11
monitor and co ntrol
monitor and control    4/5: per MICU
monitor and co ntrol
monitor and co ntrol
monitor and control    4/5: per MICU

## 2022-04-08 NOTE — PHYSICAL THERAPY INITIAL EVALUATION ADULT - DID THE PATIENT HAVE SURGERY?
n/a
Flexible Bronchoscopy, Left VATS, Pleural Biopsy, Drainage of Pleural Effusion, PleurX Catheter Placement/yes

## 2022-04-08 NOTE — PHYSICAL THERAPY INITIAL EVALUATION ADULT - DISCHARGE DISPOSITION, PT EVAL
to be determined after further functional mobility assessment
Unable to determine at this time, please continue to follow pending mobility assessment
rehabilitation facility

## 2022-04-08 NOTE — PHYSICAL THERAPY INITIAL EVALUATION ADULT - CRITERIA FOR SKILLED THERAPEUTIC INTERVENTIONS
impairments found/functional limitations in following categories/rehab potential/therapy frequency
impairments found
impairments found

## 2022-04-08 NOTE — PHYSICAL THERAPY INITIAL EVALUATION ADULT - LEVEL OF INDEPENDENCE: SUPINE/SIT, REHAB EVAL
patient not following commands and participating in evaluation despite maximum encouragement./unable to perform
minimum assist (75% patients effort)
minimum assist (75% patients effort)

## 2022-04-08 NOTE — PHYSICAL THERAPY INITIAL EVALUATION ADULT - ADDITIONAL COMMENTS
Pt lives in a house with 4 exterior steps to enter. Prior to admission, pt ambulated independently.
Patient poor historian, unable to receive social history taken from the chart. Patient lives with son, owns a cane but refuses to use it. Please see care coordination note for further details.

## 2022-04-08 NOTE — PHYSICAL THERAPY INITIAL EVALUATION ADULT - PRECAUTIONS/LIMITATIONS, REHAB EVAL
cardiac precautions/fall precautions
cardiac precautions/fall precautions
Elevate HOB 30-45 degrees/aspiration precautions/fall precautions

## 2022-04-08 NOTE — PROGRESS NOTE ADULT - SUBJECTIVE AND OBJECTIVE BOX
Date of Service: 04-08-22 @ 15:04    Patient is a 81y old  Female who presents with a chief complaint of Acute on chronic diastolic heart failure exacerbation (08 Apr 2022 14:19)      Any change in ROS:  Doing 0ok: she is alert and awake:  no cough  : no phlegm     MEDICATIONS  (STANDING):  albuterol/ipratropium for Nebulization 3 milliLiter(s) Nebulizer every 6 hours  AQUAPHOR (petrolatum Ointment) 1 Application(s) Topical every 12 hours  aspirin enteric coated 81 milliGRAM(s) Oral daily  carvedilol 3.125 milliGRAM(s) Oral every 12 hours  dextrose 5%. 1000 milliLiter(s) (50 mL/Hr) IV Continuous <Continuous>  dextrose 5%. 1000 milliLiter(s) (100 mL/Hr) IV Continuous <Continuous>  dextrose 50% Injectable 12.5 Gram(s) IV Push once  dornase balta Solution 2.5 milliGRAM(s) Inhalation two times a day  glucagon  Injectable 1 milliGRAM(s) IntraMuscular once  insulin lispro (ADMELOG) corrective regimen sliding scale   SubCutaneous every 6 hours  isosorbide   dinitrate Tablet (ISORDIL) 10 milliGRAM(s) Oral two times a day  sodium chloride 3%  Inhalation 4 milliLiter(s) Inhalation every 6 hours  warfarin 5 milliGRAM(s) Oral once    MEDICATIONS  (PRN):  acetaminophen   IVPB .. 1000 milliGRAM(s) IV Intermittent once PRN Temp greater or equal to 38C (100.4F), Mild Pain (1 - 3)  acetaminophen   IVPB .. 1000 milliGRAM(s) IV Intermittent once PRN Temp greater or equal to 38C (100.4F), Mild Pain (1 - 3)  dextrose Oral Gel 15 Gram(s) Oral once PRN Blood Glucose LESS THAN 70 milliGRAM(s)/deciliter  hydrALAZINE Injectable 5 milliGRAM(s) IV Push every 4 hours PRN SBP>160  HYDROmorphone  Injectable 0.5 milliGRAM(s) IV Push every 3 hours PRN Moderate Pain (4 - 6)  triamcinolone 0.1% Ointment 1 Application(s) Topical every 12 hours PRN itching    Vital Signs Last 24 Hrs  T(C): 36.9 (08 Apr 2022 05:35), Max: 36.9 (07 Apr 2022 22:25)  T(F): 98.4 (08 Apr 2022 05:35), Max: 98.5 (07 Apr 2022 22:25)  HR: 68 (08 Apr 2022 09:09) (61 - 89)  BP: 135/50 (08 Apr 2022 05:35) (120/48 - 135/50)  BP(mean): 75 (08 Apr 2022 05:35) (75 - 75)  RR: 16 (08 Apr 2022 05:35) (16 - 20)  SpO2: 97% (08 Apr 2022 09:09) (92% - 97%)    I&O's Summary    07 Apr 2022 07:01  -  08 Apr 2022 07:00  --------------------------------------------------------  IN: 315 mL / OUT: 1080 mL / NET: -765 mL          Physical Exam:   GENERAL: NAD, well-groomed, well-developed  HEENT: BRADY/   Atraumatic, Normocephalic  ENMT: No tonsillar erythema, exudates, or enlargement; Moist mucous membranes, Good dentition, No lesions  NECK: Supple, No JVD, Normal thyroid  CHEST/LUNG: Clear to auscultaion  CVS: Regular rate and rhythm; No murmurs, rubs, or gallops  GI: : Soft, Nontender, Nondistended; Bowel sounds present  NERVOUS SYSTEM:  Alert & Oriented X3  EXTREMITIES:  - edema  LYMPH: No lymphadenopathy noted  SKIN: No rashes or lesions  ENDOCRINOLOGY: No Thyromegaly  PSYCH: Appropriate    Labs:  ABG - ( 07 Apr 2022 03:34 )  pH, Arterial: 7.43  pH, Blood: x     /  pCO2: 50    /  pO2: 84    / HCO3: 33    / Base Excess: 7.7   /  SaO2: 96.6                                        10.1   8.46  )-----------( 162      ( 08 Apr 2022 07:04 )             32.7                         9.9    11.36 )-----------( 183      ( 07 Apr 2022 03:34 )             31.1                         10.2   12.26 )-----------( 205      ( 06 Apr 2022 19:29 )             31.6                         10.4   7.90  )-----------( 175      ( 06 Apr 2022 04:51 )             32.8                         11.6   9.17  )-----------( 180      ( 05 Apr 2022 19:09 )             37.1                         10.8   8.07  )-----------( 170      ( 05 Apr 2022 07:17 )             35.2                         10.7   8.51  )-----------( 174      ( 05 Apr 2022 02:54 )             34.2     04-08    140  |  102  |  38<H>  ----------------------------<  86  3.6   |  26  |  1.33<H>  04-07    138  |  97<L>  |  36<H>  ----------------------------<  119<H>  3.8   |  27  |  1.24  04-06    140  |  97<L>  |  35<H>  ----------------------------<  153<H>  3.9   |  25  |  1.30  04-06    136  |  96<L>  |  33<H>  ----------------------------<  157<H>  4.3   |  26  |  1.02  04-05    138  |  95<L>  |  31<H>  ----------------------------<  165<H>  3.6   |  28  |  1.02  04-05    137  |  98  |  32<H>  ----------------------------<  104<H>  4.0   |  29  |  1.07    Ca    8.8      08 Apr 2022 07:04  Ca    8.8      07 Apr 2022 03:34  Ca    8.8      06 Apr 2022 19:29  Phos  4.4     04-06  Mg     2.30     04-08  Mg     2.30     04-07  Mg     2.20     04-06    TPro  6.1  /  Alb  2.9<L>  /  TBili  1.2  /  DBili  x   /  AST  24  /  ALT  9   /  AlkPhos  124<H>  04-08    CAPILLARY BLOOD GLUCOSE  108 (07 Apr 2022 23:00)  117 (07 Apr 2022 17:49)      POCT Blood Glucose.: 98 mg/dL (08 Apr 2022 13:41)  POCT Blood Glucose.: 105 mg/dL (08 Apr 2022 05:54)  POCT Blood Glucose.: 117 mg/dL (07 Apr 2022 17:47)     (04-05 @ 10:45)    LIVER FUNCTIONS - ( 08 Apr 2022 07:04 )  Alb: 2.9 g/dL / Pro: 6.1 g/dL / ALK PHOS: 124 U/L / ALT: 9 U/L / AST: 24 U/L / GGT: x           PT/INR - ( 08 Apr 2022 07:04 )   PT: 16.4 sec;   INR: 1.41 ratio         PTT - ( 08 Apr 2022 07:04 )  PTT:28.9 sec    Fluid Source --  Albumin, Fluid--  Glucose, Fluid--  Protein total, Fluid--  Lacatate Dehydrogenase, Fluid--  pH, Fluid--  Cytopathology-Non Gyn Report  ACCESSION No:  [id number]  Patient:   CATRACHITO WOODS      Accession:                             [id number]    Collected Date/Time:                   4/5/2022 10:45 EDT  Received Date/Time:                    4/5/2022 15:12 EDT    Non-Gynecologic Report - Auth (Verified)    Specimen(s) Submitted  PLEURAL FLUID, LEFT    Final Diagnosis  PLEURAL FLUID, LEFT  NEGATIVE FOR MALIGNANT CELLS.    Cytology slides and cell block shows histiocytes and scattered benign  mesothelial cells.      Pleasesee concurrent pathology report:   [id number]  Screened by: Richmond BISWAS(ASCP)  Verified by: Wilmer Carrera MD  (Electronic Signature)  Reported on: 04/06/22 12:13 EDT, [facility], [address] [place] [address]  Phone: [phone]   Fax: [phone]  Cytology technical processing performed at [address] [place] [address], [place] [address]  _________________________________________________________________      Clinical Information  Pleural effusion.    Gross Description  Received: 90  ml of yellow fluid in CytoLyt  Prepared: 1 ThinPrep slide, 1 cell block, smear        RECENT CULTURES:  04-05 @ 16:21 .Bronchial left bronchial lavage                Testing in progress    04-05 @ 12:39 .Bronchial left bronchial lavage       No polymorphonuclear leukocytes seen per low power field  No Squamous epithelial cells seen per low power field  No organisms seen per oil power field           No growth          RESPIRATORY CULTURES:          Studies  Chest X-RAY  CT SCAN Chest   Venous Dopplers: LE:   CT Abdomen  Others    radr< from: Xray Chest 1 View- PORTABLE-Routine (Xray Chest 1 View- PORTABLE-Routine in AM.) (04.07.22 @ 07:03) >  PROCEDURE DATE:  04/07/2022          INTERPRETATION:  TIME OF EXAM: April 17, 2022 at 6:25 AM    CLINICAL INFORMATION: Postop    TECHNIQUE:   Portable chest    INTERPRETATION:    The endotracheal and enteric tubes have been removed. Persistent opacity   at the right lung base likely lower lobe and possibly middle lobe   atelectasis associated with small effusion. Left lung and right upper   lobe are clear.      COMPARISON:  April 6      IMPRESSION:  Follow-up postop post extubation with possible combined   middle and lower lobe atelectasis.    --- End of Report ---            HERMAN CLARK MD; Attending Radiologist  This document has been electronically signed. Apr 7 2022 12:17PM    < end of copied text >

## 2022-04-08 NOTE — PHYSICAL THERAPY INITIAL EVALUATION ADULT - LEVEL OF INDEPENDENCE: GAIT, REHAB EVAL
patient not following commands and participating in evaluation despite maximum encouragement./unable to perform
unable to perform; pt tolerated standing x ~10 seconds before spontaneously sitting down. Reports feeling "too weak"

## 2022-04-08 NOTE — SWALLOW BEDSIDE ASSESSMENT ADULT - ASR SWALLOW DENTITION
pt states she uses dentures to eat at home/edentulous, does not have dentures
upper and lower dentures

## 2022-04-08 NOTE — PHYSICAL THERAPY INITIAL EVALUATION ADULT - MANUAL MUSCLE TESTING RESULTS, REHAB EVAL
grossly 3+/5
cardiac issues/grossly assessed due to
patient not following commands and participating in evaluation despite maximum encouragement./not tested due to

## 2022-04-08 NOTE — PHYSICAL THERAPY INITIAL EVALUATION ADULT - PLANNED THERAPY INTERVENTIONS, PT EVAL
balance training/bed mobility training/gait training/strengthening/transfer training

## 2022-04-08 NOTE — PHYSICAL THERAPY INITIAL EVALUATION ADULT - LEVEL OF INDEPENDENCE: SIT/STAND, REHAB EVAL
patient not following commands and participating in evaluation despite maximum encouragement./unable to perform
minimum assist (75% patients effort)
due to shortness of breath noted in bed despite patient denies such; pt also placed on bedrest this AM/unable to perform

## 2022-04-08 NOTE — SWALLOW BEDSIDE ASSESSMENT ADULT - SWALLOW EVAL: DIAGNOSIS
SLP presented patient with PO trials, at which time she refused all except a minimal amount of thin liquids. The patient demonstrated functional oral management of thin liquids marked by adequate bolus collection, transfer and posterior transport. Pharyngeal skills characterized by initiation of pharyngeal swallow trigger with + hyolaryngeal elevation noted upon digital palpation without evidence of airway penetration. Unable to assess oral and pharyngeal phases of swallow for other consistencies 2/2 pt refusal to accept PO.
Pt accepts puree, minced/moist solids and thin liquids. 1. Presents with functional oral phase for puree and thin liquids. Oral stage marked by adequate acceptance/containment, timely bolus prep and posterior transport. Oral cavity is clear post swallow. 2. Moderate oral dysphagia for minced/moist solids marked by incomplete/effortful mastication and lingual residue post swallow. 3. Functional pharyngeal phase marked by appearance of timely swallow initiation, present hyolaryngeal movement per palpation. No clinical signs/symptoms of aspiration/penetration

## 2022-04-08 NOTE — SWALLOW BEDSIDE ASSESSMENT ADULT - SWALLOW EVAL: RECOMMENDED DIET
1. Defer PO diet to MD given patient's refusal to participate for swallow evaluation. 2. Reconsult as needed
puree/thin liquids

## 2022-04-08 NOTE — SWALLOW BEDSIDE ASSESSMENT ADULT - ADDITIONAL RECOMMENDATIONS
Reconsult this service as patient becomes medically optimized to reassess. This service will follow as schedule permits.
1. continue to monitor and reconsult as indicated.

## 2022-04-08 NOTE — PHYSICAL THERAPY INITIAL EVALUATION ADULT - GENERAL OBSERVATIONS, REHAB EVAL
Patient received semi-supine in bed, all lines intact, NAD.
Pt received semisupine in bed, +primafit, +IV, +nasal cannula, +telemetry, in NAD. Pt agreeable to participate in PT evaluation. Pt left semisupine in bed as found, all lines intact, +bed alarm and RN aware.
Patient received supine on a stretcher this AM with RN present

## 2022-04-08 NOTE — PROGRESS NOTE ADULT - SUBJECTIVE AND OBJECTIVE BOX
Date of Service  : 04-08-22     INTERVAL HPI/OVERNIGHT EVENTS: I feel different today . No specific complaint.   Vital Signs Last 24 Hrs  T(C): 36.9 (08 Apr 2022 05:35), Max: 36.9 (07 Apr 2022 22:25)  T(F): 98.4 (08 Apr 2022 05:35), Max: 98.5 (07 Apr 2022 22:25)  HR: 78 (08 Apr 2022 15:11) (68 - 89)  BP: 135/50 (08 Apr 2022 05:35) (120/48 - 135/50)  BP(mean): 75 (08 Apr 2022 05:35) (75 - 75)  RR: 16 (08 Apr 2022 05:35) (16 - 16)  SpO2: 97% (08 Apr 2022 15:11) (92% - 97%)  I&O's Summary    07 Apr 2022 07:01  -  08 Apr 2022 07:00  --------------------------------------------------------  IN: 315 mL / OUT: 1080 mL / NET: -765 mL      MEDICATIONS  (STANDING):  albuterol/ipratropium for Nebulization 3 milliLiter(s) Nebulizer every 6 hours  AQUAPHOR (petrolatum Ointment) 1 Application(s) Topical every 12 hours  aspirin enteric coated 81 milliGRAM(s) Oral daily  carvedilol 3.125 milliGRAM(s) Oral every 12 hours  dextrose 5%. 1000 milliLiter(s) (50 mL/Hr) IV Continuous <Continuous>  dextrose 5%. 1000 milliLiter(s) (100 mL/Hr) IV Continuous <Continuous>  dextrose 50% Injectable 12.5 Gram(s) IV Push once  dornase balta Solution 2.5 milliGRAM(s) Inhalation two times a day  glucagon  Injectable 1 milliGRAM(s) IntraMuscular once  insulin lispro (ADMELOG) corrective regimen sliding scale   SubCutaneous every 6 hours  isosorbide   dinitrate Tablet (ISORDIL) 10 milliGRAM(s) Oral two times a day  sodium chloride 3%  Inhalation 4 milliLiter(s) Inhalation every 6 hours    MEDICATIONS  (PRN):  acetaminophen   IVPB .. 1000 milliGRAM(s) IV Intermittent once PRN Temp greater or equal to 38C (100.4F), Mild Pain (1 - 3)  acetaminophen   IVPB .. 1000 milliGRAM(s) IV Intermittent once PRN Temp greater or equal to 38C (100.4F), Mild Pain (1 - 3)  dextrose Oral Gel 15 Gram(s) Oral once PRN Blood Glucose LESS THAN 70 milliGRAM(s)/deciliter  hydrALAZINE Injectable 5 milliGRAM(s) IV Push every 4 hours PRN SBP>160  HYDROmorphone  Injectable 0.5 milliGRAM(s) IV Push every 3 hours PRN Moderate Pain (4 - 6)  triamcinolone 0.1% Ointment 1 Application(s) Topical every 12 hours PRN itching    LABS:                        10.1   8.46  )-----------( 162      ( 08 Apr 2022 07:04 )             32.7     04-08    140  |  102  |  38<H>  ----------------------------<  86  3.6   |  26  |  1.33<H>    Ca    8.8      08 Apr 2022 07:04  Phos  4.4     04-06  Mg     2.30     04-08    TPro  6.1  /  Alb  2.9<L>  /  TBili  1.2  /  DBili  x   /  AST  24  /  ALT  9   /  AlkPhos  124<H>  04-08    PT/INR - ( 08 Apr 2022 07:04 )   PT: 16.4 sec;   INR: 1.41 ratio         PTT - ( 08 Apr 2022 07:04 )  PTT:28.9 sec    CAPILLARY BLOOD GLUCOSE  108 (07 Apr 2022 23:00)      POCT Blood Glucose.: 95 mg/dL (08 Apr 2022 17:36)  POCT Blood Glucose.: 98 mg/dL (08 Apr 2022 13:41)  POCT Blood Glucose.: 105 mg/dL (08 Apr 2022 05:54)      ABG - ( 07 Apr 2022 03:34 )  pH, Arterial: 7.43  pH, Blood: x     /  pCO2: 50    /  pO2: 84    / HCO3: 33    / Base Excess: 7.7   /  SaO2: 96.6                REVIEW OF SYSTEMS:  CONSTITUTIONAL: No fever, weight loss, or fatigue  NECK: No pain or stiffness  RESPIRATORY: No cough, wheezing, chills or hemoptysis; No shortness of breath  CARDIOVASCULAR: No chest pain, palpitations, dizziness, or leg swelling  GASTROINTESTINAL: No abdominal or epigastric pain. No nausea, vomiting, or hematemesis; No diarrhea or constipation. No melena or hematochezia.  GENITOURINARY: No dysuria, frequency, hematuria, or incontinence  NEUROLOGICAL: No headaches, memory loss, loss of strength, numbness, or tremors      Consultant(s) Notes Reviewed:  [x ] YES  [ ] NO    PHYSICAL EXAM:  GENERAL: NAD, Oxygen   HEAD:  Atraumatic, Normocephalic  NECK: Supple, No JVD, Normal thyroid  NERVOUS SYSTEM:  Alert & , No focal deficit   CHEST/LUNG: Good air entry bilateral with no  rales, rhonchi, wheezing, or rubs  HEART: Regular rate and rhythm; No murmurs, rubs, or gallops  ABDOMEN: Soft, Nontender, Nondistended; Bowel sounds present  EXTREMITIES:  2+ Peripheral Pulses, No clubbing, cyanosis, or edema      Care Discussed with Consultants/Other Providers [ x] YES  [ ] NO

## 2022-04-08 NOTE — PHYSICAL THERAPY INITIAL EVALUATION ADULT - PERTINENT HX OF CURRENT PROBLEM, REHAB EVAL
80yo Female with mhx of HFpEF ef 65% CAD (CABG 2000, stent 2011), severe pulm HTN, Bioprosthetic aortic and mitral valve replacement 9/2014, ?CKD per outside records, afib on warfarin, chronic Lt loculated pleural effusion,  Type 2 DM, HTN, HLD, gout a/w acute on chronic HFpEF i/s/p severe pHTN c/b hypercapnia; Found to have persisting moderate size loculated lt pleural effusion with atelectasis;
Pt is an 81 year old female presenting with acute on chronic heart failure exacerbation, severe pulmonary hypertension complicated by hypercapnia; Found to have persisting moderate size loculated left pleural effusion with atelectasis. PMH listed below.
Patient presented with dyspnea and lower extremity edema

## 2022-04-08 NOTE — PHYSICAL THERAPY INITIAL EVALUATION ADULT - DIAGNOSIS, PT EVAL
Decreased functional mobility
CHF
Pt presents with decreased functional mobility; will benefit from PT services while at [facility]

## 2022-04-08 NOTE — PROGRESS NOTE ADULT - ASSESSMENT
81 f with DM, HTN, CAD s/p CABG, HFpEF  severe pulm HTN, Bioprosthetic aortic and mitral valve replacement 9/2014, ?CKD per outside records, afib on warfarin, chronic Lt loculated pleural effusion, gout, presented 3/27 with worsening dyspnea on exertion and lower extremity swelling as well as 1 year history of generalized rash and was admitted for CHF exacerbation  initially afebrile, WBC normal  Chest CT: Small left pleural effusion with a loculated component in the left major fissure and along left anterior chest wall,  partial LLL and LEXI collapse. New pleural thickening and/or loculated fluid medially along the left upper hemithorax. Small right pleural effusion and adjacent passive atelectasis. Stable 9 mm right apical nodule.  pt was seen by cardio and pulmonary, did not recommend any thoracentesis and was being diuresed   pt spiked to 101.6 on 3/31 and WBC normal    dyspnea due to CHF exacerbation, has h/o L loculated pleural effusion but now also with LLL and LEXI collapse, likely the cause of new fever, CT 4/2 showed new complete atelectasis of L lung from a mucous plug in L mainstem bronchus, s/p Flexible Bronchoscopy, Left VATS, Pleural Biopsy, Drainage of Pleural Effusion, PleurX Catheter Placement, 1,100ml serous pleural fluid drained, still intubated with hemoptysis s/p bronc and clots were suctioned from R mainstem airway  eosinophilia to 800, was present in previous admissions as well but now also has a rash for about a year, derm's impression was xerosis, strongyloides, toxocara and filaria serology negative    * blood cx negative, afebrile and extubated, pleurex capped  * BAL cx negative, quanitferon negative, pleural biopsy not back yet  * s/p a 7 day course of zosyn, now off and afebrile          Kristy Erickson MD  contact on teams  After 5pm and on weekends call [phone]

## 2022-04-08 NOTE — CHART NOTE - NSCHARTNOTEFT_GEN_A_CORE
Spoke with thoracic surgery who cleared pt to resume Coumadin. Ordered Coumadin 5 mg x 1 for tonight.     CXR pre-oliver looks suggestive of fluid overload. Ordered Lasix 40 mg IVP x 1 STAT as per discussion with cardiology Dr. Trejo.     Will follow-up official CXR read. Will continue to monitor fluid status closely. MD Dr. Pitts aware.     Diane Arnold PA-C  Department of Medicine   In-house pager #[phone]

## 2022-04-08 NOTE — PROGRESS NOTE ADULT - ASSESSMENT
80yo Female with mhx of HFpEF ef 65% CAD (CABG 2000, stent 2011), severe pulm HTN, Bioprosthetic aortic and mitral valve replacement 9/2014, ?CKD per outside records, afib on warfarin, chronic Lt loculated pleural effusion,  Type 2 DM, HTN, HLD, gout a/w acute on chronic HFpEF i/s/p severe pHTN c/b hypercapnia; Found to have persisting moderate size loculated lt pleural effusion with atelectasis;         Problem/Plan - 1:  ·  Problem: Acute on chronic diastolic heart failure .   ·  Plan: Acute on chronic HFpEF likely in setting of decreased diuretic dose and severe pulm HTN as evidenced by prior TTE dated 10/10/2019;  Volume overloaded on exam with JVD and peripheral nonpitting edema to thighs  -S/P  Lasix 40 IV BID  -Cardiology help appreciated.   -TTE pending.      Problem/Plan - 2:  ·  Problem: Fever .   ·  Plan: Sepsis work up in progress.  IV Abxs started .  D helping.      Problem/Plan - 3:  ·  Problem: Loculated pleural effusion with Hypercapnia with Collapse left side .   ·  Plan: Persisting loculated effusion so Pulmonary helping.   S/P Flexible Bronchoscopy, Left VATS, Pleural Biopsy, Drainage of Pleural Effusion, PleurX Catheter Placement  < from: CT Chest No Cont (03.27.22 @ 20:25) >  IMPRESSION:    Small left pleural effusion with a loculated component in the left major   fissure and along left anterior chest wall. The loculated components are   new when compared to prior CT exam dated 10/13/2019. Associated partial   left lower lobe and left upper lobe collapse.    New pleural thickening and/or loculated fluid medially along the left   upper hemithorax.    Small right pleural effusion and adjacent passive atelectasis.    Stable 9 mm right apical nodule.    < end of copied text >     Problem/Plan - 4:  ·  Problem: Chronic atrial fibrillation.   ·  Plan: [id number] risk stratification = 7  Continue home coreg 12.5mg BID with hold parameters  Will restart  Coumadin as cleared by TS.      Problem/Plan - 5:  ·  Problem: Pulmonary HTN.   ·  Plan: Severe pulm HTN likely class II  Repeat TTE this admission.     Problem/Plan - 6:  ·  Problem: LAYLA Thrombus .  ·  Plan: On AC.      Problem/Plan - 7:  ·  Problem: Papular rash, generalized with Eosinophilia .  ·  Plan: Generalized papular rash of 1y duration previously attributed to hydralazine which was discontinued without improvement of rash  With chronic eosinophilia noted per chart  -Full medication review may be beneficial of meds possibly causing eosinophilia  -Dermatology consult noted.      Problem/Plan - 8:  ·  Problem: Hypertension.   ·  Plan: Restart home losartan, pressures can tolerate SBP 180s in ED  Cont Carvedilol.     Problem/Plan - 9:  ·  Problem: Hyperlipidemia.   ·  Plan: Continue home statin.     Problem/Plan - 10:  ·  Problem: T2DM (type 2 diabetes mellitus).   ·  Plan; HOLD home glipizide while inpatient   Continue home gabapentin for diabetic neuropathy  KRYSTYNA.     Problem/Plan - 11:  ·  Problem: Cerebrovascular accident (CVA).   ·  Plan: Without residual deficits at this time per daughter and patient although unclear clinical course occurring years ago. Continue to monitor and continue ASA and statin.     Problem/Plan - 12:  ·  Problem: CAD (coronary artery disease).   ·  Plan: Continue GDMT    Full code.

## 2022-04-08 NOTE — PROGRESS NOTE ADULT - SUBJECTIVE AND OBJECTIVE BOX
Follow Up:  fever, lung collapse    Interval History/ROS, JATIN: pt was transferred to floor, doing well, no fever, no cough, no diarrhea            Allergies  No Known Allergies        ANTIMICROBIALS:      OTHER MEDS:  acetaminophen   IVPB .. 1000 milliGRAM(s) IV Intermittent once PRN  acetaminophen   IVPB .. 1000 milliGRAM(s) IV Intermittent once PRN  albuterol/ipratropium for Nebulization 3 milliLiter(s) Nebulizer every 6 hours  AQUAPHOR (petrolatum Ointment) 1 Application(s) Topical every 12 hours  aspirin enteric coated 81 milliGRAM(s) Oral daily  carvedilol 3.125 milliGRAM(s) Oral every 12 hours  dextrose 5%. 1000 milliLiter(s) IV Continuous <Continuous>  dextrose 5%. 1000 milliLiter(s) IV Continuous <Continuous>  dextrose 50% Injectable 12.5 Gram(s) IV Push once  dextrose Oral Gel 15 Gram(s) Oral once PRN  dornase balta Solution 2.5 milliGRAM(s) Inhalation two times a day  glucagon  Injectable 1 milliGRAM(s) IntraMuscular once  hydrALAZINE Injectable 5 milliGRAM(s) IV Push every 4 hours PRN  HYDROmorphone  Injectable 0.5 milliGRAM(s) IV Push every 3 hours PRN  insulin lispro (ADMELOG) corrective regimen sliding scale   SubCutaneous every 6 hours  isosorbide   dinitrate Tablet (ISORDIL) 10 milliGRAM(s) Oral two times a day  sodium chloride 3%  Inhalation 4 milliLiter(s) Inhalation every 6 hours  triamcinolone 0.1% Ointment 1 Application(s) Topical every 12 hours PRN  warfarin 5 milliGRAM(s) Oral once      Vital Signs Last 24 Hrs  T(C): 36.9 (08 Apr 2022 05:35), Max: 36.9 (07 Apr 2022 22:25)  T(F): 98.4 (08 Apr 2022 05:35), Max: 98.5 (07 Apr 2022 22:25)  HR: 78 (08 Apr 2022 15:11) (68 - 89)  BP: 135/50 (08 Apr 2022 05:35) (120/48 - 135/50)  BP(mean): 75 (08 Apr 2022 05:35) (75 - 75)  RR: 16 (08 Apr 2022 05:35) (16 - 20)  SpO2: 97% (08 Apr 2022 15:11) (92% - 97%)    Physical Exam:  General:    NAD,  non toxic  Cardio:     regular S1, S2  Respiratory:    clear b/l,    no wheezing  abd:     soft,   BS +,   no tenderness  :   no CVAT,  no suprapubic tenderness  Musculoskeletal:   no joint swelling  vascular: no phlebitis  Skin:    no rash                          10.1   8.46  )-----------( 162      ( 08 Apr 2022 07:04 )             32.7       04-08    140  |  102  |  38<H>  ----------------------------<  86  3.6   |  26  |  1.33<H>    Ca    8.8      08 Apr 2022 07:04  Phos  4.4     04-06  Mg     2.30     04-08    TPro  6.1  /  Alb  2.9<L>  /  TBili  1.2  /  DBili  x   /  AST  24  /  ALT  9   /  AlkPhos  124<H>  04-08          MICROBIOLOGY:  v  .Bronchial left bronchial lavage  04-05-22   Testing in progress  --  --      .Bronchial left bronchial lavage  04-05-22   No growth  --    No polymorphonuclear leukocytes seen per low power field  No Squamous epithelial cells seen per low power field  No organisms seen per oil power field      .Blood Blood-Venous  03-31-22   No Growth Final  --  --      .Blood Blood-Peripheral  03-31-22   No Growth Final  --  --                RADIOLOGY:  Images independently visualized and reviewed personally, findings as below  < from: Xray Chest 1 View- PORTABLE-Routine (Xray Chest 1 View- PORTABLE-Routine in AM.) (04.07.22 @ 07:03) >  IMPRESSION:  Follow-up postop post extubation with possible combined   middle and lower lobe atelectasis.      < end of copied text >  < from: CT Chest No Cont (04.02.22 @ 13:48) >  IMPRESSION:  Since 3/27/22    New complete atelectasis of the left lung from a mucous plug/secretions   in the left mainstem bronchus.    Large left pleural effusion, increasedsince 3/27/2022.    < end of copied text >

## 2022-04-08 NOTE — PHYSICAL THERAPY INITIAL EVALUATION ADULT - IMPAIRMENTS FOUND, PT EVAL
gait, locomotion, and balance/muscle strength
aerobic capacity/endurance/gait, locomotion, and balance
aerobic capacity/endurance/gait, locomotion, and balance/muscle strength

## 2022-04-08 NOTE — PHYSICAL THERAPY INITIAL EVALUATION ADULT - FOLLOWS COMMANDS/ANSWERS QUESTIONS, REHAB EVAL
unable to follow commands/unable to answer questions
Polish speaking,  listed/100% of the time/able to follow single-step instructions
100% of the time

## 2022-04-08 NOTE — SWALLOW BEDSIDE ASSESSMENT ADULT - COMMENTS
per Internal Medicine charting-80yo Female with mhx of HFpEF ef 65% CAD (CABG 2000, stent 2011), severe pulm HTN, Bioprosthetic aortic and mitral valve replacement 9/2014, ?CKD per outside records, afib on warfarin, chronic Lt loculated pleural effusion,  Type 2 DM, HTN, HLD, gout a/w acute on chronic HFpEF i/s/p severe pHTN c/b hypercapnia; Found to have persisting moderate size loculated lt pleural effusion with atelectasis;   CXR 4/7 - The endotracheal and enteric tubes have been removed. Persistent opacity at the right lung base likely lower lobe and possibly middle lobe  atelectasis associated with small effusion. Left lung and right upper  lobe are clear.    Orders received and chart reviewed. Pt is known to this department and was seen for swallow eval 4/2 at which time diet recommendations were deferred to MD due to patient's refusal to consume PO. Please see full report for details. Today, Pt's RN reporting she is consuming some of her clear liquid tray. No difficulty swallowing noted. SLP used  services ([employer])  ID - [id number]. Pt followed 1 step commands and responded verbally intermittently with poor vocal volume/reduced intelligibility at times.
Per charting, the patient "80yo Female with mhx of HFpEF ef 65% CAD (CABG 2000, stent 2011), severe pulm HTN, Bioprosthetic aortic and mitral valve replacement 9/2014, ?CKD per outside records, afib on warfarin, chronic Lt loculated pleural effusion,  Type 2 DM, HTN, HLD, gout a/w acute on chronic HFpEF i/s/p severe pHTN c/b hypercapnia; Found to have persisting moderate size loculated lt pleural effusion with atelectasis;"    CT CHEST 3/27/22: "Small left pleural effusion with a loculated component in the left major fissure and along left anterior chest wall. The loculated components are new when compared to prior CT exam dated 10/13/2019. Associated partial left lower lobe and left upper lobe collapse. New pleural thickening and/or loculated fluid medially along the left upper hemithorax. Small right pleural effusion and adjacent passive atelectasis."    Consult received and chart reviewed. The patient was seen this morning for an assessment of swallow function, at which time she was awake/alert and oriented to self and place. This clinician utilized [facility]'s interpretation services to communicate with patient in her primary language (Portuguese) (ID#[id number]). The patient was able to follow directives and communicate basic wants and needs. She denied any difficulty chewing or swallowing. RN reported patient finished breakfast without any signs/symptoms of dysphagia. The patient was minimally receptive to PO trials, accepting only a minimal amount of thin liquids.

## 2022-04-08 NOTE — PROGRESS NOTE ADULT - ASSESSMENT
82yo Female with mhx of HFpEF ef 65% CAD s/p CABG 2000 LIMA to LAD and SVG to OM1 and subsequent PCI to LCx,in 2011, severe pulm HTN, Bioprosthetic aortic and mitral valve replacement 9/2014, CKD per outside records, afib on warfarin, chronic Lt loculated pleural effusion, Type 2 DM, HTN, HLD, gout c/o 3-4 days of worsening dyspnea on exertion and lower extremity swelling    Tele: Afib 60s    1. Acute decompensated diastolic heart failure  - intubated and sedated planned for bronch today and possible extubation after   - BB held  in setting bradycardia. continue to monitor on tele now improved  - echo shows normaL AVR  and MVR normal LV function decreased RV function sev pulm HTN    - CT shows large left pleural effusion s/p chest tube/VATS/pleural biopsy   - lasix 40 IV x 1 today       2. Rash  - Pt with diffuse rash over body  - Ongoing for a year  - Management as per primary team    3. CAD s/p CABG and PCI  - CABG x2 in 2000 (LIMA to LAD, SVG-OM1)  - PCI in 2011 to LCX  - Last LHC in 2019 revealed patent stent and grafts  - Continue ASA, statin, isordil, and losartan  - continue to hold BB at this time due to bradycardia    3. S/p MVR & AVR  -s/p bioprosthetic valves  -echo with bioprosthetic MVR, AVR, grossly normal LV systolic function, decreased RV function and severe pHTN    4. Afib  -rate in 60s  -continue to hold BB  - INR 1.4 restart coumadin today

## 2022-04-09 LAB
ANION GAP SERPL CALC-SCNC: 14 MMOL/L — SIGNIFICANT CHANGE UP (ref 7–14)
APTT BLD: 30.5 SEC — SIGNIFICANT CHANGE UP (ref 27–36.3)
BUN SERPL-MCNC: 39 MG/DL — HIGH (ref 7–23)
CALCIUM SERPL-MCNC: 8.7 MG/DL — SIGNIFICANT CHANGE UP (ref 8.4–10.5)
CHLORIDE SERPL-SCNC: 100 MMOL/L — SIGNIFICANT CHANGE UP (ref 98–107)
CO2 SERPL-SCNC: 26 MMOL/L — SIGNIFICANT CHANGE UP (ref 22–31)
CREAT SERPL-MCNC: 1.26 MG/DL — SIGNIFICANT CHANGE UP (ref 0.5–1.3)
EGFR: 43 ML/MIN/1.73M2 — LOW
GLUCOSE BLDC GLUCOMTR-MCNC: 101 MG/DL — HIGH (ref 70–99)
GLUCOSE BLDC GLUCOMTR-MCNC: 127 MG/DL — HIGH (ref 70–99)
GLUCOSE BLDC GLUCOMTR-MCNC: 143 MG/DL — HIGH (ref 70–99)
GLUCOSE SERPL-MCNC: 108 MG/DL — HIGH (ref 70–99)
HCT VFR BLD CALC: 32.6 % — LOW (ref 34.5–45)
HGB BLD-MCNC: 10 G/DL — LOW (ref 11.5–15.5)
INR BLD: 1.54 RATIO — HIGH (ref 0.88–1.16)
MAGNESIUM SERPL-MCNC: 2.3 MG/DL — SIGNIFICANT CHANGE UP (ref 1.6–2.6)
MCHC RBC-ENTMCNC: 27.4 PG — SIGNIFICANT CHANGE UP (ref 27–34)
MCHC RBC-ENTMCNC: 30.7 GM/DL — LOW (ref 32–36)
MCV RBC AUTO: 89.3 FL — SIGNIFICANT CHANGE UP (ref 80–100)
NRBC # BLD: 0 /100 WBCS — SIGNIFICANT CHANGE UP
NRBC # FLD: 0 K/UL — SIGNIFICANT CHANGE UP
PHOSPHATE SERPL-MCNC: 3.8 MG/DL — SIGNIFICANT CHANGE UP (ref 2.5–4.5)
PLATELET # BLD AUTO: 209 K/UL — SIGNIFICANT CHANGE UP (ref 150–400)
POTASSIUM SERPL-MCNC: 3.4 MMOL/L — LOW (ref 3.5–5.3)
POTASSIUM SERPL-SCNC: 3.4 MMOL/L — LOW (ref 3.5–5.3)
PROTHROM AB SERPL-ACNC: 17.9 SEC — HIGH (ref 10.5–13.4)
RBC # BLD: 3.65 M/UL — LOW (ref 3.8–5.2)
RBC # FLD: 15 % — HIGH (ref 10.3–14.5)
SARS-COV-2 RNA SPEC QL NAA+PROBE: SIGNIFICANT CHANGE UP
SODIUM SERPL-SCNC: 140 MMOL/L — SIGNIFICANT CHANGE UP (ref 135–145)
WBC # BLD: 8.91 K/UL — SIGNIFICANT CHANGE UP (ref 3.8–10.5)
WBC # FLD AUTO: 8.91 K/UL — SIGNIFICANT CHANGE UP (ref 3.8–10.5)

## 2022-04-09 RX ORDER — WARFARIN SODIUM 2.5 MG/1
5 TABLET ORAL ONCE
Refills: 0 | Status: COMPLETED | OUTPATIENT
Start: 2022-04-09 | End: 2022-04-09

## 2022-04-09 RX ORDER — FUROSEMIDE 40 MG
40 TABLET ORAL ONCE
Refills: 0 | Status: COMPLETED | OUTPATIENT
Start: 2022-04-09 | End: 2022-04-09

## 2022-04-09 RX ORDER — POTASSIUM CHLORIDE 20 MEQ
40 PACKET (EA) ORAL ONCE
Refills: 0 | Status: COMPLETED | OUTPATIENT
Start: 2022-04-09 | End: 2022-04-09

## 2022-04-09 RX ORDER — DIPHENHYDRAMINE HCL 50 MG
50 CAPSULE ORAL ONCE
Refills: 0 | Status: COMPLETED | OUTPATIENT
Start: 2022-04-09 | End: 2022-04-09

## 2022-04-09 RX ADMIN — SODIUM CHLORIDE 4 MILLILITER(S): 9 INJECTION INTRAMUSCULAR; INTRAVENOUS; SUBCUTANEOUS at 04:02

## 2022-04-09 RX ADMIN — CARVEDILOL PHOSPHATE 3.12 MILLIGRAM(S): 80 CAPSULE, EXTENDED RELEASE ORAL at 18:19

## 2022-04-09 RX ADMIN — SODIUM CHLORIDE 4 MILLILITER(S): 9 INJECTION INTRAMUSCULAR; INTRAVENOUS; SUBCUTANEOUS at 10:10

## 2022-04-09 RX ADMIN — Medication 5 MILLIGRAM(S): at 22:51

## 2022-04-09 RX ADMIN — ISOSORBIDE DINITRATE 10 MILLIGRAM(S): 5 TABLET ORAL at 18:19

## 2022-04-09 RX ADMIN — Medication 3 MILLILITER(S): at 04:01

## 2022-04-09 RX ADMIN — ISOSORBIDE DINITRATE 10 MILLIGRAM(S): 5 TABLET ORAL at 06:03

## 2022-04-09 RX ADMIN — Medication 50 MILLIGRAM(S): at 12:24

## 2022-04-09 RX ADMIN — Medication 81 MILLIGRAM(S): at 12:24

## 2022-04-09 RX ADMIN — WARFARIN SODIUM 5 MILLIGRAM(S): 2.5 TABLET ORAL at 18:19

## 2022-04-09 RX ADMIN — Medication 40 MILLIGRAM(S): at 18:19

## 2022-04-09 RX ADMIN — Medication 1 APPLICATION(S): at 06:04

## 2022-04-09 RX ADMIN — Medication 3 MILLILITER(S): at 15:38

## 2022-04-09 RX ADMIN — CARVEDILOL PHOSPHATE 3.12 MILLIGRAM(S): 80 CAPSULE, EXTENDED RELEASE ORAL at 06:03

## 2022-04-09 RX ADMIN — Medication 1 APPLICATION(S): at 18:13

## 2022-04-09 RX ADMIN — Medication 40 MILLIEQUIVALENT(S): at 12:24

## 2022-04-09 RX ADMIN — SODIUM CHLORIDE 4 MILLILITER(S): 9 INJECTION INTRAMUSCULAR; INTRAVENOUS; SUBCUTANEOUS at 15:38

## 2022-04-09 RX ADMIN — Medication 3 MILLILITER(S): at 19:12

## 2022-04-09 RX ADMIN — Medication 3 MILLILITER(S): at 10:10

## 2022-04-09 RX ADMIN — SODIUM CHLORIDE 4 MILLILITER(S): 9 INJECTION INTRAMUSCULAR; INTRAVENOUS; SUBCUTANEOUS at 19:11

## 2022-04-09 NOTE — PROGRESS NOTE ADULT - ASSESSMENT
80yo Female with mhx of HFpEF ef 65% CAD (CABG 2000, stent 2011), severe pulm HTN, Bioprosthetic aortic and mitral valve replacement 9/2014, ?CKD per outside records, afib on warfarin, chronic Lt loculated pleural effusion,  Type 2 DM, HTN, HLD, gout a/w acute on chronic HFpEF i/s/p severe pHTN c/b hypercapnia; Found to have persisting moderate size loculated lt pleural effusion with atelectasis;         Problem/Plan - 1:  ·  Problem: Acute on chronic diastolic heart failure .   ·  Plan: Acute on chronic HFpEF likely in setting of decreased diuretic dose and severe pulm HTN as evidenced by prior TTE dated 10/10/2019;  Volume overloaded on exam with JVD and peripheral nonpitting edema to thighs  -S/P  Lasix 40 IV BID  -Cardiology help appreciated.   -TTE pending.      Problem/Plan - 2:  ·  Problem: Fever .   ·  Plan: Sepsis work up in progress.  IV Abxs started .  D helping.      Problem/Plan - 3:  ·  Problem: Loculated pleural effusion with Hypercapnia with Collapse left side .   ·  Plan: Persisting loculated effusion so Pulmonary helping.   S/P Flexible Bronchoscopy, Left VATS, Pleural Biopsy, Drainage of Pleural Effusion, PleurX Catheter Placement  < from: CT Chest No Cont (03.27.22 @ 20:25) >  IMPRESSION:    Small left pleural effusion with a loculated component in the left major   fissure and along left anterior chest wall. The loculated components are   new when compared to prior CT exam dated 10/13/2019. Associated partial   left lower lobe and left upper lobe collapse.    New pleural thickening and/or loculated fluid medially along the left   upper hemithorax.    Small right pleural effusion and adjacent passive atelectasis.    Stable 9 mm right apical nodule.    < end of copied text >     Problem/Plan - 4:  ·  Problem: Chronic atrial fibrillation.   ·  Plan: [id number] risk stratification = 7  Continue home coreg 12.5mg BID with hold parameters  Will restart  Coumadin as cleared by TS.      Problem/Plan - 5:  ·  Problem: Pulmonary HTN.   ·  Plan: Severe pulm HTN likely class II  Repeat TTE this admission.     Problem/Plan - 6:  ·  Problem: LAYLA Thrombus .  ·  Plan: On AC.      Problem/Plan - 7:  ·  Problem: Papular rash, generalized with Eosinophilia .  ·  Plan: Generalized papular rash of 1y duration previously attributed to hydralazine which was discontinued without improvement of rash  With chronic eosinophilia noted per chart  -Full medication review may be beneficial of meds possibly causing eosinophilia  -Dermatology consult noted.      Problem/Plan - 8:  ·  Problem: Hypertension.   ·  Plan: Restart home losartan, pressures can tolerate SBP 180s in ED  Cont Carvedilol.     Problem/Plan - 9:  ·  Problem: Hyperlipidemia.   ·  Plan: Continue home statin.     Problem/Plan - 10:  ·  Problem: T2DM (type 2 diabetes mellitus).   ·  Plan; HOLD home glipizide while inpatient   Continue home gabapentin for diabetic neuropathy  KRYSTYNA.     Problem/Plan - 11:  ·  Problem: Cerebrovascular accident (CVA).   ·  Plan: Without residual deficits at this time per daughter and patient although unclear clinical course occurring years ago. Continue to monitor and continue ASA and statin.     Problem/Plan - 12:  ·  Problem: CAD (coronary artery disease).   ·  Plan: Continue GDMT    Full code.

## 2022-04-09 NOTE — PROGRESS NOTE ADULT - SUBJECTIVE AND OBJECTIVE BOX
Date of Service  : 04-09-22 @ 09:15    INTERVAL HPI/OVERNIGHT EVENTS: I want to go home.   Vital Signs Last 24 Hrs  T(C): 36.1 (09 Apr 2022 06:00), Max: 37.1 (08 Apr 2022 22:00)  T(F): 96.9 (09 Apr 2022 06:00), Max: 98.7 (08 Apr 2022 22:00)  HR: 71 (09 Apr 2022 06:00) (71 - 84)  BP: 145/68 (09 Apr 2022 06:00) (136/62 - 145/68)  BP(mean): --  RR: 18 (09 Apr 2022 06:00) (17 - 18)  SpO2: 99% (09 Apr 2022 06:00) (93% - 99%)  I&O's Summary    08 Apr 2022 07:01  -  09 Apr 2022 07:00  --------------------------------------------------------  IN: 0 mL / OUT: 700 mL / NET: -700 mL      MEDICATIONS  (STANDING):  albuterol/ipratropium for Nebulization 3 milliLiter(s) Nebulizer every 6 hours  AQUAPHOR (petrolatum Ointment) 1 Application(s) Topical every 12 hours  aspirin enteric coated 81 milliGRAM(s) Oral daily  carvedilol 3.125 milliGRAM(s) Oral every 12 hours  dextrose 5%. 1000 milliLiter(s) (50 mL/Hr) IV Continuous <Continuous>  dextrose 5%. 1000 milliLiter(s) (100 mL/Hr) IV Continuous <Continuous>  dextrose 50% Injectable 12.5 Gram(s) IV Push once  dornase balta Solution 2.5 milliGRAM(s) Inhalation two times a day  glucagon  Injectable 1 milliGRAM(s) IntraMuscular once  insulin lispro (ADMELOG) corrective regimen sliding scale   SubCutaneous every 6 hours  isosorbide   dinitrate Tablet (ISORDIL) 10 milliGRAM(s) Oral two times a day  sodium chloride 3%  Inhalation 4 milliLiter(s) Inhalation every 6 hours    MEDICATIONS  (PRN):  acetaminophen   IVPB .. 1000 milliGRAM(s) IV Intermittent once PRN Temp greater or equal to 38C (100.4F), Mild Pain (1 - 3)  acetaminophen   IVPB .. 1000 milliGRAM(s) IV Intermittent once PRN Temp greater or equal to 38C (100.4F), Mild Pain (1 - 3)  dextrose Oral Gel 15 Gram(s) Oral once PRN Blood Glucose LESS THAN 70 milliGRAM(s)/deciliter  hydrALAZINE Injectable 5 milliGRAM(s) IV Push every 4 hours PRN SBP>160  HYDROmorphone  Injectable 0.5 milliGRAM(s) IV Push every 3 hours PRN Moderate Pain (4 - 6)  triamcinolone 0.1% Ointment 1 Application(s) Topical every 12 hours PRN itching    LABS:                        10.0   8.91  )-----------( 209      ( 09 Apr 2022 08:03 )             32.6     04-09    140  |  100  |  39<H>  ----------------------------<  108<H>  3.4<L>   |  26  |  1.26    Ca    8.7      09 Apr 2022 08:03  Phos  3.8     04-09  Mg     2.30     04-09    TPro  6.1  /  Alb  2.9<L>  /  TBili  1.2  /  DBili  x   /  AST  24  /  ALT  9   /  AlkPhos  124<H>  04-08    PT/INR - ( 09 Apr 2022 08:03 )   PT: 17.9 sec;   INR: 1.54 ratio         PTT - ( 09 Apr 2022 08:03 )  PTT:30.5 sec    CAPILLARY BLOOD GLUCOSE      POCT Blood Glucose.: 101 mg/dL (09 Apr 2022 08:45)  POCT Blood Glucose.: 127 mg/dL (09 Apr 2022 00:13)  POCT Blood Glucose.: 167 mg/dL (08 Apr 2022 21:55)  POCT Blood Glucose.: 95 mg/dL (08 Apr 2022 17:36)  POCT Blood Glucose.: 98 mg/dL (08 Apr 2022 13:41)          REVIEW OF SYSTEMS:  CONSTITUTIONAL: No fever, weight loss, or fatigue  EYES: No eye pain, visual disturbances, or discharge  ENMT:  No difficulty hearing, tinnitus, vertigo; No sinus or throat pain  NECK: No pain or stiffness  RESPIRATORY: No cough, wheezing, chills or hemoptysis; No shortness of breath  CARDIOVASCULAR: No chest pain, palpitations, dizziness, or leg swelling  GASTROINTESTINAL: No abdominal or epigastric pain. No nausea, vomiting, or hematemesis; No diarrhea or constipation. No melena or hematochezia.  GENITOURINARY: No dysuria, frequency, hematuria, or incontinence  NEUROLOGICAL: No headaches, memory loss, loss of strength, numbness, or tremors      Consultant(s) Notes Reviewed:  [x ] YES  [ ] NO    PHYSICAL EXAM:  GENERAL: NAD,Oxygen NC   HEAD:  Atraumatic, Normocephalic  NECK: Supple, No JVD, Normal thyroid  NERVOUS SYSTEM:  Alert & Oriented X3, No focal deficit   CHEST/LUNG: Good air entry bilateral except bases   HEART: Regular rate and rhythm; No murmurs, rubs, or gallops  ABDOMEN: Soft, Nontender, Nondistended; Bowel sounds present  EXTREMITIES:  2+ Peripheral Pulses, No clubbing, cyanosis, or edema      Care Discussed with Consultants/Other Providers [ x] YES  [ ] NO

## 2022-04-09 NOTE — PROGRESS NOTE ADULT - SUBJECTIVE AND OBJECTIVE BOX
Collin Trejo MD  Interventional Cardiology / Endovascular Specialist  [place] Office : [phone] [place] [address] [place] [address]  Tel:   [place] Office : [address] [facility] [place] [address]  Tel: [phone]    Subjective/Overnight events: Patient lying in bed NAD   	  MEDICATIONS:  aspirin enteric coated 81 milliGRAM(s) Oral daily  carvedilol 3.125 milliGRAM(s) Oral every 12 hours  hydrALAZINE Injectable 5 milliGRAM(s) IV Push every 4 hours PRN  isosorbide   dinitrate Tablet (ISORDIL) 10 milliGRAM(s) Oral two times a day      albuterol/ipratropium for Nebulization 3 milliLiter(s) Nebulizer every 6 hours  dornase balta Solution 2.5 milliGRAM(s) Inhalation two times a day  sodium chloride 3%  Inhalation 4 milliLiter(s) Inhalation every 6 hours    acetaminophen   IVPB .. 1000 milliGRAM(s) IV Intermittent once PRN  acetaminophen   IVPB .. 1000 milliGRAM(s) IV Intermittent once PRN  diphenhydrAMINE 50 milliGRAM(s) Oral once  HYDROmorphone  Injectable 0.5 milliGRAM(s) IV Push every 3 hours PRN      dextrose 50% Injectable 12.5 Gram(s) IV Push once  dextrose Oral Gel 15 Gram(s) Oral once PRN  glucagon  Injectable 1 milliGRAM(s) IntraMuscular once  insulin lispro (ADMELOG) corrective regimen sliding scale   SubCutaneous every 6 hours    AQUAPHOR (petrolatum Ointment) 1 Application(s) Topical every 12 hours  dextrose 5%. 1000 milliLiter(s) IV Continuous <Continuous>  dextrose 5%. 1000 milliLiter(s) IV Continuous <Continuous>  potassium chloride    Tablet ER 40 milliEquivalent(s) Oral once  triamcinolone 0.1% Ointment 1 Application(s) Topical every 12 hours PRN      PAST MEDICAL/SURGICAL HISTORY  PAST MEDICAL & SURGICAL HISTORY:  HTN (Hypertension)    Afib  (on Warfarin)    CAD (Coronary Artery Disease)  s/p stent and CABG    CVA (Cerebral Infarction)  2011    CHF (congestive heart failure)  EF 65% Oct 2019    Upper GI bleed    Gout    Diabetes mellitus  type 2, not on meds    History of heart valve replacement with bioprosthetic valve  mitral and aortic    Hyperlipidemia    S/P angioplasty with stent  2011    S/P CABG x 1  (2000)    H/O aortic valve replacement  9/22/14    H/O mitral valve replacement  9/22/14        SOCIAL HISTORY: Substance Use (street drugs): ( x ) never used  (  ) other:    FAMILY HISTORY:  Family history of acute myocardial infarction  mother        REVIEW OF SYSTEMS:  CONSTITUTIONAL: No fever, weight loss, or fatigue  EYES: No eye pain, visual disturbances, or discharge  ENMT:  No difficulty hearing, tinnitus, vertigo; No sinus or throat pain  BREASTS: No pain, masses, or nipple discharge  GASTROINTESTINAL: No abdominal or epigastric pain. No nausea, vomiting, or hematemesis; No diarrhea or constipation. No melena or hematochezia.  GENITOURINARY: No dysuria, frequency, hematuria, or incontinence  NEUROLOGICAL: No headaches, memory loss, loss of strength, numbness, or tremors  ENDOCRINE: No heat or cold intolerance; No hair loss  MUSCULOSKELETAL: No joint pain or swelling; No muscle, back, or extremity pain  PSYCHIATRIC: No depression, anxiety, mood swings, or difficulty sleeping  HEME/LYMPH: No easy bruising, or bleeding gums  All others negative    PHYSICAL EXAM:  T(C): 36.1 (04-09-22 @ 06:00), Max: 37.1 (04-08-22 @ 22:00)  HR: 71 (04-09-22 @ 06:00) (71 - 84)  BP: 145/68 (04-09-22 @ 06:00) (136/62 - 145/68)  RR: 18 (04-09-22 @ 06:00) (17 - 18)  SpO2: 99% (04-09-22 @ 06:00) (93% - 99%)  Wt(kg): --  I&O's Summary    08 Apr 2022 07:01  -  09 Apr 2022 07:00  --------------------------------------------------------  IN: 0 mL / OUT: 700 mL / NET: -700 mL      EYES:   PERRLA   ENMT:   Moist mucous membranes, Good dentition, No lesions  Cardiovascular: Normal S1 S2, No JVD, No murmurs, No edema  Respiratory: left sided decreased breath sounds  Gastrointestinal:  Soft, Non-tender, + BS	  Extremities: no edema                          10.0   8.91  )-----------( 209      ( 09 Apr 2022 08:03 )             32.6     04-09    140  |  100  |  39<H>  ----------------------------<  108<H>  3.4<L>   |  26  |  1.26    Ca    8.7      09 Apr 2022 08:03  Phos  3.8     04-09  Mg     2.30     04-09    TPro  6.1  /  Alb  2.9<L>  /  TBili  1.2  /  DBili  x   /  AST  24  /  ALT  9   /  AlkPhos  124<H>  04-08    proBNP:   Lipid Profile:   HgA1c:   TSH:     Consultant(s) Notes Reviewed:  [x ] YES  [ ] NO    Care Discussed with Consultants/Other Providers [ x] YES  [ ] NO    Imaging Personally Reviewed independently:  [x] YES  [ ] NO    All labs, radiologic studies, vitals, orders and medications list reviewed. Patient is seen and examined at bedside. Case discussed with medical team.

## 2022-04-09 NOTE — PROGRESS NOTE ADULT - ASSESSMENT
80yo Female with mhx of HFpEF ef 65% CAD s/p CABG 2000 LIMA to LAD and SVG to OM1 and subsequent PCI to LCx,in 2011, severe pulm HTN, Bioprosthetic aortic and mitral valve replacement 9/2014, CKD per outside records, afib on warfarin, chronic Lt loculated pleural effusion, Type 2 DM, HTN, HLD, gout c/o 3-4 days of worsening dyspnea on exertion and lower extremity swelling    Tele: Afib 60s    1. Acute decompensated diastolic heart failure  - BB held  in setting bradycardia. continue to monitor on tele now improved  - echo shows normaL AVR  and MVR normal LV function decreased RV function sev pulm HTN    - CT shows large left pleural effusion s/p chest tube/VATS/pleural biopsy   - lasix 40 IV x 1 today and can switch to lasix 40 po daily from tomorrow       2. Rash  - Pt with diffuse rash over body  - Ongoing for a year  - Management as per primary team    3. CAD s/p CABG and PCI  - CABG x2 in 2000 (LIMA to LAD, SVG-OM1)  - PCI in 2011 to LCX  - Last LHC in 2019 revealed patent stent and grafts  - Continue ASA, statin, isordil, and losartan  - continue to hold BB at this time due to bradycardia    3. S/p MVR & AVR  -s/p bioprosthetic valves  -echo with bioprosthetic MVR, AVR, grossly normal LV systolic function, decreased RV function and severe pHTN    4. Afib  -rate in 60s  -continue to hold BB  - INR 1.5 coumadin 5 today

## 2022-04-09 NOTE — PROGRESS NOTE ADULT - SUBJECTIVE AND OBJECTIVE BOX
Date of Service: 04-09-22 @ 12:46    Patient is a 81y old  Female who presents with a chief complaint of Acute on chronic diastolic heart failure exacerbation (09 Apr 2022 11:45)      Any change in ROS: Ptis alert and awke ;  no SOB: no cough :   on 2 L of oxygen       MEDICATIONS  (STANDING):  albuterol/ipratropium for Nebulization 3 milliLiter(s) Nebulizer every 6 hours  AQUAPHOR (petrolatum Ointment) 1 Application(s) Topical every 12 hours  aspirin enteric coated 81 milliGRAM(s) Oral daily  carvedilol 3.125 milliGRAM(s) Oral every 12 hours  dextrose 5%. 1000 milliLiter(s) (50 mL/Hr) IV Continuous <Continuous>  dextrose 5%. 1000 milliLiter(s) (100 mL/Hr) IV Continuous <Continuous>  dextrose 50% Injectable 12.5 Gram(s) IV Push once  dornase balta Solution 2.5 milliGRAM(s) Inhalation two times a day  glucagon  Injectable 1 milliGRAM(s) IntraMuscular once  insulin lispro (ADMELOG) corrective regimen sliding scale   SubCutaneous every 6 hours  isosorbide   dinitrate Tablet (ISORDIL) 10 milliGRAM(s) Oral two times a day  sodium chloride 3%  Inhalation 4 milliLiter(s) Inhalation every 6 hours    MEDICATIONS  (PRN):  acetaminophen   IVPB .. 1000 milliGRAM(s) IV Intermittent once PRN Temp greater or equal to 38C (100.4F), Mild Pain (1 - 3)  acetaminophen   IVPB .. 1000 milliGRAM(s) IV Intermittent once PRN Temp greater or equal to 38C (100.4F), Mild Pain (1 - 3)  dextrose Oral Gel 15 Gram(s) Oral once PRN Blood Glucose LESS THAN 70 milliGRAM(s)/deciliter  hydrALAZINE Injectable 5 milliGRAM(s) IV Push every 4 hours PRN SBP>160  HYDROmorphone  Injectable 0.5 milliGRAM(s) IV Push every 3 hours PRN Moderate Pain (4 - 6)  triamcinolone 0.1% Ointment 1 Application(s) Topical every 12 hours PRN itching    Vital Signs Last 24 Hrs  T(C): 36.8 (09 Apr 2022 12:15), Max: 37.1 (08 Apr 2022 22:00)  T(F): 98.2 (09 Apr 2022 12:15), Max: 98.7 (08 Apr 2022 22:00)  HR: 75 (09 Apr 2022 12:15) (71 - 84)  BP: 147/81 (09 Apr 2022 12:15) (136/67 - 147/81)  BP(mean): --  RR: 17 (09 Apr 2022 12:15) (17 - 18)  SpO2: 97% (09 Apr 2022 12:15) (93% - 99%)    I&O's Summary    08 Apr 2022 07:01  -  09 Apr 2022 07:00  --------------------------------------------------------  IN: 0 mL / OUT: 700 mL / NET: -700 mL          Physical Exam:   GENERAL: NAD, well-groomed, well-developed  HEENT: BRADY/   Atraumatic, Normocephalic  ENMT: No tonsillar erythema, exudates, or enlargement; Moist mucous membranes, Good dentition, No lesions  NECK: Supple, No JVD, Normal thyroid  CHEST/LUNG: scattered crackles  CVS: Regular rate and rhythm; No murmurs, rubs, or gallops  GI: : Soft, Nontender, Nondistended; Bowel sounds present  NERVOUS SYSTEM:  Alert & Oriented X3  EXTREMITIES:  - edema  LYMPH: No lymphadenopathy noted  SKIN: No rashes or lesions  ENDOCRINOLOGY: No Thyromegaly  PSYCH: calm +    Labs:                              10.0   8.91  )-----------( 209      ( 09 Apr 2022 08:03 )             32.6                         10.1   8.46  )-----------( 162      ( 08 Apr 2022 07:04 )             32.7                         9.9    11.36 )-----------( 183      ( 07 Apr 2022 03:34 )             31.1                         10.2   12.26 )-----------( 205      ( 06 Apr 2022 19:29 )             31.6                         10.4   7.90  )-----------( 175      ( 06 Apr 2022 04:51 )             32.8                         11.6   9.17  )-----------( 180      ( 05 Apr 2022 19:09 )             37.1     04-09    140  |  100  |  39<H>  ----------------------------<  108<H>  3.4<L>   |  26  |  1.26  04-08    140  |  102  |  38<H>  ----------------------------<  86  3.6   |  26  |  1.33<H>  04-07    138  |  97<L>  |  36<H>  ----------------------------<  119<H>  3.8   |  27  |  1.24  04-06    140  |  97<L>  |  35<H>  ----------------------------<  153<H>  3.9   |  25  |  1.30  04-06    136  |  96<L>  |  33<H>  ----------------------------<  157<H>  4.3   |  26  |  1.02  04-05    138  |  95<L>  |  31<H>  ----------------------------<  165<H>  3.6   |  28  |  1.02    Ca    8.7      09 Apr 2022 08:03  Ca    8.8      08 Apr 2022 07:04  Phos  3.8     04-09  Mg     2.30     04-09  Mg     2.30     04-08    TPro  6.1  /  Alb  2.9<L>  /  TBili  1.2  /  DBili  x   /  AST  24  /  ALT  9   /  AlkPhos  124<H>  04-08    CAPILLARY BLOOD GLUCOSE      POCT Blood Glucose.: 101 mg/dL (09 Apr 2022 08:45)  POCT Blood Glucose.: 127 mg/dL (09 Apr 2022 00:13)  POCT Blood Glucose.: 167 mg/dL (08 Apr 2022 21:55)  POCT Blood Glucose.: 95 mg/dL (08 Apr 2022 17:36)  POCT Blood Glucose.: 98 mg/dL (08 Apr 2022 13:41)     (04-05 @ 10:45)    LIVER FUNCTIONS - ( 08 Apr 2022 07:04 )  Alb: 2.9 g/dL / Pro: 6.1 g/dL / ALK PHOS: 124 U/L / ALT: 9 U/L / AST: 24 U/L / GGT: x           PT/INR - ( 09 Apr 2022 08:03 )   PT: 17.9 sec;   INR: 1.54 ratio         PTT - ( 09 Apr 2022 08:03 )  PTT:30.5 sec    Fluid Source --  Albumin, Fluid--  Glucose, Fluid--  Protein total, Fluid--  Lacatate Dehydrogenase, Fluid--  pH, Fluid--  Cytopathology-Non Gyn Report  ACCESSION No:  [id number]  Patient:   CATRACHITO WOODS      Accession:                             [id number]    Collected Date/Time:                   4/5/2022 10:45 EDT  Received Date/Time:                    4/5/2022 15:12 EDT    Non-Gynecologic Report - Auth (Verified)    Specimen(s) Submitted  PLEURAL FLUID, LEFT    Final Diagnosis  PLEURAL FLUID, LEFT  NEGATIVE FOR MALIGNANT CELLS.    Cytology slides and cell block shows histiocytes and scattered benign  mesothelial cells.      Pleasesee concurrent pathology report:   30-S-  Screened by: Richmond BISWAS(ASCP)  Verified by: Wilmer Carrera MD  (Electronic Signature)  Reported on: 04/06/22 12:13 EDT, [facility], [address] [place] [address]  Phone: [phone]   Fax: [phone]  Cytology technical processing performed at [address] [place] [address]  _________________________________________________________________      Clinical Information  Pleural effusion.    Gross Description  Received: 90  ml of yellow fluid in CytoLyt  Prepared: 1 ThinPrep slide, 1 cell block, smear        RECENT CULTURES:  04-05 @ 16:21 .Bronchial left bronchial lavage                Testing in progress    04-05 @ 12:39 .Bronchial left bronchial lavage       No polymorphonuclear leukocytes seen per low power field  No Squamous epithelial cells seen per low power field  No organisms seen per oil power field           No growth          RESPIRATORY CULTURES:  ra< from: Xray Chest 1 View- PORTABLE-Routine (Xray Chest 1 View- PORTABLE-Routine in AM.) (04.08.22 @ 08:05) >    PROCEDURE DATE:  04/08/2022          INTERPRETATION:  INDICATION: Post-op    COMPARISON: 4/7/22    FINDINGS:  S/P sternotomy and valve repair.  Heart/Vascular: Stable cardiomegaly.  Pulmonary: Interval development of pulmonary vascular congestive changes   with small bilateral pleural effusions - left more than right. No   pneumothorax.  Bones: No acute bony finding.    Impression:  Interval development of pulmonary vascular congestive changes with small   bilateral pleural effusions.        --- End of Report ---            SHELDON MULLINS MD; Attending Radiologist  This document has been electronically signed. Apr 9 2022  8:59AM    < end of copied text >          Studies  Chest X-RAY  CT SCAN Chest   Venous Dopplers: LE:   CT Abdomen  Others

## 2022-04-10 LAB
ANION GAP SERPL CALC-SCNC: 14 MMOL/L — SIGNIFICANT CHANGE UP (ref 7–14)
APTT BLD: 33.1 SEC — SIGNIFICANT CHANGE UP (ref 27–36.3)
BUN SERPL-MCNC: 34 MG/DL — HIGH (ref 7–23)
CALCIUM SERPL-MCNC: 8.8 MG/DL — SIGNIFICANT CHANGE UP (ref 8.4–10.5)
CHLORIDE SERPL-SCNC: 101 MMOL/L — SIGNIFICANT CHANGE UP (ref 98–107)
CO2 SERPL-SCNC: 24 MMOL/L — SIGNIFICANT CHANGE UP (ref 22–31)
CREAT SERPL-MCNC: 1.11 MG/DL — SIGNIFICANT CHANGE UP (ref 0.5–1.3)
EGFR: 50 ML/MIN/1.73M2 — LOW
GLUCOSE BLDC GLUCOMTR-MCNC: 107 MG/DL — HIGH (ref 70–99)
GLUCOSE BLDC GLUCOMTR-MCNC: 113 MG/DL — HIGH (ref 70–99)
GLUCOSE BLDC GLUCOMTR-MCNC: 118 MG/DL — HIGH (ref 70–99)
GLUCOSE BLDC GLUCOMTR-MCNC: 122 MG/DL — HIGH (ref 70–99)
GLUCOSE SERPL-MCNC: 107 MG/DL — HIGH (ref 70–99)
HCT VFR BLD CALC: 31 % — LOW (ref 34.5–45)
HGB BLD-MCNC: 9.5 G/DL — LOW (ref 11.5–15.5)
INR BLD: 1.72 RATIO — HIGH (ref 0.88–1.16)
MAGNESIUM SERPL-MCNC: 2 MG/DL — SIGNIFICANT CHANGE UP (ref 1.6–2.6)
MCHC RBC-ENTMCNC: 27.8 PG — SIGNIFICANT CHANGE UP (ref 27–34)
MCHC RBC-ENTMCNC: 30.6 GM/DL — LOW (ref 32–36)
MCV RBC AUTO: 90.6 FL — SIGNIFICANT CHANGE UP (ref 80–100)
NRBC # BLD: 0 /100 WBCS — SIGNIFICANT CHANGE UP
NRBC # FLD: 0 K/UL — SIGNIFICANT CHANGE UP
PHOSPHATE SERPL-MCNC: 2.9 MG/DL — SIGNIFICANT CHANGE UP (ref 2.5–4.5)
PLATELET # BLD AUTO: 199 K/UL — SIGNIFICANT CHANGE UP (ref 150–400)
POTASSIUM SERPL-MCNC: 3.7 MMOL/L — SIGNIFICANT CHANGE UP (ref 3.5–5.3)
POTASSIUM SERPL-SCNC: 3.7 MMOL/L — SIGNIFICANT CHANGE UP (ref 3.5–5.3)
PROTHROM AB SERPL-ACNC: 20.1 SEC — HIGH (ref 10.5–13.4)
RBC # BLD: 3.42 M/UL — LOW (ref 3.8–5.2)
RBC # FLD: 15 % — HIGH (ref 10.3–14.5)
SODIUM SERPL-SCNC: 139 MMOL/L — SIGNIFICANT CHANGE UP (ref 135–145)
WBC # BLD: 10.33 K/UL — SIGNIFICANT CHANGE UP (ref 3.8–10.5)
WBC # FLD AUTO: 10.33 K/UL — SIGNIFICANT CHANGE UP (ref 3.8–10.5)

## 2022-04-10 RX ORDER — FUROSEMIDE 40 MG
40 TABLET ORAL DAILY
Refills: 0 | Status: DISCONTINUED | OUTPATIENT
Start: 2022-04-11 | End: 2022-04-13

## 2022-04-10 RX ORDER — LORATADINE 10 MG/1
10 TABLET ORAL DAILY
Refills: 0 | Status: DISCONTINUED | OUTPATIENT
Start: 2022-04-10 | End: 2022-04-13

## 2022-04-10 RX ORDER — DIPHENHYDRAMINE HCL 50 MG
25 CAPSULE ORAL EVERY 6 HOURS
Refills: 0 | Status: DISCONTINUED | OUTPATIENT
Start: 2022-04-10 | End: 2022-04-13

## 2022-04-10 RX ORDER — WARFARIN SODIUM 2.5 MG/1
5 TABLET ORAL ONCE
Refills: 0 | Status: COMPLETED | OUTPATIENT
Start: 2022-04-10 | End: 2022-04-10

## 2022-04-10 RX ORDER — FUROSEMIDE 40 MG
40 TABLET ORAL ONCE
Refills: 0 | Status: COMPLETED | OUTPATIENT
Start: 2022-04-10 | End: 2022-04-10

## 2022-04-10 RX ORDER — POTASSIUM CHLORIDE 20 MEQ
40 PACKET (EA) ORAL ONCE
Refills: 0 | Status: COMPLETED | OUTPATIENT
Start: 2022-04-10 | End: 2022-04-10

## 2022-04-10 RX ADMIN — DORNASE ALFA 2.5 MILLIGRAM(S): 1 SOLUTION RESPIRATORY (INHALATION) at 22:05

## 2022-04-10 RX ADMIN — Medication 3 MILLILITER(S): at 15:31

## 2022-04-10 RX ADMIN — Medication 40 MILLIGRAM(S): at 13:02

## 2022-04-10 RX ADMIN — ISOSORBIDE DINITRATE 10 MILLIGRAM(S): 5 TABLET ORAL at 06:08

## 2022-04-10 RX ADMIN — Medication 3 MILLILITER(S): at 04:02

## 2022-04-10 RX ADMIN — Medication 81 MILLIGRAM(S): at 12:24

## 2022-04-10 RX ADMIN — Medication 40 MILLIEQUIVALENT(S): at 12:23

## 2022-04-10 RX ADMIN — CARVEDILOL PHOSPHATE 3.12 MILLIGRAM(S): 80 CAPSULE, EXTENDED RELEASE ORAL at 06:08

## 2022-04-10 RX ADMIN — ISOSORBIDE DINITRATE 10 MILLIGRAM(S): 5 TABLET ORAL at 16:30

## 2022-04-10 RX ADMIN — SODIUM CHLORIDE 4 MILLILITER(S): 9 INJECTION INTRAMUSCULAR; INTRAVENOUS; SUBCUTANEOUS at 04:02

## 2022-04-10 RX ADMIN — Medication 1 APPLICATION(S): at 17:05

## 2022-04-10 RX ADMIN — Medication 5 MILLIGRAM(S): at 17:11

## 2022-04-10 RX ADMIN — CARVEDILOL PHOSPHATE 3.12 MILLIGRAM(S): 80 CAPSULE, EXTENDED RELEASE ORAL at 18:46

## 2022-04-10 RX ADMIN — Medication 3 MILLILITER(S): at 10:35

## 2022-04-10 RX ADMIN — Medication 25 MILLIGRAM(S): at 16:30

## 2022-04-10 RX ADMIN — SODIUM CHLORIDE 4 MILLILITER(S): 9 INJECTION INTRAMUSCULAR; INTRAVENOUS; SUBCUTANEOUS at 15:31

## 2022-04-10 RX ADMIN — Medication 3 MILLILITER(S): at 22:05

## 2022-04-10 RX ADMIN — Medication 1 APPLICATION(S): at 06:08

## 2022-04-10 RX ADMIN — WARFARIN SODIUM 5 MILLIGRAM(S): 2.5 TABLET ORAL at 17:19

## 2022-04-10 RX ADMIN — SODIUM CHLORIDE 4 MILLILITER(S): 9 INJECTION INTRAMUSCULAR; INTRAVENOUS; SUBCUTANEOUS at 22:05

## 2022-04-10 RX ADMIN — SODIUM CHLORIDE 4 MILLILITER(S): 9 INJECTION INTRAMUSCULAR; INTRAVENOUS; SUBCUTANEOUS at 10:35

## 2022-04-10 NOTE — PROVIDER CONTACT NOTE (OTHER) - NAME OF MD/NP/PA/DO NOTIFIED:
Brent, Medicine
ACP Ernestina Minaya
Be BOYD
DEB Sahaf
DEB Gallardo
Lisa ACP
Lisa ACP
Sami Mayorga
tele EB parson
Diane Arnold
Brent, Medicine
Bill Santiago
PA Teresa Derrick

## 2022-04-10 NOTE — PROVIDER CONTACT NOTE (OTHER) - SITUATION
Patient noted to be wheezing
Pt felecia down to 35 with multiple few second pauses, per tele tech. /70 at 2100, coreg given, rechecked BP is 150/50.
Pt hypertensive however asymptomatic
/57, HR 57
10.54 seconds of aflutter
Patient has a BP of 155/57
Patients diastolic bp 40
HR of 36 was observed on tele monitoring.
Patient Heart rate dropping to 40-45 BPM
Patient had a 2.2 sec pause on tele monitoring
Patient had a 2.4 sec pause on tele monitoring
Pt admitted to 4N. Upon assessment with  pt was A&Ox2-3. Report given said pt was A&Ox4.
Pt experienced 2 second pause, Heart rate 38
patient noted with increased temp 101.7
pt refuses bipap

## 2022-04-10 NOTE — PROVIDER CONTACT NOTE (OTHER) - RECOMMENDATIONS
PA made aware   PA ordered PRN hydralazine to given  BP rechecked in an hour
ACP aware - pt educated on importance of bipap
Per provider, continue to monitor and notify with tele changes. Provider will adjust parameters for BP meds.
Hold bp meds
Per provider and parameters, okay to give cozaar now, hold isordil for HR, reschedule coreg for 0900
Per provider, continue to monitor and notify with tele changes. Provider will adjust parameters for BP meds.
EB Contreras notified, PA reached out to previous PA covering and said baseline was A&Ox3. Pts sons were used as translators at the time.
Elevated head up.
Notify ACP
Provider notified.
Provider notified.
medications given as per order   blood culture   chest x ray

## 2022-04-10 NOTE — PROGRESS NOTE ADULT - SUBJECTIVE AND OBJECTIVE BOX
Collin Trejo MD  Interventional Cardiology / Endovascular Specialist  [place] Office : [phone] [place] [address] [place] [address]  Tel:   [place] Office : [address] [facility] [place] [address]  Tel: [phone]    Subjective/Overnight events: Patient lying in bed NAD   	  MEDICATIONS:  aspirin enteric coated 81 milliGRAM(s) Oral daily  carvedilol 3.125 milliGRAM(s) Oral every 12 hours  hydrALAZINE Injectable 5 milliGRAM(s) IV Push every 4 hours PRN  isosorbide   dinitrate Tablet (ISORDIL) 10 milliGRAM(s) Oral two times a day      albuterol/ipratropium for Nebulization 3 milliLiter(s) Nebulizer every 6 hours  dornase balta Solution 2.5 milliGRAM(s) Inhalation two times a day  sodium chloride 3%  Inhalation 4 milliLiter(s) Inhalation every 6 hours    acetaminophen   IVPB .. 1000 milliGRAM(s) IV Intermittent once PRN  acetaminophen   IVPB .. 1000 milliGRAM(s) IV Intermittent once PRN  HYDROmorphone  Injectable 0.5 milliGRAM(s) IV Push every 3 hours PRN      dextrose 50% Injectable 12.5 Gram(s) IV Push once  dextrose Oral Gel 15 Gram(s) Oral once PRN  glucagon  Injectable 1 milliGRAM(s) IntraMuscular once  insulin lispro (ADMELOG) corrective regimen sliding scale   SubCutaneous every 6 hours    AQUAPHOR (petrolatum Ointment) 1 Application(s) Topical every 12 hours  dextrose 5%. 1000 milliLiter(s) IV Continuous <Continuous>  dextrose 5%. 1000 milliLiter(s) IV Continuous <Continuous>  potassium chloride    Tablet ER 40 milliEquivalent(s) Oral once  triamcinolone 0.1% Ointment 1 Application(s) Topical every 12 hours PRN      PAST MEDICAL/SURGICAL HISTORY  PAST MEDICAL & SURGICAL HISTORY:  HTN (Hypertension)    Afib  (on Warfarin)    CAD (Coronary Artery Disease)  s/p stent and CABG    CVA (Cerebral Infarction)  2011    CHF (congestive heart failure)  EF 65% Oct 2019    Upper GI bleed    Gout    Diabetes mellitus  type 2, not on meds    History of heart valve replacement with bioprosthetic valve  mitral and aortic    Hyperlipidemia    S/P angioplasty with stent  2011    S/P CABG x 1  (2000)    H/O aortic valve replacement  9/22/14    H/O mitral valve replacement  9/22/14        SOCIAL HISTORY: Substance Use (street drugs): ( x ) never used  (  ) other:    FAMILY HISTORY:  Family history of acute myocardial infarction  mother        REVIEW OF SYSTEMS:  CONSTITUTIONAL: No fever, weight loss, or fatigue  EYES: No eye pain, visual disturbances, or discharge  ENMT:  No difficulty hearing, tinnitus, vertigo; No sinus or throat pain  BREASTS: No pain, masses, or nipple discharge  GASTROINTESTINAL: No abdominal or epigastric pain. No nausea, vomiting, or hematemesis; No diarrhea or constipation. No melena or hematochezia.  GENITOURINARY: No dysuria, frequency, hematuria, or incontinence  NEUROLOGICAL: No headaches, memory loss, loss of strength, numbness, or tremors  ENDOCRINE: No heat or cold intolerance; No hair loss  MUSCULOSKELETAL: No joint pain or swelling; No muscle, back, or extremity pain  PSYCHIATRIC: No depression, anxiety, mood swings, or difficulty sleeping  HEME/LYMPH: No easy bruising, or bleeding gums  All others negative    PHYSICAL EXAM:  T(C): 36.7 (04-10-22 @ 06:05), Max: 36.9 (04-09-22 @ 22:30)  HR: 74 (04-10-22 @ 10:35) (68 - 89)  BP: 157/69 (04-10-22 @ 06:05) (141/61 - 180/99)  RR: 18 (04-10-22 @ 06:05) (17 - 18)  SpO2: 98% (04-10-22 @ 10:35) (96% - 100%)  Wt(kg): --  I&O's Summary      EYES:   PERRLA   ENMT:   Moist mucous membranes, Good dentition, No lesions  Cardiovascular: Normal S1 S2, No JVD, No murmurs, No edema  Respiratory: left sided decreased breath sounds  Gastrointestinal:  Soft, Non-tender, + BS	  Extremities: no edema                              9.5    10.33 )-----------( 199      ( 10 Apr 2022 08:24 )             31.0     04-10    139  |  101  |  34<H>  ----------------------------<  107<H>  3.7   |  24  |  1.11    Ca    8.8      10 Apr 2022 08:24  Phos  2.9     04-10  Mg     2.00     04-10      proBNP:   Lipid Profile:   HgA1c:   TSH:     Consultant(s) Notes Reviewed:  [x ] YES  [ ] NO    Care Discussed with Consultants/Other Providers [ x] YES  [ ] NO    Imaging Personally Reviewed independently:  [x] YES  [ ] NO    All labs, radiologic studies, vitals, orders and medications list reviewed. Patient is seen and examined at bedside. Case discussed with medical team.

## 2022-04-10 NOTE — PROVIDER CONTACT NOTE (OTHER) - ACTION/TREATMENT ORDERED:
PA made aware   PA ordered PRN hydralazine to given  BP rechecked in an hour
RN will continue to monitor pt and let provider know if any tele events occurs. BP monitored. Pt resting comfortably in bed. Safety maintained.
Notified provider incentive spirometer ordered.
Per PA to use  services to get accurate information from now on. No concern at this time. Will continue to monitor mental status.
RN will give cozaar now, hold isordil for HR, and reschedule coreg for 0900. Will recheck vitals. Safety maintained.
Notified provider will continue to monitor
RN will continue to monitor pt and let provider know if any tele events occurs. BP monitored. Pt resting comfortably in bed. Safety maintained.
ACP made aware. Continue to monitor.
ACP made aware. No new orders at this time. Will continue to monitor and report any further changes
DEB Gallardo made aware and states to notify if pause is 5 seconds or gather; tele tech made aware as well. Will continue to monitor and report any further changes
DEB Gallardo made aware. No further orders at this time. Will continue to monitor for any further changes
Provider aware continue to monitor for acute changes
Provider aware, continue to monitor for any acute changes.
medications given as per order   blood culture   chest x ray
ACP aware - pt educated on importance of bipap

## 2022-04-10 NOTE — PROVIDER CONTACT NOTE (OTHER) - REASON
pt refuses bipap
/57, HR 57
/70, felecia down to 30's
Patients diastolic bp less than 60 notify provider
/70, felecia down to 30's
10.54 seconds of aflutter
2.2 second pause on tele monitoring
2.4 second pause on tele monitoring
Altered mental status
HR of 36
Patient Heart rate dropping to 40-45 BPM
Patient noted to be wheezing.
Pt experienced 2 second pause, Heart rate 38
patient noted with increased temp 101.7
Pt hypertensive

## 2022-04-10 NOTE — PROVIDER CONTACT NOTE (OTHER) - DATE AND TIME:
03-Apr-2022 15:19
04-Apr-2022 10:15
28-Mar-2022 06:19
30-Mar-2022 00:18
28-Mar-2022 13:25
28-Mar-2022 14:56
28-Mar-2022 21:45
29-Mar-2022 00:16
29-Mar-2022 00:16
29-Mar-2022 02:06
31-Mar-2022 19:16
07-Apr-2022 14:30
10-Apr-2022 22:35
28-Mar-2022 01:27
03-Apr-2022 23:40

## 2022-04-10 NOTE — PROGRESS NOTE ADULT - ASSESSMENT
80yo Female with mhx of HFpEF ef 65% CAD s/p CABG 2000 LIMA to LAD and SVG to OM1 and subsequent PCI to LCx,in 2011, severe pulm HTN, Bioprosthetic aortic and mitral valve replacement 9/2014, CKD per outside records, afib on warfarin, chronic Lt loculated pleural effusion, Type 2 DM, HTN, HLD, gout c/o 3-4 days of worsening dyspnea on exertion and lower extremity swelling    Tele: Afib 60s    1. Acute decompensated diastolic heart failure  - BB held  in setting bradycardia. continue to monitor on tele now improved  - echo shows normaL AVR  and MVR normal LV function decreased RV function sev pulm HTN    - CT shows large left pleural effusion s/p chest tube/VATS/pleural biopsy   - lasix 40 IV x 1 today and can switch to lasix 40 po daily from tomorrow       2. Rash  - Pt with diffuse rash over body  - Ongoing for a year  - Management as per primary team    3. CAD s/p CABG and PCI  - CABG x2 in 2000 (LIMA to LAD, SVG-OM1)  - PCI in 2011 to LCX  - Last LHC in 2019 revealed patent stent and grafts  - Continue ASA, statin, isordil, and losartan  - continue to hold BB at this time due to bradycardia    3. S/p MVR & AVR  -s/p bioprosthetic valves  -echo with bioprosthetic MVR, AVR, grossly normal LV systolic function, decreased RV function and severe pHTN    4. Afib  -rate in 60s  -continue to hold BB  - INR 1.7 coumadin 5 today

## 2022-04-10 NOTE — PROVIDER CONTACT NOTE (OTHER) - ASSESSMENT
Patient A&Ox4, Kazakh speaking. Patient resting in bed, no s/s of distress noted. Patient's HR ranges between low 40s to 50s
Patient A&Ox4, Kiswahili speaking. Patient resting in bed and no s/s of distress noted. Patient A-fib on tele. Brief HR of 36 was observed on tele monitoring; nonstaining. Patient HR ranging between low 40s to low 50s.
Patient A&Ox4, Korean speaking. Patient resting in bed, no s/s of distress noted. Patient's HR ranges between low 40s to 50s
Patient asymtomaic
Patient has a BP of 155/57. Patient is asymptomatic
Patient heart rate dropping to 40-45 BPM as she is sleeping. No changes or additional symptoms. Heart rate increases 50-60 as patient wakes up.
Pt alert, able to follow commands.  used to interpret. Pt knew name and was unsure of birthday. Pt thought she was in her house but once reoriented knew she was in the hospital. Pt was not able to state correct month or year. Pt remembered she had surgery yesterday.
Pt felecia down to 35 with multiple few second pauses, per tele tech. /70 at 2100, coreg given, rechecked BP is 150/50.
Pt resting in bed comfortably. Denies any chest pain, SOB, pain/discomfort, palpitations, headache
Pt resting in bed, heart rate dropped to 38 and 2 second pause observed. Patient displays no symptoms or any other changes. Heart rate increases as she awakens to 50-60 bpm
pt resting in bed
vitals stable   no acute distress noted   fall safety maintained
RN Assessed  Pt is asymptomatic  See flowsheet for VS
Patient was laying flat in bed.
/57, HR 57

## 2022-04-10 NOTE — PROVIDER CONTACT NOTE (OTHER) - BACKGROUND
Patient admitted for CHF. History oF a-FIB
Pt admitted for SOB and acute chronic congestive heart failure.
Pt transferred to unit from CTICU. Admitted for SOB and HERNANDEZ
Pt. admitted for chronic systolic heart failure, with SOB.
pt admitted for CHF
refer to provider handoff
admitted for acute chronic systolic CHF.
Hx of CAD HTN DM Afib
admitted for acute chronic systolic CHF

## 2022-04-10 NOTE — PROGRESS NOTE ADULT - SUBJECTIVE AND OBJECTIVE BOX
Date of Service: 04-10-22 @ 15:07    Patient is a 81y old  Female who presents with a chief complaint of Acute on chronic diastolic heart failure exacerbation (10 Apr 2022 11:50)      Any change in ROS:  doing ok :  alert and awake :  no sob:  no overnight evetn s:       MEDICATIONS  (STANDING):  albuterol/ipratropium for Nebulization 3 milliLiter(s) Nebulizer every 6 hours  AQUAPHOR (petrolatum Ointment) 1 Application(s) Topical every 12 hours  aspirin enteric coated 81 milliGRAM(s) Oral daily  carvedilol 3.125 milliGRAM(s) Oral every 12 hours  dextrose 5%. 1000 milliLiter(s) (50 mL/Hr) IV Continuous <Continuous>  dextrose 5%. 1000 milliLiter(s) (100 mL/Hr) IV Continuous <Continuous>  dextrose 50% Injectable 12.5 Gram(s) IV Push once  dornase balta Solution 2.5 milliGRAM(s) Inhalation two times a day  glucagon  Injectable 1 milliGRAM(s) IntraMuscular once  insulin lispro (ADMELOG) corrective regimen sliding scale   SubCutaneous every 6 hours  isosorbide   dinitrate Tablet (ISORDIL) 10 milliGRAM(s) Oral two times a day  loratadine 10 milliGRAM(s) Oral daily  sodium chloride 3%  Inhalation 4 milliLiter(s) Inhalation every 6 hours  warfarin 5 milliGRAM(s) Oral once    MEDICATIONS  (PRN):  acetaminophen   IVPB .. 1000 milliGRAM(s) IV Intermittent once PRN Temp greater or equal to 38C (100.4F), Mild Pain (1 - 3)  acetaminophen   IVPB .. 1000 milliGRAM(s) IV Intermittent once PRN Temp greater or equal to 38C (100.4F), Mild Pain (1 - 3)  dextrose Oral Gel 15 Gram(s) Oral once PRN Blood Glucose LESS THAN 70 milliGRAM(s)/deciliter  diphenhydrAMINE 25 milliGRAM(s) Oral every 6 hours PRN Rash and/or Itching  hydrALAZINE Injectable 5 milliGRAM(s) IV Push every 4 hours PRN SBP>160  HYDROmorphone  Injectable 0.5 milliGRAM(s) IV Push every 3 hours PRN Moderate Pain (4 - 6)  triamcinolone 0.1% Ointment 1 Application(s) Topical every 12 hours PRN itching    Vital Signs Last 24 Hrs  T(C): 37 (10 Apr 2022 13:00), Max: 37 (10 Apr 2022 13:00)  T(F): 98.6 (10 Apr 2022 13:00), Max: 98.6 (10 Apr 2022 13:00)  HR: 75 (10 Apr 2022 13:00) (68 - 89)  BP: 156/80 (10 Apr 2022 13:00) (141/61 - 180/99)  BP(mean): --  RR: 18 (10 Apr 2022 13:00) (17 - 18)  SpO2: 97% (10 Apr 2022 13:00) (96% - 100%)    I&O's Summary        Physical Exam:   GENERAL: NAD, well-groomed, well-developed  HEENT: BRADY/   Atraumatic, Normocephalic  ENMT: No tonsillar erythema, exudates, or enlargement; Moist mucous membranes, Good dentition, No lesions  NECK: Supple, No JVD, Normal thyroid  CHEST/LUNG: Clear to auscultaion- left sided pleurax  CVS: Regular rate and rhythm; No murmurs, rubs, or gallops  GI: : Soft, Nontender, Nondistended; Bowel sounds present  NERVOUS SYSTEM:  Alert & Oriented X3  EXTREMITIES_edema  LYMPH: No lymphadenopathy noted  SKIN: No rashes or lesions  ENDOCRINOLOGY: No Thyromegaly  PSYCH: Appropriate    Labs:                              9.5    10.33 )-----------( 199      ( 10 Apr 2022 08:24 )             31.0                         10.0   8.91  )-----------( 209      ( 09 Apr 2022 08:03 )             32.6                         10.1   8.46  )-----------( 162      ( 08 Apr 2022 07:04 )             32.7                         9.9    11.36 )-----------( 183      ( 07 Apr 2022 03:34 )             31.1                         10.2   12.26 )-----------( 205      ( 06 Apr 2022 19:29 )             31.6     04-10    139  |  101  |  34<H>  ----------------------------<  107<H>  3.7   |  24  |  1.11  04-09    140  |  100  |  39<H>  ----------------------------<  108<H>  3.4<L>   |  26  |  1.26  04-08    140  |  102  |  38<H>  ----------------------------<  86  3.6   |  26  |  1.33<H>  04-07    138  |  97<L>  |  36<H>  ----------------------------<  119<H>  3.8   |  27  |  1.24  04-06    140  |  97<L>  |  35<H>  ----------------------------<  153<H>  3.9   |  25  |  1.30    Ca    8.8      10 Apr 2022 08:24  Ca    8.7      09 Apr 2022 08:03  Phos  2.9     04-10  Phos  3.8     04-09  Mg     2.00     04-10  Mg     2.30     04-09    TPro  6.1  /  Alb  2.9<L>  /  TBili  1.2  /  DBili  x   /  AST  24  /  ALT  9   /  AlkPhos  124<H>  04-08    CAPILLARY BLOOD GLUCOSE  163 (09 Apr 2022 18:30)      POCT Blood Glucose.: 107 mg/dL (10 Apr 2022 12:36)  POCT Blood Glucose.: 113 mg/dL (10 Apr 2022 08:41)     (04-05 @ 10:45)      PT/INR - ( 10 Apr 2022 08:24 )   PT: 20.1 sec;   INR: 1.72 ratio         PTT - ( 10 Apr 2022 08:24 )  PTT:33.1 sec    Fluid Source --  Albumin, Fluid--  Glucose, Fluid--  Protein total, Fluid--  Lacatate Dehydrogenase, Fluid--  pH, Fluid--  Cytopathology-Non Gyn Report  ACCESSION No:  [id number]  Patient:   CATRACHITO WOODS      Accession:                             [id number]    Collected Date/Time:                   4/5/2022 10:45 EDT  Received Date/Time:                    4/5/2022 15:12 EDT    Non-Gynecologic Report - Auth (Verified)    Specimen(s) Submitted  PLEURAL FLUID, LEFT    Final Diagnosis  PLEURAL FLUID, LEFT  NEGATIVE FOR MALIGNANT CELLS.    Cytology slides and cell block shows histiocytes and scattered benign  mesothelial cells.      Pleasesee concurrent pathology report:   14-T-  Screened by: Richmond BISWAS(ASCP)  Verified by: Wilmer Carrera MD  (Electronic Signature)  Reported on: 04/06/22 12:13 EDT, [facility], [address]  [facility]. [address] [place] [address]  Phone: [phone]   Fax: [phone]  Cytology technical processing performed at [address] [place] [id number]  _________________________________________________________________      Clinical Information  Pleural effusion.    Gross Description  Received: 90  ml of yellow fluid in CytoLyt  Prepared: 1 ThinPrep slide, 1 cell block, smear        RECENT CULTURES:  04-05 @ 16:21 .Bronchial left bronchial lavage                Testing in progress    04-05 @ 12:39 .Bronchial left bronchial lavage       No polymorphonuclear leukocytes seen per low power field  No Squamous epithelial cells seen per low power field  No organisms seen per oil power field           No growth  < from: Xray Chest 1 View- PORTABLE-Routine (Xray Chest 1 View- PORTABLE-Routine in AM.) (04.08.22 @ 08:05) >  COMPARISON: 4/7/22    FINDINGS:  S/P sternotomy and valve repair.  Heart/Vascular: Stable cardiomegaly.  Pulmonary: Interval development of pulmonary vascular congestive changes   with small bilateral pleural effusions - left more than right. No   pneumothorax.  Bones: No acute bony finding.    Impression:  Interval development of pulmonary vascular congestive changes with small   bilateral pleural effusions.        --- End of Report ---    < end of copied text >          RESPIRATORY CULTURES:          Studies  Chest X-RAY  CT SCAN Chest   Venous Dopplers: LE:   CT Abdomen  Others

## 2022-04-10 NOTE — PROGRESS NOTE ADULT - ASSESSMENT
80yo Female with mhx of HFpEF ef 65% CAD (CABG 2000, stent 2011), severe pulm HTN, Bioprosthetic aortic and mitral valve replacement 9/2014, ?CKD per outside records, afib on warfarin, chronic Lt loculated pleural effusion,  Type 2 DM, HTN, HLD, gout a/w acute on chronic HFpEF i/s/p severe pHTN c/b hypercapnia; Found to have persisting moderate size loculated lt pleural effusion with atelectasis;         Problem/Plan - 1:  ·  Problem: Acute on chronic diastolic heart failure .   ·  Plan: Acute on chronic HFpEF likely in setting of decreased diuretic dose and severe pulm HTN as evidenced by prior TTE dated 10/10/2019;  Volume overloaded on exam with JVD and peripheral nonpitting edema to thighs  -S/P  Lasix 40 IV BID  -Cardiology help appreciated.   -TTE pending.      Problem/Plan - 2:  ·  Problem: Fever .   ·  Plan: Sepsis work up in progress.  IV Abxs started .  D helping.      Problem/Plan - 3:  ·  Problem: Loculated pleural effusion with Hypercapnia with Collapse left side .   ·  Plan: Persisting loculated effusion so Pulmonary helping.   S/P Flexible Bronchoscopy, Left VATS, Pleural Biopsy, Drainage of Pleural Effusion, PleurX Catheter Placement  < from: CT Chest No Cont (03.27.22 @ 20:25) >  IMPRESSION:    Small left pleural effusion with a loculated component in the left major   fissure and along left anterior chest wall. The loculated components are   new when compared to prior CT exam dated 10/13/2019. Associated partial   left lower lobe and left upper lobe collapse.    New pleural thickening and/or loculated fluid medially along the left   upper hemithorax.    Small right pleural effusion and adjacent passive atelectasis.    Stable 9 mm right apical nodule.    < end of copied text >     Problem/Plan - 4:  ·  Problem: Chronic atrial fibrillation.   ·  Plan: [id number] risk stratification = 7  Continue home coreg 12.5mg BID with hold parameters  ON Coumadin as cleared by TS.      Problem/Plan - 5:  ·  Problem: Pulmonary HTN.   ·  Plan: Severe pulm HTN likely class II  Repeat TTE this admission.     Problem/Plan - 6:  ·  Problem: LAYLA Thrombus .  ·  Plan: On AC.      Problem/Plan - 7:  ·  Problem: Papular rash, generalized with Eosinophilia .  ·  Plan: Generalized papular rash of 1y duration previously attributed to hydralazine which was discontinued without improvement of rash  With chronic eosinophilia noted per chart  -Full medication review may be beneficial of meds possibly causing eosinophilia  -Dermatology consult noted.      Problem/Plan - 8:  ·  Problem: Hypertension.   ·  Plan: Restart home losartan, pressures can tolerate SBP 180s in ED  Cont Carvedilol.     Problem/Plan - 9:  ·  Problem: Hyperlipidemia.   ·  Plan: Continue home statin.     Problem/Plan - 10:  ·  Problem: T2DM (type 2 diabetes mellitus).   ·  Plan; HOLD home glipizide while inpatient   Continue home gabapentin for diabetic neuropathy  KRYSTYNA.     Problem/Plan - 11:  ·  Problem: Cerebrovascular accident (CVA).   ·  Plan: Without residual deficits at this time per daughter and patient although unclear clinical course occurring years ago. Continue to monitor and continue ASA and statin.     Problem/Plan - 12:  ·  Problem: CAD (coronary artery disease).   ·  Plan: Continue GDMT    Full code.     Disposition : DC planning .

## 2022-04-10 NOTE — PROGRESS NOTE ADULT - SUBJECTIVE AND OBJECTIVE BOX
Date of Service  : 04-10-22 @ 15:15    INTERVAL HPI/OVERNIGHT EVENTS: i am fine so want to go home.   Vital Signs Last 24 Hrs  T(C): 37 (10 Apr 2022 13:00), Max: 37 (10 Apr 2022 13:00)  T(F): 98.6 (10 Apr 2022 13:00), Max: 98.6 (10 Apr 2022 13:00)  HR: 75 (10 Apr 2022 13:00) (68 - 89)  BP: 156/80 (10 Apr 2022 13:00) (141/61 - 180/99)  BP(mean): --  RR: 18 (10 Apr 2022 13:00) (17 - 18)  SpO2: 97% (10 Apr 2022 13:00) (96% - 100%)  I&O's Summary    MEDICATIONS  (STANDING):  albuterol/ipratropium for Nebulization 3 milliLiter(s) Nebulizer every 6 hours  AQUAPHOR (petrolatum Ointment) 1 Application(s) Topical every 12 hours  aspirin enteric coated 81 milliGRAM(s) Oral daily  carvedilol 3.125 milliGRAM(s) Oral every 12 hours  dextrose 5%. 1000 milliLiter(s) (50 mL/Hr) IV Continuous <Continuous>  dextrose 5%. 1000 milliLiter(s) (100 mL/Hr) IV Continuous <Continuous>  dextrose 50% Injectable 12.5 Gram(s) IV Push once  dornase balta Solution 2.5 milliGRAM(s) Inhalation two times a day  glucagon  Injectable 1 milliGRAM(s) IntraMuscular once  insulin lispro (ADMELOG) corrective regimen sliding scale   SubCutaneous every 6 hours  isosorbide   dinitrate Tablet (ISORDIL) 10 milliGRAM(s) Oral two times a day  loratadine 10 milliGRAM(s) Oral daily  sodium chloride 3%  Inhalation 4 milliLiter(s) Inhalation every 6 hours  warfarin 5 milliGRAM(s) Oral once    MEDICATIONS  (PRN):  acetaminophen   IVPB .. 1000 milliGRAM(s) IV Intermittent once PRN Temp greater or equal to 38C (100.4F), Mild Pain (1 - 3)  acetaminophen   IVPB .. 1000 milliGRAM(s) IV Intermittent once PRN Temp greater or equal to 38C (100.4F), Mild Pain (1 - 3)  dextrose Oral Gel 15 Gram(s) Oral once PRN Blood Glucose LESS THAN 70 milliGRAM(s)/deciliter  diphenhydrAMINE 25 milliGRAM(s) Oral every 6 hours PRN Rash and/or Itching  hydrALAZINE Injectable 5 milliGRAM(s) IV Push every 4 hours PRN SBP>160  HYDROmorphone  Injectable 0.5 milliGRAM(s) IV Push every 3 hours PRN Moderate Pain (4 - 6)  triamcinolone 0.1% Ointment 1 Application(s) Topical every 12 hours PRN itching    LABS:                        9.5    10.33 )-----------( 199      ( 10 Apr 2022 08:24 )             31.0     04-10    139  |  101  |  34<H>  ----------------------------<  107<H>  3.7   |  24  |  1.11    Ca    8.8      10 Apr 2022 08:24  Phos  2.9     04-10  Mg     2.00     04-10      PT/INR - ( 10 Apr 2022 08:24 )   PT: 20.1 sec;   INR: 1.72 ratio         PTT - ( 10 Apr 2022 08:24 )  PTT:33.1 sec    CAPILLARY BLOOD GLUCOSE  163 (09 Apr 2022 18:30)      POCT Blood Glucose.: 107 mg/dL (10 Apr 2022 12:36)  POCT Blood Glucose.: 113 mg/dL (10 Apr 2022 08:41)          REVIEW OF SYSTEMS:  CONSTITUTIONAL: No fever, weight loss, or fatigue  EYES: No eye pain, visual disturbances, or discharge  ENMT:  No difficulty hearing, tinnitus, vertigo; No sinus or throat pain  NECK: No pain or stiffness  RESPIRATORY: No cough, wheezing, chills or hemoptysis; No shortness of breath  CARDIOVASCULAR: No chest pain, palpitations, dizziness, or leg swelling  GASTROINTESTINAL: No abdominal or epigastric pain. No nausea, vomiting, or hematemesis; No diarrhea or constipation. No melena or hematochezia.  GENITOURINARY: No dysuria, frequency, hematuria, or incontinence  NEUROLOGICAL: No headaches, memory loss, loss of strength, numbness, or tremors      Consultant(s) Notes Reviewed:  [x ] YES  [ ] NO    PHYSICAL EXAM:  GENERAL: NAD, NC Oxygen   HEAD:  Atraumatic, Normocephalic  NECK: Supple, No JVD, Normal thyroid  NERVOUS SYSTEM:  Alert & Oriented X3, No focal deficit   CHEST/LUNG: Good air entry bilateral except bases   HEART: Regular rate and rhythm; No murmurs, rubs, or gallops  ABDOMEN: Soft, Nontender, Nondistended; Bowel sounds present  EXTREMITIES:  2+ Peripheral Pulses, No clubbing, cyanosis, or edema  SKIN: No rashes or lesions    Care Discussed with Consultants/Other Providers [ x] YES  [ ] NO

## 2022-04-11 ENCOUNTER — TRANSCRIPTION ENCOUNTER (OUTPATIENT)
Age: 82
End: 2022-04-11

## 2022-04-11 LAB
ANION GAP SERPL CALC-SCNC: 13 MMOL/L — SIGNIFICANT CHANGE UP (ref 7–14)
APTT BLD: 34.3 SEC — SIGNIFICANT CHANGE UP (ref 27–36.3)
BUN SERPL-MCNC: 32 MG/DL — HIGH (ref 7–23)
CALCIUM SERPL-MCNC: 8.9 MG/DL — SIGNIFICANT CHANGE UP (ref 8.4–10.5)
CHLORIDE SERPL-SCNC: 104 MMOL/L — SIGNIFICANT CHANGE UP (ref 98–107)
CO2 SERPL-SCNC: 25 MMOL/L — SIGNIFICANT CHANGE UP (ref 22–31)
CREAT SERPL-MCNC: 1 MG/DL — SIGNIFICANT CHANGE UP (ref 0.5–1.3)
EGFR: 56 ML/MIN/1.73M2 — LOW
GLUCOSE BLDC GLUCOMTR-MCNC: 101 MG/DL — HIGH (ref 70–99)
GLUCOSE BLDC GLUCOMTR-MCNC: 114 MG/DL — HIGH (ref 70–99)
GLUCOSE BLDC GLUCOMTR-MCNC: 135 MG/DL — HIGH (ref 70–99)
GLUCOSE BLDC GLUCOMTR-MCNC: 83 MG/DL — SIGNIFICANT CHANGE UP (ref 70–99)
GLUCOSE SERPL-MCNC: 77 MG/DL — SIGNIFICANT CHANGE UP (ref 70–99)
HCT VFR BLD CALC: 31.1 % — LOW (ref 34.5–45)
HGB BLD-MCNC: 9.9 G/DL — LOW (ref 11.5–15.5)
INR BLD: 2.27 RATIO — HIGH (ref 0.88–1.16)
MAGNESIUM SERPL-MCNC: 1.9 MG/DL — SIGNIFICANT CHANGE UP (ref 1.6–2.6)
MCHC RBC-ENTMCNC: 28.4 PG — SIGNIFICANT CHANGE UP (ref 27–34)
MCHC RBC-ENTMCNC: 31.8 GM/DL — LOW (ref 32–36)
MCV RBC AUTO: 89.4 FL — SIGNIFICANT CHANGE UP (ref 80–100)
NRBC # BLD: 0 /100 WBCS — SIGNIFICANT CHANGE UP
NRBC # FLD: 0 K/UL — SIGNIFICANT CHANGE UP
PHOSPHATE SERPL-MCNC: 3.1 MG/DL — SIGNIFICANT CHANGE UP (ref 2.5–4.5)
PLATELET # BLD AUTO: 242 K/UL — SIGNIFICANT CHANGE UP (ref 150–400)
POTASSIUM SERPL-MCNC: 3.9 MMOL/L — SIGNIFICANT CHANGE UP (ref 3.5–5.3)
POTASSIUM SERPL-SCNC: 3.9 MMOL/L — SIGNIFICANT CHANGE UP (ref 3.5–5.3)
PROTHROM AB SERPL-ACNC: 26.5 SEC — HIGH (ref 10.5–13.4)
RBC # BLD: 3.48 M/UL — LOW (ref 3.8–5.2)
RBC # FLD: 15.2 % — HIGH (ref 10.3–14.5)
SODIUM SERPL-SCNC: 142 MMOL/L — SIGNIFICANT CHANGE UP (ref 135–145)
WBC # BLD: 9.74 K/UL — SIGNIFICANT CHANGE UP (ref 3.8–10.5)
WBC # FLD AUTO: 9.74 K/UL — SIGNIFICANT CHANGE UP (ref 3.8–10.5)

## 2022-04-11 RX ORDER — WARFARIN SODIUM 2.5 MG/1
3 TABLET ORAL ONCE
Refills: 0 | Status: COMPLETED | OUTPATIENT
Start: 2022-04-11 | End: 2022-04-11

## 2022-04-11 RX ADMIN — Medication 40 MILLIGRAM(S): at 06:00

## 2022-04-11 RX ADMIN — ISOSORBIDE DINITRATE 10 MILLIGRAM(S): 5 TABLET ORAL at 05:59

## 2022-04-11 RX ADMIN — CARVEDILOL PHOSPHATE 3.12 MILLIGRAM(S): 80 CAPSULE, EXTENDED RELEASE ORAL at 05:59

## 2022-04-11 RX ADMIN — Medication 3 MILLILITER(S): at 22:00

## 2022-04-11 RX ADMIN — Medication 81 MILLIGRAM(S): at 11:59

## 2022-04-11 RX ADMIN — SODIUM CHLORIDE 4 MILLILITER(S): 9 INJECTION INTRAMUSCULAR; INTRAVENOUS; SUBCUTANEOUS at 03:27

## 2022-04-11 RX ADMIN — Medication 1 APPLICATION(S): at 05:59

## 2022-04-11 RX ADMIN — Medication 1 APPLICATION(S): at 18:28

## 2022-04-11 RX ADMIN — SODIUM CHLORIDE 4 MILLILITER(S): 9 INJECTION INTRAMUSCULAR; INTRAVENOUS; SUBCUTANEOUS at 10:13

## 2022-04-11 RX ADMIN — Medication 3 MILLILITER(S): at 10:13

## 2022-04-11 RX ADMIN — SODIUM CHLORIDE 4 MILLILITER(S): 9 INJECTION INTRAMUSCULAR; INTRAVENOUS; SUBCUTANEOUS at 22:01

## 2022-04-11 RX ADMIN — Medication 3 MILLILITER(S): at 03:27

## 2022-04-11 RX ADMIN — DORNASE ALFA 2.5 MILLIGRAM(S): 1 SOLUTION RESPIRATORY (INHALATION) at 10:14

## 2022-04-11 RX ADMIN — CARVEDILOL PHOSPHATE 3.12 MILLIGRAM(S): 80 CAPSULE, EXTENDED RELEASE ORAL at 18:27

## 2022-04-11 RX ADMIN — DORNASE ALFA 2.5 MILLIGRAM(S): 1 SOLUTION RESPIRATORY (INHALATION) at 21:56

## 2022-04-11 RX ADMIN — ISOSORBIDE DINITRATE 10 MILLIGRAM(S): 5 TABLET ORAL at 17:44

## 2022-04-11 RX ADMIN — LORATADINE 10 MILLIGRAM(S): 10 TABLET ORAL at 11:59

## 2022-04-11 RX ADMIN — SODIUM CHLORIDE 4 MILLILITER(S): 9 INJECTION INTRAMUSCULAR; INTRAVENOUS; SUBCUTANEOUS at 16:17

## 2022-04-11 RX ADMIN — Medication 3 MILLILITER(S): at 16:16

## 2022-04-11 RX ADMIN — WARFARIN SODIUM 3 MILLIGRAM(S): 2.5 TABLET ORAL at 17:44

## 2022-04-11 NOTE — DISCHARGE NOTE PROVIDER - HOSPITAL COURSE
80yo Female with mhx of HFpEF ef 65% CAD (CABG 2000, stent 2011), severe pulm HTN, Bioprosthetic aortic and mitral valve replacement 9/2014, ?CKD per outside records, afib on warfarin, chronic Lt loculated pleural effusion,  Type 2 DM, HTN, HLD, gout a/w acute on chronic HFpEF i/s/p severe pHTN c/b hypercapnia; Found to have persisting moderate size loculated lt pleural effusion with atelectasis;   + Lt pleural effusion S/P bronch, Pleurx, Lt VATS/biopsy 4/5; extubated 4/6     1. Acute decompensated diastolic heart failure  - BB held  in setting bradycardia. continue to monitor on tele now improved coreg resumed  - echo shows normaL AVR  and MVR normal LV function decreased RV function sev pulm HTN    - CT shows large left pleural effusion s/p chest tube/VATS/pleural biopsy   - s/p iv lasix, c/w lasix 40 po daily    * blood cx negative, afebrile and extubated, pleurex capped  * BAL cx negative, quanitferon negative, pleural cytology  negative for malignant cells, just showed histiocytes and scattered benign mesothelial cells, path still pending  * s/p a 7 day course of zosyn, now off and afebrile    rash   Generalized pruritus with secondary excoriations most likely secondary to xerosis and diabetes. Patient with eosinophilia dating back to 2019 per chart review.   - recommend work-up for underlying cause of peripheral eosinophilia    - recommend liberal moisturization to body and extremities multiple times per day with cold Sarna lotion (if available) or other emollient   - start topical triamcinolone 0.1% ointment BID to affected areas as needed for itching for up to 2 weeks continuously. Then take 1 week break before re-using if needed     Discussed case with Dr. Pitts, pt is cleared for discharge home on 4/12  Pt will need home O2, she already has tank at home with portable concentrator. 82yo Female with mhx of HFpEF ef 65% CAD (CABG 2000, stent 2011), severe pulm HTN, Bioprosthetic aortic and mitral valve replacement 9/2014, ?CKD per outside records, afib on warfarin, chronic Lt loculated pleural effusion,  Type 2 DM, HTN, HLD, gout a/w acute on chronic HFpEF i/s/p severe pHTN c/b hypercapnia; Found to have persisting moderate size loculated lt pleural effusion with atelectasis;     + Lt pleural effusion S/P bronch, Pleurx, Lt VATS/biopsy 4/5 with CTS; extubated 4/6, drain pleurex every M, W, F    + Acute decompensated diastolic heart failure  - BB ws initially held in setting bradycardia. however now improved therefore coreg resumed  - echo shows normaL AVR  and MVR normal LV function decreased RV function sev pulm HTN    - CT shows large left pleural effusion s/p chest tube/VATS/pleural biopsy   - s/p iv lasix, transitioned to lasix 40 po daily    + Fever  - Pt seen by ID  - blood cx negative, afebrile and extubated, pleurex capped  - BAL cx negative, quanitferon negative, pleural cytology  negative for malignant cells, just showed histiocytes and scattered benign mesothelial cells, path neg for malignant cells  - s/p a 7 day course of zosyn, now off and afebrile    + Rash   - Dermatology consulted: Generalized pruritus with secondary excoriations most likely secondary to xerosis and diabetes. Patient with eosinophilia dating back to 2019 per chart review.   - recommend work-up for underlying cause of peripheral eosinophilia    - recommend liberal moisturization to body and extremities multiple times per day with cold Sarna lotion (if available) or other emollient   - start topical triamcinolone 0.1% ointment BID to affected areas as needed for itching for up to 2 weeks continuously. Then take 1 week break before re-using if needed     Discussed case with Dr. Pitts, pt is cleared for discharge home on 4/12  Pt will need home O2, she already has tank at home with portable concentrator.

## 2022-04-11 NOTE — DISCHARGE NOTE PROVIDER - NSDCFUADDAPPT_GEN_ALL_CORE_FT
Please follow up with your PCP in 2 days for INR check - you will be discharged on 5mg Coumadin but your doctor may adjust your dose based on INR level. INR on 4/12 was 2.19  Follow up with your cardiologist Dr. Kg barrera scheduled for Wed April 20th at 1:15  2 week follow up with Dr. Conteh for wound care check   Follow up with your pulmonologist for management of pleurx catheter    - recommend outpatient follow up at our clinic located at [address] [place], [place] [address], [address], [place], [place] ([phone]) after discharge    Please follow up with your PCP in 2 days for INR check - you will be discharged on 5mg Coumadin but your doctor may adjust your dose based on INR level. INR on 4/12 was 2.19    Follow up with your cardiologist Dr. Kg barrera scheduled for Wed April 20th at 1:15    2 week follow up with Dr. Conteh for wound care check   Follow up with your pulmonologist for management of pleurx catheter  Drain pleurx every M, W, F  Use oxygen at all times at home    Dermatology outpatient f/u - recommend outpatient follow up at our clinic located at [address] [place] [address], [address], [place], [place] ([phone]) after discharge

## 2022-04-11 NOTE — PROGRESS NOTE ADULT - SUBJECTIVE AND OBJECTIVE BOX
Date of Service  : 04-11-22     INTERVAL HPI/OVERNIGHT EVENTS: Son and daughter in law in room . Feeling weak.   Vital Signs Last 24 Hrs  T(C): 36.8 (11 Apr 2022 17:44), Max: 36.9 (10 Apr 2022 21:45)  T(F): 98.2 (11 Apr 2022 17:44), Max: 98.5 (10 Apr 2022 21:45)  HR: 67 (11 Apr 2022 17:44) (65 - 77)  BP: 150/68 (11 Apr 2022 17:44) (138/62 - 156/60)  BP(mean): --  RR: 18 (11 Apr 2022 17:44) (17 - 18)  SpO2: 95% (11 Apr 2022 17:44) (95% - 100%)  I&O's Summary    10 Apr 2022 07:01  -  11 Apr 2022 07:00  --------------------------------------------------------  IN: 0 mL / OUT: 800 mL / NET: -800 mL    11 Apr 2022 07:01  -  11 Apr 2022 18:15  --------------------------------------------------------  IN: 0 mL / OUT: 500 mL / NET: -500 mL      MEDICATIONS  (STANDING):  albuterol/ipratropium for Nebulization 3 milliLiter(s) Nebulizer every 6 hours  AQUAPHOR (petrolatum Ointment) 1 Application(s) Topical every 12 hours  aspirin enteric coated 81 milliGRAM(s) Oral daily  carvedilol 3.125 milliGRAM(s) Oral every 12 hours  dextrose 5%. 1000 milliLiter(s) (50 mL/Hr) IV Continuous <Continuous>  dextrose 5%. 1000 milliLiter(s) (100 mL/Hr) IV Continuous <Continuous>  dextrose 50% Injectable 12.5 Gram(s) IV Push once  dornase balta Solution 2.5 milliGRAM(s) Inhalation two times a day  furosemide    Tablet 40 milliGRAM(s) Oral daily  glucagon  Injectable 1 milliGRAM(s) IntraMuscular once  insulin lispro (ADMELOG) corrective regimen sliding scale   SubCutaneous every 6 hours  isosorbide   dinitrate Tablet (ISORDIL) 10 milliGRAM(s) Oral two times a day  loratadine 10 milliGRAM(s) Oral daily  sodium chloride 3%  Inhalation 4 milliLiter(s) Inhalation every 6 hours    MEDICATIONS  (PRN):  acetaminophen   IVPB .. 1000 milliGRAM(s) IV Intermittent once PRN Temp greater or equal to 38C (100.4F), Mild Pain (1 - 3)  acetaminophen   IVPB .. 1000 milliGRAM(s) IV Intermittent once PRN Temp greater or equal to 38C (100.4F), Mild Pain (1 - 3)  dextrose Oral Gel 15 Gram(s) Oral once PRN Blood Glucose LESS THAN 70 milliGRAM(s)/deciliter  diphenhydrAMINE 25 milliGRAM(s) Oral every 6 hours PRN Rash and/or Itching  hydrALAZINE Injectable 5 milliGRAM(s) IV Push every 4 hours PRN SBP>160  triamcinolone 0.1% Ointment 1 Application(s) Topical every 12 hours PRN itching    LABS:                        9.9    9.74  )-----------( 242      ( 11 Apr 2022 07:57 )             31.1     04-11    142  |  104  |  32<H>  ----------------------------<  77  3.9   |  25  |  1.00    Ca    8.9      11 Apr 2022 07:57  Phos  3.1     04-11  Mg     1.90     04-11      PT/INR - ( 11 Apr 2022 07:57 )   PT: 26.5 sec;   INR: 2.27 ratio         PTT - ( 11 Apr 2022 07:57 )  PTT:34.3 sec    CAPILLARY BLOOD GLUCOSE  83 (11 Apr 2022 08:50)      POCT Blood Glucose.: 135 mg/dL (11 Apr 2022 17:52)  POCT Blood Glucose.: 114 mg/dL (11 Apr 2022 12:41)  POCT Blood Glucose.: 83 mg/dL (11 Apr 2022 08:55)  POCT Blood Glucose.: 101 mg/dL (11 Apr 2022 00:40)  POCT Blood Glucose.: 118 mg/dL (10 Apr 2022 21:23)          REVIEW OF SYSTEMS:  CONSTITUTIONAL: No fever, weight loss, or fatigue  EYES: No eye pain, visual disturbances, or discharge  ENMT:  No difficulty hearing, tinnitus, vertigo; No sinus or throat pain  NECK: No pain or stiffness  RESPIRATORY: No cough, wheezing, chills or hemoptysis; No shortness of breath  CARDIOVASCULAR: No chest pain, palpitations, dizziness, or leg swelling  GASTROINTESTINAL: No abdominal or epigastric pain. No nausea, vomiting, or hematemesis; No diarrhea or constipation. No melena or hematochezia.  GENITOURINARY: No dysuria, frequency, hematuria, or incontinence  NEUROLOGICAL: No headaches, memory loss, loss of strength, numbness, or tremors      Consultant(s) Notes Reviewed:  [x ] YES  [ ] NO    PHYSICAL EXAM:  GENERAL: NAD, Oxygen   HEAD:  Atraumatic, Normocephalic  NECK: Supple, No JVD, Normal thyroid  NERVOUS SYSTEM:  Alert & Oriented X3, No focal deficit   CHEST/LUNG: Good air entry bilateral except bases   HEART: Regular rate and rhythm; No murmurs, rubs, or gallops  ABDOMEN: Soft, Nontender, Nondistended; Bowel sounds present  EXTREMITIES:  2+ Peripheral Pulses, No clubbing, cyanosis, or edema    Care Discussed with Consultants/Other Providers [ x] YES  [ ] NO

## 2022-04-11 NOTE — PROGRESS NOTE ADULT - SUBJECTIVE AND OBJECTIVE BOX
Collin Trejo MD  Interventional Cardiology / Advance Heart Failure and Cardiac Transplant Specialist  [place] Office : [phone] [place] [address] [place]. [address]  Tel:   [place] Office : [address] [facility] [place] [address]  Tel: [phone]      Subjective/Overnight events: Pt is lying in bed comfortable not in distress  	  MEDICATIONS:  aspirin enteric coated 81 milliGRAM(s) Oral daily  carvedilol 3.125 milliGRAM(s) Oral every 12 hours  furosemide    Tablet 40 milliGRAM(s) Oral daily  hydrALAZINE Injectable 5 milliGRAM(s) IV Push every 4 hours PRN  isosorbide   dinitrate Tablet (ISORDIL) 10 milliGRAM(s) Oral two times a day  warfarin 3 milliGRAM(s) Oral once      albuterol/ipratropium for Nebulization 3 milliLiter(s) Nebulizer every 6 hours  dornase balta Solution 2.5 milliGRAM(s) Inhalation two times a day  loratadine 10 milliGRAM(s) Oral daily  sodium chloride 3%  Inhalation 4 milliLiter(s) Inhalation every 6 hours    acetaminophen   IVPB .. 1000 milliGRAM(s) IV Intermittent once PRN  acetaminophen   IVPB .. 1000 milliGRAM(s) IV Intermittent once PRN  diphenhydrAMINE 25 milliGRAM(s) Oral every 6 hours PRN      dextrose 50% Injectable 12.5 Gram(s) IV Push once  dextrose Oral Gel 15 Gram(s) Oral once PRN  glucagon  Injectable 1 milliGRAM(s) IntraMuscular once  insulin lispro (ADMELOG) corrective regimen sliding scale   SubCutaneous every 6 hours    AQUAPHOR (petrolatum Ointment) 1 Application(s) Topical every 12 hours  dextrose 5%. 1000 milliLiter(s) IV Continuous <Continuous>  dextrose 5%. 1000 milliLiter(s) IV Continuous <Continuous>  triamcinolone 0.1% Ointment 1 Application(s) Topical every 12 hours PRN      PAST MEDICAL/SURGICAL HISTORY  PAST MEDICAL & SURGICAL HISTORY:  HTN (Hypertension)    Afib  (on Warfarin)    CAD (Coronary Artery Disease)  s/p stent and CABG    CVA (Cerebral Infarction)  2011    CHF (congestive heart failure)  EF 65% Oct 2019    Upper GI bleed    Gout    Diabetes mellitus  type 2, not on meds    History of heart valve replacement with bioprosthetic valve  mitral and aortic    Hyperlipidemia    S/P angioplasty with stent  2011    S/P CABG x 1  (2000)    H/O aortic valve replacement  9/22/14    H/O mitral valve replacement  9/22/14        SOCIAL HISTORY: Substance Use (street drugs): ( x ) never used  (  ) other:    FAMILY HISTORY:  Family history of acute myocardial infarction  mother          PHYSICAL EXAM:  T(C): 36.6 (04-11-22 @ 06:00), Max: 37 (04-10-22 @ 13:00)  HR: 76 (04-11-22 @ 10:17) (65 - 77)  BP: 138/62 (04-11-22 @ 06:00) (138/62 - 162/71)  RR: 17 (04-11-22 @ 06:00) (17 - 18)  SpO2: 98% (04-11-22 @ 10:17) (97% - 100%)  Wt(kg): --  I&O's Summary    10 Apr 2022 07:01  -  11 Apr 2022 07:00  --------------------------------------------------------  IN: 0 mL / OUT: 800 mL / NET: -800 mL          EYES:   PERRLA   ENMT:   Moist mucous membranes, Good dentition, No lesions  Cardiovascular: Normal S1 S2, No JVD, No murmurs, No edema  Respiratory: left sided decreased breath sounds  Gastrointestinal:  Soft, Non-tender, + BS	  Extremities: no edema                                    9.9    9.74  )-----------( 242      ( 11 Apr 2022 07:57 )             31.1     04-11    142  |  104  |  32<H>  ----------------------------<  77  3.9   |  25  |  1.00    Ca    8.9      11 Apr 2022 07:57  Phos  3.1     04-11  Mg     1.90     04-11      proBNP:   Lipid Profile:   HgA1c:   TSH:     Consultant(s) Notes Reviewed:  [x ] YES  [ ] NO    Care Discussed with Consultants/Other Providers [ x] YES  [ ] NO    Imaging Personally Reviewed independently:  [x] YES  [ ] NO    All labs, radiologic studies, vitals, orders and medications list reviewed. Patient is seen and examined at bedside. Case discussed with medical team.

## 2022-04-11 NOTE — DISCHARGE NOTE PROVIDER - CARE PROVIDER_API CALL
Dominic Conteh)  Surgery; Thoracic Surgery  [phone] [place] [address], Oncology [place] [address]  Phone: [phone]  Fax: [phone]  Follow Up Time:

## 2022-04-11 NOTE — PROGRESS NOTE ADULT - ASSESSMENT
80yo Female with mhx of HFpEF ef 65% CAD (CABG 2000, stent 2011), severe pulm HTN, Bioprosthetic aortic and mitral valve replacement 9/2014, ?CKD per outside records, afib on warfarin, chronic Lt loculated pleural effusion,  Type 2 DM, HTN, HLD, gout a/w acute on chronic HFpEF i/s/p severe pHTN c/b hypercapnia; Found to have persisting moderate size loculated lt pleural effusion with atelectasis;         Problem/Plan - 1:  ·  Problem: Acute on chronic diastolic heart failure .   ·  Plan: Acute on chronic HFpEF likely in setting of decreased diuretic dose and severe pulm HTN as evidenced by prior TTE dated 10/10/2019;  Volume overloaded on exam with JVD and peripheral nonpitting edema to thighs  -S/P  Lasix 40 IV BID changed to PO.   -Cardiology help appreciated.   < from: Transthoracic Echocardiogram (03.30.22 @ 09:41) >  CONCLUSIONS:  1. Bioprosthetic mitral valve replacement. Minimal mitral  regurgitation. Mean transmitral valve gradient equals 5 mm  Hg, which is probably normal in the setting of a prosthetic  valve.  2. Bioprosthetic aortic valve replacement, which is well  seated with normal leaflet motion. Peak transaortic valve  gradient equals 28 mm Hg, mean transaortic valve gradient  equals 14 mm Hg, which is probably normal in the presence  of a prosthetic valve.  3. Severely dilated left atrium.  LA volume index = 74  cc/m2.  4. Normal left ventricular internal dimensions and wall  thicknesses.  5. Endocardium not well visualized; grossly normal left  ventricular systolic function.  6. Severe right atrial enlargement.  7. Right ventricular enlargement with decreased right  ventricular systolic function.  8. Estimated right ventricular systolic pressure equals 65  mm Hg, assuming right atrial pressure equals 10 mm Hg,  consistent with severe pulmonary hypertension.  9. Normal tricuspid valve.  Severe tricuspid regurgitation.  10. Left pleural effusion.    < end of copied text >       Problem/Plan - 2:  ·  Problem: Fever .   ·  Plan: Sepsis work up in progress.  IV Abxs started .  D helping.      Problem/Plan - 3:  ·  Problem: Loculated pleural effusion with Hypercapnia with Collapse left side .   ·  Plan: Persisting loculated effusion so Pulmonary helping.   S/P Flexible Bronchoscopy, Left VATS, Pleural Biopsy, Drainage of Pleural Effusion, PleurX Catheter Placement  < from: CT Chest No Cont (03.27.22 @ 20:25) >  IMPRESSION:    Small left pleural effusion with a loculated component in the left major   fissure and along left anterior chest wall. The loculated components are   new when compared to prior CT exam dated 10/13/2019. Associated partial   left lower lobe and left upper lobe collapse.    New pleural thickening and/or loculated fluid medially along the left   upper hemithorax.    Small right pleural effusion and adjacent passive atelectasis.    Stable 9 mm right apical nodule.    < end of copied text >     Problem/Plan - 4:  ·  Problem: Chronic atrial fibrillation.   ·  Plan: [id number] risk stratification = 7  Continue home coreg 12.5mg BID with hold parameters  ON Coumadin with Therapeutic INR.      Problem/Plan - 5:  ·  Problem: Pulmonary HTN.   ·  Plan: Severe pulm HTN likely class II  Repeat TTE this admission.     Problem/Plan - 6:  ·  Problem: LAYLA Thrombus .  ·  Plan: On AC.      Problem/Plan - 7:  ·  Problem: Papular rash, generalized with Eosinophilia .  ·  Plan: Generalized papular rash of 1y duration previously attributed to hydralazine which was discontinued without improvement of rash  With chronic eosinophilia noted per chart  -Full medication review may be beneficial of meds possibly causing eosinophilia  -Dermatology consult noted.      Problem/Plan - 8:  ·  Problem: Hypertension.   ·  Plan: Restart home losartan, pressures can tolerate SBP 180s in ED  Cont Carvedilol.     Problem/Plan - 9:  ·  Problem: Hyperlipidemia.   ·  Plan: Continue home statin.     Problem/Plan - 10:  ·  Problem: T2DM (type 2 diabetes mellitus).   ·  Plan; HOLD home glipizide while inpatient   Continue home gabapentin for diabetic neuropathy  KRYSTYNA.     Problem/Plan - 11:  ·  Problem: Cerebrovascular accident (CVA).   ·  Plan: Without residual deficits at this time per daughter and patient although unclear clinical course occurring years ago. Continue to monitor and continue ASA and statin.     Problem/Plan - 12:  ·  Problem: CAD (coronary artery disease).   ·  Plan: Continue GDMT    Full code.     Disposition : DC planning home as family and pt refusing MILLIE. .

## 2022-04-11 NOTE — PROGRESS NOTE ADULT - PROBLEM SELECTOR PROBLEM 10
Hyperlipidemia
Hyperlipidemia
Cerebrovascular accident (CVA)
Hyperlipidemia
Hyperlipidemia
Cerebrovascular accident (CVA)
Hyperlipidemia
Hyperlipidemia
Cerebrovascular accident (CVA)
Hyperlipidemia

## 2022-04-11 NOTE — DISCHARGE NOTE PROVIDER - NSDCMRMEDTOKEN_GEN_ALL_CORE_FT
aspirin 81 mg oral delayed release tablet: 1 tab(s) orally once a day  atorvastatin 20 mg oral tablet: 1 tab(s) orally once a day  carvedilol 12.5 mg oral tablet: 1 tab(s) orally 2 times a day  Coumadin 2 mg oral tablet: 1 tab(s) orally once a day  ferrous sulfate 325 mg (65 mg elemental iron) oral tablet: 1 tab(s) orally once a day  folic acid 1 mg oral tablet: 1 tab(s) orally once a day  furosemide 40 mg oral tablet: 1 tab(s) orally once a day  gabapentin 100 mg oral capsule: 1 cap(s) orally 3 times a day  glipiZIDE 2.5 mg oral tablet, extended release: 1 tab(s) orally once a day  isosorbide dinitrate 10 mg oral tablet: 1 tab(s) orally 2 times a day  losartan 25 mg oral tablet: 1 tab(s) orally once a day   aspirin 81 mg oral delayed release tablet: 1 tab(s) orally once a day  atorvastatin 20 mg oral tablet: 1 tab(s) orally once a day  Coumadin 2 mg oral tablet: 1 tab(s) orally once a day  ferrous sulfate 325 mg (65 mg elemental iron) oral tablet: 1 tab(s) orally once a day  folic acid 1 mg oral tablet: 1 tab(s) orally once a day  furosemide 40 mg oral tablet: 1 tab(s) orally once a day  gabapentin 100 mg oral capsule: 1 cap(s) orally 3 times a day  glipiZIDE 2.5 mg oral tablet, extended release: 1 tab(s) orally once a day  isosorbide dinitrate 10 mg oral tablet: 1 tab(s) orally 2 times a day  loratadine 10 mg oral tablet: 1 tab(s) orally once a day  losartan 25 mg oral tablet: 1 tab(s) orally once a day   aspirin 81 mg oral delayed release tablet: 1 tab(s) orally once a day  atorvastatin 20 mg oral tablet: 1 tab(s) orally once a day  carvedilol 3.125 mg oral tablet: 1 tab(s) orally every 12 hours  ferrous sulfate 325 mg (65 mg elemental iron) oral tablet: 1 tab(s) orally once a day  folic acid 1 mg oral tablet: 1 tab(s) orally once a day  furosemide 40 mg oral tablet: 1 tab(s) orally once a day  gabapentin 100 mg oral capsule: 1 cap(s) orally 3 times a day  glipiZIDE 2.5 mg oral tablet, extended release: 1 tab(s) orally once a day  isosorbide dinitrate 10 mg oral tablet: 1 tab(s) orally 2 times a day  loratadine 10 mg oral tablet: 1 tab(s) orally once a day  losartan 25 mg oral tablet: 1 tab(s) orally once a day  petrolatum topical ointment: 1 application topically every 12 hours  Proventil HFA 90 mcg/inh inhalation aerosol: 2 puff(s) inhaled every 8 hours  as needed for wheezing shortness of breath   warfarin 5 mg oral tablet: 1 tab(s) orally once a day    aspirin 81 mg oral delayed release tablet: 1 tab(s) orally once a day  atorvastatin 20 mg oral tablet: 1 tab(s) orally once a day  carvedilol 3.125 mg oral tablet: 1 tab(s) orally every 12 hours  ferrous sulfate 325 mg (65 mg elemental iron) oral tablet: 1 tab(s) orally once a day  folic acid 1 mg oral tablet: 1 tab(s) orally once a day  furosemide 40 mg oral tablet: 1 tab(s) orally once a day  gabapentin 100 mg oral capsule: 1 cap(s) orally 3 times a day  glipiZIDE 2.5 mg oral tablet, extended release: 1 tab(s) orally once a day  isosorbide dinitrate 10 mg oral tablet: 1 tab(s) orally 2 times a day  loratadine 10 mg oral tablet: 1 tab(s) orally once a day  losartan 25 mg oral tablet: 1 tab(s) orally once a day  petrolatum topical ointment: 1 application topically every 12 hours  Proventil HFA 90 mcg/inh inhalation aerosol: 2 puff(s) inhaled every 8 hours  as needed for wheezing shortness of breath   warfarin 3 mg oral tablet: 1 tab(s) orally once a day

## 2022-04-11 NOTE — DISCHARGE NOTE PROVIDER - NSDCCPCAREPLAN_GEN_ALL_CORE_FT
PRINCIPAL DISCHARGE DIAGNOSIS  Diagnosis: Acute on chronic systolic congestive heart failure  Assessment and Plan of Treatment: IV lasix was given through your IV to help rid your body of excess fluids. Continue oral lasix (water pill) on discharge to prevent fluid overload. Continue low salt diet, fluid restriction to 1500 ml daily, monitor your fluid intake and weight daily, exercise as tolerated 30 minutes daily, and follow up with your cardiologist as scheduled next week.      SECONDARY DISCHARGE DIAGNOSES  Diagnosis: Loculated pleural effusion  Assessment and Plan of Treatment: Fluid was driained from around your lung, pathology was performed which did not show any malignant cells. This fluid accumulation was likely result of heart failure exacerbation. Pleurx catheter was placed and needs to be drained 3 times per week, every M, W, F    Diagnosis: Pulmonary HTN  Assessment and Plan of Treatment: Use Oxygen at home, you will need to follow u with your pulonologist for sleep study if you have not had one already.    Diagnosis: Fever  Assessment and Plan of Treatment: No clear source of infection you were treated emperically with 7 day course antibiotics    Diagnosis: CAD (coronary artery disease)  Assessment and Plan of Treatment: Continue ASA, statin, isordil, and losartan and coreg    Diagnosis: Chronic atrial fibrillation  Assessment and Plan of Treatment: Continue Coumadin 5mg to prevent strokes, follow up with your PCP for INR check in 2 days. Your doctor may adjust coumadin dose at that time    Diagnosis: Cerebrovascular accident (CVA)  Assessment and Plan of Treatment: Continue aspirin and statin    Diagnosis: Eosinophilia  Assessment and Plan of Treatment: Elevated eosinophil count noted on blood work, follow up with your primary care doctor for monitoring for further work up.    Diagnosis: Body rash  Assessment and Plan of Treatment: You were seen by Dermatology. strongyloides, toxocara and filaria serology negative. Rash likely due to very dry skin, recommend liberal moisturization to body and extremities multiple times per day with any emollient. Follow up outpt with Dermatology.     PRINCIPAL DISCHARGE DIAGNOSIS  Diagnosis: Acute on chronic systolic congestive heart failure  Assessment and Plan of Treatment: IV lasix was given through your IV to help rid your body of excess fluids. Continue oral lasix (water pill) on discharge to prevent fluid overload. Continue low salt diet, fluid restriction to 1500 ml daily, monitor your fluid intake and weight daily, exercise as tolerated 30 minutes daily, and follow up with your cardiologist as scheduled next week.      SECONDARY DISCHARGE DIAGNOSES  Diagnosis: Loculated pleural effusion  Assessment and Plan of Treatment: Fluid was driained from around your lung, pathology was performed which did not show any malignant cells. This fluid accumulation was likely result of heart failure exacerbation. Pleurx catheter was placed and needs to be drained 3 times per week, every M, W, F    Diagnosis: Pulmonary HTN  Assessment and Plan of Treatment: Use Oxygen at home, you will need to follow u with your pulonologist for sleep study if you have not had one already.    Diagnosis: Chronic atrial fibrillation  Assessment and Plan of Treatment: Continue Coumadin 5mg to prevent strokes. Please do not take coumadin tonight as your INR is elevated, 3.23. Please take your coumadin tomorrow night.  follow up with your PCP for INR check in 2 days. Your doctor may adjust coumadin dose at that time    Diagnosis: CAD (coronary artery disease)  Assessment and Plan of Treatment: Continue ASA, statin, isordil, and losartan and coreg    Diagnosis: Cerebrovascular accident (CVA)  Assessment and Plan of Treatment: Continue aspirin and statin    Diagnosis: Fever  Assessment and Plan of Treatment: No clear source of infection you were treated emperically with 7 day course antibiotics    Diagnosis: Eosinophilia  Assessment and Plan of Treatment: Elevated eosinophil count noted on blood work, follow up with your primary care doctor for monitoring for further work up.    Diagnosis: Body rash  Assessment and Plan of Treatment: You were seen by Dermatology. strongyloides, toxocara and filaria serology negative. Rash likely due to very dry skin, recommend liberal moisturization to body and extremities multiple times per day with any emollient. Follow up outpt with Dermatology.     PRINCIPAL DISCHARGE DIAGNOSIS  Diagnosis: Acute on chronic systolic congestive heart failure  Assessment and Plan of Treatment: IV lasix was given through your IV to help rid your body of excess fluids. Continue oral lasix (water pill) on discharge to prevent fluid overload. Continue low salt diet, fluid restriction to 1500 ml daily, monitor your fluid intake and weight daily, exercise as tolerated 30 minutes daily, and follow up with your cardiologist as scheduled next week.      SECONDARY DISCHARGE DIAGNOSES  Diagnosis: Loculated pleural effusion  Assessment and Plan of Treatment: Fluid was driained from around your lung, pathology was performed which did not show any malignant cells. This fluid accumulation was likely result of heart failure exacerbation. Pleurx catheter was placed and needs to be drained 3 times per week, every M, W, F    Diagnosis: Pulmonary HTN  Assessment and Plan of Treatment: Use Oxygen at home, you will need to follow u with your pulonologist for sleep study if you have not had one already.    Diagnosis: Chronic atrial fibrillation  Assessment and Plan of Treatment: Continue Coumadin 3 mg to prevent strokes. Please do not take coumadin tonight as your INR is elevated, 3.23. Please take your coumadin tomorrow night.  Follow up with your PCP for INR check in 2 days which is Friday.  Your doctor may adjust coumadin dose at that time    Diagnosis: CAD (coronary artery disease)  Assessment and Plan of Treatment: Continue ASA, statin, isordil, and losartan and coreg    Diagnosis: Cerebrovascular accident (CVA)  Assessment and Plan of Treatment: Continue aspirin and statin    Diagnosis: Fever  Assessment and Plan of Treatment: No clear source of infection you were treated emperically with 7 day course antibiotics    Diagnosis: Eosinophilia  Assessment and Plan of Treatment: Elevated eosinophil count noted on blood work, follow up with your primary care doctor for monitoring for further work up.    Diagnosis: Body rash  Assessment and Plan of Treatment: You were seen by Dermatology. strongyloides, toxocara and filaria serology negative. Rash likely due to very dry skin, recommend liberal moisturization to body and extremities multiple times per day with any emollient. Follow up outpt with Dermatology.

## 2022-04-11 NOTE — PROGRESS NOTE ADULT - ASSESSMENT
80yo Female with mhx of HFpEF ef 65% CAD s/p CABG 2000 LIMA to LAD and SVG to OM1 and subsequent PCI to LCx,in 2011, severe pulm HTN, Bioprosthetic aortic and mitral valve replacement 9/2014, CKD per outside records, afib on warfarin, chronic Lt loculated pleural effusion, Type 2 DM, HTN, HLD, gout c/o 3-4 days of worsening dyspnea on exertion and lower extremity swelling    Tele: Afib 60s    1. Acute decompensated diastolic heart failure  - BB held  in setting bradycardia. continue to monitor on tele now improved coreg resumed  - echo shows normaL AVR  and MVR normal LV function decreased RV function sev pulm HTN    - CT shows large left pleural effusion s/p chest tube/VATS/pleural biopsy   -c/w lasix 40 po daily      2. Rash  - Pt with diffuse rash over body  - Ongoing for a year  - Management as per primary team    3. CAD s/p CABG and PCI  - CABG x2 in 2000 (LIMA to LAD, SVG-OM1)  - PCI in 2011 to LCX  - Last LHC in 2019 revealed patent stent and grafts  - Continue ASA, statin, isordil, and losartan and coreg    3. S/p MVR & AVR  -s/p bioprosthetic valves  -echo with bioprosthetic MVR, AVR, grossly normal LV systolic function, decreased RV function and severe pHTN    4. Afib  -rate in 60s  -coreg resumed  -INR 2.27 c/w coumadin

## 2022-04-11 NOTE — PROGRESS NOTE ADULT - SUBJECTIVE AND OBJECTIVE BOX
Date of Service: 04-11-22 @ 10:53    Patient is a 81y old  Female who presents with a chief complaint of Acute on chronic diastolic heart failure exacerbation (10 Apr 2022 15:15)      Any change in ROS: sheis alert ning wke and is on 2 L of oxygen     MEDICATIONS  (STANDING):  albuterol/ipratropium for Nebulization 3 milliLiter(s) Nebulizer every 6 hours  AQUAPHOR (petrolatum Ointment) 1 Application(s) Topical every 12 hours  aspirin enteric coated 81 milliGRAM(s) Oral daily  carvedilol 3.125 milliGRAM(s) Oral every 12 hours  dextrose 5%. 1000 milliLiter(s) (50 mL/Hr) IV Continuous <Continuous>  dextrose 5%. 1000 milliLiter(s) (100 mL/Hr) IV Continuous <Continuous>  dextrose 50% Injectable 12.5 Gram(s) IV Push once  dornase balta Solution 2.5 milliGRAM(s) Inhalation two times a day  furosemide    Tablet 40 milliGRAM(s) Oral daily  glucagon  Injectable 1 milliGRAM(s) IntraMuscular once  insulin lispro (ADMELOG) corrective regimen sliding scale   SubCutaneous every 6 hours  isosorbide   dinitrate Tablet (ISORDIL) 10 milliGRAM(s) Oral two times a day  loratadine 10 milliGRAM(s) Oral daily  sodium chloride 3%  Inhalation 4 milliLiter(s) Inhalation every 6 hours    MEDICATIONS  (PRN):  acetaminophen   IVPB .. 1000 milliGRAM(s) IV Intermittent once PRN Temp greater or equal to 38C (100.4F), Mild Pain (1 - 3)  acetaminophen   IVPB .. 1000 milliGRAM(s) IV Intermittent once PRN Temp greater or equal to 38C (100.4F), Mild Pain (1 - 3)  dextrose Oral Gel 15 Gram(s) Oral once PRN Blood Glucose LESS THAN 70 milliGRAM(s)/deciliter  diphenhydrAMINE 25 milliGRAM(s) Oral every 6 hours PRN Rash and/or Itching  hydrALAZINE Injectable 5 milliGRAM(s) IV Push every 4 hours PRN SBP>160  triamcinolone 0.1% Ointment 1 Application(s) Topical every 12 hours PRN itching    Vital Signs Last 24 Hrs  T(C): 36.6 (11 Apr 2022 06:00), Max: 37 (10 Apr 2022 13:00)  T(F): 97.8 (11 Apr 2022 06:00), Max: 98.6 (10 Apr 2022 13:00)  HR: 76 (11 Apr 2022 10:17) (65 - 77)  BP: 138/62 (11 Apr 2022 06:00) (138/62 - 162/71)  BP(mean): --  RR: 17 (11 Apr 2022 06:00) (17 - 18)  SpO2: 98% (11 Apr 2022 10:17) (97% - 100%)    I&O's Summary    10 Apr 2022 07:01  -  11 Apr 2022 07:00  --------------------------------------------------------  IN: 0 mL / OUT: 800 mL / NET: -800 mL          Physical Exam:   GENERAL: NAD, well-groomed, well-developed  HEENT: BRADY/   Atraumatic, Normocephalic  ENMT: No tonsillar erythema, exudates, or enlargement; Moist mucous membranes, Good dentition, No lesions  NECK: Supple, No JVD, Normal thyroid  CHEST/LUNG: Clear to auscultaion  CVS: Regular rate and rhythm; No murmurs, rubs, or gallops  GI: : Soft, Nontender, Nondistended; Bowel sounds present  NERVOUS SYSTEM:  Alert & Oriented X3  EXTREMITIES: -edema  LYMPH: No lymphadenopathy noted  SKIN: No rashes or lesions  ENDOCRINOLOGY: No Thyromegaly  PSYCH: Appropriate    Labs:                              9.9    9.74  )-----------( 242      ( 11 Apr 2022 07:57 )             31.1                         9.5    10.33 )-----------( 199      ( 10 Apr 2022 08:24 )             31.0                         10.0   8.91  )-----------( 209      ( 09 Apr 2022 08:03 )             32.6                         10.1   8.46  )-----------( 162      ( 08 Apr 2022 07:04 )             32.7     04-11    142  |  104  |  32<H>  ----------------------------<  77  3.9   |  25  |  1.00  04-10    139  |  101  |  34<H>  ----------------------------<  107<H>  3.7   |  24  |  1.11  04-09    140  |  100  |  39<H>  ----------------------------<  108<H>  3.4<L>   |  26  |  1.26  04-08    140  |  102  |  38<H>  ----------------------------<  86  3.6   |  26  |  1.33<H>    Ca    8.9      11 Apr 2022 07:57  Ca    8.8      10 Apr 2022 08:24  Phos  3.1     04-11  Phos  2.9     04-10  Mg     1.90     04-11  Mg     2.00     04-10    TPro  6.1  /  Alb  2.9<L>  /  TBili  1.2  /  DBili  x   /  AST  24  /  ALT  9   /  AlkPhos  124<H>  04-08    CAPILLARY BLOOD GLUCOSE  83 (11 Apr 2022 08:50)      POCT Blood Glucose.: 83 mg/dL (11 Apr 2022 08:55)  POCT Blood Glucose.: 101 mg/dL (11 Apr 2022 00:40)  POCT Blood Glucose.: 118 mg/dL (10 Apr 2022 21:23)  POCT Blood Glucose.: 122 mg/dL (10 Apr 2022 17:45)  POCT Blood Glucose.: 107 mg/dL (10 Apr 2022 12:36)     (04-05 @ 10:45)      PT/INR - ( 11 Apr 2022 07:57 )   PT: 26.5 sec;   INR: 2.27 ratio         PTT - ( 11 Apr 2022 07:57 )  PTT:34.3 sec    Fluid Source --  Albumin, Fluid--  Glucose, Fluid--  Protein total, Fluid--  Lacatate Dehydrogenase, Fluid--  pH, Fluid--  Cytopathology-Non Gyn Report  ACCESSION No:  [id number]  Patient:   CATRACHITO WOODS      Accession:                             [id number]    Collected Date/Time:                   4/5/2022 10:45 EDT  Received Date/Time:                    4/5/2022 15:12 EDT    Non-Gynecologic Report - Auth (Verified)    Specimen(s) Submitted  PLEURAL FLUID, LEFT    Final Diagnosis  PLEURAL FLUID, LEFT  NEGATIVE FOR MALIGNANT CELLS.    Cytology slides and cell block shows histiocytes and scattered benign  mesothelial cells.      Pleasesee concurrent pathology report:   18-H-  Screened by: Richmond BISWAS(ASCP)  Verified by: Wilmer Carrera MD  (Electronic Signature)  Reported on: 04/06/22 12:13 EDT, [facility], [address] [place] [id number]  Phone: [phone]   Fax: [phone]  Cytology technical processing performed at [address] [place] [id number]  _________________________________________________________________      Clinical Information  Pleural effusion.    Gross Description  Received: 90  ml of yellow fluid in CytoLyt  Prepared: 1 ThinPrep slide, 1 cell block, smear        RECENT CULTURES:  04-05 @ 16:21 .Bronchial left bronchial lavage                Testing in progress    04-05 @ 12:39 .Bronchial left bronchial lavage       No polymorphonuclear leukocytes seen per low power field  No Squamous epithelial cells seen per low power field  No organisms seen per oil power field           No growth    rad< from: Xray Chest 1 View- PORTABLE-Routine (Xray Chest 1 View- PORTABLE-Routine in AM.) (04.08.22 @ 08:05) >  ACC: [id number] EXAM:  XR CHEST PORTABLE ROUTINE 1V                          PROCEDURE DATE:  04/08/2022          INTERPRETATION:  INDICATION: Post-op    COMPARISON: 4/7/22    FINDINGS:  S/P sternotomy and valve repair.  Heart/Vascular: Stable cardiomegaly.  Pulmonary: Interval development of pulmonary vascular congestive changes   with small bilateral pleural effusions - left more than right. No   pneumothorax.  Bones: No acute bony finding.    Impression:  Interval development of pulmonary vascular congestive changes with small   bilateral pleural effusions.        --- End of Report ---      < from: Transthoracic Echocardiogram (03.30.22 @ 09:41) >  hich is probably normal in the setting of a prosthetic  valve.  2. Bioprosthetic aortic valve replacement, which is well  seated with normal leaflet motion. Peak transaortic valve  gradient equals 28 mm Hg, mean transaortic valve gradient  equals 14 mm Hg, which is probably normal in the presence  of a prosthetic valve.  3. Severely dilated left atrium.  LA volume index = 74  cc/m2.  4. Normal left ventricular internal dimensions and wall  thicknesses.  5. Endocardium not well visualized; grossly normal left  ventricular systolic function.  6. Severe right atrial enlargement.  7. Right ventricular enlargement with decreased right  ventricular systolic function.  8. Estimated right ventricular systolic pressure equals 65  mm Hg, assuming right atrial pressure equals 10 mm Hg,  consistent with severe pulmonary hypertension.  9. Normal tricuspid valve.  Severe tricuspid regurgitation.  10. Left pleural effusion.  ------------------------------------------------------------------------  Confirmed on  3/30/2022 - 11:11:07 by Neel Johnson M.D.,    < end of copied text >        SHELDON MULLINS MD; Attending Radiologist  This document has been electronically signed. Apr 9 2022  8:59AM    < end of copied text >        RESPIRATORY CULTURES:          Studies  Chest X-RAY  CT SCAN Chest   Venous Dopplers: LE:   CT Abdomen  Others

## 2022-04-12 LAB
ANION GAP SERPL CALC-SCNC: 15 MMOL/L — HIGH (ref 7–14)
BUN SERPL-MCNC: 28 MG/DL — HIGH (ref 7–23)
CALCIUM SERPL-MCNC: 8.9 MG/DL — SIGNIFICANT CHANGE UP (ref 8.4–10.5)
CHLORIDE SERPL-SCNC: 102 MMOL/L — SIGNIFICANT CHANGE UP (ref 98–107)
CO2 SERPL-SCNC: 24 MMOL/L — SIGNIFICANT CHANGE UP (ref 22–31)
CREAT SERPL-MCNC: 0.89 MG/DL — SIGNIFICANT CHANGE UP (ref 0.5–1.3)
EGFR: 65 ML/MIN/1.73M2 — SIGNIFICANT CHANGE UP
GLUCOSE BLDC GLUCOMTR-MCNC: 104 MG/DL — HIGH (ref 70–99)
GLUCOSE BLDC GLUCOMTR-MCNC: 117 MG/DL — HIGH (ref 70–99)
GLUCOSE BLDC GLUCOMTR-MCNC: 118 MG/DL — HIGH (ref 70–99)
GLUCOSE BLDC GLUCOMTR-MCNC: 138 MG/DL — HIGH (ref 70–99)
GLUCOSE BLDC GLUCOMTR-MCNC: 140 MG/DL — HIGH (ref 70–99)
GLUCOSE BLDC GLUCOMTR-MCNC: 96 MG/DL — SIGNIFICANT CHANGE UP (ref 70–99)
GLUCOSE SERPL-MCNC: 90 MG/DL — SIGNIFICANT CHANGE UP (ref 70–99)
HCT VFR BLD CALC: 34.2 % — LOW (ref 34.5–45)
HGB BLD-MCNC: 10.5 G/DL — LOW (ref 11.5–15.5)
INR BLD: 2.19 RATIO — HIGH (ref 0.88–1.16)
MAGNESIUM SERPL-MCNC: 1.9 MG/DL — SIGNIFICANT CHANGE UP (ref 1.6–2.6)
MCHC RBC-ENTMCNC: 27.8 PG — SIGNIFICANT CHANGE UP (ref 27–34)
MCHC RBC-ENTMCNC: 30.7 GM/DL — LOW (ref 32–36)
MCV RBC AUTO: 90.5 FL — SIGNIFICANT CHANGE UP (ref 80–100)
NRBC # BLD: 0 /100 WBCS — SIGNIFICANT CHANGE UP
NRBC # FLD: 0 K/UL — SIGNIFICANT CHANGE UP
PHOSPHATE SERPL-MCNC: 2.7 MG/DL — SIGNIFICANT CHANGE UP (ref 2.5–4.5)
PLATELET # BLD AUTO: 270 K/UL — SIGNIFICANT CHANGE UP (ref 150–400)
POTASSIUM SERPL-MCNC: 3.6 MMOL/L — SIGNIFICANT CHANGE UP (ref 3.5–5.3)
POTASSIUM SERPL-SCNC: 3.6 MMOL/L — SIGNIFICANT CHANGE UP (ref 3.5–5.3)
PROTHROM AB SERPL-ACNC: 25.6 SEC — HIGH (ref 10.5–13.4)
RBC # BLD: 3.78 M/UL — LOW (ref 3.8–5.2)
RBC # FLD: 15.5 % — HIGH (ref 10.3–14.5)
SODIUM SERPL-SCNC: 141 MMOL/L — SIGNIFICANT CHANGE UP (ref 135–145)
SURGICAL PATHOLOGY STUDY: SIGNIFICANT CHANGE UP
WBC # BLD: 10.39 K/UL — SIGNIFICANT CHANGE UP (ref 3.8–10.5)
WBC # FLD AUTO: 10.39 K/UL — SIGNIFICANT CHANGE UP (ref 3.8–10.5)

## 2022-04-12 PROCEDURE — 99231 SBSQ HOSP IP/OBS SF/LOW 25: CPT

## 2022-04-12 RX ORDER — CARVEDILOL PHOSPHATE 80 MG/1
1 CAPSULE, EXTENDED RELEASE ORAL
Qty: 0 | Refills: 0 | DISCHARGE

## 2022-04-12 RX ORDER — ALBUTEROL 90 UG/1
2 AEROSOL, METERED ORAL
Qty: 1 | Refills: 0
Start: 2022-04-12 | End: 2022-05-11

## 2022-04-12 RX ORDER — INSULIN LISPRO 100/ML
VIAL (ML) SUBCUTANEOUS
Refills: 0 | Status: DISCONTINUED | OUTPATIENT
Start: 2022-04-12 | End: 2022-04-13

## 2022-04-12 RX ORDER — WARFARIN SODIUM 2.5 MG/1
1 TABLET ORAL
Qty: 30 | Refills: 0
Start: 2022-04-12 | End: 2022-05-11

## 2022-04-12 RX ORDER — CARVEDILOL PHOSPHATE 80 MG/1
1 CAPSULE, EXTENDED RELEASE ORAL
Qty: 60 | Refills: 0
Start: 2022-04-12 | End: 2022-05-11

## 2022-04-12 RX ORDER — ACETAMINOPHEN 500 MG
975 TABLET ORAL ONCE
Refills: 0 | Status: COMPLETED | OUTPATIENT
Start: 2022-04-12 | End: 2022-04-12

## 2022-04-12 RX ORDER — WARFARIN SODIUM 2.5 MG/1
5 TABLET ORAL ONCE
Refills: 0 | Status: COMPLETED | OUTPATIENT
Start: 2022-04-12 | End: 2022-04-12

## 2022-04-12 RX ORDER — PETROLATUM,WHITE
1 JELLY (GRAM) TOPICAL
Qty: 0 | Refills: 0 | DISCHARGE
Start: 2022-04-12

## 2022-04-12 RX ORDER — INSULIN LISPRO 100/ML
VIAL (ML) SUBCUTANEOUS AT BEDTIME
Refills: 0 | Status: DISCONTINUED | OUTPATIENT
Start: 2022-04-12 | End: 2022-04-13

## 2022-04-12 RX ORDER — LORATADINE 10 MG/1
1 TABLET ORAL
Qty: 0 | Refills: 0 | DISCHARGE
Start: 2022-04-12

## 2022-04-12 RX ORDER — WARFARIN SODIUM 2.5 MG/1
1 TABLET ORAL
Qty: 0 | Refills: 0 | DISCHARGE

## 2022-04-12 RX ORDER — LOSARTAN POTASSIUM 100 MG/1
25 TABLET, FILM COATED ORAL DAILY
Refills: 0 | Status: DISCONTINUED | OUTPATIENT
Start: 2022-04-12 | End: 2022-04-13

## 2022-04-12 RX ADMIN — ISOSORBIDE DINITRATE 10 MILLIGRAM(S): 5 TABLET ORAL at 18:25

## 2022-04-12 RX ADMIN — WARFARIN SODIUM 5 MILLIGRAM(S): 2.5 TABLET ORAL at 18:23

## 2022-04-12 RX ADMIN — CARVEDILOL PHOSPHATE 3.12 MILLIGRAM(S): 80 CAPSULE, EXTENDED RELEASE ORAL at 06:38

## 2022-04-12 RX ADMIN — LORATADINE 10 MILLIGRAM(S): 10 TABLET ORAL at 12:15

## 2022-04-12 RX ADMIN — Medication 3 MILLILITER(S): at 10:51

## 2022-04-12 RX ADMIN — Medication 40 MILLIGRAM(S): at 09:27

## 2022-04-12 RX ADMIN — LOSARTAN POTASSIUM 25 MILLIGRAM(S): 100 TABLET, FILM COATED ORAL at 18:25

## 2022-04-12 RX ADMIN — DORNASE ALFA 2.5 MILLIGRAM(S): 1 SOLUTION RESPIRATORY (INHALATION) at 16:39

## 2022-04-12 RX ADMIN — Medication 81 MILLIGRAM(S): at 12:15

## 2022-04-12 RX ADMIN — DORNASE ALFA 2.5 MILLIGRAM(S): 1 SOLUTION RESPIRATORY (INHALATION) at 21:51

## 2022-04-12 RX ADMIN — Medication 1 APPLICATION(S): at 06:38

## 2022-04-12 RX ADMIN — ISOSORBIDE DINITRATE 10 MILLIGRAM(S): 5 TABLET ORAL at 06:38

## 2022-04-12 RX ADMIN — Medication 5 MILLIGRAM(S): at 06:11

## 2022-04-12 RX ADMIN — Medication 3 MILLILITER(S): at 03:54

## 2022-04-12 RX ADMIN — Medication 3 MILLILITER(S): at 21:52

## 2022-04-12 RX ADMIN — Medication 3 MILLILITER(S): at 16:39

## 2022-04-12 RX ADMIN — Medication 975 MILLIGRAM(S): at 06:11

## 2022-04-12 RX ADMIN — Medication 1 APPLICATION(S): at 18:22

## 2022-04-12 RX ADMIN — CARVEDILOL PHOSPHATE 3.12 MILLIGRAM(S): 80 CAPSULE, EXTENDED RELEASE ORAL at 18:25

## 2022-04-12 RX ADMIN — SODIUM CHLORIDE 4 MILLILITER(S): 9 INJECTION INTRAMUSCULAR; INTRAVENOUS; SUBCUTANEOUS at 03:54

## 2022-04-12 NOTE — PROGRESS NOTE ADULT - SUBJECTIVE AND OBJECTIVE BOX
Date of Service: 04-12-22 @ 12:09    Patient is a 81y old  Female who presents with a chief complaint of Acute on chronic diastolic heart failure exacerbation (11 Apr 2022 13:24)      Any change in ROS: Doing pretty good:  no SOB:       MEDICATIONS  (STANDING):  albuterol/ipratropium for Nebulization 3 milliLiter(s) Nebulizer every 6 hours  AQUAPHOR (petrolatum Ointment) 1 Application(s) Topical every 12 hours  aspirin enteric coated 81 milliGRAM(s) Oral daily  carvedilol 3.125 milliGRAM(s) Oral every 12 hours  dextrose 5%. 1000 milliLiter(s) (50 mL/Hr) IV Continuous <Continuous>  dextrose 5%. 1000 milliLiter(s) (100 mL/Hr) IV Continuous <Continuous>  dextrose 50% Injectable 12.5 Gram(s) IV Push once  dornase balta Solution 2.5 milliGRAM(s) Inhalation two times a day  furosemide    Tablet 40 milliGRAM(s) Oral daily  glucagon  Injectable 1 milliGRAM(s) IntraMuscular once  insulin lispro (ADMELOG) corrective regimen sliding scale   SubCutaneous every 6 hours  isosorbide   dinitrate Tablet (ISORDIL) 10 milliGRAM(s) Oral two times a day  loratadine 10 milliGRAM(s) Oral daily    MEDICATIONS  (PRN):  acetaminophen   IVPB .. 1000 milliGRAM(s) IV Intermittent once PRN Temp greater or equal to 38C (100.4F), Mild Pain (1 - 3)  acetaminophen   IVPB .. 1000 milliGRAM(s) IV Intermittent once PRN Temp greater or equal to 38C (100.4F), Mild Pain (1 - 3)  dextrose Oral Gel 15 Gram(s) Oral once PRN Blood Glucose LESS THAN 70 milliGRAM(s)/deciliter  diphenhydrAMINE 25 milliGRAM(s) Oral every 6 hours PRN Rash and/or Itching  hydrALAZINE Injectable 5 milliGRAM(s) IV Push every 4 hours PRN SBP>160  triamcinolone 0.1% Ointment 1 Application(s) Topical every 12 hours PRN itching    Vital Signs Last 24 Hrs  T(C): 36.8 (12 Apr 2022 09:20), Max: 36.8 (11 Apr 2022 17:44)  T(F): 98.2 (12 Apr 2022 09:20), Max: 98.3 (12 Apr 2022 06:10)  HR: 68 (12 Apr 2022 09:20) (66 - 76)  BP: 129/52 (12 Apr 2022 09:20) (129/52 - 166/70)  BP(mean): --  RR: 18 (12 Apr 2022 11:59) (17 - 18)  SpO2: 95% (12 Apr 2022 11:59) (85% - 98%)    I&O's Summary    11 Apr 2022 07:01  -  12 Apr 2022 07:00  --------------------------------------------------------  IN: 0 mL / OUT: 500 mL / NET: -500 mL          Physical Exam:   GENERAL: NAD, well-groomed, well-developed  HEENT: BRADY/   Atraumatic, Normocephalic  ENMT: No tonsillar erythema, exudates, or enlargement; Moist mucous membranes, Good dentition, No lesions  NECK: Supple, No JVD, Normal thyroid  CHEST/LUNG: Clear to auscultaion-  CVS: Regular rate and rhythm; No murmurs, rubs, or gallops  GI: : Soft, Nontender, Nondistended; Bowel sounds present  NERVOUS SYSTEM:  Alert & Oriented X3  EXTREMITIES:  -edema  LYMPH: No lymphadenopathy noted  SKIN: No rashes or lesions  ENDOCRINOLOGY: No Thyromegaly  PSYCH: Appropriate    Labs:                              10.5   10.39 )-----------( 270      ( 12 Apr 2022 07:57 )             34.2                         9.9    9.74  )-----------( 242      ( 11 Apr 2022 07:57 )             31.1                         9.5    10.33 )-----------( 199      ( 10 Apr 2022 08:24 )             31.0                         10.0   8.91  )-----------( 209      ( 09 Apr 2022 08:03 )             32.6     04-12    141  |  102  |  28<H>  ----------------------------<  90  3.6   |  24  |  0.89  04-11    142  |  104  |  32<H>  ----------------------------<  77  3.9   |  25  |  1.00  04-10    139  |  101  |  34<H>  ----------------------------<  107<H>  3.7   |  24  |  1.11  04-09    140  |  100  |  39<H>  ----------------------------<  108<H>  3.4<L>   |  26  |  1.26    Ca    8.9      12 Apr 2022 07:57  Ca    8.9      11 Apr 2022 07:57  Phos  2.7     04-12  Phos  3.1     04-11  Mg     1.90     04-12  Mg     1.90     04-11      CAPILLARY BLOOD GLUCOSE  114 (12 Apr 2022 00:09)      POCT Blood Glucose.: 104 mg/dL (12 Apr 2022 08:37)  POCT Blood Glucose.: 96 mg/dL (12 Apr 2022 05:53)  POCT Blood Glucose.: 135 mg/dL (11 Apr 2022 17:52)  POCT Blood Glucose.: 114 mg/dL (11 Apr 2022 12:41)     (04-05 @ 10:45)      PT/INR - ( 12 Apr 2022 07:57 )   PT: 25.6 sec;   INR: 2.19 ratio         PTT - ( 11 Apr 2022 07:57 )  PTT:34.3 sec          RECENT CULTURES:  04-05 @ 16:21 .Bronchial left bronchial lavage                Testing in progress    04-05 @ 12:39 .Bronchial left bronchial lavage       No polymorphonuclear leukocytes seen per low power field  No Squamous epithelial cells seen per low power field  No organisms seen per oil power field           No growth          RESPIRATORY CULTURES:    ra< from: Xray Chest 1 View- PORTABLE-Routine (Xray Chest 1 View- PORTABLE-Routine in AM.) (04.08.22 @ 08:05) >      INTERPRETATION:  INDICATION: Post-op    COMPARISON: 4/7/22    FINDINGS:  S/P sternotomy and valve repair.  Heart/Vascular: Stable cardiomegaly.  Pulmonary: Interval development of pulmonary vascular congestive changes   with small bilateral pleural effusions - left more than right. No   pneumothorax.  Bones: No acute bony finding.    Impression:  Interval development of pulmonary vascular congestive changes with small   bilateral pleural effusions.        --- End of Report ---            SHELDON MULLINS MD; Attending Radiologist  This document has been electronically signed. Apr 9 2022  8:59AM    < end of copied text >        Studies  Chest X-RAY  CT SCAN Chest   Venous Dopplers: LE:   CT Abdomen  Others

## 2022-04-12 NOTE — PROGRESS NOTE ADULT - ASSESSMENT
82yo Female with mhx of HFpEF ef 65% CAD (CABG 2000, stent 2011), severe pulm HTN, Bioprosthetic aortic and mitral valve replacement 9/2014, ?CKD per outside records, afib on warfarin, chronic Lt loculated pleural effusion,  Type 2 DM, HTN, HLD, gout a/w acute on chronic HFpEF i/s/p severe pHTN c/b hypercapnia; Found to have persisting moderate size loculated lt pleural effusion with atelectasis;         Problem/Plan - 1:  ·  Problem: Acute on chronic diastolic heart failure .   ·  Plan: Acute on chronic HFpEF likely in setting of decreased diuretic dose and severe pulm HTN as evidenced by prior TTE dated 10/10/2019;  Volume overloaded on exam with JVD and peripheral nonpitting edema to thighs  -S/P  Lasix 40 IV BID changed to PO.   -Cardiology help appreciated.   < from: Transthoracic Echocardiogram (03.30.22 @ 09:41) >  CONCLUSIONS:  1. Bioprosthetic mitral valve replacement. Minimal mitral  regurgitation. Mean transmitral valve gradient equals 5 mm  Hg, which is probably normal in the setting of a prosthetic  valve.  2. Bioprosthetic aortic valve replacement, which is well  seated with normal leaflet motion. Peak transaortic valve  gradient equals 28 mm Hg, mean transaortic valve gradient  equals 14 mm Hg, which is probably normal in the presence  of a prosthetic valve.  3. Severely dilated left atrium.  LA volume index = 74  cc/m2.  4. Normal left ventricular internal dimensions and wall  thicknesses.  5. Endocardium not well visualized; grossly normal left  ventricular systolic function.  6. Severe right atrial enlargement.  7. Right ventricular enlargement with decreased right  ventricular systolic function.  8. Estimated right ventricular systolic pressure equals 65  mm Hg, assuming right atrial pressure equals 10 mm Hg,  consistent with severe pulmonary hypertension.  9. Normal tricuspid valve.  Severe tricuspid regurgitation.  10. Left pleural effusion.    < end of copied text >       Problem/Plan - 2:  ·  Problem: Fever .   ·  Plan: Sepsis work up in progress.  IV Abxs started .  D helping.      Problem/Plan - 3:  ·  Problem: Loculated pleural effusion with Hypercapnia with Collapse left side .   ·  Plan: Persisting loculated effusion so Pulmonary helping.   S/P Flexible Bronchoscopy, Left VATS, Pleural Biopsy, Drainage of Pleural Effusion, PleurX Catheter Placement  < from: CT Chest No Cont (03.27.22 @ 20:25) >  IMPRESSION:    Small left pleural effusion with a loculated component in the left major   fissure and along left anterior chest wall. The loculated components are   new when compared to prior CT exam dated 10/13/2019. Associated partial   left lower lobe and left upper lobe collapse.    New pleural thickening and/or loculated fluid medially along the left   upper hemithorax.    Small right pleural effusion and adjacent passive atelectasis.    Stable 9 mm right apical nodule.    < end of copied text >     Problem/Plan - 4:  ·  Problem: Chronic atrial fibrillation.   ·  Plan: [id number] risk stratification = 7  Continue home coreg 12.5mg BID with hold parameters  ON Coumadin with Therapeutic INR.      Problem/Plan - 5:  ·  Problem: Pulmonary HTN.   ·  Plan: Severe pulm HTN likely class II  Repeat TTE this admission.     Problem/Plan - 6:  ·  Problem: LAYLA Thrombus .  ·  Plan: On AC.      Problem/Plan - 7:  ·  Problem: Papular rash, generalized with Eosinophilia .  ·  Plan: Generalized papular rash of 1y duration previously attributed to hydralazine which was discontinued without improvement of rash  With chronic eosinophilia noted per chart  -Full medication review may be beneficial of meds possibly causing eosinophilia  -Dermatology consult noted.      Problem/Plan - 8:  ·  Problem: Hypertension.   ·  Plan: Restart home losartan, pressures can tolerate SBP 180s in ED  Cont Carvedilol.     Problem/Plan - 9:  ·  Problem: Hyperlipidemia.   ·  Plan: Continue home statin.     Problem/Plan - 10:  ·  Problem: T2DM (type 2 diabetes mellitus).   ·  Plan; HOLD home glipizide while inpatient   Continue home gabapentin for diabetic neuropathy  KRYSTYNA.     Problem/Plan - 11:  ·  Problem: Cerebrovascular accident (CVA).   ·  Plan: Without residual deficits at this time per daughter and patient although unclear clinical course occurring years ago. Continue to monitor and continue ASA and statin.     Problem/Plan - 12:  ·  Problem: CAD (coronary artery disease).   ·  Plan: Continue GDMT     Problem/Plan - 13:  ·  Problem: Acute respiratory failure with Hypoxia.    ·  Plan: On 2 L NC now so arranging home oxygen.     Disposition : DC planning home as family and pt refusing MILLIE. .

## 2022-04-12 NOTE — PROGRESS NOTE ADULT - PROBLEM SELECTOR PLAN 1
doing OK" no SOB: no cough  : no phlegm :  on iv lasix    4/3: her ct chest uis noted: I think her major issue is large effusion onleft side: once that is drained, the mucus plug will : cont chest pt : to which she doesnto cooperate: she looks comfortable though: thoracic surgery to see today : hold coumadin: INR to drop < 1.5 to drain the fluid    4/4: pt clinically looks OK: no SOB: INR awaited to come down: seen by ct surgery : med the fluid once the INR is decreased: I think her lung would expand after draining fluid    4/5: s/p vats for l effusion and mucus plugging: now intubated and going to MICU : she was on AC    4/6: s/p vats: still intubated and sedated: chest xray noted:    4/7: extubated: on zosyn: no SOB: no fever: monitor for hemoptysis: cyto is neg: on pl fluid: no AFB on bronh    4/8: doing OK: no SOB: no cough: to start coumadin today
seema pretty good:  no SOB:  on 2 L of oxygen    on lasix 40 mg per day
doing OK" no SOB: no cough  : no phlegm :  on iv lasix    4/3: her ct chest uis noted: I think her major issue is large effusion onleft side: once that is drained, the mucus plug will : cont chest pt : to which she doesnto cooperate: she looks comfortable though: thoracic surgery to see today : hold coumadin: INR to drop < 1.5 to drain the fluid    4/4: pt clinically looks OK: no SOB: INR awaited to come down: seen by ct surgery : med the fluid once the INR is decreased: I think her lung would expand after draining fluid    4/5: s/p vats for l effusion and mucus plugging: now intubated and going to MICU : she was on AC    4/6: s/p vats: still intubated and sedated: chest xray noted:    4/7: extubated: on zosyn: no SOB: no fever: monitor for hemoptysis: cyto is neg: on pl fluid: no AFB on bronh    4/8: doing OK: no SOB: no cough: to start coumadin today    4/9: seems to be doing well: no issues overnight:  cont to ac:
doing OK" no SOB: no cough  : no phlegm :  on iv lasix    4/3: her ct chest uis noted: I think her major issue is large effusion onleft side: once that is drained, the mucus plug will : cont chest pt : to which she doesnto cooperate: she looks comfortable though: thoracic surgery to see today : hold coumadin: INR to drop < 1.5 to drain the fluid    4/4: pt clinically looks OK: no SOB: INR awaited to come down: seen by ct surgery : med the fluid once the INR is decreased: I think her lung would expand after draining fluid    4/5: s/p vats for l effusion and mucus plugging: now intubated and going to MICU : she was on AC    4/6: s/p vats: still intubated and sedated: chest xray noted:    4/7: extubated: on zosyn: no SOB: no fever: monitro for hemoptysis: cyto is neg: on pl fluid: no AFB on bronh
doing OK" no SOB: no cough  : no phlegm :  on iv lasix    4/3: her ct chest uis noted: I think her major issue is large effusion onleft side: once that is drained, the mucus plug will : cont chest pt : to which she doesnto cooperate: she looks comfortable though: thoracic surgery to see today : hold coumadin: INR to drop < 1.5 to drain the fluid    4/4: pt clinically looks OK: no SOB: INR awaited to come down: seen by ct surgery : med the fluid once the INR is decreased: I think her lung would expand after draining fluid    4/5: s/p vats for l effusion and mucus plugging: now intubated and going to MICU : she was on AC    4/6: s/p vats: still intubated and sedated: chest xray noted:
doing OK" no SOB: no cough  : no phlegm :  on iv lasix    4/3: her ct chest uis noted: I think her major issue is large effusion onleft side: once that is drained, the mucus plug will : cont chest pt : to which she doesnto cooperate: she looks comfortable though: thoracic surgery to see today : hold coumadin: INR to drop < 1.5 to drain the fluid    4/4: pt clinically looks OK: no SOB: INR awaited to come down: seen by ct surgery : med the fluid once the INR is decreased: I think her lung would expand after draining fluid    4/5: s/p vats for l effusion and mucus plugging: now intubated and going to MICU : she was on AC
doing OK" no SOB: no cough  : no phlegm :  on iv lasix    4/3: her ct chest uis noted: I think her major issue is large effusion onleft side: once that is drained, the mucus plug will : cont chest pt : to which she doesnto cooperate: she looks comfortable though: thoracic surgery to see today : hold coumadin: INR to drop < 1.5 to drain the fluid    4/4: pt clinically looks OK: no SOB: INR awaited to come down: seen by ct surgery : med the fluid once the INR is decreased: I think her lung would expand after draining fluid    4/5: s/p vats for l effusion and mucus plugging: now intubated and going to MICU : she was on AC    4/6: s/p vats: still intubated and sedated: chest xray noted:    4/7: extubated: on zosyn: no SOB: no fever: monitor for hemoptysis: cyto is neg: on pl fluid: no AFB on bronh    4/8: doing OK: no SOB: no cough: to start coumadin today    4/9: seems to be doing well: no issues overnight:  cont to ac:    4/10: she feel better then yesterday : no resp events overnight: on 2 L of oxygen: cont coumadin
doing OK" no SOB: no cough  : no phlegm :  on iv lasix    4/3: her ct chest uis noted: I think her major issue is large effusion onleft side: once that is drained, the mucus plug will : cont chest pt : to which she doesnto cooperate: she looks comfortable though: thoracic surgery to see today : hold coumadin: INR to drop < 1.5 to drain the fluid
4/4: pt clinically looks OK: no SOB: INR awaited to come down: seen by ct surgery : med the fluid once the INR is decreased: I think her lung would expand after draining fluid    4/5: s/p vats for l effusion and mucus plugging: now intubated and going to MICU : she was on AC    4/6: s/p vats: still intubated and sedated: chest xray noted:    4/7: extubated: on zosyn: no SOB: no fever: monitor for hemoptysis: cyto is neg: on pl fluid: no AFB on bronh    4/8: doing OK: no SOB: no cough: to start coumadin today    4/9: seems to be doing well: no issues overnight:  cont to ac:    4/10: she feel better then yesterday : no resp events overnight: on 2 L of oxygen: cont coumadin  4/11: seems OK; no SOB: no cough : on 2 L of oxygen: no overnight events: Her chest radiograph is showing PVC;s: add lasix?
doing OK" no SOB: no cough  : no phlegm :  on iv lasix    4/3: her ct chest uis noted: I think her major issue is large effusion onleft side: once that is drained, the mucus plug will : cont chest pt : to which she doesnto cooperate: she looks comfortable though: thoracic surgery to see today : hold coumadin: INR to drop < 1.5 to drain the fluid    4/4: pt clinically looks OK: no SOB: INR awaited to come down: seen by ct surgery : med the fluid once the INR is decreased: I think her lung would expand after draining fluid
doing OK" no SOB: no cough  : no phlegm :  on iv lasix

## 2022-04-12 NOTE — PROGRESS NOTE ADULT - PROBLEM SELECTOR PROBLEM 4
Loculated pleural effusion

## 2022-04-12 NOTE — CHART NOTE - NSCHARTNOTEFT_GEN_A_CORE
Patient has a medical history of coronary artery disease, severe pulmonary hypertension as well as both aortic and mitral valve replacements. Patient now has congestive heart failure. As a result of both acute and chronic illness patient demonstrates decreased endurance, profound weakness, decreased mobility and deconditioning. Patient would medically benefit from a semi-elevated hospital bed that would allow for head of bed elevation 30 degrees to prevent aspiration well as maximal chest expansion to assist with breathing. A hospital bed would also allow for assistance with bed mobility abd OOB to chair activities. Recommend hospital bed with gel overlay to prevent decubitus formation.

## 2022-04-12 NOTE — PROGRESS NOTE ADULT - PROBLEM SELECTOR PLAN 8
Controlled    4/3: controlled  4/4: controlled  4/5: on propofol:  4/6 on vasopressors:  4/7: stable off pressors!
per PMD
monitor and control
per PMD
derm pending
Controlled    4/3: controlled  4/4: controlled  4/5: on propofol:  4/6 on vasopressors:  4/7: stable off pressors!  4/10 controlled
Controlled
derm pending

## 2022-04-12 NOTE — PROGRESS NOTE ADULT - ASSESSMENT
80yo Female with mhx of HFpEF ef 65% CAD s/p CABG 2000 LIMA to LAD and SVG to OM1 and subsequent PCI to LCx,in 2011, severe pulm HTN, Bioprosthetic aortic and mitral valve replacement 9/2014, CKD per outside records, afib on warfarin, chronic Lt loculated pleural effusion, Type 2 DM, HTN, HLD, gout c/o 3-4 days of worsening dyspnea on exertion and lower extremity swelling    Tele: Afib 60s    1. Acute decompensated diastolic heart failure  - BB held  in setting bradycardia. continue to monitor on tele now improved coreg resumed  - echo shows normaL AVR  and MVR normal LV function decreased RV function sev pulm HTN    - CT shows large left pleural effusion s/p chest tube/VATS/pleural biopsy   -c/w lasix 40 po daily    2. Rash  - Pt with diffuse rash over body  - Ongoing for a year  - Management as per primary team    3. CAD s/p CABG and PCI  - CABG x2 in 2000 (LIMA to LAD, SVG-OM1)  - PCI in 2011 to LCX  - Last LHC in 2019 revealed patent stent and grafts  - Continue ASA, statin, isordil, and losartan and coreg    3. S/p MVR & AVR  -s/p bioprosthetic valves  -echo with bioprosthetic MVR, AVR, grossly normal LV systolic function, decreased RV function and severe pHTN    4. Afib  -rate in 60s  -coreg resumed  -INR 2.19 c/w coumadin

## 2022-04-12 NOTE — PROGRESS NOTE ADULT - PROBLEM SELECTOR PLAN 3
resolved : has some chr retention ofpco2:
her hypercapnia has resolved:    4/5: now intubated: going to MICU    4/6: intubated and sedated    4/7: the ABG after extubation seems pretty good!  4/9: resolved: she is alert and awake and responding to questions pretty well!  4/10 resolved
her hypercapnia has resolved:    4/5: now intubated: going to MICU    4/6: intubated and sedated    4/7: the ABG fater extubation seems pretty good!
her hypercapnia has resolved:
her hypercapnia has resolved:    4/5: now intubated: going to MICU    4/6: intubated and sedated    4/7: the ABG after extubation seems pretty good!  4/9: resolved: she is alert and awake and responding to questions pretty well!  4/10 resolved
her hypercapnia has resolved:    4/5: now intubated: going to MICU    4/6: intubated and sedated    4/7: the ABG after extubation seems pretty good!
her hypercapnia has resolved:
her hypercapnia has resolved:    4/5: now intubated: going to MICU
her hypercapnia has resolved:    4/5: now intubated: going to MICU    4/6: intubated and sedated
her hypercapnia has resolved:    4/5: now intubated: going to MICU    4/6: intubated and sedated    4/7: the ABG after extubation seems pretty good!  4/9: resolved: she is alert and awake and responding to questions pretty well!
her hypercapnia has resolved:

## 2022-04-12 NOTE — PROGRESS NOTE ADULT - NSPROGADDITIONALINFOA_GEN_ALL_CORE

## 2022-04-12 NOTE — PROGRESS NOTE ADULT - PROBLEM SELECTOR PLAN 5
AC per primary team    4/3: hold coumadin    4/4: hold ac    4/5: AC on hold:    4/6: off coundin
AC per primary team    4/3: hold coumadin    4/4: hold ac    4/5: AC on hold:    4/6: off coundin    4/7: acper primary cards team
on coumadin: INR is therapeutic:
AC per primary team    4/3: hold coumadin    4/4: hold ac    4/5: AC on hold:    4/6: off coumadin    4/7: acper primary cards team
AC per primary team    4/3: hold coumadin    4/4: hold ac    4/5: AC on hold:
AC per primary team    4/3: hold coumadin    4/4: hold ac    4/5: AC on hold:    4/6: off coumadin    4/7: acper primary cards team  4/9: ac started again after the surgery
AC per primary team    4/3: hold coumadin    4/4: hold ac    4/5: AC on hold:    4/6: off coumadin    4/7: acper primary cards team  4/9: ac started again after the surgery
99
AC per primary team    4/3: hold coumadin    4/4: hold ac    4/5: AC on hold:    4/6: off coumadin    4/7: acper primary cards team  4/9: ac started again after the surgery
AC per primary team    4/3: hold coumadin
AC per primary team    4/3: hold coumadin    4/4: hold ac
AC per primary team

## 2022-04-12 NOTE — PROGRESS NOTE ADULT - PROBLEM SELECTOR PROBLEM 3
Hypercapnia

## 2022-04-12 NOTE — PROGRESS NOTE ADULT - SUBJECTIVE AND OBJECTIVE BOX
Follow Up:  fever, lung collapse    Interval History/ROS, JATIN: pt doing well, afebrile, normal WBC and path was negative for malignant cells            Allergies  No Known Allergies        ANTIMICROBIALS:      OTHER MEDS:  acetaminophen   IVPB .. 1000 milliGRAM(s) IV Intermittent once PRN  acetaminophen   IVPB .. 1000 milliGRAM(s) IV Intermittent once PRN  albuterol/ipratropium for Nebulization 3 milliLiter(s) Nebulizer every 6 hours  AQUAPHOR (petrolatum Ointment) 1 Application(s) Topical every 12 hours  aspirin enteric coated 81 milliGRAM(s) Oral daily  carvedilol 3.125 milliGRAM(s) Oral every 12 hours  dextrose 5%. 1000 milliLiter(s) IV Continuous <Continuous>  dextrose 5%. 1000 milliLiter(s) IV Continuous <Continuous>  dextrose 50% Injectable 12.5 Gram(s) IV Push once  dextrose Oral Gel 15 Gram(s) Oral once PRN  diphenhydrAMINE 25 milliGRAM(s) Oral every 6 hours PRN  dornase balta Solution 2.5 milliGRAM(s) Inhalation two times a day  furosemide    Tablet 40 milliGRAM(s) Oral daily  glucagon  Injectable 1 milliGRAM(s) IntraMuscular once  hydrALAZINE Injectable 5 milliGRAM(s) IV Push every 4 hours PRN  insulin lispro (ADMELOG) corrective regimen sliding scale   SubCutaneous every 6 hours  isosorbide   dinitrate Tablet (ISORDIL) 10 milliGRAM(s) Oral two times a day  loratadine 10 milliGRAM(s) Oral daily  triamcinolone 0.1% Ointment 1 Application(s) Topical every 12 hours PRN      Vital Signs Last 24 Hrs  T(C): 36.8 (12 Apr 2022 09:20), Max: 36.8 (11 Apr 2022 17:44)  T(F): 98.2 (12 Apr 2022 09:20), Max: 98.3 (12 Apr 2022 06:10)  HR: 68 (12 Apr 2022 09:20) (66 - 76)  BP: 129/52 (12 Apr 2022 09:20) (129/52 - 166/70)  BP(mean): --  RR: 18 (12 Apr 2022 11:59) (17 - 18)  SpO2: 95% (12 Apr 2022 11:59) (85% - 98%)    Physical Exam:  General:    NAD,  non toxic  Cardio:     regular S1, S2  Respiratory:    clear b/l,    no wheezing  abd:     soft,   BS +,   no tenderness  :   no CVAT,  no suprapubic tenderness  Musculoskeletal:   no joint swelling  vascular: no phlebitis  Skin:    no rash                          10.5   10.39 )-----------( 270      ( 12 Apr 2022 07:57 )             34.2       04-12    141  |  102  |  28<H>  ----------------------------<  90  3.6   |  24  |  0.89    Ca    8.9      12 Apr 2022 07:57  Phos  2.7     04-12  Mg     1.90     04-12            MICROBIOLOGY:  v  .Bronchial left bronchial lavage  04-05-22   Testing in progress  --  --      .Bronchial left bronchial lavage  04-05-22   No growth  --    No polymorphonuclear leukocytes seen per low power field  No Squamous epithelial cells seen per low power field  No organisms seen per oil power field      .Blood Blood-Venous  03-31-22   No Growth Final  --  --      .Blood Blood-Peripheral  03-31-22   No Growth Final  --  --                RADIOLOGY:  Images independently visualized and reviewed personally, findings as below  < from: Xray Chest 1 View- PORTABLE-Routine (Xray Chest 1 View- PORTABLE-Routine in AM.) (04.08.22 @ 08:05) >  Impression:  Interval development of pulmonary vascular congestive changes with small   bilateral pleural effusions.        < end of copied text >  NEGATIVE FOR MALIGNANT CELLS.   Cytology slides and cell block shows histiocytes and scattered benign   mesothelial cells.

## 2022-04-12 NOTE — PROGRESS NOTE ADULT - PROBLEM SELECTOR PLAN 4
repeat ct pending    4/36:rpt ct chest reviewed: jb acp : and thoracic needs to see ; ? pig tail on left side    4/4: INR is still high: awaiting INR to come down!    4/5: s/p vts: fluid drained: the post op chest xray looks better
repeat ct pending    4/36:rpt ct chest reviewed: jb acp : and thoracic needs to see ; ? pig tail on left side    4/4: INR is still high: awaiting INR to come down!    4/5: s/p vts: fluid drained: the post op chest xray looks better    4/6: s/p vats : left sided chest tube
repeat ct pending    4/36:rpt ct chest reviewed: dw acp : and thoracic needs to see ; ? pig tail on left side    4/4: INR is still high: awaiting INR to come down!    4/5: s/p vts: fluid drained: the post op chest xray looks better    4/6: s/p vats : left sided chest tube    4/7: left pleurax is capped  4/8: s/p vats: doing pretty good  4/9: pleural path still awaited!@
repeat ct pending    4/36:rpt ct chest reviewed: dw acp : and thoracic needs to see ; ? pig tail on left side    4/4: INR is still high: awaiting INR to come down!    4/5: s/p vts: fluid drained: the post op chest xray looks better    4/6: s/p vats : left sided chest tube    4/7: left pleurax is capped  4/8: s/p vats: doing pretty good  4/9: pleural path still awaited!@
repeat ct pending    4/36:rpt ct chest reviewed: jb acp : and thoracic needs to see ; ? pig tail on left side    4/4: INR is still high: awaiting INR to come down!    4/5: s/p vts: fluid drained: the post op chest xray looks better    4/6: s/p vats : left sided chest tube    4/7: left pleurax is capped  4/8: s/p vats: doing pretty good
repeat ct pending    4/36:rpt ct chest reviewed: jb acp : and thoracic needs to see ; ? pig tail on left side    4/4: INR is still high: awaiting INR to come down!    4/5: s/p vts: fluid drained: the post op chest xray looks better    4/6: s/p vats : left sided chest tube    4/7: left pleurax is capped
repeat ct pending    4/36:rpt ct chest reviewed: jb acp : and thoracic needs to see ; ? pig tail on left side    4/4: INR is still high: awaiting INR to come down!
resolved: s/p vats
repeat ct pending    4/36:rpt ct chest reviewed: dw acp : and thoracic needs to see ; ? pig tail on left side    4/4: INR is still high: awaiting INR to come down!    4/5: s/p vts: fluid drained: the post op chest xray looks better    4/6: s/p vats : left sided chest tube    4/7: left pleurax is capped  4/8: s/p vats: doing pretty good  4/9: pleural path still awaited!@
repeat ct pending    4/36:rpt ct chest reviewed: jb acp : and thoracic needs to see ; ? pig tail on left side
repeat ct pending

## 2022-04-12 NOTE — PROGRESS NOTE ADULT - SUBJECTIVE AND OBJECTIVE BOX
Collin Trejo MD  Interventional Cardiology / Advance Heart Failure and Cardiac Transplant Specialist  [place] Office : [address] [place] [address] [place]. [address]  Tel:   [place] Office : [phone] [facility] [place] [address]  Tel: [phone]      Subjective/Overnight events: Pt is lying in bed comfortable not in distress  	  MEDICATIONS:  aspirin enteric coated 81 milliGRAM(s) Oral daily  carvedilol 3.125 milliGRAM(s) Oral every 12 hours  furosemide    Tablet 40 milliGRAM(s) Oral daily  hydrALAZINE Injectable 5 milliGRAM(s) IV Push every 4 hours PRN  isosorbide   dinitrate Tablet (ISORDIL) 10 milliGRAM(s) Oral two times a day      albuterol/ipratropium for Nebulization 3 milliLiter(s) Nebulizer every 6 hours  dornase balta Solution 2.5 milliGRAM(s) Inhalation two times a day  loratadine 10 milliGRAM(s) Oral daily    acetaminophen   IVPB .. 1000 milliGRAM(s) IV Intermittent once PRN  acetaminophen   IVPB .. 1000 milliGRAM(s) IV Intermittent once PRN  diphenhydrAMINE 25 milliGRAM(s) Oral every 6 hours PRN      dextrose 50% Injectable 12.5 Gram(s) IV Push once  dextrose Oral Gel 15 Gram(s) Oral once PRN  glucagon  Injectable 1 milliGRAM(s) IntraMuscular once  insulin lispro (ADMELOG) corrective regimen sliding scale   SubCutaneous every 6 hours    AQUAPHOR (petrolatum Ointment) 1 Application(s) Topical every 12 hours  dextrose 5%. 1000 milliLiter(s) IV Continuous <Continuous>  dextrose 5%. 1000 milliLiter(s) IV Continuous <Continuous>  triamcinolone 0.1% Ointment 1 Application(s) Topical every 12 hours PRN      PAST MEDICAL/SURGICAL HISTORY  PAST MEDICAL & SURGICAL HISTORY:  HTN (Hypertension)    Afib  (on Warfarin)    CAD (Coronary Artery Disease)  s/p stent and CABG    CVA (Cerebral Infarction)  2011    CHF (congestive heart failure)  EF 65% Oct 2019    Upper GI bleed    Gout    Diabetes mellitus  type 2, not on meds    History of heart valve replacement with bioprosthetic valve  mitral and aortic    Hyperlipidemia    S/P angioplasty with stent  2011    S/P CABG x 1  (2000)    H/O aortic valve replacement  9/22/14    H/O mitral valve replacement  9/22/14        SOCIAL HISTORY: Substance Use (street drugs): ( x ) never used  (  ) other:    FAMILY HISTORY:  Family history of acute myocardial infarction  mother          PHYSICAL EXAM:  T(C): 36.8 (04-12-22 @ 09:20), Max: 36.8 (04-11-22 @ 17:44)  HR: 68 (04-12-22 @ 09:20) (66 - 76)  BP: 129/52 (04-12-22 @ 09:20) (129/52 - 166/70)  RR: 18 (04-12-22 @ 11:59) (17 - 18)  SpO2: 95% (04-12-22 @ 11:59) (85% - 98%)  Wt(kg): --  I&O's Summary    11 Apr 2022 07:01  -  12 Apr 2022 07:00  --------------------------------------------------------  IN: 0 mL / OUT: 500 mL / NET: -500 mL          EYES:   PERRLA   ENMT:   Moist mucous membranes, Good dentition, No lesions  Cardiovascular: Normal S1 S2, No JVD, No murmurs, No edema  Respiratory: left sided decreased breath sounds  Gastrointestinal:  Soft, Non-tender, + BS	  Extremities: no edema                                  10.5   10.39 )-----------( 270      ( 12 Apr 2022 07:57 )             34.2     04-12    141  |  102  |  28<H>  ----------------------------<  90  3.6   |  24  |  0.89    Ca    8.9      12 Apr 2022 07:57  Phos  2.7     04-12  Mg     1.90     04-12      proBNP:   Lipid Profile:   HgA1c:   TSH:     Consultant(s) Notes Reviewed:  [x ] YES  [ ] NO    Care Discussed with Consultants/Other Providers [ x] YES  [ ] NO    Imaging Personally Reviewed independently:  [x] YES  [ ] NO    All labs, radiologic studies, vitals, orders and medications list reviewed. Patient is seen and examined at bedside. Case discussed with medical team.

## 2022-04-12 NOTE — PROGRESS NOTE ADULT - ASSESSMENT
81 f with DM, HTN, CAD s/p CABG, HFpEF  severe pulm HTN, Bioprosthetic aortic and mitral valve replacement 9/2014, ?CKD per outside records, afib on warfarin, chronic Lt loculated pleural effusion, gout, presented 3/27 with worsening dyspnea on exertion and lower extremity swelling as well as 1 year history of generalized rash and was admitted for CHF exacerbation  initially afebrile, WBC normal  Chest CT: Small left pleural effusion with a loculated component in the left major fissure and along left anterior chest wall,  partial LLL and LEXI collapse. New pleural thickening and/or loculated fluid medially along the left upper hemithorax. Small right pleural effusion and adjacent passive atelectasis. Stable 9 mm right apical nodule.  pt was seen by cardio and pulmonary, did not recommend any thoracentesis and was being diuresed   pt spiked to 101.6 on 3/31 and WBC normal    dyspnea due to CHF exacerbation, has h/o L loculated pleural effusion but now also with LLL and LEXI collapse, likely the cause of new fever, CT 4/2 showed new complete atelectasis of L lung from a mucous plug in L mainstem bronchus, s/p Flexible Bronchoscopy, Left VATS, Pleural Biopsy, Drainage of Pleural Effusion, PleurX Catheter Placement, 1,100ml serous pleural fluid drained, still intubated with hemoptysis s/p bronc and clots were suctioned from R mainstem airway  eosinophilia to 800, was present in previous admissions as well but now also has a rash for about a year, derm's impression was xerosis, strongyloides, toxocara and filaria serology negative    * blood cx negative, afebrile and extubated, pleurex capped  * BAL cx negative, quanitferon negative, pleural cytology  negative for malignant cells, just showed histiocytes and scattered benign mesothelial cells, path still pending  * s/p a 7 day course of zosyn, now off and afebrile            Kristy Erickson MD  contact on teams  After 5pm and on weekends call [phone]

## 2022-04-12 NOTE — PROGRESS NOTE ADULT - PROBLEM SELECTOR PROBLEM 5
Chronic atrial fibrillation

## 2022-04-12 NOTE — PROGRESS NOTE ADULT - PROBLEM SELECTOR PLAN 6
she has severe pulm htn: likely secondary to SABIHA and cardiac etiology: needs PSG as anoutpt.
she has severe pulm htn: likely secondary to SABIHA and cardiac etiology: needs PSG as anoutpt.    4/5: likely secondary to multiple factors: cont conservative management;    4/7: doing ok : follow up    4/11: has sever -ulm htn: likely multifactorial: would need to cotn current medications and see the requirement for home oxygen
she has severe pulm htn: likely secondary to SABIHA and cardiac etiology: needs PSG as anoutpt.
she has severe pulm htn: likely secondary to SABIHA and cardiac etiology: needs PSG as anoutpt.    4/5: likely secondary to multiple factors: cont conservative management;
she has severe pulm htn: likely secondary to SABIHA and cardiac etiology: needs PSG as anoutpt.    4/5: likely secondary to multiple factors: cont conservative management;    4/7: doing ok : follow up
she has severe pulm htn: likely secondary to SABIHA and cardiac etiology: needs PSG as anoutpt.    4/5: likely secondary to multiple factors: cont conservative management;    4/7: doing ok : follow up
she has severe pulm htn: likely secondary to SABIHA and cardiac etiology: needs PSG as anoutpt.    4/5: likely secondary to multiple factors: cont conservative management;
she has severe pulm htn: likely secondary to SABIHA and cardiac etiology: needs PSG as anoutpt.    4/5: likely secondary to multiple factors: cont conservative management;    4/7: doing ok : follow up
she has severe pulm htn: likely secondary to SABIHA and cardiac etiology: needs PSG as anoutpt.    4/5: likely secondary to multiple factors: cont conservative management;    4/7: doing ok : follow up    4/11: has sever -ulm htn: likely multifactorial: would need to cotn current medications and see the requirement for home oxygen
she has severe pulm htn: likely secondary to SABIHA and cardiac etiology: needs PSG as anoutpt.    4/5: likely secondary to multiple factors: cont conservative management;
she has severe pulm htn: likely secondary to SABIHA and cardiac etiology: needs PSG as anoutpt.

## 2022-04-12 NOTE — PROGRESS NOTE ADULT - PROBLEM SELECTOR PROBLEM 7
Eosinophilia
Eosinophilia
Papular rash, generalized
Eosinophilia
Eosinophilia
Hypertension
Eosinophilia

## 2022-04-12 NOTE — PROGRESS NOTE ADULT - PROBLEM SELECTOR PROBLEM 1
Acute on chronic heart failure with preserved ejection fraction

## 2022-04-12 NOTE — PROGRESS NOTE ADULT - PROBLEM SELECTOR PROBLEM 9
Hypertension
T2DM (type 2 diabetes mellitus)
Hypertension
Hypertension
T2DM (type 2 diabetes mellitus)
Hypertension
T2DM (type 2 diabetes mellitus)
Hypertension
Cerebrovascular accident (CVA)
Hypertension
Hypertension

## 2022-04-12 NOTE — PROGRESS NOTE ADULT - PROBLEM SELECTOR PROBLEM 6
Pulmonary HTN

## 2022-04-12 NOTE — PROGRESS NOTE ADULT - SUBJECTIVE AND OBJECTIVE BOX
Date of Service  : 04-12-22     INTERVAL HPI/OVERNIGHT EVENTS: Daughter in room . I want to go home.   Vital Signs Last 24 Hrs  T(C): 36.7 (12 Apr 2022 22:11), Max: 36.8 (12 Apr 2022 06:10)  T(F): 98 (12 Apr 2022 22:11), Max: 98.3 (12 Apr 2022 06:10)  HR: 65 (12 Apr 2022 22:11) (65 - 83)  BP: 152/77 (12 Apr 2022 22:11) (129/52 - 186/83)  BP(mean): --  RR: 18 (12 Apr 2022 22:11) (17 - 18)  SpO2: 96% (12 Apr 2022 22:11) (85% - 98%)  I&O's Summary    11 Apr 2022 07:01  -  12 Apr 2022 07:00  --------------------------------------------------------  IN: 0 mL / OUT: 500 mL / NET: -500 mL      MEDICATIONS  (STANDING):  albuterol/ipratropium for Nebulization 3 milliLiter(s) Nebulizer every 6 hours  AQUAPHOR (petrolatum Ointment) 1 Application(s) Topical every 12 hours  aspirin enteric coated 81 milliGRAM(s) Oral daily  carvedilol 3.125 milliGRAM(s) Oral every 12 hours  dextrose 5%. 1000 milliLiter(s) (50 mL/Hr) IV Continuous <Continuous>  dextrose 5%. 1000 milliLiter(s) (100 mL/Hr) IV Continuous <Continuous>  dextrose 50% Injectable 12.5 Gram(s) IV Push once  dornase balta Solution 2.5 milliGRAM(s) Inhalation two times a day  furosemide    Tablet 40 milliGRAM(s) Oral daily  glucagon  Injectable 1 milliGRAM(s) IntraMuscular once  insulin lispro (ADMELOG) corrective regimen sliding scale   SubCutaneous every 6 hours  isosorbide   dinitrate Tablet (ISORDIL) 10 milliGRAM(s) Oral two times a day  loratadine 10 milliGRAM(s) Oral daily  losartan 25 milliGRAM(s) Oral daily    MEDICATIONS  (PRN):  acetaminophen   IVPB .. 1000 milliGRAM(s) IV Intermittent once PRN Temp greater or equal to 38C (100.4F), Mild Pain (1 - 3)  acetaminophen   IVPB .. 1000 milliGRAM(s) IV Intermittent once PRN Temp greater or equal to 38C (100.4F), Mild Pain (1 - 3)  dextrose Oral Gel 15 Gram(s) Oral once PRN Blood Glucose LESS THAN 70 milliGRAM(s)/deciliter  diphenhydrAMINE 25 milliGRAM(s) Oral every 6 hours PRN Rash and/or Itching  hydrALAZINE Injectable 5 milliGRAM(s) IV Push every 4 hours PRN SBP>160  triamcinolone 0.1% Ointment 1 Application(s) Topical every 12 hours PRN itching    LABS:                        10.5   10.39 )-----------( 270      ( 12 Apr 2022 07:57 )             34.2     04-12    141  |  102  |  28<H>  ----------------------------<  90  3.6   |  24  |  0.89    Ca    8.9      12 Apr 2022 07:57  Phos  2.7     04-12  Mg     1.90     04-12      PT/INR - ( 12 Apr 2022 07:57 )   PT: 25.6 sec;   INR: 2.19 ratio         PTT - ( 11 Apr 2022 07:57 )  PTT:34.3 sec    CAPILLARY BLOOD GLUCOSE  114 (12 Apr 2022 00:09)      POCT Blood Glucose.: 138 mg/dL (12 Apr 2022 21:37)  POCT Blood Glucose.: 140 mg/dL (12 Apr 2022 18:18)  POCT Blood Glucose.: 117 mg/dL (12 Apr 2022 12:18)  POCT Blood Glucose.: 104 mg/dL (12 Apr 2022 08:37)  POCT Blood Glucose.: 96 mg/dL (12 Apr 2022 05:53)          REVIEW OF SYSTEMS:  CONSTITUTIONAL: No fever, weight loss, or fatigue  EYES: No eye pain, visual disturbances, or discharge  ENMT:  No difficulty hearing, tinnitus, vertigo; No sinus or throat pain  NECK: No pain or stiffness  RESPIRATORY: No cough, wheezing, chills or hemoptysis; No shortness of breath  CARDIOVASCULAR: No chest pain, palpitations, dizziness, or leg swelling  GASTROINTESTINAL: No abdominal or epigastric pain. No nausea, vomiting, or hematemesis; No diarrhea or constipation. No melena or hematochezia.  GENITOURINARY: No dysuria, frequency, hematuria, or incontinence  :    Consultant(s) Notes Reviewed:  [x ] YES  [ ] NO    PHYSICAL EXAM:  GENERAL: NAD, Oxygen NC   HEAD:  Atraumatic, Normocephalic  NECK: Supple, No JVD, Normal thyroid  NERVOUS SYSTEM:  Alert & Oriented X3, No focal deficit   CHEST/LUNG: Good air entry bilateral except bases   HEART: Regular rate and rhythm; No murmurs, rubs, or gallops  ABDOMEN: Soft, Nontender, Nondistended; Bowel sounds present  EXTREMITIES:  2+ Peripheral Pulses, No clubbing, cyanosis, or edema  SKIN: No rashes or lesions    Care Discussed with Consultants/Other Providers [ x] YES  [ ] NO

## 2022-04-12 NOTE — PROGRESS NOTE ADULT - PROBLEM SELECTOR PROBLEM 8
Papular rash, generalized
Hypertension
Papular rash, generalized
T2DM (type 2 diabetes mellitus)
Hypertension
Hypertension
Papular rash, generalized

## 2022-04-12 NOTE — PROGRESS NOTE ADULT - NS ATTEND AMEND GEN_ALL_CORE FT

## 2022-04-12 NOTE — PROGRESS NOTE ADULT - PROBLEM SELECTOR PLAN 7
need to d repet cbc with diff:
need to d repet cbc with diff:    4/5: no recent eosinophil count
Controlled
need to d repet cbc with diff:    4/5: no recent eosinophil count
per PMD
per PMD
need to d repet cbc with diff:    4/5: no recent eosinophil count
per PMD
need to d repet cbc with diff:
need to d repet cbc with diff:    4/5: no recent eosinophil count
need to d repet cbc with diff:

## 2022-04-12 NOTE — PROGRESS NOTE ADULT - PROBLEM SELECTOR PLAN 2
resolved: ct scan chest is still pending: need to see increasingly loculated pleural fluid which might need intervention:    4/3: now she is afebrile; on antibiotics    4/4: on antibiotics: cultures have been negative: awaiting fluid drainage!    4/5: resolved    4/6: resolved    4/7: resolved    4/8: resolved    4/10 resolved: s/p vats for loculated effusion on lleft sdie!    4/11: resolved
resolved: ct scan chest is still pending: need to see increasingly loculated pleural fluid which might need intervention:    4/6: now she is afebrile; on antibiotics    4/7: on antibiotics: cultures have been negative: awaiting fluid drainage!
resolved: ct scan chest is still pending: need to see increasingly loculated pleural fluid which might need intervention:    4/3: now she is afebrile; on antibiotics    4/4: on antibiotics: cultures have been negative: awaiting fluid drainage!    4/5: resolved    4/6: resolved    4/7: resolved    4/8: resolved
resolved: ct scan chest is still pending: need to see increasingly loculated pleural fluid which might need intervention:    4/6: now she is afebrile; on antibiotics
resolved: ct scan chest is still pending: need to see increasingly loculated pleural fluid which might need intervention:    4/3: now she is afebrile; on antibiotics    4/4: on antibiotics: cultures have been negative: awaiting fluid drainage!    4/5: resolved    4/6: resolved    4/7: resolved    4/8: resolved    4/10 resolved: s/p vats for loculated effusion on lleft sdie!
resolved: ct scan chest is still pending: need to see increasingly loculated pleural fluid which might need intervention:    4/3: now she is afebrile; on antibiotics    4/4: on antibiotics: cultures have been negative: awaiting fluid drainage!    4/5: resolved    4/6: resolved    4/7: resolved    4/8: resolved
resolved: ct scan chest is still pending: need to see increasingly loculated pleural fluid which might need intervention:    4/3: now she is afebrile; on antibiotics    4/4: on antibiotics: cultures have been negative: awaiting fluid drainage!    4/5: resolved
resolved: ct scan chest is still pending: need to see increasingly loculated pleural fluid which might need intervention:    4/3: now she is afebrile; on antibiotics    4/4: on antibiotics: cultures have been negative: awaiting fluid drainage!    4/5: resolved    4/6: resolved
resolved: off antibtiocs
resolved: ct scan chest is still pending: need to see increasingly loculated pleural fluid which might need intervention:
resolved: ct scan chest is still pending: need to see increasingly loculated pleural fluid which might need intervention:    4/3: now she is afebrile; on antibiotics    4/4: on antibiotics: cultures have been negative: awaiting fluid drainage!    4/5: resolved    4/6: resolved    4/7: resolved

## 2022-04-13 ENCOUNTER — TRANSCRIPTION ENCOUNTER (OUTPATIENT)
Age: 82
End: 2022-04-13

## 2022-04-13 VITALS
OXYGEN SATURATION: 97 % | DIASTOLIC BLOOD PRESSURE: 68 MMHG | RESPIRATION RATE: 18 BRPM | TEMPERATURE: 98 F | HEART RATE: 69 BPM | SYSTOLIC BLOOD PRESSURE: 176 MMHG

## 2022-04-13 LAB
ANION GAP SERPL CALC-SCNC: 14 MMOL/L — SIGNIFICANT CHANGE UP (ref 7–14)
BUN SERPL-MCNC: 23 MG/DL — SIGNIFICANT CHANGE UP (ref 7–23)
CALCIUM SERPL-MCNC: 9.1 MG/DL — SIGNIFICANT CHANGE UP (ref 8.4–10.5)
CHLORIDE SERPL-SCNC: 98 MMOL/L — SIGNIFICANT CHANGE UP (ref 98–107)
CO2 SERPL-SCNC: 24 MMOL/L — SIGNIFICANT CHANGE UP (ref 22–31)
CREAT SERPL-MCNC: 0.84 MG/DL — SIGNIFICANT CHANGE UP (ref 0.5–1.3)
EGFR: 69 ML/MIN/1.73M2 — SIGNIFICANT CHANGE UP
GLUCOSE BLDC GLUCOMTR-MCNC: 104 MG/DL — HIGH (ref 70–99)
GLUCOSE BLDC GLUCOMTR-MCNC: 129 MG/DL — HIGH (ref 70–99)
GLUCOSE SERPL-MCNC: 106 MG/DL — HIGH (ref 70–99)
HCT VFR BLD CALC: 34.4 % — LOW (ref 34.5–45)
HGB BLD-MCNC: 11.1 G/DL — LOW (ref 11.5–15.5)
INR BLD: 3.23 RATIO — HIGH (ref 0.88–1.16)
MAGNESIUM SERPL-MCNC: 1.7 MG/DL — SIGNIFICANT CHANGE UP (ref 1.6–2.6)
MCHC RBC-ENTMCNC: 28.5 PG — SIGNIFICANT CHANGE UP (ref 27–34)
MCHC RBC-ENTMCNC: 32.3 GM/DL — SIGNIFICANT CHANGE UP (ref 32–36)
MCV RBC AUTO: 88.2 FL — SIGNIFICANT CHANGE UP (ref 80–100)
NRBC # BLD: 0 /100 WBCS — SIGNIFICANT CHANGE UP
NRBC # FLD: 0 K/UL — SIGNIFICANT CHANGE UP
PHOSPHATE SERPL-MCNC: 3.1 MG/DL — SIGNIFICANT CHANGE UP (ref 2.5–4.5)
PLATELET # BLD AUTO: 254 K/UL — SIGNIFICANT CHANGE UP (ref 150–400)
POTASSIUM SERPL-MCNC: 3.4 MMOL/L — LOW (ref 3.5–5.3)
POTASSIUM SERPL-SCNC: 3.4 MMOL/L — LOW (ref 3.5–5.3)
PROTHROM AB SERPL-ACNC: 37.9 SEC — HIGH (ref 10.5–13.4)
RBC # BLD: 3.9 M/UL — SIGNIFICANT CHANGE UP (ref 3.8–5.2)
RBC # FLD: 15.7 % — HIGH (ref 10.3–14.5)
SODIUM SERPL-SCNC: 136 MMOL/L — SIGNIFICANT CHANGE UP (ref 135–145)
WBC # BLD: 8.2 K/UL — SIGNIFICANT CHANGE UP (ref 3.8–10.5)
WBC # FLD AUTO: 8.2 K/UL — SIGNIFICANT CHANGE UP (ref 3.8–10.5)

## 2022-04-13 PROCEDURE — 99231 SBSQ HOSP IP/OBS SF/LOW 25: CPT

## 2022-04-13 RX ORDER — SODIUM,POTASSIUM PHOSPHATES 278-250MG
1 POWDER IN PACKET (EA) ORAL ONCE
Refills: 0 | Status: COMPLETED | OUTPATIENT
Start: 2022-04-13 | End: 2022-04-13

## 2022-04-13 RX ADMIN — DORNASE ALFA 2.5 MILLIGRAM(S): 1 SOLUTION RESPIRATORY (INHALATION) at 10:22

## 2022-04-13 RX ADMIN — Medication 3 MILLILITER(S): at 10:23

## 2022-04-13 RX ADMIN — Medication 1 PACKET(S): at 11:20

## 2022-04-13 RX ADMIN — LORATADINE 10 MILLIGRAM(S): 10 TABLET ORAL at 11:21

## 2022-04-13 RX ADMIN — Medication 3 MILLILITER(S): at 03:06

## 2022-04-13 RX ADMIN — Medication 40 MILLIGRAM(S): at 09:14

## 2022-04-13 RX ADMIN — Medication 1 APPLICATION(S): at 05:37

## 2022-04-13 RX ADMIN — Medication 81 MILLIGRAM(S): at 11:20

## 2022-04-13 RX ADMIN — CARVEDILOL PHOSPHATE 3.12 MILLIGRAM(S): 80 CAPSULE, EXTENDED RELEASE ORAL at 05:23

## 2022-04-13 RX ADMIN — ISOSORBIDE DINITRATE 10 MILLIGRAM(S): 5 TABLET ORAL at 05:23

## 2022-04-13 NOTE — DISCHARGE NOTE NURSING/CASE MANAGEMENT/SOCIAL WORK - NSDCFUADDAPPT_GEN_ALL_CORE_FT
Please follow up with your PCP in 2 days for INR check - you will be discharged on 5mg Coumadin but your doctor may adjust your dose based on INR level. INR on 4/12 was 2.19    Follow up with your cardiologist Dr. Kg barrera scheduled for Wed April 20th at 1:15    2 week follow up with Dr. Conteh for wound care check   Follow up with your pulmonologist for management of pleurx catheter  Drain pleurx every M, W, F  Use oxygen at all times at home    Dermatology outpatient f/u - recommend outpatient follow up at our clinic located at [address] [place] [address], [address], [place], [place] ([phone]) after discharge

## 2022-04-13 NOTE — PROGRESS NOTE ADULT - REASON FOR ADMISSION
Acute on chronic diastolic heart failure exacerbation

## 2022-04-13 NOTE — DISCHARGE NOTE NURSING/CASE MANAGEMENT/SOCIAL WORK - NSDCDMENAME_GEN_ALL_CORE_FT
[facility]  [facility] for pleurex catheter supplies (patient provided with extra kits for home)

## 2022-04-13 NOTE — DISCHARGE NOTE NURSING/CASE MANAGEMENT/SOCIAL WORK - NSDCVIVACCINE_GEN_ALL_CORE_FT
influenza, injectable, quadrivalent, preservative free; 17-Oct-2019 12:39; Rashid Santiago (RN); [employer]; [id number] (Exp. Date: 30-Jun-2020); IntraMuscular; Deltoid Right.; 0.5 milliLiter(s); VIS (VIS Published: 15-Aug-2019, VIS Presented: 17-Oct-2019);

## 2022-04-13 NOTE — DISCHARGE NOTE NURSING/CASE MANAGEMENT/SOCIAL WORK - PATIENT PORTAL LINK FT
You can access the FollowMyHealth Patient Portal offered by [facility] by registering at the following website: [email or web address]. By joining [employer]’s FollowMyHealth portal, you will also be able to view your health information using other applications (apps) compatible with our system.

## 2022-04-13 NOTE — DISCHARGE NOTE NURSING/CASE MANAGEMENT/SOCIAL WORK - NSDCPEFALRISK_GEN_ALL_CORE
For information on Fall & Injury Prevention, visit: [email or web address] OR  [email or web address] OR  https://www.cdc.gov/steadi/patient.html

## 2022-04-13 NOTE — CHART NOTE - NSCHARTNOTESELECT_GEN_ALL_CORE
Bronchoscopy
Dermatology/Event Note
Event Note
Pauses on Tele/Event Note

## 2022-04-13 NOTE — DISCHARGE NOTE NURSING/CASE MANAGEMENT/SOCIAL WORK - NSDCDMETYPESERV_GEN_ALL_CORE_FT
Hospital bed delivery to the home and portable oxygen tank delivery to bedside for transport home today Wednesday 4/13/22

## 2022-04-13 NOTE — PROGRESS NOTE ADULT - ASSESSMENT
81 f with DM, HTN, CAD s/p CABG, HFpEF  severe pulm HTN, Bioprosthetic aortic and mitral valve replacement 9/2014, ?CKD per outside records, afib on warfarin, chronic Lt loculated pleural effusion, gout, presented 3/27 with worsening dyspnea on exertion and lower extremity swelling as well as 1 year history of generalized rash and was admitted for CHF exacerbation  initially afebrile, WBC normal  Chest CT: Small left pleural effusion with a loculated component in the left major fissure and along left anterior chest wall,  partial LLL and LEXI collapse. New pleural thickening and/or loculated fluid medially along the left upper hemithorax. Small right pleural effusion and adjacent passive atelectasis. Stable 9 mm right apical nodule.  pt was seen by cardio and pulmonary, did not recommend any thoracentesis and was being diuresed   pt spiked to 101.6 on 3/31 and WBC normal    dyspnea due to CHF exacerbation, has h/o L loculated pleural effusion but now also with LLL and LEXI collapse, likely the cause of new fever, CT 4/2 showed new complete atelectasis of L lung from a mucous plug in L mainstem bronchus, s/p Flexible Bronchoscopy, Left VATS, Pleural Biopsy, Drainage of Pleural Effusion, PleurX Catheter Placement, 1,100ml serous pleural fluid drained, still intubated with hemoptysis s/p bronc and clots were suctioned from R mainstem airway  eosinophilia to 800, was present in previous admissions as well but now also has a rash for about a year, derm's impression was xerosis, strongyloides, toxocara and filaria serology negative    * blood cx negative, afebrile and extubated, pleurex capped  * BAL cx negative, quanitferon negative, pleural cytology  negative for malignant cells, just showed histiocytes and scattered benign mesothelial cells, path also showed chronic pleuritis and reactive mesothelia cells, no carcinoma  * s/p a 7 day course of zosyn, now off and afebrile  * will sign off, please call with questions          Kristy Erickson MD  contact on teams  After 5pm and on weekends call [phone]

## 2022-04-13 NOTE — PROGRESS NOTE ADULT - PROVIDER SPECIALTY LIST ADULT
Cardiology
Cardiology
Internal Medicine
Anesthesia
Cardiology
Critical Care
Infectious Disease
Infectious Disease
Internal Medicine
Pulmonology
Cardiology
Critical Care
Critical Care
Infectious Disease
Internal Medicine
Pulmonology
CT Surgery
Cardiology
Infectious Disease
Internal Medicine
Cardiology
Internal Medicine
Pulmonology

## 2022-04-13 NOTE — PROGRESS NOTE ADULT - SUBJECTIVE AND OBJECTIVE BOX
Collin Trejo MD  Interventional Cardiology / Advance Heart Failure and Cardiac Transplant Specialist  [place] Office : [phone] [place] [address] [place] [address]  Tel:   [place] Office : [address] [facility] [place] [address]  Tel: [phone]      Subjective/Overnight events: Pt is sitting up in chair comfortable not in distress  	  MEDICATIONS:  aspirin enteric coated 81 milliGRAM(s) Oral daily  carvedilol 3.125 milliGRAM(s) Oral every 12 hours  furosemide    Tablet 40 milliGRAM(s) Oral daily  hydrALAZINE Injectable 5 milliGRAM(s) IV Push every 4 hours PRN  isosorbide   dinitrate Tablet (ISORDIL) 10 milliGRAM(s) Oral two times a day  losartan 25 milliGRAM(s) Oral daily      albuterol/ipratropium for Nebulization 3 milliLiter(s) Nebulizer every 6 hours  dornase balta Solution 2.5 milliGRAM(s) Inhalation two times a day  loratadine 10 milliGRAM(s) Oral daily    acetaminophen   IVPB .. 1000 milliGRAM(s) IV Intermittent once PRN  acetaminophen   IVPB .. 1000 milliGRAM(s) IV Intermittent once PRN  diphenhydrAMINE 25 milliGRAM(s) Oral every 6 hours PRN      dextrose 50% Injectable 12.5 Gram(s) IV Push once  dextrose Oral Gel 15 Gram(s) Oral once PRN  glucagon  Injectable 1 milliGRAM(s) IntraMuscular once  insulin lispro (ADMELOG) corrective regimen sliding scale   SubCutaneous three times a day before meals  insulin lispro (ADMELOG) corrective regimen sliding scale   SubCutaneous at bedtime    AQUAPHOR (petrolatum Ointment) 1 Application(s) Topical every 12 hours  dextrose 5%. 1000 milliLiter(s) IV Continuous <Continuous>  dextrose 5%. 1000 milliLiter(s) IV Continuous <Continuous>  triamcinolone 0.1% Ointment 1 Application(s) Topical every 12 hours PRN      PAST MEDICAL/SURGICAL HISTORY  PAST MEDICAL & SURGICAL HISTORY:  HTN (Hypertension)    Afib  (on Warfarin)    CAD (Coronary Artery Disease)  s/p stent and CABG    CVA (Cerebral Infarction)  2011    CHF (congestive heart failure)  EF 65% Oct 2019    Upper GI bleed    Gout    Diabetes mellitus  type 2, not on meds    History of heart valve replacement with bioprosthetic valve  mitral and aortic    Hyperlipidemia    S/P angioplasty with stent  2011    S/P CABG x 1  (2000)    H/O aortic valve replacement  9/22/14    H/O mitral valve replacement  9/22/14        SOCIAL HISTORY: Substance Use (street drugs): ( x ) never used  (  ) other:    FAMILY HISTORY:  Family history of acute myocardial infarction  mother        PHYSICAL EXAM:  T(C): 36.7 (04-13-22 @ 11:57), Max: 36.8 (04-13-22 @ 05:23)  HR: 69 (04-13-22 @ 11:57) (64 - 83)  BP: 176/68 (04-13-22 @ 11:57) (152/77 - 186/83)  1:57) (18 - 18)  SpO2: 97% (04-13-22 @ 11:57) (94% - 97%)  Wt(kg): --  I&O's Summary    13 Apr 2022 07:01  -  13 Apr 2022 14:27  --------------------------------------------------------  IN: 0 mL / OUT: 500 mL / NET: -500 mL        EYES:   PERRLA   ENMT:   Moist mucous membranes, Good dentition, No lesions  Cardiovascular: Normal S1 S2, No JVD, No murmurs, No edema  Respiratory: left sided decreased breath sounds  Gastrointestinal:  Soft, Non-tender, + BS	  Extremities: no edema                                11.1   8.20  )-----------( 254      ( 13 Apr 2022 06:38 )             34.4     04-13    136  |  98  |  23  ----------------------------<  106<H>  3.4<L>   |  24  |  0.84    Ca    9.1      13 Apr 2022 06:38  Phos  3.1     04-13  Mg     1.70     04-13      proBNP:   Lipid Profile:   HgA1c:   TSH:     Consultant(s) Notes Reviewed:  [x ] YES  [ ] NO    Care Discussed with Consultants/Other Providers [ x] YES  [ ] NO    Imaging Personally Reviewed independently:  [x] YES  [ ] NO    All labs, radiologic studies, vitals, orders and medications list reviewed. Patient is seen and examined at bedside. Case discussed with medical team.

## 2022-04-13 NOTE — PROGRESS NOTE ADULT - SUBJECTIVE AND OBJECTIVE BOX
Follow Up:  fever, lung collapse    Interval History/ROS, JATIN: pt doing well, afebrile, normal WBC and path showed chronic pleuritis        Allergies  No Known Allergies        ANTIMICROBIALS:      OTHER MEDS:  acetaminophen   IVPB .. 1000 milliGRAM(s) IV Intermittent once PRN  acetaminophen   IVPB .. 1000 milliGRAM(s) IV Intermittent once PRN  albuterol/ipratropium for Nebulization 3 milliLiter(s) Nebulizer every 6 hours  AQUAPHOR (petrolatum Ointment) 1 Application(s) Topical every 12 hours  aspirin enteric coated 81 milliGRAM(s) Oral daily  carvedilol 3.125 milliGRAM(s) Oral every 12 hours  dextrose 5%. 1000 milliLiter(s) IV Continuous <Continuous>  dextrose 5%. 1000 milliLiter(s) IV Continuous <Continuous>  dextrose 50% Injectable 12.5 Gram(s) IV Push once  dextrose Oral Gel 15 Gram(s) Oral once PRN  diphenhydrAMINE 25 milliGRAM(s) Oral every 6 hours PRN  dornase balta Solution 2.5 milliGRAM(s) Inhalation two times a day  furosemide    Tablet 40 milliGRAM(s) Oral daily  glucagon  Injectable 1 milliGRAM(s) IntraMuscular once  hydrALAZINE Injectable 5 milliGRAM(s) IV Push every 4 hours PRN  insulin lispro (ADMELOG) corrective regimen sliding scale   SubCutaneous three times a day before meals  insulin lispro (ADMELOG) corrective regimen sliding scale   SubCutaneous at bedtime  isosorbide   dinitrate Tablet (ISORDIL) 10 milliGRAM(s) Oral two times a day  loratadine 10 milliGRAM(s) Oral daily  losartan 25 milliGRAM(s) Oral daily  triamcinolone 0.1% Ointment 1 Application(s) Topical every 12 hours PRN      Vital Signs Last 24 Hrs  T(C): 36.7 (13 Apr 2022 11:57), Max: 36.8 (13 Apr 2022 05:23)  T(F): 98.1 (13 Apr 2022 11:57), Max: 98.2 (13 Apr 2022 05:23)  HR: 69 (13 Apr 2022 11:57) (64 - 83)  BP: 176/68 (13 Apr 2022 11:57) (152/77 - 186/83)  BP(mean): --  RR: 18 (13 Apr 2022 11:57) (18 - 18)  SpO2: 97% (13 Apr 2022 11:57) (94% - 97%)    Physical Exam:  General:    NAD,  non toxic  Cardio:     regular S1, S2  Respiratory:    clear b/l,    no wheezing  abd:     soft,   BS +,   no tenderness  :   no CVAT,  no suprapubic tenderness  Musculoskeletal:   no joint swelling  vascular: no phlebitis  Skin:    no rash                          11.1   8.20  )-----------( 254      ( 13 Apr 2022 06:38 )             34.4       04-13    136  |  98  |  23  ----------------------------<  106<H>  3.4<L>   |  24  |  0.84    Ca    9.1      13 Apr 2022 06:38  Phos  3.1     04-13  Mg     1.70     04-13            MICROBIOLOGY:  v  .Bronchial left bronchial lavage  04-05-22   Testing in progress  --  --      .Bronchial left bronchial lavage  04-05-22   No growth  --    No polymorphonuclear leukocytes seen per low power field  No Squamous epithelial cells seen per low power field  No organisms seen per oil power field      .Blood Blood-Venous  03-31-22   No Growth Final  --  --      .Blood Blood-Peripheral  03-31-22   No Growth Final  --  --                RADIOLOGY:  Images independently visualized and reviewed personally, findings as below  < from: Xray Chest 1 View- PORTABLE-Routine (Xray Chest 1 View- PORTABLE-Routine in AM.) (04.08.22 @ 08:05) >    Impression:  Interval development of pulmonary vascular congestive changes with small   bilateral pleural effusions.      Left pleural biopsy:   - Chronic pleuritis and inflamed adipose tissue. No carcinoma is   identified. Immunohistochemistry for calretinin, cytokeratin, TTF-1,   PAX8, GATA3, CDX2 and MOC31 supports the diagnosis and highlight   reactive mesothelial cells.   < from: CT Chest No Cont (04.02.22 @ 13:48) >  IMPRESSION:  Since 3/27/22    New complete atelectasis of the left lung from a mucous plug/secretions   in the left mainstem bronchus.    Large left pleural effusion, increasedsince 3/27/2022.    < end of copied text >

## 2022-04-13 NOTE — DISCHARGE NOTE NURSING/CASE MANAGEMENT/SOCIAL WORK - NSSCTYPOFSERV_GEN_ALL_CORE
RN & PT visits; RN to visit Sunday 4/17/22. PLEASE CALL HOME CARE AGENCY DIRECTLY & IMMEDIATELY FOR IF DO NOT HEAR FROM AGENCY

## 2022-04-13 NOTE — CHART NOTE - NSCHARTNOTEFT_GEN_A_CORE
Reviewed AM labs and noted that INR is 3.23. Patient is currently on coumadin for anticoagulation for AFib. Discussed with Dr. Pitts on discharge planning to hold coumadin tonight and to restart coumadin tomorrow, 4/14. Discharge medication dose for coumadin will be decreased from 5 mg to 3 mg. Discussed with the son at the bedside. The son verbally understood and reiterated the plan to give coumadin tomorrow and to have PT/INR checked on Friday.

## 2022-04-13 NOTE — PROGRESS NOTE ADULT - ASSESSMENT
82yo Female with mhx of HFpEF ef 65% CAD s/p CABG 2000 LIMA to LAD and SVG to OM1 and subsequent PCI to LCx,in 2011, severe pulm HTN, Bioprosthetic aortic and mitral valve replacement 9/2014, CKD per outside records, afib on warfarin, chronic Lt loculated pleural effusion, Type 2 DM, HTN, HLD, gout c/o 3-4 days of worsening dyspnea on exertion and lower extremity swelling    Tele: Afib 60s    1. Acute decompensated diastolic heart failure  - BB held  in setting bradycardia. continue to monitor on tele now improved coreg resumed  - echo shows normaL AVR  and MVR normal LV function decreased RV function sev pulm HTN    - CT shows large left pleural effusion s/p chest tube/VATS/pleural biopsy   -c/w lasix 40 po daily    2. Rash  - Pt with diffuse rash over body  - Ongoing for a year  - Management as per primary team    3. CAD s/p CABG and PCI  - CABG x2 in 2000 (LIMA to LAD, SVG-OM1)  - PCI in 2011 to LCX  - Last LHC in 2019 revealed patent stent and grafts  - Continue ASA, statin, isordil, and losartan and coreg    3. S/p MVR & AVR  -s/p bioprosthetic valves  -echo with bioprosthetic MVR, AVR, grossly normal LV systolic function, decreased RV function and severe pHTN    4. Afib  -rate in 60s  -coreg resumed  -INR 3.23 hold coumadin tonight

## 2022-04-14 RX ORDER — WARFARIN SODIUM 2.5 MG/1
1 TABLET ORAL
Qty: 30 | Refills: 0
Start: 2022-04-14 | End: 2022-05-13

## 2022-04-25 ENCOUNTER — NON-APPOINTMENT (OUTPATIENT)
Age: 82
End: 2022-04-25

## 2022-04-29 ENCOUNTER — APPOINTMENT (OUTPATIENT)
Age: 82
End: 2022-04-29

## 2022-04-29 RX ORDER — FOLIC ACID 1 MG/1
1 TABLET ORAL DAILY
Refills: 0 | Status: ACTIVE | COMMUNITY

## 2022-04-29 RX ORDER — PETROLATUM 420 MG/G
OINTMENT TOPICAL
Refills: 0 | Status: ACTIVE | COMMUNITY

## 2022-04-29 RX ORDER — FUROSEMIDE 40 MG/1
40 TABLET ORAL TWICE DAILY
Refills: 0 | Status: DISCONTINUED | COMMUNITY
Start: 2019-10-17 | End: 2022-04-29

## 2022-04-29 RX ORDER — CHLORHEXIDINE GLUCONATE 4 %
325 (65 FE) LIQUID (ML) TOPICAL DAILY
Refills: 0 | Status: ACTIVE | COMMUNITY

## 2022-04-29 RX ORDER — CARVEDILOL 3.12 MG/1
3.12 TABLET, FILM COATED ORAL TWICE DAILY
Refills: 0 | Status: ACTIVE | COMMUNITY

## 2022-04-29 RX ORDER — ALBUTEROL SULFATE 108 UG/1
108 (90 BASE) AEROSOL, METERED RESPIRATORY (INHALATION) EVERY 6 HOURS
Refills: 0 | Status: ACTIVE | COMMUNITY

## 2022-04-29 RX ORDER — ENALAPRIL MALEATE 20 MG/1
20 TABLET ORAL DAILY
Refills: 0 | Status: DISCONTINUED | COMMUNITY
Start: 2019-11-04 | End: 2022-04-29

## 2022-04-29 RX ORDER — ISOSORBIDE DINITRATE 10 MG/1
10 TABLET ORAL TWICE DAILY
Refills: 0 | Status: ACTIVE | COMMUNITY

## 2022-04-29 RX ORDER — ATORVASTATIN CALCIUM 20 MG/1
20 TABLET, FILM COATED ORAL
Refills: 0 | Status: ACTIVE | COMMUNITY

## 2022-04-29 RX ORDER — ASPIRIN ENTERIC COATED TABLETS 81 MG 81 MG/1
81 TABLET, DELAYED RELEASE ORAL DAILY
Qty: 30 | Refills: 0 | Status: ACTIVE | COMMUNITY

## 2022-04-29 RX ORDER — SPIRONOLACTONE 25 MG/1
25 TABLET ORAL DAILY
Refills: 0 | Status: DISCONTINUED | COMMUNITY
Start: 2019-11-04 | End: 2022-04-29

## 2022-04-29 RX ORDER — GABAPENTIN 100 MG/1
100 CAPSULE ORAL
Refills: 0 | Status: ACTIVE | COMMUNITY

## 2022-04-29 RX ORDER — LOSARTAN POTASSIUM 25 MG/1
25 TABLET, FILM COATED ORAL DAILY
Refills: 0 | Status: ACTIVE | COMMUNITY

## 2022-04-29 RX ORDER — ATORVASTATIN CALCIUM 80 MG/1
80 TABLET, FILM COATED ORAL
Qty: 90 | Refills: 0 | Status: DISCONTINUED | COMMUNITY
Start: 2019-10-03 | End: 2022-04-29

## 2022-04-29 RX ORDER — GLIPIZIDE 10 MG/1
TABLET, FILM COATED, EXTENDED RELEASE ORAL DAILY
Refills: 0 | Status: ACTIVE | COMMUNITY

## 2022-04-29 RX ORDER — ENALAPRIL MALEATE 20 MG/1
20 TABLET ORAL
Qty: 30 | Refills: 0 | Status: DISCONTINUED | COMMUNITY
Start: 2019-10-17 | End: 2022-04-29

## 2022-04-29 RX ORDER — FUROSEMIDE 40 MG/1
40 TABLET ORAL DAILY
Refills: 0 | Status: ACTIVE | COMMUNITY

## 2022-04-29 RX ORDER — LORATADINE 10 MG/1
10 TABLET ORAL
Refills: 0 | Status: ACTIVE | COMMUNITY

## 2022-04-29 NOTE — HISTORY OF PRESENT ILLNESS
[Other Location: e.g. Home (Enter Location, City,State)___] : at [unfilled] [FreeTextEntry1] : [facility] Follow up: Heart Failure

## 2022-05-04 LAB
CULTURE RESULTS: SIGNIFICANT CHANGE UP
SPECIMEN SOURCE: SIGNIFICANT CHANGE UP

## 2022-05-25 LAB
CULTURE RESULTS: SIGNIFICANT CHANGE UP
SPECIMEN SOURCE: SIGNIFICANT CHANGE UP

## 2022-06-04 NOTE — PROGRESS NOTE ADULT - PROBLEM/PLAN-9
Patient set up, education, procedures explained prior to testing.  Mask/glove precaution taken.  Disposable leads/sensors used where applicable. The patient met split night criteria.  CPAP initiated w/ F&P Eson nasal mask size medium.  Post study follow up instructions given in the AM.  
DISPLAY PLAN FREE TEXT

## 2022-06-06 NOTE — ED PROVIDER NOTE - CROS ED ROS STATEMENT
normal gait and station , no tenderness or deformities present all other ROS negative except as per HPI

## 2022-07-19 NOTE — ASU PREOP CHECKLIST - WAS PATIENT ON BETA BLOCKER?
[facility] RADIATION ONCOLOGY  [address]  [address]  [facility] [address]  Dept Phone: [phone]    Radiation Oncology Clinical Treatment Plan Note    Patient Name:  Delta Garcia  YOB: 1960  MRN:  [id number]  Account Number:  [id number]  Referring Physician:  Solomon Miller MD  Dictating Physician:  Anupam Pineda MD    Diagnosis:  Endometrial cancer (CMS/HCC) C54.1    Cancer Staging  No matching staging information was found for the patient.    Summary:  Delta has been offered radiation therapy.  Clinical treatment plan was done for the patient for the above diagnosis. Plan section describes the radiation treatment plan and prescription. Orders section lists simulation orders and treatment device as well as treatment planning related orders.    Plan - Intent:   curative - adjuvant    We are planning to deliver intensity modulated radiotherapy.  The planned prescription is listed below.  These elements change at the time of treatment planning or may be altered during the course of therapy based on patient tolerance and disease response.      Radiation Therapy Planning  Treatment Site 1    Treatment intent Curative   Line of treatment Adjuvant   Treatment site Pelvis    Technique IMRT   IMRT sub-technique  VMAT   Medical necessity statement Intensity modulated radiotherapy will be delivered to minimize dose to normal structures and optimize coverage of our target     Prescribed fraction dose 180 cGy    Prescribed total dose 4500 cGy    Prescribed number of fractions 25    Patient position Supine, head first   Immobilization Cradle         Orders:  In order to implement the above plan, the following elements will be required and are therefore requested. Simulation orders are included as well as simulation devices. Treatment plan orders are included for physics staff for treatment planning. Imaging orders are included for setting up treatment fields and verifying the accuracy during  the treatment as well.  These may change as needed before or during the treatment.    RO Orders 7/6/2022 7/14/2022   1.25MM SLICE THICKNESS Yes -   2.5MM SLICE THICKNESS - Yes   COMPLEX TX DEVICE Yes Yes   COMPUTER PLAN LEVEL - 3D Yes -   COMPUTER PLAN LEVEL - IMRT - Yes   CONTINUING MEDICAL PHYSICS Yes Yes   DOSIMETRY CALC (QTY) Yes Yes   FULL BLADDER Yes -   IMRT MLC - Yes   KV/KV DAILY - Yes   PELVIS SACN L2 TO MID FEMUR Yes Yes   RADIATION THERAPY Yes Yes   SIMULATION COMPLEX Yes Yes   SPECIAL PHYSICS CONSULTATION - BRACHY CONSULT Yes -   SPECIAL TREATMENT PROCEDURE Yes Yes   SUPINE Yes Yes   VAC-ROSELYN - Yes   VERIFICATION SIMULATION - Yes        Evaluation:  As the patient undergoes therapy, this patient will be evaluated at least weekly to assure tolerance to the therapy and so that any of the above elements can be changed as needed. Imaging studies will be done at least weekly using on-board imaging technology as ordered and compared to the reference images for accurate setup of the patient.     Additional Information:  Setup instruction, immobilization device information, contrast information and other simulation details are given below.        No

## 2022-08-05 NOTE — ED ADULT TRIAGE NOTE - RESPIRATORY RATE (BREATHS/MIN)
24
on the discharge service for the patient. I have reviewed and made amendments to the documentation where necessary.

## 2022-08-09 NOTE — ED ADULT NURSE NOTE - EXTENSIONS OF SELF_ADULT
None Tazorac Counseling:  Patient advised that medication is irritating and drying.  Patient may need to apply sparingly and wash off after an hour before eventually leaving it on overnight.  The patient verbalized understanding of the proper use and possible adverse effects of tazorac.  All of the patient's questions and concerns were addressed. High Dose Vitamin A Counseling: Side effects reviewed, pt to contact office should one occur. Birth Control Pills Pregnancy And Lactation Text: This medication should be avoided if pregnant and for the first 30 days post-partum. Detail Level: Zone Sarecycline Counseling: Patient advised regarding possible photosensitivity and discoloration of the teeth, skin, lips, tongue and gums.  Patient instructed to avoid sunlight, if possible.  When exposed to sunlight, patients should wear protective clothing, sunglasses, and sunscreen.  The patient was instructed to call the office immediately if the following severe adverse effects occur:  hearing changes, easy bruising/bleeding, severe headache, or vision changes.  The patient verbalized understanding of the proper use and possible adverse effects of sarecycline.  All of the patient's questions and concerns were addressed. Topical Sulfur Applications Pregnancy And Lactation Text: This medication is Pregnancy Category C and has an unknown safety profile during pregnancy. It is unknown if this topical medication is excreted in breast milk. Sarecycline Pregnancy And Lactation Text: This medication is Pregnancy Category D and not consider safe during pregnancy. It is also excreted in breast milk. Doxycycline Pregnancy And Lactation Text: This medication is Pregnancy Category D and not consider safe during pregnancy. It is also excreted in breast milk but is considered safe for shorter treatment courses. Use Enhanced Medication Counseling?: No Azithromycin Counseling:  I discussed with the patient the risks of azithromycin including but not limited to GI upset, allergic reaction, drug rash, diarrhea, and yeast infections. Benzoyl Peroxide Counseling: Patient counseled that medicine may cause skin irritation and bleach clothing.  In the event of skin irritation, the patient was advised to reduce the amount of the drug applied or use it less frequently.   The patient verbalized understanding of the proper use and possible adverse effects of benzoyl peroxide.  All of the patient's questions and concerns were addressed. Dapsone Counseling: I discussed with the patient the risks of dapsone including but not limited to hemolytic anemia, agranulocytosis, rashes, methemoglobinemia, kidney failure, peripheral neuropathy, headaches, GI upset, and liver toxicity.  Patients who start dapsone require monitoring including baseline LFTs and weekly CBCs for the first month, then every month thereafter.  The patient verbalized understanding of the proper use and possible adverse effects of dapsone.  All of the patient's questions and concerns were addressed. Aklief counseling:  Patient advised to apply a pea-sized amount only at bedtime and wait 30 minutes after washing their face before applying.  If too drying, patient may add a non-comedogenic moisturizer.  The most commonly reported side effects including irritation, redness, scaling, dryness, stinging, burning, itching, and increased risk of sunburn.  The patient verbalized understanding of the proper use and possible adverse effects of retinoids.  All of the patient's questions and concerns were addressed. High Dose Vitamin A Pregnancy And Lactation Text: High dose vitamin A therapy is contraindicated during pregnancy and breast feeding. Tazorac Pregnancy And Lactation Text: This medication is not safe during pregnancy. It is unknown if this medication is excreted in breast milk. Erythromycin Pregnancy And Lactation Text: This medication is Pregnancy Category B and is considered safe during pregnancy. It is also excreted in breast milk. Benzoyl Peroxide Pregnancy And Lactation Text: This medication is Pregnancy Category C. It is unknown if benzoyl peroxide is excreted in breast milk. Erythromycin Counseling:  I discussed with the patient the risks of erythromycin including but not limited to GI upset, allergic reaction, drug rash, diarrhea, increase in liver enzymes, and yeast infections. Azithromycin Pregnancy And Lactation Text: This medication is considered safe during pregnancy and is also secreted in breast milk. Winlevi Counseling:  I discussed with the patient the risks of topical clascoterone including but not limited to erythema, scaling, itching, and stinging. Patient voiced their understanding. Spironolactone Counseling: Patient advised regarding risks of diarrhea, abdominal pain, hyperkalemia, birth defects (for female patients), liver toxicity and renal toxicity. The patient may need blood work to monitor liver and kidney function and potassium levels while on therapy. The patient verbalized understanding of the proper use and possible adverse effects of spironolactone.  All of the patient's questions and concerns were addressed. Isotretinoin Counseling: Patient should get monthly blood tests, not donate blood, not drive at night if vision affected, not share medication, and not undergo elective surgery for 6 months after tx completed. Side effects reviewed, pt to contact office should one occur. Dapsone Pregnancy And Lactation Text: This medication is Pregnancy Category C and is not considered safe during pregnancy or breast feeding. Aklief Pregnancy And Lactation Text: It is unknown if this medication is safe to use during pregnancy.  It is unknown if this medication is excreted in breast milk.  Breastfeeding women should use the topical cream on the smallest area of the skin for the shortest time needed while breastfeeding.  Do not apply to nipple and areola. Topical Clindamycin Counseling: Patient counseled that this medication may cause skin irritation or allergic reactions.  In the event of skin irritation, the patient was advised to reduce the amount of the drug applied or use it less frequently.   The patient verbalized understanding of the proper use and possible adverse effects of clindamycin.  All of the patient's questions and concerns were addressed. Bactrim Pregnancy And Lactation Text: This medication is Pregnancy Category D and is known to cause fetal risk.  It is also excreted in breast milk. Bactrim Counseling:  I discussed with the patient the risks of sulfa antibiotics including but not limited to GI upset, allergic reaction, drug rash, diarrhea, dizziness, photosensitivity, and yeast infections.  Rarely, more serious reactions can occur including but not limited to aplastic anemia, agranulocytosis, methemoglobinemia, blood dyscrasias, liver or kidney failure, lung infiltrates or desquamative/blistering drug rashes. Spironolactone Pregnancy And Lactation Text: This medication can cause feminization of the male fetus and should be avoided during pregnancy. The active metabolite is also found in breast milk. Topical Clindamycin Pregnancy And Lactation Text: This medication is Pregnancy Category B and is considered safe during pregnancy. It is unknown if it is excreted in breast milk. Topical Retinoid counseling:  Patient advised to apply a pea-sized amount only at bedtime and wait 30 minutes after washing their face before applying.  If too drying, patient may add a non-comedogenic moisturizer. The patient verbalized understanding of the proper use and possible adverse effects of retinoids.  All of the patient's questions and concerns were addressed. Minocycline Counseling: Patient advised regarding possible photosensitivity and discoloration of the teeth, skin, lips, tongue and gums.  Patient instructed to avoid sunlight, if possible.  When exposed to sunlight, patients should wear protective clothing, sunglasses, and sunscreen.  The patient was instructed to call the office immediately if the following severe adverse effects occur:  hearing changes, easy bruising/bleeding, severe headache, or vision changes.  The patient verbalized understanding of the proper use and possible adverse effects of minocycline.  All of the patient's questions and concerns were addressed. Winlevi Pregnancy And Lactation Text: This medication is considered safe during pregnancy and breastfeeding. Birth Control Pills Counseling: Birth Control Pill Counseling: I discussed with the patient the potential side effects of OCPs including but not limited to increased risk of stroke, heart attack, thrombophlebitis, deep venous thrombosis, hepatic adenomas, breast changes, GI upset, headaches, and depression.  The patient verbalized understanding of the proper use and possible adverse effects of OCPs. All of the patient's questions and concerns were addressed. Isotretinoin Pregnancy And Lactation Text: This medication is Pregnancy Category X and is considered extremely dangerous during pregnancy. It is unknown if it is excreted in breast milk. Azelaic Acid Counseling: Patient counseled that medicine may cause skin irritation and to avoid applying near the eyes.  In the event of skin irritation, the patient was advised to reduce the amount of the drug applied or use it less frequently.   The patient verbalized understanding of the proper use and possible adverse effects of azelaic acid.  All of the patient's questions and concerns were addressed. Azelaic Acid Pregnancy And Lactation Text: This medication is considered safe during pregnancy and breast feeding. Tetracycline Counseling: Patient counseled regarding possible photosensitivity and increased risk for sunburn.  Patient instructed to avoid sunlight, if possible.  When exposed to sunlight, patients should wear protective clothing, sunglasses, and sunscreen.  The patient was instructed to call the office immediately if the following severe adverse effects occur:  hearing changes, easy bruising/bleeding, severe headache, or vision changes.  The patient verbalized understanding of the proper use and possible adverse effects of tetracycline.  All of the patient's questions and concerns were addressed. Patient understands to avoid pregnancy while on therapy due to potential birth defects. Doxycycline Counseling:  Patient counseled regarding possible photosensitivity and increased risk for sunburn.  Patient instructed to avoid sunlight, if possible.  When exposed to sunlight, patients should wear protective clothing, sunglasses, and sunscreen.  The patient was instructed to call the office immediately if the following severe adverse effects occur:  hearing changes, easy bruising/bleeding, severe headache, or vision changes.  The patient verbalized understanding of the proper use and possible adverse effects of doxycycline.  All of the patient's questions and concerns were addressed. Topical Sulfur Applications Counseling: Topical Sulfur Counseling: Patient counseled that this medication may cause skin irritation or allergic reactions.  In the event of skin irritation, the patient was advised to reduce the amount of the drug applied or use it less frequently.   The patient verbalized understanding of the proper use and possible adverse effects of topical sulfur application.  All of the patient's questions and concerns were addressed. Topical Retinoid Pregnancy And Lactation Text: This medication is Pregnancy Category C. It is unknown if this medication is excreted in breast milk. Detail Level: Simple

## 2022-12-27 NOTE — DISCHARGE NOTE ADULT - MEDICATION SUMMARY - MEDICATIONS TO STOP TAKING
"  PROGRESS NOTE  Patient Name: Mingo Aquino  Age/Sex: 87 y.o. male  : 1935  MRN: [id number]    Date of Admission: 2022  Date of Encounter Visit: 22   LOS: 7 days   Patient Care Team:  Adolfo Adame MD as PCP - General (Family Medicine)    Chief Complaint: Bilateral subdural hematoma    Hospital course: Patient is complaining of some headache this morning, he is eager to have something to eat or drink and complaining of dry mouth as well but no nausea or vomiting.  He has a nasogastric feeding tube in position  Afebrile  Hemodynamically stable  Awaiting speech therapy evaluation  Patient is being evaluated for skilled nursing facility and this is a work-up in progress      REVIEW OF SYSTEMS:   CONSTITUTIONAL: no fever or chills  CARDIOVASCULAR: No chest pain, chest pressure or chest discomfort. No palpitations or edema.   RESPIRATORY: No shortness of breath, cough or sputum.   GASTROINTESTINAL: No anorexia, nausea, vomiting or diarrhea. No abdominal pain or blood.   HEMATOLOGIC: No bleeding or bruising.  Positive headache and dry mouth    Ventilator/Non-Invasive Ventilation Settings (From admission, onward)    None            Vital Signs  Temp:  [98 °F (36.7 °C)-98.7 °F (37.1 °C)] 98 °F (36.7 °C)  Heart Rate:  [] 84  Resp:  [18-20] 18  BP: (106-180)/() 163/105  SpO2:  [95 %-97 %] 96 %  on    Device (Oxygen Therapy): room air    Intake/Output Summary (Last 24 hours) at 2022 1118  Last data filed at 2022 0527  Gross per 24 hour   Intake 3174 ml   Output --   Net 3174 ml     Flowsheet Rows    Flowsheet Row First Filed Value   Admission Height 182.9 cm (72\") Documented at 2022 1147   Admission Weight 80.7 kg (178 lb) Documented at 2022 1147        Body mass index is 26.16 kg/m².      22  2238 22  0400 22  0431   Weight: 83.8 kg (184 lb 11.9 oz) 83.8 kg (184 lb 11.9 oz) 87.5 kg (192 lb 14.4 oz)       Physical Exam:  GEN:  No acute distress, " alert, cooperative, well developed responsive but slightly drowsy  EYES:   Sclerae clear. No icterus. PERRL. Normal EOM  ENT:   External ears/nose normal, no oral lesions, no thrush, slightly dry mucous membrane  NECK:  Supple, midline trachea, no JVD  LUNGS: Normal chest on inspection, CTAB, no wheezes. No rhonchi. No crackles. Respirations regular, even and unlabored.   CV:  Regular rhythm and rate. Normal S1/S2. No murmurs, gallops, or rubs noted.  ABD:  Soft, nontender and nondistended. Normal bowel sounds. No guarding  EXT:  Moves all extremities well. No cyanosis. No redness. No edema.   Skin: Dry, intact, no bleeding    Results Review:    Results From Last 14 Days   Lab Units 12/21/22  0349   TRIGLYCERIDES mg/dL 88     Results from last 7 days   Lab Units 12/27/22  0621 12/26/22  0637 12/25/22  0341 12/24/22  1535 12/24/22  0835 12/23/22  0343 12/21/22  0349 12/20/22  1214   SODIUM mmol/L 139 141 143  --  142 140 139 140   POTASSIUM mmol/L 3.8 3.7 3.9 3.9 3.9 3.4* 3.7 4.6   CHLORIDE mmol/L 109* 111* 115*  --  114* 107 104 104   CO2 mmol/L 23.0 23.4 19.0*  --  21.2* 19.8* 19.1* 24.6   BUN mg/dL 27* 26* 23  --  28* 23 19 13   CREATININE mg/dL 0.66* 0.71* 0.57*  --  0.61* 0.67* 0.97 0.88   CALCIUM mg/dL 9.4 8.9 8.8  --  9.0 8.9 9.1 9.4   AST (SGOT) U/L  --   --   --   --   --   --   --  43*   ALT (SGPT) U/L  --   --   --   --   --   --   --  41   ANION GAP mmol/L 7.0 6.6 9.0  --  6.8 13.2 15.9* 11.4   ALBUMIN g/dL  --   --   --   --   --   --   --  4.30     Results from last 7 days   Lab Units 12/20/22  1214   TROPONIN T ng/mL <0.010             Results from last 7 days   Lab Units 12/27/22  0621 12/26/22  0637 12/25/22  0341 12/24/22  0835 12/23/22  0343 12/21/22  0349 12/20/22  1126   WBC 10*3/mm3 14.03* 12.06* 12.24* 13.50* 17.61* 19.72* 14.23*   HEMOGLOBIN g/dL 13.1 11.6* 12.1* 11.6* 12.5* 12.4* 15.4   HEMATOCRIT % 37.7 32.5* 34.9* 34.1* 34.6* 36.5* 43.5   PLATELETS 10*3/mm3 389 272 261 226 223 258 214   MCV  fL 88.7 88.1 89.9 93.7 87.4 93.4 89.3   NEUTROPHIL % % 60.7 68.2 71.4 74.5 74.6 79.7* 80.3*   LYMPHOCYTE % % 17.2* 14.5* 12.7* 10.8* 10.2* 7.4* 8.8*   MONOCYTES % % 12.9* 12.4* 12.9* 13.2* 13.5* 11.9 8.2   EOSINOPHIL % % 3.4 1.7 0.4 0.1* 0.1* 0.0* 0.1*   BASOPHIL % % 0.9 0.5 0.6 0.4 0.3 0.3 0.8   IMM GRAN % % 4.9* 2.7* 2.0* 1.0* 1.3* 0.7* 1.8*     Results from last 7 days   Lab Units 12/21/22  0349 12/20/22  1126   INR  1.11* 1.02   APTT seconds 26.2 23.2     Results from last 7 days   Lab Units 12/24/22  1535   MAGNESIUM mg/dL 2.3     Results from last 7 days   Lab Units 12/21/22  0349   CHOLESTEROL mg/dL 121   TRIGLYCERIDES mg/dL 88   HDL CHOL mg/dL 40     Results from last 7 days   Lab Units 12/24/22  2100   PH, ARTERIAL pH units 7.504*   PCO2, ARTERIAL mm Hg 26.1*   PO2 ART mm Hg 63.4*   HCO3 ART mmol/L 20.5*     Results from last 7 days   Lab Units 12/21/22  0349   HEMOGLOBIN A1C % 5.80*     Glucose   Date/Time Value Ref Range Status   12/27/2022 0623 155 (H) 70 - 130 mg/dL Final     Comment:     Meter: [id number] : [id number] Elizabeth Garcia NA   12/27/2022 0017 129 70 - 130 mg/dL Final     Comment:     Meter: [id number] : [id number] Elizabeth Garcia NA   12/26/2022 1647 128 70 - 130 mg/dL Final     Comment:     Meter: [id number] : [id number] Hatchett Sherrish    12/26/2022 1137 129 70 - 130 mg/dL Final     Comment:     Meter: [id number] : [id number] [facility]   12/26/2022 0645 128 70 - 130 mg/dL Final     Comment:     Meter: [id number] : [id number] [place] Mandi NA   12/26/2022 0011 124 70 - 130 mg/dL Final     Comment:     Meter: [id number] : [id number] [place] Mandi NA   12/25/2022 1149 121 70 - 130 mg/dL Final     Comment:     Meter: [id number] : [id number] Connie Maldonado NA   12/25/2022 0022 139 (H) 70 - 130 mg/dL Final     Comment:     Meter: [id number] : [id number] Lamin Mansfield RN                               Imaging:   Imaging Results (All)      Procedure Component Value Units Date/Time          I reviewed the patient's new clinical results.  I personally viewed and interpreted the patient's imaging results:        Medication Review:   amLODIPine, 10 mg, Oral, Daily  digoxin, 250 mcg, Intravenous, Daily  ipratropium-albuterol, 3 mL, Nebulization, 4x Daily - RT  levETIRAcetam, 500 mg, Intravenous, Q12H  lisinopril, 10 mg, Oral, Q24H  melatonin, 5 mg, Oral, Nightly  metoprolol tartrate, 25 mg, Oral, Q12H  QUEtiapine, 50 mg, Oral, Nightly  sodium chloride, 10 mL, Intravenous, Q12H             ASSESSMENT:   1. Bilateral subdural hematoma, L>R.    Traumatic and secondary to anticoagulation, s/p craniotomy & evacuation 12/20  2. Brain swelling and midline shift  3. Delirium  4. Leukocytosis, likely stress-induced.  No evidence of infection  5. History of stroke, on anticoagulation with Plavix  6. Hyperglycemia  7. Pulmonary infiltrates,?  Probably Chronic scarring  8. Anemia, new on this admission.  No evidence of blood loss.  9. A. fib with mild RVR  10. Hyperchloremic metabolic acidosis  11. Hypertension, started on lisinopril and amlodipine and metoprolol    PLAN:  CCP note reviewed, working on placement patient will likely require skilled nursing facility and was not a candidate for acute inpatient rehab  Blood pressure is doing better but the patient still having fluctuations with level as low as 106 and as high as 182  Continue with the current regimen with amlodipine 10/lisinopril 10/metoprolol 25 and we will consider increasing the dose of lisinopril if still having high readings by tomorrow  Awaiting speech therapy evaluation to decide on the fate of the nasogastric tube, less likely to require a PEG tube and hopefully should be able to remove that temporary feeding tube soon  Will have some as needed Tylenol for headache and hold the morphine for severe pain  Leukocytosis is mild and relatively stable and we will continue to monitor.  Placement  work-up in progress, CCP note reviewed    Discussed with family at the bedside    Disposition: Skilled nursing facility    Brandt Thapa MD  12/27/22  11:18 EST           Dictated utilizing Dragon dictation   I will STOP taking the medications listed below when I get home from the hospital:    Deltasone 10 mg oral tablet  -- 4 tab(s) by mouth once a day for 3 days, then 2 tabs daily for 3 days, then 1 tab daily for 3 days, then stop.  -- It is very important that you take or use this exactly as directed.  Do not skip doses or discontinue unless directed by your doctor.  Obtain medical advice before taking any non-prescription drugs as some may affect the action of this medication.  Take with food or milk.

## 2023-03-08 NOTE — H&P ADULT - PROBLEM SELECTOR PLAN 11
Without residual deficits at this time per daughter and patient although unclear clinical course occurring years ago. Continue to monitor and continue ASA and statin
SQ ICD

## 2023-04-27 NOTE — PHYSICAL THERAPY INITIAL EVALUATION ADULT - REHAB POTENTIAL, PT EVAL
NEEL GALINDO. Pt asymptomatic.  Scheduled today at 11:00
Noted.
Pt stated CS wanted her to notify her if her bp went over 150/100. Pt stated her bp went to 154/102. Pt stated she doesn't have any sxs. Pt is scheduled for today on below.   FYI    Future Appointments   Date Time Provider Meghan Carlson   4/27/2023 11:00 AM Troy Melgar PA-C [facility]
fair, will monitor progress closely

## 2023-04-28 NOTE — H&P ADULT. - NEGATIVE GASTROINTESTINAL SYMPTOMS
HF RN has been following peripherally for her chronic diastolic heart failure. She does have a follow up appointment with her usual cardiologist, Dr. Casa Snell. This was scheduled prior to this admission, she has appropriate discharge instructions on her AVS, as well as on the [address].
no vomiting/no change in bowel habits/no abdominal pain/no nausea

## 2023-11-15 NOTE — CONSULT NOTE ADULT - CONSULT REQUESTED DATE/TIME
Operative Report      Patient: John Cohen MRN: [id number]  SSN: [id number]    YOB: 1966  Age: 61 y.o. Sex: male      Date of Surgery: November 15, 2023    Surgery Location:  [address] [place] [address] Ambulatory Surgery Unit    Preoperative Diagnosis:      Left shoulder glenohumeral arthritis  Left shoulder long head biceps tendinopathy     Postoperative Diagnosis:    Same as above    Procedure(s) Performed:   Left anatomic total shoulder arthroplasty  Left open biceps tenodesis    Implants:    Implant Name Type Inv. Item Serial No.  Lot No. LRB No. Used Action   COMPONENT MARIZA FIX M [id number] BACKSIDE RAD CORTILOC PEGGED - [id number]  COMPONENT MARIZA FIX M [id number] BACKSIDE RAD CORTILOC PEGGED [id number] [employer] N/A Left 1 Implanted   CEMENT BNE 40GM W/ GENT HI VISC CO - SN/A  CEMENT BNE 40GM W/ GENT HI VISC CO N/A [employer]WD [id number] Left 1 Implanted   HEAD HUM 48P89 MM SHLDR SIMPLICITI - [id number]  HEAD HUM 70A66 MM [facility] SIMPLICITI [id number] [employer] N/A Left 1 Implanted   COMPONENT HUM SZ 2 SHLDR [facility] SIMPLICITI - [id number]  COMPONENT HUM SZ 2 SHLDR [address] [id number] [address] [place] [address],[address] INC-WD N/A Left 1 Implanted        Surgeon:  Colonel Andreia Gama MD    Assistant:  Surgical Assistant: Madison Myers. A skilled assistant was necessary throughout the case, including positioning, prepping and draping, retraction and suction irrigation for adequate visualization, wound closure, dressing placement, and sling placement .      Anesthesia:  General     Tourniquet time:  None    Estimated Blood Loss:  450 mL       Specimens:  None    Complications: * No complications entered in OR log * None    Indications: John Cohen is a 61 y.o. male with a history of Left shoulder pain and imaging findings consistent with end-stage glenohumeral arthritis who had failed 11-Oct-2017 18:30

## 2024-02-28 NOTE — ED ADULT TRIAGE NOTE - SPO2 (%)
----- Message from Keely Giron MD sent at 2/27/2024  4:22 PM CST -----  Lab results including CBC, TSH and BMP completely normal.  Please notify patient   100

## 2024-04-11 NOTE — DISCHARGE NOTE NURSING/CASE MANAGEMENT/SOCIAL WORK - NSPROEXTENSIONSOFSELF_GEN_A_NUR
----- Message from Karl Stein sent at 4/11/2024 10:33 AM CDT -----  Patient of Becky       Pt Said the Ozempic medication is really helping with anything , she didn't want to go in detail with me she asked to speak with a nurse      semaglutide (OZEMPIC) 0.25 mg or 0.5 mg(2 mg/1.5 mL) pen injector    04/09/2024    @10:35    Pt #  [phone]     none

## 2024-05-02 NOTE — PHYSICAL THERAPY INITIAL EVALUATION ADULT - BALANCE DISTURBANCE, IDENTIFIED IMPAIRMENT CONTRIBUTE, REHAB EVAL
PAST SURGICAL HISTORY:  No significant past surgical history      impaired postural control/decreased strength

## 2024-07-05 NOTE — H&P ADULT - PROBLEM SELECTOR PLAN 8
Dr. Juice Wiggins reviewed lab results from [facility].  Patient's LDL cholesterol is 127.  Patient cannot tolerate statins.  Would recommend trying Ezetimibe 10 mg daily.  Call placed to patient and advised.  Patient verbalized understanding.  However, she is going to check into the cost of the medication with her Insurance and call the office back on Monday with her decision whether she would like to go on it.  
Patient called and stated she is willing to start the ezetimibe 10 mg. Order pended for signing. Routed to Cordelia Luna LPN   
Restart home losartan, pressures can tolerate SBP 180s in ED  Cont Carvedilol

## 2024-07-29 NOTE — DIETITIAN INITIAL EVALUATION ADULT. - PATIENT PROFILE REVIEWED
yes

## 2024-08-02 NOTE — DISCHARGE NOTE ADULT - ABILITY TO HEAR (WITH HEARING AID OR HEARING APPLIANCE IF NORMALLY USED):
Addended by: WOOD COYNE on: 8/2/2024 03:17 PM     Modules accepted: Orders    
Adequate: hears normal conversation without difficulty

## 2024-08-03 NOTE — CONSULT NOTE ADULT - ASSESSMENT
The patient is a 77 year old female with a history of HTN, DM, CAD s/p PCI and CABG, chronic AF, CVA, bioprosthetic AVR, MVR who presents with shortness of breath in the setting of mild acute on chronic diastolic heart failure.    Plan:  - Check one set of cardiac enzymes  - Check BNP  - BNP with no obvious congestion or effusions  - Exam without significant evidence of heart failure  - INR supratherapeutic; hold for next two evenings and follow-up for INR check next week  - If above work-up not significantly abnormal and patient feels improved when ambulating, she can be discharged. She can increase her furosemide dose to 40 mg bid for next 3-4 days and then resume daily dosing thereafter.
Initial (On Arrival)

## 2025-03-10 NOTE — PROVIDER CONTACT NOTE (OTHER) - ACTION/TREATMENT ORDERED:
Patient currently taking Ozempic for diabetes management and weight loss  With new onset of nausea vomiting and diarrhea, 1 dose of Ozempic skipped  No abdominal pain on exam  Abdominal x-ray with no acute findings  Labs with no acute findings, plan to restart Ozempic next week  Patient with a poor appetite since taking Ozempic  Encourage staff to monitor oral intake of foods and fluids  
BP meds given as ordered. Will continue to monitor
Will continue to monitor.
Will continue to monitor.
tele pa notified. Will cont. to monitor and reassess.
Standing medications administered

## 2025-04-16 NOTE — ED ADULT TRIAGE NOTE - LANGUAGE ASSISTANCE NEEDED
Medrol dose pack started in 4/16    Check mri neck   No-Patient/Caregiver offered and refused free interpretation services.

## 2025-04-27 NOTE — ED ADULT TRIAGE NOTE - LANGUAGE ASSISTANCE NEEDED
----- Message from SYLVESTER Keller sent at 4/27/2025  8:15 AM CDT -----  Urine culture grew a small amount of mixed bacteria, not consistent with UTI.  Patient may finish course of Macrobid and should follow up with PCP if symptoms persist.  
Writer spoke with patient to notify of urine culture results. Name and  verified. Patient asking if other results have come in yet, but it looks like they are still in process/not released. Patient to be called once those are resulted. No further questions at this time.   
No-Patient/Caregiver offered and refused free interpretation services.

## 2025-07-28 NOTE — ED PROVIDER NOTE - QUALITY
Lab called, canceled labs,CMP,HFP,Mag,Phosphorus,Lipid panel,albumin urine and GGT, broken/spilled in transit   heavy

## (undated) DEVICE — CONNECTOR REDUCING STRAIGHT 3/8X0.25"

## (undated) DEVICE — SUT VICRYL 3-0 27" SH UNDYED

## (undated) DEVICE — DISSECTOR ENDO PEANUT 5MM

## (undated) DEVICE — DRSG TEGADERM 4X4.75"

## (undated) DEVICE — SUT SILK 2-0 30" TIES

## (undated) DEVICE — GLV 7 PROTEXIS (WHITE)

## (undated) DEVICE — TUBING SUCTION NONCONDUCTIVE 6MM X 12FT

## (undated) DEVICE — HAND-AID ARTERIAL WRIST SUPPORT

## (undated) DEVICE — DRSG TELFA 3 X 8

## (undated) DEVICE — SUT VICRYL 0 27" CT-1 UNDYED

## (undated) DEVICE — WARMING BLANKET LOWER ADULT

## (undated) DEVICE — SUT MONOCRYL 4-0 27" PS-2 UNDYED

## (undated) DEVICE — POSITIONER STRAP ARMBOARD VELCRO TS-30

## (undated) DEVICE — ELCTR EXTENSION STRAIGHT

## (undated) DEVICE — ELCTR GROUNDING PAD ADULT COVIDIEN

## (undated) DEVICE — ELCTR BOVIE TIP BLADE INSULATED 6.5" EDGE

## (undated) DEVICE — DISSECTOR ENDOSCOPIC KITTNER SINGLE TIP

## (undated) DEVICE — DRSG STERISTRIPS 0.5 X 4"

## (undated) DEVICE — SUT VICRYL 2-0 27" UR-6

## (undated) DEVICE — GLV 7.5 PROTEXIS (WHITE)

## (undated) DEVICE — ADAPTER FIBEROPTIC BRONCHOSCOPE DUAL AXIS SWIVEL

## (undated) DEVICE — SUT PROLENE 0 30" CT-1

## (undated) DEVICE — ELCTR BOVIE TIP BLADE INSULATED 2.75" EDGE

## (undated) DEVICE — PREP CHLORAPREP HI-LITE ORANGE 26ML

## (undated) DEVICE — CHEST DRAIN OASIS DRY SUCTION WATER SEAL

## (undated) DEVICE — SUT SILK 0 18" TIES

## (undated) DEVICE — PACK MAJOR ABDOMINAL W ENDO DRAPE

## (undated) DEVICE — TAPE SILK 3"

## (undated) DEVICE — DRAPE LARGE SHEET 72X85"

## (undated) DEVICE — DRAPE IOBAN 33" X 23"

## (undated) DEVICE — SUT VICRYL 1 27" CTX

## (undated) DEVICE — FOLEY TRAY 16FR 5CC LF UMETER CLOSED

## (undated) DEVICE — SYR SLIP 10CC

## (undated) DEVICE — DRAPE MAGNETIC INSTRUMENT MEDIUM

## (undated) DEVICE — VISITEC 4X4

## (undated) DEVICE — SOL ANTI FOG

## (undated) DEVICE — DRSG BENZOIN 0.6CC

## (undated) DEVICE — VENODYNE/SCD SLEEVE CALF MEDIUM

## (undated) DEVICE — SUT SILK 2-0 24" TIES